# Patient Record
Sex: FEMALE | Race: WHITE | NOT HISPANIC OR LATINO | Employment: UNEMPLOYED | URBAN - NONMETROPOLITAN AREA
[De-identification: names, ages, dates, MRNs, and addresses within clinical notes are randomized per-mention and may not be internally consistent; named-entity substitution may affect disease eponyms.]

---

## 2017-01-19 ENCOUNTER — TRANSCRIBE ORDERS (OUTPATIENT)
Dept: CARDIOLOGY | Facility: CLINIC | Age: 47
End: 2017-01-19

## 2017-01-19 DIAGNOSIS — R53.81 CHRONIC FATIGUE AND MALAISE: ICD-10-CM

## 2017-01-19 DIAGNOSIS — R53.82 CHRONIC FATIGUE AND MALAISE: ICD-10-CM

## 2017-01-19 DIAGNOSIS — R00.2 PALPITATIONS: Primary | ICD-10-CM

## 2017-03-14 ENCOUNTER — PROCEDURE VISIT (OUTPATIENT)
Dept: OBSTETRICS AND GYNECOLOGY | Facility: CLINIC | Age: 47
End: 2017-03-14

## 2017-03-14 VITALS
WEIGHT: 199 LBS | BODY MASS INDEX: 31.23 KG/M2 | HEIGHT: 67 IN | DIASTOLIC BLOOD PRESSURE: 72 MMHG | SYSTOLIC BLOOD PRESSURE: 110 MMHG

## 2017-03-14 DIAGNOSIS — Z01.419 WELL WOMAN EXAM WITH ROUTINE GYNECOLOGICAL EXAM: Primary | ICD-10-CM

## 2017-03-14 DIAGNOSIS — M54.5 ACUTE BILATERAL LOW BACK PAIN, WITH SCIATICA PRESENCE UNSPECIFIED: ICD-10-CM

## 2017-03-14 DIAGNOSIS — Z12.31 ENCOUNTER FOR SCREENING MAMMOGRAM FOR MALIGNANT NEOPLASM OF BREAST: Primary | ICD-10-CM

## 2017-03-14 PROCEDURE — 88141 CYTOPATH C/V INTERPRET: CPT | Performed by: PATHOLOGY

## 2017-03-14 PROCEDURE — 88142 CYTOPATH C/V THIN LAYER: CPT | Performed by: NURSE PRACTITIONER

## 2017-03-14 PROCEDURE — 87624 HPV HI-RISK TYP POOLED RSLT: CPT | Performed by: NURSE PRACTITIONER

## 2017-03-14 PROCEDURE — 99386 PREV VISIT NEW AGE 40-64: CPT | Performed by: NURSE PRACTITIONER

## 2017-03-14 NOTE — PROGRESS NOTES
"Subjective   History of Present Illness    Kylie García is a 46 y.o. female who presents for annual exam. No GYN complaints. Patient refuses breast exam. She has had bilateral lower back pains for one month, has been to chiropractor which improves symptoms. Denies dysuria, increased urinary frequency, fever.      Postmenopausal?: No  HRT?: no  Hysterectomy?: no    Date last pap:   History of abnormal Pap smear: no  Date of last mammogram:   History of abnormal mammogram: yes - - BRCA- subsequent right mastectomy    Visit Vitals   • /72   • Ht 67\" (170.2 cm)   • Wt 199 lb (90.3 kg)   • LMP 2017 (Approximate)   • Breastfeeding No   • BMI 31.17 kg/m2       Past Medical History   Diagnosis Date   • Anxiety    • Depression    • Encounter for gynecological examination    • Malignant neoplasm of female breast      hx of dcis s/p mastectomy and reconstruction and augmentation      • Primary malignant neoplasm of breast      dcis in rt breast s/p mastectomy,reconstruction      • Ventricular premature beats        Past Surgical History   Procedure Laterality Date   • Mastectomy Right    • Breast reconstruction  10/28/2008     Abscence of right breast secondary to mastectomy. Implant exchange for reconstruction of right breast with open para-prosthetic capsulotomy   • Breast surgery  10/01/2009     Left breast ptosis and breast asymmetry secondary to right breast reconstruction for breast cancer. Left breast mastopexy with augmentation.   • Cardiac catheterization  2008     Normal coronary arteriography. Normal left ventricular angiogram   • Mastectomy  2008     Multifocal right breast ductal carcinoma-in-situ. Right total mastectomy   • Mastectomy, partial  2008     Ductal carcinoma in-situ of the right breast, proven by mammotome biopsy. Right lumpectomy, medial portion of the breast, between 2 o'clock and 4 o'clock, 5 cm from the nipple, with clip placement, radiographic " inter. [facility]. specimen, & ultrasound   • Pap smear  03/03/2009     Negative   • Breast biopsy  12/07/2007     Mammotome biopsy of the right side with clip placement       The following portions of the patient's history were reviewed and updated as appropriate: allergies, current medications, past family history, past medical history, past social history, past surgical history and problem list.    Review of Systems    Constitutional:  No fatigue, no weight loss, no weight gain, no fever, no chills   Respiratory: No dyspnea, no cough, no hemoptysis, no wheezing, no pleuritic pain   Cardiovascular: No chest pain, no palpitations, no arrhythmia, no orthopnea, no nocturnal dyspnea, no edema, no claudication   Breasts: No discharge from nipple, No breast tenderness and No breast mass   Gastrointestinal: No loss of appetite, No dysphagia, No abdominal pain, No nausea, No vomiting, No change in bowel habits, No diarrhea, No constipation and No blood in stool   Genitourinary: No increased frequency of urination, No dysuria, No hematuria, No nocturia, No urinary incontinence, No vaginal discharge, No abnormal vaginal bleeding, No pelvic pain, No menstrual problem and No menopausal problem   Skin: No skin rash, No skin lesion, No dry skin, No pruritus and No nail problem   Neurologic: No headache, No dizziness, No lightheadedness, No syncope, No vertigo, No weakness, No numbness, No tremor and No paresthesia   Psychiatric: No difficulty sleeping, No mood swings, No feeling anxious, No confusion and No memory loss          Objective   Physical Exam    General:  Alert and oriented x 3, Cooperative, Well developed & well nourished and No acute distress   Abdomen: Soft, nondistended, non-tender, without masses or organomegaly   Genitourinary: Vulva normal, vaginal mucosa normal, bladder nondistended and nontender, cervix normal, uterus normal size and nontender, no adnexal/pelvic tenderness or masses, Bartholin's/Olney/Urethral  gland WNL   Skin: Skin warm and dry and Pattern of hair growth normal       Assessment/Plan   Kylie was seen today for gynecologic exam.    Diagnoses and all orders for this visit:    Well woman exam with routine gynecological exam  -     Liquid-based Pap Smear, Screening    Acute bilateral low back pain, with sciatica presence unspecified  -     Urinalysis  -     Urine Culture      All questions answered.  Recommended self breast exam  Pap smear obtained.  Will rule out UTI.  Mammogram.  Follow up in 1 year.

## 2017-03-17 LAB
LAB AP CASE REPORT: NORMAL
LAB AP GYN ADDITIONAL INFORMATION: NORMAL
LAB AP GYN OTHER FINDINGS: NORMAL
Lab: NORMAL
PATH INTERP SPEC-IMP: NORMAL
STAT OF ADQ CVX/VAG CYTO-IMP: NORMAL

## 2017-03-21 ENCOUNTER — TELEPHONE (OUTPATIENT)
Dept: OBSTETRICS AND GYNECOLOGY | Facility: CLINIC | Age: 47
End: 2017-03-21

## 2017-03-21 LAB — HPV I/H RISK 4 DNA CVX QL PROBE+SIG AMP: NEGATIVE

## 2017-03-21 NOTE — TELEPHONE ENCOUNTER
----- Message from EVELYN Almazan sent at 3/17/2017 10:10 AM CDT -----  Send normal pap card    ----- Message -----     From: Lab, Background User     Sent: 3/17/2017  10:02 AM       To: EVELYN Almazan        Card sent.

## 2017-04-04 ENCOUNTER — OFFICE VISIT (OUTPATIENT)
Dept: SURGERY | Facility: CLINIC | Age: 47
End: 2017-04-04

## 2017-04-04 VITALS
SYSTOLIC BLOOD PRESSURE: 118 MMHG | DIASTOLIC BLOOD PRESSURE: 68 MMHG | HEIGHT: 67 IN | BODY MASS INDEX: 31.08 KG/M2 | WEIGHT: 198 LBS

## 2017-04-04 DIAGNOSIS — Z86.000 HISTORY OF DUCTAL CARCINOMA IN SITU (DCIS) OF BREAST: Primary | ICD-10-CM

## 2017-04-04 PROCEDURE — 99212 OFFICE O/P EST SF 10 MIN: CPT | Performed by: SURGERY

## 2017-04-04 NOTE — PROGRESS NOTES
46-year-old female 8 years out from right mastectomy and reconstruction for DCIS.  Patient has also undergone a left breast augmentation with implants.  No complaints mammogram done of left breast and I reviewed the films as well as the report and agree with radiologist that its a BI-RADS 2.  Patient denies any pain palpable masses nipple discharge associated with her.    Right reconstructed breast is unremarkable there is no palpable masses and no adenopathy  Left augmented breast is without any masses nipple discharge skin changes or adenopathy    Assessment and plan  History of DCIS and right breast with no evidence of disease on current mammogram and exam.  Recommend left breast mammogram in 1 year and return to clinic after the study or return to clinic sooner she has any concerns or questions

## 2017-06-26 ENCOUNTER — OFFICE VISIT (OUTPATIENT)
Dept: SURGERY | Facility: CLINIC | Age: 47
End: 2017-06-26

## 2017-06-26 ENCOUNTER — LAB (OUTPATIENT)
Dept: LAB | Facility: HOSPITAL | Age: 47
End: 2017-06-26

## 2017-06-26 VITALS
WEIGHT: 194 LBS | HEIGHT: 66 IN | SYSTOLIC BLOOD PRESSURE: 134 MMHG | BODY MASS INDEX: 31.18 KG/M2 | DIASTOLIC BLOOD PRESSURE: 72 MMHG

## 2017-06-26 DIAGNOSIS — R10.9 ABDOMINAL PAIN, UNSPECIFIED LOCATION: ICD-10-CM

## 2017-06-26 DIAGNOSIS — R10.9 ABDOMINAL PAIN, UNSPECIFIED LOCATION: Primary | ICD-10-CM

## 2017-06-26 DIAGNOSIS — M54.50 BILATERAL LOW BACK PAIN WITHOUT SCIATICA, UNSPECIFIED CHRONICITY: ICD-10-CM

## 2017-06-26 DIAGNOSIS — K80.10 CALCULUS OF GALLBLADDER WITH CHRONIC CHOLECYSTITIS WITHOUT OBSTRUCTION: ICD-10-CM

## 2017-06-26 LAB
25(OH)D3 SERPL-MCNC: 31.3 NG/ML (ref 30–100)
ALBUMIN SERPL-MCNC: 4.3 G/DL (ref 3.4–4.8)
ALP SERPL-CCNC: 364 U/L (ref 38–126)
ALT SERPL W P-5'-P-CCNC: 382 U/L (ref 9–52)
AST SERPL-CCNC: 240 U/L (ref 14–36)
BILIRUB CONJ SERPL-MCNC: 0 MG/DL (ref 0–0.3)
BILIRUB INDIRECT SERPL-MCNC: 0.3 MG/DL (ref 0–1.1)
BILIRUB SERPL-MCNC: 0.6 MG/DL (ref 0.2–1.3)
BILIRUB UR QL STRIP: NEGATIVE
CLARITY UR: CLEAR
COLOR UR: YELLOW
GLUCOSE UR STRIP-MCNC: NEGATIVE MG/DL
HGB UR QL STRIP.AUTO: NEGATIVE
KETONES UR QL STRIP: NEGATIVE
LEUKOCYTE ESTERASE UR QL STRIP.AUTO: ABNORMAL
NITRITE UR QL STRIP: NEGATIVE
PH UR STRIP.AUTO: <=5 [PH] (ref 5–9)
PROT SERPL-MCNC: 7.6 G/DL (ref 6.3–8.6)
PROT UR QL STRIP: NEGATIVE
SP GR UR STRIP: 1.01 (ref 1–1.03)
TSH SERPL DL<=0.05 MIU/L-ACNC: 1.68 MIU/ML (ref 0.46–4.68)
UROBILINOGEN UR QL STRIP: ABNORMAL

## 2017-06-26 PROCEDURE — 81003 URINALYSIS AUTO W/O SCOPE: CPT | Performed by: NURSE PRACTITIONER

## 2017-06-26 PROCEDURE — 80076 HEPATIC FUNCTION PANEL: CPT

## 2017-06-26 PROCEDURE — 36415 COLL VENOUS BLD VENIPUNCTURE: CPT

## 2017-06-26 PROCEDURE — 84443 ASSAY THYROID STIM HORMONE: CPT

## 2017-06-26 PROCEDURE — 87086 URINE CULTURE/COLONY COUNT: CPT | Performed by: NURSE PRACTITIONER

## 2017-06-26 PROCEDURE — 82306 VITAMIN D 25 HYDROXY: CPT

## 2017-06-26 PROCEDURE — 99214 OFFICE O/P EST MOD 30 MIN: CPT | Performed by: SURGERY

## 2017-06-26 NOTE — PROGRESS NOTES
Orient[age]-year-old female who I have seen in the past for history of DCIS.  Patient underwent a mastectomy nearly 10 years ago and subsequent tamoxifen treatment for 5 years.  We've been following on annual basis last time was in April of this year and her mammogram at that time of the left breast was unremarkable.  Recently the patient did not feel well for a few months and intermittent epigastric pain and more significant back pain and lower back.  Patient presented to her primary care physician for an ultrasound which I have reviewed myself she does have gallstones in her common bile duct is 5-1/2 mm.  LFTs demonstrated a alkaline phosphatase of 212 and mildly elevated transaminases with a total normal bilirubin.  Patient denies being jaundiced or any history of pancreatitis.  This epigastric pain which she points to the midline can happen anytime does not directly related to meals.  She was also sent for a bone scan and it is hot at L4 and according to the report a few areas in the ribs as well as a skull to suggest possibly metastatic disease.  No other workup is been done.  Patient has been set up to see medical oncology on Thursday Dr. Meadows.    Social History     Social History   • Marital status:      Spouse name: N/A   • Number of children: N/A   • Years of education: N/A     Occupational History   • Not on file.     Social History Main Topics   • Smoking status: Never Smoker   • Smokeless tobacco: Never Used   • Alcohol use No   • Drug use: No   • Sexual activity: Defer     Other Topics Concern   • Not on file     Social History Narrative       Past Medical History:   Diagnosis Date   • Anxiety    • Depression    • Encounter for gynecological examination    • Malignant neoplasm of female breast     hx of dcis s/p mastectomy and reconstruction and augmentation      • Primary malignant neoplasm of breast     dcis in rt breast s/p mastectomy,reconstruction      • Ventricular premature beats         Family History   Problem Relation Age of Onset   • No Known Problems Mother    • No Known Problems Father    • Diabetes Other    • Multiple sclerosis Other        Past Surgical History:   Procedure Laterality Date   • AUGMENTATION MAMMAPLASTY     • BREAST BIOPSY  12/07/2007    Mammotome biopsy of the right side with clip placement   • BREAST LUMPECTOMY     • BREAST RECONSTRUCTION  10/28/2008    Abscence of right breast secondary to mastectomy. Implant exchange for reconstruction of right breast with open para-prosthetic capsulotomy   • BREAST SURGERY  10/01/2009    Left breast ptosis and breast asymmetry secondary to right breast reconstruction for breast cancer. Left breast mastopexy with augmentation.   • CARDIAC CATHETERIZATION  09/18/2008    Normal coronary arteriography. Normal left ventricular angiogram   • MASTECTOMY Right    • MASTECTOMY  02/05/2008    Multifocal right breast ductal carcinoma-in-situ. Right total mastectomy   • MASTECTOMY, PARTIAL  01/03/2008    Ductal carcinoma in-situ of the right breast, proven by mammotome biopsy. Right lumpectomy, medial portion of the breast, between 2 o'clock and 4 o'clock, 5 cm from the nipple, with clip placement, radiographic inter. of severo. specimen, & ultrasound   • PAP SMEAR  03/03/2009    Negative     Alert and appropriate nontoxic-appearing  Nonicteric  Neck soft there was masses  Lungs clear heart regular rate and rhythm  Abdomen soft  Skin warm and dry  Extremities unremarkable    Reviewed the ultrasound and bone scan        Assessment and plan    Less likely metastatic breast cancer with his history of DCIS and appropriate treatment.  Biopsy cannot be ruled out which is this amount of information.  I would recommend we repeat her liver function tests which we will do the day and she should go and follow up with Dr. Meadows as planned.  He will most likely recommend further workup to evaluate this bone scan.  At some point she may need to have her  gallbladder removed and we talked about that and I went over that with her using a pamphlet and we discussed the procedure as well as alternatives risk and benefits and she understands surgery for a laparoscopic cholecystectomy with interoperative cholangiogram.  Patient will follow with us after she sees Dr. Meadows and we ordered LFTs to be obtained today so that they will be available for Dr. Meadows on Thursday                                                      EMR Dragon/Transcription disclaimer:  Much of this encounter note is on electronic transcription/translation of spoken language to printed text.  The electronic translation of spoken language may permit erroneous or at times, nonsensical words or phrases to be inadvertently transcribed;  Although I have reviewed the note for such errors, some may still exist.

## 2017-06-27 LAB — BACTERIA SPEC AEROBE CULT: NORMAL

## 2017-06-29 ENCOUNTER — CONSULT (OUTPATIENT)
Dept: ONCOLOGY | Facility: CLINIC | Age: 47
End: 2017-06-29

## 2017-06-29 ENCOUNTER — LAB (OUTPATIENT)
Dept: ONCOLOGY | Facility: HOSPITAL | Age: 47
End: 2017-06-29

## 2017-06-29 ENCOUNTER — DOCUMENTATION (OUTPATIENT)
Dept: ONCOLOGY | Facility: CLINIC | Age: 47
End: 2017-06-29

## 2017-06-29 VITALS
SYSTOLIC BLOOD PRESSURE: 123 MMHG | TEMPERATURE: 98.1 F | BODY MASS INDEX: 31.1 KG/M2 | WEIGHT: 192.7 LBS | HEART RATE: 68 BPM | DIASTOLIC BLOOD PRESSURE: 71 MMHG | RESPIRATION RATE: 16 BRPM

## 2017-06-29 DIAGNOSIS — R79.89 ELEVATED LIVER FUNCTION TESTS: ICD-10-CM

## 2017-06-29 DIAGNOSIS — C79.51 METASTASIS TO BONE OF UNKNOWN PRIMARY (HCC): Primary | ICD-10-CM

## 2017-06-29 DIAGNOSIS — Z86.000 HISTORY OF DUCTAL CARCINOMA IN SITU (DCIS) OF BREAST: ICD-10-CM

## 2017-06-29 DIAGNOSIS — C80.1 METASTASIS TO BONE OF UNKNOWN PRIMARY (HCC): ICD-10-CM

## 2017-06-29 DIAGNOSIS — Z85.820 HISTORY OF MALIGNANT MELANOMA OF SKIN: ICD-10-CM

## 2017-06-29 DIAGNOSIS — C80.1 METASTASIS TO BONE OF UNKNOWN PRIMARY (HCC): Primary | ICD-10-CM

## 2017-06-29 DIAGNOSIS — C79.51 METASTASIS TO BONE OF UNKNOWN PRIMARY (HCC): ICD-10-CM

## 2017-06-29 LAB
ALBUMIN SERPL-MCNC: 4.3 G/DL (ref 3.4–4.8)
ALBUMIN/GLOB SERPL: 1.1 G/DL (ref 1.1–1.8)
ALP SERPL-CCNC: 415 U/L (ref 38–126)
ALT SERPL W P-5'-P-CCNC: 337 U/L (ref 9–52)
ANION GAP SERPL CALCULATED.3IONS-SCNC: 15 MMOL/L (ref 5–15)
AST SERPL-CCNC: 200 U/L (ref 14–36)
BASOPHILS # BLD AUTO: 0.05 10*3/MM3 (ref 0–0.2)
BASOPHILS NFR BLD AUTO: 0.6 % (ref 0–2)
BILIRUB SERPL-MCNC: 0.7 MG/DL (ref 0.2–1.3)
BUN BLD-MCNC: 17 MG/DL (ref 7–21)
BUN/CREAT SERPL: 19.3 (ref 7–25)
CALCIUM SPEC-SCNC: 9.6 MG/DL (ref 8.4–10.2)
CHLORIDE SERPL-SCNC: 101 MMOL/L (ref 95–110)
CO2 SERPL-SCNC: 24 MMOL/L (ref 22–31)
CREAT BLD-MCNC: 0.88 MG/DL (ref 0.5–1)
DEPRECATED RDW RBC AUTO: 44.1 FL (ref 36.4–46.3)
EOSINOPHIL # BLD AUTO: 0.14 10*3/MM3 (ref 0–0.7)
EOSINOPHIL NFR BLD AUTO: 1.8 % (ref 0–7)
ERYTHROCYTE [DISTWIDTH] IN BLOOD BY AUTOMATED COUNT: 13.4 % (ref 11.5–14.5)
GFR SERPL CREATININE-BSD FRML MDRD: 68 ML/MIN/1.73 (ref 60–135)
GLOBULIN UR ELPH-MCNC: 3.9 GM/DL (ref 2.3–3.5)
GLUCOSE BLD-MCNC: 89 MG/DL (ref 60–100)
HCT VFR BLD AUTO: 37 % (ref 35–45)
HGB BLD-MCNC: 12.9 G/DL (ref 12–15.5)
IMM GRANULOCYTES # BLD: 0.02 10*3/MM3 (ref 0–0.02)
IMM GRANULOCYTES NFR BLD: 0.3 % (ref 0–0.5)
LYMPHOCYTES # BLD AUTO: 2.68 10*3/MM3 (ref 0.6–4.2)
LYMPHOCYTES NFR BLD AUTO: 34.5 % (ref 10–50)
MCH RBC QN AUTO: 31.5 PG (ref 26.5–34)
MCHC RBC AUTO-ENTMCNC: 34.9 G/DL (ref 31.4–36)
MCV RBC AUTO: 90.2 FL (ref 80–98)
MONOCYTES # BLD AUTO: 0.76 10*3/MM3 (ref 0–0.9)
MONOCYTES NFR BLD AUTO: 9.8 % (ref 0–12)
NEUTROPHILS # BLD AUTO: 4.11 10*3/MM3 (ref 2–8.6)
NEUTROPHILS NFR BLD AUTO: 53 % (ref 37–80)
PLATELET # BLD AUTO: 369 10*3/MM3 (ref 150–450)
PMV BLD AUTO: 11.4 FL (ref 8–12)
POTASSIUM BLD-SCNC: 4.1 MMOL/L (ref 3.5–5.1)
PROT SERPL-MCNC: 8.2 G/DL (ref 6.3–8.6)
RBC # BLD AUTO: 4.1 10*6/MM3 (ref 3.77–5.16)
SODIUM BLD-SCNC: 140 MMOL/L (ref 137–145)
WBC NRBC COR # BLD: 7.76 10*3/MM3 (ref 3.2–9.8)

## 2017-06-29 PROCEDURE — 86300 IMMUNOASSAY TUMOR CA 15-3: CPT | Performed by: INTERNAL MEDICINE

## 2017-06-29 PROCEDURE — 85025 COMPLETE CBC W/AUTO DIFF WBC: CPT | Performed by: INTERNAL MEDICINE

## 2017-06-29 PROCEDURE — 36415 COLL VENOUS BLD VENIPUNCTURE: CPT | Performed by: INTERNAL MEDICINE

## 2017-06-29 PROCEDURE — 80053 COMPREHEN METABOLIC PANEL: CPT | Performed by: INTERNAL MEDICINE

## 2017-06-29 PROCEDURE — 99204 OFFICE O/P NEW MOD 45 MIN: CPT | Performed by: INTERNAL MEDICINE

## 2017-06-29 NOTE — PROGRESS NOTES
LCSW met with patient in  Exam room prior to her initial medical oncology visit.  Patient is accompanied by her , Benny.  Pt. Referred having recently been diagnosed with metastatic disease of unknown origin.  Per history, she has history of breast cancer, DCIS and melanoma, surgically removed.    SW introduced self and explained role, hours and contact information provided.  SW presented pt with distress screen and discussed stressors as pt. Completed.    Patient's distress tool was reviewed and discussed with patient.  Patient scored #7  on scale and marked indicators of  Fear and worry that she states is due to the unknown aspects of her recent diagnostics. Patient presents this day with bright affect and mood congruent to situation. Pt. evidences good eye contact and easily engages.  She describes positive family support from her  and daughter,who she notes is a . Pt. Describes adaptive coping and primary focus of this visit is seeking for answers related to her diagnosis.  Patient responses present appropriate given her current situation.   Pt. Presents as a reliable historian.  Collaborative feedback obtained from her .   Pt. Responded receptive to SW feedback. LCSW offered emotional support as pt was encouraged to share her thoughts and feelings.  Feelings were validated and education provided in regard to [facility] services and supportive care resources to include financial counselor, dietician and patient navigator.No immediate SW needs were observed or reported.  Patient states understanding and availability of SW services and willingness to contact undersigned as needs or concerns arise.  Pt was scheduled for additional diagnostics with plan to return to  in one week.

## 2017-06-29 NOTE — PROGRESS NOTES
DATE OF CONSULT: 6/29/2017    REQUESTING SOURCE:  Jason Irvin MD      REASON FOR CONSULTATION:  Suspected bone metastasis on nuclear bone scan and history of ductal carcinoma in situ of right breast      HISTORY OF PRESENT ILLNESS:    47-year-old female with a past medical history significant for history of ductal carcinoma in situ of the right breast diagnosed in December 2007 patient eventually had a mastectomy done in February 2008 and took adjuvant tamoxifen for 5 years until 2013.  Patient also had a history of melanoma of left shoulder which was surgically excised by dermatology, pathology report of which is not available to for review at this point.  Patient started not feeling well around November of last year with intermittent abdominal pain especially in the right upper quadrant and epigastric region.  I don't February 2017 she started having severe and worsening back pain.  Initially patient was seen by chiropractor and had x-rays done which showed scoliosis and arthritis.  Subsequently in view of worsening pain patient had a bone scan done by primary medical doctor on June 20, 2017 which showed increased activity in the region of L4 vertebrae, ribs and calvarium consistent with metastatic disease.  [facility] regarding newly diagnosed suspected bone metastasis.  Patient denies any new skin lesions.  Denies any abnormal swollen and anywhere in the body.  Denies any pain in the body except for pain in lower back.  Denies any vision changes, denies any nausea or vomiting or headaches.  Admits to 4 pound weight loss recently and poor appetite.  Denies any blood in the stool or urine.        PAST MEDICAL HISTORY:    Past Medical History:   Diagnosis Date   • Anxiety    • Depression    • Encounter for gynecological examination    • Malignant neoplasm of female breast     hx of dcis s/p mastectomy and reconstruction and augmentation      •  Primary malignant neoplasm of breast     dcis in rt breast s/p mastectomy,reconstruction      • Ventricular premature beats        PAST SURGICAL HISTORY:  Past Surgical History:   Procedure Laterality Date   • AUGMENTATION MAMMAPLASTY     • BREAST BIOPSY  12/07/2007    Mammotome biopsy of the right side with clip placement   • BREAST LUMPECTOMY     • BREAST RECONSTRUCTION  10/28/2008    Abscence of right breast secondary to mastectomy. Implant exchange for reconstruction of right breast with open para-prosthetic capsulotomy   • BREAST SURGERY  10/01/2009    Left breast ptosis and breast asymmetry secondary to right breast reconstruction for breast cancer. Left breast mastopexy with augmentation.   • CARDIAC CATHETERIZATION  09/18/2008    Normal coronary arteriography. Normal left ventricular angiogram   • MASTECTOMY Right    • MASTECTOMY  02/05/2008    Multifocal right breast ductal carcinoma-in-situ. Right total mastectomy   • MASTECTOMY, PARTIAL  01/03/2008    Ductal carcinoma in-situ of the right breast, proven by mammotome biopsy. Right lumpectomy, medial portion of the breast, between 2 o'clock and 4 o'clock, 5 cm from the nipple, with clip placement, radiographic inter. of severo. specimen, & ultrasound   • PAP SMEAR  03/03/2009    Negative       ALLERGIES:    Allergies   Allergen Reactions   • Percocet [Oxycodone-Acetaminophen] Nausea And Vomiting       SOCIAL HISTORY:   Social History   Substance Use Topics   • Smoking status: Never Smoker   • Smokeless tobacco: Never Used   • Alcohol use No       CURRENT MEDICATIONS:    Current Outpatient Prescriptions   Medication Sig Dispense Refill   • escitalopram (LEXAPRO) 10 MG tablet 15 mg daily.       No current facility-administered medications for this visit.         HOME MEDICATIONS:   Current Outpatient Prescriptions on File Prior to Visit   Medication Sig Dispense Refill   • escitalopram (LEXAPRO) 10 MG tablet 15 mg daily.       No current facility-administered  medications on file prior to visit.        FAMILY HISTORY:    Family History   Problem Relation Age of Onset   • Anemia Mother    • Multiple sclerosis Mother    • No Known Problems Father    • Diabetes Other    • Multiple sclerosis Other        REVIEW OF SYSTEMS:      CONSTITUTIONAL:  Complains of fatigue.Positive for decreased appetite.  Positive for fear pound weight loss recently.  Denies any fever, chills .     HEENT:  No epistaxis, mouth sores or difficulty swallowing.    RESPIRATORY: Complains of shortness of breath with exertion that started a few months back.  No new cough or hemoptysis.    CARDIOVASCULAR:  No chest pain or palpitations.    GASTROINTESTINAL: Positive for abdominal pain in epigastric region.  No  nausea, vomiting or blood in the stool.    GENITOURINARY: No Dysuria or Hematuria.    MUSCULOSKELETAL: Complains of pain in lower back radiating down to left side wall and flank region.    LYMPHATICS:  Denies any abnormal swollen glands anywhere in the body.    NEUROLOGICAL : No tingling or numbness. No headache or dizziness. No seizures or balance problems.    SKIN: Positive for history of melanoma status post surgical removal.  Denies any new skin lesion worrisome for melanoma.        PHYSICAL EXAMINATION:      VITAL SIGNS:  Temp:  [98.1 °F (36.7 °C)] 98.1 °F (36.7 °C)  Heart Rate:  [68] 68  Resp:  [16] 16  BP: (123)/(71) 123/71    GENERAL:  Not in any distress.    HEENT:  Normocephalic, Atraumatic.Eyes  Shows mild pallor. No icterus. Extraocular Movements Intact. No Facial Asymmetry noted.    NECK:  No adenopathy. NO JVD.    RESPIRATORY:  Fair air entry bilateral. No rhonchi or wheezing.    CARDIOVASCULAR:  S1, S2. Regular rate and rhythm. No murmur or gallop appreciated.    ABDOMEN:  Soft, obese, nontender. Bowel sounds present in all four quadrants.  No organomegaly appreciated.    EXTREMITIES:  No edema.No Calf Tenderness.    NEUROLOGIC:  Alert, awake and oriented ×3.  No  Motor or sensory  deficit appreciated. Cranial Nerves 2-12 grossly intact.    SKIN : Evidence of a surgical scar present in the left shoulder.    DIAGNOSTIC DATA:    No results found for: WBC, RBC, HGB, HCT, MCV, MCH, MCHC, RDW, RDWSD, MPV, PLT, NEUTRORELPCT, LYMPHORELPCT, MONORELPCT, EOSRELPCT, BASORELPCT, AUTOIGPER, NEUTROABS, LYMPHSABS, MONOSABS, EOSABS, BASOSABS, AUTOIGNUM, NRBC  Alkaline Phosphatase   Date Value Ref Range Status   06/26/2017 364 (H) 38 - 126 U/L Final     Total Protein   Date Value Ref Range Status   06/26/2017 7.6 6.3 - 8.6 g/dL Final     ALT (SGPT)   Date Value Ref Range Status   06/26/2017 382 (H) 9 - 52 U/L Final     AST (SGOT)   Date Value Ref Range Status   06/26/2017 240 (H) 14 - 36 U/L Final     Total Bilirubin   Date Value Ref Range Status   06/26/2017 0.6 0.2 - 1.3 mg/dL Final     Albumin   Date Value Ref Range Status   06/26/2017 4.30 3.40 - 4.80 g/dL Final     No results found for: IRON, TIBC, LABIRON, FERRITIN, GQXVRCGO70, FOLATE  No results found for: , LABCA2, AFPTM, HCGQUANT, , CHROMGRNA, 2VUAT80RCJ, CEA, REFLABREPO]    Radiology Data :  Nuclear bone scan done on June 20, 2017 showed:  1.  Increased uptake at L4 suggestive of metastatic process.  2.  3 or 4 areas of increased activity in bony calvarium  3.  Some increased activity in tips posteriorly and anteriorly suggestive of metastatic carcinoma to the skeletal system.        Pathology :  Pathology report from December 7, 2007 showed:  FINAL DIAGNOSIS:   RIGHT BREAST MAMMOTOME BIOPSY:        DUCTAL CARCINOMA IN SITU, INTERMEDIATE NUCLEAR GRADE              WITH MICROCALCIFICATION.     ADDENDUM:   Estrogen receptors are positive (90-95% stainability).   Progesterone receptors are positive (90-95% stainability).         ASSESSMENT AND PLAN:      1.  Suspected bone metastasis: Most likely secondary to metastatic focus in L4 vertebral body.  Bone scan done on June 20, 2017 shows abnormal uptake in as well as bilateral hips and calvarium  worrisome force bone metastasis.  Patient does have a history of ductal carcinoma in situ of right breast as well as melanoma of skin.  Both of them can metastasize to bone.  At this point we will schedule patient for PET CT to see if there is involvement of any other organ or any other area in the bone.  The result of nuclear bone scan were discussed with patient and her .  It was discussed with them most likely it's related to malignancy but depending on PET CT will need to get some kind of tissue diagnosis with biopsy.  Further recommendation based on biopsy result.  We will see her back in about one week to go over the result of biopsy.  We'll also do some blood work today with CBC, CMP, CA-15-3 and CA-27-29.    2.  History of ductal carcinoma in situ of right breast: Patient was initially diagnosed in December 2007.  It was positive for estrogen 90-95%, positive for progesterone 90-95%.  Patient had a mastectomy in February 2008 after that she took tamoxifen for 5 years until 2013.    3.  History of melanoma of skin: We do not have pathology report available to review how deep was melanoma at the time of excision.  We will try to obtain the result of pathology.    4.  History of irregular heart rate: Status post ablation    5.  Elevated liver function test: Most recently done liver function test is extremely elevated on patient with AST 40, , alkaline phosphatase 384.  It may be related to metastatic focus in the liver.  PET/CT showed better delineated there is any involvement of liver with cancer.  Patient does have a history of gallstones which can also elevat liver function test.    6.  Health maintenance: She does not smoke.  She is 47 years of age and not due for colonoscopy at this point.  Remains full code.            Thank you for this consultation.    Ovidio Meadows MD  6/29/2017  4:42 PM          EMR Dragon/Transcription disclaimer:   Much of this encounter note is an electronic  transcription/translation of spoken language to printed text. The electronic translation of spoken language may permit erroneous, or at times, nonsensical words or phrases to be inadvertently transcribed; Although I have reviewed the note for such errors, some may still exist.

## 2017-06-29 NOTE — PATIENT INSTRUCTIONS
PET Scan  A PET scan, also called positron emission tomography, is a test that creates pictures of the inside of the body. A PET scan requires a small dose of a harmless radioactive material to be injected into a vein. When this material combines with certain substances in the body, it produces tiny particles that can be detected by a scanner and converted into pictures.   The pictures created during a PET scan can be used to study a disease. They are often used to study cancer and cancer therapy. The colors and brightness on the pictures show different levels of organ and tissue function. For example, cancer tissue appears brighter than normal tissue on a PET scan picture.  LET YOUR HEALTH CARE PROVIDER KNOW ABOUT:   · Any allergies you have.  · All medicines you are taking, including vitamins, herbs, eye drops, creams, and over-the-counter medicines.  · Previous problems you or members of your family have had with the use of anesthetics.  · Any blood disorders you have.  · Previous surgeries you have had.  · Medical conditions you have.  · If you are afraid of cramped spaces (claustrophobic). If claustrophobia is a problem, it usually can be relieved with mild sedatives or antianxiety medicines.  · If you have trouble staying still for long periods of time.  BEFORE THE PROCEDURE   · Do not eat or drink anything after midnight on the night before the procedure or as directed by your health care provider.  · Take medicines only as directed by your health care provider.  · If you have diabetes, ask your health care provider for diet guidelines to control sugar (glucose) levels on the day of the test.  PROCEDURE   · A small amount of radioactive material will be injected into a vein. The test will begin 30-60 minutes after the injection, when the material has traveled around your body.  · You will lie on a cushioned table, and the table will be moved through the center of a machine that looks like a large donut. It  will take about 30-60 minutes for the machine to produce pictures of your body. You will need to stay still during this time.  AFTER THE PROCEDURE  · You may resume your normal diet and activities.  · Drink several 8 oz glasses of water following the test to flush the radioactive material out of your body.     This information is not intended to replace advice given to you by your health care provider. Make sure you discuss any questions you have with your health care provider.     Document Released: 06/23/2004 Document Revised: 01/08/2016 Document Reviewed: 04/01/2015  [employer] Patient Education ©2017 [employer]

## 2017-07-02 LAB
CANCER AG15-3 SERPL-ACNC: 2107 U/ML (ref 0–25)
CANCER AG27-29 SERPL-ACNC: 1817.1 U/ML (ref 0–38.6)

## 2017-07-03 ENCOUNTER — HOSPITAL ENCOUNTER (OUTPATIENT)
Dept: PET IMAGING | Facility: HOSPITAL | Age: 47
Discharge: HOME | End: 2017-07-03
Admitting: INTERNAL MEDICINE

## 2017-07-03 DIAGNOSIS — C79.51 METASTASIS TO BONE OF UNKNOWN PRIMARY (HCC): ICD-10-CM

## 2017-07-03 DIAGNOSIS — C80.1 METASTASIS TO BONE OF UNKNOWN PRIMARY (HCC): ICD-10-CM

## 2017-07-03 PROCEDURE — A9552 F18 FDG: HCPCS | Performed by: INTERNAL MEDICINE

## 2017-07-03 PROCEDURE — 0 FLUDEOXYGLUCOSE F18 SOLUTION: Performed by: INTERNAL MEDICINE

## 2017-07-03 PROCEDURE — 78815 PET IMAGE W/CT SKULL-THIGH: CPT

## 2017-07-03 RX ADMIN — FLUDEOXYGLUCOSE F18 1 DOSE: 300 INJECTION INTRAVENOUS at 09:06

## 2017-07-05 ENCOUNTER — PREP FOR SURGERY (OUTPATIENT)
Dept: OTHER | Facility: HOSPITAL | Age: 47
End: 2017-07-05

## 2017-07-07 ENCOUNTER — ANESTHESIA EVENT (OUTPATIENT)
Dept: PERIOP | Facility: HOSPITAL | Age: 47
End: 2017-07-07

## 2017-07-07 ENCOUNTER — OFFICE VISIT (OUTPATIENT)
Dept: SURGERY | Facility: CLINIC | Age: 47
End: 2017-07-07

## 2017-07-07 ENCOUNTER — OFFICE VISIT (OUTPATIENT)
Dept: ONCOLOGY | Facility: CLINIC | Age: 47
End: 2017-07-07

## 2017-07-07 ENCOUNTER — APPOINTMENT (OUTPATIENT)
Dept: PREADMISSION TESTING | Facility: HOSPITAL | Age: 47
End: 2017-07-07

## 2017-07-07 VITALS
HEIGHT: 67 IN | BODY MASS INDEX: 30.13 KG/M2 | SYSTOLIC BLOOD PRESSURE: 104 MMHG | WEIGHT: 192 LBS | DIASTOLIC BLOOD PRESSURE: 66 MMHG

## 2017-07-07 VITALS
DIASTOLIC BLOOD PRESSURE: 67 MMHG | HEIGHT: 67 IN | TEMPERATURE: 97 F | HEART RATE: 68 BPM | BODY MASS INDEX: 30.04 KG/M2 | WEIGHT: 191.4 LBS | RESPIRATION RATE: 18 BRPM | SYSTOLIC BLOOD PRESSURE: 122 MMHG

## 2017-07-07 VITALS — WEIGHT: 191 LBS | BODY MASS INDEX: 29.98 KG/M2 | HEIGHT: 67 IN

## 2017-07-07 DIAGNOSIS — C80.1 METASTASIS TO BONE OF UNKNOWN PRIMARY (HCC): Primary | ICD-10-CM

## 2017-07-07 DIAGNOSIS — C79.9 METASTATIC DISEASE (HCC): Primary | ICD-10-CM

## 2017-07-07 DIAGNOSIS — C79.51 METASTASIS TO BONE OF UNKNOWN PRIMARY (HCC): Primary | ICD-10-CM

## 2017-07-07 PROCEDURE — 99214 OFFICE O/P EST MOD 30 MIN: CPT | Performed by: INTERNAL MEDICINE

## 2017-07-07 PROCEDURE — 99214 OFFICE O/P EST MOD 30 MIN: CPT | Performed by: SURGERY

## 2017-07-07 PROCEDURE — 93010 ELECTROCARDIOGRAM REPORT: CPT | Performed by: INTERNAL MEDICINE

## 2017-07-07 PROCEDURE — 93005 ELECTROCARDIOGRAM TRACING: CPT

## 2017-07-07 PROCEDURE — G0463 HOSPITAL OUTPT CLINIC VISIT: HCPCS | Performed by: INTERNAL MEDICINE

## 2017-07-07 PROCEDURE — 93005 ELECTROCARDIOGRAM TRACING: CPT | Performed by: ANESTHESIOLOGY

## 2017-07-07 RX ORDER — SODIUM CHLORIDE, SODIUM GLUCONATE, SODIUM ACETATE, POTASSIUM CHLORIDE, AND MAGNESIUM CHLORIDE 526; 502; 368; 37; 30 MG/100ML; MG/100ML; MG/100ML; MG/100ML; MG/100ML
1000 INJECTION, SOLUTION INTRAVENOUS CONTINUOUS PRN
Status: CANCELLED | OUTPATIENT
Start: 2017-07-10

## 2017-07-07 RX ORDER — NAPROXEN SODIUM 220 MG
220 TABLET ORAL AS NEEDED
COMMUNITY
End: 2019-04-19

## 2017-07-07 NOTE — DISCHARGE INSTRUCTIONS
[facility]  Pre-op Information and Guidelines    You will be called after 2 p.m. the day before your surgery (Friday for Monday surgery) and notified of your time for arrival and approximate surgery time.  If you have not received a call by 4P.M., please contact Same Day Surgery at [phone] of if outside [facility] call [phone].    Please Follow these Important Safety Guidelines:    • The morning of your procedure, take only the medications listed below with   A sip of water:_____________________________________________       ______________________________________________    • DO NOT eat or drink anything after 12:00 midnight the night before surgery  Specific instructions concerning drinking clear liquids will be discussed during  the pre-surgery instruction call the day before your surgery.    • If you take a blood thinner (ex. Plavix, Coumadin, aspirin), ask your doctor when to stop it before surgery  STOP DATE: _________________    • Only 2 visitors are allowed in patient rooms at a time  Your visitors will be asked to wait in the lobby until the admission process is complete with the exception of a parent with a child and patients in need of special assistance.    • YOU CANNOT DRIVE YOURSELF HOME  You must be accompanied by someone who will be responsible for driving you home after surgery and for your care at home.    • DO NOT chew gum, use breath mints, hard candy, or smoke the day of surgery  • DO NOT drink alcohol for at least 24 hours before your surgery  • DO NOT wear any jewelry and remove all body piercing before coming to the hospital  • DO NOT wear make-up to the hospital  • If you are having surgery on an extremity (arm/leg/foot) remove nail polish/artificial nails on the surgical side  • Clothing, glasses, contacts, dentures, and hairpieces must be removed before surgery  • Bathe the night before or the morning of your surgery and do not use powders/lotions on  skin.

## 2017-07-07 NOTE — PROGRESS NOTES
DATE OF VISIT: 7/7/2017    REASON FOR VISIT:  Metastatic cancer most likely of breast origin    HISTORY OF PRESENT ILLNESS:    47-year-old female with a past medical history significant for history of ductal carcinoma in situ of the right breast diagnosed in December 2007 patient eventually had a mastectomy done in February 2008 and took adjuvant tamoxifen for 5 years until 2013. Patient also had a history of melanoma in situ of left shoulder diagnosed in September 2016 status postsurgical excision by Dr. Linn.  Patient was initially seen in consultation on June 29, 2017 for evaluation of abnormal bone scan which was done for back pain.  Patient had a PET/CT done earlier this week.  She is here to discuss the result of PET/CT and further recommendation.  Still complains of pain in her back and hip radiating down to leg.  Denies any blood in the stool or urine.    PAST MEDICAL HISTORY:    Past Medical History:   Diagnosis Date   • Anxiety    • Depression    • Encounter for gynecological examination    • Malignant neoplasm of female breast     hx of dcis s/p mastectomy and reconstruction and augmentation      • Primary malignant neoplasm of breast     dcis in rt breast s/p mastectomy,reconstruction      • Ventricular premature beats        SOCIAL HISTORY:    Social History   Substance Use Topics   • Smoking status: Never Smoker   • Smokeless tobacco: Never Used   • Alcohol use No       Surgical History :  Past Surgical History:   Procedure Laterality Date   • AUGMENTATION MAMMAPLASTY     • BREAST BIOPSY  12/07/2007    Mammotome biopsy of the right side with clip placement   • BREAST LUMPECTOMY     • BREAST RECONSTRUCTION  10/28/2008    Abscence of right breast secondary to mastectomy. Implant exchange for reconstruction of right breast with open para-prosthetic capsulotomy   • BREAST SURGERY  10/01/2009    Left breast ptosis and breast asymmetry secondary to right breast reconstruction for breast cancer. Left breast  "mastopexy with augmentation.   • CARDIAC CATHETERIZATION  09/18/2008    Normal coronary arteriography. Normal left ventricular angiogram   • MASTECTOMY Right    • MASTECTOMY  02/05/2008    Multifocal right breast ductal carcinoma-in-situ. Right total mastectomy   • MASTECTOMY, PARTIAL  01/03/2008    Ductal carcinoma in-situ of the right breast, proven by mammotome biopsy. Right lumpectomy, medial portion of the breast, between 2 o'clock and 4 o'clock, 5 cm from the nipple, with clip placement, radiographic inter. of severo. specimen, & ultrasound   • PAP SMEAR  03/03/2009    Negative       ALLERGIES:    Allergies   Allergen Reactions   • Percocet [Oxycodone-Acetaminophen] Nausea And Vomiting       REVIEW OF SYSTEMS:      CONSTITUTIONAL: Complains of fatigue.Positive for decreased appetite. Positive for four pound weight loss recently.  Denies any fever, chills .      HEENT: No epistaxis, mouth sores or difficulty swallowing.     RESPIRATORY: Complains of shortness of breath with exertion that started a few months back. No new cough or hemoptysis.     CARDIOVASCULAR: No chest pain or palpitations.     GASTROINTESTINAL: Positive for abdominal pain in epigastric region. No nausea, vomiting or blood in the stool.     GENITOURINARY: No Dysuria or Hematuria.     MUSCULOSKELETAL: Complains of pain in lower back radiating down to left side wall and flank region.     LYMPHATICS: Denies any abnormal swollen glands anywhere in the body.     NEUROLOGICAL : No tingling or numbness. No headache or dizziness. No seizures or balance problems.     SKIN: Positive for history of melanoma status post surgical removal. Denies any new skin lesion worrisome for melanoma.  Complains of pruritus follower body.        PHYSICAL EXAMINATION:      VITAL SIGNS:  /67  Pulse 68  Temp 97 °F (36.1 °C)  Resp 18  Ht 67\" (170.2 cm)  Wt 191 lb 6.4 oz (86.8 kg)  LMP 06/29/2017  BMI 29.98 kg/m2    GENERAL:  Not in any distress.    HEENT:  " Normocephalic, Atraumatic.Mild Conjunctival pallor. No icterus. Extraocular Movements Intact. No Facial Asymmetry noted.    NECK:  No adenopathy. No JVD.    RESPIRATORY:  Fair air entry bilateral. No rhonchi or wheezing.    CARDIOVASCULAR:  S1, S2. Regular rate and rhythm. No murmur or gallop appreciated.    ABDOMEN:  Soft, obese, nontender. Bowel sounds present in all four quadrants.  No organomegaly appreciated.    EXTREMITIES:  No edema.No Calf Tenderness.    NEUROLOGIC:  Alert, awake and oriented ×3.  No  Motor or sensory deficit appreciated. Cranial Nerves 2-12 grossly intact.      DIAGNOSTIC DATA:    Glucose   Date Value Ref Range Status   06/29/2017 89 60 - 100 mg/dL Final     Sodium   Date Value Ref Range Status   06/29/2017 140 137 - 145 mmol/L Final     Potassium   Date Value Ref Range Status   06/29/2017 4.1 3.5 - 5.1 mmol/L Final     CO2   Date Value Ref Range Status   06/29/2017 24.0 22.0 - 31.0 mmol/L Final     Chloride   Date Value Ref Range Status   06/29/2017 101 95 - 110 mmol/L Final     Anion Gap   Date Value Ref Range Status   06/29/2017 15.0 5.0 - 15.0 mmol/L Final     Creatinine   Date Value Ref Range Status   06/29/2017 0.88 0.50 - 1.00 mg/dL Final     BUN   Date Value Ref Range Status   06/29/2017 17 7 - 21 mg/dL Final     BUN/Creatinine Ratio   Date Value Ref Range Status   06/29/2017 19.3 7.0 - 25.0 Final     Calcium   Date Value Ref Range Status   06/29/2017 9.6 8.4 - 10.2 mg/dL Final     eGFR Non  Amer   Date Value Ref Range Status   06/29/2017 68 >60 mL/min/1.73 Final     Alkaline Phosphatase   Date Value Ref Range Status   06/29/2017 415 (H) 38 - 126 U/L Final     Total Protein   Date Value Ref Range Status   06/29/2017 8.2 6.3 - 8.6 g/dL Final     ALT (SGPT)   Date Value Ref Range Status   06/29/2017 337 (H) 9 - 52 U/L Final     AST (SGOT)   Date Value Ref Range Status   06/29/2017 200 (H) 14 - 36 U/L Final     Total Bilirubin   Date Value Ref Range Status   06/29/2017 0.7 0.2 -  1.3 mg/dL Final     Albumin   Date Value Ref Range Status   06/29/2017 4.30 3.40 - 4.80 g/dL Final     Globulin   Date Value Ref Range Status   06/29/2017 3.9 (H) 2.3 - 3.5 gm/dL Final     A/G Ratio   Date Value Ref Range Status   06/29/2017 1.1 1.1 - 1.8 g/dL Final     Lab Results   Component Value Date    WBC 7.76 06/29/2017    HGB 12.9 06/29/2017    HCT 37.0 06/29/2017    MCV 90.2 06/29/2017     06/29/2017     Lab Results   Component Value Date    NEUTROABS 4.11 06/29/2017     Lab Results   Component Value Date     2107.0 (H) 06/29/2017    LABCA2 1817.1 (H) 06/29/2017       Radiology Data :  PET/CT done on July 3, 2017 showed:  IMPRESSION:  CONCLUSION:  1. Extensive metastatic disease. Pathologic uptake within  enlarged right-sided axillary lymph nodes. Metastatic adenopathy  in both laurie and mediastinum. Tumor replacing most of the left  lobe of liver. Intra-abdominal retroperitoneal metastases,  adenopathy.     Extensive skeletal metastases including a pathologic fracture at  L4.        Nuclear bone scan done on June 20, 2017 showed:  1. Increased uptake at L4 suggestive of metastatic process.  2. 3 or 4 areas of increased activity in bony calvarium  3. Some increased activity in tips posteriorly and anteriorly suggestive of metastatic carcinoma to the skeletal system.           Pathology :  Pathology report from December 7, 2007 showed:  FINAL DIAGNOSIS:   RIGHT BREAST MAMMOTOME BIOPSY:        DUCTAL CARCINOMA IN SITU, INTERMEDIATE NUCLEAR GRADE              WITH MICROCALCIFICATION.      ADDENDUM:   Estrogen receptors are positive (90-95% stainability).   Progesterone receptors are positive (90-95% stainability).         ASSESSMENT AND PLAN:      1.  Metastatic cancer with extensive adenopathy in right axilla, mediastinum, hilar, abdominal, retroperitoneal with extensive liver and bone metastasis on PET/CT.  Most likely relates a breast primary since patient has a history of DCIS in past.  Patient  also had a history of melanoma in situ which was excised in September 2016.  At this point there is a palpable axillary lymphadenopathy on physical exam and referred her to Dr. Ernst to see if that lymph nodes can be removed surgically we can obtain tissue diagnosis.  She will also need port placement for chemotherapy.  If axillary lymph node removal is not possible then we will refer her to liver biopsy under CT guidance.  Depending on results of pathology plan is to start her on chemotherapy on July 17, 2017.  Result of PET/CT and extent of disease were discussed with patient and her .  It was also discussed with patient since she has extensive bone metastasis we will start her on treatment with Zometa or Xgeva with chemotherapy.  We'll talk about chemotherapy Zometa side effect once we see her with the result of pathology.    2.  History of melanoma in situ of left shoulder    3.  History of ductal carcinoma in situ of the right breast diagnosed in 2017.  Status post mastectomy followed by adjuvant tamoxifen for 5 years which was finished in 2013.    4.  Elevated  liver function tests secondary to liver metastases   5.  Health maintenance: Patient does not smoke.  Not a candidate for screening colonoscopy at this point.  Remains full code.        Ovidio Meadows MD  7/7/2017  9:21 AM        EMR Dragon/Transcription disclaimer:   Much of this encounter note is an electronic transcription/translation of spoken language to printed text. The electronic translation of spoken language may permit erroneous, or at times, nonsensical words or phrases to be inadvertently transcribed; Although I have reviewed the note for such errors, some may still exist.

## 2017-07-07 NOTE — PROGRESS NOTES
See prior notes.  Patient has seen Dr. Meadows in and underwent a PET scan which demonstrates metastatic disease involving pelvic bones lumbar and thoracic vertebrae most of left lobe of liver multiple small areas in the right lobe of liver and prominent areas in right axilla.  Patient also has history of a melanoma in situ removed by dermatology from her left shoulder a few years ago.  Went over this patient's studies and discussed this with Dr. Meadows and the rest the tumor board.  Patient obviously needs to have tissue for diagnostic purposes.  Dr. Meadows would also appreciate MediPort placement.  I went over all this with the patient and her  and specifically went over her PET scan with her and answered all the questions.    Social History     Social History   • Marital status:      Spouse name: N/A   • Number of children: N/A   • Years of education: N/A     Occupational History   • Not on file.     Social History Main Topics   • Smoking status: Never Smoker   • Smokeless tobacco: Never Used   • Alcohol use No   • Drug use: No   • Sexual activity: Defer     Other Topics Concern   • Not on file     Social History Narrative       Past Medical History:   Diagnosis Date   • Anxiety    • Depression    • Encounter for gynecological examination    • Malignant neoplasm of female breast     hx of dcis s/p mastectomy and reconstruction and augmentation      • Primary malignant neoplasm of breast     dcis in rt breast s/p mastectomy,reconstruction      • Ventricular premature beats        Family History   Problem Relation Age of Onset   • Anemia Mother    • Multiple sclerosis Mother    • No Known Problems Father    • Diabetes Other    • Multiple sclerosis Other        Past Surgical History:   Procedure Laterality Date   • AUGMENTATION MAMMAPLASTY     • BREAST BIOPSY  12/07/2007    Mammotome biopsy of the right side with clip placement   • BREAST LUMPECTOMY     • BREAST RECONSTRUCTION  10/28/2008    Abscence of right  breast secondary to mastectomy. Implant exchange for reconstruction of right breast with open para-prosthetic capsulotomy   • BREAST SURGERY  10/01/2009    Left breast ptosis and breast asymmetry secondary to right breast reconstruction for breast cancer. Left breast mastopexy with augmentation.   • CARDIAC CATHETERIZATION  09/18/2008    Normal coronary arteriography. Normal left ventricular angiogram   • MASTECTOMY Right    • MASTECTOMY  02/05/2008    Multifocal right breast ductal carcinoma-in-situ. Right total mastectomy   • MASTECTOMY, PARTIAL  01/03/2008    Ductal carcinoma in-situ of the right breast, proven by mammotome biopsy. Right lumpectomy, medial portion of the breast, between 2 o'clock and 4 o'clock, 5 cm from the nipple, with clip placement, radiographic inter. of severo. specimen, & ultrasound   • PAP SMEAR  03/03/2009    Negative     Alert and appropriate nontoxic-appearing  Lungs clear heart regular rate and rhythm  Right axilla has an approximately 2 cm firm mass most likely consistent with the findings on the PET scan  Abdomen soft  Extremities unremarkable    Assessment and plan  Metastatic disease likely breast as a source from her prior history of DCIS.  I agree that tissue is needed for further treatment with chemotherapy.  I would recommend biopsy of right axillary mass.  Same time we can put a MediPort in place.  Discussed both procedures with the patient she clearly understands and wishes to proceed.  She understands alternatives risk and benefits of both right axillary biopsy and MediPort placement                                                      EMR Dragon/Transcription disclaimer:  Much of this encounter note is on electronic transcription/translation of spoken language to printed text.  The electronic translation of spoken language may permit erroneous or at times, nonsensical words or phrases to be inadvertently transcribed;  Although I have reviewed the note for such errors, some  may still exist.

## 2017-07-10 ENCOUNTER — HOSPITAL ENCOUNTER (OUTPATIENT)
Facility: HOSPITAL | Age: 47
Setting detail: HOSPITAL OUTPATIENT SURGERY
Discharge: HOME | End: 2017-07-10
Attending: SURGERY | Admitting: SURGERY

## 2017-07-10 ENCOUNTER — HOSPITAL ENCOUNTER (OUTPATIENT)
Dept: GENERAL RADIOLOGY | Facility: HOSPITAL | Age: 47
Setting detail: HOSPITAL OUTPATIENT SURGERY
Discharge: HOME | End: 2017-07-10

## 2017-07-10 ENCOUNTER — ANESTHESIA (OUTPATIENT)
Dept: PERIOP | Facility: HOSPITAL | Age: 47
End: 2017-07-10

## 2017-07-10 ENCOUNTER — APPOINTMENT (OUTPATIENT)
Dept: GENERAL RADIOLOGY | Facility: HOSPITAL | Age: 47
End: 2017-07-10

## 2017-07-10 VITALS
BODY MASS INDEX: 29.97 KG/M2 | OXYGEN SATURATION: 96 % | WEIGHT: 190.92 LBS | HEIGHT: 67 IN | TEMPERATURE: 98.8 F | DIASTOLIC BLOOD PRESSURE: 56 MMHG | RESPIRATION RATE: 18 BRPM | SYSTOLIC BLOOD PRESSURE: 110 MMHG | HEART RATE: 66 BPM

## 2017-07-10 DIAGNOSIS — C79.9 METASTATIC DISEASE (HCC): ICD-10-CM

## 2017-07-10 LAB — HCG SERPL QL: NEGATIVE

## 2017-07-10 PROCEDURE — 88274 CYTOGENETICS 25-99: CPT

## 2017-07-10 PROCEDURE — 3394F QUANT HER2 IHC EVAL BRST CX: CPT | Performed by: PATHOLOGY

## 2017-07-10 PROCEDURE — 25010000002 FENTANYL CITRATE (PF) 100 MCG/2ML SOLUTION: Performed by: NURSE ANESTHETIST, CERTIFIED REGISTERED

## 2017-07-10 PROCEDURE — 25010000002 MIDAZOLAM PER 1 MG: Performed by: NURSE ANESTHETIST, CERTIFIED REGISTERED

## 2017-07-10 PROCEDURE — 84703 CHORIONIC GONADOTROPIN ASSAY: CPT | Performed by: ANESTHESIOLOGY

## 2017-07-10 PROCEDURE — 25010000002 PROPOFOL 10 MG/ML EMULSION: Performed by: NURSE ANESTHETIST, CERTIFIED REGISTERED

## 2017-07-10 PROCEDURE — 25010000002 ONDANSETRON PER 1 MG: Performed by: NURSE ANESTHETIST, CERTIFIED REGISTERED

## 2017-07-10 PROCEDURE — 76937 US GUIDE VASCULAR ACCESS: CPT | Performed by: SURGERY

## 2017-07-10 PROCEDURE — 88360 TUMOR IMMUNOHISTOCHEM/MANUAL: CPT | Performed by: SURGERY

## 2017-07-10 PROCEDURE — 38525 BIOPSY/REMOVAL LYMPH NODES: CPT | Performed by: SURGERY

## 2017-07-10 PROCEDURE — 88331 PATH CONSLTJ SURG 1 BLK 1SPC: CPT | Performed by: SURGERY

## 2017-07-10 PROCEDURE — 25010000002 DEXAMETHASONE PER 1 MG: Performed by: NURSE ANESTHETIST, CERTIFIED REGISTERED

## 2017-07-10 PROCEDURE — 88307 TISSUE EXAM BY PATHOLOGIST: CPT | Performed by: SURGERY

## 2017-07-10 PROCEDURE — 88307 TISSUE EXAM BY PATHOLOGIST: CPT | Performed by: PATHOLOGY

## 2017-07-10 PROCEDURE — 76000 FLUOROSCOPY <1 HR PHYS/QHP: CPT

## 2017-07-10 PROCEDURE — C1788 PORT, INDWELLING, IMP: HCPCS | Performed by: SURGERY

## 2017-07-10 PROCEDURE — 25010000002 HEPARIN FLUSH (PORCINE) 100 UNIT/ML SOLUTION: Performed by: SURGERY

## 2017-07-10 PROCEDURE — 77001 FLUOROGUIDE FOR VEIN DEVICE: CPT | Performed by: SURGERY

## 2017-07-10 PROCEDURE — 88377 M/PHMTRC ALYS ISHQUANT/SEMIQ: CPT | Performed by: SURGERY

## 2017-07-10 PROCEDURE — 36590 REMOVAL TUNNELED CV CATH: CPT | Performed by: SURGERY

## 2017-07-10 PROCEDURE — 25010000003 CEFAZOLIN PER 500 MG: Performed by: SURGERY

## 2017-07-10 PROCEDURE — 88342 IMHCHEM/IMCYTCHM 1ST ANTB: CPT | Performed by: PATHOLOGY

## 2017-07-10 PROCEDURE — 88360 TUMOR IMMUNOHISTOCHEM/MANUAL: CPT | Performed by: PATHOLOGY

## 2017-07-10 PROCEDURE — 88341 IMHCHEM/IMCYTCHM EA ADD ANTB: CPT | Performed by: PATHOLOGY

## 2017-07-10 PROCEDURE — 88341 IMHCHEM/IMCYTCHM EA ADD ANTB: CPT | Performed by: SURGERY

## 2017-07-10 PROCEDURE — 88331 PATH CONSLTJ SURG 1 BLK 1SPC: CPT | Performed by: PATHOLOGY

## 2017-07-10 PROCEDURE — 88342 IMHCHEM/IMCYTCHM 1ST ANTB: CPT | Performed by: SURGERY

## 2017-07-10 DEVICE — POWERPORT M.R.I. IMPLANTABLE PORT WITH PRE-ATTACHED 9.6F  OPEN-ENDED SINGLE-LUMEN VENOUS CATHETER. INTERMEDIATE KIT (WITH SUTURE PLUGS)
Type: IMPLANTABLE DEVICE | Site: CHEST | Status: FUNCTIONAL
Brand: POWERPORT M.R.I.

## 2017-07-10 RX ORDER — EPHEDRINE SULFATE 50 MG/ML
INJECTION, SOLUTION INTRAVENOUS AS NEEDED
Status: DISCONTINUED | OUTPATIENT
Start: 2017-07-10 | End: 2017-07-10 | Stop reason: SURG

## 2017-07-10 RX ORDER — MIDAZOLAM HYDROCHLORIDE 1 MG/ML
INJECTION INTRAMUSCULAR; INTRAVENOUS AS NEEDED
Status: DISCONTINUED | OUTPATIENT
Start: 2017-07-10 | End: 2017-07-10 | Stop reason: SURG

## 2017-07-10 RX ORDER — ACETAMINOPHEN 325 MG/1
650 TABLET ORAL ONCE AS NEEDED
Status: DISCONTINUED | OUTPATIENT
Start: 2017-07-10 | End: 2017-07-10 | Stop reason: HOSPADM

## 2017-07-10 RX ORDER — LIDOCAINE HYDROCHLORIDE 20 MG/ML
INJECTION, SOLUTION INFILTRATION; PERINEURAL AS NEEDED
Status: DISCONTINUED | OUTPATIENT
Start: 2017-07-10 | End: 2017-07-10 | Stop reason: SURG

## 2017-07-10 RX ORDER — EPHEDRINE SULFATE 50 MG/ML
5 INJECTION, SOLUTION INTRAVENOUS ONCE AS NEEDED
Status: DISCONTINUED | OUTPATIENT
Start: 2017-07-10 | End: 2017-07-10 | Stop reason: HOSPADM

## 2017-07-10 RX ORDER — NALOXONE HCL 0.4 MG/ML
0.2 VIAL (ML) INJECTION AS NEEDED
Status: DISCONTINUED | OUTPATIENT
Start: 2017-07-10 | End: 2017-07-10 | Stop reason: HOSPADM

## 2017-07-10 RX ORDER — PROPOFOL 10 MG/ML
VIAL (ML) INTRAVENOUS AS NEEDED
Status: DISCONTINUED | OUTPATIENT
Start: 2017-07-10 | End: 2017-07-10 | Stop reason: SURG

## 2017-07-10 RX ORDER — DIPHENHYDRAMINE HYDROCHLORIDE 50 MG/ML
12.5 INJECTION INTRAMUSCULAR; INTRAVENOUS
Status: DISCONTINUED | OUTPATIENT
Start: 2017-07-10 | End: 2017-07-10 | Stop reason: HOSPADM

## 2017-07-10 RX ORDER — FLUMAZENIL 0.1 MG/ML
0.2 INJECTION INTRAVENOUS AS NEEDED
Status: DISCONTINUED | OUTPATIENT
Start: 2017-07-10 | End: 2017-07-10 | Stop reason: HOSPADM

## 2017-07-10 RX ORDER — ONDANSETRON 2 MG/ML
INJECTION INTRAMUSCULAR; INTRAVENOUS AS NEEDED
Status: DISCONTINUED | OUTPATIENT
Start: 2017-07-10 | End: 2017-07-10 | Stop reason: SURG

## 2017-07-10 RX ORDER — ACETAMINOPHEN 650 MG/1
650 SUPPOSITORY RECTAL ONCE AS NEEDED
Status: DISCONTINUED | OUTPATIENT
Start: 2017-07-10 | End: 2017-07-10 | Stop reason: HOSPADM

## 2017-07-10 RX ORDER — FENTANYL CITRATE 50 UG/ML
INJECTION, SOLUTION INTRAMUSCULAR; INTRAVENOUS AS NEEDED
Status: DISCONTINUED | OUTPATIENT
Start: 2017-07-10 | End: 2017-07-10 | Stop reason: SURG

## 2017-07-10 RX ORDER — SCOLOPAMINE TRANSDERMAL SYSTEM 1 MG/1
1 PATCH, EXTENDED RELEASE TRANSDERMAL ONCE
Status: DISCONTINUED | OUTPATIENT
Start: 2017-07-10 | End: 2017-07-10 | Stop reason: HOSPADM

## 2017-07-10 RX ORDER — ONDANSETRON 2 MG/ML
4 INJECTION INTRAMUSCULAR; INTRAVENOUS ONCE AS NEEDED
Status: DISCONTINUED | OUTPATIENT
Start: 2017-07-10 | End: 2017-07-10 | Stop reason: HOSPADM

## 2017-07-10 RX ORDER — SODIUM CHLORIDE, SODIUM GLUCONATE, SODIUM ACETATE, POTASSIUM CHLORIDE, AND MAGNESIUM CHLORIDE 526; 502; 368; 37; 30 MG/100ML; MG/100ML; MG/100ML; MG/100ML; MG/100ML
1000 INJECTION, SOLUTION INTRAVENOUS CONTINUOUS PRN
Status: DISCONTINUED | OUTPATIENT
Start: 2017-07-10 | End: 2017-07-10 | Stop reason: HOSPADM

## 2017-07-10 RX ORDER — HYDROCODONE BITARTRATE AND ACETAMINOPHEN 7.5; 325 MG/1; MG/1
1 TABLET ORAL EVERY 6 HOURS PRN
Qty: 20 TABLET | Refills: 0 | Status: SHIPPED | OUTPATIENT
Start: 2017-07-10 | End: 2017-07-21

## 2017-07-10 RX ORDER — DEXAMETHASONE SODIUM PHOSPHATE 4 MG/ML
INJECTION, SOLUTION INTRA-ARTICULAR; INTRALESIONAL; INTRAMUSCULAR; INTRAVENOUS; SOFT TISSUE AS NEEDED
Status: DISCONTINUED | OUTPATIENT
Start: 2017-07-10 | End: 2017-07-10 | Stop reason: SURG

## 2017-07-10 RX ORDER — LABETALOL HYDROCHLORIDE 5 MG/ML
5 INJECTION, SOLUTION INTRAVENOUS
Status: DISCONTINUED | OUTPATIENT
Start: 2017-07-10 | End: 2017-07-10 | Stop reason: HOSPADM

## 2017-07-10 RX ADMIN — FENTANYL CITRATE 50 MCG: 50 INJECTION, SOLUTION INTRAMUSCULAR; INTRAVENOUS at 10:50

## 2017-07-10 RX ADMIN — SODIUM CHLORIDE, SODIUM GLUCONATE, SODIUM ACETATE, POTASSIUM CHLORIDE, AND MAGNESIUM CHLORIDE 1000 ML: 526; 502; 368; 37; 30 INJECTION, SOLUTION INTRAVENOUS at 09:26

## 2017-07-10 RX ADMIN — PROPOFOL 200 MG: 10 INJECTION, EMULSION INTRAVENOUS at 10:44

## 2017-07-10 RX ADMIN — CEFAZOLIN SODIUM 2 G: 1 INJECTION, POWDER, FOR SOLUTION INTRAMUSCULAR; INTRAVENOUS at 10:47

## 2017-07-10 RX ADMIN — PROPOFOL 30 MG: 10 INJECTION, EMULSION INTRAVENOUS at 11:37

## 2017-07-10 RX ADMIN — MIDAZOLAM 2 MG: 1 INJECTION INTRAMUSCULAR; INTRAVENOUS at 10:33

## 2017-07-10 RX ADMIN — FENTANYL CITRATE 50 MCG: 50 INJECTION, SOLUTION INTRAMUSCULAR; INTRAVENOUS at 11:10

## 2017-07-10 RX ADMIN — ONDANSETRON 4 MG: 2 INJECTION INTRAMUSCULAR; INTRAVENOUS at 11:08

## 2017-07-10 RX ADMIN — LIDOCAINE HYDROCHLORIDE 50 MG: 20 INJECTION, SOLUTION INFILTRATION; PERINEURAL at 10:44

## 2017-07-10 RX ADMIN — EPHEDRINE SULFATE 10 MG: 50 INJECTION INTRAMUSCULAR; INTRAVENOUS; SUBCUTANEOUS at 11:25

## 2017-07-10 RX ADMIN — SCOPALAMINE 1 PATCH: 1 PATCH, EXTENDED RELEASE TRANSDERMAL at 09:26

## 2017-07-10 RX ADMIN — PROPOFOL 20 MG: 10 INJECTION, EMULSION INTRAVENOUS at 11:55

## 2017-07-10 RX ADMIN — DEXAMETHASONE SODIUM PHOSPHATE 4 MG: 4 INJECTION, SOLUTION INTRAMUSCULAR; INTRAVENOUS at 11:08

## 2017-07-10 NOTE — ANESTHESIA PREPROCEDURE EVALUATION
Anesthesia Evaluation     Patient summary reviewed and Nursing notes reviewed   NPO Solid Status: > 8 hours  NPO Liquid Status: > 8 hours     Airway   Mallampati: II  TM distance: >3 FB  Neck ROM: full  possible difficult intubation  Dental - normal exam     Pulmonary - negative pulmonary ROS and normal exam   Cardiovascular - normal exam      ROS comment: She had a full cardiac w/u a few years ago for palpitations.  The results of this were negative and she has done well since.    Neuro/Psych  (+) psychiatric history Depression,    GI/Hepatic/Renal/Endo    (+)  liver disease ( has liver metastases),     Musculoskeletal (-) negative ROS    Abdominal    Substance History - negative use     OB/GYN negative ob/gyn ROS         Other      history of cancer active                                  Anesthesia Plan    ASA 3   (Has a h/o N + V and I will order scopalamine)  intravenous induction   Anesthetic plan and risks discussed with patient and spouse/significant other.

## 2017-07-10 NOTE — ANESTHESIA POSTPROCEDURE EVALUATION
Patient: Kylie García    Procedure Summary     Date Anesthesia Start Anesthesia Stop Room / Location    07/10/17 1038 [id number] [facility] OR 09 / [facility] OR       Procedure Diagnosis Surgeon Provider    BIOSPY RIGHT AXILLARY MASS AND OR LYMPH (Right ); MEDIPORT     (c-arm#2) (Right Chest) Metastatic disease  (Metastatic disease [C80.1]) MD Cely Vickers CRNA          Anesthesia Type: No value filed.  Last vitals  BP      Temp      Pulse     Resp      SpO2        Post Anesthesia Care and Evaluation    Patient location during evaluation: PACU  Patient participation: complete - patient participated  Level of consciousness: awake and alert  Pain score: 0  Pain management: adequate  Airway patency: patent  Anesthetic complications: No anesthetic complications  PONV Status: none  Cardiovascular status: acceptable  Respiratory status: acceptable  Hydration status: acceptable

## 2017-07-10 NOTE — OP NOTE
AXILLARY LYMPH NODE BIOPSY/EXCISION, INSERTION VENOUS ACCESS DEVICE  Procedure Note    Kylie García  7/10/2017    Pre-op Diagnosis:   Metastatic disease [C80.1]    Post-op Diagnosis:     Post-Op Diagnosis Codes:     * Metastatic disease [C80.1]    Procedure/CPT® Codes:      Procedure(s):  BIOSPY RIGHT AXILLARY MASS AND OR LYMPH  MEDIPORT     (c-arm#2)    Surgeon(s):  Sourav Ernst MD    Anesthesia: General    Staff:   Circulator: Saskia Charles RN  Scrub Person: Alice Gagnon  Assistant: Keke Hooker CSA    Estimated Blood Loss: *No blood loss documented*    Specimens:                  ID Type Source Tests Collected by Time Destination   A : right axillary mass  Tissue Axilla, Right TISSUE EXAM Sourav Ernst MD 7/10/2017 1139          Drains: None          Indications: Patient recently underwent workup for back and epigastric abdominal pain and workup suggest metastatic disease to multiple bones liver and likely right axilla.  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    Findings:  Matted nodes and right axilla.  Adequate biopsy obtained of these nodes    Complications: None    Procedure: She was brought to the operating room where she was placed under general anesthesia without any difficulty.  Her right neck and right axilla arm and upper chest were prepped and draped in normal sterile fashion.  She received Ancef of antibiotics preoperatively.  She had SCDs in place.  Appropriate timeout was taken.  We addressed the MediPort initially.  With the patient in Trendelenburg position evaluated the right IJ with ultrasound and right IJ appeared to be patent by ultrasound criteria.  We accessed the right IJ first attempt using ultrasound for guidance and placed guidewire into position.  We checked the guidewire marked on the scanner was tipped catheter and up.  The mapped out subcutaneous tunnel and  pocket and infiltrated that with local.  Made a skin incision sharply for the pocket and then developed that the subcutaneous tissue staying well above the muscle has patient has a prosthesis breast prosthesis under the muscle.  We placed a pre-connected port into the pocket and then placed 2-0 Prolene suture on each side and did not tie the suture this point.  Used a tunneler to bring the catheter from the pocket to the exit site and then cut the catheter the appropriate length.  We then make sure patient was in Trendelenburg position and placed a dilator over the guidewire and then removed the dilator and placed the dilator.  The sheath over the guidewire this time removing the dilator and the guidewire leaving the sheath in position.  Then fed the catheter through the peel-away sheath and removed the peel-away sheath leaving the catheter in good position.  It aspirates and flushes easily with heparinized saline we checked under fluoroscopy and the catheter laid nicely and appeared to be in good position.  We tied our 2 Prolene sutures down and then closed the pocket with a 3-0 Vicryl in a running 4-0 Monocryl subcuticular stitch.  Close the access site with 4-0 Monocryl subcuticular stitch      Attention was then directed to the right axilla we could palpate what appeared to be a 2-3 cm mass.  Made a skin incision in the lower mid axilla transversely and then followed that down to subcutaneous tissue with electrocautery a Vicryl suture was used to grasp the mass and then we dissected the mass out partially and then divided a portion of this mass sharply with scalpel and the mass appeared to be 2-3 matted lymph nodes.  It grossly appeared that we had adequate tissue at that point and I sent this to pathology and confirmed that with Dr. knapp.  He did a frozen section on a portion of the mass and said that he thought that this was not melanoma but further studies would need to be done to actually rule that out and  more likely this was another process.  It is breast cancer it appears to be poorly differentiated.  We irrigated the wound.  Hemostasis was noted.  Piece of Surgicel was placed at the site of the lymph node biopsy and then the wound was closed with 3-0 Vicryl and a running 4-0 Monocryl subject her stitch glue was used to final skin closure at all sites and patient was awakened and extubated and transferred to the recovery room in awake stable condition          Disposition: Patient will be discharged percent day surgery protocol.  She was given twenty 7.5 Norco tablets these on a when necessary basis for pain.  She will follow up with the [facility] as planned hole chest x-ray will be obtained in the recovery room prior to discharge    EMR Dragon/Transcription disclaimer:  Much of this encounter note is on electronic transcription/translation of spoken language to printed text.  The electronic translation of spoken language may permit erroneous or at times, nonsensical words or phrases to be inadvertently transcribed;  Although I have reviewed the note for such errors, some may still exist.            Sourav Ernst MD     Date: 7/10/2017  Time: 12:11 PM

## 2017-07-10 NOTE — ANESTHESIA PROCEDURE NOTES
Airway  Airway not difficult    General Information and Staff    Patient location during procedure: OR  CRNA: JACQUI MARI    Indications and Patient Condition  Indications for airway management: airway protection    Preoxygenated: yes  MILS maintained throughout  Mask difficulty assessment: 0 - not attempted    Final Airway Details  Final airway type: supraglottic airway      Successful airway: classic  Size 4    Number of attempts at approach: 1

## 2017-07-10 NOTE — PLAN OF CARE
Problem: Patient Care Overview (Adult)  Goal: Plan of Care Review  Outcome: Ongoing (interventions implemented as appropriate)    07/10/17 1237   Coping/Psychosocial Response Interventions   Plan Of Care Reviewed With patient   Patient Care Overview   Progress improving   Outcome Evaluation   Outcome Summary/Follow up Plan vss, dc per anesthesia protocol, transfer to [facility].       Goal: Discharge Needs Assessment  Outcome: Ongoing (interventions implemented as appropriate)

## 2017-07-13 DIAGNOSIS — Z51.11 ENCOUNTER FOR ANTINEOPLASTIC CHEMOTHERAPY: Primary | ICD-10-CM

## 2017-07-17 ENCOUNTER — APPOINTMENT (OUTPATIENT)
Dept: ONCOLOGY | Facility: HOSPITAL | Age: 47
End: 2017-07-17

## 2017-07-17 ENCOUNTER — OFFICE VISIT (OUTPATIENT)
Dept: SURGERY | Facility: CLINIC | Age: 47
End: 2017-07-17

## 2017-07-17 VITALS
SYSTOLIC BLOOD PRESSURE: 120 MMHG | BODY MASS INDEX: 29.82 KG/M2 | HEIGHT: 67 IN | WEIGHT: 190 LBS | DIASTOLIC BLOOD PRESSURE: 60 MMHG

## 2017-07-17 DIAGNOSIS — C79.9 METASTATIC DISEASE (HCC): Primary | ICD-10-CM

## 2017-07-17 PROCEDURE — 99024 POSTOP FOLLOW-UP VISIT: CPT | Performed by: SURGERY

## 2017-07-17 NOTE — PROGRESS NOTES
47-year-old female returns after MediPort placement and biopsy of right axillary lymph node 1 week ago.  See prior note.  Patient is doing well from her surgery.  Overnight she noticed some drainage from her lymph node biopsy site but otherwise no complaints.  Pathology is consistent with metastatic breast cancer and Herceptin is pending from [facility].  Patient will see medical oncology tomorrow.  She is ready to be treated once the get the final pathology back.  Patient has expressed an interest in going to [facility].  I have spoken to medical oncology as well as the nursing staff from the [facility] and we will assist her in any way with being evaluated at [facility] as well.    Since wounds are clean and healing.  There is no redness and only slight amount of drainage from the axillary incision.  I explained to her that that more likely will resolve but if it becomes more significant or she has a mass develop narrative and she needs to see us for further evaluation treatment.

## 2017-07-19 DIAGNOSIS — C50.911 MALIGNANT NEOPLASM OF RIGHT FEMALE BREAST, UNSPECIFIED SITE OF BREAST: ICD-10-CM

## 2017-07-19 DIAGNOSIS — C80.1 METASTASIS TO BONE OF UNKNOWN PRIMARY (HCC): ICD-10-CM

## 2017-07-19 DIAGNOSIS — C79.51 METASTASIS TO BONE OF UNKNOWN PRIMARY (HCC): ICD-10-CM

## 2017-07-20 LAB
LAB AP CASE REPORT: NORMAL
LAB AP SPECIAL STAINS: NORMAL
Lab: NORMAL
Lab: NORMAL
PATH REPORT.ADDENDUM SPEC: NORMAL
PATH REPORT.FINAL DX SPEC: NORMAL
PATH REPORT.GROSS SPEC: NORMAL

## 2017-07-21 ENCOUNTER — INFUSION (OUTPATIENT)
Dept: ONCOLOGY | Facility: HOSPITAL | Age: 47
End: 2017-07-21

## 2017-07-21 ENCOUNTER — OFFICE VISIT (OUTPATIENT)
Dept: SURGERY | Facility: CLINIC | Age: 47
End: 2017-07-21

## 2017-07-21 ENCOUNTER — OFFICE VISIT (OUTPATIENT)
Dept: ONCOLOGY | Facility: CLINIC | Age: 47
End: 2017-07-21

## 2017-07-21 VITALS
SYSTOLIC BLOOD PRESSURE: 114 MMHG | WEIGHT: 189.5 LBS | BODY MASS INDEX: 29.74 KG/M2 | HEIGHT: 67 IN | TEMPERATURE: 98.4 F | RESPIRATION RATE: 14 BRPM | DIASTOLIC BLOOD PRESSURE: 69 MMHG | HEART RATE: 70 BPM

## 2017-07-21 VITALS
HEIGHT: 67 IN | WEIGHT: 189 LBS | SYSTOLIC BLOOD PRESSURE: 116 MMHG | DIASTOLIC BLOOD PRESSURE: 70 MMHG | BODY MASS INDEX: 29.66 KG/M2

## 2017-07-21 DIAGNOSIS — C79.51 METASTASIS TO BONE OF UNKNOWN PRIMARY (HCC): Primary | ICD-10-CM

## 2017-07-21 DIAGNOSIS — C50.911 MALIGNANT NEOPLASM OF RIGHT FEMALE BREAST, UNSPECIFIED SITE OF BREAST: ICD-10-CM

## 2017-07-21 DIAGNOSIS — Z45.2 ENCOUNTER FOR ADJUSTMENT OR MANAGEMENT OF VASCULAR ACCESS DEVICE: ICD-10-CM

## 2017-07-21 DIAGNOSIS — C80.1 METASTASIS TO BONE OF UNKNOWN PRIMARY (HCC): ICD-10-CM

## 2017-07-21 DIAGNOSIS — C79.9 METASTATIC DISEASE (HCC): Primary | ICD-10-CM

## 2017-07-21 DIAGNOSIS — C80.1 METASTASIS TO BONE OF UNKNOWN PRIMARY (HCC): Primary | ICD-10-CM

## 2017-07-21 DIAGNOSIS — C79.51 METASTASIS TO BONE OF UNKNOWN PRIMARY (HCC): ICD-10-CM

## 2017-07-21 DIAGNOSIS — C50.911 MALIGNANT NEOPLASM OF RIGHT FEMALE BREAST, UNSPECIFIED SITE OF BREAST: Primary | ICD-10-CM

## 2017-07-21 LAB
ALBUMIN SERPL-MCNC: 3.8 G/DL (ref 3.4–4.8)
ALBUMIN/GLOB SERPL: 1.2 G/DL (ref 1.1–1.8)
ALP SERPL-CCNC: 349 U/L (ref 38–126)
ALT SERPL W P-5'-P-CCNC: 170 U/L (ref 9–52)
ANION GAP SERPL CALCULATED.3IONS-SCNC: 10 MMOL/L (ref 5–15)
AST SERPL-CCNC: 116 U/L (ref 14–36)
BASOPHILS # BLD AUTO: 0.05 10*3/MM3 (ref 0–0.2)
BASOPHILS NFR BLD AUTO: 0.7 % (ref 0–2)
BILIRUB SERPL-MCNC: 1 MG/DL (ref 0.2–1.3)
BUN BLD-MCNC: 18 MG/DL (ref 7–21)
BUN/CREAT SERPL: 23.1 (ref 7–25)
CALCIUM SPEC-SCNC: 9.3 MG/DL (ref 8.4–10.2)
CHLORIDE SERPL-SCNC: 106 MMOL/L (ref 95–110)
CO2 SERPL-SCNC: 22 MMOL/L (ref 22–31)
CREAT BLD-MCNC: 0.78 MG/DL (ref 0.5–1)
DEPRECATED RDW RBC AUTO: 44.1 FL (ref 36.4–46.3)
EOSINOPHIL # BLD AUTO: 0.19 10*3/MM3 (ref 0–0.7)
EOSINOPHIL NFR BLD AUTO: 2.7 % (ref 0–7)
ERYTHROCYTE [DISTWIDTH] IN BLOOD BY AUTOMATED COUNT: 13.3 % (ref 11.5–14.5)
GFR SERPL CREATININE-BSD FRML MDRD: 78 ML/MIN/1.73 (ref 58–135)
GLOBULIN UR ELPH-MCNC: 3.3 GM/DL (ref 2.3–3.5)
GLUCOSE BLD-MCNC: 86 MG/DL (ref 60–100)
HCT VFR BLD AUTO: 32.6 % (ref 35–45)
HGB BLD-MCNC: 11.2 G/DL (ref 12–15.5)
IMM GRANULOCYTES # BLD: 0.01 10*3/MM3 (ref 0–0.02)
IMM GRANULOCYTES NFR BLD: 0.1 % (ref 0–0.5)
LYMPHOCYTES # BLD AUTO: 1.78 10*3/MM3 (ref 0.6–4.2)
LYMPHOCYTES NFR BLD AUTO: 25.8 % (ref 10–50)
MCH RBC QN AUTO: 31.5 PG (ref 26.5–34)
MCHC RBC AUTO-ENTMCNC: 34.4 G/DL (ref 31.4–36)
MCV RBC AUTO: 91.6 FL (ref 80–98)
MONOCYTES # BLD AUTO: 0.86 10*3/MM3 (ref 0–0.9)
MONOCYTES NFR BLD AUTO: 12.4 % (ref 0–12)
NEUTROPHILS # BLD AUTO: 4.02 10*3/MM3 (ref 2–8.6)
NEUTROPHILS NFR BLD AUTO: 58.3 % (ref 37–80)
PLATELET # BLD AUTO: 303 10*3/MM3 (ref 150–450)
PMV BLD AUTO: 10.4 FL (ref 8–12)
POTASSIUM BLD-SCNC: 3.9 MMOL/L (ref 3.5–5.1)
PROT SERPL-MCNC: 7.1 G/DL (ref 6.3–8.6)
RBC # BLD AUTO: 3.56 10*6/MM3 (ref 3.77–5.16)
SODIUM BLD-SCNC: 138 MMOL/L (ref 137–145)
WBC NRBC COR # BLD: 6.91 10*3/MM3 (ref 3.2–9.8)

## 2017-07-21 PROCEDURE — 99214 OFFICE O/P EST MOD 30 MIN: CPT | Performed by: INTERNAL MEDICINE

## 2017-07-21 PROCEDURE — 25010000002 HEPARIN FLUSH (PORCINE) 100 UNIT/ML SOLUTION: Performed by: INTERNAL MEDICINE

## 2017-07-21 PROCEDURE — 36591 DRAW BLOOD OFF VENOUS DEVICE: CPT | Performed by: INTERNAL MEDICINE

## 2017-07-21 PROCEDURE — G0463 HOSPITAL OUTPT CLINIC VISIT: HCPCS | Performed by: INTERNAL MEDICINE

## 2017-07-21 PROCEDURE — 80053 COMPREHEN METABOLIC PANEL: CPT

## 2017-07-21 PROCEDURE — 99024 POSTOP FOLLOW-UP VISIT: CPT | Performed by: SURGERY

## 2017-07-21 PROCEDURE — 85025 COMPLETE CBC W/AUTO DIFF WBC: CPT

## 2017-07-21 RX ORDER — ONDANSETRON 4 MG/1
8 TABLET, FILM COATED ORAL 4 TIMES DAILY PRN
Qty: 40 TABLET | Refills: 3 | Status: SHIPPED | OUTPATIENT
Start: 2017-07-21 | End: 2018-01-31

## 2017-07-21 RX ORDER — DEXAMETHASONE 4 MG/1
TABLET ORAL
Qty: 12 TABLET | Refills: 3 | Status: SHIPPED | OUTPATIENT
Start: 2017-07-21 | End: 2017-10-25 | Stop reason: SDUPTHER

## 2017-07-21 RX ORDER — SODIUM CHLORIDE 0.9 % (FLUSH) 0.9 %
10 SYRINGE (ML) INJECTION AS NEEDED
Status: DISCONTINUED | OUTPATIENT
Start: 2017-07-21 | End: 2017-07-21 | Stop reason: HOSPADM

## 2017-07-21 RX ORDER — SODIUM CHLORIDE 0.9 % (FLUSH) 0.9 %
10 SYRINGE (ML) INJECTION AS NEEDED
Status: CANCELLED | OUTPATIENT
Start: 2017-07-21

## 2017-07-21 RX ORDER — SODIUM CHLORIDE 9 MG/ML
250 INJECTION, SOLUTION INTRAVENOUS ONCE
Status: CANCELLED | OUTPATIENT
Start: 2017-08-23

## 2017-07-21 RX ADMIN — Medication 10 ML: at 08:18

## 2017-07-21 RX ADMIN — SODIUM CHLORIDE, PRESERVATIVE FREE 500 UNITS: 5 INJECTION INTRAVENOUS at 09:49

## 2017-07-21 NOTE — PROGRESS NOTES
47-year-old female now 11 days out from right axillary lymph node biopsy and MediPort placement.  This turns out to be metastatic Breast cancer and patient has seen Dr. Meadows from medical oncology who wishes to start her on chemotherapy.  She had some drainage from her axillary incision he wanted me to see her today.  I suggested to him earlier they should wait another week at least before starting her chemotherapy.  Patient is a small amount of blood-tinged drainage no pain no mass feeling and no fever.  Incision appears to be clean there is no mass or significant fluid collection deep to the incision or in the axilla there is no redness or acute infection appreciated.  Recommend patient continue with local wound care at this point as we discussed and she'll follow up with us early next week.  Agree with waiting at least a week for her chemotherapy and then we'll reassess this next week.

## 2017-07-21 NOTE — PATIENT INSTRUCTIONS
Docetaxel injection  What is this medicine?  DOCETAXEL (chiu se TAX el) is a chemotherapy drug. It targets fast dividing cells, like cancer cells, and causes these cells to die. This medicine is used to treat many types of cancers like breast cancer, certain stomach cancers, head and neck cancer, lung cancer, and prostate cancer.  This medicine may be used for other purposes; ask your health care provider or pharmacist if you have questions.  COMMON BRAND NAME(S): Jayjuanlong Taxoterjose  What should I tell my health care provider before I take this medicine?  They need to know if you have any of these conditions:  -infection (especially a virus infection such as chickenpox, cold sores, or herpes)  -liver disease  -low blood counts, like low white cell, platelet, or red cell counts  -an unusual or allergic reaction to docetaxel, polysorbate 80, other chemotherapy agents, other medicines, foods, dyes, or preservatives  -pregnant or trying to get pregnant  -breast-feeding  How should I use this medicine?  This drug is given as an infusion into a vein. It is administered in a hospital or clinic by a specially trained health care professional.  Talk to your pediatrician regarding the use of this medicine in children. Special care may be needed.  Overdosage: If you think you have taken too much of this medicine contact a poison control center or emergency room at once.  NOTE: This medicine is only for you. Do not share this medicine with others.  What if I miss a dose?  It is important not to miss your dose. Call your doctor or health care professional if you are unable to keep an appointment.  What may interact with this medicine?  -cyclosporine  -erythromycin  -ketoconazole  -medicines to increase blood counts like filgrastim, pegfilgrastim, sargramostim  -vaccines  Talk to your doctor or health care professional before taking any of these medicines:  -acetaminophen  -aspirin  -ibuprofen  -ketoprofen  -naproxen  This list  may not describe all possible interactions. Give your health care provider a list of all the medicines, herbs, non-prescription drugs, or dietary supplements you use. Also tell them if you smoke, drink alcohol, or use illegal drugs. Some items may interact with your medicine.  What should I watch for while using this medicine?  Your condition will be monitored carefully while you are receiving this medicine. You will need important blood work done while you are taking this medicine.  This drug may make you feel generally unwell. This is not uncommon, as chemotherapy can affect healthy cells as well as cancer cells. Report any side effects. Continue your course of treatment even though you feel ill unless your doctor tells you to stop.  In some cases, you may be given additional medicines to help with side effects. Follow all directions for their use.  Call your doctor or health care professional for advice if you get a fever, chills or sore throat, or other symptoms of a cold or flu. Do not treat yourself. This drug decreases your body's ability to fight infections. Try to avoid being around people who are sick.  This medicine may increase your risk to bruise or bleed. Call your doctor or health care professional if you notice any unusual bleeding.  This medicine may contain alcohol in the product. You may get drowsy or dizzy. Do not drive, use machinery, or do anything that needs mental alertness until you know how this medicine affects you. Do not stand or sit up quickly, especially if you are an older patient. This reduces the risk of dizzy or fainting spells. Avoid alcoholic drinks.  Do not become pregnant while taking this medicine. Women should inform their doctor if they wish to become pregnant or think they might be pregnant. There is a potential for serious side effects to an unborn child. Talk to your health care professional or pharmacist for more information. Do not breast-feed an infant while taking  this medicine.  What side effects may I notice from receiving this medicine?  Side effects that you should report to your doctor or health care professional as soon as possible:  -allergic reactions like skin rash, itching or hives, swelling of the face, lips, or tongue  -low blood counts - This drug may decrease the number of white blood cells, red blood cells and platelets. You may be at increased risk for infections and bleeding.  -signs of infection - fever or chills, cough, sore throat, pain or difficulty passing urine  -signs of decreased platelets or bleeding - bruising, pinpoint red spots on the skin, black, tarry stools, nosebleeds  -signs of decreased red blood cells - unusually weak or tired, fainting spells, lightheadedness  -breathing problems  -fast or irregular heartbeat  -low blood pressure  -mouth sores  -nausea and vomiting  -pain, swelling, redness or irritation at the injection site  -pain, tingling, numbness in the hands or feet  -swelling of the ankle, feet, hands  -weight gain  Side effects that usually do not require medical attention (report to your doctor or health care professional if they continue or are bothersome):  -bone pain  -complete hair loss including hair on your head, underarms, pubic hair, eyebrows, and eyelashes  -diarrhea  -excessive tearing  -changes in the color of fingernails  -loosening of the fingernails  -nausea  -muscle pain  -red flush to skin  -sweating  -weak or tired  This list may not describe all possible side effects. Call your doctor for medical advice about side effects. You may report side effects to FDA at 5-721-FDA-9471.  Where should I keep my medicine?  This drug is given in a hospital or clinic and will not be stored at home.  NOTE: This sheet is a summary. It may not cover all possible information. If you have questions about this medicine, talk to your doctor, pharmacist, or health care provider.     © 2017, [employer]. (2017-01-19  12:32:56)  Cyclophosphamide injection  What is this medicine?  CYCLOPHOSPHAMIDE (sye klgrnat KENNY fa mide) is a chemotherapy drug. It slows the growth of cancer cells. This medicine is used to treat many types of cancer like lymphoma, myeloma, leukemia, breast cancer, and ovarian cancer, to name a few.  This medicine may be used for other purposes; ask your health care provider or pharmacist if you have questions.  COMMON BRAND NAME(S): Cytoxan, Neosar  What should I tell my health care provider before I take this medicine?  They need to know if you have any of these conditions:  -blood disorders  -history of other chemotherapy  -infection  -kidney disease  -liver disease  -recent or ongoing radiation therapy  -tumors in the bone marrow  -an unusual or allergic reaction to cyclophosphamide, other chemotherapy, other medicines, foods, dyes, or preservatives  -pregnant or trying to get pregnant  -breast-feeding  How should I use this medicine?  This drug is usually given as an injection into a vein or muscle or by infusion into a vein. It is administered in a hospital or clinic by a specially trained health care professional.  Talk to your pediatrician regarding the use of this medicine in children. Special care may be needed.  Overdosage: If you think you have taken too much of this medicine contact a poison control center or emergency room at once.  NOTE: This medicine is only for you. Do not share this medicine with others.  What if I miss a dose?  It is important not to miss your dose. Call your doctor or health care professional if you are unable to keep an appointment.  What may interact with this medicine?  This medicine may interact with the following medications:  -amiodarone  -amphotericin B  -azathioprine  -certain antiviral medicines for HIV or AIDS such as protease inhibitors (e.g., indinavir, ritonavir) and zidovudine  -certain blood pressure medications such as benazepril, captopril, enalapril, fosinopril,  lisinopril, moexipril, monopril, perindopril, quinapril, ramipril, trandolapril  -certain cancer medications such as anthracyclines (e.g., daunorubicin, doxorubicin), busulfan, cytarabine, paclitaxel, pentostatin, tamoxifen, trastuzumab  -certain diuretics such as chlorothiazide, chlorthalidone, hydrochlorothiazide, indapamide, metolazone  -certain medicines that treat or prevent blood clots like warfarin  -certain muscle relaxants such as succinylcholine  -cyclosporine  -etanercept  -indomethacin  -medicines to increase blood counts like filgrastim, pegfilgrastim, sargramostim  -medicines used as general anesthesia  -metronidazole  -natalizumab  This list may not describe all possible interactions. Give your health care provider a list of all the medicines, herbs, non-prescription drugs, or dietary supplements you use. Also tell them if you smoke, drink alcohol, or use illegal drugs. Some items may interact with your medicine.  What should I watch for while using this medicine?  Visit your doctor for checks on your progress. This drug may make you feel generally unwell. This is not uncommon, as chemotherapy can affect healthy cells as well as cancer cells. Report any side effects. Continue your course of treatment even though you feel ill unless your doctor tells you to stop.  Drink water or other fluids as directed. Urinate often, even at night.  In some cases, you may be given additional medicines to help with side effects. Follow all directions for their use.  Call your doctor or health care professional for advice if you get a fever, chills or sore throat, or other symptoms of a cold or flu. Do not treat yourself. This drug decreases your body's ability to fight infections. Try to avoid being around people who are sick.  This medicine may increase your risk to bruise or bleed. Call your doctor or health care professional if you notice any unusual bleeding.  Be careful brushing and flossing your teeth or using a  toothpick because you may get an infection or bleed more easily. If you have any dental work done, tell your dentist you are receiving this medicine.  You may get drowsy or dizzy. Do not drive, use machinery, or do anything that needs mental alertness until you know how this medicine affects you.  Do not become pregnant while taking this medicine or for 1 year after stopping it. Women should inform their doctor if they wish to become pregnant or think they might be pregnant. Men should not father a child while taking this medicine and for 4 months after stopping it. There is a potential for serious side effects to an unborn child. Talk to your health care professional or pharmacist for more information. Do not breast-feed an infant while taking this medicine.  This medicine may interfere with the ability to have a child. This medicine has caused ovarian failure in some women. This medicine has caused reduced sperm counts in some men. You should talk with your doctor or health care professional if you are concerned about your fertility.  If you are going to have surgery, tell your doctor or health care professional that you have taken this medicine.  What side effects may I notice from receiving this medicine?  Side effects that you should report to your doctor or health care professional as soon as possible:  -allergic reactions like skin rash, itching or hives, swelling of the face, lips, or tongue  -low blood counts - this medicine may decrease the number of white blood cells, red blood cells and platelets. You may be at increased risk for infections and bleeding.  -signs of infection - fever or chills, cough, sore throat, pain or difficulty passing urine  -signs of decreased platelets or bleeding - bruising, pinpoint red spots on the skin, black, tarry stools, blood in the urine  -signs of decreased red blood cells - unusually weak or tired, fainting spells, lightheadedness  -breathing problems  -dark  urine  -dizziness  -palpitations  -swelling of the ankles, feet, hands  -trouble passing urine or change in the amount of urine  -weight gain  -yellowing of the eyes or skin  Side effects that usually do not require medical attention (report to your doctor or health care professional if they continue or are bothersome):  -changes in nail or skin color  -hair loss  -missed menstrual periods  -mouth sores  -nausea, vomiting  This list may not describe all possible side effects. Call your doctor for medical advice about side effects. You may report side effects to FDA at 0-648-FDA-5882.  Where should I keep my medicine?  This drug is given in a hospital or clinic and will not be stored at home.  NOTE: This sheet is a summary. It may not cover all possible information. If you have questions about this medicine, talk to your doctor, pharmacist, or health care provider.     © 2017, [employer]. (2013-11-01 16:22:58)  Zoledronic Acid injection (Hypercalcemia, Oncology)  What is this medicine?  ZOLEDRONIC ACID (WHITEILEEN fan ik AS id) lowers the amount of calcium loss from bone. It is used to treat too much calcium in your blood from cancer. It is also used to prevent complications of cancer that has spread to the bone.  This medicine may be used for other purposes; ask your health care provider or pharmacist if you have questions.  COMMON BRAND NAME(S): Zometa  What should I tell my health care provider before I take this medicine?  They need to know if you have any of these conditions:  -aspirin-sensitive asthma  -cancer, especially if you are receiving medicines used to treat cancer  -dental disease or wear dentures  -infection  -kidney disease  -receiving corticosteroids like dexamethasone or prednisone  -an unusual or allergic reaction to zoledronic acid, other medicines, foods, dyes, or preservatives  -pregnant or trying to get pregnant  -breast-feeding  How should I use this medicine?  This medicine is for  infusion into a vein. It is given by a health care professional in a hospital or clinic setting.  Talk to your pediatrician regarding the use of this medicine in children. Special care may be needed.  Overdosage: If you think you have taken too much of this medicine contact a poison control center or emergency room at once.  NOTE: This medicine is only for you. Do not share this medicine with others.  What if I miss a dose?  It is important not to miss your dose. Call your doctor or health care professional if you are unable to keep an appointment.  What may interact with this medicine?  -certain antibiotics given by injection  -NSAIDs, medicines for pain and inflammation, like ibuprofen or naproxen  -some diuretics like bumetanide, furosemide  -teriparatide  -thalidomide  This list may not describe all possible interactions. Give your health care provider a list of all the medicines, herbs, non-prescription drugs, or dietary supplements you use. Also tell them if you smoke, drink alcohol, or use illegal drugs. Some items may interact with your medicine.  What should I watch for while using this medicine?  Visit your doctor or health care professional for regular checkups. It may be some time before you see the benefit from this medicine. Do not stop taking your medicine unless your doctor tells you to. Your doctor may order blood tests or other tests to see how you are doing.  Women should inform their doctor if they wish to become pregnant or think they might be pregnant. There is a potential for serious side effects to an unborn child. Talk to your health care professional or pharmacist for more information.  You should make sure that you get enough calcium and vitamin D while you are taking this medicine. Discuss the foods you eat and the vitamins you take with your health care professional.  Some people who take this medicine have severe bone, joint, and/or muscle pain. This medicine may also increase your risk  for jaw problems or a broken thigh bone. Tell your doctor right away if you have severe pain in your jaw, bones, joints, or muscles. Tell your doctor if you have any pain that does not go away or that gets worse.  Tell your dentist and dental surgeon that you are taking this medicine. You should not have major dental surgery while on this medicine. See your dentist to have a dental exam and fix any dental problems before starting this medicine. Take good care of your teeth while on this medicine. Make sure you see your dentist for regular follow-up appointments.  What side effects may I notice from receiving this medicine?  Side effects that you should report to your doctor or health care professional as soon as possible:  -allergic reactions like skin rash, itching or hives, swelling of the face, lips, or tongue  -anxiety, confusion, or depression  -breathing problems  -changes in vision  -eye pain  -feeling faint or lightheaded, falls  -jaw pain, especially after dental work  -mouth sores  -muscle cramps, stiffness, or weakness  -redness, blistering, peeling or loosening of the skin, including inside the mouth  -trouble passing urine or change in the amount of urine  Side effects that usually do not require medical attention (report to your doctor or health care professional if they continue or are bothersome):  -bone, joint, or muscle pain  -constipation  -diarrhea  -fever  -hair loss  -irritation at site where injected  -loss of appetite  -nausea, vomiting  -stomach upset  -trouble sleeping  -trouble swallowing  -weak or tired  This list may not describe all possible side effects. Call your doctor for medical advice about side effects. You may report side effects to FDA at 9-699-FDA-5406.  Where should I keep my medicine?  This drug is given in a hospital or clinic and will not be stored at home.  NOTE: This sheet is a summary. It may not cover all possible information. If you have questions about this medicine,  talk to your doctor, pharmacist, or health care provider.     © 2017, [employer]. (2015-05-16 14:19:39)

## 2017-07-21 NOTE — PROGRESS NOTES
DATE OF VISIT: 7/21/2017    REASON FOR VISIT:  Metastatic cancer most likely of breast origin    HISTORY OF PRESENT ILLNESS:    47-year-old female with a past medical history significant for history of ductal carcinoma in situ of the right breast diagnosed in December 2007 patient eventually had a mastectomy done in February 2008 and took adjuvant tamoxifen for 5 years until 2013. Patient also had a history of melanoma in situ of left shoulder diagnosed in September 2016 status postsurgical excision by Dr. Linn.  She was last seen here in clinic on July 7, 2017 and at that point in view of PET scan showing metastatic disease she was referred to Dr. Ernst for axillary lymph node biopsy which was done on July 10, 2017.  She is here to discuss the result of biopsy as well as further treatment recommendation. Complains of drainage coming from axillary lymph node biopsy site.  Denies any fever or chills  Complains of back pain.  Complains of constipation.    PAST MEDICAL HISTORY:    Past Medical History:   Diagnosis Date   • Anxiety    • Depression    • Encounter for gynecological examination    • Malignant neoplasm of female breast     hx of dcis s/p mastectomy and reconstruction and augmentation      • Primary malignant neoplasm of breast     dcis in rt breast s/p mastectomy,reconstruction      • Ventricular premature beats        SOCIAL HISTORY:    Social History   Substance Use Topics   • Smoking status: Never Smoker   • Smokeless tobacco: Never Used   • Alcohol use No       Surgical History :  Past Surgical History:   Procedure Laterality Date   • AUGMENTATION MAMMAPLASTY     • AXILLARY LYMPH NODE BIOPSY/EXCISION Right 7/10/2017    Procedure: BIOSPY RIGHT AXILLARY MASS AND OR LYMPH;  Surgeon: Sourav Ernst MD;  Location: [facility];  Service:    • BREAST BIOPSY  12/07/2007    Mammotome biopsy of the right side with clip placement   • BREAST LUMPECTOMY     • BREAST RECONSTRUCTION  10/28/2008    Abscence of  right breast secondary to mastectomy. Implant exchange for reconstruction of right breast with open para-prosthetic capsulotomy   • BREAST SURGERY  10/01/2009    Left breast ptosis and breast asymmetry secondary to right breast reconstruction for breast cancer. Left breast mastopexy with augmentation.   • CARDIAC CATHETERIZATION  09/18/2008    Normal coronary arteriography. Normal left ventricular angiogram   • EP STUDY     • MASTECTOMY Right    • MASTECTOMY  02/05/2008    Multifocal right breast ductal carcinoma-in-situ. Right total mastectomy   • MASTECTOMY, PARTIAL  01/03/2008    Ductal carcinoma in-situ of the right breast, proven by mammotome biopsy. Right lumpectomy, medial portion of the breast, between 2 o'clock and 4 o'clock, 5 cm from the nipple, with clip placement, radiographic inter. of severo. specimen, & ultrasound   • PAP SMEAR  03/03/2009    Negative   • LA INSJ TUNNELED CVC W/O SUBQ PORT/ AGE 3 YR/> Right 7/10/2017    Procedure: MEDIPORT     (c-arm#2);  Surgeon: Sourav Ernst MD;  Location: [facility];  Service: General       ALLERGIES:    Allergies   Allergen Reactions   • Percocet [Oxycodone-Acetaminophen] Nausea And Vomiting       REVIEW OF SYSTEMS:      CONSTITUTIONAL: Complains of fatigue.Positive for decreased appetite.   Denies any fever, chills .      HEENT: No epistaxis, mouth sores or difficulty swallowing.     RESPIRATORY: Complains of shortness of breath with exertion that started a few months back. No new cough or hemoptysis.     CARDIOVASCULAR: No chest pain or palpitations.     GASTROINTESTINAL: Positive for abdominal pain in epigastric region. No nausea, vomiting or blood in the stool.     GENITOURINARY: No Dysuria or Hematuria.     MUSCULOSKELETAL: Complains of pain in lower back radiating down to left side wall and flank region.     LYMPHATICS: Denies any abnormal swollen glands anywhere in the body.     NEUROLOGICAL : No tingling or numbness. No headache or dizziness. No  "seizures or balance problems.     SKIN: Positive for history of melanoma status post surgical removal. Denies any new skin lesion worrisome for melanoma.  Complains of pruritus all over body.            PHYSICAL EXAMINATION:      VITAL SIGNS:  /69  Pulse 70  Temp 98.4 °F (36.9 °C)  Resp 14  Ht 67\" (170.2 cm)  Wt 189 lb 8 oz (86 kg)  LMP 06/28/2017  BMI 29.68 kg/m2    GENERAL:  Not in any distress.    HEENT:  Normocephalic, Atraumatic.Mild Conjunctival pallor. No icterus. Extraocular Movements Intact. No Facial Asymmetry noted.    NECK:  No adenopathy. No JVD.    RESPIRATORY:  Fair air entry bilateral. No rhonchi or wheezing.    CARDIOVASCULAR:  S1, S2. Regular rate and rhythm. No murmur or gallop appreciated.    BREAST:Right axillary area was examined in the presence of registered nurse Vijaya, there appears well-healed surgical scar.  However on pressing on area of scarthere is dark brown fluid coming out.    ABDOMEN:  Soft, obese, nontender. Bowel sounds present in all four quadrants.  No organomegaly appreciated.    EXTREMITIES:  No edema.No Calf Tenderness.    NEUROLOGIC:  Alert, awake and oriented ×3.  No  Motor or sensory deficit appreciated. Cranial Nerves 2-12 grossly intact.      DIAGNOSTIC DATA:    Glucose   Date Value Ref Range Status   07/21/2017 86 60 - 100 mg/dL Final     Sodium   Date Value Ref Range Status   07/21/2017 138 137 - 145 mmol/L Final     Potassium   Date Value Ref Range Status   07/21/2017 3.9 3.5 - 5.1 mmol/L Final     CO2   Date Value Ref Range Status   07/21/2017 22.0 22.0 - 31.0 mmol/L Final     Chloride   Date Value Ref Range Status   07/21/2017 106 95 - 110 mmol/L Final     Anion Gap   Date Value Ref Range Status   07/21/2017 10.0 5.0 - 15.0 mmol/L Final     Creatinine   Date Value Ref Range Status   07/21/2017 0.78 0.50 - 1.00 mg/dL Final     BUN   Date Value Ref Range Status   07/21/2017 18 7 - 21 mg/dL Final     BUN/Creatinine Ratio   Date Value Ref Range Status "   07/21/2017 23.1 7.0 - 25.0 Final     Calcium   Date Value Ref Range Status   07/21/2017 9.3 8.4 - 10.2 mg/dL Final     eGFR Non  Amer   Date Value Ref Range Status   07/21/2017 78 58 - 135 mL/min/1.73 Final     Alkaline Phosphatase   Date Value Ref Range Status   07/21/2017 349 (H) 38 - 126 U/L Final     Total Protein   Date Value Ref Range Status   07/21/2017 7.1 6.3 - 8.6 g/dL Final     ALT (SGPT)   Date Value Ref Range Status   07/21/2017 170 (H) 9 - 52 U/L Final     AST (SGOT)   Date Value Ref Range Status   07/21/2017 116 (H) 14 - 36 U/L Final     Total Bilirubin   Date Value Ref Range Status   07/21/2017 1.0 0.2 - 1.3 mg/dL Final     Albumin   Date Value Ref Range Status   07/21/2017 3.80 3.40 - 4.80 g/dL Final     Globulin   Date Value Ref Range Status   07/21/2017 3.3 2.3 - 3.5 gm/dL Final     A/G Ratio   Date Value Ref Range Status   07/21/2017 1.2 1.1 - 1.8 g/dL Final     Lab Results   Component Value Date    WBC 6.91 07/21/2017    HGB 11.2 (L) 07/21/2017    HCT 32.6 (L) 07/21/2017    MCV 91.6 07/21/2017     07/21/2017     Lab Results   Component Value Date    NEUTROABS 4.02 07/21/2017     Lab Results   Component Value Date     2107.0 (H) 06/29/2017    LABCA2 1817.1 (H) 06/29/2017     2 D Echo Done on July 19, 2017 showed:  Interpretation Summary   · The left ventricular cavity is borderline dilated.  · Left ventricular systolic function is normal. Estimated EF = 53%.  · Left ventricular diastolic dysfunction (grade I) consistent with impaired relaxation.  · IAS aneurysm noted. No PFO or ASD           Radiology Data :  PET/CT done on July 3, 2017 showed:  IMPRESSION:  CONCLUSION:  1. Extensive metastatic disease. Pathologic uptake within  enlarged right-sided axillary lymph nodes. Metastatic adenopathy  in both laurie and mediastinum. Tumor replacing most of the left  lobe of liver. Intra-abdominal retroperitoneal metastases,  adenopathy.     Extensive skeletal metastases including a  pathologic fracture at  L4.        Nuclear bone scan done on June 20, 2017 showed:  1. Increased uptake at L4 suggestive of metastatic process.  2. 3 or 4 areas of increased activity in bony calvarium  3. Some increased activity in tips posteriorly and anteriorly suggestive of metastatic carcinoma to the skeletal system.           Pathology :    Pathology data from July 10, 2017 showed:  Final Diagnosis   Mass right axilla, excisional biopsy:      Metastatic poorly differentiated ductal carcinoma, breast primary      Estrogen receptor positive, 95% stainability, strong intensity      Progesterone receptor positive, 95% stainability, strong intensity      HER-2 2+(equivocal), sent to [facility] for FISH     Addendum   Her 2 FISH result from [facility] is NEGATIVE         Pathology report from December 7, 2007 showed:  FINAL DIAGNOSIS:   RIGHT BREAST MAMMOTOME BIOPSY:        DUCTAL CARCINOMA IN SITU, INTERMEDIATE NUCLEAR GRADE              WITH MICROCALCIFICATION.      ADDENDUM:   Estrogen receptors are positive (90-95% stainability).   Progesterone receptors are positive (90-95% stainability).         ASSESSMENT AND PLAN:      1.  Metastatic cancer with extensive adenopathy in right axilla, mediastinum, hilar, abdominal, retroperitoneal with extensive liver and bone metastasis on PET/CT.  Patient underwent right apatient underwent right axillary lymph node excision by Dr. Ernst on July 10, 2017.[facility] report confirmed ductal carcinoma of breast with estrogen and progesterone positivity.  Result of pathology report were discussed with patient and her family member.  At this point plan is to start her on chemotherapy with Taxotere and Cytoxan in view of extensive liver involvement and elevated liver function test.  Side effect of Taxotere and Cytoxan including nausea, vomiting, diarrhea, lowering of blood counts, risk of infection, need for blood transfusion, neuropathy, risk of kidney failure, and possibility  ofallergic reaction which could be severe and fatal in rare case were discussed with patient and her family member.  We will provide them information about Taxotere and Cytoxan today.  Since patient is having some active discharge from the site of surgery case was discussed with Dr. Ernst over the phone.  He recommended waiting for one more week before starting chemotherapy to prevent any infectious complication.  This recommendation were again discussed with patient and her family member.  Patient was referred back to Dr. Ernst to get evaluated today.    2.  History of melanoma in situ status post excision by Dr. Linn.    3.  Bone metastasis: Planning to start patient on Zometa next week.  Side effect of Zometa including hypocalcemia, mouth sores, osteonecrosis of jaw were discussed with patient.  We will also provide her information to read about Zometa.  She was instructed to get seen by dentist prior to starting Zometa in case any dental work needs to be done.    4.  History of ductal carcinoma in situ of the right breast diagnosed in 2017.  Status post mastectomy followed by adjuvant tamoxifen for 5 years which was finished in 2013.    5.  Elevated  liver function tests secondary to liver metastases   6.  Health maintenance: Patient does not smoke.  Not a candidate for screening colonoscopy at this point.  Remains full code.    7.  Prescriptions: Prescription for Decadron and Zofran has been sent to her pharmacy today on July 21, 2017.        Ovidio Meadows MD  7/21/2017  4:36 PM        EMR Dragon/Transcription disclaimer:   Much of this encounter note is an electronic transcription/translation of spoken language to printed text. The electronic translation of spoken language may permit erroneous, or at times, nonsensical words or phrases to be inadvertently transcribed; Although I have reviewed the note for such errors, some may still exist.

## 2017-07-25 ENCOUNTER — OFFICE VISIT (OUTPATIENT)
Dept: SURGERY | Facility: CLINIC | Age: 47
End: 2017-07-25

## 2017-07-25 VITALS
BODY MASS INDEX: 29.98 KG/M2 | DIASTOLIC BLOOD PRESSURE: 76 MMHG | WEIGHT: 191 LBS | SYSTOLIC BLOOD PRESSURE: 138 MMHG | HEIGHT: 67 IN

## 2017-07-25 DIAGNOSIS — C50.911 MALIGNANT NEOPLASM OF RIGHT FEMALE BREAST, UNSPECIFIED SITE OF BREAST: Primary | ICD-10-CM

## 2017-07-25 PROCEDURE — 99024 POSTOP FOLLOW-UP VISIT: CPT | Performed by: SURGERY

## 2017-07-25 NOTE — PROGRESS NOTES
47-year-old female recently diagnosed with metastatic breast cancer presents 2 weeks after biopsy of right axillary lymph node.  Patient continues to have old blood-tinged drainage from the lateral part of her incision.  There is no redness no tenderness no fluctuance and no evidence of any type of infection.  Discussed this with Dr. Meadows and we both feel that support she go ahead and start her chemotherapy realizing that there may be some delay in her wound healing but I think is critical that she get her chemotherapy started sooner rather than later.  We will continue with local wound care as far as his wound is concerned and the patient will follow up with me in 2 weeks or sooner she has a further concerns or questions.  Fully discussed this with the patient she clearly understands

## 2017-07-26 ENCOUNTER — INFUSION (OUTPATIENT)
Dept: ONCOLOGY | Facility: HOSPITAL | Age: 47
End: 2017-07-26

## 2017-07-26 VITALS
TEMPERATURE: 98.2 F | SYSTOLIC BLOOD PRESSURE: 129 MMHG | DIASTOLIC BLOOD PRESSURE: 80 MMHG | HEART RATE: 68 BPM | RESPIRATION RATE: 16 BRPM

## 2017-07-26 DIAGNOSIS — C79.51 METASTASIS TO BONE OF UNKNOWN PRIMARY (HCC): ICD-10-CM

## 2017-07-26 DIAGNOSIS — C80.1 METASTASIS TO BONE OF UNKNOWN PRIMARY (HCC): ICD-10-CM

## 2017-07-26 DIAGNOSIS — C50.911 MALIGNANT NEOPLASM OF RIGHT FEMALE BREAST, UNSPECIFIED SITE OF BREAST: ICD-10-CM

## 2017-07-26 DIAGNOSIS — C50.911 MALIGNANT NEOPLASM OF RIGHT FEMALE BREAST, UNSPECIFIED SITE OF BREAST: Primary | ICD-10-CM

## 2017-07-26 DIAGNOSIS — Z45.2 ENCOUNTER FOR ADJUSTMENT OR MANAGEMENT OF VASCULAR ACCESS DEVICE: ICD-10-CM

## 2017-07-26 LAB
ALBUMIN SERPL-MCNC: 4.2 G/DL (ref 3.4–4.8)
ALBUMIN/GLOB SERPL: 1.4 G/DL (ref 1.1–1.8)
ALP SERPL-CCNC: 399 U/L (ref 38–126)
ALT SERPL W P-5'-P-CCNC: 182 U/L (ref 9–52)
ANION GAP SERPL CALCULATED.3IONS-SCNC: 11 MMOL/L (ref 5–15)
AST SERPL-CCNC: 140 U/L (ref 14–36)
BASOPHILS # BLD AUTO: 0.05 10*3/MM3 (ref 0–0.2)
BASOPHILS NFR BLD AUTO: 0.8 % (ref 0–2)
BILIRUB SERPL-MCNC: 1.1 MG/DL (ref 0.2–1.3)
BUN BLD-MCNC: 14 MG/DL (ref 7–21)
BUN/CREAT SERPL: 18.9 (ref 7–25)
CALCIUM SPEC-SCNC: 8.9 MG/DL (ref 8.4–10.2)
CHLORIDE SERPL-SCNC: 103 MMOL/L (ref 95–110)
CO2 SERPL-SCNC: 23 MMOL/L (ref 22–31)
CREAT BLD-MCNC: 0.74 MG/DL (ref 0.5–1)
DEPRECATED RDW RBC AUTO: 44.5 FL (ref 36.4–46.3)
EOSINOPHIL # BLD AUTO: 0.14 10*3/MM3 (ref 0–0.7)
EOSINOPHIL NFR BLD AUTO: 2.2 % (ref 0–7)
ERYTHROCYTE [DISTWIDTH] IN BLOOD BY AUTOMATED COUNT: 13.3 % (ref 11.5–14.5)
GFR SERPL CREATININE-BSD FRML MDRD: 83 ML/MIN/1.73 (ref 58–135)
GLOBULIN UR ELPH-MCNC: 3.1 GM/DL (ref 2.3–3.5)
GLUCOSE BLD-MCNC: 99 MG/DL (ref 60–100)
HCT VFR BLD AUTO: 34 % (ref 35–45)
HGB BLD-MCNC: 11.6 G/DL (ref 12–15.5)
IMM GRANULOCYTES # BLD: 0.02 10*3/MM3 (ref 0–0.02)
IMM GRANULOCYTES NFR BLD: 0.3 % (ref 0–0.5)
LYMPHOCYTES # BLD AUTO: 1.15 10*3/MM3 (ref 0.6–4.2)
LYMPHOCYTES NFR BLD AUTO: 18.4 % (ref 10–50)
MAGNESIUM SERPL-MCNC: 1.9 MG/DL (ref 1.6–2.3)
MCH RBC QN AUTO: 31.4 PG (ref 26.5–34)
MCHC RBC AUTO-ENTMCNC: 34.1 G/DL (ref 31.4–36)
MCV RBC AUTO: 91.9 FL (ref 80–98)
MONOCYTES # BLD AUTO: 0.36 10*3/MM3 (ref 0–0.9)
MONOCYTES NFR BLD AUTO: 5.8 % (ref 0–12)
NEUTROPHILS # BLD AUTO: 4.52 10*3/MM3 (ref 2–8.6)
NEUTROPHILS NFR BLD AUTO: 72.5 % (ref 37–80)
PHOSPHATE SERPL-MCNC: 4.2 MG/DL (ref 2.4–4.4)
PLATELET # BLD AUTO: 306 10*3/MM3 (ref 150–450)
PMV BLD AUTO: 10.4 FL (ref 8–12)
POTASSIUM BLD-SCNC: 4.3 MMOL/L (ref 3.5–5.1)
PROT SERPL-MCNC: 7.3 G/DL (ref 6.3–8.6)
RBC # BLD AUTO: 3.7 10*6/MM3 (ref 3.77–5.16)
SODIUM BLD-SCNC: 137 MMOL/L (ref 137–145)
WBC NRBC COR # BLD: 6.24 10*3/MM3 (ref 3.2–9.8)

## 2017-07-26 PROCEDURE — 25010000002 CYCLOPHOSPHAMIDE PER 100 MG: Performed by: INTERNAL MEDICINE

## 2017-07-26 PROCEDURE — 85025 COMPLETE CBC W/AUTO DIFF WBC: CPT

## 2017-07-26 PROCEDURE — 25010000002 DOCETAXEL 80 MG/4ML CONCENTRATION 4 ML VIAL: Performed by: INTERNAL MEDICINE

## 2017-07-26 PROCEDURE — 96417 CHEMO IV INFUS EACH ADDL SEQ: CPT | Performed by: INTERNAL MEDICINE

## 2017-07-26 PROCEDURE — 96413 CHEMO IV INFUSION 1 HR: CPT | Performed by: INTERNAL MEDICINE

## 2017-07-26 PROCEDURE — 96377 APPLICATON ON-BODY INJECTOR: CPT | Performed by: INTERNAL MEDICINE

## 2017-07-26 PROCEDURE — 83735 ASSAY OF MAGNESIUM: CPT

## 2017-07-26 PROCEDURE — 25010000003 DEXAMETHASONE SODIUM PHOSPHATE 100 MG/10ML SOLUTION 10 ML VIAL: Performed by: INTERNAL MEDICINE

## 2017-07-26 PROCEDURE — 80053 COMPREHEN METABOLIC PANEL: CPT

## 2017-07-26 PROCEDURE — 96375 TX/PRO/DX INJ NEW DRUG ADDON: CPT | Performed by: INTERNAL MEDICINE

## 2017-07-26 PROCEDURE — 25010000002 PEGFILGRASTIM 6 MG/0.6ML PREFILLED SYRINGE KIT: Performed by: INTERNAL MEDICINE

## 2017-07-26 PROCEDURE — 25010000002 PALONOSETRON PER 25 MCG: Performed by: INTERNAL MEDICINE

## 2017-07-26 PROCEDURE — 25010000002 DIPHENHYDRAMINE PER 50 MG: Performed by: INTERNAL MEDICINE

## 2017-07-26 PROCEDURE — 25010000002 HEPARIN FLUSH (PORCINE) 100 UNIT/ML SOLUTION: Performed by: INTERNAL MEDICINE

## 2017-07-26 PROCEDURE — 84100 ASSAY OF PHOSPHORUS: CPT

## 2017-07-26 RX ORDER — SODIUM CHLORIDE 9 MG/ML
250 INJECTION, SOLUTION INTRAVENOUS ONCE
Status: CANCELLED | OUTPATIENT
Start: 2017-07-26

## 2017-07-26 RX ORDER — TRAMADOL HYDROCHLORIDE 50 MG/1
50 TABLET ORAL EVERY 8 HOURS PRN
Qty: 60 TABLET | Refills: 0 | Status: SHIPPED | OUTPATIENT
Start: 2017-07-26 | End: 2017-08-23 | Stop reason: SDUPTHER

## 2017-07-26 RX ORDER — FAMOTIDINE 10 MG/ML
20 INJECTION, SOLUTION INTRAVENOUS ONCE
Status: CANCELLED | OUTPATIENT
Start: 2017-07-26

## 2017-07-26 RX ORDER — SODIUM CHLORIDE 0.9 % (FLUSH) 0.9 %
10 SYRINGE (ML) INJECTION AS NEEDED
Status: CANCELLED | OUTPATIENT
Start: 2017-07-26

## 2017-07-26 RX ORDER — SODIUM CHLORIDE 0.9 % (FLUSH) 0.9 %
10 SYRINGE (ML) INJECTION AS NEEDED
Status: DISCONTINUED | OUTPATIENT
Start: 2017-07-26 | End: 2017-07-26 | Stop reason: HOSPADM

## 2017-07-26 RX ORDER — PALONOSETRON 0.05 MG/ML
0.25 INJECTION, SOLUTION INTRAVENOUS ONCE
Status: COMPLETED | OUTPATIENT
Start: 2017-07-26 | End: 2017-07-26

## 2017-07-26 RX ORDER — FAMOTIDINE 10 MG/ML
20 INJECTION, SOLUTION INTRAVENOUS ONCE
Status: COMPLETED | OUTPATIENT
Start: 2017-07-26 | End: 2017-07-26

## 2017-07-26 RX ORDER — SODIUM CHLORIDE 9 MG/ML
250 INJECTION, SOLUTION INTRAVENOUS ONCE
Status: COMPLETED | OUTPATIENT
Start: 2017-07-26 | End: 2017-07-26

## 2017-07-26 RX ORDER — PALONOSETRON 0.05 MG/ML
0.25 INJECTION, SOLUTION INTRAVENOUS ONCE
Status: CANCELLED | OUTPATIENT
Start: 2017-07-26

## 2017-07-26 RX ADMIN — DEXAMETHASONE SODIUM PHOSPHATE 10 MG: 10 INJECTION, SOLUTION INTRAMUSCULAR; INTRAVENOUS at 11:05

## 2017-07-26 RX ADMIN — SODIUM CHLORIDE, PRESERVATIVE FREE 500 UNITS: 5 INJECTION INTRAVENOUS at 14:19

## 2017-07-26 RX ADMIN — CYCLOPHOSPHAMIDE 1190 MG: 1 INJECTION, POWDER, FOR SOLUTION INTRAVENOUS; ORAL at 13:30

## 2017-07-26 RX ADMIN — Medication 10 ML: at 09:19

## 2017-07-26 RX ADMIN — DIPHENHYDRAMINE HYDROCHLORIDE 25 MG: 50 INJECTION INTRAMUSCULAR; INTRAVENOUS at 11:41

## 2017-07-26 RX ADMIN — SODIUM CHLORIDE 250 ML: 9 INJECTION, SOLUTION INTRAVENOUS at 11:03

## 2017-07-26 RX ADMIN — Medication 10 ML: at 14:19

## 2017-07-26 RX ADMIN — PEGFILGRASTIM 6 MG: KIT SUBCUTANEOUS at 14:20

## 2017-07-26 RX ADMIN — DOCETAXEL 150 MG: 80 INJECTION, SOLUTION, CONCENTRATE INTRAVENOUS at 12:15

## 2017-07-26 RX ADMIN — FAMOTIDINE 20 MG: 10 INJECTION INTRAVENOUS at 11:28

## 2017-07-26 RX ADMIN — PALONOSETRON HYDROCHLORIDE 0.25 MG: 0.25 INJECTION INTRAVENOUS at 11:00

## 2017-07-26 NOTE — PROGRESS NOTES
Patient report surgical site still draining---seen Sujata yesterday, says he said proceed with chemo.  Kyleigh English RN  July 26, 2017  9:28 AM

## 2017-07-26 NOTE — PROGRESS NOTES
Patient did not take her at home day before steroid.  Kyleigh English RN  July 26, 2017  9:24 AM

## 2017-07-28 ENCOUNTER — INFUSION (OUTPATIENT)
Dept: ONCOLOGY | Facility: HOSPITAL | Age: 47
End: 2017-07-28

## 2017-07-28 VITALS — DIASTOLIC BLOOD PRESSURE: 67 MMHG | SYSTOLIC BLOOD PRESSURE: 101 MMHG | TEMPERATURE: 98 F | HEART RATE: 74 BPM

## 2017-07-28 DIAGNOSIS — R11.2 NAUSEA AND VOMITING, INTRACTABILITY OF VOMITING NOT SPECIFIED, UNSPECIFIED VOMITING TYPE: ICD-10-CM

## 2017-07-28 DIAGNOSIS — Z45.2 ENCOUNTER FOR ADJUSTMENT OR MANAGEMENT OF VASCULAR ACCESS DEVICE: Primary | ICD-10-CM

## 2017-07-28 PROCEDURE — 96374 THER/PROPH/DIAG INJ IV PUSH: CPT | Performed by: INTERNAL MEDICINE

## 2017-07-28 PROCEDURE — 25010000002 HEPARIN FLUSH (PORCINE) 100 UNIT/ML SOLUTION: Performed by: INTERNAL MEDICINE

## 2017-07-28 PROCEDURE — 25010000003 DEXAMETHASONE SODIUM PHOSPHATE 100 MG/10ML SOLUTION 10 ML VIAL: Performed by: INTERNAL MEDICINE

## 2017-07-28 PROCEDURE — 25010000002 ONDANSETRON PER 1 MG: Performed by: INTERNAL MEDICINE

## 2017-07-28 PROCEDURE — 96361 HYDRATE IV INFUSION ADD-ON: CPT | Performed by: INTERNAL MEDICINE

## 2017-07-28 RX ORDER — SODIUM CHLORIDE 0.9 % (FLUSH) 0.9 %
10 SYRINGE (ML) INJECTION AS NEEDED
Status: DISCONTINUED | OUTPATIENT
Start: 2017-07-28 | End: 2017-07-28 | Stop reason: HOSPADM

## 2017-07-28 RX ORDER — SODIUM CHLORIDE 0.9 % (FLUSH) 0.9 %
10 SYRINGE (ML) INJECTION AS NEEDED
Status: CANCELLED | OUTPATIENT
Start: 2017-07-28

## 2017-07-28 RX ADMIN — Medication 10 ML: at 13:00

## 2017-07-28 RX ADMIN — DEXAMETHASONE SODIUM PHOSPHATE: 10 INJECTION, SOLUTION INTRAMUSCULAR; INTRAVENOUS at 10:53

## 2017-07-28 RX ADMIN — SODIUM CHLORIDE: 900 INJECTION, SOLUTION INTRAVENOUS at 10:42

## 2017-07-28 RX ADMIN — SODIUM CHLORIDE, PRESERVATIVE FREE 500 UNITS: 5 INJECTION INTRAVENOUS at 13:00

## 2017-07-28 NOTE — PROGRESS NOTES
Adult Outpatient Nutrition  Assessment    Patient Name:  Kylie García  YOB: 1970  MRN: [id number]        Assessment Date:  7/28/2017              CHART REVIEW  Kylie García   1970  47 y.o.  Wt: 191 lb  Ht: 67 in  Dx: metastatic cancer to liver & bones (most likely breast origin)  Tx: chemo    PATIENT/FAMILY COMMENTS  Usual Body Weight: 196 lb  Weight Change: lost 5 lb  Diet: regular    Appetite/Intake: poor recently (N/V)  Supplements: poorly tolerated recently  Nutrition Concerns:  -N/V (IV fluid and zofran today)  -more frequent thin stools     No problems with chew/swal/taste blindness/constipation.          GOAL(s)  -to optimize nutrition in attempt to prevent loss of LBM          EDUCATION  Discussed  -high calorie high protein diet with homemade supplements  -importance of staying hydrated  -food safety  -MD discussed antemetic use/dose    To reinforce education, written information with RD's name & number provided.  Patient and son receptive to suggestions--agreed to call on dietitian as needed.              Sue Devine RD                Electronically signed by:  Sue Devine RD  07/28/17 2:43 PM

## 2017-08-02 ENCOUNTER — TELEPHONE (OUTPATIENT)
Dept: ONCOLOGY | Facility: CLINIC | Age: 47
End: 2017-08-02

## 2017-08-02 RX ORDER — SCOLOPAMINE TRANSDERMAL SYSTEM 1 MG/1
1 PATCH, EXTENDED RELEASE TRANSDERMAL
Qty: 10 PATCH | Refills: 0 | Status: SHIPPED | OUTPATIENT
Start: 2017-08-02 | End: 2017-11-15 | Stop reason: SDUPTHER

## 2017-08-02 NOTE — TELEPHONE ENCOUNTER
Pt states she has always had to have a scopolamine patch for nausea any time she has had to have surgery and is requesting to try that. Also asking if there is anything that can be done for sensitive gag reflex.

## 2017-08-02 NOTE — TELEPHONE ENCOUNTER
I have sent prescription for scopolamine patch.  Nothing we can do about sensitive gag reflex.  Scopolamine can try up everything so make sure she does not get bad constipation or have urinary retention.

## 2017-08-02 NOTE — TELEPHONE ENCOUNTER
Pt picked up script from pharmacy. Called back asking if she could continue her other nausea meds as needed along with patch and instructed to continue other meds prn. Verbalized understanding.

## 2017-08-08 ENCOUNTER — OFFICE VISIT (OUTPATIENT)
Dept: SURGERY | Facility: CLINIC | Age: 47
End: 2017-08-08

## 2017-08-08 VITALS
HEIGHT: 67 IN | DIASTOLIC BLOOD PRESSURE: 68 MMHG | SYSTOLIC BLOOD PRESSURE: 118 MMHG | BODY MASS INDEX: 28.56 KG/M2 | WEIGHT: 182 LBS

## 2017-08-08 DIAGNOSIS — C79.9 METASTATIC DISEASE (HCC): Primary | ICD-10-CM

## 2017-08-08 PROCEDURE — 99024 POSTOP FOLLOW-UP VISIT: CPT | Performed by: SURGERY

## 2017-08-08 NOTE — PROGRESS NOTES
47-year-old female with known metastatic breast disease presents for follow-up regarding her lymph node biopsy right axilla.  Patient is restarted on chemotherapy.  Lateral portion of the wound proximally as centimeter area that still open and draining a clear material.  There is no redness noted no fluctuance appreciated.  Of note patient has decreased abduction of right shoulder that she's noticed over the past 24 hours and this is an acute change.  Shoulder appears to be normal with good pulse and no paresthesias.  No neck or back pain.    Spoke with Dr. Meadows for medical oncology and we will obtain a MRI of the shoulder see if there is any acute changes noted.  Metastatic disease involving consider referral for radiation therapy.  Otherwise will consist continue with chemotherapy at this point.

## 2017-08-09 ENCOUNTER — TELEPHONE (OUTPATIENT)
Dept: ONCOLOGY | Facility: CLINIC | Age: 47
End: 2017-08-09

## 2017-08-11 ENCOUNTER — TELEPHONE (OUTPATIENT)
Dept: SURGERY | Facility: CLINIC | Age: 47
End: 2017-08-11

## 2017-08-11 NOTE — TELEPHONE ENCOUNTER
PT IS AT [facility].. HAS A LOT OF DRAINAGE AND NEEDS TO BE SEE ASAP..       WANTS TO TALK TO  YOU RE: THIS    CALL PT  [phone]    Spoke with the patient about the drainage from her wound.  She will see me in the clinic sooner she gets back from [place] early next week

## 2017-08-14 ENCOUNTER — OFFICE VISIT (OUTPATIENT)
Dept: SURGERY | Facility: CLINIC | Age: 47
End: 2017-08-14

## 2017-08-14 VITALS
HEIGHT: 67 IN | BODY MASS INDEX: 28.72 KG/M2 | SYSTOLIC BLOOD PRESSURE: 116 MMHG | WEIGHT: 183 LBS | DIASTOLIC BLOOD PRESSURE: 70 MMHG

## 2017-08-14 DIAGNOSIS — C79.9 METASTATIC DISEASE (HCC): Primary | ICD-10-CM

## 2017-08-14 PROCEDURE — 99024 POSTOP FOLLOW-UP VISIT: CPT | Performed by: SURGERY

## 2017-08-14 RX ORDER — PROCHLORPERAZINE MALEATE 10 MG
1 TABLET ORAL AS NEEDED
COMMUNITY
Start: 2017-07-28 | End: 2018-02-23

## 2017-08-14 NOTE — PROGRESS NOTES
47-year-old female here for follow-up of her right axillary wound.  She's now 1 month out from a right axillary lymph node biopsy demonstrated metastatic breast cancer.  She continues to have drainage from her incision and it seems to changed in character.  Patient has received one round of chemotherapy for breast cancer and she'll be due to get her second round later this week.  Patient did go to [facility] last week and saw medical oncologist.  No fever no chills.    suspect patient is just coming out of her ruthie issues due to get chemotherapy in 2 days.    X-ray wound is intact slight redness at the edges but clearly no cellulitis or obvious infection.  His drainage on the dressing and there is a pinhole size opening in the incision.  I prepped the area with Betadine and explored this with a Q-tip and appears to just be the cavity from the surgery.    Discussed options with the patient of continuing with dressing changes and she's doing versus open up the wound to allow for packing to be placed.  I spoke with Dr. Barahona and he is going to delay her chemotherapy for a week.  We decided to go ahead and open the incision under local.  We prepped the area with Betadine and infiltrated local and a good block was obtained.  I open the incision approximately 2 cm in length are about a third of the incision this allowed me to get a gauze packing and place easily.  Patient has a friend with her is also a nurse and feels comfortable with doing the packing.  Advised him to the packing at least twice a day if not 3 times a day and they seem to be comfortable with doing that.  Patient will follow up with me on Friday or sooner she has any other concerns or questions.

## 2017-08-14 NOTE — TELEPHONE ENCOUNTER
FREDDIE- Pt called and states she is having yellow drainage from incision to right axilla.  She has appt with dr. Ernst today and is to let us know what his thoughts are.  Pt also went to md richard for second opinion, we are requesting records now.  Pt has appt. Here on Wednesday.  thanks

## 2017-08-16 ENCOUNTER — APPOINTMENT (OUTPATIENT)
Dept: ONCOLOGY | Facility: HOSPITAL | Age: 47
End: 2017-08-16

## 2017-08-22 ENCOUNTER — OFFICE VISIT (OUTPATIENT)
Dept: SURGERY | Facility: CLINIC | Age: 47
End: 2017-08-22

## 2017-08-22 VITALS
HEIGHT: 67 IN | SYSTOLIC BLOOD PRESSURE: 102 MMHG | BODY MASS INDEX: 28.88 KG/M2 | WEIGHT: 184 LBS | DIASTOLIC BLOOD PRESSURE: 66 MMHG

## 2017-08-22 DIAGNOSIS — C50.911 MALIGNANT NEOPLASM OF RIGHT FEMALE BREAST, UNSPECIFIED SITE OF BREAST: Primary | ICD-10-CM

## 2017-08-22 PROCEDURE — 99024 POSTOP FOLLOW-UP VISIT: CPT | Performed by: SURGERY

## 2017-08-22 NOTE — PROGRESS NOTES
See prior notes.  Patient here for right axillary wound check.  Overall she is doing much better.  Patient has friends who are nurses are helping with her dressing change in her doing an excellent job.  Wound is clean with good granulation tissue and the drainage output has dropped significantly.  Wound is being packed well.  There is no tenderness or fluctuance area patient will continue with local wound care.  She is due to get another dose of chemotherapy this week.  She'll follow up with me in 2 weeks or sooner she has a further concerns or questions

## 2017-08-23 ENCOUNTER — INFUSION (OUTPATIENT)
Dept: ONCOLOGY | Facility: HOSPITAL | Age: 47
End: 2017-08-23

## 2017-08-23 ENCOUNTER — OFFICE VISIT (OUTPATIENT)
Dept: ONCOLOGY | Facility: CLINIC | Age: 47
End: 2017-08-23

## 2017-08-23 VITALS
SYSTOLIC BLOOD PRESSURE: 105 MMHG | HEIGHT: 67 IN | RESPIRATION RATE: 16 BRPM | WEIGHT: 184 LBS | BODY MASS INDEX: 28.88 KG/M2 | TEMPERATURE: 96.8 F | DIASTOLIC BLOOD PRESSURE: 67 MMHG | HEART RATE: 84 BPM

## 2017-08-23 DIAGNOSIS — Z45.2 ENCOUNTER FOR VENOUS ACCESS DEVICE CARE: ICD-10-CM

## 2017-08-23 DIAGNOSIS — C80.1 METASTASIS TO BONE OF UNKNOWN PRIMARY (HCC): ICD-10-CM

## 2017-08-23 DIAGNOSIS — C50.911 MALIGNANT NEOPLASM OF RIGHT FEMALE BREAST, UNSPECIFIED SITE OF BREAST: ICD-10-CM

## 2017-08-23 DIAGNOSIS — C79.51 METASTASIS TO BONE OF UNKNOWN PRIMARY (HCC): Primary | ICD-10-CM

## 2017-08-23 DIAGNOSIS — R79.89 ELEVATED LIVER FUNCTION TESTS: Primary | ICD-10-CM

## 2017-08-23 DIAGNOSIS — IMO0001 OTHER COMPLICATION DUE TO VENOUS ACCESS DEVICE, SUBSEQUENT ENCOUNTER: ICD-10-CM

## 2017-08-23 DIAGNOSIS — Z45.2 ENCOUNTER FOR ADJUSTMENT OR MANAGEMENT OF VASCULAR ACCESS DEVICE: ICD-10-CM

## 2017-08-23 DIAGNOSIS — R11.2 NAUSEA AND VOMITING, INTRACTABILITY OF VOMITING NOT SPECIFIED, UNSPECIFIED VOMITING TYPE: ICD-10-CM

## 2017-08-23 DIAGNOSIS — C79.51 METASTASIS TO BONE OF UNKNOWN PRIMARY (HCC): ICD-10-CM

## 2017-08-23 DIAGNOSIS — C80.1 METASTASIS TO BONE OF UNKNOWN PRIMARY (HCC): Primary | ICD-10-CM

## 2017-08-23 LAB
ALBUMIN SERPL-MCNC: 4.1 G/DL (ref 3.4–4.8)
ALBUMIN/GLOB SERPL: 1.3 G/DL (ref 1.1–1.8)
ALP SERPL-CCNC: 320 U/L (ref 38–126)
ALT SERPL W P-5'-P-CCNC: 109 U/L (ref 9–52)
ANION GAP SERPL CALCULATED.3IONS-SCNC: 11 MMOL/L (ref 5–15)
AST SERPL-CCNC: 57 U/L (ref 14–36)
BASOPHILS # BLD AUTO: 0 10*3/MM3 (ref 0–0.2)
BASOPHILS NFR BLD AUTO: 0 % (ref 0–2)
BILIRUB SERPL-MCNC: 0.5 MG/DL (ref 0.2–1.3)
BUN BLD-MCNC: 14 MG/DL (ref 7–21)
BUN/CREAT SERPL: 21.5 (ref 7–25)
CALCIUM SPEC-SCNC: 9.3 MG/DL (ref 8.4–10.2)
CHLORIDE SERPL-SCNC: 104 MMOL/L (ref 95–110)
CO2 SERPL-SCNC: 24 MMOL/L (ref 22–31)
CREAT BLD-MCNC: 0.65 MG/DL (ref 0.5–1)
DEPRECATED RDW RBC AUTO: 46.8 FL (ref 36.4–46.3)
EOSINOPHIL # BLD AUTO: 0 10*3/MM3 (ref 0–0.7)
EOSINOPHIL NFR BLD AUTO: 0 % (ref 0–7)
ERYTHROCYTE [DISTWIDTH] IN BLOOD BY AUTOMATED COUNT: 14.2 % (ref 11.5–14.5)
GFR SERPL CREATININE-BSD FRML MDRD: 97 ML/MIN/1.73 (ref 58–135)
GLOBULIN UR ELPH-MCNC: 3.1 GM/DL (ref 2.3–3.5)
GLUCOSE BLD-MCNC: 106 MG/DL (ref 60–100)
HCT VFR BLD AUTO: 33.7 % (ref 35–45)
HGB BLD-MCNC: 11.5 G/DL (ref 12–15.5)
IMM GRANULOCYTES # BLD: 0.02 10*3/MM3 (ref 0–0.02)
IMM GRANULOCYTES NFR BLD: 0.2 % (ref 0–0.5)
LYMPHOCYTES # BLD AUTO: 0.65 10*3/MM3 (ref 0.6–4.2)
LYMPHOCYTES NFR BLD AUTO: 7.7 % (ref 10–50)
MCH RBC QN AUTO: 31 PG (ref 26.5–34)
MCHC RBC AUTO-ENTMCNC: 34.1 G/DL (ref 31.4–36)
MCV RBC AUTO: 90.8 FL (ref 80–98)
MONOCYTES # BLD AUTO: 0.5 10*3/MM3 (ref 0–0.9)
MONOCYTES NFR BLD AUTO: 5.9 % (ref 0–12)
NEUTROPHILS # BLD AUTO: 7.25 10*3/MM3 (ref 2–8.6)
NEUTROPHILS NFR BLD AUTO: 86.2 % (ref 37–80)
PLATELET # BLD AUTO: 266 10*3/MM3 (ref 150–450)
PMV BLD AUTO: 9.5 FL (ref 8–12)
POTASSIUM BLD-SCNC: 4 MMOL/L (ref 3.5–5.1)
PROT SERPL-MCNC: 7.2 G/DL (ref 6.3–8.6)
RBC # BLD AUTO: 3.71 10*6/MM3 (ref 3.77–5.16)
SODIUM BLD-SCNC: 139 MMOL/L (ref 137–145)
WBC NRBC COR # BLD: 8.42 10*3/MM3 (ref 3.2–9.8)

## 2017-08-23 PROCEDURE — 86300 IMMUNOASSAY TUMOR CA 15-3: CPT

## 2017-08-23 PROCEDURE — 36415 COLL VENOUS BLD VENIPUNCTURE: CPT | Performed by: INTERNAL MEDICINE

## 2017-08-23 PROCEDURE — 96375 TX/PRO/DX INJ NEW DRUG ADDON: CPT | Performed by: INTERNAL MEDICINE

## 2017-08-23 PROCEDURE — 96417 CHEMO IV INFUS EACH ADDL SEQ: CPT | Performed by: INTERNAL MEDICINE

## 2017-08-23 PROCEDURE — 25010000003 DEXAMETHASONE SODIUM PHOSPHATE 100 MG/10ML SOLUTION 10 ML VIAL: Performed by: INTERNAL MEDICINE

## 2017-08-23 PROCEDURE — 25010000002 HEPARIN FLUSH (PORCINE) 100 UNIT/ML SOLUTION: Performed by: INTERNAL MEDICINE

## 2017-08-23 PROCEDURE — 96413 CHEMO IV INFUSION 1 HR: CPT | Performed by: INTERNAL MEDICINE

## 2017-08-23 PROCEDURE — 99214 OFFICE O/P EST MOD 30 MIN: CPT | Performed by: INTERNAL MEDICINE

## 2017-08-23 PROCEDURE — 25010000002 ALTEPLASE 2 MG RECONSTITUTED SOLUTION: Performed by: INTERNAL MEDICINE

## 2017-08-23 PROCEDURE — 85025 COMPLETE CBC W/AUTO DIFF WBC: CPT

## 2017-08-23 PROCEDURE — 96367 TX/PROPH/DG ADDL SEQ IV INF: CPT | Performed by: INTERNAL MEDICINE

## 2017-08-23 PROCEDURE — 36593 DECLOT VASCULAR DEVICE: CPT | Performed by: INTERNAL MEDICINE

## 2017-08-23 PROCEDURE — 25010000002 CYCLOPHOSPHAMIDE PER 100 MG: Performed by: INTERNAL MEDICINE

## 2017-08-23 PROCEDURE — 25010000002 PALONOSETRON PER 25 MCG: Performed by: INTERNAL MEDICINE

## 2017-08-23 PROCEDURE — 25010000002 PEGFILGRASTIM 6 MG/0.6ML PREFILLED SYRINGE KIT: Performed by: INTERNAL MEDICINE

## 2017-08-23 PROCEDURE — 25010000002 DOCETAXEL 80 MG/4ML CONCENTRATION 4 ML VIAL: Performed by: INTERNAL MEDICINE

## 2017-08-23 PROCEDURE — 25010000002 ZOLEDRONIC ACID PER 1 MG: Performed by: INTERNAL MEDICINE

## 2017-08-23 PROCEDURE — 25010000002 DIPHENHYDRAMINE PER 50 MG: Performed by: INTERNAL MEDICINE

## 2017-08-23 PROCEDURE — 96377 APPLICATON ON-BODY INJECTOR: CPT | Performed by: INTERNAL MEDICINE

## 2017-08-23 PROCEDURE — 80053 COMPREHEN METABOLIC PANEL: CPT

## 2017-08-23 RX ORDER — TRAMADOL HYDROCHLORIDE 50 MG/1
50 TABLET ORAL EVERY 8 HOURS PRN
Qty: 90 TABLET | Refills: 0 | Status: SHIPPED | OUTPATIENT
Start: 2017-08-23 | End: 2017-09-13 | Stop reason: SDUPTHER

## 2017-08-23 RX ORDER — SODIUM CHLORIDE 0.9 % (FLUSH) 0.9 %
10 SYRINGE (ML) INJECTION AS NEEDED
Status: DISCONTINUED | OUTPATIENT
Start: 2017-08-23 | End: 2017-08-23 | Stop reason: HOSPADM

## 2017-08-23 RX ORDER — SODIUM CHLORIDE 9 MG/ML
250 INJECTION, SOLUTION INTRAVENOUS ONCE
Status: DISCONTINUED | OUTPATIENT
Start: 2017-08-23 | End: 2017-08-23 | Stop reason: HOSPADM

## 2017-08-23 RX ORDER — PALONOSETRON 0.05 MG/ML
0.25 INJECTION, SOLUTION INTRAVENOUS ONCE
Status: COMPLETED | OUTPATIENT
Start: 2017-08-23 | End: 2017-08-23

## 2017-08-23 RX ORDER — SODIUM CHLORIDE 0.9 % (FLUSH) 0.9 %
10 SYRINGE (ML) INJECTION AS NEEDED
Status: CANCELLED | OUTPATIENT
Start: 2017-08-23

## 2017-08-23 RX ORDER — FAMOTIDINE 10 MG/ML
20 INJECTION, SOLUTION INTRAVENOUS ONCE
Status: COMPLETED | OUTPATIENT
Start: 2017-08-23 | End: 2017-08-23

## 2017-08-23 RX ORDER — PALONOSETRON 0.05 MG/ML
0.25 INJECTION, SOLUTION INTRAVENOUS ONCE
Status: CANCELLED | OUTPATIENT
Start: 2017-08-23

## 2017-08-23 RX ORDER — SODIUM CHLORIDE 9 MG/ML
250 INJECTION, SOLUTION INTRAVENOUS ONCE
Status: CANCELLED | OUTPATIENT
Start: 2017-08-23

## 2017-08-23 RX ORDER — SODIUM CHLORIDE 9 MG/ML
250 INJECTION, SOLUTION INTRAVENOUS ONCE
Status: COMPLETED | OUTPATIENT
Start: 2017-08-23 | End: 2017-08-23

## 2017-08-23 RX ORDER — FAMOTIDINE 10 MG/ML
20 INJECTION, SOLUTION INTRAVENOUS ONCE
Status: CANCELLED | OUTPATIENT
Start: 2017-08-23

## 2017-08-23 RX ORDER — DEXAMETHASONE 4 MG/1
4 TABLET ORAL
Qty: 15 TABLET | Refills: 2 | Status: SHIPPED | OUTPATIENT
Start: 2017-08-23 | End: 2017-10-25 | Stop reason: SDUPTHER

## 2017-08-23 RX ADMIN — ALTEPLASE 2 MG: 2.2 INJECTION, POWDER, LYOPHILIZED, FOR SOLUTION INTRAVENOUS at 08:58

## 2017-08-23 RX ADMIN — PALONOSETRON HYDROCHLORIDE 0.25 MG: 0.25 INJECTION INTRAVENOUS at 10:02

## 2017-08-23 RX ADMIN — Medication 10 ML: at 08:44

## 2017-08-23 RX ADMIN — ZOLEDRONIC ACID 4 MG: 4 INJECTION, SOLUTION, CONCENTRATE INTRAVENOUS at 13:20

## 2017-08-23 RX ADMIN — Medication 10 ML: at 13:52

## 2017-08-23 RX ADMIN — FAMOTIDINE 20 MG: 10 INJECTION INTRAVENOUS at 09:56

## 2017-08-23 RX ADMIN — DOCETAXEL 150 MG: 80 INJECTION, SOLUTION, CONCENTRATE INTRAVENOUS at 10:59

## 2017-08-23 RX ADMIN — DIPHENHYDRAMINE HYDROCHLORIDE 25 MG: 50 INJECTION INTRAMUSCULAR; INTRAVENOUS at 10:10

## 2017-08-23 RX ADMIN — PEGFILGRASTIM 6 MG: KIT SUBCUTANEOUS at 12:56

## 2017-08-23 RX ADMIN — SODIUM CHLORIDE 250 ML: 9 INJECTION, SOLUTION INTRAVENOUS at 09:43

## 2017-08-23 RX ADMIN — DEXAMETHASONE SODIUM PHOSPHATE 10 MG: 10 INJECTION, SOLUTION INTRAMUSCULAR; INTRAVENOUS at 10:34

## 2017-08-23 RX ADMIN — SODIUM CHLORIDE, PRESERVATIVE FREE 500 UNITS: 5 INJECTION INTRAVENOUS at 13:53

## 2017-08-23 RX ADMIN — CYCLOPHOSPHAMIDE 1190 MG: 1 INJECTION, POWDER, FOR SOLUTION INTRAVENOUS; ORAL at 12:09

## 2017-08-23 NOTE — PROGRESS NOTES
DATE OF VISIT: 8/23/2017    REASON FOR VISIT:  Metastatic breast cancer    HISTORY OF PRESENT ILLNESS:    47-year-old female with a past medical history significant for history of ductal carcinoma in situ of the right breast diagnosed in December 2007 patient eventually had a mastectomy done in February 2008 and took adjuvant tamoxifen for 5 years until 2013.  Started on palliative chemotherapy with Taxotere and Cytoxan on July 26, 2017.  She is here to get cycle 2 of chemotherapy today.  [facility] her metastatic breast cancer.  States her discharge from right axilla is much improved after packing.  Still complains of back pain and hip pain.  Complains of excessive nausea after last chemotherapy which is being helped with scopolamine patch.      PAST MEDICAL HISTORY:    Past Medical History:   Diagnosis Date   • Anxiety    • Depression    • Encounter for gynecological examination    • Malignant neoplasm of female breast     hx of dcis s/p mastectomy and reconstruction and augmentation      • Primary malignant neoplasm of breast     dcis in rt breast s/p mastectomy,reconstruction      • Ventricular premature beats        SOCIAL HISTORY:    Social History   Substance Use Topics   • Smoking status: Never Smoker   • Smokeless tobacco: Never Used   • Alcohol use No       Surgical History :  Past Surgical History:   Procedure Laterality Date   • AUGMENTATION MAMMAPLASTY     • AXILLARY LYMPH NODE BIOPSY/EXCISION Right 7/10/2017    Procedure: BIOSPY RIGHT AXILLARY MASS AND OR LYMPH;  Surgeon: Sourav Ernst MD;  Location: [facility];  Service:    • BREAST BIOPSY  12/07/2007    Mammotome biopsy of the right side with clip placement   • BREAST LUMPECTOMY     • BREAST RECONSTRUCTION  10/28/2008    Abscence of right breast secondary to mastectomy. Implant exchange for reconstruction of right breast with open para-prosthetic capsulotomy   • BREAST SURGERY   10/01/2009    Left breast ptosis and breast asymmetry secondary to right breast reconstruction for breast cancer. Left breast mastopexy with augmentation.   • CARDIAC CATHETERIZATION  09/18/2008    Normal coronary arteriography. Normal left ventricular angiogram   • EP STUDY     • MASTECTOMY Right    • MASTECTOMY  02/05/2008    Multifocal right breast ductal carcinoma-in-situ. Right total mastectomy   • MASTECTOMY, PARTIAL  01/03/2008    Ductal carcinoma in-situ of the right breast, proven by mammotome biopsy. Right lumpectomy, medial portion of the breast, between 2 o'clock and 4 o'clock, 5 cm from the nipple, with clip placement, radiographic inter. of severo. specimen, & ultrasound   • PAP SMEAR  03/03/2009    Negative   • [place] INSJ TUNNELED CVC W/O SUBQ PORT/ AGE 3 YR/> Right 7/10/2017    Procedure: MEDIPORT     (c-arm#2);  Surgeon: Sourav Ernst MD;  Location: [facility];  Service: General       ALLERGIES:    Allergies   Allergen Reactions   • Percocet [Oxycodone-Acetaminophen] Nausea And Vomiting       REVIEW OF SYSTEMS:      CONSTITUTIONAL: Complains of fatigue.  Denies any fever, chills .      HEENT: No epistaxis, mouth sores or difficulty swallowing.     RESPIRATORY: Complains of shortness of breath with exertion that started a few months back. No new cough or hemoptysis.     CARDIOVASCULAR: No chest pain or palpitations.     GASTROINTESTINAL: Complains of excessive nausea with last round of chemotherapy, states scopolamine patch is helping her with nausea.  Positive for abdominal pain in epigastric region. No blood in the stool.     GENITOURINARY: No Dysuria or Hematuria.     MUSCULOSKELETAL: Complains of pain in lower back radiating down to left side wall and flank region.     LYMPHATICS: States drainage from right axilla is improved.     NEUROLOGICAL : No tingling or numbness. No headache or dizziness. No seizures or balance problems.     SKIN: Positive for history of melanoma status post surgical  "removal. Denies any new skin lesion worrisome for melanoma.  Complains of pruritus all over body.            PHYSICAL EXAMINATION:      VITAL SIGNS:  /67  Pulse 84  Temp 96.8 °F (36 °C) (Temporal Artery )   Resp 16  Ht 67\" (170.2 cm)  Wt 184 lb (83.5 kg)  BMI 28.82 kg/m2    GENERAL:  Not in any distress.    HEENT:  Normocephalic, Atraumatic.Mild Conjunctival pallor. No icterus. Extraocular Movements Intact. No Facial Asymmetry noted.    NECK:  No adenopathy. No JVD.    RESPIRATORY:  Fair air entry bilateral. No rhonchi or wheezing.    CARDIOVASCULAR:  S1, S2. Regular rate and rhythm. No murmur or gallop appreciated.    ABDOMEN:  Soft, obese, nontender. Bowel sounds present in all four quadrants.  No organomegaly appreciated.    EXTREMITIES:  No edema.No Calf Tenderness.    NEUROLOGIC:  Alert, awake and oriented ×3.  No  Motor or sensory deficit appreciated. Cranial Nerves 2-12 grossly intact.      DIAGNOSTIC DATA:    Glucose   Date Value Ref Range Status   08/23/2017 106 (H) 60 - 100 mg/dL Final     Sodium   Date Value Ref Range Status   08/23/2017 139 137 - 145 mmol/L Final     Potassium   Date Value Ref Range Status   08/23/2017 4.0 3.5 - 5.1 mmol/L Final     CO2   Date Value Ref Range Status   08/23/2017 24.0 22.0 - 31.0 mmol/L Final     Chloride   Date Value Ref Range Status   08/23/2017 104 95 - 110 mmol/L Final     Anion Gap   Date Value Ref Range Status   08/23/2017 11.0 5.0 - 15.0 mmol/L Final     Creatinine   Date Value Ref Range Status   08/23/2017 0.65 0.50 - 1.00 mg/dL Final     BUN   Date Value Ref Range Status   08/23/2017 14 7 - 21 mg/dL Final     BUN/Creatinine Ratio   Date Value Ref Range Status   08/23/2017 21.5 7.0 - 25.0 Final     Calcium   Date Value Ref Range Status   08/23/2017 9.3 8.4 - 10.2 mg/dL Final     eGFR Non  Amer   Date Value Ref Range Status   08/23/2017 97 58 - 135 mL/min/1.73 Final     Alkaline Phosphatase   Date Value Ref Range Status   08/23/2017 320 (H) 38 - " 126 U/L Final     Total Protein   Date Value Ref Range Status   08/23/2017 7.2 6.3 - 8.6 g/dL Final     ALT (SGPT)   Date Value Ref Range Status   08/23/2017 109 (H) 9 - 52 U/L Final     AST (SGOT)   Date Value Ref Range Status   08/23/2017 57 (H) 14 - 36 U/L Final     Total Bilirubin   Date Value Ref Range Status   08/23/2017 0.5 0.2 - 1.3 mg/dL Final     Albumin   Date Value Ref Range Status   08/23/2017 4.10 3.40 - 4.80 g/dL Final     Globulin   Date Value Ref Range Status   08/23/2017 3.1 2.3 - 3.5 gm/dL Final     A/G Ratio   Date Value Ref Range Status   08/23/2017 1.3 1.1 - 1.8 g/dL Final     Lab Results   Component Value Date    WBC 8.42 08/23/2017    HGB 11.5 (L) 08/23/2017    HCT 33.7 (L) 08/23/2017    MCV 90.8 08/23/2017     08/23/2017     Lab Results   Component Value Date    NEUTROABS 7.25 08/23/2017     Lab Results   Component Value Date     2107.0 (H) 06/29/2017    LABCA2 1817.1 (H) 06/29/2017     2 D Echo Done on July 19, 2017 showed:  Interpretation Summary   · The left ventricular cavity is borderline dilated.  · Left ventricular systolic function is normal. Estimated EF = 53%.  · Left ventricular diastolic dysfunction (grade I) consistent with impaired relaxation.  · IAS aneurysm noted. No PFO or ASD           Radiology Data :  PET/CT done on July 3, 2017 showed:  IMPRESSION:  CONCLUSION:  1. Extensive metastatic disease. Pathologic uptake within  enlarged right-sided axillary lymph nodes. Metastatic adenopathy  in both laurie and mediastinum. Tumor replacing most of the left  lobe of liver. Intra-abdominal retroperitoneal metastases,  adenopathy.     Extensive skeletal metastases including a pathologic fracture at  L4.        Nuclear bone scan done on June 20, 2017 showed:  1. Increased uptake at L4 suggestive of metastatic process.  2. 3 or 4 areas of increased activity in bony calvarium  3. Some increased activity in tips posteriorly and anteriorly suggestive of metastatic carcinoma to  the skeletal system.           Pathology :    Pathology data from July 10, 2017 showed:  Final Diagnosis   Mass right axilla, excisional biopsy:      Metastatic poorly differentiated ductal carcinoma, breast primary      Estrogen receptor positive, 95% stainability, strong intensity      Progesterone receptor positive, 95% stainability, strong intensity      HER-2 2+(equivocal), sent to [facility] for FISH     Addendum   Her 2 FISH result from [facility] is NEGATIVE         Pathology report from December 7, 2007 showed:  FINAL DIAGNOSIS:   RIGHT BREAST MAMMOTOME BIOPSY:        DUCTAL CARCINOMA IN SITU, INTERMEDIATE NUCLEAR GRADE              WITH MICROCALCIFICATION.      ADDENDUM:   Estrogen receptors are positive (90-95% stainability).   Progesterone receptors are positive (90-95% stainability).         ASSESSMENT AND PLAN:      1.  Metastatic cancer with extensive adenopathy in right axilla, mediastinum, hilar, abdominal, retroperitoneal with extensive liver and bone metastasis on PET/CT.  Patient underwent right apatient underwent right axillary lymph node excision by Dr. Ernst on July 10, 2017.Pathology report confirmed ductal carcinoma of breast with estrogen and progesterone positivity.  At her on palliative chemotherapy with Taxotere and cytoxan on July 26, 2017.  Patient tolerated first round of chemotherapy poorly with excessive nausea which is finally controlled with scopolamine patch.  Patient was recently seen at [facility] for second opinion regarding her breast cancer.  Case was discussed with Dr vazquez oncologist that has seen patient at [facility].  She agreed with her chemotherapy at this point.  In future if he get her disease under control will change her to hormonal therapy with Palbociclib.  This recommendation were discussed with patient again today.  Plan is to give her 3 cycles of Taxotere and Cytoxan and after that we will do restaging CT scan.  Tumor marker has been drawn today  we will follow-up on the result of tumor marker.    2.  History of melanoma in situ status post excision by Dr. Linn.    3.  Bone metastasis: She was recently seen by dentist.  She does not need any intervention done at this point.  We'll start her on Zometa today.  Patient was instructed to start taking calcium and vitamin D every day.    4.  History of ductal carcinoma in situ of the right breast diagnosed in 2007.  Status post mastectomy followed by adjuvant tamoxifen for 5 years which was finished in 2013.    5.  Elevated  liver function tests secondary to liver metastases   6.  Health maintenance: Patient does not smoke.  Not a candidate for screening colonoscopy at this point.  Remains full code.    7.  Prescriptions: Prescription for Decadron and Zofran has been sent to her pharmacy today on July 21, 2017.        Ovidio Meadows MD  8/23/2017  6:14 PM        EMR Dragon/Transcription disclaimer:   Much of this encounter note is an electronic transcription/translation of spoken language to printed text. The electronic translation of spoken language may permit erroneous, or at times, nonsensical words or phrases to be inadvertently transcribed; Although I have reviewed the note for such errors, some may still exist.

## 2017-08-24 PROBLEM — T82.898A OTHER COMPLICATION DUE TO VENOUS ACCESS DEVICE: Status: ACTIVE | Noted: 2017-08-24

## 2017-08-24 LAB
CANCER AG15-3 SERPL-ACNC: 943 U/ML (ref 0–25)
CANCER AG27-29 SERPL-ACNC: 1075.9 U/ML (ref 0–38.6)

## 2017-09-05 ENCOUNTER — OFFICE VISIT (OUTPATIENT)
Dept: SURGERY | Facility: CLINIC | Age: 47
End: 2017-09-05

## 2017-09-05 VITALS
DIASTOLIC BLOOD PRESSURE: 72 MMHG | HEIGHT: 67 IN | WEIGHT: 187 LBS | SYSTOLIC BLOOD PRESSURE: 120 MMHG | BODY MASS INDEX: 29.35 KG/M2

## 2017-09-05 DIAGNOSIS — C50.911 MALIGNANT NEOPLASM OF RIGHT FEMALE BREAST, UNSPECIFIED SITE OF BREAST: Primary | ICD-10-CM

## 2017-09-05 PROCEDURE — 99024 POSTOP FOLLOW-UP VISIT: CPT | Performed by: SURGERY

## 2017-09-05 RX ORDER — MULTIPLE VITAMINS W/ MINERALS TAB 9MG-400MCG
1 TAB ORAL DAILY
COMMUNITY
End: 2019-10-11 | Stop reason: ALTCHOICE

## 2017-09-05 NOTE — PROGRESS NOTES
See prior notes.  Patient had a follow-up right axillary wound.  Patient undergoing chemotherapy for metastatic breast cancer currently.  Second treatment was last week.  She's undergone local wound care of her axilla where the biopsy site was done make a diagnosis.  Cavity is getting smaller with packing.  Patient is doing well no complaints in wound appears to be getting better as well.  We'll continue with local wound care will follow up with us in 2 weeks or sooner should any further concerns or questions.

## 2017-09-06 ENCOUNTER — TELEPHONE (OUTPATIENT)
Dept: ONCOLOGY | Facility: CLINIC | Age: 47
End: 2017-09-06

## 2017-09-06 DIAGNOSIS — C80.1 METASTASIS TO BONE OF UNKNOWN PRIMARY (HCC): Primary | ICD-10-CM

## 2017-09-06 DIAGNOSIS — C79.51 METASTASIS TO BONE OF UNKNOWN PRIMARY (HCC): Primary | ICD-10-CM

## 2017-09-06 NOTE — TELEPHONE ENCOUNTER
Called and informed pt that Dr Meadows ordered a xray of left hip and thigh. Pt request to have it done at [facility]. Called hospital and they stated to just fax order over to [phone] and she can walk in any time and get xray done. Called and informed pt about arrangements.

## 2017-09-06 NOTE — TELEPHONE ENCOUNTER
Pt states that joint pain started on day 9 after chemo treatment last week. Pt states that when she is walking pain is a 10 out of 10 but is relieved when she is sitting or laying. Pt states that she is taking tramadol. Please advise

## 2017-09-06 NOTE — TELEPHONE ENCOUNTER
Pt states that it is her left groin area which is new and left hip down her leg. Pt states that she doesn't want anything stronger for pain right now but she just wanted to let you know since it was a new pain.

## 2017-09-13 ENCOUNTER — INFUSION (OUTPATIENT)
Dept: ONCOLOGY | Facility: HOSPITAL | Age: 47
End: 2017-09-13

## 2017-09-13 ENCOUNTER — OFFICE VISIT (OUTPATIENT)
Dept: ONCOLOGY | Facility: CLINIC | Age: 47
End: 2017-09-13

## 2017-09-13 VITALS
SYSTOLIC BLOOD PRESSURE: 113 MMHG | HEIGHT: 67 IN | BODY MASS INDEX: 29.84 KG/M2 | RESPIRATION RATE: 18 BRPM | WEIGHT: 190.1 LBS | TEMPERATURE: 98 F | HEART RATE: 75 BPM | DIASTOLIC BLOOD PRESSURE: 69 MMHG

## 2017-09-13 DIAGNOSIS — C79.51 METASTASIS TO BONE OF UNKNOWN PRIMARY (HCC): ICD-10-CM

## 2017-09-13 DIAGNOSIS — C50.911 MALIGNANT NEOPLASM OF RIGHT FEMALE BREAST, UNSPECIFIED SITE OF BREAST: ICD-10-CM

## 2017-09-13 DIAGNOSIS — Z45.2 ENCOUNTER FOR ADJUSTMENT OR MANAGEMENT OF VASCULAR ACCESS DEVICE: ICD-10-CM

## 2017-09-13 DIAGNOSIS — Z45.2 ENCOUNTER FOR VENOUS ACCESS DEVICE CARE: Primary | ICD-10-CM

## 2017-09-13 DIAGNOSIS — C80.1 METASTASIS TO BONE OF UNKNOWN PRIMARY (HCC): ICD-10-CM

## 2017-09-13 DIAGNOSIS — IMO0001 OTHER COMPLICATION DUE TO VENOUS ACCESS DEVICE, SUBSEQUENT ENCOUNTER: ICD-10-CM

## 2017-09-13 LAB
ALBUMIN SERPL-MCNC: 4 G/DL (ref 3.4–4.8)
ALBUMIN/GLOB SERPL: 1.3 G/DL (ref 1.1–1.8)
ALP SERPL-CCNC: 204 U/L (ref 38–126)
ALT SERPL W P-5'-P-CCNC: 60 U/L (ref 9–52)
ANION GAP SERPL CALCULATED.3IONS-SCNC: 13 MMOL/L (ref 5–15)
AST SERPL-CCNC: 30 U/L (ref 14–36)
BASOPHILS # BLD AUTO: 0 10*3/MM3 (ref 0–0.2)
BASOPHILS NFR BLD AUTO: 0 % (ref 0–2)
BILIRUB SERPL-MCNC: 0.5 MG/DL (ref 0.2–1.3)
BUN BLD-MCNC: 13 MG/DL (ref 7–21)
BUN/CREAT SERPL: 21.7 (ref 7–25)
CALCIUM SPEC-SCNC: 9.3 MG/DL (ref 8.4–10.2)
CHLORIDE SERPL-SCNC: 106 MMOL/L (ref 95–110)
CO2 SERPL-SCNC: 21 MMOL/L (ref 22–31)
CREAT BLD-MCNC: 0.6 MG/DL (ref 0.5–1)
DEPRECATED RDW RBC AUTO: 48.2 FL (ref 36.4–46.3)
EOSINOPHIL # BLD AUTO: 0 10*3/MM3 (ref 0–0.7)
EOSINOPHIL NFR BLD AUTO: 0 % (ref 0–7)
ERYTHROCYTE [DISTWIDTH] IN BLOOD BY AUTOMATED COUNT: 14.7 % (ref 11.5–14.5)
GFR SERPL CREATININE-BSD FRML MDRD: 106 ML/MIN/1.73 (ref 60–135)
GLOBULIN UR ELPH-MCNC: 3 GM/DL (ref 2.3–3.5)
GLUCOSE BLD-MCNC: 131 MG/DL (ref 60–100)
HCT VFR BLD AUTO: 33.2 % (ref 35–45)
HGB BLD-MCNC: 11.2 G/DL (ref 12–15.5)
IMM GRANULOCYTES # BLD: 0.03 10*3/MM3 (ref 0–0.02)
IMM GRANULOCYTES NFR BLD: 0.3 % (ref 0–0.5)
LYMPHOCYTES # BLD AUTO: 0.44 10*3/MM3 (ref 0.6–4.2)
LYMPHOCYTES NFR BLD AUTO: 4.7 % (ref 10–50)
MCH RBC QN AUTO: 31 PG (ref 26.5–34)
MCHC RBC AUTO-ENTMCNC: 33.7 G/DL (ref 31.4–36)
MCV RBC AUTO: 92 FL (ref 80–98)
MONOCYTES # BLD AUTO: 0.38 10*3/MM3 (ref 0–0.9)
MONOCYTES NFR BLD AUTO: 4.1 % (ref 0–12)
NEUTROPHILS # BLD AUTO: 8.45 10*3/MM3 (ref 2–8.6)
NEUTROPHILS NFR BLD AUTO: 90.9 % (ref 37–80)
NRBC BLD MANUAL-RTO: 0 /100 WBC (ref 0–0)
PLATELET # BLD AUTO: 272 10*3/MM3 (ref 150–450)
PMV BLD AUTO: 9.6 FL (ref 8–12)
POTASSIUM BLD-SCNC: 4.2 MMOL/L (ref 3.5–5.1)
PROT SERPL-MCNC: 7 G/DL (ref 6.3–8.6)
RBC # BLD AUTO: 3.61 10*6/MM3 (ref 3.77–5.16)
SODIUM BLD-SCNC: 140 MMOL/L (ref 137–145)
WBC NRBC COR # BLD: 9.3 10*3/MM3 (ref 3.2–9.8)

## 2017-09-13 PROCEDURE — 96413 CHEMO IV INFUSION 1 HR: CPT | Performed by: INTERNAL MEDICINE

## 2017-09-13 PROCEDURE — 80053 COMPREHEN METABOLIC PANEL: CPT

## 2017-09-13 PROCEDURE — 25010000002 HEPARIN FLUSH (PORCINE) 100 UNIT/ML SOLUTION: Performed by: INTERNAL MEDICINE

## 2017-09-13 PROCEDURE — 25010000002 CYCLOPHOSPHAMIDE PER 100 MG: Performed by: INTERNAL MEDICINE

## 2017-09-13 PROCEDURE — 25010000002 DOCETAXEL 80 MG/4ML CONCENTRATION 4 ML VIAL: Performed by: INTERNAL MEDICINE

## 2017-09-13 PROCEDURE — 25010000002 PEGFILGRASTIM 6 MG/0.6ML PREFILLED SYRINGE KIT: Performed by: INTERNAL MEDICINE

## 2017-09-13 PROCEDURE — 25010000003 DEXAMETHASONE SODIUM PHOSPHATE 100 MG/10ML SOLUTION 10 ML VIAL: Performed by: INTERNAL MEDICINE

## 2017-09-13 PROCEDURE — 25010000002 DIPHENHYDRAMINE PER 50 MG: Performed by: INTERNAL MEDICINE

## 2017-09-13 PROCEDURE — 85025 COMPLETE CBC W/AUTO DIFF WBC: CPT | Performed by: INTERNAL MEDICINE

## 2017-09-13 PROCEDURE — 36415 COLL VENOUS BLD VENIPUNCTURE: CPT | Performed by: INTERNAL MEDICINE

## 2017-09-13 PROCEDURE — 96375 TX/PRO/DX INJ NEW DRUG ADDON: CPT | Performed by: INTERNAL MEDICINE

## 2017-09-13 PROCEDURE — 96377 APPLICATON ON-BODY INJECTOR: CPT | Performed by: INTERNAL MEDICINE

## 2017-09-13 PROCEDURE — 25010000002 PALONOSETRON PER 25 MCG: Performed by: INTERNAL MEDICINE

## 2017-09-13 PROCEDURE — 96417 CHEMO IV INFUS EACH ADDL SEQ: CPT | Performed by: INTERNAL MEDICINE

## 2017-09-13 PROCEDURE — 99214 OFFICE O/P EST MOD 30 MIN: CPT | Performed by: INTERNAL MEDICINE

## 2017-09-13 RX ORDER — FAMOTIDINE 10 MG/ML
20 INJECTION, SOLUTION INTRAVENOUS ONCE
Status: COMPLETED | OUTPATIENT
Start: 2017-09-13 | End: 2017-09-13

## 2017-09-13 RX ORDER — TRAMADOL HYDROCHLORIDE 50 MG/1
50 TABLET ORAL EVERY 8 HOURS PRN
Qty: 90 TABLET | Refills: 0 | Status: SHIPPED | OUTPATIENT
Start: 2017-09-13 | End: 2017-12-27 | Stop reason: SDUPTHER

## 2017-09-13 RX ORDER — PALONOSETRON 0.05 MG/ML
0.25 INJECTION, SOLUTION INTRAVENOUS ONCE
Status: COMPLETED | OUTPATIENT
Start: 2017-09-13 | End: 2017-09-13

## 2017-09-13 RX ORDER — SODIUM CHLORIDE 9 MG/ML
250 INJECTION, SOLUTION INTRAVENOUS ONCE
Status: CANCELLED | OUTPATIENT
Start: 2017-09-13

## 2017-09-13 RX ORDER — FAMOTIDINE 10 MG/ML
20 INJECTION, SOLUTION INTRAVENOUS ONCE
Status: CANCELLED | OUTPATIENT
Start: 2017-09-13

## 2017-09-13 RX ORDER — SODIUM CHLORIDE 0.9 % (FLUSH) 0.9 %
10 SYRINGE (ML) INJECTION AS NEEDED
Status: DISCONTINUED | OUTPATIENT
Start: 2017-09-13 | End: 2017-09-13 | Stop reason: HOSPADM

## 2017-09-13 RX ORDER — PALONOSETRON 0.05 MG/ML
0.25 INJECTION, SOLUTION INTRAVENOUS ONCE
Status: CANCELLED | OUTPATIENT
Start: 2017-09-13

## 2017-09-13 RX ORDER — SODIUM CHLORIDE 0.9 % (FLUSH) 0.9 %
10 SYRINGE (ML) INJECTION AS NEEDED
Status: CANCELLED | OUTPATIENT
Start: 2017-09-13

## 2017-09-13 RX ORDER — SODIUM CHLORIDE 9 MG/ML
250 INJECTION, SOLUTION INTRAVENOUS ONCE
Status: COMPLETED | OUTPATIENT
Start: 2017-09-13 | End: 2017-09-13

## 2017-09-13 RX ADMIN — FAMOTIDINE 20 MG: 10 INJECTION, SOLUTION INTRAVENOUS at 12:23

## 2017-09-13 RX ADMIN — DIPHENHYDRAMINE HYDROCHLORIDE 25 MG: 50 INJECTION INTRAMUSCULAR; INTRAVENOUS at 11:15

## 2017-09-13 RX ADMIN — SODIUM CHLORIDE 250 ML: 9 INJECTION, SOLUTION INTRAVENOUS at 11:12

## 2017-09-13 RX ADMIN — CYCLOPHOSPHAMIDE 1190 MG: 1 INJECTION, POWDER, FOR SOLUTION INTRAVENOUS; ORAL at 14:03

## 2017-09-13 RX ADMIN — SODIUM CHLORIDE, PRESERVATIVE FREE 500 UNITS: 5 INJECTION INTRAVENOUS at 14:54

## 2017-09-13 RX ADMIN — DOCETAXEL 150 MG: 80 INJECTION, SOLUTION, CONCENTRATE INTRAVENOUS at 12:45

## 2017-09-13 RX ADMIN — DEXAMETHASONE SODIUM PHOSPHATE 10 MG: 10 INJECTION, SOLUTION INTRAMUSCULAR; INTRAVENOUS at 11:53

## 2017-09-13 RX ADMIN — Medication 10 ML: at 09:56

## 2017-09-13 RX ADMIN — Medication 10 ML: at 14:54

## 2017-09-13 RX ADMIN — Medication 10 ML: at 09:29

## 2017-09-13 RX ADMIN — PEGFILGRASTIM 6 MG: KIT SUBCUTANEOUS at 14:57

## 2017-09-13 RX ADMIN — PALONOSETRON HYDROCHLORIDE 0.25 MG: 0.25 INJECTION INTRAVENOUS at 11:13

## 2017-09-13 NOTE — PROGRESS NOTES
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  Started on palliative chemotherapy with Taxotere and Cytoxan on July 26, 2017.  She is here to get cycle 3 of chemotherapy today.  Patient started having worsening pain in the left hip and groin region last week for which she underwent an x-ray of hip.  States her hip pain is improved.  States her nausea is better controlled with scopolamine patch.  Denies any blood in the stool or urine.  Denies any abnormal swollen and anywhere in the body.      PAST MEDICAL HISTORY:    Past Medical History:   Diagnosis Date   • Anxiety    • Depression    • Encounter for gynecological examination    • Malignant neoplasm of female breast     hx of dcis s/p mastectomy and reconstruction and augmentation      • Primary malignant neoplasm of breast     dcis in rt breast s/p mastectomy,reconstruction      • Ventricular premature beats        SOCIAL HISTORY:    Social History   Substance Use Topics   • Smoking status: Never Smoker   • Smokeless tobacco: Never Used   • Alcohol use No       Surgical History :  Past Surgical History:   Procedure Laterality Date   • AUGMENTATION MAMMAPLASTY     • AXILLARY LYMPH NODE BIOPSY/EXCISION Right 7/10/2017    Procedure: BIOSPY RIGHT AXILLARY MASS AND OR LYMPH;  Surgeon: Sourav Ernst MD;  Location: [facility];  Service:    • BREAST BIOPSY  12/07/2007    Mammotome biopsy of the right side with clip placement   • BREAST LUMPECTOMY     • BREAST RECONSTRUCTION  10/28/2008    Abscence of right breast secondary to mastectomy. Implant exchange for reconstruction of right breast with open para-prosthetic capsulotomy   • BREAST SURGERY  10/01/2009     Left breast ptosis and breast asymmetry secondary to right breast reconstruction for breast cancer. Left breast mastopexy with augmentation.   • CARDIAC CATHETERIZATION  09/18/2008    Normal coronary arteriography. Normal left ventricular angiogram   • EP STUDY     • MASTECTOMY Right    • MASTECTOMY  02/05/2008    Multifocal right breast ductal carcinoma-in-situ. Right total mastectomy   • MASTECTOMY, PARTIAL  01/03/2008    Ductal carcinoma in-situ of the right breast, proven by mammotome biopsy. Right lumpectomy, medial portion of the breast, between 2 o'clock and 4 o'clock, 5 cm from the nipple, with clip placement, radiographic inter. of severo. specimen, & ultrasound   • PAP SMEAR  03/03/2009    Negative   • [place] INSJ TUNNELED CVC W/O SUBQ PORT/ AGE 3 YR/> Right 7/10/2017    Procedure: MEDIPORT     (c-arm#2);  Surgeon: Sourav Ernst MD;  Location: [facility];  Service: General       ALLERGIES:    Allergies   Allergen Reactions   • Percocet [Oxycodone-Acetaminophen] Nausea And Vomiting       REVIEW OF SYSTEMS:      CONSTITUTIONAL: Complains of fatigue.  Denies any fever, chills .      HEENT: No epistaxis, mouth sores or difficulty swallowing.     RESPIRATORY: Complains of shortness of breath with exertion that started a few months back. No new cough or hemoptysis.     CARDIOVASCULAR: No chest pain or palpitations.     GASTROINTESTINAL:  states scopolamine patch is helping her with nausea.  Positive for abdominal pain in epigastric region. No blood in the stool.     GENITOURINARY: No Dysuria or Hematuria.     MUSCULOSKELETAL: Complains of pain in bilateral hip.  Complains of pain in lower back radiating down to left side wall and flank region.     LYMPHATICS: States drainage from right axilla is improved.     NEUROLOGICAL : No tingling or numbness. No headache or dizziness. No seizures or balance problems.     SKIN: Positive for history of melanoma status post surgical removal. Denies any new skin lesion  "worrisome for melanoma.  Complains of pruritus all over body.            PHYSICAL EXAMINATION:      VITAL SIGNS:  /69  Pulse 75  Temp 98 °F (36.7 °C) (Temporal Artery )   Resp 18  Ht 67\" (170.2 cm)  Wt 190 lb 1.6 oz (86.2 kg)  BMI 29.77 kg/m2    GENERAL:  Not in any distress.    HEENT:  Normocephalic, Atraumatic.Mild Conjunctival pallor. No icterus. Extraocular Movements Intact. No Facial Asymmetry noted.    NECK:  No adenopathy. No JVD.    RESPIRATORY:  Fair air entry bilateral. No rhonchi or wheezing.    CARDIOVASCULAR:  S1, S2. Regular rate and rhythm. No murmur or gallop appreciated.    ABDOMEN:  Soft, obese, nontender. Bowel sounds present in all four quadrants.  No organomegaly appreciated.    EXTREMITIES:  No edema.No Calf Tenderness.    NEUROLOGIC:  Alert, awake and oriented ×3.  No  Motor or sensory deficit appreciated. Cranial Nerves 2-12 grossly intact.      DIAGNOSTIC DATA:    Glucose   Date Value Ref Range Status   09/13/2017 131 (H) 60 - 100 mg/dL Final     Sodium   Date Value Ref Range Status   09/13/2017 140 137 - 145 mmol/L Final     Potassium   Date Value Ref Range Status   09/13/2017 4.2 3.5 - 5.1 mmol/L Final     CO2   Date Value Ref Range Status   09/13/2017 21.0 (L) 22.0 - 31.0 mmol/L Final     Chloride   Date Value Ref Range Status   09/13/2017 106 95 - 110 mmol/L Final     Anion Gap   Date Value Ref Range Status   09/13/2017 13.0 5.0 - 15.0 mmol/L Final     Creatinine   Date Value Ref Range Status   09/13/2017 0.60 0.50 - 1.00 mg/dL Final     BUN   Date Value Ref Range Status   09/13/2017 13 7 - 21 mg/dL Final     BUN/Creatinine Ratio   Date Value Ref Range Status   09/13/2017 21.7 7.0 - 25.0 Final     Calcium   Date Value Ref Range Status   09/13/2017 9.3 8.4 - 10.2 mg/dL Final     eGFR Non  Amer   Date Value Ref Range Status   09/13/2017 106 >60 mL/min/1.73 Final     Alkaline Phosphatase   Date Value Ref Range Status   09/13/2017 204 (H) 38 - 126 U/L Final     Total " Protein   Date Value Ref Range Status   09/13/2017 7.0 6.3 - 8.6 g/dL Final     ALT (SGPT)   Date Value Ref Range Status   09/13/2017 60 (H) 9 - 52 U/L Final     AST (SGOT)   Date Value Ref Range Status   09/13/2017 30 14 - 36 U/L Final     Total Bilirubin   Date Value Ref Range Status   09/13/2017 0.5 0.2 - 1.3 mg/dL Final     Albumin   Date Value Ref Range Status   09/13/2017 4.00 3.40 - 4.80 g/dL Final     Globulin   Date Value Ref Range Status   09/13/2017 3.0 2.3 - 3.5 gm/dL Final     A/G Ratio   Date Value Ref Range Status   09/13/2017 1.3 1.1 - 1.8 g/dL Final     Lab Results   Component Value Date    WBC 9.30 09/13/2017    HGB 11.2 (L) 09/13/2017    HCT 33.2 (L) 09/13/2017    MCV 92.0 09/13/2017     09/13/2017     Lab Results   Component Value Date    NEUTROABS 8.45 09/13/2017     Lab Results   Component Value Date     943.0 (H) 08/23/2017    LABCA2 1075.9 (H) 08/23/2017     2 D Echo Done on July 19, 2017 showed:  Interpretation Summary   · The left ventricular cavity is borderline dilated.  · Left ventricular systolic function is normal. Estimated EF = 53%.  · Left ventricular diastolic dysfunction (grade I) consistent with impaired relaxation.  · IAS aneurysm noted. No PFO or ASD           Radiology Data :  X-ray of left hip done on September 7, 2017 showed:  Hyperlucency of the inferior pubic rami and the inferior aspect of obturator foramen consistent with osteolytic metastasis.    PET/CT done on July 3, 2017 showed:  IMPRESSION:  CONCLUSION:  1. Extensive metastatic disease. Pathologic uptake within  enlarged right-sided axillary lymph nodes. Metastatic adenopathy  in both laurie and mediastinum. Tumor replacing most of the left  lobe of liver. Intra-abdominal retroperitoneal metastases,  adenopathy.     Extensive skeletal metastases including a pathologic fracture at  L4.        Nuclear bone scan done on June 20, 2017 showed:  1. Increased uptake at L4 suggestive of metastatic process.  2. 3  or 4 areas of increased activity in bony calvarium  3. Some increased activity in tips posteriorly and anteriorly suggestive of metastatic carcinoma to the skeletal system.           Pathology :    Pathology data from July 10, 2017 showed:  Final Diagnosis   Mass right axilla, excisional biopsy:      Metastatic poorly differentiated ductal carcinoma, breast primary      Estrogen receptor positive, 95% stainability, strong intensity      Progesterone receptor positive, 95% stainability, strong intensity      HER-2 2+(equivocal), sent to [facility] for FISH     Addendum   Her 2 FISH result from [facility] is NEGATIVE         Pathology report from December 7, 2007 showed:  FINAL DIAGNOSIS:   RIGHT BREAST MAMMOTOME BIOPSY:        DUCTAL CARCINOMA IN SITU, INTERMEDIATE NUCLEAR GRADE              WITH MICROCALCIFICATION.      ADDENDUM:   Estrogen receptors are positive (90-95% stainability).   Progesterone receptors are positive (90-95% stainability).         ASSESSMENT AND PLAN:      1.  Metastatic cancer with extensive adenopathy in right axilla, mediastinum, hilar, abdominal, retroperitoneal with extensive liver and bone metastasis on PET/CT.  Patient underwent right apatient underwent right axillary lymph node excision by Dr. Ernst on July 10, 2017.Pathology report confirmed ductal carcinoma of breast with estrogen and progesterone positivity.  At her on palliative chemotherapy with Taxotere and cytoxan on July 26, 2017.   Patient was  seen at [facility] for second opinion regarding her breast cancer.  Case was discussed with Dr vazquez oncologist that has seen patient at [facility].  She agreed with her chemotherapy at this point.  In future if he get her disease under control will change her to hormonal therapy with Palbociclib.  This recommendation were discussed with patient again today.  We will go head with cycle 3 of Taxotere and Cytoxan today.  Plan is to repeat CT of chest, abdomen and pelvis with  contrast prior to next clinic visit in 3 weeks.  Further treatment recommendation will depend on the result of CT scan.    2.  History of melanoma in situ status post excision by Dr. Linn.    3.  Bone metastasis: Patient was started on Zometa on August 23, 2017.  In view of patient complaining of metastatic disease in the hip.  For pain gets out of control will have to do palliative radiation to pelvic region.     4.  History of ductal carcinoma in situ of the right breast diagnosed in 2007.  Status post mastectomy followed by adjuvant tamoxifen for 5 years which was finished in 2013.    5.  Elevated  liver function tests secondary to liver metastases   6.  Health maintenance: Patient does not smoke.  Not a candidate for screening colonoscopy at this point.  Remains full code.    7.  Prescriptions: Is enough prescription of Decadron, Zofran, scopolamine and Ultram.        Ovidio Meadows MD  9/13/2017  5:23 PM        EMR Dragon/Transcription disclaimer:   Much of this encounter note is an electronic transcription/translation of spoken language to printed text. The electronic translation of spoken language may permit erroneous, or at times, nonsensical words or phrases to be inadvertently transcribed; Although I have reviewed the note for such errors, some may still exist.

## 2017-09-20 ENCOUNTER — HOSPITAL ENCOUNTER (OUTPATIENT)
Dept: CT IMAGING | Facility: HOSPITAL | Age: 47
Discharge: HOME | End: 2017-09-20
Attending: INTERNAL MEDICINE | Admitting: INTERNAL MEDICINE

## 2017-09-20 ENCOUNTER — APPOINTMENT (OUTPATIENT)
Dept: ONCOLOGY | Facility: HOSPITAL | Age: 47
End: 2017-09-20

## 2017-09-20 ENCOUNTER — APPOINTMENT (OUTPATIENT)
Dept: CT IMAGING | Facility: HOSPITAL | Age: 47
End: 2017-09-20
Attending: INTERNAL MEDICINE

## 2017-09-20 DIAGNOSIS — C79.51 METASTASIS TO BONE OF UNKNOWN PRIMARY (HCC): ICD-10-CM

## 2017-09-20 DIAGNOSIS — C80.1 METASTASIS TO BONE OF UNKNOWN PRIMARY (HCC): ICD-10-CM

## 2017-09-20 PROCEDURE — 74177 CT ABD & PELVIS W/CONTRAST: CPT

## 2017-09-20 PROCEDURE — 25010000002 HEPARIN FLUSH (PORCINE) 100 UNIT/ML SOLUTION: Performed by: INTERNAL MEDICINE

## 2017-09-20 PROCEDURE — 0 IOPAMIDOL 61 % SOLUTION: Performed by: INTERNAL MEDICINE

## 2017-09-20 PROCEDURE — 71260 CT THORAX DX C+: CPT

## 2017-09-20 RX ORDER — 0.9 % SODIUM CHLORIDE 0.9 %
10 VIAL (ML) INJECTION ONCE
Status: COMPLETED | OUTPATIENT
Start: 2017-09-20 | End: 2017-09-20

## 2017-09-20 RX ADMIN — SODIUM CHLORIDE, PRESERVATIVE FREE 500 UNITS: 5 INJECTION INTRAVENOUS at 15:29

## 2017-09-20 RX ADMIN — SODIUM CHLORIDE 10 ML: 9 INJECTION, SOLUTION INTRAMUSCULAR; INTRAVENOUS; SUBCUTANEOUS at 15:28

## 2017-09-20 RX ADMIN — IOPAMIDOL 95 ML: 612 INJECTION, SOLUTION INTRAVENOUS at 16:00

## 2017-09-22 ENCOUNTER — OFFICE VISIT (OUTPATIENT)
Dept: SURGERY | Facility: CLINIC | Age: 47
End: 2017-09-22

## 2017-09-22 ENCOUNTER — INFUSION (OUTPATIENT)
Dept: ONCOLOGY | Facility: HOSPITAL | Age: 47
End: 2017-09-22

## 2017-09-22 VITALS
DIASTOLIC BLOOD PRESSURE: 72 MMHG | WEIGHT: 188 LBS | BODY MASS INDEX: 29.51 KG/M2 | SYSTOLIC BLOOD PRESSURE: 114 MMHG | HEIGHT: 67 IN

## 2017-09-22 VITALS
SYSTOLIC BLOOD PRESSURE: 120 MMHG | DIASTOLIC BLOOD PRESSURE: 76 MMHG | TEMPERATURE: 97.8 F | RESPIRATION RATE: 16 BRPM | HEART RATE: 105 BPM

## 2017-09-22 DIAGNOSIS — Z45.2 ENCOUNTER FOR VENOUS ACCESS DEVICE CARE: Primary | ICD-10-CM

## 2017-09-22 DIAGNOSIS — Z45.2 ENCOUNTER FOR ADJUSTMENT OR MANAGEMENT OF VASCULAR ACCESS DEVICE: ICD-10-CM

## 2017-09-22 DIAGNOSIS — C50.911 MALIGNANT NEOPLASM OF RIGHT FEMALE BREAST, UNSPECIFIED SITE OF BREAST: ICD-10-CM

## 2017-09-22 DIAGNOSIS — C79.9 METASTATIC DISEASE (HCC): Primary | ICD-10-CM

## 2017-09-22 PROCEDURE — 96365 THER/PROPH/DIAG IV INF INIT: CPT | Performed by: INTERNAL MEDICINE

## 2017-09-22 PROCEDURE — 99024 POSTOP FOLLOW-UP VISIT: CPT | Performed by: SURGERY

## 2017-09-22 PROCEDURE — 25010000002 HEPARIN FLUSH (PORCINE) 100 UNIT/ML SOLUTION: Performed by: INTERNAL MEDICINE

## 2017-09-22 PROCEDURE — 25010000002 ZOLEDRONIC ACID PER 1 MG: Performed by: INTERNAL MEDICINE

## 2017-09-22 RX ORDER — SODIUM CHLORIDE 0.9 % (FLUSH) 0.9 %
10 SYRINGE (ML) INJECTION AS NEEDED
Status: DISCONTINUED | OUTPATIENT
Start: 2017-09-22 | End: 2017-09-22 | Stop reason: HOSPADM

## 2017-09-22 RX ORDER — SODIUM CHLORIDE 0.9 % (FLUSH) 0.9 %
10 SYRINGE (ML) INJECTION AS NEEDED
Status: CANCELLED | OUTPATIENT
Start: 2017-09-22

## 2017-09-22 RX ORDER — SODIUM CHLORIDE 9 MG/ML
250 INJECTION, SOLUTION INTRAVENOUS ONCE
Status: COMPLETED | OUTPATIENT
Start: 2017-09-22 | End: 2017-09-22

## 2017-09-22 RX ADMIN — SODIUM CHLORIDE, PRESERVATIVE FREE 500 UNITS: 5 INJECTION INTRAVENOUS at 13:53

## 2017-09-22 RX ADMIN — SODIUM CHLORIDE 250 ML: 9 INJECTION, SOLUTION INTRAVENOUS at 13:19

## 2017-09-22 RX ADMIN — Medication 10 ML: at 13:53

## 2017-09-22 RX ADMIN — ZOLEDRONIC ACID 4 MG: 4 INJECTION, SOLUTION, CONCENTRATE INTRAVENOUS at 13:19

## 2017-09-22 NOTE — PROGRESS NOTES
See prior notes.  This is a 47-year-old female who is here for right axillary lymph node biopsy site check.  Patient has metastatic breast cancer and has just completed her third round of chemotherapy with Dr. Meadows at the [facility].  Patient is actually doing very well with the chemotherapy and seems to be tolerating better and better.   tells me that the wound appears to be getting smaller.  She is due to get her next round of chemotherapy next week.    Incision is open takes one gauze as packing there is no redness no significant drainage no tenderness      We'll continue with local wound care she is doing as well as with her chemotherapy.  Patient will follow up with me in 3-4 weeks or sooner she has a further concerns or questions

## 2017-09-25 ENCOUNTER — TELEPHONE (OUTPATIENT)
Dept: ONCOLOGY | Facility: HOSPITAL | Age: 47
End: 2017-09-25

## 2017-09-25 NOTE — TELEPHONE ENCOUNTER
----- Message from Janine Greenwood sent at 9/25/2017 10:08 AM CDT -----  Pt called stating she got zometa on Friday and now she is broke out on her face and its itchy wants to know if there is something she can take for this

## 2017-09-28 ENCOUNTER — TELEPHONE (OUTPATIENT)
Dept: ONCOLOGY | Facility: HOSPITAL | Age: 47
End: 2017-09-28

## 2017-09-28 NOTE — TELEPHONE ENCOUNTER
Patient called, reported that her eye has been dilated since 10 am this morning.  She was concerned that it was related to the rash she experienced after the Zometa--she had called previously, reported rash on back of head and around her eye---this has improved with OTC Benadryl.  She went to her eye doctor-Nakia today, he looked at her eye, states there is some inflammation, but not sure from what--he was going to consult another eye doctor to consider prednisone eye drops.  At this time, patient is just self-reporting this incident to see if Ammon has any other recommendations.  Kyleigh English RN  September 28, 2017  1:37 PM

## 2017-10-04 ENCOUNTER — HOSPITAL ENCOUNTER (OUTPATIENT)
Dept: RADIATION ONCOLOGY | Facility: HOSPITAL | Age: 47
Setting detail: RADIATION/ONCOLOGY SERIES
End: 2017-10-04

## 2017-10-04 ENCOUNTER — INFUSION (OUTPATIENT)
Dept: ONCOLOGY | Facility: HOSPITAL | Age: 47
End: 2017-10-04

## 2017-10-04 ENCOUNTER — CONSULT (OUTPATIENT)
Dept: RADIATION ONCOLOGY | Facility: HOSPITAL | Age: 47
End: 2017-10-04

## 2017-10-04 ENCOUNTER — OFFICE VISIT (OUTPATIENT)
Dept: ONCOLOGY | Facility: CLINIC | Age: 47
End: 2017-10-04

## 2017-10-04 VITALS
TEMPERATURE: 97.6 F | BODY MASS INDEX: 30.04 KG/M2 | RESPIRATION RATE: 16 BRPM | WEIGHT: 191.8 LBS | DIASTOLIC BLOOD PRESSURE: 76 MMHG | HEART RATE: 83 BPM | SYSTOLIC BLOOD PRESSURE: 112 MMHG

## 2017-10-04 VITALS
HEART RATE: 83 BPM | WEIGHT: 191.9 LBS | DIASTOLIC BLOOD PRESSURE: 76 MMHG | BODY MASS INDEX: 30.12 KG/M2 | SYSTOLIC BLOOD PRESSURE: 112 MMHG | RESPIRATION RATE: 16 BRPM | TEMPERATURE: 97.6 F | HEIGHT: 67 IN

## 2017-10-04 DIAGNOSIS — C79.31 METASTASIS TO BRAIN (HCC): Primary | ICD-10-CM

## 2017-10-04 DIAGNOSIS — Z17.0 MALIGNANT NEOPLASM OF RIGHT BREAST IN FEMALE, ESTROGEN RECEPTOR POSITIVE, UNSPECIFIED SITE OF BREAST (HCC): Primary | ICD-10-CM

## 2017-10-04 DIAGNOSIS — C80.1 METASTASIS TO BONE OF UNKNOWN PRIMARY (HCC): Primary | ICD-10-CM

## 2017-10-04 DIAGNOSIS — Z17.0 MALIGNANT NEOPLASM OF RIGHT BREAST IN FEMALE, ESTROGEN RECEPTOR POSITIVE, UNSPECIFIED SITE OF BREAST (HCC): ICD-10-CM

## 2017-10-04 DIAGNOSIS — Z45.2 ENCOUNTER FOR ADJUSTMENT OR MANAGEMENT OF VASCULAR ACCESS DEVICE: ICD-10-CM

## 2017-10-04 DIAGNOSIS — C80.1 METASTASIS TO BONE OF UNKNOWN PRIMARY (HCC): ICD-10-CM

## 2017-10-04 DIAGNOSIS — C50.911 MALIGNANT NEOPLASM OF RIGHT FEMALE BREAST (HCC): ICD-10-CM

## 2017-10-04 DIAGNOSIS — Z45.2 ENCOUNTER FOR VENOUS ACCESS DEVICE CARE: ICD-10-CM

## 2017-10-04 DIAGNOSIS — C79.51 METASTASIS TO BONE OF UNKNOWN PRIMARY (HCC): ICD-10-CM

## 2017-10-04 DIAGNOSIS — IMO0001 OTHER COMPLICATION DUE TO VENOUS ACCESS DEVICE, SUBSEQUENT ENCOUNTER: ICD-10-CM

## 2017-10-04 DIAGNOSIS — C50.911 MALIGNANT NEOPLASM OF RIGHT BREAST IN FEMALE, ESTROGEN RECEPTOR POSITIVE, UNSPECIFIED SITE OF BREAST (HCC): ICD-10-CM

## 2017-10-04 DIAGNOSIS — C50.911 MALIGNANT NEOPLASM OF RIGHT BREAST IN FEMALE, ESTROGEN RECEPTOR POSITIVE, UNSPECIFIED SITE OF BREAST (HCC): Primary | ICD-10-CM

## 2017-10-04 DIAGNOSIS — C79.51 METASTASIS TO BONE OF UNKNOWN PRIMARY (HCC): Primary | ICD-10-CM

## 2017-10-04 LAB
ALBUMIN SERPL-MCNC: 3.7 G/DL (ref 3.4–4.8)
ALBUMIN/GLOB SERPL: 1.3 G/DL (ref 1.1–1.8)
ALP SERPL-CCNC: 160 U/L (ref 38–126)
ALT SERPL W P-5'-P-CCNC: 47 U/L (ref 9–52)
ANION GAP SERPL CALCULATED.3IONS-SCNC: 14 MMOL/L (ref 5–15)
AST SERPL-CCNC: 31 U/L (ref 14–36)
BASOPHILS # BLD AUTO: 0.01 10*3/MM3 (ref 0–0.2)
BASOPHILS NFR BLD AUTO: 0.1 % (ref 0–2)
BILIRUB SERPL-MCNC: 0.4 MG/DL (ref 0.2–1.3)
BUN BLD-MCNC: 11 MG/DL (ref 7–21)
BUN/CREAT SERPL: 14.7 (ref 7–25)
CALCIUM SPEC-SCNC: 9.4 MG/DL (ref 8.4–10.2)
CHLORIDE SERPL-SCNC: 106 MMOL/L (ref 95–110)
CO2 SERPL-SCNC: 23 MMOL/L (ref 22–31)
CREAT BLD-MCNC: 0.75 MG/DL (ref 0.5–1)
DEPRECATED RDW RBC AUTO: 49.5 FL (ref 36.4–46.3)
EOSINOPHIL # BLD AUTO: 0 10*3/MM3 (ref 0–0.7)
EOSINOPHIL NFR BLD AUTO: 0 % (ref 0–7)
ERYTHROCYTE [DISTWIDTH] IN BLOOD BY AUTOMATED COUNT: 14.8 % (ref 11.5–14.5)
GFR SERPL CREATININE-BSD FRML MDRD: 82 ML/MIN/1.73 (ref 60–135)
GLOBULIN UR ELPH-MCNC: 2.9 GM/DL (ref 2.3–3.5)
GLUCOSE BLD-MCNC: 128 MG/DL (ref 60–100)
HCT VFR BLD AUTO: 31.5 % (ref 35–45)
HGB BLD-MCNC: 10.6 G/DL (ref 12–15.5)
IMM GRANULOCYTES # BLD: 0.04 10*3/MM3 (ref 0–0.02)
IMM GRANULOCYTES NFR BLD: 0.5 % (ref 0–0.5)
LYMPHOCYTES # BLD AUTO: 0.35 10*3/MM3 (ref 0.6–4.2)
LYMPHOCYTES NFR BLD AUTO: 4.4 % (ref 10–50)
MCH RBC QN AUTO: 31.4 PG (ref 26.5–34)
MCHC RBC AUTO-ENTMCNC: 33.7 G/DL (ref 31.4–36)
MCV RBC AUTO: 93.2 FL (ref 80–98)
MONOCYTES # BLD AUTO: 0.25 10*3/MM3 (ref 0–0.9)
MONOCYTES NFR BLD AUTO: 3.1 % (ref 0–12)
NEUTROPHILS # BLD AUTO: 7.31 10*3/MM3 (ref 2–8.6)
NEUTROPHILS NFR BLD AUTO: 91.9 % (ref 37–80)
PLATELET # BLD AUTO: 255 10*3/MM3 (ref 150–450)
PMV BLD AUTO: 9.4 FL (ref 8–12)
POTASSIUM BLD-SCNC: 4.1 MMOL/L (ref 3.5–5.1)
PROT SERPL-MCNC: 6.6 G/DL (ref 6.3–8.6)
RBC # BLD AUTO: 3.38 10*6/MM3 (ref 3.77–5.16)
SODIUM BLD-SCNC: 143 MMOL/L (ref 137–145)
WBC NRBC COR # BLD: 7.96 10*3/MM3 (ref 3.2–9.8)

## 2017-10-04 PROCEDURE — 96377 APPLICATON ON-BODY INJECTOR: CPT | Performed by: INTERNAL MEDICINE

## 2017-10-04 PROCEDURE — 86300 IMMUNOASSAY TUMOR CA 15-3: CPT | Performed by: INTERNAL MEDICINE

## 2017-10-04 PROCEDURE — 25010000002 DIPHENHYDRAMINE PER 50 MG: Performed by: INTERNAL MEDICINE

## 2017-10-04 PROCEDURE — 85025 COMPLETE CBC W/AUTO DIFF WBC: CPT | Performed by: INTERNAL MEDICINE

## 2017-10-04 PROCEDURE — 36415 COLL VENOUS BLD VENIPUNCTURE: CPT | Performed by: INTERNAL MEDICINE

## 2017-10-04 PROCEDURE — 99215 OFFICE O/P EST HI 40 MIN: CPT | Performed by: INTERNAL MEDICINE

## 2017-10-04 PROCEDURE — 25010000003 DEXAMETHASONE SODIUM PHOSPHATE 100 MG/10ML SOLUTION 10 ML VIAL: Performed by: INTERNAL MEDICINE

## 2017-10-04 PROCEDURE — 96375 TX/PRO/DX INJ NEW DRUG ADDON: CPT | Performed by: INTERNAL MEDICINE

## 2017-10-04 PROCEDURE — 25010000002 CYCLOPHOSPHAMIDE PER 100 MG: Performed by: INTERNAL MEDICINE

## 2017-10-04 PROCEDURE — 80053 COMPREHEN METABOLIC PANEL: CPT | Performed by: INTERNAL MEDICINE

## 2017-10-04 PROCEDURE — 25010000002 PALONOSETRON PER 25 MCG: Performed by: INTERNAL MEDICINE

## 2017-10-04 PROCEDURE — 25010000002 PEGFILGRASTIM 6 MG/0.6ML PREFILLED SYRINGE KIT: Performed by: INTERNAL MEDICINE

## 2017-10-04 PROCEDURE — 25010000002 DOCETAXEL 80 MG/4ML CONCENTRATION 4 ML VIAL: Performed by: INTERNAL MEDICINE

## 2017-10-04 PROCEDURE — 96417 CHEMO IV INFUS EACH ADDL SEQ: CPT | Performed by: INTERNAL MEDICINE

## 2017-10-04 PROCEDURE — 36591 DRAW BLOOD OFF VENOUS DEVICE: CPT | Performed by: INTERNAL MEDICINE

## 2017-10-04 PROCEDURE — 96413 CHEMO IV INFUSION 1 HR: CPT | Performed by: INTERNAL MEDICINE

## 2017-10-04 PROCEDURE — 99204 OFFICE O/P NEW MOD 45 MIN: CPT | Performed by: RADIOLOGY

## 2017-10-04 PROCEDURE — 25010000002 HEPARIN FLUSH (PORCINE) 100 UNIT/ML SOLUTION: Performed by: INTERNAL MEDICINE

## 2017-10-04 RX ORDER — SODIUM CHLORIDE 9 MG/ML
250 INJECTION, SOLUTION INTRAVENOUS ONCE
Status: CANCELLED | OUTPATIENT
Start: 2017-10-04

## 2017-10-04 RX ORDER — SODIUM CHLORIDE 0.9 % (FLUSH) 0.9 %
10 SYRINGE (ML) INJECTION AS NEEDED
Status: DISCONTINUED | OUTPATIENT
Start: 2017-10-04 | End: 2017-10-04 | Stop reason: HOSPADM

## 2017-10-04 RX ORDER — SODIUM CHLORIDE 0.9 % (FLUSH) 0.9 %
10 SYRINGE (ML) INJECTION AS NEEDED
Status: DISCONTINUED | OUTPATIENT
Start: 2017-10-04 | End: 2018-07-23

## 2017-10-04 RX ORDER — FAMOTIDINE 10 MG/ML
20 INJECTION, SOLUTION INTRAVENOUS ONCE
Status: CANCELLED | OUTPATIENT
Start: 2017-10-04

## 2017-10-04 RX ORDER — PALONOSETRON 0.05 MG/ML
0.25 INJECTION, SOLUTION INTRAVENOUS ONCE
Status: CANCELLED | OUTPATIENT
Start: 2017-10-04

## 2017-10-04 RX ORDER — FAMOTIDINE 10 MG/ML
20 INJECTION, SOLUTION INTRAVENOUS ONCE
Status: DISCONTINUED | OUTPATIENT
Start: 2017-10-04 | End: 2017-10-04 | Stop reason: HOSPADM

## 2017-10-04 RX ORDER — SODIUM CHLORIDE 0.9 % (FLUSH) 0.9 %
10 SYRINGE (ML) INJECTION AS NEEDED
Status: CANCELLED | OUTPATIENT
Start: 2017-10-04

## 2017-10-04 RX ORDER — PALONOSETRON 0.05 MG/ML
0.25 INJECTION, SOLUTION INTRAVENOUS ONCE
Status: DISCONTINUED | OUTPATIENT
Start: 2017-10-04 | End: 2017-10-04 | Stop reason: HOSPADM

## 2017-10-04 RX ORDER — SODIUM CHLORIDE 9 MG/ML
250 INJECTION, SOLUTION INTRAVENOUS ONCE
Status: DISCONTINUED | OUTPATIENT
Start: 2017-10-04 | End: 2017-10-04 | Stop reason: HOSPADM

## 2017-10-04 RX ADMIN — SODIUM CHLORIDE 250 ML: 9 INJECTION, SOLUTION INTRAVENOUS at 12:29

## 2017-10-04 RX ADMIN — PALONOSETRON HYDROCHLORIDE 0.25 MG: 0.25 INJECTION INTRAVENOUS at 14:08

## 2017-10-04 RX ADMIN — DOCETAXEL 150 MG: 80 INJECTION, SOLUTION, CONCENTRATE INTRAVENOUS at 15:30

## 2017-10-04 RX ADMIN — DIPHENHYDRAMINE HYDROCHLORIDE 25 MG: 50 INJECTION INTRAMUSCULAR; INTRAVENOUS at 13:13

## 2017-10-04 RX ADMIN — SODIUM CHLORIDE, PRESERVATIVE FREE 500 UNITS: 5 INJECTION INTRAVENOUS at 09:35

## 2017-10-04 RX ADMIN — CYCLOPHOSPHAMIDE 1190 MG: 1 INJECTION, POWDER, FOR SOLUTION INTRAVENOUS; ORAL at 14:34

## 2017-10-04 RX ADMIN — SODIUM CHLORIDE, PRESERVATIVE FREE 500 UNITS: 5 INJECTION INTRAVENOUS at 16:46

## 2017-10-04 RX ADMIN — Medication 10 ML: at 16:46

## 2017-10-04 RX ADMIN — Medication 10 ML: at 08:46

## 2017-10-04 RX ADMIN — PEGFILGRASTIM 6 MG: KIT SUBCUTANEOUS at 16:33

## 2017-10-04 RX ADMIN — DEXAMETHASONE SODIUM PHOSPHATE 10 MG: 10 INJECTION, SOLUTION INTRAMUSCULAR; INTRAVENOUS at 12:30

## 2017-10-04 RX ADMIN — FAMOTIDINE 20 MG: 10 INJECTION, SOLUTION INTRAVENOUS at 14:12

## 2017-10-04 NOTE — PROGRESS NOTES
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  ICD 10 Code: C50.911                                               History of Present Illness: Ms. Kylie García is a 47-year-old white female with a remote history of DCIS of the right breast, with recently diagnosed metastatic breast cancer, now with meningeal involvement. Ms. García was initially diagnosed with DCIS in the right breast on mammotome biopsy on 12/7/07. She underwent a lumpectomy on 1/3/08, which confirmed DCIS (ER/NC positive) with extensive necrosis and positive surgical margins. On 2/5/08 she underwent a right total mastectomy without lymphadenectomy with right breast reconstruction and left breast augmentation. EIC and extensive necrosis was noted. She then received 5 years of tamoxifen therapy, until 2013. In September 2016, she had a melanoma in situ removed from the left shoulder.     The patient did well, and had no disease or treatment-related complaints, until she developed abdominal pain in November 2016. In February, 2017, she began developing back pain, and received treatment by a chiropractor (with some relief), and her PCP. A mammogram on 3/14/17 was benign (BI-RADS 2). When her abdominal and back pain persisted, however, her PCP obtained an abdominal ultrasound and a bone scan at [facility] on 6/20/17. The U/S was relatively unremarkable, however, the bone scan revealed multiple areas of increased uptake, especially in the L4 vertebral body, bony calvarium, and in the ribs, highly suggestive for metastatic disease. She was seen in consultation by Dr. Meadows on 6/29/17. A PET/CT on 7/3/17 revealed hypermetabolic activity in numerous enlarged mediastinal and bilateral hilar lymph nodes, the  entire left lobe of the liver, several smaller foci in the right lobe of the liver, numerous enlarged intra-abdominal and retroperitoneal lymph nodes, numerous right sided axillary lymph nodes, and extensive osseous metastases involving the iliac bones, pelvis, sacrum, multiple T/L vertebral bodies, left scapula, and right humeral head. A pathologic fracture was noted at L4. On 7/10/17 Dr. Ernst biopsied a right axillary lymph node, with pathology revealing metastatic poorly differentiated ductal carcinoma from a breast primary (ER 95%, RI 95%, HER-2/john negative by FISH). Dr. Meadows separately started the patient on Taxotere/Cytoxan chemotherapy, of which she has now had 3 cycles (7/26/17, 8/23/17, and 9/13/17). The patient received a second opinion from Dr. Cespedes at [facility], who agreed with chemotherapy at this point. She has tolerated chemotherapy fairly well, except for excessive nausea, which has responded to a scopolamine patch.    On 9/28/17, the patient noted that she had developed a dilated right pupil. She had no other complaints, such as headache, blurred vision, or increased N/V. She went to her optometrist, who saw no abnormalities on exam. She was evaluated by Dr. Irvin, who obtained an MRI of the brain at [facility] on 9/29/17 which revealed approximately 7 metastatic lesions in the meninges of the brain, the largest measuring 2 cm. No parenchymal lesions or edema were identified. The patient was seen in follow-up by Dr. Meadows earlier today, and has been referred to our clinic for radiation therapy recommendations. The patient states that she received a call earlier this week from her optometrist, who feels that the right pupil dilation was possibly secondary to her scopolamine patch.    Today on exam, her pupillary dilation has resolved. She has no headaches or blurred/double vision. She has no unilateral or bilateral extremity weakness, and no difficulty with gait. She continues  to wear a scopolamine patch secondary to chemotherapy induced nausea.    This is a new problem to me.  (Old medical records were requested, reviewed, and summarized in the HPI)    Review of Systems   Constitutional: Positive for fatigue.   Eyes: Positive for visual disturbance.   Gastrointestinal: Positive for nausea and vomiting.   Musculoskeletal: Positive for back pain.        Hip pain          The remainder of her review of systems is otherwise negative.    Past Medical History:   Diagnosis Date   • Anxiety    • Depression    • Encounter for gynecological examination    • Malignant neoplasm of female breast     hx of dcis s/p mastectomy and reconstruction and augmentation      • Primary malignant neoplasm of breast     dcis in rt breast s/p mastectomy,reconstruction      • Ventricular premature beats         Past Surgical History:   Procedure Laterality Date   • AUGMENTATION MAMMAPLASTY     • AXILLARY LYMPH NODE BIOPSY/EXCISION Right 7/10/2017    Procedure: BIOSPY RIGHT AXILLARY MASS AND OR LYMPH;  Surgeon: Sourav Ernst MD;  Location: [facility];  Service:    • BREAST BIOPSY  12/07/2007    Mammotome biopsy of the right side with clip placement   • BREAST LUMPECTOMY     • BREAST RECONSTRUCTION  10/28/2008    Abscence of right breast secondary to mastectomy. Implant exchange for reconstruction of right breast with open para-prosthetic capsulotomy   • BREAST SURGERY  10/01/2009    Left breast ptosis and breast asymmetry secondary to right breast reconstruction for breast cancer. Left breast mastopexy with augmentation.   • CARDIAC CATHETERIZATION  09/18/2008    Normal coronary arteriography. Normal left ventricular angiogram   • EP STUDY     • MASTECTOMY Right    • MASTECTOMY  02/05/2008    Multifocal right breast ductal carcinoma-in-situ. Right total mastectomy   • MASTECTOMY, PARTIAL  01/03/2008    Ductal carcinoma in-situ of the right breast, proven by mammotome biopsy. Right lumpectomy, medial portion of  the breast, between 2 o'clock and 4 o'clock, 5 cm from the nipple, with clip placement, radiographic inter. of severo. specimen, & ultrasound   • PAP SMEAR  03/03/2009    Negative   • IN INSJ TUNNELED CVC W/O SUBQ PORT/ AGE 3 YR/> Right 7/10/2017    Procedure: MEDIPORT     (c-arm#2);  Surgeon: Sourav Ernst MD;  Location: [facility];  Service: General      Family History   Problem Relation Age of Onset   • Anemia Mother    • Multiple sclerosis Mother    • No Known Problems Father    • Diabetes Other    • Multiple sclerosis Other         Social History     Social History   • Marital status:      Spouse name: N/A   • Number of children: N/A   • Years of education: N/A     Occupational History   • Not on file.     Social History Main Topics   • Smoking status: Never Smoker   • Smokeless tobacco: Never Used   • Alcohol use No   • Drug use: No   • Sexual activity: Defer     Other Topics Concern   • Not on file     Social History Narrative     Allergies: Percocet [oxycodone-acetaminophen]     Prior to Admission medications    Medication Sig Start Date End Date Taking? Authorizing Provider   Calcium Carbonate-Vitamin D (CALTRATE 600+D PO) Take  by mouth Daily.   Yes Historical Provider, MD   dexamethasone (DECADRON) 4 MG tablet Take 2 tablets oral twice a day for 3 consecutive days beginning the day before chemotherapy and continue for 6 doses. 7/21/17  Yes Ovidio Meadows MD   dexamethasone (DECADRON) 4 MG tablet Take 1 tablet by mouth Daily With Breakfast. 8/23/17  Yes Ovidio Meadows MD   Docusate Sodium (COLACE PO) Take  by mouth Daily.   Yes Historical Provider, MD   escitalopram (LEXAPRO) 10 MG tablet Take 15 mg by mouth Daily. 8/9/16  Yes Historical Provider, MD   Multiple Vitamins-Minerals (MULTIVITAMIN WITH MINERALS) tablet tablet Take 1 tablet by mouth Daily.   Yes Historical Provider, MD   naproxen sodium (ALEVE) 220 MG tablet Take 220 mg by mouth As Needed for Mild Pain  (2 tabs in the morning one pm).    Yes Historical Provider, MD   ondansetron (ZOFRAN) 4 MG tablet Take 2 tablets by mouth 4 (Four) Times a Day As Needed for Nausea or Vomiting.  Patient taking differently: Take 8 mg by mouth As Needed for Nausea or Vomiting. 7/21/17  Yes Ovidio Meadows MD   Polyethylene Glycol 3350 (MIRALAX PO) Take 17 g by mouth As Needed.   Yes Historical Provider, MD   prochlorperazine (COMPAZINE) 10 MG tablet Take 1 tablet by mouth As Needed. 7/28/17  Yes Historical Provider, MD   Scopolamine (TRANSDERM-SCOP, 1.5 MG,) 1.5 MG/3DAYS patch Place 1 patch on the skin Every 72 (Seventy-Two) Hours. 8/2/17  Yes Ovidio Meadows MD   traMADol (ULTRAM) 50 MG tablet Take 1 tablet by mouth Every 8 (Eight) Hours As Needed for Moderate Pain . 9/13/17  Yes Ovidio Meadows MD      Pain:(on a scale of 0-10)  Pain Score    10/04/17 1005   PainSc: 4  Comment: hip        Quality of Life: 90 - Limited Activity  Advanced Care Plan: N    Physical Examination:  Vitals:     Vitals:    10/04/17 1005   BP: 112/76   Pulse: 83   Resp: 16   Temp: 97.6 °F (36.4 °C)       Height:    Weight: Weight: 191 lb 12.8 oz (87 kg) Body mass index is 30.04 kg/(m^2).      Constitutional: The patient is a well-developed, well-nourished white female in no acute distress.  Alert and oriented ×3.  Eyes: PERRLA.  EOMI.  ENMT:  Ears and nose WNL.  No lesions noted in the oral cavity or oropharynx.  Lymphatics: The patient has an open wound in the right axilla, secondary to her recent biopsy, which is currently packed with gauze. There is otherwise no cervical, supraclavicular, axillary, or inguinal lymphadenopathy palpated.  CV: Regular rate and rhythm.  No murmurs, rubs, or gallops are appreciated.  Respiratory: Lungs clear to auscultation.  Breath sounds equal bilaterally.  Breasts: Bilateral surgical implants are noted. Surgical scars in both the left and right breast are well healed, and there are no suspicious lesions or nodules palpated.   GI: Abdomen soft, nontender,  nondistended, with no hepatosplenomegaly or masses palpated.  Extremities: No clubbing, cyanosis, or edema.  Neurologic: Cranial nerves II through XII are grossly intact, with no focal neurological deficits noted on exam.  Psychiatric: Alert and oriented x3. Normal affect, with no anxiety or depression noted.    Radiographs :as discussed in the HPI.  Pathology: as discussed in the HPI.  Labs:   WBC   Date Value Ref Range Status   10/04/2017 7.96 3.20 - 9.80 10*3/mm3 Final     Hemoglobin   Date Value Ref Range Status   10/04/2017 10.6 (L) 12.0 - 15.5 g/dL Final     Hematocrit   Date Value Ref Range Status   10/04/2017 31.5 (L) 35.0 - 45.0 % Final     Platelets   Date Value Ref Range Status   10/04/2017 255 150 - 450 10*3/mm3 Final        ASSESSMENT/PLAN: Ms. Kylie García is a 47-year-old white female with a remote history of DCIS of the right breast, with recently diagnosed widespread metastatic breast cancer, now with meningeal involvement. I agree with her optometrist that the pupillary dilation could be secondary to her scopolamine patch, and not related to the  meningeal lesions, which would not tend to cause unilateral pupil dilation (which has now resolved). She is otherwise without neurologic complaints or signs on exam. She states that her low back/sacral pain has improved while on chemotherapy. I had a long discussion with the patient and her  regarding the meningeal metastases, and whether palliative radiation therapy at this point would be more beneficial than continuing her chemotherapy, to which she is responding favorably. I also discussed this with Dr. Meadows. At this point, the patient voiced her desire to continue with chemotherapy and reevaluate the brain/meningeal lesions in 6 weeks, if she remains asymptomatic. If she becomes symptomatic, we will proceed with palliative whole brain XRT immediately. We also discussed the possibility of palliative radiation therapy to her pelvic metastases,  should they continue to be painful.    Sincerely,    Pablito Pulido MD  Radiation Oncology    Electronically signed by Pablito Pulido MD  10/4/2017  10:10 AM    Cc: Dr. Ovidio Irvin

## 2017-10-04 NOTE — PROGRESS NOTES
DATE OF VISIT: 10/4/2017    REASON FOR VISIT:  Metastatic breast cancer    HISTORY OF PRESENT ILLNESS:    47-year-old female with a past medical history significant for history of ductal carcinoma in situ of the right breast diagnosed in December 2007 patient eventually had a mastectomy done in February 2008 and took adjuvant tamoxifen for 5 years until 2013.  Started on palliative chemotherapy with Taxotere and Cytoxan on July 26, 2017.  She is here to get cycle 4 of chemotherapy today.  Last week patient's primary medical doctor called me regarding the later dilated pupil on right eye for which patient underwent an MRI of brain.  Patient also had a staging CT of chest, abdomen and pelvis with contrast done 2 weeks ago.  She is here to discuss the result of MRI and CT scan and further recommendation.  Denies any headache or dizziness.  States her hip pain is improved.  States her nausea is better controlled with scopolamine patch.  Denies any blood in the stool or urine.  Denies any abnormal swollen and anywhere in the body.      PAST MEDICAL HISTORY:    Past Medical History:   Diagnosis Date   • Anxiety    • Depression    • Encounter for gynecological examination    • Malignant neoplasm of female breast     hx of dcis s/p mastectomy and reconstruction and augmentation      • Primary malignant neoplasm of breast     dcis in rt breast s/p mastectomy,reconstruction      • Ventricular premature beats        SOCIAL HISTORY:    Social History   Substance Use Topics   • Smoking status: Never Smoker   • Smokeless tobacco: Never Used   • Alcohol use No       Surgical History :  Past Surgical History:   Procedure Laterality Date   • AUGMENTATION MAMMAPLASTY     • AXILLARY LYMPH NODE BIOPSY/EXCISION Right 7/10/2017    Procedure: BIOSPY RIGHT AXILLARY MASS AND OR LYMPH;  Surgeon: Sourav Ernst MD;  Location: [facility];  Service:    • BREAST BIOPSY  12/07/2007    Mammotome biopsy of the right side with clip placement   •  BREAST LUMPECTOMY     • BREAST RECONSTRUCTION  10/28/2008    Abscence of right breast secondary to mastectomy. Implant exchange for reconstruction of right breast with open para-prosthetic capsulotomy   • BREAST SURGERY  10/01/2009    Left breast ptosis and breast asymmetry secondary to right breast reconstruction for breast cancer. Left breast mastopexy with augmentation.   • CARDIAC CATHETERIZATION  09/18/2008    Normal coronary arteriography. Normal left ventricular angiogram   • EP STUDY     • MASTECTOMY Right    • MASTECTOMY  02/05/2008    Multifocal right breast ductal carcinoma-in-situ. Right total mastectomy   • MASTECTOMY, PARTIAL  01/03/2008    Ductal carcinoma in-situ of the right breast, proven by mammotome biopsy. Right lumpectomy, medial portion of the breast, between 2 o'clock and 4 o'clock, 5 cm from the nipple, with clip placement, radiographic inter. of severo. specimen, & ultrasound   • PAP SMEAR  03/03/2009    Negative   • ID INSJ TUNNELED CVC W/O SUBQ PORT/ AGE 5 YR/> Right 7/10/2017    Procedure: MEDIPORT     (c-arm#2);  Surgeon: Sourav Ernst MD;  Location: [facility];  Service: General       ALLERGIES:    Allergies   Allergen Reactions   • Percocet [Oxycodone-Acetaminophen] Nausea And Vomiting       FAMILY HISTORY:  Family History   Problem Relation Age of Onset   • Anemia Mother    • Multiple sclerosis Mother    • No Known Problems Father    • Diabetes Other    • Multiple sclerosis Other        REVIEW OF SYSTEMS:      CONSTITUTIONAL: Complains of fatigue.  Denies any fever, chills .      HEENT: No epistaxis, mouth sores or difficulty swallowing.     RESPIRATORY: Complains of shortness of breath with exertion that started a few months back. No new cough or hemoptysis.     CARDIOVASCULAR: No chest pain or palpitations.     GASTROINTESTINAL:  states scopolamine patch is helping her with nausea.  Positive for abdominal pain in epigastric region. No blood in the stool.     GENITOURINARY: No  "Dysuria or Hematuria.     MUSCULOSKELETAL: Complains of pain in bilateral hip.  Complains of pain in lower back radiating down to left side wall and flank region.     LYMPHATICS: States drainage from right axilla is improved.     NEUROLOGICAL : No tingling or numbness. No headache or dizziness. No seizures or balance problems.     SKIN: Positive for history of melanoma status post surgical removal. Denies any new skin lesion worrisome for melanoma.  Complains of pruritus all over body.            PHYSICAL EXAMINATION:      VITAL SIGNS:  /76  Pulse 83  Temp 97.6 °F (36.4 °C) (Temporal Artery )   Resp 16  Ht 67\" (170.2 cm)  Wt 191 lb 14.4 oz (87 kg)  BMI 30.06 kg/m2    GENERAL:  Not in any distress.    HEENT:  Normocephalic, Atraumatic.Mild Conjunctival pallor. No icterus. Extraocular Movements Intact. No Facial Asymmetry noted.    NECK:  No adenopathy. No JVD.    RESPIRATORY:  Fair air entry bilateral. No rhonchi or wheezing.    CARDIOVASCULAR:  S1, S2. Regular rate and rhythm. No murmur or gallop appreciated.    ABDOMEN:  Soft, obese, nontender. Bowel sounds present in all four quadrants.  No organomegaly appreciated.    EXTREMITIES:  No edema.No Calf Tenderness.    NEUROLOGIC:  Alert, awake and oriented ×3.  No  Motor or sensory deficit appreciated. Cranial Nerves 2-12 grossly intact.      DIAGNOSTIC DATA:    Glucose   Date Value Ref Range Status   10/04/2017 128 (H) 60 - 100 mg/dL Final     Sodium   Date Value Ref Range Status   10/04/2017 143 137 - 145 mmol/L Final     Potassium   Date Value Ref Range Status   10/04/2017 4.1 3.5 - 5.1 mmol/L Final     CO2   Date Value Ref Range Status   10/04/2017 23.0 22.0 - 31.0 mmol/L Final     Chloride   Date Value Ref Range Status   10/04/2017 106 95 - 110 mmol/L Final     Anion Gap   Date Value Ref Range Status   10/04/2017 14.0 5.0 - 15.0 mmol/L Final     Creatinine   Date Value Ref Range Status   10/04/2017 0.75 0.50 - 1.00 mg/dL Final     BUN   Date Value Ref " Range Status   10/04/2017 11 7 - 21 mg/dL Final     BUN/Creatinine Ratio   Date Value Ref Range Status   10/04/2017 14.7 7.0 - 25.0 Final     Calcium   Date Value Ref Range Status   10/04/2017 9.4 8.4 - 10.2 mg/dL Final     eGFR Non  Amer   Date Value Ref Range Status   10/04/2017 82 >60 mL/min/1.73 Final     Alkaline Phosphatase   Date Value Ref Range Status   10/04/2017 160 (H) 38 - 126 U/L Final     Total Protein   Date Value Ref Range Status   10/04/2017 6.6 6.3 - 8.6 g/dL Final     ALT (SGPT)   Date Value Ref Range Status   10/04/2017 47 9 - 52 U/L Final     AST (SGOT)   Date Value Ref Range Status   10/04/2017 31 14 - 36 U/L Final     Total Bilirubin   Date Value Ref Range Status   10/04/2017 0.4 0.2 - 1.3 mg/dL Final     Albumin   Date Value Ref Range Status   10/04/2017 3.70 3.40 - 4.80 g/dL Final     Globulin   Date Value Ref Range Status   10/04/2017 2.9 2.3 - 3.5 gm/dL Final     A/G Ratio   Date Value Ref Range Status   10/04/2017 1.3 1.1 - 1.8 g/dL Final     Lab Results   Component Value Date    WBC 7.96 10/04/2017    HGB 10.6 (L) 10/04/2017    HCT 31.5 (L) 10/04/2017    MCV 93.2 10/04/2017     10/04/2017     Lab Results   Component Value Date    NEUTROABS 7.31 10/04/2017     Lab Results   Component Value Date     943.0 (H) 08/23/2017    LABCA2 1075.9 (H) 08/23/2017     2 D Echo Done on July 19, 2017 showed:  Interpretation Summary   · The left ventricular cavity is borderline dilated.  · Left ventricular systolic function is normal. Estimated EF = 53%.  · Left ventricular diastolic dysfunction (grade I) consistent with impaired relaxation.  · IAS aneurysm noted. No PFO or ASD           Radiology Data :  MRI of brain with contrast done on September 29, 2017 at [facility] was reviewed and it showed:  1.  T1 study done with pre-and post contrast scan demonstrates approximately 7 metastatic lesions in the meninges of brain, largest of which 2 cm in size.  There are some minor  metastasis present seen based on coronal study.      CT of Chest, abdomen and pelvis with contrast done on September 20, 2017 showed:  IMPRESSION:  CONCLUSION:   1.  Large hypodense lesion occupying much of the left lobe of the  liver with additional smaller lesions is described above,  consistent with metastatic disease. The larger lesions are  smaller than the areas of hypermetabolic activity seen on PET,  however this comparison may not be accurate due to differences in  modality.  2.  Slightly enlarged lymph nodes an postoperative changes noted  in the right axilla.  3.  Numerous lytic lesions throughout the osseous structures  appears similar, consistent with metastatic disease.  4.  Pathologic L4 compression fracture has progressed compared  with that seen on the PET/CT. There is mild bony retropulsion.          X-ray of left hip done on September 7, 2017 showed:  Hyperlucency of the inferior pubic rami and the inferior aspect of obturator foramen consistent with osteolytic metastasis.    PET/CT done on July 3, 2017 showed:  IMPRESSION:  CONCLUSION:  1. Extensive metastatic disease. Pathologic uptake within  enlarged right-sided axillary lymph nodes. Metastatic adenopathy  in both laurie and mediastinum. Tumor replacing most of the left  lobe of liver. Intra-abdominal retroperitoneal metastases,  adenopathy.     Extensive skeletal metastases including a pathologic fracture at  L4.        Nuclear bone scan done on June 20, 2017 showed:  1. Increased uptake at L4 suggestive of metastatic process.  2. 3 or 4 areas of increased activity in bony calvarium  3. Some increased activity in tips posteriorly and anteriorly suggestive of metastatic carcinoma to the skeletal system.           Pathology :    Pathology data from July 10, 2017 showed:  Final Diagnosis   Mass right axilla, excisional biopsy:      Metastatic poorly differentiated ductal carcinoma, breast primary      Estrogen receptor positive, 95% stainability,  strong intensity      Progesterone receptor positive, 95% stainability, strong intensity      HER-2 2+(equivocal), sent to [facility] for FISH     Addendum   Her 2 FISH result from [facility] is NEGATIVE         Pathology report from December 7, 2007 showed:  FINAL DIAGNOSIS:   RIGHT BREAST MAMMOTOME BIOPSY:        DUCTAL CARCINOMA IN SITU, INTERMEDIATE NUCLEAR GRADE              WITH MICROCALCIFICATION.      ADDENDUM:   Estrogen receptors are positive (90-95% stainability).   Progesterone receptors are positive (90-95% stainability).         ASSESSMENT AND PLAN:      1.  Metastatic cancer with extensive adenopathy in right axilla, mediastinum, hilar, abdominal, retroperitoneal with extensive liver and bone metastasis on PET/CT.  Patient underwent right apatient underwent right axillary lymph node excision by Dr. Ernst on July 10, 2017.Pathology report confirmed ductal carcinoma of breast with estrogen and progesterone positivity.  At her on palliative chemotherapy with Taxotere and cytoxan on July 26, 2017.   Patient was  seen at [facility] for second opinion regarding her breast cancer.  Case was discussed with Dr vazquez oncologist that has seen patient at [facility].  She agreed with her chemotherapy at this point.  In future if he get her disease under control will change her to hormonal therapy with Palbociclib.  This recommendation were discussed with patient again today. The scan of chest, abdomen and pelvis with contrast was Cytoxan showed good response with lesion in the liver as well as adenopathy getting smaller.  In view of MRI of brain showing possible metastatic lesion in the brain patient was referred to Dr. Pulido was seen patient today and did not recommend any radiation at this point since patient is asymptomatic.  We will go ahead with cycle 4 of Taxotere and Cytoxan today.  We will repeat CT of chest, abdomen and pelvis with contrast after 5 cycles of Taxotere and Cytoxan.  We will  also repeat MRI of brain with contrast after 5 cycles of Taxotere and Cytoxan to follow-up on the brain lesions.    2.  Brain metastasis: Patient had a unilateral dilated patient of pupil last week for which she underwent MRI of brain on September 29, 2017.  MRI of brain showed metastatic lesion in the meninges.  MRI from outside were reviewed with Dr. Pulido earlier today.  It patient was evaluated by Dr. Pulido for radiation treatment.  As patient is asymptomatic Dr. Pulido recommended continuing with systemic therapy at this point.  Plan is to repeat MRI of brain after 2 more cycles of taxotere and Cytoxan.  If she develops any symptoms will do radiation therapy prior to that which was discussed with patient.    3.  History of melanoma in situ status post excision by Dr. Linn.    4.  Bone metastasis: Patient was started on Zometa on August 23, 2017.  In view of patient complaining of metastatic disease in the hip.  For pain gets out of control will have to do palliative radiation to pelvic region.     5.  History of ductal carcinoma in situ of the right breast diagnosed in 2007.  Status post mastectomy followed by adjuvant tamoxifen for 5 years which was finished in 2013.    6.  Elevated  liver function tests secondary to liver metastases   7.  Health maintenance: Patient does not smoke.  Not a candidate for screening colonoscopy at this point.  Remains full code.    8.  Prescriptions: Is enough prescription of Decadron, Zofran, scopolamine and Ultram.        Ovidio Meadows MD  10/4/2017  4:14 PM        EMR Dragon/Transcription disclaimer:   Much of this encounter note is an electronic transcription/translation of spoken language to printed text. The electronic translation of spoken language may permit erroneous, or at times, nonsensical words or phrases to be inadvertently transcribed; Although I have reviewed the note for such errors, some may still exist.

## 2017-10-05 LAB
CANCER AG15-3 SERPL-ACNC: 282.2 U/ML (ref 0–25)
CANCER AG27-29 SERPL-ACNC: 288.9 U/ML (ref 0–38.6)

## 2017-10-13 ENCOUNTER — TELEPHONE (OUTPATIENT)
Dept: ONCOLOGY | Facility: CLINIC | Age: 47
End: 2017-10-13

## 2017-10-13 ENCOUNTER — HOSPITAL ENCOUNTER (EMERGENCY)
Facility: HOSPITAL | Age: 47
Discharge: HOME | End: 2017-10-13
Attending: FAMILY MEDICINE | Admitting: FAMILY MEDICINE

## 2017-10-13 VITALS
HEART RATE: 106 BPM | OXYGEN SATURATION: 98 % | BODY MASS INDEX: 29.82 KG/M2 | DIASTOLIC BLOOD PRESSURE: 75 MMHG | WEIGHT: 190 LBS | RESPIRATION RATE: 20 BRPM | TEMPERATURE: 98.8 F | SYSTOLIC BLOOD PRESSURE: 156 MMHG | HEIGHT: 67 IN

## 2017-10-13 DIAGNOSIS — L08.9 WOUND INFECTION: Primary | ICD-10-CM

## 2017-10-13 DIAGNOSIS — T14.8XXA WOUND INFECTION: Primary | ICD-10-CM

## 2017-10-13 LAB
ALBUMIN SERPL-MCNC: 3.3 G/DL (ref 3.4–4.8)
ALBUMIN/GLOB SERPL: 1.4 G/DL (ref 1.1–1.8)
ALP SERPL-CCNC: 120 U/L (ref 38–126)
ALT SERPL W P-5'-P-CCNC: 105 U/L (ref 9–52)
ANION GAP SERPL CALCULATED.3IONS-SCNC: 8 MMOL/L (ref 5–15)
ANISOCYTOSIS BLD QL: ABNORMAL
AST SERPL-CCNC: 52 U/L (ref 14–36)
BASOPHILS # BLD MANUAL: 0.03 10*3/MM3 (ref 0–0.2)
BASOPHILS NFR BLD AUTO: 2 % (ref 0–2)
BILIRUB SERPL-MCNC: 0.8 MG/DL (ref 0.2–1.3)
BUN BLD-MCNC: 11 MG/DL (ref 7–21)
BUN/CREAT SERPL: 17.5 (ref 7–25)
CALCIUM SPEC-SCNC: 7.4 MG/DL (ref 8.4–10.2)
CHLORIDE SERPL-SCNC: 100 MMOL/L (ref 95–110)
CO2 SERPL-SCNC: 25 MMOL/L (ref 22–31)
CREAT BLD-MCNC: 0.63 MG/DL (ref 0.5–1)
D-LACTATE SERPL-SCNC: 1.1 MMOL/L (ref 0.5–2)
DEPRECATED RDW RBC AUTO: 48.3 FL (ref 36.4–46.3)
ERYTHROCYTE [DISTWIDTH] IN BLOOD BY AUTOMATED COUNT: 14.4 % (ref 11.5–14.5)
GFR SERPL CREATININE-BSD FRML MDRD: 100 ML/MIN/1.73 (ref 58–135)
GLOBULIN UR ELPH-MCNC: 2.3 GM/DL (ref 2.3–3.5)
GLUCOSE BLD-MCNC: 92 MG/DL (ref 60–100)
HCT VFR BLD AUTO: 31.4 % (ref 35–45)
HGB BLD-MCNC: 10.7 G/DL (ref 12–15.5)
HYPOCHROMIA BLD QL: ABNORMAL
LARGE PLATELETS: ABNORMAL
LYMPHOCYTES # BLD MANUAL: 0.49 10*3/MM3 (ref 0.6–4.2)
LYMPHOCYTES NFR BLD MANUAL: 36.4 % (ref 10–50)
LYMPHOCYTES NFR BLD MANUAL: 48.5 % (ref 0–12)
MCH RBC QN AUTO: 31.4 PG (ref 26.5–34)
MCHC RBC AUTO-ENTMCNC: 34.1 G/DL (ref 31.4–36)
MCV RBC AUTO: 92.1 FL (ref 80–98)
MONOCYTES # BLD AUTO: 0.65 10*3/MM3 (ref 0–0.9)
NEUTROPHILS # BLD AUTO: 0.18 10*3/MM3 (ref 2–8.6)
NEUTROPHILS NFR BLD MANUAL: 8.1 % (ref 37–80)
NEUTS BAND NFR BLD MANUAL: 5.1 % (ref 0–5)
PLATELET # BLD AUTO: 246 10*3/MM3 (ref 150–450)
PMV BLD AUTO: 9.1 FL (ref 8–12)
POIKILOCYTOSIS BLD QL SMEAR: ABNORMAL
POTASSIUM BLD-SCNC: 3.8 MMOL/L (ref 3.5–5.1)
PROT SERPL-MCNC: 5.6 G/DL (ref 6.3–8.6)
RBC # BLD AUTO: 3.41 10*6/MM3 (ref 3.77–5.16)
SMALL PLATELETS BLD QL SMEAR: ADEQUATE
SODIUM BLD-SCNC: 133 MMOL/L (ref 137–145)
WBC MORPH BLD: NORMAL
WBC NRBC COR # BLD: 1.34 10*3/MM3 (ref 3.2–9.8)

## 2017-10-13 PROCEDURE — 99283 EMERGENCY DEPT VISIT LOW MDM: CPT

## 2017-10-13 PROCEDURE — 96360 HYDRATION IV INFUSION INIT: CPT

## 2017-10-13 PROCEDURE — 80053 COMPREHEN METABOLIC PANEL: CPT | Performed by: FAMILY MEDICINE

## 2017-10-13 PROCEDURE — 85025 COMPLETE CBC W/AUTO DIFF WBC: CPT | Performed by: FAMILY MEDICINE

## 2017-10-13 PROCEDURE — 85007 BL SMEAR W/DIFF WBC COUNT: CPT | Performed by: FAMILY MEDICINE

## 2017-10-13 PROCEDURE — 87040 BLOOD CULTURE FOR BACTERIA: CPT | Performed by: FAMILY MEDICINE

## 2017-10-13 PROCEDURE — 83605 ASSAY OF LACTIC ACID: CPT | Performed by: FAMILY MEDICINE

## 2017-10-13 RX ORDER — CEPHALEXIN 500 MG/1
500 CAPSULE ORAL 4 TIMES DAILY
Qty: 28 CAPSULE | Refills: 0 | Status: SHIPPED | OUTPATIENT
Start: 2017-10-13 | End: 2017-10-20

## 2017-10-13 RX ADMIN — SODIUM CHLORIDE 1000 ML: 9 INJECTION, SOLUTION INTRAVENOUS at 15:55

## 2017-10-13 NOTE — ED PROVIDER NOTES
Subjective     History provided by:  Patient   used: No      Patient complain about infection on the wound site on the right breast for 2 days now. Patient does wound change 2 times a day. Said that she had fever of 100.6 at home. Patient is a breast cancer patient with mets.    Review of Systems   Genitourinary: Negative for dysuria.   Skin: Positive for wound.        On the right axilla.   All other systems reviewed and are negative.      Past Medical History:   Diagnosis Date   • Anxiety    • Depression    • Encounter for gynecological examination    • Malignant neoplasm of female breast     hx of dcis s/p mastectomy and reconstruction and augmentation      • Primary malignant neoplasm of breast     dcis in rt breast s/p mastectomy,reconstruction      • Ventricular premature beats        Allergies   Allergen Reactions   • Percocet [Oxycodone-Acetaminophen] Nausea And Vomiting       Past Surgical History:   Procedure Laterality Date   • AUGMENTATION MAMMAPLASTY     • AXILLARY LYMPH NODE BIOPSY/EXCISION Right 7/10/2017    Procedure: BIOSPY RIGHT AXILLARY MASS AND OR LYMPH;  Surgeon: Sourav Ernst MD;  Location: [facility];  Service:    • BREAST BIOPSY  12/07/2007    Mammotome biopsy of the right side with clip placement   • BREAST LUMPECTOMY     • BREAST RECONSTRUCTION  10/28/2008    Abscence of right breast secondary to mastectomy. Implant exchange for reconstruction of right breast with open para-prosthetic capsulotomy   • BREAST SURGERY  10/01/2009    Left breast ptosis and breast asymmetry secondary to right breast reconstruction for breast cancer. Left breast mastopexy with augmentation.   • CARDIAC CATHETERIZATION  09/18/2008    Normal coronary arteriography. Normal left ventricular angiogram   • EP STUDY     • MASTECTOMY Right    • MASTECTOMY  02/05/2008    Multifocal right breast ductal carcinoma-in-situ. Right total mastectomy   • MASTECTOMY, PARTIAL  01/03/2008    Ductal carcinoma  in-situ of the right breast, proven by mammotome biopsy. Right lumpectomy, medial portion of the breast, between 2 o'clock and 4 o'clock, 5 cm from the nipple, with clip placement, radiographic inter. of severo. specimen, & ultrasound   • PAP SMEAR  03/03/2009    Negative   • KS INSJ TUNNELED CVC W/O SUBQ PORT/ AGE 3 YR/> Right 7/10/2017    Procedure: MEDIPORT     (c-arm#2);  Surgeon: Sourav Ernst MD;  Location: [facility];  Service: General       Family History   Problem Relation Age of Onset   • Anemia Mother    • Multiple sclerosis Mother    • No Known Problems Father    • Diabetes Other    • Multiple sclerosis Other        Social History     Social History   • Marital status:      Spouse name: N/A   • Number of children: N/A   • Years of education: N/A     Social History Main Topics   • Smoking status: Never Smoker   • Smokeless tobacco: Never Used   • Alcohol use No   • Drug use: No   • Sexual activity: Defer     Other Topics Concern   • None     Social History Narrative           Objective   Physical Exam   Constitutional: She appears well-nourished.   Neck: Normal range of motion. Neck supple.   Cardiovascular: Normal rate, regular rhythm, normal heart sounds and intact distal pulses.    Pulmonary/Chest: Effort normal and breath sounds normal.   Abdominal: Soft. Bowel sounds are normal.   Musculoskeletal: Normal range of motion.   Neurological: She is alert.   Skin: Skin is warm. No erythema.   There is no redness, wound look great, mild drainage clear.       Procedures         ED Course  ED Course      Labs Reviewed   COMPREHENSIVE METABOLIC PANEL - Abnormal; Notable for the following:        Result Value    Sodium 133 (*)     Calcium 7.4 (*)     Total Protein 5.6 (*)     Albumin 3.30 (*)     ALT (SGPT) 105 (*)     AST (SGOT) 52 (*)     All other components within normal limits   CBC WITH AUTO DIFFERENTIAL - Abnormal; Notable for the following:     WBC 1.34 (*)     RBC 3.41 (*)     Hemoglobin 10.7  (*)     Hematocrit 31.4 (*)     RDW-SD 48.3 (*)     All other components within normal limits   MANUAL DIFFERENTIAL - Abnormal; Notable for the following:     Neutrophil % 8.1 (*)     Monocyte % 48.5 (*)     Bands %  5.1 (*)     Neutrophils Absolute 0.18 (*)     Lymphocytes Absolute 0.49 (*)     All other components within normal limits   LACTIC ACID, PLASMA - Normal   BLOOD CULTURE   BLOOD CULTURE   CBC AND DIFFERENTIAL    Narrative:     The following orders were created for panel order CBC & Differential.  Procedure                               Abnormality         Status                     ---------                               -----------         ------                     Manual Differential[[id number]]          Abnormal            Final result               CBC Auto Differential[[id number]]        Abnormal            Final result                 Please view results for these tests on the individual orders.       No orders to display               MDM    Final diagnoses:   Wound infection            Edin Chatman MD  10/13/17 1650       Edin Chatman MD  10/13/17 1656

## 2017-10-13 NOTE — ED NOTES
Patient presents to the ED with complaints of fever and redness/warmth around an incision from this past July. Patient does receive chemo every 3 weeks with her last treatment being on October 4th. Patient does have packing in her right breast that is changed twice daily at home.      Justine Gagnon RN  10/13/17 1479

## 2017-10-13 NOTE — TELEPHONE ENCOUNTER
Called patient back. She denies any cold like symptoms but says her incision is more tender than normal. She says it is not reddened or draining at all. Informed patient that Dr. Meadows suggests patient go to the ER. Patient states understanding and says she will go.

## 2017-10-16 ENCOUNTER — TELEPHONE (OUTPATIENT)
Dept: ONCOLOGY | Facility: CLINIC | Age: 47
End: 2017-10-16

## 2017-10-16 ENCOUNTER — OFFICE VISIT (OUTPATIENT)
Dept: SURGERY | Facility: CLINIC | Age: 47
End: 2017-10-16

## 2017-10-16 VITALS
HEIGHT: 67 IN | BODY MASS INDEX: 30.61 KG/M2 | WEIGHT: 195 LBS | SYSTOLIC BLOOD PRESSURE: 110 MMHG | DIASTOLIC BLOOD PRESSURE: 72 MMHG

## 2017-10-16 DIAGNOSIS — C79.9 METASTATIC DISEASE (HCC): Primary | ICD-10-CM

## 2017-10-16 PROCEDURE — 99213 OFFICE O/P EST LOW 20 MIN: CPT | Performed by: SURGERY

## 2017-10-16 NOTE — TELEPHONE ENCOUNTER
Called patient back. She said that she was sent home from ER on Friday with an antibiotic and then on Saturday her temperature got up to 102 and she was vomiting. She called then to see if it was okay to take Tylenol with her liver mets. She says today that her temp is normal at 97.2 and she feels better. She is going to call Dr. Ernst's office since she missed her wound check appointment on Friday.

## 2017-10-16 NOTE — PROGRESS NOTES
47-year-old female currently receiving chemotherapy for metastatic breast cancer has an open wound from lymph node biopsy and right axilla which she is changing dressings and packing the wound daily.  Last week she had some pain associated with her shoulder and thought to be related to the wound and noticed some increased drainage from the wound.  She had a fever over 100° on Friday and her medical oncologist told her to go to the emergency room for evaluation.  Blood cultures and labs were checked her white count was 1.3 with 80% neutrophils.  Blood cultures are negative at 48 hours.  Patient was started on Keflex.  She says the pain in her right shoulder has improved and the drainage is back to what was before.    Wound itself skin is clean without any redness or fluctuance appreciated in the wound is open and will have appears to have granulation tissue present and is easily packed.    Likely do not appear any obvious issues with wound.  Would be concerned that she has other reasons for an infection at the fact that she is neutropenic.  Fully discussed that with her and she will follow up with her medical oncologist next week or sooner if she has any further questions.  She'll follow up with us in 3-4 weeks or sooner she has a further concerns or questions.

## 2017-10-18 LAB
BACTERIA SPEC AEROBE CULT: NORMAL
BACTERIA SPEC AEROBE CULT: NORMAL

## 2017-10-25 ENCOUNTER — INFUSION (OUTPATIENT)
Dept: ONCOLOGY | Facility: HOSPITAL | Age: 47
End: 2017-10-25

## 2017-10-25 ENCOUNTER — OFFICE VISIT (OUTPATIENT)
Dept: ONCOLOGY | Facility: CLINIC | Age: 47
End: 2017-10-25

## 2017-10-25 VITALS
BODY MASS INDEX: 31.09 KG/M2 | TEMPERATURE: 98.6 F | RESPIRATION RATE: 18 BRPM | SYSTOLIC BLOOD PRESSURE: 110 MMHG | WEIGHT: 198.5 LBS | DIASTOLIC BLOOD PRESSURE: 65 MMHG | HEART RATE: 79 BPM

## 2017-10-25 DIAGNOSIS — C79.51 METASTASIS TO BONE OF UNKNOWN PRIMARY (HCC): ICD-10-CM

## 2017-10-25 DIAGNOSIS — C80.1 METASTASIS TO BONE OF UNKNOWN PRIMARY (HCC): ICD-10-CM

## 2017-10-25 DIAGNOSIS — Z86.000 HISTORY OF DUCTAL CARCINOMA IN SITU (DCIS) OF BREAST: ICD-10-CM

## 2017-10-25 DIAGNOSIS — C50.911 MALIGNANT NEOPLASM OF RIGHT BREAST IN FEMALE, ESTROGEN RECEPTOR POSITIVE, UNSPECIFIED SITE OF BREAST (HCC): ICD-10-CM

## 2017-10-25 DIAGNOSIS — IMO0001 OTHER COMPLICATION DUE TO VENOUS ACCESS DEVICE, SUBSEQUENT ENCOUNTER: ICD-10-CM

## 2017-10-25 DIAGNOSIS — Z45.2 ENCOUNTER FOR ADJUSTMENT OR MANAGEMENT OF VASCULAR ACCESS DEVICE: ICD-10-CM

## 2017-10-25 DIAGNOSIS — Z17.0 MALIGNANT NEOPLASM OF RIGHT BREAST IN FEMALE, ESTROGEN RECEPTOR POSITIVE, UNSPECIFIED SITE OF BREAST (HCC): ICD-10-CM

## 2017-10-25 DIAGNOSIS — Z45.2 ENCOUNTER FOR VENOUS ACCESS DEVICE CARE: Primary | ICD-10-CM

## 2017-10-25 DIAGNOSIS — C79.31 METASTASIS TO BRAIN (HCC): Primary | ICD-10-CM

## 2017-10-25 LAB
ALBUMIN SERPL-MCNC: 3.8 G/DL (ref 3.4–4.8)
ALBUMIN/GLOB SERPL: 1.3 G/DL (ref 1.1–1.8)
ALP SERPL-CCNC: 100 U/L (ref 38–126)
ALT SERPL W P-5'-P-CCNC: 42 U/L (ref 9–52)
ANION GAP SERPL CALCULATED.3IONS-SCNC: 14 MMOL/L (ref 5–15)
AST SERPL-CCNC: 29 U/L (ref 14–36)
BASOPHILS # BLD AUTO: 0 10*3/MM3 (ref 0–0.2)
BASOPHILS NFR BLD AUTO: 0 % (ref 0–2)
BILIRUB SERPL-MCNC: 0.3 MG/DL (ref 0.2–1.3)
BUN BLD-MCNC: 14 MG/DL (ref 7–21)
BUN/CREAT SERPL: 21.9 (ref 7–25)
CALCIUM SPEC-SCNC: 9.7 MG/DL (ref 8.4–10.2)
CHLORIDE SERPL-SCNC: 107 MMOL/L (ref 95–110)
CO2 SERPL-SCNC: 21 MMOL/L (ref 22–31)
CREAT BLD-MCNC: 0.64 MG/DL (ref 0.5–1)
DEPRECATED RDW RBC AUTO: 51.1 FL (ref 36.4–46.3)
EOSINOPHIL # BLD AUTO: 0 10*3/MM3 (ref 0–0.7)
EOSINOPHIL NFR BLD AUTO: 0 % (ref 0–7)
ERYTHROCYTE [DISTWIDTH] IN BLOOD BY AUTOMATED COUNT: 15.8 % (ref 11.5–14.5)
GFR SERPL CREATININE-BSD FRML MDRD: 99 ML/MIN/1.73 (ref 58–135)
GLOBULIN UR ELPH-MCNC: 3 GM/DL (ref 2.3–3.5)
GLUCOSE BLD-MCNC: 133 MG/DL (ref 60–100)
HCT VFR BLD AUTO: 32.2 % (ref 35–45)
HGB BLD-MCNC: 11.1 G/DL (ref 12–15.5)
IMM GRANULOCYTES # BLD: 0.03 10*3/MM3 (ref 0–0.02)
IMM GRANULOCYTES NFR BLD: 0.3 % (ref 0–0.5)
LYMPHOCYTES # BLD AUTO: 0.36 10*3/MM3 (ref 0.6–4.2)
LYMPHOCYTES NFR BLD AUTO: 4 % (ref 10–50)
MCH RBC QN AUTO: 30.7 PG (ref 26.5–34)
MCHC RBC AUTO-ENTMCNC: 34.5 G/DL (ref 31.4–36)
MCV RBC AUTO: 89 FL (ref 80–98)
MONOCYTES # BLD AUTO: 0.16 10*3/MM3 (ref 0–0.9)
MONOCYTES NFR BLD AUTO: 1.8 % (ref 0–12)
NEUTROPHILS # BLD AUTO: 8.38 10*3/MM3 (ref 2–8.6)
NEUTROPHILS NFR BLD AUTO: 93.9 % (ref 37–80)
PLATELET # BLD AUTO: 279 10*3/MM3 (ref 150–450)
PMV BLD AUTO: 9 FL (ref 8–12)
POTASSIUM BLD-SCNC: 4 MMOL/L (ref 3.5–5.1)
PROT SERPL-MCNC: 6.8 G/DL (ref 6.3–8.6)
RBC # BLD AUTO: 3.62 10*6/MM3 (ref 3.77–5.16)
SODIUM BLD-SCNC: 142 MMOL/L (ref 137–145)
WBC NRBC COR # BLD: 8.93 10*3/MM3 (ref 3.2–9.8)

## 2017-10-25 PROCEDURE — 86300 IMMUNOASSAY TUMOR CA 15-3: CPT

## 2017-10-25 PROCEDURE — 25010000002 DIPHENHYDRAMINE PER 50 MG: Performed by: INTERNAL MEDICINE

## 2017-10-25 PROCEDURE — 25010000002 CYCLOPHOSPHAMIDE PER 100 MG: Performed by: INTERNAL MEDICINE

## 2017-10-25 PROCEDURE — 96417 CHEMO IV INFUS EACH ADDL SEQ: CPT | Performed by: INTERNAL MEDICINE

## 2017-10-25 PROCEDURE — 25010000002 ALTEPLASE 2 MG RECONSTITUTED SOLUTION: Performed by: INTERNAL MEDICINE

## 2017-10-25 PROCEDURE — 25010000002 PALONOSETRON PER 25 MCG: Performed by: INTERNAL MEDICINE

## 2017-10-25 PROCEDURE — 99214 OFFICE O/P EST MOD 30 MIN: CPT | Performed by: INTERNAL MEDICINE

## 2017-10-25 PROCEDURE — 96367 TX/PROPH/DG ADDL SEQ IV INF: CPT | Performed by: INTERNAL MEDICINE

## 2017-10-25 PROCEDURE — 25010000002 HEPARIN FLUSH (PORCINE) 100 UNIT/ML SOLUTION: Performed by: INTERNAL MEDICINE

## 2017-10-25 PROCEDURE — 25010000002 ZOLEDRONIC ACID PER 1 MG: Performed by: INTERNAL MEDICINE

## 2017-10-25 PROCEDURE — 25010000002 DOCETAXEL 80 MG/4ML CONCENTRATION 4 ML VIAL: Performed by: INTERNAL MEDICINE

## 2017-10-25 PROCEDURE — 25010000002 PEGFILGRASTIM 6 MG/0.6ML PREFILLED SYRINGE KIT: Performed by: INTERNAL MEDICINE

## 2017-10-25 PROCEDURE — 96413 CHEMO IV INFUSION 1 HR: CPT | Performed by: INTERNAL MEDICINE

## 2017-10-25 PROCEDURE — 96375 TX/PRO/DX INJ NEW DRUG ADDON: CPT | Performed by: INTERNAL MEDICINE

## 2017-10-25 PROCEDURE — 80053 COMPREHEN METABOLIC PANEL: CPT

## 2017-10-25 PROCEDURE — 96377 APPLICATON ON-BODY INJECTOR: CPT | Performed by: INTERNAL MEDICINE

## 2017-10-25 PROCEDURE — 36415 COLL VENOUS BLD VENIPUNCTURE: CPT | Performed by: INTERNAL MEDICINE

## 2017-10-25 PROCEDURE — 25010000003 DEXAMETHASONE SODIUM PHOSPHATE 100 MG/10ML SOLUTION 10 ML VIAL: Performed by: INTERNAL MEDICINE

## 2017-10-25 PROCEDURE — 36593 DECLOT VASCULAR DEVICE: CPT | Performed by: INTERNAL MEDICINE

## 2017-10-25 PROCEDURE — 85025 COMPLETE CBC W/AUTO DIFF WBC: CPT

## 2017-10-25 RX ORDER — SODIUM CHLORIDE 0.9 % (FLUSH) 0.9 %
10 SYRINGE (ML) INJECTION AS NEEDED
Status: CANCELLED | OUTPATIENT
Start: 2017-10-25

## 2017-10-25 RX ORDER — PALONOSETRON 0.05 MG/ML
0.25 INJECTION, SOLUTION INTRAVENOUS ONCE
Status: COMPLETED | OUTPATIENT
Start: 2017-10-25 | End: 2017-10-25

## 2017-10-25 RX ORDER — DEXAMETHASONE 4 MG/1
4 TABLET ORAL
Qty: 15 TABLET | Refills: 2 | Status: SHIPPED | OUTPATIENT
Start: 2017-10-25 | End: 2018-02-23 | Stop reason: SDUPTHER

## 2017-10-25 RX ORDER — SODIUM CHLORIDE 9 MG/ML
250 INJECTION, SOLUTION INTRAVENOUS ONCE
Status: COMPLETED | OUTPATIENT
Start: 2017-10-25 | End: 2017-10-25

## 2017-10-25 RX ORDER — FAMOTIDINE 10 MG/ML
20 INJECTION, SOLUTION INTRAVENOUS ONCE
Status: COMPLETED | OUTPATIENT
Start: 2017-10-25 | End: 2017-10-25

## 2017-10-25 RX ORDER — FAMOTIDINE 10 MG/ML
20 INJECTION, SOLUTION INTRAVENOUS ONCE
Status: CANCELLED | OUTPATIENT
Start: 2017-10-25

## 2017-10-25 RX ORDER — DEXAMETHASONE 4 MG/1
TABLET ORAL
Qty: 12 TABLET | Refills: 3 | Status: SHIPPED | OUTPATIENT
Start: 2017-10-25 | End: 2019-10-11 | Stop reason: ALTCHOICE

## 2017-10-25 RX ORDER — SODIUM CHLORIDE 9 MG/ML
250 INJECTION, SOLUTION INTRAVENOUS ONCE
Status: DISCONTINUED | OUTPATIENT
Start: 2017-10-25 | End: 2017-10-25 | Stop reason: HOSPADM

## 2017-10-25 RX ORDER — SODIUM CHLORIDE 9 MG/ML
250 INJECTION, SOLUTION INTRAVENOUS ONCE
Status: CANCELLED | OUTPATIENT
Start: 2017-10-25

## 2017-10-25 RX ORDER — PALONOSETRON 0.05 MG/ML
0.25 INJECTION, SOLUTION INTRAVENOUS ONCE
Status: CANCELLED | OUTPATIENT
Start: 2017-10-25

## 2017-10-25 RX ORDER — SODIUM CHLORIDE 0.9 % (FLUSH) 0.9 %
10 SYRINGE (ML) INJECTION AS NEEDED
Status: DISCONTINUED | OUTPATIENT
Start: 2017-10-25 | End: 2017-10-25 | Stop reason: HOSPADM

## 2017-10-25 RX ADMIN — SODIUM CHLORIDE, PRESERVATIVE FREE 500 UNITS: 5 INJECTION INTRAVENOUS at 15:21

## 2017-10-25 RX ADMIN — CYCLOPHOSPHAMIDE 1190 MG: 1 INJECTION, POWDER, FOR SOLUTION INTRAVENOUS; ORAL at 13:34

## 2017-10-25 RX ADMIN — Medication 10 ML: at 08:48

## 2017-10-25 RX ADMIN — PALONOSETRON HYDROCHLORIDE 0.25 MG: 0.25 INJECTION INTRAVENOUS at 11:49

## 2017-10-25 RX ADMIN — DEXAMETHASONE SODIUM PHOSPHATE 10 MG: 10 INJECTION, SOLUTION INTRAMUSCULAR; INTRAVENOUS at 11:10

## 2017-10-25 RX ADMIN — DOCETAXEL 150 MG: 80 INJECTION, SOLUTION, CONCENTRATE INTRAVENOUS at 12:07

## 2017-10-25 RX ADMIN — SODIUM CHLORIDE 250 ML: 9 INJECTION, SOLUTION INTRAVENOUS at 10:16

## 2017-10-25 RX ADMIN — ALTEPLASE 2 MG: 2.2 INJECTION, POWDER, LYOPHILIZED, FOR SOLUTION INTRAVENOUS at 09:11

## 2017-10-25 RX ADMIN — Medication 10 ML: at 15:20

## 2017-10-25 RX ADMIN — DIPHENHYDRAMINE HYDROCHLORIDE 25 MG: 50 INJECTION INTRAMUSCULAR; INTRAVENOUS at 10:43

## 2017-10-25 RX ADMIN — PEGFILGRASTIM 6 MG: KIT SUBCUTANEOUS at 15:20

## 2017-10-25 RX ADMIN — FAMOTIDINE 20 MG: 10 INJECTION, SOLUTION INTRAVENOUS at 10:33

## 2017-10-25 RX ADMIN — ZOLEDRONIC ACID 4 MG: 4 INJECTION, SOLUTION, CONCENTRATE INTRAVENOUS at 14:38

## 2017-10-25 NOTE — PROGRESS NOTES
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  Started on palliative chemotherapy with Taxotere and Cytoxan on July 26, 2017.  She is here to get cycle 5 of chemotherapy today.  Last clinic visit in view of MRI of brain showing meningeal lesion consistent with a metastasis patient was referred to Dr. Pulido for a radiation therapy evaluation.  Since patient was asymptomatic Dr. Pulido recommended continue with the chemotherapy subsequently patient received cycle 4 of chemotherapy on October 4, 2017.  Patient presented to the emergency room on October 13, 2017 with complaint of fever and increasing discharge from the right axilla.  Her septic workup was negative and patient was evaluated by Dr. Ernst on October 16.  Denies any worsening headache or visual disturbances.    PAST MEDICAL HISTORY:    Past Medical History:   Diagnosis Date   • Anxiety    • Depression    • Encounter for gynecological examination    • Malignant neoplasm of female breast     hx of dcis s/p mastectomy and reconstruction and augmentation      • Primary malignant neoplasm of breast     dcis in rt breast s/p mastectomy,reconstruction      • Ventricular premature beats        SOCIAL HISTORY:    Social History   Substance Use Topics   • Smoking status: Never Smoker   • Smokeless tobacco: Never Used   • Alcohol use No       Surgical History :  Past Surgical History:   Procedure Laterality Date   • AUGMENTATION MAMMAPLASTY     • AXILLARY LYMPH NODE BIOPSY/EXCISION Right 7/10/2017    Procedure: BIOSPY RIGHT AXILLARY MASS AND OR LYMPH;  Surgeon: Sourav Ernst MD;  Location: [facility];  Service:    • BREAST BIOPSY  12/07/2007     Mammotome biopsy of the right side with clip placement   • BREAST LUMPECTOMY     • BREAST RECONSTRUCTION  10/28/2008    Abscence of right breast secondary to mastectomy. Implant exchange for reconstruction of right breast with open para-prosthetic capsulotomy   • BREAST SURGERY  10/01/2009    Left breast ptosis and breast asymmetry secondary to right breast reconstruction for breast cancer. Left breast mastopexy with augmentation.   • CARDIAC CATHETERIZATION  09/18/2008    Normal coronary arteriography. Normal left ventricular angiogram   • EP STUDY     • MASTECTOMY Right    • MASTECTOMY  02/05/2008    Multifocal right breast ductal carcinoma-in-situ. Right total mastectomy   • MASTECTOMY, PARTIAL  01/03/2008    Ductal carcinoma in-situ of the right breast, proven by mammotome biopsy. Right lumpectomy, medial portion of the breast, between 2 o'clock and 4 o'clock, 5 cm from the nipple, with clip placement, radiographic inter. of severo. specimen, & ultrasound   • PAP SMEAR  03/03/2009    Negative   • NC INSJ TUNNELED CVC W/O SUBQ PORT/ AGE 5 YR/> Right 7/10/2017    Procedure: MEDIPORT     (c-arm#2);  Surgeon: Sourav Ernst MD;  Location: [facility];  Service: General       ALLERGIES:    Allergies   Allergen Reactions   • Percocet [Oxycodone-Acetaminophen] Nausea And Vomiting       FAMILY HISTORY:  Family History   Problem Relation Age of Onset   • Anemia Mother    • Multiple sclerosis Mother    • No Known Problems Father    • Diabetes Other    • Multiple sclerosis Other        REVIEW OF SYSTEMS:      CONSTITUTIONAL: Complains of fatigue.  Denies any fever, chills .      HEENT: No epistaxis, mouth sores or difficulty swallowing.     RESPIRATORY: No new shortness of breath. No new cough or hemoptysis.     CARDIOVASCULAR: No chest pain or palpitations.     GASTROINTESTINAL:  states scopolamine patch is helping her with nausea.  Positive for abdominal pain in epigastric region. No blood in the  stool.     GENITOURINARY: No Dysuria or Hematuria.     MUSCULOSKELETAL: Complains of pain in bilateral hip.  Denies any recent worsening of hip pain.     LYMPHATICS: States drainage from right axilla is improved after recent use of Keflex.     NEUROLOGICAL : No tingling or numbness. No headache or dizziness. No seizures or balance problems.     SKIN: Positive for history of melanoma status post surgical removal. Denies any new skin lesion worrisome for melanoma.  Complains of pruritus all over body.            PHYSICAL EXAMINATION:      VITAL SIGNS:  /65  Pulse 79  Temp 98.6 °F (37 °C)  Resp 18  Wt 198 lb 8 oz (90 kg)  BMI 31.09 kg/m2    GENERAL:  Not in any distress.    HEENT:  Normocephalic, Atraumatic.Mild Conjunctival pallor. No icterus. Extraocular Movements Intact. No Facial Asymmetry noted.    NECK:  No adenopathy. No JVD.    RESPIRATORY:  Fair air entry bilateral. No rhonchi or wheezing.    CARDIOVASCULAR:  S1, S2. Regular rate and rhythm. No murmur or gallop appreciated.    ABDOMEN:  Soft, obese, nontender. Bowel sounds present in all four quadrants.  No organomegaly appreciated.    EXTREMITIES:  No edema.No Calf Tenderness.    NEUROLOGIC:  Alert, awake and oriented ×3.  No  Motor or sensory deficit appreciated. Cranial Nerves 2-12 grossly intact.      DIAGNOSTIC DATA:    Glucose   Date Value Ref Range Status   10/25/2017 133 (H) 60 - 100 mg/dL Final     Sodium   Date Value Ref Range Status   10/25/2017 142 137 - 145 mmol/L Final     Potassium   Date Value Ref Range Status   10/25/2017 4.0 3.5 - 5.1 mmol/L Final     CO2   Date Value Ref Range Status   10/25/2017 21.0 (L) 22.0 - 31.0 mmol/L Final     Chloride   Date Value Ref Range Status   10/25/2017 107 95 - 110 mmol/L Final     Anion Gap   Date Value Ref Range Status   10/25/2017 14.0 5.0 - 15.0 mmol/L Final     Creatinine   Date Value Ref Range Status   10/25/2017 0.64 0.50 - 1.00 mg/dL Final     BUN   Date Value Ref Range Status   10/25/2017  14 7 - 21 mg/dL Final     BUN/Creatinine Ratio   Date Value Ref Range Status   10/25/2017 21.9 7.0 - 25.0 Final     Calcium   Date Value Ref Range Status   10/25/2017 9.7 8.4 - 10.2 mg/dL Final     eGFR Non  Amer   Date Value Ref Range Status   10/25/2017 99 58 - 135 mL/min/1.73 Final     Alkaline Phosphatase   Date Value Ref Range Status   10/25/2017 100 38 - 126 U/L Final     Total Protein   Date Value Ref Range Status   10/25/2017 6.8 6.3 - 8.6 g/dL Final     ALT (SGPT)   Date Value Ref Range Status   10/25/2017 42 9 - 52 U/L Final     AST (SGOT)   Date Value Ref Range Status   10/25/2017 29 14 - 36 U/L Final     Total Bilirubin   Date Value Ref Range Status   10/25/2017 0.3 0.2 - 1.3 mg/dL Final     Albumin   Date Value Ref Range Status   10/25/2017 3.80 3.40 - 4.80 g/dL Final     Globulin   Date Value Ref Range Status   10/25/2017 3.0 2.3 - 3.5 gm/dL Final     A/G Ratio   Date Value Ref Range Status   10/25/2017 1.3 1.1 - 1.8 g/dL Final     Lab Results   Component Value Date    WBC 8.93 10/25/2017    HGB 11.1 (L) 10/25/2017    HCT 32.2 (L) 10/25/2017    MCV 89.0 10/25/2017     10/25/2017     Lab Results   Component Value Date    NEUTROABS 8.38 10/25/2017     Lab Results   Component Value Date     282.2 (H) 10/04/2017    LABCA2 288.9 (H) 10/04/2017     2 D Echo Done on July 19, 2017 showed:  Interpretation Summary   · The left ventricular cavity is borderline dilated.  · Left ventricular systolic function is normal. Estimated EF = 53%.  · Left ventricular diastolic dysfunction (grade I) consistent with impaired relaxation.  · IAS aneurysm noted. No PFO or ASD           Radiology Data :  MRI of brain with contrast done on September 29, 2017 at [facility] was reviewed and it showed:  1.  T1 study done with pre-and post contrast scan demonstrates approximately 7 metastatic lesions in the meninges of brain, largest of which 2 cm in size.  There are some minor metastasis present seen  based on coronal study.      CT of Chest, abdomen and pelvis with contrast done on September 20, 2017 showed:  IMPRESSION:  CONCLUSION:   1.  Large hypodense lesion occupying much of the left lobe of the  liver with additional smaller lesions is described above,  consistent with metastatic disease. The larger lesions are  smaller than the areas of hypermetabolic activity seen on PET,  however this comparison may not be accurate due to differences in  modality.  2.  Slightly enlarged lymph nodes an postoperative changes noted  in the right axilla.  3.  Numerous lytic lesions throughout the osseous structures  appears similar, consistent with metastatic disease.  4.  Pathologic L4 compression fracture has progressed compared  with that seen on the PET/CT. There is mild bony retropulsion.          X-ray of left hip done on September 7, 2017 showed:  Hyperlucency of the inferior pubic rami and the inferior aspect of obturator foramen consistent with osteolytic metastasis.    PET/CT done on July 3, 2017 showed:  IMPRESSION:  CONCLUSION:  1. Extensive metastatic disease. Pathologic uptake within  enlarged right-sided axillary lymph nodes. Metastatic adenopathy  in both laurie and mediastinum. Tumor replacing most of the left  lobe of liver. Intra-abdominal retroperitoneal metastases,  adenopathy.     Extensive skeletal metastases including a pathologic fracture at  L4.        Nuclear bone scan done on June 20, 2017 showed:  1. Increased uptake at L4 suggestive of metastatic process.  2. 3 or 4 areas of increased activity in bony calvarium  3. Some increased activity in tips posteriorly and anteriorly suggestive of metastatic carcinoma to the skeletal system.           Pathology :    Pathology data from July 10, 2017 showed:  Final Diagnosis   Mass right axilla, excisional biopsy:      Metastatic poorly differentiated ductal carcinoma, breast primary      Estrogen receptor positive, 95% stainability, strong intensity       Progesterone receptor positive, 95% stainability, strong intensity      HER-2 2+(equivocal), sent to [facility] for FISH     Addendum   Her 2 FISH result from [facility] is NEGATIVE         Pathology report from December 7, 2007 showed:  FINAL DIAGNOSIS:   RIGHT BREAST MAMMOTOME BIOPSY:        DUCTAL CARCINOMA IN SITU, INTERMEDIATE NUCLEAR GRADE              WITH MICROCALCIFICATION.      ADDENDUM:   Estrogen receptors are positive (90-95% stainability).   Progesterone receptors are positive (90-95% stainability).         ASSESSMENT AND PLAN:      1.  Metastatic cancer with extensive adenopathy in right axilla, mediastinum, hilar, abdominal, retroperitoneal with extensive liver and bone metastasis on PET/CT.  Patient underwent right apatient underwent right axillary lymph node excision by Dr. Ernst on July 10, 2017.Pathology report confirmed ductal carcinoma of breast with estrogen and progesterone positivity.  At her on palliative chemotherapy with Taxotere and cytoxan on July 26, 2017.   Patient was  seen at [facility] for second opinion regarding her breast cancer.  Case was discussed with Dr vazquez oncologist that has seen patient at [facility].  She agreed with her chemotherapy at this point.  In future if he get her disease under control will change her to hormonal therapy with Palbociclib.  This recommendation were discussed with patient again today. The scan of chest, abdomen and pelvis with contrast was Cytoxan showed good response with lesion in the liver as well as adenopathy getting smaller.  In view of MRI of brain showing possible metastatic lesion in the brain patient was referred to Dr. Pulido was seen patient today and did not recommend any radiation at this point since patient is asymptomatic.  We will go ahead with cycle 5 of Taxotere and Cytoxan today.  We will see her back in about 3 weeks with a repeat CT of chest, abdomen and pelvis with contrast as well as MRI of brain with and  without contrast prior to next clinic visit for restaging purposes.  This recommendation were discussed with patient    2.  Brain metastasis: Patient had a unilateral dilated patient of pupil last week for which she underwent MRI of brain on September 29, 2017.  MRI of brain showed metastatic lesion in the meninges.  MRI from outside were reviewed with Dr. Pulido earlier today.  It patient was evaluated by Dr. Pulido for radiation treatment.  As patient is asymptomatic Dr. Pulido recommended continuing with systemic therapy at this point.  We will get MRI of brain with and without contrast prior to next clinic visit in 3 weeks.    3.  History of melanoma in situ status post excision by Dr. Linn.    4.  Bone metastasis: Patient was started on Zometa on August 23, 2017.  Continue with Zometa as scheduled for now.  Patient denies any mouth sores or jaw pain.     5.  History of ductal carcinoma in situ of the right breast diagnosed in 2007.  Status post mastectomy followed by adjuvant tamoxifen for 5 years which was finished in 2013.    6.  Elevated  liver function tests secondary to liver metastases: Liver enzymes have completely normalized today.   7.  Health maintenance: Patient does not smoke.  Not a candidate for screening colonoscopy at this point.  Remains full code.    8.  Prescriptions: Patient has enough prescription of Decadron, Zofran, scopolamine and Ultram.        Ovidio Meadows MD  10/25/2017  5:57 PM        EMR Dragon/Transcription disclaimer:   Much of this encounter note is an electronic transcription/translation of spoken language to printed text. The electronic translation of spoken language may permit erroneous, or at times, nonsensical words or phrases to be inadvertently transcribed; Although I have reviewed the note for such errors, some may still exist.

## 2017-10-26 LAB
CANCER AG15-3 SERPL-ACNC: 162.2 U/ML (ref 0–25)
CANCER AG27-29 SERPL-ACNC: 181.2 U/ML (ref 0–38.6)

## 2017-11-09 ENCOUNTER — HOSPITAL ENCOUNTER (OUTPATIENT)
Dept: CT IMAGING | Facility: HOSPITAL | Age: 47
Discharge: HOME | End: 2017-11-09
Attending: INTERNAL MEDICINE

## 2017-11-09 ENCOUNTER — APPOINTMENT (OUTPATIENT)
Dept: CT IMAGING | Facility: HOSPITAL | Age: 47
End: 2017-11-09
Attending: INTERNAL MEDICINE

## 2017-11-09 ENCOUNTER — HOSPITAL ENCOUNTER (OUTPATIENT)
Dept: MRI IMAGING | Facility: HOSPITAL | Age: 47
Discharge: HOME | End: 2017-11-09
Attending: INTERNAL MEDICINE | Admitting: INTERNAL MEDICINE

## 2017-11-09 DIAGNOSIS — C79.31 METASTASIS TO BRAIN (HCC): ICD-10-CM

## 2017-11-09 DIAGNOSIS — C50.911 MALIGNANT NEOPLASM OF RIGHT BREAST IN FEMALE, ESTROGEN RECEPTOR POSITIVE, UNSPECIFIED SITE OF BREAST (HCC): ICD-10-CM

## 2017-11-09 DIAGNOSIS — C80.1 METASTASIS TO BONE OF UNKNOWN PRIMARY (HCC): ICD-10-CM

## 2017-11-09 DIAGNOSIS — C79.51 METASTASIS TO BONE OF UNKNOWN PRIMARY (HCC): ICD-10-CM

## 2017-11-09 DIAGNOSIS — Z17.0 MALIGNANT NEOPLASM OF RIGHT BREAST IN FEMALE, ESTROGEN RECEPTOR POSITIVE, UNSPECIFIED SITE OF BREAST (HCC): ICD-10-CM

## 2017-11-09 PROCEDURE — 70553 MRI BRAIN STEM W/O & W/DYE: CPT

## 2017-11-09 PROCEDURE — 0 IOPAMIDOL 61 % SOLUTION: Performed by: INTERNAL MEDICINE

## 2017-11-09 PROCEDURE — 71260 CT THORAX DX C+: CPT

## 2017-11-09 PROCEDURE — 74177 CT ABD & PELVIS W/CONTRAST: CPT

## 2017-11-09 PROCEDURE — 25010000002 GADOTERIDOL PER 1 ML: Performed by: INTERNAL MEDICINE

## 2017-11-09 PROCEDURE — A9576 INJ PROHANCE MULTIPACK: HCPCS | Performed by: INTERNAL MEDICINE

## 2017-11-09 RX ADMIN — IOPAMIDOL 94 ML: 612 INJECTION, SOLUTION INTRAVENOUS at 11:15

## 2017-11-09 RX ADMIN — GADOTERIDOL 20 ML: 279.3 INJECTION, SOLUTION INTRAVENOUS at 09:25

## 2017-11-10 ENCOUNTER — TELEPHONE (OUTPATIENT)
Dept: ONCOLOGY | Facility: HOSPITAL | Age: 47
End: 2017-11-10

## 2017-11-10 ENCOUNTER — HOSPITAL ENCOUNTER (OUTPATIENT)
Dept: ULTRASOUND IMAGING | Facility: HOSPITAL | Age: 47
Discharge: HOME | End: 2017-11-10
Admitting: INTERNAL MEDICINE

## 2017-11-10 DIAGNOSIS — I82.290 BRACHIOCEPHALIC VEIN THROMBOSIS (HCC): Primary | ICD-10-CM

## 2017-11-10 DIAGNOSIS — I82.290 BRACHIOCEPHALIC VEIN THROMBOSIS (HCC): ICD-10-CM

## 2017-11-10 PROCEDURE — 93971 EXTREMITY STUDY: CPT

## 2017-11-10 NOTE — PROGRESS NOTES
I was called by the radiologist Dr. Nevarez, patient had Doppler ultrasound of right upper extremity done earlier today for filling defect in brachiocephalic vein on CT scan yesterday.  Patient was found to have thrombosis in the internal jugular vein.  Patient was called with the result of ultrasound.  Prescription for Lovenox 60 mg twice daily along with Coumadin 5 mg daily to be started tomorrow has been called to her pharmacy.  Patient was instructed to stop taking blood thinner if she starts having any bleeding issues since she has open wound on the right axilla.

## 2017-11-10 NOTE — TELEPHONE ENCOUNTER
Called patient and informed that Dr. Meadows has ordered an ultrasound of SUJATHA for today to rule out DVT. Gave patient that appointment time for today at 3:45. She states understanding.

## 2017-11-14 DIAGNOSIS — I82.290 BRACHIOCEPHALIC VEIN THROMBOSIS (HCC): Primary | ICD-10-CM

## 2017-11-15 ENCOUNTER — INFUSION (OUTPATIENT)
Dept: ONCOLOGY | Facility: HOSPITAL | Age: 47
End: 2017-11-15

## 2017-11-15 ENCOUNTER — OFFICE VISIT (OUTPATIENT)
Dept: ONCOLOGY | Facility: CLINIC | Age: 47
End: 2017-11-15

## 2017-11-15 ENCOUNTER — ANTICOAGULATION VISIT (OUTPATIENT)
Dept: CARDIAC SURGERY | Facility: CLINIC | Age: 47
End: 2017-11-15

## 2017-11-15 VITALS
HEIGHT: 67 IN | BODY MASS INDEX: 32.04 KG/M2 | DIASTOLIC BLOOD PRESSURE: 84 MMHG | HEART RATE: 106 BPM | RESPIRATION RATE: 18 BRPM | WEIGHT: 204.15 LBS | TEMPERATURE: 98.8 F | SYSTOLIC BLOOD PRESSURE: 130 MMHG

## 2017-11-15 DIAGNOSIS — C80.1 METASTASIS TO BONE OF UNKNOWN PRIMARY (HCC): ICD-10-CM

## 2017-11-15 DIAGNOSIS — C50.911 MALIGNANT NEOPLASM OF RIGHT BREAST IN FEMALE, ESTROGEN RECEPTOR POSITIVE, UNSPECIFIED SITE OF BREAST (HCC): ICD-10-CM

## 2017-11-15 DIAGNOSIS — Z17.0 MALIGNANT NEOPLASM OF RIGHT BREAST IN FEMALE, ESTROGEN RECEPTOR POSITIVE, UNSPECIFIED SITE OF BREAST (HCC): ICD-10-CM

## 2017-11-15 DIAGNOSIS — C79.31 METASTASIS TO BRAIN (HCC): Primary | ICD-10-CM

## 2017-11-15 DIAGNOSIS — C79.9 METASTATIC DISEASE (HCC): ICD-10-CM

## 2017-11-15 DIAGNOSIS — IMO0001 OTHER COMPLICATION DUE TO VENOUS ACCESS DEVICE, SUBSEQUENT ENCOUNTER: ICD-10-CM

## 2017-11-15 DIAGNOSIS — I82.290 BRACHIOCEPHALIC VEIN THROMBOSIS (HCC): ICD-10-CM

## 2017-11-15 DIAGNOSIS — C79.51 METASTASIS TO BONE OF UNKNOWN PRIMARY (HCC): Primary | ICD-10-CM

## 2017-11-15 DIAGNOSIS — C79.51 METASTASIS TO BONE OF UNKNOWN PRIMARY (HCC): ICD-10-CM

## 2017-11-15 DIAGNOSIS — I82.C11 ACUTE THROMBOSIS OF RIGHT INTERNAL JUGULAR VEIN (HCC): ICD-10-CM

## 2017-11-15 DIAGNOSIS — Z79.01 LONG-TERM (CURRENT) USE OF ANTICOAGULANTS: ICD-10-CM

## 2017-11-15 DIAGNOSIS — Z45.2 ENCOUNTER FOR ADJUSTMENT OR MANAGEMENT OF VASCULAR ACCESS DEVICE: ICD-10-CM

## 2017-11-15 DIAGNOSIS — C80.1 METASTASIS TO BONE OF UNKNOWN PRIMARY (HCC): Primary | ICD-10-CM

## 2017-11-15 DIAGNOSIS — Z45.2 ENCOUNTER FOR VENOUS ACCESS DEVICE CARE: ICD-10-CM

## 2017-11-15 DIAGNOSIS — R79.89 ELEVATED LIVER FUNCTION TESTS: ICD-10-CM

## 2017-11-15 DIAGNOSIS — M54.50 BILATERAL LOW BACK PAIN WITHOUT SCIATICA, UNSPECIFIED CHRONICITY: ICD-10-CM

## 2017-11-15 LAB
ALBUMIN SERPL-MCNC: 3.7 G/DL (ref 3.4–4.8)
ALBUMIN/GLOB SERPL: 1.3 G/DL (ref 1.1–1.8)
ALP SERPL-CCNC: 109 U/L (ref 38–126)
ALT SERPL W P-5'-P-CCNC: 56 U/L (ref 9–52)
ANION GAP SERPL CALCULATED.3IONS-SCNC: 14 MMOL/L (ref 5–15)
AST SERPL-CCNC: 33 U/L (ref 14–36)
BASOPHILS # BLD AUTO: 0 10*3/MM3 (ref 0–0.2)
BASOPHILS NFR BLD AUTO: 0 % (ref 0–2)
BILIRUB SERPL-MCNC: 0.2 MG/DL (ref 0.2–1.3)
BUN BLD-MCNC: 11 MG/DL (ref 7–21)
BUN/CREAT SERPL: 17.2 (ref 7–25)
CALCIUM SPEC-SCNC: 9.3 MG/DL (ref 8.4–10.2)
CHLORIDE SERPL-SCNC: 108 MMOL/L (ref 95–110)
CO2 SERPL-SCNC: 20 MMOL/L (ref 22–31)
CREAT BLD-MCNC: 0.64 MG/DL (ref 0.5–1)
DEPRECATED RDW RBC AUTO: 55.2 FL (ref 36.4–46.3)
EOSINOPHIL # BLD AUTO: 0 10*3/MM3 (ref 0–0.7)
EOSINOPHIL NFR BLD AUTO: 0 % (ref 0–7)
ERYTHROCYTE [DISTWIDTH] IN BLOOD BY AUTOMATED COUNT: 16.6 % (ref 11.5–14.5)
GFR SERPL CREATININE-BSD FRML MDRD: 99 ML/MIN/1.73 (ref 58–135)
GLOBULIN UR ELPH-MCNC: 2.9 GM/DL (ref 2.3–3.5)
GLUCOSE BLD-MCNC: 159 MG/DL (ref 60–100)
HCT VFR BLD AUTO: 31.9 % (ref 35–45)
HGB BLD-MCNC: 10.5 G/DL (ref 12–15.5)
IMM GRANULOCYTES # BLD: 0.04 10*3/MM3 (ref 0–0.02)
IMM GRANULOCYTES NFR BLD: 0.6 % (ref 0–0.5)
INR PPP: 1.52
INR PPP: 1.52 (ref 0.8–1.2)
LYMPHOCYTES # BLD AUTO: 0.28 10*3/MM3 (ref 0.6–4.2)
LYMPHOCYTES NFR BLD AUTO: 4.1 % (ref 10–50)
MCH RBC QN AUTO: 29.8 PG (ref 26.5–34)
MCHC RBC AUTO-ENTMCNC: 32.9 G/DL (ref 31.4–36)
MCV RBC AUTO: 90.6 FL (ref 80–98)
MONOCYTES # BLD AUTO: 0.49 10*3/MM3 (ref 0–0.9)
MONOCYTES NFR BLD AUTO: 7.2 % (ref 0–12)
NEUTROPHILS # BLD AUTO: 5.96 10*3/MM3 (ref 2–8.6)
NEUTROPHILS NFR BLD AUTO: 88.1 % (ref 37–80)
PLATELET # BLD AUTO: 276 10*3/MM3 (ref 150–450)
PMV BLD AUTO: 9.8 FL (ref 8–12)
POTASSIUM BLD-SCNC: 3.6 MMOL/L (ref 3.5–5.1)
PROT SERPL-MCNC: 6.6 G/DL (ref 6.3–8.6)
PROTHROMBIN TIME: 18.3 SECONDS (ref 11.1–15.3)
RBC # BLD AUTO: 3.52 10*6/MM3 (ref 3.77–5.16)
SODIUM BLD-SCNC: 142 MMOL/L (ref 137–145)
WBC NRBC COR # BLD: 6.77 10*3/MM3 (ref 3.2–9.8)

## 2017-11-15 PROCEDURE — 85025 COMPLETE CBC W/AUTO DIFF WBC: CPT

## 2017-11-15 PROCEDURE — 86300 IMMUNOASSAY TUMOR CA 15-3: CPT

## 2017-11-15 PROCEDURE — 25010000002 CYCLOPHOSPHAMIDE PER 100 MG: Performed by: INTERNAL MEDICINE

## 2017-11-15 PROCEDURE — 25010000002 ALTEPLASE 2 MG RECONSTITUTED SOLUTION: Performed by: INTERNAL MEDICINE

## 2017-11-15 PROCEDURE — 25010000002 DIPHENHYDRAMINE PER 50 MG: Performed by: INTERNAL MEDICINE

## 2017-11-15 PROCEDURE — 25010000002 PEGFILGRASTIM 6 MG/0.6ML PREFILLED SYRINGE KIT: Performed by: INTERNAL MEDICINE

## 2017-11-15 PROCEDURE — 36593 DECLOT VASCULAR DEVICE: CPT | Performed by: INTERNAL MEDICINE

## 2017-11-15 PROCEDURE — 96377 APPLICATON ON-BODY INJECTOR: CPT | Performed by: INTERNAL MEDICINE

## 2017-11-15 PROCEDURE — 80053 COMPREHEN METABOLIC PANEL: CPT

## 2017-11-15 PROCEDURE — 36415 COLL VENOUS BLD VENIPUNCTURE: CPT | Performed by: INTERNAL MEDICINE

## 2017-11-15 PROCEDURE — 96413 CHEMO IV INFUSION 1 HR: CPT | Performed by: INTERNAL MEDICINE

## 2017-11-15 PROCEDURE — 25010000002 PALONOSETRON PER 25 MCG: Performed by: INTERNAL MEDICINE

## 2017-11-15 PROCEDURE — 96417 CHEMO IV INFUS EACH ADDL SEQ: CPT | Performed by: INTERNAL MEDICINE

## 2017-11-15 PROCEDURE — 99215 OFFICE O/P EST HI 40 MIN: CPT | Performed by: INTERNAL MEDICINE

## 2017-11-15 PROCEDURE — 85610 PROTHROMBIN TIME: CPT

## 2017-11-15 PROCEDURE — 36415 COLL VENOUS BLD VENIPUNCTURE: CPT

## 2017-11-15 PROCEDURE — 25010000002 DOCETAXEL 80 MG/4ML CONCENTRATION 4 ML VIAL: Performed by: INTERNAL MEDICINE

## 2017-11-15 PROCEDURE — 25010000002 HEPARIN FLUSH (PORCINE) 100 UNIT/ML SOLUTION: Performed by: INTERNAL MEDICINE

## 2017-11-15 PROCEDURE — 25010000003 DEXAMETHASONE SODIUM PHOSPHATE 100 MG/10ML SOLUTION 10 ML VIAL: Performed by: INTERNAL MEDICINE

## 2017-11-15 PROCEDURE — 96375 TX/PRO/DX INJ NEW DRUG ADDON: CPT | Performed by: INTERNAL MEDICINE

## 2017-11-15 RX ORDER — SODIUM CHLORIDE 0.9 % (FLUSH) 0.9 %
10 SYRINGE (ML) INJECTION AS NEEDED
Status: CANCELLED | OUTPATIENT
Start: 2017-11-15

## 2017-11-15 RX ORDER — FAMOTIDINE 10 MG/ML
20 INJECTION, SOLUTION INTRAVENOUS ONCE
Status: COMPLETED | OUTPATIENT
Start: 2017-11-15 | End: 2017-11-15

## 2017-11-15 RX ORDER — SODIUM CHLORIDE 0.9 % (FLUSH) 0.9 %
10 SYRINGE (ML) INJECTION AS NEEDED
Status: DISCONTINUED | OUTPATIENT
Start: 2017-11-15 | End: 2017-11-15 | Stop reason: HOSPADM

## 2017-11-15 RX ORDER — SODIUM CHLORIDE 9 MG/ML
250 INJECTION, SOLUTION INTRAVENOUS ONCE
Status: CANCELLED | OUTPATIENT
Start: 2017-11-15

## 2017-11-15 RX ORDER — SCOLOPAMINE TRANSDERMAL SYSTEM 1 MG/1
1 PATCH, EXTENDED RELEASE TRANSDERMAL
Qty: 10 PATCH | Refills: 2 | Status: SHIPPED | OUTPATIENT
Start: 2017-11-15 | End: 2018-03-30 | Stop reason: SDUPTHER

## 2017-11-15 RX ORDER — FAMOTIDINE 10 MG/ML
20 INJECTION, SOLUTION INTRAVENOUS ONCE
Status: CANCELLED | OUTPATIENT
Start: 2017-11-15

## 2017-11-15 RX ORDER — SODIUM CHLORIDE 9 MG/ML
250 INJECTION, SOLUTION INTRAVENOUS ONCE
Status: COMPLETED | OUTPATIENT
Start: 2017-11-15 | End: 2017-11-15

## 2017-11-15 RX ORDER — WARFARIN SODIUM 5 MG/1
TABLET ORAL
Refills: 0 | COMMUNITY
Start: 2017-11-10 | End: 2017-11-17 | Stop reason: SDUPTHER

## 2017-11-15 RX ORDER — PALONOSETRON 0.05 MG/ML
0.25 INJECTION, SOLUTION INTRAVENOUS ONCE
Status: CANCELLED | OUTPATIENT
Start: 2017-11-15

## 2017-11-15 RX ORDER — PALONOSETRON 0.05 MG/ML
0.25 INJECTION, SOLUTION INTRAVENOUS ONCE
Status: COMPLETED | OUTPATIENT
Start: 2017-11-15 | End: 2017-11-15

## 2017-11-15 RX ADMIN — PEGFILGRASTIM 6 MG: KIT SUBCUTANEOUS at 13:45

## 2017-11-15 RX ADMIN — ALTEPLASE 2 MG: 2.2 INJECTION, POWDER, LYOPHILIZED, FOR SOLUTION INTRAVENOUS at 09:10

## 2017-11-15 RX ADMIN — FAMOTIDINE 20 MG: 10 INJECTION, SOLUTION INTRAVENOUS at 10:21

## 2017-11-15 RX ADMIN — SODIUM CHLORIDE, PRESERVATIVE FREE 500 UNITS: 5 INJECTION INTRAVENOUS at 13:44

## 2017-11-15 RX ADMIN — CYCLOPHOSPHAMIDE 1190 MG: 1 INJECTION, POWDER, FOR SOLUTION INTRAVENOUS; ORAL at 13:01

## 2017-11-15 RX ADMIN — DIPHENHYDRAMINE HYDROCHLORIDE 25 MG: 50 INJECTION INTRAMUSCULAR; INTRAVENOUS at 10:34

## 2017-11-15 RX ADMIN — DOCETAXEL 150 MG: 80 INJECTION, SOLUTION, CONCENTRATE INTRAVENOUS at 11:32

## 2017-11-15 RX ADMIN — Medication 10 ML: at 13:44

## 2017-11-15 RX ADMIN — SODIUM CHLORIDE 250 ML: 9 INJECTION, SOLUTION INTRAVENOUS at 10:17

## 2017-11-15 RX ADMIN — Medication 10 ML: at 08:47

## 2017-11-15 RX ADMIN — PALONOSETRON HYDROCHLORIDE 0.25 MG: 0.25 INJECTION INTRAVENOUS at 10:18

## 2017-11-15 RX ADMIN — DEXAMETHASONE SODIUM PHOSPHATE 10 MG: 10 INJECTION, SOLUTION INTRAMUSCULAR; INTRAVENOUS at 11:05

## 2017-11-15 NOTE — PROGRESS NOTES
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  Started on palliative chemotherapy with Taxotere and Cytoxan on July 26, 2017.  She is here to get cycle 6 of chemotherapy today.  She had a restaging CT of chest, abdomen and pelvis done last week on which there was a filling defect on brachiocephalic vein.  Subsequently patient had ultrasound of right upper extremity which showed internal jugular vein thrombosis for which she is currently taking Lovenox along with Coumadin.  Denies any active bleeding.  Complains of tingling and numbness affecting bilateral hand and feet.  Denies any new headache or dizziness.      PAST MEDICAL HISTORY:    Past Medical History:   Diagnosis Date   • Anxiety    • Depression    • Encounter for gynecological examination    • Malignant neoplasm of female breast     hx of dcis s/p mastectomy and reconstruction and augmentation      • Primary malignant neoplasm of breast     dcis in rt breast s/p mastectomy,reconstruction      • Ventricular premature beats        SOCIAL HISTORY:    Social History   Substance Use Topics   • Smoking status: Never Smoker   • Smokeless tobacco: Never Used   • Alcohol use No       Surgical History :  Past Surgical History:   Procedure Laterality Date   • AUGMENTATION MAMMAPLASTY     • AXILLARY LYMPH NODE BIOPSY/EXCISION Right 7/10/2017    Procedure: BIOSPY RIGHT AXILLARY MASS AND OR LYMPH;  Surgeon: Sourav Ernst MD;  Location: [facility];  Service:    • BREAST BIOPSY  12/07/2007    Mammotome biopsy of the right side with clip placement   • BREAST LUMPECTOMY     • BREAST RECONSTRUCTION  10/28/2008    Abscence of right breast secondary to mastectomy.  Implant exchange for reconstruction of right breast with open para-prosthetic capsulotomy   • BREAST SURGERY  10/01/2009    Left breast ptosis and breast asymmetry secondary to right breast reconstruction for breast cancer. Left breast mastopexy with augmentation.   • CARDIAC CATHETERIZATION  09/18/2008    Normal coronary arteriography. Normal left ventricular angiogram   • EP STUDY     • MASTECTOMY Right    • MASTECTOMY  02/05/2008    Multifocal right breast ductal carcinoma-in-situ. Right total mastectomy   • MASTECTOMY, PARTIAL  01/03/2008    Ductal carcinoma in-situ of the right breast, proven by mammotome biopsy. Right lumpectomy, medial portion of the breast, between 2 o'clock and 4 o'clock, 5 cm from the nipple, with clip placement, radiographic inter. of severo. specimen, & ultrasound   • PAP SMEAR  03/03/2009    Negative   • [place] INSJ TUNNELED CVC W/O SUBQ PORT/ AGE 5 YR/> Right 7/10/2017    Procedure: MEDIPORT     (c-arm#2);  Surgeon: Sourav Ernst MD;  Location: [facility];  Service: General       ALLERGIES:    Allergies   Allergen Reactions   • Percocet [Oxycodone-Acetaminophen] Nausea And Vomiting       FAMILY HISTORY:  Family History   Problem Relation Age of Onset   • Anemia Mother    • Multiple sclerosis Mother    • No Known Problems Father    • Diabetes Other    • Multiple sclerosis Other        REVIEW OF SYSTEMS:      CONSTITUTIONAL: Complains of fatigue.  Denies any fever, chills .      HEENT: No epistaxis, mouth sores or difficulty swallowing.     RESPIRATORY: No new shortness of breath. No new cough or hemoptysis.     CARDIOVASCULAR: No chest pain or palpitations.     GASTROINTESTINAL:  states scopolamine patch is helping her with nausea.  Positive for abdominal pain in epigastric region. No blood in the stool.     GENITOURINARY: No Dysuria or Hematuria.     MUSCULOSKELETAL: Complains of pain in bilateral hip.  Complains of back pain.     LYMPHATICS: States drainage from right axilla is  "improved .     NEUROLOGICAL : No tingling or numbness. No headache or dizziness. No seizures or balance problems.     SKIN: Positive for history of melanoma status post surgical removal. Denies any new skin lesion worrisome for melanoma.              PHYSICAL EXAMINATION:      VITAL SIGNS:  /84  Pulse 106  Temp 98.8 °F (37.1 °C) (Temporal Artery )   Resp 18  Ht 67.01\" (170.2 cm)  Wt 204 lb 2.3 oz (92.6 kg)  BMI 31.97 kg/m2    GENERAL:  Not in any distress.    HEENT:  Normocephalic, Atraumatic.Mild Conjunctival pallor. No icterus. Extraocular Movements Intact. No Facial Asymmetry noted.    NECK:  No adenopathy. No JVD.    RESPIRATORY:  Fair air entry bilateral. No rhonchi or wheezing.    CARDIOVASCULAR:  S1, S2. Regular rate and rhythm. No murmur or gallop appreciated.    ABDOMEN:  Soft, obese, nontender. Bowel sounds present in all four quadrants.  No organomegaly appreciated.    EXTREMITIES:  No edema.No Calf Tenderness.    NEUROLOGIC:  Alert, awake and oriented ×3.  No  Motor or sensory deficit appreciated. Cranial Nerves 2-12 grossly intact.      DIAGNOSTIC DATA:    Glucose   Date Value Ref Range Status   11/15/2017 159 (H) 60 - 100 mg/dL Final     Sodium   Date Value Ref Range Status   11/15/2017 142 137 - 145 mmol/L Final     Potassium   Date Value Ref Range Status   11/15/2017 3.6 3.5 - 5.1 mmol/L Final     CO2   Date Value Ref Range Status   11/15/2017 20.0 (L) 22.0 - 31.0 mmol/L Final     Chloride   Date Value Ref Range Status   11/15/2017 108 95 - 110 mmol/L Final     Anion Gap   Date Value Ref Range Status   11/15/2017 14.0 5.0 - 15.0 mmol/L Final     Creatinine   Date Value Ref Range Status   11/15/2017 0.64 0.50 - 1.00 mg/dL Final     BUN   Date Value Ref Range Status   11/15/2017 11 7 - 21 mg/dL Final     BUN/Creatinine Ratio   Date Value Ref Range Status   11/15/2017 17.2 7.0 - 25.0 Final     Calcium   Date Value Ref Range Status   11/15/2017 9.3 8.4 - 10.2 mg/dL Final     eGFR Non African " Amer   Date Value Ref Range Status   11/15/2017 99 58 - 135 mL/min/1.73 Final     Alkaline Phosphatase   Date Value Ref Range Status   11/15/2017 109 38 - 126 U/L Final     Total Protein   Date Value Ref Range Status   11/15/2017 6.6 6.3 - 8.6 g/dL Final     ALT (SGPT)   Date Value Ref Range Status   11/15/2017 56 (H) 9 - 52 U/L Final     AST (SGOT)   Date Value Ref Range Status   11/15/2017 33 14 - 36 U/L Final     Total Bilirubin   Date Value Ref Range Status   11/15/2017 0.2 0.2 - 1.3 mg/dL Final     Albumin   Date Value Ref Range Status   11/15/2017 3.70 3.40 - 4.80 g/dL Final     Globulin   Date Value Ref Range Status   11/15/2017 2.9 2.3 - 3.5 gm/dL Final     A/G Ratio   Date Value Ref Range Status   11/15/2017 1.3 1.1 - 1.8 g/dL Final     Lab Results   Component Value Date    WBC 6.77 11/15/2017    HGB 10.5 (L) 11/15/2017    HCT 31.9 (L) 11/15/2017    MCV 90.6 11/15/2017     11/15/2017     Lab Results   Component Value Date    NEUTROABS 5.96 11/15/2017     Lab Results   Component Value Date     162.2 (H) 10/25/2017    LABCA2 181.2 (H) 10/25/2017     2 D Echo Done on July 19, 2017 showed:  Interpretation Summary   · The left ventricular cavity is borderline dilated.  · Left ventricular systolic function is normal. Estimated EF = 53%.  · Left ventricular diastolic dysfunction (grade I) consistent with impaired relaxation.  · IAS aneurysm noted. No PFO or ASD           Radiology Data :  Doppler ultrasound of right upper extremity done on November 10, 2017 showed:  IMPRESSION:  CONCLUSION:   Abnormal exam with thrombus visualized in the right internal  jugular vein, consistent with DVT.    MRI of brain with and without contrast done on November 9, 2017 was reviewed with the radiologist, it showed:  IMPRESSION:  CONCLUSION:  At least six calvarial metastases, largest 1.6 cm residing in the  left parietal bone.  No brain metastases demonstrated.    CT of chest, abdomen and pelvis with contrast done on  November 9, 2017 showed:  IMPRESSION:  1. Partial filling defect involving the right brachiocephalic  vein suspicious for thrombus in this region.  2. Stable left lobe of liver metastatic disease.  3. Stable L4 vertebral body pathologic compression fracture.  There is loss of diffuse L4 vertebral body height by 70%. Diffuse  thoracic and lumbar spine stable osteoblastic and osteolytic  lesions consistent with metastatic disease.          X-ray of left hip done on September 7, 2017 showed:  Hyperlucency of the inferior pubic rami and the inferior aspect of obturator foramen consistent with osteolytic metastasis.    PET/CT done on July 3, 2017 showed:  IMPRESSION:  CONCLUSION:  1. Extensive metastatic disease. Pathologic uptake within  enlarged right-sided axillary lymph nodes. Metastatic adenopathy  in both laurie and mediastinum. Tumor replacing most of the left  lobe of liver. Intra-abdominal retroperitoneal metastases,  adenopathy.     Extensive skeletal metastases including a pathologic fracture at  L4.        Nuclear bone scan done on June 20, 2017 showed:  1. Increased uptake at L4 suggestive of metastatic process.  2. 3 or 4 areas of increased activity in bony calvarium  3. Some increased activity in tips posteriorly and anteriorly suggestive of metastatic carcinoma to the skeletal system.           Pathology :    Pathology data from July 10, 2017 showed:  Final Diagnosis   Mass right axilla, excisional biopsy:      Metastatic poorly differentiated ductal carcinoma, breast primary      Estrogen receptor positive, 95% stainability, strong intensity      Progesterone receptor positive, 95% stainability, strong intensity      HER-2 2+(equivocal), sent to [facility] for FISH     Addendum   Her 2 FISH result from [facility] is NEGATIVE         Pathology report from December 7, 2007 showed:  FINAL DIAGNOSIS:   RIGHT BREAST MAMMOTOME BIOPSY:        DUCTAL CARCINOMA IN SITU, INTERMEDIATE NUCLEAR GRADE               WITH MICROCALCIFICATION.      ADDENDUM:   Estrogen receptors are positive (90-95% stainability).   Progesterone receptors are positive (90-95% stainability).         ASSESSMENT AND PLAN:      1.  Metastatic cancer with extensive adenopathy in right axilla, mediastinum, hilar, abdominal, retroperitoneal with extensive liver and bone metastasis on PET/CT.  Patient underwent right apatient underwent right axillary lymph node excision by Dr. Ernst on July 10, 2017.Pathology report confirmed ductal carcinoma of breast with estrogen and progesterone positivity.  At her on palliative chemotherapy with Taxotere and cytoxan on July 26, 2017.   Patient was  seen at [facility] for second opinion regarding her breast cancer.  Case was discussed with Dr vazquez oncologist that has seen patient at [facility].  She agreed with her chemotherapy at this point.  In future if he get her disease under control will change her to hormonal therapy with Palbociclib.  CT of chest, abdomen and pelvis with contrast done on November 9, 2017 shows stable disease involving liver.  Plan is to give HER-2 more grams of chemotherapy including one today.  After 7 cycles of chemotherapy we will do restaging CT of chest, abdomen and pelvis with contrast, after that we will change her therapy to letrozole with Palbociclib which was discussed with patient and her family today.    2.  Brain metastasis: Iopidine with and without done on November 9, 2017 was reviewed and compared with a weight MRI done 6 weeks ago.  Brain calvarial metastasis are stable or somewhat improved.  No need to do radiation at this point which was discussed with patient.    3.  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  Patient has been started on Lovenox 60 mg twice daily along with Coumadin 5 mg on November 11, 2017.  Her INR is 1.54 today.  She has an appointment with Coumadin clinic upcoming Friday.  She was encouraged  to keep that appointment.  She was instructed to come to the emergency room in case she starts having any bleeding.    4.  History of melanoma in situ status post excision by Dr. Linn.    5.  Bone metastasis: Patient was started on Zometa on August 23, 2017.  Continue with Zometa as scheduled for now.  Patient denies any mouth sores or jaw pain.  CT scan shows about loss of 70% of height of L4 vertebral body, patient has been referred to Dr. Pugh to get evaluated for kyphoplasty.     6.  History of ductal carcinoma in situ of the right breast diagnosed in 2007.  Status post mastectomy followed by adjuvant tamoxifen for 5 years which was finished in 2013.    7.  Elevated  liver function tests secondary to liver metastases:   8.  Health maintenance: Patient does not smoke.  Not a candidate for screening colonoscopy at this point.  Remains full code.    9.  Prescriptions: Patient has enough prescription of Decadron, Zofran, scopolamine and Ultram.        Ovidio Meadows MD  11/15/2017  5:30 PM        EMR Dragon/Transcription disclaimer:   Much of this encounter note is an electronic transcription/translation of spoken language to printed text. The electronic translation of spoken language may permit erroneous, or at times, nonsensical words or phrases to be inadvertently transcribed; Although I have reviewed the note for such errors, some may still exist.

## 2017-11-15 NOTE — PROGRESS NOTES
Pt's INR drawn today in [facility] while receiving chemo.  Pt started coumadin 5mg daily last Saturday along with lovenox 60mg twice daily.  I spoke to MALGORZATA Hilario from [facility] over the phone.  Gave Vijaya verbal coumadin dosing instructions and for pt to continue lovenox injections twice daily.  I also instructed for pt to limit green intake for now until INR rechecked this Friday the 17th in the coumadin clinic.  Pt will be admitted for CC services and receive teaching on Friday as well.  Instructions verbalized.  I did fax a copy of pt's coumadin dosing schedule,  Vijaya received this and gave to pt.          This document has been electronically signed by EVELYN Brewster on November 15, 2017 1:11 PM

## 2017-11-16 LAB
CANCER AG15-3 SERPL-ACNC: 118 U/ML (ref 0–25)
CANCER AG27-29 SERPL-ACNC: 112.2 U/ML (ref 0–38.6)

## 2017-11-17 ENCOUNTER — ANTICOAGULATION VISIT (OUTPATIENT)
Dept: CARDIAC SURGERY | Facility: CLINIC | Age: 47
End: 2017-11-17

## 2017-11-17 VITALS — DIASTOLIC BLOOD PRESSURE: 71 MMHG | HEART RATE: 101 BPM | SYSTOLIC BLOOD PRESSURE: 114 MMHG

## 2017-11-17 DIAGNOSIS — Z79.01 LONG-TERM (CURRENT) USE OF ANTICOAGULANTS: ICD-10-CM

## 2017-11-17 DIAGNOSIS — I82.C11 ACUTE THROMBOSIS OF RIGHT INTERNAL JUGULAR VEIN (HCC): ICD-10-CM

## 2017-11-17 DIAGNOSIS — M54.5 LOW BACK PAIN, UNSPECIFIED BACK PAIN LATERALITY, UNSPECIFIED CHRONICITY, WITH SCIATICA PRESENCE UNSPECIFIED: Primary | ICD-10-CM

## 2017-11-17 LAB — INR PPP: 3 (ref 0.9–1.1)

## 2017-11-17 PROCEDURE — 85610 PROTHROMBIN TIME: CPT | Performed by: NURSE PRACTITIONER

## 2017-11-17 PROCEDURE — 99211 OFF/OP EST MAY X REQ PHY/QHP: CPT | Performed by: NURSE PRACTITIONER

## 2017-11-17 RX ORDER — WARFARIN SODIUM 5 MG/1
5 TABLET ORAL
Qty: 30 TABLET | Refills: 1 | Status: SHIPPED | OUTPATIENT
Start: 2017-11-17 | End: 2017-12-27 | Stop reason: SDUPTHER

## 2017-11-17 NOTE — PROGRESS NOTES
Pt here today for enrollment in the Coumadin Clinic. Due to rapid rise in INR pt was instructed to stop Lovenox, dose was decreased, and pt instructed to have a large salad or serving of broccoli or turnip greens today; pt verbalized. Pt is on a decreasing steroid schedule she follows after chemo for nausea. Pt is currently taking 1 pill a day and will take for 5 days then stop. Pt states she has chemo every 3 weeks and her last treatment was 11/15. Pt is given IV steroids with chemo and one of the drugs Cytoxan shows a moderate interaction with Coumadin which would explain rapid rise in INR. Pt was educated regarding rationale for Coumadin therapy, mechanism of action, side effects, importance of compliance, to take coumadin in the evening or bedtime, food and drug interactions, to notify CC with any medication changes, to notify CC if she were instructed to hold coumadin for any reason, visit frequency for monitoring of INR, and importance of having only one provider manage Coumadin. Pt was encouraged to purchase a medic alert bracelet. Pt was given Coumadin Clinic enrollment booklet and above highlights were discussed; pt verbalized an understanding. Pt was encouraged to call CC with any questions and pt provided with office phone number and business hours.        This document has been electronically signed by EVELYN Brewster on November 17, 2017 1:05 PM

## 2017-11-20 ENCOUNTER — OFFICE VISIT (OUTPATIENT)
Dept: ORTHOPEDIC SURGERY | Facility: CLINIC | Age: 47
End: 2017-11-20

## 2017-11-20 ENCOUNTER — ANTICOAGULATION VISIT (OUTPATIENT)
Dept: CARDIAC SURGERY | Facility: CLINIC | Age: 47
End: 2017-11-20

## 2017-11-20 VITALS — HEIGHT: 67 IN | WEIGHT: 204 LBS | BODY MASS INDEX: 32.02 KG/M2

## 2017-11-20 DIAGNOSIS — I82.890 SPLENIC VEIN THROMBOSIS: Primary | ICD-10-CM

## 2017-11-20 DIAGNOSIS — IMO0001 VERTEBRAL COMPRESSION FRACTURE, INITIAL ENCOUNTER: ICD-10-CM

## 2017-11-20 DIAGNOSIS — C79.9 METASTATIC DISEASE (HCC): ICD-10-CM

## 2017-11-20 DIAGNOSIS — M54.50 ACUTE BILATERAL LOW BACK PAIN WITHOUT SCIATICA: ICD-10-CM

## 2017-11-20 DIAGNOSIS — I82.C11 ACUTE THROMBOSIS OF RIGHT INTERNAL JUGULAR VEIN (HCC): ICD-10-CM

## 2017-11-20 DIAGNOSIS — Z79.01 LONG-TERM (CURRENT) USE OF ANTICOAGULANTS: ICD-10-CM

## 2017-11-20 DIAGNOSIS — C79.31 METASTASIS TO BRAIN (HCC): ICD-10-CM

## 2017-11-20 DIAGNOSIS — C80.1 METASTASIS TO BONE OF UNKNOWN PRIMARY (HCC): Primary | ICD-10-CM

## 2017-11-20 DIAGNOSIS — C79.51 METASTASIS TO BONE OF UNKNOWN PRIMARY (HCC): Primary | ICD-10-CM

## 2017-11-20 LAB — INR PPP: 1.6 (ref 0.9–1.1)

## 2017-11-20 PROCEDURE — 99211 OFF/OP EST MAY X REQ PHY/QHP: CPT | Performed by: NURSE PRACTITIONER

## 2017-11-20 PROCEDURE — 85610 PROTHROMBIN TIME: CPT | Performed by: NURSE PRACTITIONER

## 2017-11-20 PROCEDURE — 99204 OFFICE O/P NEW MOD 45 MIN: CPT | Performed by: ORTHOPAEDIC SURGERY

## 2017-11-20 NOTE — PROGRESS NOTES
PT is only taking steroids daily and will finish on Wednesday. PT denies any new medications or bleeding issues. PT denies any further s/s of blood clot. PT denies any missed doses or excessive k. PT states she does not have Lovenox at home and now has to pay out of pocket for it. Adjusted pt's dose and instructed to hold green vegs for 2 days. PT will be seen in CC on Wednesday when she returns for Zometa. Patient instructed regarding medication; results given and questions answered. Nutritional counseling given.  Dietary factors affecting therapy addressed.  Patient instructed to monitor for excessive bruising or bleeding. PT verbalizes understanding.   Electronically signed by EVELYN Matute

## 2017-11-20 NOTE — PROGRESS NOTES
Kylie García is a 47 y.o. female   Primary provider:  Tobi Pugh MD       Chief Complaint   Patient presents with   • Lumbar Spine - Pain   x-rays done formerly [facility]    HISTORY OF PRESENT ILLNESS: low back pain pain scale today 0/10     Pain   This is a new problem. The current episode started more than 1 month ago. The problem occurs constantly. The problem has been unchanged. Associated symptoms include fatigue, nausea and vomiting. Pertinent negatives include no abdominal pain, chest pain, chills, congestion, coughing, diaphoresis, fever, headaches, joint swelling, neck pain, numbness, rash or weakness. Associated symptoms comments: Lumbar pain  .     She has some back pain, was diagnosed with recurrence of breast cancer,.. States she is presently getting chemotherapy.  no radiation to date.       CONCURRENT MEDICAL HISTORY:    Past Medical History:   Diagnosis Date   • Anxiety    • Depression    • Encounter for gynecological examination    • Malignant neoplasm of female breast     hx of dcis s/p mastectomy and reconstruction and augmentation      • Primary malignant neoplasm of breast     dcis in rt breast s/p mastectomy,reconstruction      • Ventricular premature beats        Allergies   Allergen Reactions   • Percocet [Oxycodone-Acetaminophen] Nausea And Vomiting         Current Outpatient Prescriptions:   •  Calcium Carbonate-Vitamin D (CALTRATE 600+D PO), Take  by mouth Daily., Disp: , Rfl:   •  dexamethasone (DECADRON) 4 MG tablet, Take 1 tablet by mouth Daily With Breakfast., Disp: 15 tablet, Rfl: 2  •  dexamethasone (DECADRON) 4 MG tablet, Take 2 tablets oral twice a day for 3 consecutive days beginning the day before chemotherapy and continue for 6 doses., Disp: 12 tablet, Rfl: 3  •  Docusate Sodium (COLACE PO), Take  by mouth Daily., Disp: , Rfl:   •  enoxaparin (LOVENOX) 60 MG/0.6ML solution syringe, INJECT CONTENTS OF 1 SYRINGE TWICE DAILY FOR 7 DAYS, Disp: , Rfl: 0  •  escitalopram  (LEXAPRO) 10 MG tablet, Take 15 mg by mouth Daily., Disp: , Rfl:   •  Multiple Vitamins-Minerals (MULTIVITAMIN WITH MINERALS) tablet tablet, Take 1 tablet by mouth Daily., Disp: , Rfl:   •  naproxen sodium (ALEVE) 220 MG tablet, Take 220 mg by mouth As Needed for Mild Pain  (2 tabs in the morning one pm)., Disp: , Rfl:   •  ondansetron (ZOFRAN) 4 MG tablet, Take 2 tablets by mouth 4 (Four) Times a Day As Needed for Nausea or Vomiting. (Patient taking differently: Take 8 mg by mouth As Needed for Nausea or Vomiting.), Disp: 40 tablet, Rfl: 3  •  Polyethylene Glycol 3350 (MIRALAX PO), Take 17 g by mouth As Needed., Disp: , Rfl:   •  prochlorperazine (COMPAZINE) 10 MG tablet, Take 1 tablet by mouth As Needed., Disp: , Rfl:   •  Scopolamine (TRANSDERM-SCOP, 1.5 MG,) 1.5 MG/3DAYS patch, Place 1 patch on the skin Every 72 (Seventy-Two) Hours., Disp: 10 patch, Rfl: 2  •  traMADol (ULTRAM) 50 MG tablet, Take 1 tablet by mouth Every 8 (Eight) Hours As Needed for Moderate Pain ., Disp: 90 tablet, Rfl: 0  •  warfarin (COUMADIN) 5 MG tablet, Take 1 tablet by mouth Daily. Take 1 tablet nightly or as directed, Disp: 30 tablet, Rfl: 1  No current facility-administered medications for this visit.     Facility-Administered Medications Ordered in Other Visits:   •  alteplase ((CATHFLO/ACTIVASE)) injection 2 mg, 2 mg, Intravenous, Once, Ovidio Meadows MD  •  heparin flush (porcine) 100 UNIT/ML injection 500 Units, 500 Units, Intravenous, PRN, Ovidio Meadows MD, 500 Units at 10/04/17 1646  •  sodium chloride 0.9 % flush 10 mL, 10 mL, Intravenous, PRN, Ovidio Meadows MD, 10 mL at 10/04/17 1646    Past Surgical History:   Procedure Laterality Date   • AUGMENTATION MAMMAPLASTY     • AXILLARY LYMPH NODE BIOPSY/EXCISION Right 7/10/2017    Procedure: BIOSPY RIGHT AXILLARY MASS AND OR LYMPH;  Surgeon: Sourav Ernst MD;  Location: [facility];  Service:    • BREAST BIOPSY  12/07/2007    Mammotome biopsy of the right side with clip placement   •  BREAST LUMPECTOMY     • BREAST RECONSTRUCTION  10/28/2008    Abscence of right breast secondary to mastectomy. Implant exchange for reconstruction of right breast with open para-prosthetic capsulotomy   • BREAST SURGERY  10/01/2009    Left breast ptosis and breast asymmetry secondary to right breast reconstruction for breast cancer. Left breast mastopexy with augmentation.   • CARDIAC CATHETERIZATION  09/18/2008    Normal coronary arteriography. Normal left ventricular angiogram   • EP STUDY     • MASTECTOMY Right    • MASTECTOMY  02/05/2008    Multifocal right breast ductal carcinoma-in-situ. Right total mastectomy   • MASTECTOMY, PARTIAL  01/03/2008    Ductal carcinoma in-situ of the right breast, proven by mammotome biopsy. Right lumpectomy, medial portion of the breast, between 2 o'clock and 4 o'clock, 5 cm from the nipple, with clip placement, radiographic inter. of severo. specimen, & ultrasound   • PAP SMEAR  03/03/2009    Negative   • MA INSJ TUNNELED CVC W/O SUBQ PORT/ AGE 3 YR/> Right 7/10/2017    Procedure: MEDIPORT     (c-arm#2);  Surgeon: Sourav Ernst MD;  Location: [facility];  Service: General       Family History   Problem Relation Age of Onset   • Anemia Mother    • Multiple sclerosis Mother    • No Known Problems Father    • Diabetes Other    • Multiple sclerosis Other         Social History     Social History   • Marital status:      Spouse name: N/A   • Number of children: N/A   • Years of education: N/A     Occupational History   • Not on file.     Social History Main Topics   • Smoking status: Never Smoker   • Smokeless tobacco: Never Used   • Alcohol use No   • Drug use: No   • Sexual activity: Defer     Other Topics Concern   • Not on file     Social History Narrative        Review of Systems   Constitutional: Positive for fatigue. Negative for appetite change, chills, diaphoresis, fever and unexpected weight change.   HENT: Negative.  Negative for congestion, dental problem,  "facial swelling, hearing loss, nosebleeds, postnasal drip, trouble swallowing and voice change.    Eyes: Positive for visual disturbance. Negative for pain, discharge, redness and itching.   Respiratory: Negative.  Negative for cough, choking, chest tightness, shortness of breath, wheezing and stridor.    Cardiovascular: Negative.  Negative for chest pain, palpitations and leg swelling.   Gastrointestinal: Positive for nausea and vomiting. Negative for abdominal pain, blood in stool, constipation and diarrhea.   Endocrine: Negative.  Negative for cold intolerance and heat intolerance.   Genitourinary: Negative.  Negative for dysuria, frequency, hematuria and urgency.   Musculoskeletal: Positive for back pain. Negative for gait problem, joint swelling, neck pain and neck stiffness.        Hip pain    Skin: Negative.  Negative for pallor and rash.   Allergic/Immunologic: Negative.    Neurological: Negative for syncope, facial asymmetry, speech difficulty, weakness, numbness and headaches.   Hematological: Negative.    Psychiatric/Behavioral: Negative.  Negative for agitation, behavioral problems, confusion, decreased concentration, dysphoric mood and hallucinations. The patient is not nervous/anxious and is not hyperactive.        PHYSICAL EXAMINATION:       Ht 67.01\" (170.2 cm)  Wt 204 lb (92.5 kg)  BMI 31.94 kg/m2    Physical Exam   Constitutional: She is oriented to person, place, and time. She appears well-developed and well-nourished.   HENT:   Head: Normocephalic.   Mouth/Throat: No oropharyngeal exudate.   Eyes: No scleral icterus.   Neck: No JVD present. No tracheal deviation present.   Cardiovascular: Normal rate and intact distal pulses.    Pulmonary/Chest: Effort normal. She has no wheezes. She has no rales.   Abdominal: Soft. She exhibits no mass. There is no tenderness.   Lymphadenopathy:     She has no cervical adenopathy.   Neurological: She is alert and oriented to person, place, and time. She " displays normal reflexes. Coordination normal.   Skin: Skin is warm and dry. No rash noted. No erythema.   Psychiatric: She has a normal mood and affect. Her behavior is normal. Thought content normal.       GAIT:     [x]  Normal  []  Antalgic    Assistive device: [x]  None  []  Walker     []  Crutches  []  Cane     []  Wheelchair  []  Stretcher    Back Exam     Tenderness   The patient is experiencing tenderness in the lumbar.    Range of Motion   Extension: 10   Flexion: 40   Lateral Bend Right: 10   Lateral Bend Left: 10   Rotation Right: 10   Rotation Left: 10     Tests   Straight leg raise right: negative  Straight leg raise left: negative    Other   Erythema: no back redness              Xr Spine Lumbar 2 Or 3 View    Result Date: 11/20/2017  Narrative: Xray lumbar spine with standard AP and lateral view bone quality is poor, lumbar lordosis is maintained, L4 vertebral compression fracture, 50% loss of vertebral body height and endplate collapse.      Ct Chest W Contrast, Ct Abdomen Pelvis W Contrast    Result Date: 11/9/2017  Narrative: PROCEDURE: CT chest abdomen and pelvis with contrast REASON FOR EXAM: Restaging, C79.51 Secondary malignant neoplasm of bone C80.1 Malignant (primary) neoplasm, unspecified C50.911 Malignant neoplasm of unspecified site of right female breast Z17.0 Estrogen receptor positive status (ER+) This exam was performed according to our departmental dose-optimization program, which includes automated exposure control, adjustment of the mA and/or kV according to patient size and/or use of iterative reconstruction technique. FINDINGS: Comparison study dated September 20, 2017. Axial computer tomography sequential imaging was performed from the thoracic inlet through the symphysis pubis after administration of IV contrast. .Sagittal and coronal reformation was performed . Right jugular Port-A-Cath with tip within the right brachiocephalic vein. There is a partial filling defect within the  right brachiocephalic vein which would be suspicious for thrombus in this region. Otherwise mediastinal structures of the chest are unremarkable. Stable right breast tissue expander device in place. Smaller left breast tissue expander device in place. No mediastinal lymphadenopathy or pericardial effusion. Lungs are clear. Pleural spaces are normal. Stable large left lobe of liver hypodense poorly enhancing mass lesion which measures 4.7 x 5.8 x 5.7 cm. Adjacent small satellite some centimeter hypodense metastatic nodule involving the medial segment left lobe of liver. Otherwise the liver is unremarkable. The gallbladder is normal. The biliary system is normal. The pancreas is normal. The spleen is normal. Bilateral adrenal glands are normal. Right kidney and ureter are normal. Left kidney and ureter are normal. The bladder is normal. The uterus and ovaries appear within normal limits. The hollow viscera is normal. The appendix is identified appears normal. No lymphadenopathy in the abdomen or pelvis. No acute osseous abnormalities. Stable L4 vertebral body pathologic compression fracture. There is loss of diffuse L4 vertebral body height by 70%. Diffuse thoracic and lumbar spine stable osteoblastic and osteolytic lesions.     Impression: 1. Partial filling defect involving the right brachiocephalic vein suspicious for thrombus in this region. 2. Stable left lobe of liver metastatic disease. 3. Stable L4 vertebral body pathologic compression fracture. There is loss of diffuse L4 vertebral body height by 70%. Diffuse thoracic and lumbar spine stable osteoblastic and osteolytic lesions consistent with metastatic disease. 4. Findings discussed with referring clinician by phone at 5:28 PM on 11/9/2017. Electronically signed by:  Oscar Valentien MD  11/9/2017 5:29 PM [facility] Workstation: [id number]    Mri Brain With & Without Contrast    Result Date: 11/9/2017  Narrative: MRI of the brain without and with contrast HISTORY: Carcinoma  the breast with liver and bone metastases. Evaluate for brain metastases. Multisequence multiplanar images of the brain were obtained without and with contrast. COMPARISON: None. The paranasal sinuses are unremarkable. At least six calvarial metastases, largest 1.6 cm residing in the left parietal bone. Diffusion images do not demonstrate an acute infarct. No hemorrhage. No mass. No abnormal areas of increased or decreased signal. No abnormal enhancement. No midline shift and no abnormal extra-axial fluid collections. Normal signal flow voids are present in the major vessels and venous sinuses.     Impression: CONCLUSION: At least six calvarial metastases, largest 1.6 cm residing in the left parietal bone. No brain metastases demonstrated. [id number] Electronically signed by:  Darell Estes MD  11/9/2017 3:06 PM CST Workstation: [id number] Venous Doppler Upper Extremity Right (duplex)    Result Date: 11/10/2017  Narrative: PROCEDURE: Right upper extremity venous duplex COMPARISON: None HISTORY: Filling defects seen in the right internal jugular vein on recent chest CT. FINDINGS: Realtime grayscale and color-flow imaging was performed of the right upper extremity(s). Thrombus is visualized in the right internal jugular vein consistent with DVT. The remaining deep veins of the right upper extremity demonstrate normal compressibility, respiratory variation and augmentation with distal compression.     Impression: CONCLUSION: Abnormal exam with thrombus visualized in the right internal jugular vein, consistent with DVT. Critical results discussed with SEVERO GRAJEDA on 11/10/2017 4:38 PM CST Electronically signed by:  Enmanuel Nevarez MD  11/10/2017 4:40 PM CST Workstation: [id number]      CT scan lumbar spine L4 vertebral compression fracture with 50% collapse of endplate.    ASSESSMENT:    Diagnoses and all orders for this visit:    Metastasis to bone of unknown primary  -     MRI Lumbar Spine With & Without Contrast;  Future  -     MRI Thoracic Spine With & Without Contrast; Future    Acute bilateral low back pain without sciatica    Metastatic disease    Metastasis to brain    Vertebral compression fracture, initial encounter          PLAN      Recommend MRI of thoracic and lumbar spines to assess.   Surgical treatment may be necessary depending on tumor burden, pain and/or neurologic complaints.          Tobi Pugh MD

## 2017-11-22 ENCOUNTER — INFUSION (OUTPATIENT)
Dept: ONCOLOGY | Facility: HOSPITAL | Age: 47
End: 2017-11-22

## 2017-11-22 ENCOUNTER — ANTICOAGULATION VISIT (OUTPATIENT)
Dept: CARDIAC SURGERY | Facility: CLINIC | Age: 47
End: 2017-11-22

## 2017-11-22 VITALS
TEMPERATURE: 97.2 F | HEART RATE: 109 BPM | DIASTOLIC BLOOD PRESSURE: 74 MMHG | SYSTOLIC BLOOD PRESSURE: 120 MMHG | RESPIRATION RATE: 18 BRPM

## 2017-11-22 DIAGNOSIS — Z17.0 MALIGNANT NEOPLASM OF RIGHT BREAST IN FEMALE, ESTROGEN RECEPTOR POSITIVE, UNSPECIFIED SITE OF BREAST (HCC): ICD-10-CM

## 2017-11-22 DIAGNOSIS — IMO0001 OTHER COMPLICATION DUE TO VENOUS ACCESS DEVICE, SUBSEQUENT ENCOUNTER: ICD-10-CM

## 2017-11-22 DIAGNOSIS — Z45.2 ENCOUNTER FOR VENOUS ACCESS DEVICE CARE: ICD-10-CM

## 2017-11-22 DIAGNOSIS — I82.C11 ACUTE THROMBOSIS OF RIGHT INTERNAL JUGULAR VEIN (HCC): ICD-10-CM

## 2017-11-22 DIAGNOSIS — C50.911 MALIGNANT NEOPLASM OF RIGHT BREAST IN FEMALE, ESTROGEN RECEPTOR POSITIVE, UNSPECIFIED SITE OF BREAST (HCC): Primary | ICD-10-CM

## 2017-11-22 DIAGNOSIS — Z79.01 LONG-TERM (CURRENT) USE OF ANTICOAGULANTS: ICD-10-CM

## 2017-11-22 DIAGNOSIS — C50.911 MALIGNANT NEOPLASM OF RIGHT BREAST IN FEMALE, ESTROGEN RECEPTOR POSITIVE, UNSPECIFIED SITE OF BREAST (HCC): ICD-10-CM

## 2017-11-22 DIAGNOSIS — Z45.2 ENCOUNTER FOR ADJUSTMENT OR MANAGEMENT OF VASCULAR ACCESS DEVICE: ICD-10-CM

## 2017-11-22 DIAGNOSIS — Z17.0 MALIGNANT NEOPLASM OF RIGHT BREAST IN FEMALE, ESTROGEN RECEPTOR POSITIVE, UNSPECIFIED SITE OF BREAST (HCC): Primary | ICD-10-CM

## 2017-11-22 LAB
ALBUMIN SERPL-MCNC: 3.9 G/DL (ref 3.4–4.8)
ALBUMIN/GLOB SERPL: 1.4 G/DL (ref 1.1–1.8)
ALP SERPL-CCNC: 125 U/L (ref 38–126)
ALT SERPL W P-5'-P-CCNC: 50 U/L (ref 9–52)
ANION GAP SERPL CALCULATED.3IONS-SCNC: 10 MMOL/L (ref 5–15)
ANISOCYTOSIS BLD QL: ABNORMAL
AST SERPL-CCNC: 43 U/L (ref 14–36)
BASOPHILS # BLD AUTO: 0.8 10*3/MM3 (ref 0–0.2)
BASOPHILS NFR BLD AUTO: 3.7 % (ref 0–2)
BILIRUB SERPL-MCNC: 0.3 MG/DL (ref 0.2–1.3)
BUN BLD-MCNC: 16 MG/DL (ref 7–21)
BUN/CREAT SERPL: 21.6 (ref 7–25)
CALCIUM SPEC-SCNC: 9.8 MG/DL (ref 8.4–10.2)
CHLORIDE SERPL-SCNC: 102 MMOL/L (ref 95–110)
CO2 SERPL-SCNC: 27 MMOL/L (ref 22–31)
CREAT BLD-MCNC: 0.74 MG/DL (ref 0.5–1)
DEPRECATED RDW RBC AUTO: 57.2 FL (ref 36.4–46.3)
EOSINOPHIL # BLD AUTO: 0.04 10*3/MM3 (ref 0–0.7)
EOSINOPHIL NFR BLD AUTO: 0.2 % (ref 0–7)
ERYTHROCYTE [DISTWIDTH] IN BLOOD BY AUTOMATED COUNT: 16.9 % (ref 11.5–14.5)
GFR SERPL CREATININE-BSD FRML MDRD: 83 ML/MIN/1.73 (ref 58–135)
GLOBULIN UR ELPH-MCNC: 2.8 GM/DL (ref 2.3–3.5)
GLUCOSE BLD-MCNC: 102 MG/DL (ref 60–100)
HCT VFR BLD AUTO: 36.6 % (ref 35–45)
HGB BLD-MCNC: 12.2 G/DL (ref 12–15.5)
IMM GRANULOCYTES # BLD: ABNORMAL 10*3/MM3 (ref 0–0.02)
IMM GRANULOCYTES NFR BLD: ABNORMAL % (ref 0–0.5)
INR PPP: 2.5 (ref 0.9–1.1)
LYMPHOCYTES # BLD AUTO: ABNORMAL 10*3/MM3 (ref 0.6–4.2)
LYMPHOCYTES # BLD MANUAL: 2.38 10*3/MM3 (ref 0.6–4.2)
LYMPHOCYTES NFR BLD AUTO: ABNORMAL % (ref 10–50)
LYMPHOCYTES NFR BLD MANUAL: 11 % (ref 10–50)
LYMPHOCYTES NFR BLD MANUAL: 9 % (ref 0–12)
MAGNESIUM SERPL-MCNC: 1.8 MG/DL (ref 1.6–2.3)
MCH RBC QN AUTO: 30.7 PG (ref 26.5–34)
MCHC RBC AUTO-ENTMCNC: 33.3 G/DL (ref 31.4–36)
MCV RBC AUTO: 92 FL (ref 80–98)
METAMYELOCYTES NFR BLD MANUAL: 4 % (ref 0–0)
MONOCYTES # BLD AUTO: 1.94 10*3/MM3 (ref 0–0.9)
MONOCYTES # BLD AUTO: 4.3 10*3/MM3 (ref 0–0.9)
MONOCYTES NFR BLD AUTO: 19.9 % (ref 0–12)
MYELOCYTES NFR BLD MANUAL: 15 % (ref 0–0)
NEUTROPHILS # BLD AUTO: 12.75 10*3/MM3 (ref 2–8.6)
NEUTROPHILS # BLD AUTO: ABNORMAL 10*3/MM3 (ref 2–8.6)
NEUTROPHILS NFR BLD AUTO: ABNORMAL % (ref 37–80)
NEUTROPHILS NFR BLD MANUAL: 43 % (ref 37–80)
NEUTS BAND NFR BLD MANUAL: 16 % (ref 0–5)
NRBC BLD MANUAL-RTO: 1.9 /100 WBC (ref 0–0)
PHOSPHATE SERPL-MCNC: 3.6 MG/DL (ref 2.4–4.4)
PLATELET # BLD AUTO: 392 10*3/MM3 (ref 150–450)
PMV BLD AUTO: 9.2 FL (ref 8–12)
POLYCHROMASIA BLD QL SMEAR: ABNORMAL
POTASSIUM BLD-SCNC: 3.8 MMOL/L (ref 3.5–5.1)
PROT SERPL-MCNC: 6.7 G/DL (ref 6.3–8.6)
RBC # BLD AUTO: 3.98 10*6/MM3 (ref 3.77–5.16)
SMALL PLATELETS BLD QL SMEAR: ADEQUATE
SODIUM BLD-SCNC: 139 MMOL/L (ref 137–145)
TOXIC GRANULATION: ABNORMAL
VARIANT LYMPHS NFR BLD MANUAL: 2 % (ref 0–5)
WBC NRBC COR # BLD: 21.61 10*3/MM3 (ref 3.2–9.8)

## 2017-11-22 PROCEDURE — 85025 COMPLETE CBC W/AUTO DIFF WBC: CPT

## 2017-11-22 PROCEDURE — 83735 ASSAY OF MAGNESIUM: CPT

## 2017-11-22 PROCEDURE — 96374 THER/PROPH/DIAG INJ IV PUSH: CPT | Performed by: INTERNAL MEDICINE

## 2017-11-22 PROCEDURE — 85610 PROTHROMBIN TIME: CPT | Performed by: NURSE PRACTITIONER

## 2017-11-22 PROCEDURE — 84100 ASSAY OF PHOSPHORUS: CPT

## 2017-11-22 PROCEDURE — 80053 COMPREHEN METABOLIC PANEL: CPT

## 2017-11-22 PROCEDURE — 25010000002 HEPARIN FLUSH (PORCINE) 100 UNIT/ML SOLUTION: Performed by: INTERNAL MEDICINE

## 2017-11-22 PROCEDURE — 25010000002 ZOLEDRONIC ACID PER 1 MG: Performed by: INTERNAL MEDICINE

## 2017-11-22 PROCEDURE — 85007 BL SMEAR W/DIFF WBC COUNT: CPT

## 2017-11-22 PROCEDURE — 25010000002 ALTEPLASE 2 MG RECONSTITUTED SOLUTION: Performed by: INTERNAL MEDICINE

## 2017-11-22 PROCEDURE — 36415 COLL VENOUS BLD VENIPUNCTURE: CPT | Performed by: INTERNAL MEDICINE

## 2017-11-22 RX ORDER — SODIUM CHLORIDE 0.9 % (FLUSH) 0.9 %
10 SYRINGE (ML) INJECTION AS NEEDED
Status: CANCELLED | OUTPATIENT
Start: 2017-11-22

## 2017-11-22 RX ORDER — SODIUM CHLORIDE 9 MG/ML
250 INJECTION, SOLUTION INTRAVENOUS ONCE
Status: CANCELLED | OUTPATIENT
Start: 2017-11-22

## 2017-11-22 RX ORDER — SODIUM CHLORIDE 9 MG/ML
250 INJECTION, SOLUTION INTRAVENOUS ONCE
Status: COMPLETED | OUTPATIENT
Start: 2017-11-22 | End: 2017-11-22

## 2017-11-22 RX ORDER — SODIUM CHLORIDE 0.9 % (FLUSH) 0.9 %
10 SYRINGE (ML) INJECTION AS NEEDED
Status: DISCONTINUED | OUTPATIENT
Start: 2017-11-22 | End: 2017-11-22 | Stop reason: HOSPADM

## 2017-11-22 RX ADMIN — ZOLEDRONIC ACID 4 MG: 4 INJECTION, SOLUTION, CONCENTRATE INTRAVENOUS at 12:01

## 2017-11-22 RX ADMIN — SODIUM CHLORIDE, PRESERVATIVE FREE 500 UNITS: 5 INJECTION INTRAVENOUS at 12:24

## 2017-11-22 RX ADMIN — ALTEPLASE 2 MG: 2.2 INJECTION, POWDER, LYOPHILIZED, FOR SOLUTION INTRAVENOUS at 10:33

## 2017-11-22 RX ADMIN — SODIUM CHLORIDE 250 ML: 9 INJECTION, SOLUTION INTRAVENOUS at 12:00

## 2017-11-22 RX ADMIN — Medication 10 ML: at 12:24

## 2017-11-22 NOTE — PROGRESS NOTES
Pt was checked in [facility] today. Pt denies bleeding problems. Pt is no longer taking steroids. Pt will begin steroids again on Dec 5 with next chemo on Dec 6. Due to rapid rise in INR dose was slightly decreased. Patient instructed regarding medication; results given and questions answered. Nutritional counseling given.  Dietary factors affecting therapy addressed.  Patient instructed to monitor for excessive bruising or bleeding. Will recheck in 5 days.

## 2017-11-27 ENCOUNTER — OFFICE VISIT (OUTPATIENT)
Dept: SURGERY | Facility: CLINIC | Age: 47
End: 2017-11-27

## 2017-11-27 ENCOUNTER — ANTICOAGULATION VISIT (OUTPATIENT)
Dept: CARDIAC SURGERY | Facility: CLINIC | Age: 47
End: 2017-11-27

## 2017-11-27 VITALS — DIASTOLIC BLOOD PRESSURE: 83 MMHG | SYSTOLIC BLOOD PRESSURE: 121 MMHG | HEART RATE: 99 BPM

## 2017-11-27 VITALS
BODY MASS INDEX: 31.89 KG/M2 | WEIGHT: 203.2 LBS | SYSTOLIC BLOOD PRESSURE: 122 MMHG | DIASTOLIC BLOOD PRESSURE: 80 MMHG | HEIGHT: 67 IN

## 2017-11-27 DIAGNOSIS — Z17.0 MALIGNANT NEOPLASM OF RIGHT BREAST IN FEMALE, ESTROGEN RECEPTOR POSITIVE, UNSPECIFIED SITE OF BREAST (HCC): Primary | ICD-10-CM

## 2017-11-27 DIAGNOSIS — Z79.01 LONG-TERM (CURRENT) USE OF ANTICOAGULANTS: ICD-10-CM

## 2017-11-27 DIAGNOSIS — C50.911 MALIGNANT NEOPLASM OF RIGHT BREAST IN FEMALE, ESTROGEN RECEPTOR POSITIVE, UNSPECIFIED SITE OF BREAST (HCC): Primary | ICD-10-CM

## 2017-11-27 DIAGNOSIS — I82.C11 ACUTE THROMBOSIS OF RIGHT INTERNAL JUGULAR VEIN (HCC): ICD-10-CM

## 2017-11-27 LAB — INR PPP: 3.4 (ref 0.9–1.1)

## 2017-11-27 PROCEDURE — 99024 POSTOP FOLLOW-UP VISIT: CPT | Performed by: SURGERY

## 2017-11-27 PROCEDURE — 99211 OFF/OP EST MAY X REQ PHY/QHP: CPT | Performed by: NURSE PRACTITIONER

## 2017-11-27 PROCEDURE — 85610 PROTHROMBIN TIME: CPT | Performed by: NURSE PRACTITIONER

## 2017-11-27 NOTE — PROGRESS NOTES
PT had Zometa on Wednesday but denies any other med changes or bleeding issues. Pt denies any s/s of blood clot. Adjusted pt's dose and instructed to increase vit k today. PT will be at Dr in [place] on Thursday and INR will be checked there. If appt is changed, pt will come to [facility]. Patient instructed regarding medication; results given and questions answered. Nutritional counseling given.  Dietary factors affecting therapy addressed.  Patient instructed to monitor for excessive bruising or bleeding. PT was given appt in [facility] for 12/6 before chemo.           This document has been electronically signed by EVELYN Brewster on November 27, 2017 4:09 PM

## 2017-11-27 NOTE — PROGRESS NOTES
This document has been electronically signed by EVELYN Brewster on November 26, 2017 7:01 PM

## 2017-11-27 NOTE — PROGRESS NOTES
47-year-old female metastatic breast cancer with a chronic wound after lymph node biopsy right axilla.  Patient is on chemotherapy so this wound is very slow to heal.  No evidence of any infection currently.  Patient's been doing local wound care in this area is been stable and seems to be getting slowly smaller.  Exam is no redness tenderness or induration.  We'll still takes about a third of a gauze for packing.  We'll continue with local wound care.  She will follow up with us in 6 weeks or sooner she has a further concerns or questions.  She understands she has any problems with her wound or any other issues she'll follow up with us sooner.  She will continue with her chemotherapy every 3 weeks if she is doing currently

## 2017-11-28 DIAGNOSIS — C79.51 METASTASIS TO BONE OF UNKNOWN PRIMARY (HCC): ICD-10-CM

## 2017-11-28 DIAGNOSIS — C80.1 METASTASIS TO BONE OF UNKNOWN PRIMARY (HCC): ICD-10-CM

## 2017-12-01 ENCOUNTER — DOCUMENTATION (OUTPATIENT)
Dept: CARDIAC SURGERY | Facility: CLINIC | Age: 47
End: 2017-12-01

## 2017-12-01 NOTE — PROGRESS NOTES
Pt called and stated her INR was 2.7 yesterday when she visited PCP. Pt was instructed to follow dosing schedule on her calendar and keep appt for Dec 6 ; she verbalized an understanding.

## 2017-12-06 ENCOUNTER — ANTICOAGULATION VISIT (OUTPATIENT)
Dept: CARDIAC SURGERY | Facility: CLINIC | Age: 47
End: 2017-12-06

## 2017-12-06 ENCOUNTER — INFUSION (OUTPATIENT)
Dept: ONCOLOGY | Facility: HOSPITAL | Age: 47
End: 2017-12-06

## 2017-12-06 ENCOUNTER — OFFICE VISIT (OUTPATIENT)
Dept: ONCOLOGY | Facility: CLINIC | Age: 47
End: 2017-12-06

## 2017-12-06 VITALS
HEART RATE: 87 BPM | HEIGHT: 67 IN | RESPIRATION RATE: 18 BRPM | BODY MASS INDEX: 32.6 KG/M2 | WEIGHT: 207.67 LBS | TEMPERATURE: 98.9 F | DIASTOLIC BLOOD PRESSURE: 87 MMHG | SYSTOLIC BLOOD PRESSURE: 126 MMHG

## 2017-12-06 VITALS — HEART RATE: 108 BPM | DIASTOLIC BLOOD PRESSURE: 81 MMHG | SYSTOLIC BLOOD PRESSURE: 127 MMHG

## 2017-12-06 DIAGNOSIS — Z45.2 ENCOUNTER FOR VENOUS ACCESS DEVICE CARE: ICD-10-CM

## 2017-12-06 DIAGNOSIS — C80.1 METASTASIS TO BONE OF UNKNOWN PRIMARY (HCC): Primary | ICD-10-CM

## 2017-12-06 DIAGNOSIS — Z79.01 LONG-TERM (CURRENT) USE OF ANTICOAGULANTS: ICD-10-CM

## 2017-12-06 DIAGNOSIS — C80.1 METASTASIS TO BONE OF UNKNOWN PRIMARY (HCC): ICD-10-CM

## 2017-12-06 DIAGNOSIS — C79.51 METASTASIS TO BONE OF UNKNOWN PRIMARY (HCC): Primary | ICD-10-CM

## 2017-12-06 DIAGNOSIS — I82.C11 ACUTE THROMBOSIS OF RIGHT INTERNAL JUGULAR VEIN (HCC): ICD-10-CM

## 2017-12-06 DIAGNOSIS — C79.31 METASTASIS TO BRAIN (HCC): Primary | ICD-10-CM

## 2017-12-06 DIAGNOSIS — Z17.0 MALIGNANT NEOPLASM OF RIGHT BREAST IN FEMALE, ESTROGEN RECEPTOR POSITIVE, UNSPECIFIED SITE OF BREAST (HCC): ICD-10-CM

## 2017-12-06 DIAGNOSIS — C50.911 MALIGNANT NEOPLASM OF RIGHT BREAST IN FEMALE, ESTROGEN RECEPTOR POSITIVE, UNSPECIFIED SITE OF BREAST (HCC): ICD-10-CM

## 2017-12-06 DIAGNOSIS — Z45.2 ENCOUNTER FOR ADJUSTMENT OR MANAGEMENT OF VASCULAR ACCESS DEVICE: ICD-10-CM

## 2017-12-06 DIAGNOSIS — C79.51 METASTASIS TO BONE OF UNKNOWN PRIMARY (HCC): ICD-10-CM

## 2017-12-06 LAB
ALBUMIN SERPL-MCNC: 4 G/DL (ref 3.4–4.8)
ALBUMIN/GLOB SERPL: 1.3 G/DL (ref 1.1–1.8)
ALP SERPL-CCNC: 96 U/L (ref 38–126)
ALT SERPL W P-5'-P-CCNC: 63 U/L (ref 9–52)
ANION GAP SERPL CALCULATED.3IONS-SCNC: 13 MMOL/L (ref 5–15)
AST SERPL-CCNC: 32 U/L (ref 14–36)
BASOPHILS # BLD AUTO: 0 10*3/MM3 (ref 0–0.2)
BASOPHILS NFR BLD AUTO: 0 % (ref 0–2)
BILIRUB SERPL-MCNC: 0.3 MG/DL (ref 0.2–1.3)
BUN BLD-MCNC: 13 MG/DL (ref 7–21)
BUN/CREAT SERPL: 20.6 (ref 7–25)
CALCIUM SPEC-SCNC: 9.8 MG/DL (ref 8.4–10.2)
CHLORIDE SERPL-SCNC: 105 MMOL/L (ref 95–110)
CO2 SERPL-SCNC: 24 MMOL/L (ref 22–31)
CREAT BLD-MCNC: 0.63 MG/DL (ref 0.5–1)
DEPRECATED RDW RBC AUTO: 55.6 FL (ref 36.4–46.3)
EOSINOPHIL # BLD AUTO: 0 10*3/MM3 (ref 0–0.7)
EOSINOPHIL NFR BLD AUTO: 0 % (ref 0–7)
ERYTHROCYTE [DISTWIDTH] IN BLOOD BY AUTOMATED COUNT: 17 % (ref 11.5–14.5)
GFR SERPL CREATININE-BSD FRML MDRD: 100 ML/MIN/1.73 (ref 58–135)
GLOBULIN UR ELPH-MCNC: 3.1 GM/DL (ref 2.3–3.5)
GLUCOSE BLD-MCNC: 97 MG/DL (ref 60–100)
HCT VFR BLD AUTO: 33.5 % (ref 35–45)
HGB BLD-MCNC: 11.1 G/DL (ref 12–15.5)
IMM GRANULOCYTES # BLD: 0.03 10*3/MM3 (ref 0–0.02)
IMM GRANULOCYTES NFR BLD: 0.4 % (ref 0–0.5)
INR PPP: 2 (ref 0.9–1.1)
LYMPHOCYTES # BLD AUTO: 0.28 10*3/MM3 (ref 0.6–4.2)
LYMPHOCYTES NFR BLD AUTO: 3.9 % (ref 10–50)
MCH RBC QN AUTO: 29.9 PG (ref 26.5–34)
MCHC RBC AUTO-ENTMCNC: 33.1 G/DL (ref 31.4–36)
MCV RBC AUTO: 90.3 FL (ref 80–98)
MONOCYTES # BLD AUTO: 0.2 10*3/MM3 (ref 0–0.9)
MONOCYTES NFR BLD AUTO: 2.8 % (ref 0–12)
NEUTROPHILS # BLD AUTO: 6.67 10*3/MM3 (ref 2–8.6)
NEUTROPHILS NFR BLD AUTO: 92.9 % (ref 37–80)
NRBC BLD MANUAL-RTO: 0 /100 WBC (ref 0–0)
PLATELET # BLD AUTO: 217 10*3/MM3 (ref 150–450)
PMV BLD AUTO: 8.9 FL (ref 8–12)
POTASSIUM BLD-SCNC: 3.9 MMOL/L (ref 3.5–5.1)
PROT SERPL-MCNC: 7.1 G/DL (ref 6.3–8.6)
RBC # BLD AUTO: 3.71 10*6/MM3 (ref 3.77–5.16)
SODIUM BLD-SCNC: 142 MMOL/L (ref 137–145)
WBC NRBC COR # BLD: 7.18 10*3/MM3 (ref 3.2–9.8)

## 2017-12-06 PROCEDURE — 96413 CHEMO IV INFUSION 1 HR: CPT | Performed by: INTERNAL MEDICINE

## 2017-12-06 PROCEDURE — 36415 COLL VENOUS BLD VENIPUNCTURE: CPT

## 2017-12-06 PROCEDURE — 25010000003 DEXAMETHASONE SODIUM PHOSPHATE 100 MG/10ML SOLUTION 10 ML VIAL: Performed by: INTERNAL MEDICINE

## 2017-12-06 PROCEDURE — 85025 COMPLETE CBC W/AUTO DIFF WBC: CPT

## 2017-12-06 PROCEDURE — 25010000002 CYCLOPHOSPHAMIDE PER 100 MG: Performed by: INTERNAL MEDICINE

## 2017-12-06 PROCEDURE — 86300 IMMUNOASSAY TUMOR CA 15-3: CPT

## 2017-12-06 PROCEDURE — 25010000002 DOCETAXEL 80 MG/4ML CONCENTRATION 4 ML VIAL: Performed by: INTERNAL MEDICINE

## 2017-12-06 PROCEDURE — 96375 TX/PRO/DX INJ NEW DRUG ADDON: CPT | Performed by: INTERNAL MEDICINE

## 2017-12-06 PROCEDURE — 25010000002 PEGFILGRASTIM 6 MG/0.6ML PREFILLED SYRINGE KIT: Performed by: INTERNAL MEDICINE

## 2017-12-06 PROCEDURE — 25010000002 DIPHENHYDRAMINE PER 50 MG: Performed by: INTERNAL MEDICINE

## 2017-12-06 PROCEDURE — 80053 COMPREHEN METABOLIC PANEL: CPT

## 2017-12-06 PROCEDURE — 25010000002 PALONOSETRON PER 25 MCG: Performed by: INTERNAL MEDICINE

## 2017-12-06 PROCEDURE — 96417 CHEMO IV INFUS EACH ADDL SEQ: CPT | Performed by: INTERNAL MEDICINE

## 2017-12-06 PROCEDURE — 25010000002 HEPARIN FLUSH (PORCINE) 100 UNIT/ML SOLUTION: Performed by: INTERNAL MEDICINE

## 2017-12-06 PROCEDURE — 96377 APPLICATON ON-BODY INJECTOR: CPT | Performed by: INTERNAL MEDICINE

## 2017-12-06 PROCEDURE — 85610 PROTHROMBIN TIME: CPT | Performed by: NURSE PRACTITIONER

## 2017-12-06 PROCEDURE — 99214 OFFICE O/P EST MOD 30 MIN: CPT | Performed by: INTERNAL MEDICINE

## 2017-12-06 RX ORDER — FAMOTIDINE 10 MG/ML
20 INJECTION, SOLUTION INTRAVENOUS ONCE
Status: COMPLETED | OUTPATIENT
Start: 2017-12-06 | End: 2017-12-06

## 2017-12-06 RX ORDER — SODIUM CHLORIDE 9 MG/ML
250 INJECTION, SOLUTION INTRAVENOUS ONCE
Status: CANCELLED | OUTPATIENT
Start: 2017-12-06

## 2017-12-06 RX ORDER — FAMOTIDINE 10 MG/ML
20 INJECTION, SOLUTION INTRAVENOUS ONCE
Status: CANCELLED | OUTPATIENT
Start: 2017-12-06

## 2017-12-06 RX ORDER — SODIUM CHLORIDE 0.9 % (FLUSH) 0.9 %
10 SYRINGE (ML) INJECTION AS NEEDED
Status: DISCONTINUED | OUTPATIENT
Start: 2017-12-06 | End: 2017-12-06 | Stop reason: HOSPADM

## 2017-12-06 RX ORDER — SODIUM CHLORIDE 9 MG/ML
250 INJECTION, SOLUTION INTRAVENOUS ONCE
Status: COMPLETED | OUTPATIENT
Start: 2017-12-06 | End: 2017-12-06

## 2017-12-06 RX ORDER — SODIUM CHLORIDE 0.9 % (FLUSH) 0.9 %
10 SYRINGE (ML) INJECTION AS NEEDED
Status: CANCELLED | OUTPATIENT
Start: 2017-12-06

## 2017-12-06 RX ORDER — PALONOSETRON 0.05 MG/ML
0.25 INJECTION, SOLUTION INTRAVENOUS ONCE
Status: CANCELLED | OUTPATIENT
Start: 2017-12-06

## 2017-12-06 RX ORDER — PALONOSETRON 0.05 MG/ML
0.25 INJECTION, SOLUTION INTRAVENOUS ONCE
Status: COMPLETED | OUTPATIENT
Start: 2017-12-06 | End: 2017-12-06

## 2017-12-06 RX ADMIN — FAMOTIDINE 20 MG: 10 INJECTION, SOLUTION INTRAVENOUS at 11:16

## 2017-12-06 RX ADMIN — PALONOSETRON HYDROCHLORIDE 0.25 MG: 0.25 INJECTION INTRAVENOUS at 11:33

## 2017-12-06 RX ADMIN — DOCETAXEL 150 MG: 20 INJECTION, SOLUTION, CONCENTRATE INTRAVENOUS at 11:50

## 2017-12-06 RX ADMIN — CYCLOPHOSPHAMIDE 1190 MG: 1 INJECTION, POWDER, FOR SOLUTION INTRAVENOUS; ORAL at 13:06

## 2017-12-06 RX ADMIN — SODIUM CHLORIDE, PRESERVATIVE FREE 500 UNITS: 5 INJECTION INTRAVENOUS at 13:52

## 2017-12-06 RX ADMIN — SODIUM CHLORIDE 250 ML: 9 INJECTION, SOLUTION INTRAVENOUS at 10:00

## 2017-12-06 RX ADMIN — Medication 10 ML: at 09:10

## 2017-12-06 RX ADMIN — Medication 10 ML: at 13:52

## 2017-12-06 RX ADMIN — DEXAMETHASONE SODIUM PHOSPHATE 10 MG: 10 INJECTION, SOLUTION INTRAMUSCULAR; INTRAVENOUS at 10:46

## 2017-12-06 RX ADMIN — DIPHENHYDRAMINE HYDROCHLORIDE 25 MG: 50 INJECTION INTRAMUSCULAR; INTRAVENOUS at 10:22

## 2017-12-06 RX ADMIN — PEGFILGRASTIM 6 MG: KIT SUBCUTANEOUS at 13:52

## 2017-12-06 NOTE — PROGRESS NOTES
Pt denies med changes or bleeding problems. Pt is receiving chemo today is taking steroids as she always does with chemo. Due to pt being at the lower end of range dose was not adjusted today but tomorrow instead. Will recheck INR in 2 days. Patient instructed regarding medication; results given and questions answered. Nutritional counseling given.  Dietary factors affecting therapy addressed.  Patient instructed to monitor for excessive bruising or bleeding.   Electronically signed by EVELYN Matute

## 2017-12-06 NOTE — PATIENT INSTRUCTIONS
Patient Instructions for CT Scan    · Your CT scan is being done without any oral contrast.  Your CT scan may be done with IV contrast, if you have an allergy to iodine--please tell your nurse.    · Do not eat or drink 4 hours prior to scan.     · You may take your medications with sips of water, except DO NOT take your diabetic pill the morning of the test.    Arrive at the Outpatient Surgery entrance at [facility] 20 minutes prior to appointment time.    You will receive a phone call with an appointment for your CT scan.  Please call our office, if someone does not contact you with 3 days.    Vijaya Campos RN  December 6, 2017  9:56 AM              CT Scan  A computed tomography (CT) scan is a specialized X-ray scan. It uses X-rays and a computer to make pictures of different areas of your body. A CT scan can offer more detailed information than a regular X-ray exam. The CT scan provides data about internal organs, soft tissue structures, blood vessels, and bones.   The CT scanner is a large machine that takes pictures of your body as you move through the opening.   LET YOUR HEALTH CARE PROVIDER KNOW ABOUT:  · Any allergies you have.    · All medicines you are taking, including vitamins, herbs, eye drops, creams, and over-the-counter medicines.    · Previous problems you or members of your family have had with the use of anesthetics.    · Any blood disorders you have.    · Previous surgeries you have had.    · Medical conditions you have.  RISKS AND COMPLICATIONS   Generally, this is a safe procedure. However, as with any procedure, problems can occur. Possible problems include:   · An allergic reaction to the contrast material.    · Development of cancer from excessive exposure to radiation. The risk of this is small.    BEFORE THE PROCEDURE   · The day before the test, stop drinking caffeinated beverages. These include energy drinks, tea, soda, coffee, and hot chocolate.    · On the day of  the test:  ¨ About 4 hours before the test, stop eating and drinking anything but water as advised by your health care provider.    ¨ Avoid wearing jewelry. You will have to partly or fully undress and wear a hospital gown.  PROCEDURE   · You will be asked to lie on a table with your arms above your head.    · If contrast dye is to be used for the test, an IV tube will be inserted in your arm. The contrast dye will be injected into the IV tube. You might feel warm, or you may get a metallic taste in your mouth.    · The table you will be lying on will move into a large machine that will do the scanning.    · You will be able to see, hear, and talk to the person running the machine while you are in it. Follow that person's directions.    · The CT machine will move around you to take pictures. Do not move while it is scanning. This helps to get a good image.    · When the best possible pictures have been taken, the machine will be turned off. The table will be moved out of the machine. The IV tube will then be removed.  AFTER THE PROCEDURE   Ask your health care provider when to follow up for your test results.     This information is not intended to replace advice given to you by your health care provider. Make sure you discuss any questions you have with your health care provider.     Document Released: 01/25/2006 Document Revised: 12/23/2014 Document Reviewed: 08/25/2014  Elsevier Interactive Patient Education ©2017 [employer]

## 2017-12-06 NOTE — PROGRESS NOTES
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  Started on palliative chemotherapy with Taxotere and Cytoxan on July 26, 2017.  She is here to get cycle 7 of chemotherapy today.    Denies any active bleeding.  Complains of tingling and numbness affecting bilateral hand and feet.  Denies any new headache or dizziness.  Complains of chronic pain in bilateral hip and back, denies any recent worsening.  Was recently evaluated by Dr. Pugh for back pain secondary to metastatic fracture involving lumbar vertebral body.      PAST MEDICAL HISTORY:    Past Medical History:   Diagnosis Date   • Anxiety    • Depression    • Encounter for gynecological examination    • Malignant neoplasm of female breast     hx of dcis s/p mastectomy and reconstruction and augmentation      • Primary malignant neoplasm of breast     dcis in rt breast s/p mastectomy,reconstruction      • Ventricular premature beats        SOCIAL HISTORY:    Social History   Substance Use Topics   • Smoking status: Never Smoker   • Smokeless tobacco: Never Used   • Alcohol use No       Surgical History :  Past Surgical History:   Procedure Laterality Date   • AUGMENTATION MAMMAPLASTY     • AXILLARY LYMPH NODE BIOPSY/EXCISION Right 7/10/2017    Procedure: BIOSPY RIGHT AXILLARY MASS AND OR LYMPH;  Surgeon: Sourav Ernst MD;  Location: [facility];  Service:    • BREAST BIOPSY  12/07/2007    Mammotome biopsy of the right side with clip placement   • BREAST LUMPECTOMY     • BREAST RECONSTRUCTION  10/28/2008    Abscence of right breast secondary to mastectomy. Implant exchange for reconstruction of right breast with open para-prosthetic capsulotomy   •  BREAST SURGERY  10/01/2009    Left breast ptosis and breast asymmetry secondary to right breast reconstruction for breast cancer. Left breast mastopexy with augmentation.   • CARDIAC CATHETERIZATION  09/18/2008    Normal coronary arteriography. Normal left ventricular angiogram   • EP STUDY     • MASTECTOMY Right    • MASTECTOMY  02/05/2008    Multifocal right breast ductal carcinoma-in-situ. Right total mastectomy   • MASTECTOMY, PARTIAL  01/03/2008    Ductal carcinoma in-situ of the right breast, proven by mammotome biopsy. Right lumpectomy, medial portion of the breast, between 2 o'clock and 4 o'clock, 5 cm from the nipple, with clip placement, radiographic inter. of severo. specimen, & ultrasound   • PAP SMEAR  03/03/2009    Negative   • NH INSJ TUNNELED CVC W/O SUBQ PORT/ AGE 5 YR/> Right 7/10/2017    Procedure: MEDIPORT     (c-arm#2);  Surgeon: Sourav Ernst MD;  Location: [facility];  Service: General       ALLERGIES:    Allergies   Allergen Reactions   • Percocet [Oxycodone-Acetaminophen] Nausea And Vomiting       FAMILY HISTORY:  Family History   Problem Relation Age of Onset   • Anemia Mother    • Multiple sclerosis Mother    • No Known Problems Father    • Diabetes Other    • Multiple sclerosis Other        REVIEW OF SYSTEMS:      CONSTITUTIONAL: Complains of fatigue.  Denies any fever, chills .      HEENT: No epistaxis, mouth sores or difficulty swallowing.     RESPIRATORY: No new shortness of breath. No new cough or hemoptysis.     CARDIOVASCULAR: No chest pain or palpitations.     GASTROINTESTINAL:  states scopolamine patch is helping her with nausea.  No new abdominal pain. No blood in the stool.     GENITOURINARY: No Dysuria or Hematuria.     MUSCULOSKELETAL: Complains of pain in bilateral hip.  Complains of back pain.     LYMPHATICS: States drainage from right axilla is improved .     NEUROLOGICAL : Complains of intermittent tingling and numbness affecting bilateral hand, denies any recent  "worsening. No headache or dizziness. No seizures or balance problems.     SKIN: Positive for history of melanoma status post surgical removal. Denies any new skin lesion worrisome for melanoma.              PHYSICAL EXAMINATION:      VITAL SIGNS:  /87  Pulse 87  Temp 98.9 °F (37.2 °C) (Temporal Artery )   Resp 18  Ht 170.2 cm (67.01\")  Wt 94.2 kg (207 lb 10.8 oz)  BMI 32.52 kg/m2    GENERAL:  Not in any distress.    HEENT:  Normocephalic, Atraumatic.Mild Conjunctival pallor. No icterus. Extraocular Movements Intact. No Facial Asymmetry noted.    NECK:  No adenopathy. No JVD.    RESPIRATORY:  Fair air entry bilateral. No rhonchi or wheezing.    CARDIOVASCULAR:  S1, S2. Regular rate and rhythm. No murmur or gallop appreciated.    ABDOMEN:  Soft, obese, nontender. Bowel sounds present in all four quadrants.  No organomegaly appreciated.    EXTREMITIES:  No edema.No Calf Tenderness.    NEUROLOGIC:  Alert, awake and oriented ×3.  No  Motor or sensory deficit appreciated. Cranial Nerves 2-12 grossly intact.      DIAGNOSTIC DATA:    Glucose   Date Value Ref Range Status   12/06/2017 97 60 - 100 mg/dL Final     Sodium   Date Value Ref Range Status   12/06/2017 142 137 - 145 mmol/L Final     Potassium   Date Value Ref Range Status   12/06/2017 3.9 3.5 - 5.1 mmol/L Final     CO2   Date Value Ref Range Status   12/06/2017 24.0 22.0 - 31.0 mmol/L Final     Chloride   Date Value Ref Range Status   12/06/2017 105 95 - 110 mmol/L Final     Anion Gap   Date Value Ref Range Status   12/06/2017 13.0 5.0 - 15.0 mmol/L Final     Creatinine   Date Value Ref Range Status   12/06/2017 0.63 0.50 - 1.00 mg/dL Final     BUN   Date Value Ref Range Status   12/06/2017 13 7 - 21 mg/dL Final     BUN/Creatinine Ratio   Date Value Ref Range Status   12/06/2017 20.6 7.0 - 25.0 Final     Calcium   Date Value Ref Range Status   12/06/2017 9.8 8.4 - 10.2 mg/dL Final     eGFR Non  Amer   Date Value Ref Range Status   12/06/2017 100 " 58 - 135 mL/min/1.73 Final     Alkaline Phosphatase   Date Value Ref Range Status   12/06/2017 96 38 - 126 U/L Final     Total Protein   Date Value Ref Range Status   12/06/2017 7.1 6.3 - 8.6 g/dL Final     ALT (SGPT)   Date Value Ref Range Status   12/06/2017 63 (H) 9 - 52 U/L Final     AST (SGOT)   Date Value Ref Range Status   12/06/2017 32 14 - 36 U/L Final     Total Bilirubin   Date Value Ref Range Status   12/06/2017 0.3 0.2 - 1.3 mg/dL Final     Albumin   Date Value Ref Range Status   12/06/2017 4.00 3.40 - 4.80 g/dL Final     Globulin   Date Value Ref Range Status   12/06/2017 3.1 2.3 - 3.5 gm/dL Final     A/G Ratio   Date Value Ref Range Status   12/06/2017 1.3 1.1 - 1.8 g/dL Final     Lab Results   Component Value Date    WBC 7.18 12/06/2017    HGB 11.1 (L) 12/06/2017    HCT 33.5 (L) 12/06/2017    MCV 90.3 12/06/2017     12/06/2017     Lab Results   Component Value Date    NEUTROABS 6.67 12/06/2017     Lab Results   Component Value Date     118.0 (H) 11/15/2017    LABCA2 112.2 (H) 11/15/2017     2 D Echo Done on July 19, 2017 showed:  Interpretation Summary   · The left ventricular cavity is borderline dilated.  · Left ventricular systolic function is normal. Estimated EF = 53%.  · Left ventricular diastolic dysfunction (grade I) consistent with impaired relaxation.  · IAS aneurysm noted. No PFO or ASD           Radiology Data :  Doppler ultrasound of right upper extremity done on November 10, 2017 showed:  IMPRESSION:  CONCLUSION:   Abnormal exam with thrombus visualized in the right internal  jugular vein, consistent with DVT.    MRI of brain with and without contrast done on November 9, 2017 was reviewed with the radiologist, it showed:  IMPRESSION:  CONCLUSION:  At least six calvarial metastases, largest 1.6 cm residing in the  left parietal bone.  No brain metastases demonstrated.    CT of chest, abdomen and pelvis with contrast done on November 9, 2017 showed:  IMPRESSION:  1. Partial  filling defect involving the right brachiocephalic  vein suspicious for thrombus in this region.  2. Stable left lobe of liver metastatic disease.  3. Stable L4 vertebral body pathologic compression fracture.  There is loss of diffuse L4 vertebral body height by 70%. Diffuse  thoracic and lumbar spine stable osteoblastic and osteolytic  lesions consistent with metastatic disease.          X-ray of left hip done on September 7, 2017 showed:  Hyperlucency of the inferior pubic rami and the inferior aspect of obturator foramen consistent with osteolytic metastasis.    PET/CT done on July 3, 2017 showed:  IMPRESSION:  CONCLUSION:  1. Extensive metastatic disease. Pathologic uptake within  enlarged right-sided axillary lymph nodes. Metastatic adenopathy  in both laurie and mediastinum. Tumor replacing most of the left  lobe of liver. Intra-abdominal retroperitoneal metastases,  adenopathy.     Extensive skeletal metastases including a pathologic fracture at  L4.        Nuclear bone scan done on June 20, 2017 showed:  1. Increased uptake at L4 suggestive of metastatic process.  2. 3 or 4 areas of increased activity in bony calvarium  3. Some increased activity in tips posteriorly and anteriorly suggestive of metastatic carcinoma to the skeletal system.           Pathology :    Pathology data from July 10, 2017 showed:  Final Diagnosis   Mass right axilla, excisional biopsy:      Metastatic poorly differentiated ductal carcinoma, breast primary      Estrogen receptor positive, 95% stainability, strong intensity      Progesterone receptor positive, 95% stainability, strong intensity      HER-2 2+(equivocal), sent to [facility] for FISH     Addendum   Her 2 FISH result from [facility] is NEGATIVE         Pathology report from December 7, 2007 showed:  FINAL DIAGNOSIS:   RIGHT BREAST MAMMOTOME BIOPSY:        DUCTAL CARCINOMA IN SITU, INTERMEDIATE NUCLEAR GRADE              WITH MICROCALCIFICATION.      ADDENDUM:   Estrogen  receptors are positive (90-95% stainability).   Progesterone receptors are positive (90-95% stainability).         ASSESSMENT AND PLAN:      1.  Metastatic cancer with extensive adenopathy in right axilla, mediastinum, hilar, abdominal, retroperitoneal with extensive liver and bone metastasis on PET/CT.  Patient underwent right apatient underwent right axillary lymph node excision by Dr. Ernst on July 10, 2017.Pathology report confirmed ductal carcinoma of breast with estrogen and progesterone positivity.  At her on palliative chemotherapy with Taxotere and cytoxan on July 26, 2017.   Patient was  seen at [facility] for second opinion regarding her breast cancer.  Case was discussed with Dr vazquez oncologist that has seen patient at [facility].  She agreed with her chemotherapy at this point.  In future if he get her disease under control will change her to hormonal therapy with Palbociclib.  CT of chest, abdomen and pelvis with contrast done after 5 cycles on November 9, 2017 shows stable disease involving liver.  We will go ahead with cycle 7 of Taxotere and Cytoxan today on December 6, 2017.  We will do restaging CT of chest, abdomen and pelvis with contrast prior to next clinic visit in 3 weeks.  If her disease is stable on next CT scan plan is to change her on Palbociclib with letrozole which was again discussed with patient.     2.  Brain metastasis:MRi Brain with and without done on November 9, 2017 was reviewed and compared with t MRI done 6 weeks ago.  Brain calvarial metastasis are stable or somewhat improved.  No need to do radiation at this point which was discussed with patient.    3.  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  She is currently on Coumadin.  INR is 2 today.  She is being followed by Coumadin clinic She was instructed to come to the emergency room in case she starts having any bleeding.    4.  History of melanoma in situ status  post excision by Dr. Linn.    5.  Bone metastasis: Patient was started on Zometa on August 23, 2017.  Continue with Zometa as scheduled for now.  Patient denies any mouth sores or jaw pain.  CT scan shows about loss of 70% of height of L4 vertebral body, patient has been referred to Dr. Pugh to get evaluated for kyphoplasty.     6.  History of ductal carcinoma in situ of the right breast diagnosed in 2007.  Status post mastectomy followed by adjuvant tamoxifen for 5 years which was finished in 2013.    7.  Elevated  liver function tests secondary to liver metastases:   8.  Health maintenance: Patient does not smoke.  Not a candidate for screening colonoscopy at this point.  Remains full code.    9.  Prescriptions: Patient has enough prescription of Decadron, Zofran, scopolamine and Ultram.        Ovidio Meadows MD  12/6/2017  1:13 PM        EMR Dragon/Transcription disclaimer:   Much of this encounter note is an electronic transcription/translation of spoken language to printed text. The electronic translation of spoken language may permit erroneous, or at times, nonsensical words or phrases to be inadvertently transcribed; Although I have reviewed the note for such errors, some may still exist.

## 2017-12-07 LAB
CANCER AG15-3 SERPL-ACNC: 99.6 U/ML (ref 0–25)
CANCER AG27-29 SERPL-ACNC: 101.6 U/ML (ref 0–38.6)

## 2017-12-08 ENCOUNTER — ANTICOAGULATION VISIT (OUTPATIENT)
Dept: CARDIAC SURGERY | Facility: CLINIC | Age: 47
End: 2017-12-08

## 2017-12-08 VITALS — HEART RATE: 88 BPM | SYSTOLIC BLOOD PRESSURE: 108 MMHG | OXYGEN SATURATION: 97 % | DIASTOLIC BLOOD PRESSURE: 72 MMHG

## 2017-12-08 DIAGNOSIS — Z79.01 LONG-TERM (CURRENT) USE OF ANTICOAGULANTS: ICD-10-CM

## 2017-12-08 DIAGNOSIS — I82.C11 ACUTE THROMBOSIS OF RIGHT INTERNAL JUGULAR VEIN (HCC): ICD-10-CM

## 2017-12-08 LAB — INR PPP: 2.7 (ref 0.9–1.1)

## 2017-12-08 PROCEDURE — 85610 PROTHROMBIN TIME: CPT | Performed by: NURSE PRACTITIONER

## 2017-12-08 PROCEDURE — 99211 OFF/OP EST MAY X REQ PHY/QHP: CPT | Performed by: NURSE PRACTITIONER

## 2017-12-08 NOTE — PROGRESS NOTES
PT had chemo on Wednesday. PT 8mg of steroids on Tuesday, 16mg of steroids on Wednesday and Thursday, and will begin 4mg steroids daily x4 days today. PT states she does not believe she will be able to eat green. Denies any bleeding issues or further s/s of blood clot. Due to chemo and inability to eat green, pt's dose was decreased slightly and pt will be seen on Tuesday when she can return. Patient instructed regarding medication; results given and questions answered. Nutritional counseling given.  Dietary factors affecting therapy addressed.  Patient instructed to monitor for excessive bruising or bleeding. PT verbalizes understanding.   Electronically signed by EVELYN Matute

## 2017-12-12 ENCOUNTER — ANTICOAGULATION VISIT (OUTPATIENT)
Dept: CARDIAC SURGERY | Facility: CLINIC | Age: 47
End: 2017-12-12

## 2017-12-12 VITALS — SYSTOLIC BLOOD PRESSURE: 116 MMHG | HEART RATE: 108 BPM | DIASTOLIC BLOOD PRESSURE: 76 MMHG

## 2017-12-12 DIAGNOSIS — I82.C11 ACUTE THROMBOSIS OF RIGHT INTERNAL JUGULAR VEIN (HCC): ICD-10-CM

## 2017-12-12 DIAGNOSIS — Z79.01 LONG-TERM (CURRENT) USE OF ANTICOAGULANTS: ICD-10-CM

## 2017-12-12 LAB — INR PPP: 1.8 (ref 0.9–1.1)

## 2017-12-12 PROCEDURE — 99211 OFF/OP EST MAY X REQ PHY/QHP: CPT | Performed by: NURSE PRACTITIONER

## 2017-12-12 PROCEDURE — 85610 PROTHROMBIN TIME: CPT | Performed by: NURSE PRACTITIONER

## 2017-12-12 NOTE — PROGRESS NOTES
Pt will finish post chemo steroids tomorrow. Pt denies other med changes or bleeding problems. Pt is not scheduled for chemo again until Dec 27 but she is having a CT scan next week and may be switched to oral chemo depending on scan results. Pt was instructed to contact [facility] if she is placed on oral chemo; pt verbalized. Dose adjusted and pt instructed to hold green veggies for 2 days; pt verbalized. Patient instructed regarding medication; results given and questions answered. Nutritional counseling given.  Dietary factors affecting therapy addressed.  Patient instructed to monitor for excessive bruising or bleeding. Will recheck next week when pt returns for CT scan.           This document has been electronically signed by EVELYN Brewster on December 13, 2017 11:00 AM

## 2017-12-20 ENCOUNTER — ANTICOAGULATION VISIT (OUTPATIENT)
Dept: CARDIAC SURGERY | Facility: CLINIC | Age: 47
End: 2017-12-20

## 2017-12-20 ENCOUNTER — INFUSION (OUTPATIENT)
Dept: ONCOLOGY | Facility: HOSPITAL | Age: 47
End: 2017-12-20

## 2017-12-20 ENCOUNTER — APPOINTMENT (OUTPATIENT)
Dept: CT IMAGING | Facility: HOSPITAL | Age: 47
End: 2017-12-20
Attending: INTERNAL MEDICINE

## 2017-12-20 ENCOUNTER — HOSPITAL ENCOUNTER (OUTPATIENT)
Dept: CT IMAGING | Facility: HOSPITAL | Age: 47
Discharge: HOME | End: 2017-12-20
Attending: INTERNAL MEDICINE | Admitting: INTERNAL MEDICINE

## 2017-12-20 VITALS — HEART RATE: 92 BPM | DIASTOLIC BLOOD PRESSURE: 70 MMHG | OXYGEN SATURATION: 97 % | SYSTOLIC BLOOD PRESSURE: 98 MMHG

## 2017-12-20 DIAGNOSIS — Z17.0 MALIGNANT NEOPLASM OF RIGHT BREAST IN FEMALE, ESTROGEN RECEPTOR POSITIVE, UNSPECIFIED SITE OF BREAST (HCC): ICD-10-CM

## 2017-12-20 DIAGNOSIS — Z45.2 ENCOUNTER FOR VENOUS ACCESS DEVICE CARE: ICD-10-CM

## 2017-12-20 DIAGNOSIS — C50.911 MALIGNANT NEOPLASM OF RIGHT BREAST IN FEMALE, ESTROGEN RECEPTOR POSITIVE, UNSPECIFIED SITE OF BREAST (HCC): Primary | ICD-10-CM

## 2017-12-20 DIAGNOSIS — I82.C11 ACUTE THROMBOSIS OF RIGHT INTERNAL JUGULAR VEIN (HCC): ICD-10-CM

## 2017-12-20 DIAGNOSIS — Z45.2 ENCOUNTER FOR ADJUSTMENT OR MANAGEMENT OF VASCULAR ACCESS DEVICE: ICD-10-CM

## 2017-12-20 DIAGNOSIS — C80.1 METASTASIS TO BONE OF UNKNOWN PRIMARY (HCC): ICD-10-CM

## 2017-12-20 DIAGNOSIS — Z17.0 MALIGNANT NEOPLASM OF RIGHT BREAST IN FEMALE, ESTROGEN RECEPTOR POSITIVE, UNSPECIFIED SITE OF BREAST (HCC): Primary | ICD-10-CM

## 2017-12-20 DIAGNOSIS — C50.911 MALIGNANT NEOPLASM OF RIGHT BREAST IN FEMALE, ESTROGEN RECEPTOR POSITIVE, UNSPECIFIED SITE OF BREAST (HCC): ICD-10-CM

## 2017-12-20 DIAGNOSIS — Z79.01 LONG-TERM (CURRENT) USE OF ANTICOAGULANTS: ICD-10-CM

## 2017-12-20 DIAGNOSIS — C79.51 METASTASIS TO BONE OF UNKNOWN PRIMARY (HCC): ICD-10-CM

## 2017-12-20 LAB
ALBUMIN SERPL-MCNC: 3.6 G/DL (ref 3.4–4.8)
ALBUMIN/GLOB SERPL: 1.2 G/DL (ref 1.1–1.8)
ALP SERPL-CCNC: 142 U/L (ref 38–126)
ALT SERPL W P-5'-P-CCNC: 110 U/L (ref 9–52)
ANION GAP SERPL CALCULATED.3IONS-SCNC: 8 MMOL/L (ref 5–15)
AST SERPL-CCNC: 48 U/L (ref 14–36)
BILIRUB SERPL-MCNC: 0.3 MG/DL (ref 0.2–1.3)
BUN BLD-MCNC: 13 MG/DL (ref 7–21)
BUN/CREAT SERPL: 17.6 (ref 7–25)
CALCIUM SPEC-SCNC: 8.6 MG/DL (ref 8.4–10.2)
CHLORIDE SERPL-SCNC: 105 MMOL/L (ref 95–110)
CO2 SERPL-SCNC: 26 MMOL/L (ref 22–31)
CREAT BLD-MCNC: 0.74 MG/DL (ref 0.5–1)
GFR SERPL CREATININE-BSD FRML MDRD: 83 ML/MIN/1.73 (ref 58–135)
GLOBULIN UR ELPH-MCNC: 2.9 GM/DL (ref 2.3–3.5)
GLUCOSE BLD-MCNC: 92 MG/DL (ref 60–100)
INR PPP: 3.3 (ref 0.9–1.1)
MAGNESIUM SERPL-MCNC: 1.7 MG/DL (ref 1.6–2.3)
PHOSPHATE SERPL-MCNC: 4.1 MG/DL (ref 2.4–4.4)
POTASSIUM BLD-SCNC: 3.8 MMOL/L (ref 3.5–5.1)
PROT SERPL-MCNC: 6.5 G/DL (ref 6.3–8.6)
SODIUM BLD-SCNC: 139 MMOL/L (ref 137–145)

## 2017-12-20 PROCEDURE — 74177 CT ABD & PELVIS W/CONTRAST: CPT

## 2017-12-20 PROCEDURE — 0 IOPAMIDOL 61 % SOLUTION: Performed by: INTERNAL MEDICINE

## 2017-12-20 PROCEDURE — 83735 ASSAY OF MAGNESIUM: CPT

## 2017-12-20 PROCEDURE — 25010000002 HEPARIN FLUSH (PORCINE) 100 UNIT/ML SOLUTION: Performed by: INTERNAL MEDICINE

## 2017-12-20 PROCEDURE — 25010000002 ZOLEDRONIC ACID PER 1 MG: Performed by: INTERNAL MEDICINE

## 2017-12-20 PROCEDURE — 80053 COMPREHEN METABOLIC PANEL: CPT

## 2017-12-20 PROCEDURE — 85610 PROTHROMBIN TIME: CPT | Performed by: NURSE PRACTITIONER

## 2017-12-20 PROCEDURE — 71260 CT THORAX DX C+: CPT

## 2017-12-20 PROCEDURE — 36415 COLL VENOUS BLD VENIPUNCTURE: CPT | Performed by: INTERNAL MEDICINE

## 2017-12-20 PROCEDURE — 96374 THER/PROPH/DIAG INJ IV PUSH: CPT | Performed by: INTERNAL MEDICINE

## 2017-12-20 PROCEDURE — 84100 ASSAY OF PHOSPHORUS: CPT

## 2017-12-20 RX ORDER — SODIUM CHLORIDE 9 MG/ML
250 INJECTION, SOLUTION INTRAVENOUS ONCE
Status: CANCELLED | OUTPATIENT
Start: 2017-12-20

## 2017-12-20 RX ORDER — SODIUM CHLORIDE 0.9 % (FLUSH) 0.9 %
10 SYRINGE (ML) INJECTION AS NEEDED
Status: DISCONTINUED | OUTPATIENT
Start: 2017-12-20 | End: 2017-12-20 | Stop reason: HOSPADM

## 2017-12-20 RX ORDER — SODIUM CHLORIDE 9 MG/ML
250 INJECTION, SOLUTION INTRAVENOUS ONCE
Status: COMPLETED | OUTPATIENT
Start: 2017-12-20 | End: 2017-12-20

## 2017-12-20 RX ORDER — 0.9 % SODIUM CHLORIDE 0.9 %
10 VIAL (ML) INJECTION ONCE
Status: COMPLETED | OUTPATIENT
Start: 2017-12-20 | End: 2017-12-20

## 2017-12-20 RX ORDER — SODIUM CHLORIDE 0.9 % (FLUSH) 0.9 %
10 SYRINGE (ML) INJECTION AS NEEDED
Status: CANCELLED | OUTPATIENT
Start: 2017-12-20

## 2017-12-20 RX ADMIN — SODIUM CHLORIDE 250 ML: 9 INJECTION, SOLUTION INTRAVENOUS at 10:52

## 2017-12-20 RX ADMIN — SODIUM CHLORIDE, PRESERVATIVE FREE 500 UNITS: 5 INJECTION INTRAVENOUS at 11:30

## 2017-12-20 RX ADMIN — Medication 10 ML: at 11:30

## 2017-12-20 RX ADMIN — ZOLEDRONIC ACID 4 MG: 4 INJECTION, SOLUTION, CONCENTRATE INTRAVENOUS at 10:52

## 2017-12-20 RX ADMIN — Medication 10 ML: at 09:44

## 2017-12-20 RX ADMIN — IOPAMIDOL 93 ML: 612 INJECTION, SOLUTION INTRAVENOUS at 09:15

## 2017-12-20 RX ADMIN — SODIUM CHLORIDE, PRESERVATIVE FREE 500 UNITS: 5 INJECTION INTRAVENOUS at 08:34

## 2017-12-20 RX ADMIN — SODIUM CHLORIDE 10 ML: 9 INJECTION, SOLUTION INTRAMUSCULAR; INTRAVENOUS; SUBCUTANEOUS at 08:34

## 2017-12-20 NOTE — PROGRESS NOTES
PT started Doxycyline yesterday x7 days for stye. PT denies any bleeding issues or s/s of blood clot. Adjusted pt's dose today and Sunday. PT was instructed to increase vit k every 2 days as she can due to nausea. PT will be seen in [facility] on Wednesday when she returns to [facility]. Patient instructed regarding medication; results given and questions answered. Nutritional counseling given.  Dietary factors affecting therapy addressed.  Patient instructed to monitor for excessive bruising or bleeding. PT verbalizes understanding.   Electronically signed by EVELYN Matute

## 2017-12-27 ENCOUNTER — INFUSION (OUTPATIENT)
Dept: ONCOLOGY | Facility: HOSPITAL | Age: 47
End: 2017-12-27

## 2017-12-27 ENCOUNTER — ANTICOAGULATION VISIT (OUTPATIENT)
Dept: CARDIAC SURGERY | Facility: CLINIC | Age: 47
End: 2017-12-27

## 2017-12-27 ENCOUNTER — OFFICE VISIT (OUTPATIENT)
Dept: ONCOLOGY | Facility: CLINIC | Age: 47
End: 2017-12-27

## 2017-12-27 VITALS
BODY MASS INDEX: 33.16 KG/M2 | SYSTOLIC BLOOD PRESSURE: 112 MMHG | HEART RATE: 103 BPM | DIASTOLIC BLOOD PRESSURE: 78 MMHG | TEMPERATURE: 98.7 F | HEIGHT: 67 IN | WEIGHT: 211.3 LBS | RESPIRATION RATE: 18 BRPM

## 2017-12-27 VITALS — HEART RATE: 100 BPM

## 2017-12-27 DIAGNOSIS — Z17.0 MALIGNANT NEOPLASM OF RIGHT BREAST IN FEMALE, ESTROGEN RECEPTOR POSITIVE, UNSPECIFIED SITE OF BREAST (HCC): ICD-10-CM

## 2017-12-27 DIAGNOSIS — Z45.2 ENCOUNTER FOR ADJUSTMENT OR MANAGEMENT OF VASCULAR ACCESS DEVICE: ICD-10-CM

## 2017-12-27 DIAGNOSIS — C80.1 METASTASIS TO BONE OF UNKNOWN PRIMARY (HCC): Primary | ICD-10-CM

## 2017-12-27 DIAGNOSIS — Z79.01 LONG-TERM (CURRENT) USE OF ANTICOAGULANTS: ICD-10-CM

## 2017-12-27 DIAGNOSIS — I82.C11 ACUTE THROMBOSIS OF RIGHT INTERNAL JUGULAR VEIN (HCC): ICD-10-CM

## 2017-12-27 DIAGNOSIS — C50.911 MALIGNANT NEOPLASM OF RIGHT BREAST IN FEMALE, ESTROGEN RECEPTOR POSITIVE, UNSPECIFIED SITE OF BREAST (HCC): ICD-10-CM

## 2017-12-27 DIAGNOSIS — Z45.2 ENCOUNTER FOR VENOUS ACCESS DEVICE CARE: ICD-10-CM

## 2017-12-27 DIAGNOSIS — C79.51 METASTASIS TO BONE OF UNKNOWN PRIMARY (HCC): Primary | ICD-10-CM

## 2017-12-27 DIAGNOSIS — IMO0001 OTHER COMPLICATION DUE TO VENOUS ACCESS DEVICE, SUBSEQUENT ENCOUNTER: ICD-10-CM

## 2017-12-27 LAB
ALBUMIN SERPL-MCNC: 3.9 G/DL (ref 3.4–4.8)
ALBUMIN/GLOB SERPL: 1.3 G/DL (ref 1.1–1.8)
ALP SERPL-CCNC: 91 U/L (ref 38–126)
ALT SERPL W P-5'-P-CCNC: 51 U/L (ref 9–52)
ANION GAP SERPL CALCULATED.3IONS-SCNC: 11 MMOL/L (ref 5–15)
AST SERPL-CCNC: 32 U/L (ref 14–36)
BASOPHILS # BLD AUTO: 0 10*3/MM3 (ref 0–0.2)
BASOPHILS NFR BLD AUTO: 0 % (ref 0–2)
BILIRUB SERPL-MCNC: 0.2 MG/DL (ref 0.2–1.3)
BUN BLD-MCNC: 16 MG/DL (ref 7–21)
BUN/CREAT SERPL: 24.6 (ref 7–25)
CALCIUM SPEC-SCNC: 9.7 MG/DL (ref 8.4–10.2)
CHLORIDE SERPL-SCNC: 106 MMOL/L (ref 95–110)
CO2 SERPL-SCNC: 22 MMOL/L (ref 22–31)
CREAT BLD-MCNC: 0.65 MG/DL (ref 0.5–1)
DEPRECATED RDW RBC AUTO: 57.6 FL (ref 36.4–46.3)
EOSINOPHIL # BLD AUTO: 0 10*3/MM3 (ref 0–0.7)
EOSINOPHIL NFR BLD AUTO: 0 % (ref 0–7)
ERYTHROCYTE [DISTWIDTH] IN BLOOD BY AUTOMATED COUNT: 17.3 % (ref 11.5–14.5)
GFR SERPL CREATININE-BSD FRML MDRD: 97 ML/MIN/1.73 (ref 58–135)
GLOBULIN UR ELPH-MCNC: 3 GM/DL (ref 2.3–3.5)
GLUCOSE BLD-MCNC: 122 MG/DL (ref 60–100)
HCT VFR BLD AUTO: 35.1 % (ref 35–45)
HGB BLD-MCNC: 11.8 G/DL (ref 12–15.5)
IMM GRANULOCYTES # BLD: 0.02 10*3/MM3 (ref 0–0.02)
IMM GRANULOCYTES NFR BLD: 0.3 % (ref 0–0.5)
INR PPP: 1.9 (ref 0.9–1.1)
LYMPHOCYTES # BLD AUTO: 0.28 10*3/MM3 (ref 0.6–4.2)
LYMPHOCYTES NFR BLD AUTO: 4.1 % (ref 10–50)
MCH RBC QN AUTO: 31 PG (ref 26.5–34)
MCHC RBC AUTO-ENTMCNC: 33.6 G/DL (ref 31.4–36)
MCV RBC AUTO: 92.1 FL (ref 80–98)
MONOCYTES # BLD AUTO: 0.18 10*3/MM3 (ref 0–0.9)
MONOCYTES NFR BLD AUTO: 2.6 % (ref 0–12)
NEUTROPHILS # BLD AUTO: 6.41 10*3/MM3 (ref 2–8.6)
NEUTROPHILS NFR BLD AUTO: 93 % (ref 37–80)
PLATELET # BLD AUTO: 237 10*3/MM3 (ref 150–450)
PMV BLD AUTO: 8.8 FL (ref 8–12)
POTASSIUM BLD-SCNC: 4.3 MMOL/L (ref 3.5–5.1)
PROT SERPL-MCNC: 6.9 G/DL (ref 6.3–8.6)
RBC # BLD AUTO: 3.81 10*6/MM3 (ref 3.77–5.16)
SODIUM BLD-SCNC: 139 MMOL/L (ref 137–145)
WBC NRBC COR # BLD: 6.89 10*3/MM3 (ref 3.2–9.8)

## 2017-12-27 PROCEDURE — 86300 IMMUNOASSAY TUMOR CA 15-3: CPT

## 2017-12-27 PROCEDURE — 85025 COMPLETE CBC W/AUTO DIFF WBC: CPT | Performed by: INTERNAL MEDICINE

## 2017-12-27 PROCEDURE — G0463 HOSPITAL OUTPT CLINIC VISIT: HCPCS | Performed by: INTERNAL MEDICINE

## 2017-12-27 PROCEDURE — 36415 COLL VENOUS BLD VENIPUNCTURE: CPT | Performed by: INTERNAL MEDICINE

## 2017-12-27 PROCEDURE — 99211 OFF/OP EST MAY X REQ PHY/QHP: CPT | Performed by: NURSE PRACTITIONER

## 2017-12-27 PROCEDURE — 85610 PROTHROMBIN TIME: CPT | Performed by: NURSE PRACTITIONER

## 2017-12-27 PROCEDURE — 80053 COMPREHEN METABOLIC PANEL: CPT | Performed by: INTERNAL MEDICINE

## 2017-12-27 PROCEDURE — 99215 OFFICE O/P EST HI 40 MIN: CPT | Performed by: INTERNAL MEDICINE

## 2017-12-27 PROCEDURE — 25010000002 ALTEPLASE 2 MG RECONSTITUTED SOLUTION: Performed by: INTERNAL MEDICINE

## 2017-12-27 PROCEDURE — 36593 DECLOT VASCULAR DEVICE: CPT | Performed by: INTERNAL MEDICINE

## 2017-12-27 PROCEDURE — 25010000002 HEPARIN FLUSH (PORCINE) 100 UNIT/ML SOLUTION: Performed by: INTERNAL MEDICINE

## 2017-12-27 RX ORDER — LACTULOSE 20 G/30ML
20 SOLUTION ORAL DAILY
Qty: 500 ML | Refills: 1 | Status: SHIPPED | OUTPATIENT
Start: 2017-12-27 | End: 2019-04-19

## 2017-12-27 RX ORDER — TRAMADOL HYDROCHLORIDE 50 MG/1
50 TABLET ORAL EVERY 8 HOURS PRN
Qty: 90 TABLET | Refills: 0 | Status: SHIPPED | OUTPATIENT
Start: 2017-12-27 | End: 2018-11-05 | Stop reason: SDUPTHER

## 2017-12-27 RX ORDER — SODIUM CHLORIDE 0.9 % (FLUSH) 0.9 %
10 SYRINGE (ML) INJECTION AS NEEDED
Status: DISCONTINUED | OUTPATIENT
Start: 2017-12-27 | End: 2017-12-27 | Stop reason: HOSPADM

## 2017-12-27 RX ORDER — WARFARIN SODIUM 5 MG/1
TABLET ORAL
Qty: 30 TABLET | Refills: 5 | Status: SHIPPED | OUTPATIENT
Start: 2017-12-27 | End: 2018-06-30 | Stop reason: SDUPTHER

## 2017-12-27 RX ORDER — SODIUM CHLORIDE 0.9 % (FLUSH) 0.9 %
10 SYRINGE (ML) INJECTION AS NEEDED
Status: CANCELLED | OUTPATIENT
Start: 2018-01-31

## 2017-12-27 RX ORDER — LETROZOLE 2.5 MG/1
2.5 TABLET, FILM COATED ORAL DAILY
Qty: 30 TABLET | Refills: 3 | Status: SHIPPED | OUTPATIENT
Start: 2017-12-27 | End: 2019-09-30 | Stop reason: SDUPTHER

## 2017-12-27 RX ADMIN — Medication 10 ML: at 10:10

## 2017-12-27 RX ADMIN — Medication 10 ML: at 08:33

## 2017-12-27 RX ADMIN — SODIUM CHLORIDE, PRESERVATIVE FREE 500 UNITS: 5 INJECTION INTRAVENOUS at 10:10

## 2017-12-27 RX ADMIN — ALTEPLASE 2 MG: 2.2 INJECTION, POWDER, LYOPHILIZED, FOR SOLUTION INTRAVENOUS at 09:12

## 2017-12-27 NOTE — PROGRESS NOTES
Pt finished AB two days ago.  Pt states she may switch to oral chemo today.  Pt denies any bleeding issues.  Adjusted coumadin dose and instructed pt to limit green intake today.  Recheck INR this Friday.  Pt verbalizes.  Patient instructed regarding medication; results given and questions answered. Nutritional counseling given.  Dietary factors affecting therapy addressed.  Patient instructed to monitor for excessive bruising or bleeding.  Electronically signed by EVELYN Matute

## 2017-12-27 NOTE — PATIENT INSTRUCTIONS
Palbociclib capsules  What is this medicine?  PALBOCICLIB (pal johanny SYEILEEN klib) is a medicine that targets proteins in cancer cells and stops the cancer cells from growing. It is used to treat breast cancer.  This medicine may be used for other purposes; ask your health care provider or pharmacist if you have questions.  COMMON BRAND NAME(S): Stefano  What should I tell my health care provider before I take this medicine?  They need to know if you have any of these conditions:  -infection (especially a virus infection such as chickenpox, cold sores, or herpes)  -low blood counts, like low white cell, platelet, or red cell counts  -an unusual or allergic reaction to palbociclib, other medicines, foods, dyes, or preservatives  -pregnant or trying to get pregnant  -breast-feeding  How should I use this medicine?  Take this medicine by mouth with a glass of water. Follow the directions on the prescription label. Take this medicine with food. Avoid grapefruit and grapefruit juice while you are taking this medicine. Swallow the capsule whole. Do not cut, crush or chew this medicine. Take your medicine at regular intervals. Do not take it more often than directed. Do not stop taking except on your doctor's advice.  Talk to your pediatrician regarding the use of this medicine in children. Special care may be needed.  Overdosage: If you think you have taken too much of this medicine contact a poison control center or emergency room at once.  NOTE: This medicine is only for you. Do not share this medicine with others.  What if I miss a dose?  If you miss a dose or vomit after taking a dose, do not take another dose on that day. Take your next dose at your regular time.  What may interact with this medicine?  This medicine may interact with the following medications:  -alfentanil  -antiviral medicines for HIV or AIDS  -boceprevir  -bosentan  -carbamazepine  -certain medicines for fungal infections like ketoconazole, itraconazole,  posaconazole, voriconazole  -clarithromycin  -cyclosporine  -ergot alkaloids like dihydroergotamine, ergotamine  -everolimus  -fentanyl  -grapefruit juice  -midazolam  -modafinil  -nafcillin  -nefazodone  -phenobarbital  -phenytoin  -pimozide  -quinidine  -rifampin  -sirolimus  -Brittney's Wort  -tacrolimus  -telaprevir  -telithromycin  -verapamil  This list may not describe all possible interactions. Give your health care provider a list of all the medicines, herbs, non-prescription drugs, or dietary supplements you use. Also tell them if you smoke, drink alcohol, or use illegal drugs. Some items may interact with your medicine.  What should I watch for while using this medicine?  Visit your doctor for regular check ups. Report any side effects. Continue your course of treatment unless your doctor tells you to stop. You will need blood work done while you are taking this medicine.  Do not become pregnant while taking this medicine or for at least 3 weeks after stopping it. Women should inform their doctor if they wish to become pregnant or think they might be pregnant. Men should not father a child while taking this medicine and for 3 months after stopping it. There is a potential for serious side effects to an unborn child. Men should inform their doctors if they wish to father a child later. This medicine may lower sperm counts. Talk to your health care professional or pharmacist for more information. Do not breast-feed an infant while taking this medicine or for 3 weeks after the last dose.  Avoid taking products that contain aspirin, acetaminophen, ibuprofen, naproxen, or ketoprofen unless instructed by your doctor. These medicines may hide a fever.  Be careful brushing and flossing your teeth or using a toothpick because you may get an infection or bleed more easily. If you have any dental work done, tell your dentist you are receiving this medicine.  Call your doctor or health care professional for advice if  you get a fever, chills or sore throat, or other symptoms of a cold or flu. Do not treat yourself. This drug decreases your body's ability to fight infections. Try to avoid being around people who are sick.  This medicine may increase your risk to bruise or bleed. Call your doctor or health care professional if you notice any unusual bleeding.  This drug may make you feel generally unwell. This is not uncommon, as chemotherapy can affect healthy cells as well as cancer cells. Report any side effects. Continue your course of treatment even though you feel ill unless your doctor tells you to stop.  What side effects may I notice from receiving this medicine?  Side effects that you should report to your doctor or health care professional as soon as possible:  -allergic reactions like skin rash, itching or hives, swelling of the face, lips, or tongue  -dizziness  -mouth sores  -low blood counts - this medicine may decrease the number of white blood cells, red blood cells and platelets. You may be at increased risk for infections and bleeding.  -pain, tingling, numbness in the hands or feet  -severe or persistent diarrhea, nausea, vomiting  -signs and symptoms of infection like fever or chills; cough; sore throat; pain or trouble passing urine  -signs of decreased platelets or bleeding - nosebleed, bruising, pinpoint red spots on the skin, black, tarry stools, blood in the urine  -signs of decreased red blood cells - unusually weak or tired, feeling faint or lightheaded, falls  Side effects that usually do not require medical attention (report to your doctor or health care professional if they continue or are bothersome):  -decreased appetite  -hair thinning or hair loss  -mild diarrhea  -nausea  -weak or tired  This list may not describe all possible side effects. Call your doctor for medical advice about side effects. You may report side effects to FDA at 3-903-FDA-6629.  Where should I keep my medicine?  Keep out of  the reach of children.  Store between 20 and 25 degrees C (68 and 77 degrees F). Throw away any unused medicine after the expiration date.  NOTE: This sheet is a summary. It may not cover all possible information. If you have questions about this medicine, talk to your doctor, pharmacist, or health care provider.     © 2017, [employer]. (2017-04-06 16:30:07)  Letrozole tablets  What is this medicine?  LETROZOLE (LET erik zole) blocks the production of estrogen. Certain types of breast cancer grow under the influence of estrogen. Letrozole helps block tumor growth. This medicine is used to treat advanced breast cancer in postmenopausal women.  This medicine may be used for other purposes; ask your health care provider or pharmacist if you have questions.  COMMON BRAND NAME(S): Tenisha  What should I tell my health care provider before I take this medicine?  They need to know if you have any of these conditions:  -liver disease  -osteoporosis (weak bones)  -an unusual or allergic reaction to letrozole, other medicines, foods, dyes, or preservatives  -pregnant or trying to get pregnant  -breast-feeding  How should I use this medicine?  Take this medicine by mouth with a glass of water. You may take it with or without food. Follow the directions on the prescription label. Take your medicine at regular intervals. Do not take your medicine more often than directed. Do not stop taking except on your doctor's advice.  Talk to your pediatrician regarding the use of this medicine in children. Special care may be needed.  Overdosage: If you think you have taken too much of this medicine contact a poison control center or emergency room at once.  NOTE: This medicine is only for you. Do not share this medicine with others.  What if I miss a dose?  If you miss a dose, take it as soon as you can. If it is almost time for your next dose, take only that dose. Do not take double or extra doses.  What may interact with this  medicine?  Do not take this medicine with any of the following medications:  -estrogens, like hormone replacement therapy or birth control pills  This medicine may also interact with the following medications:  -dietary supplements such as androstenedione or DHEA  -prasterone  -tamoxifen  This list may not describe all possible interactions. Give your health care provider a list of all the medicines, herbs, non-prescription drugs, or dietary supplements you use. Also tell them if you smoke, drink alcohol, or use illegal drugs. Some items may interact with your medicine.  What should I watch for while using this medicine?  Visit your doctor or health care professional for regular check-ups to monitor your condition.  Do not use this drug if you are pregnant. Serious side effects to an unborn child are possible. Talk to your doctor or pharmacist for more information.  You may get drowsy or dizzy. Do not drive, use machinery, or do anything that needs mental alertness until you know how this medicine affects you. Do not stand or sit up quickly, especially if you are an older patient. This reduces the risk of dizzy or fainting spells.  What side effects may I notice from receiving this medicine?  Side effects that you should report to your doctor or health care professional as soon as possible:  -allergic reactions like skin rash, itching, or hives  -bone fracture  -chest pain  -difficulty breathing or shortness of breath  -severe pain, swelling, warmth in the leg  -unusually weak or tired  -vaginal bleeding  Side effects that usually do not require medical attention (report to your doctor or health care professional if they continue or are bothersome):  -bone, back, joint, or muscle pain  -dizziness  -fatigue  -fluid retention  -headache  -hot flashes, night sweats  -nausea  -weight gain  This list may not describe all possible side effects. Call your doctor for medical advice about side effects. You may report side  effects to FDA at 2-870-FDA-4462.  Where should I keep my medicine?  Keep out of the reach of children.  Store between 15 and 30 degrees C (59 and 86 degrees F). Throw away any unused medicine after the expiration date.  NOTE: This sheet is a summary. It may not cover all possible information. If you have questions about this medicine, talk to your doctor, pharmacist, or health care provider.     © 2017, [employer]. (2009-02-27 16:43:44)

## 2017-12-28 LAB
CANCER AG15-3 SERPL-ACNC: 88.3 U/ML (ref 0–25)
CANCER AG27-29 SERPL-ACNC: 90.3 U/ML (ref 0–38.6)

## 2017-12-28 NOTE — PROGRESS NOTES
DATE OF VISIT: 12/27/17    REASON FOR VISIT:  Metastatic breast cancer    HISTORY OF PRESENT ILLNESS:    47-year-old female with a past medical history significant for history of ductal carcinoma in situ of the right breast diagnosed in December 2007 patient eventually had a mastectomy done in February 2008 and took adjuvant tamoxifen for 5 years until 2013.  Started on palliative chemotherapy with Taxotere and Cytoxan on July 26, 2017.  She is here to get cycle 8 of chemotherapy today.   Had a restaging CT of chest, abdomen and pelvis with contrast done last week.  Denies any active bleeding.  Complains of tingling and numbness affecting bilateral hand and feet.  Denies any new headache or dizziness.  Complains of chronic pain in bilateral hip and back, denies any recent worsening.      PAST MEDICAL HISTORY:    Past Medical History:   Diagnosis Date   • Anxiety    • Depression    • Encounter for gynecological examination    • Malignant neoplasm of female breast     hx of dcis s/p mastectomy and reconstruction and augmentation      • Primary malignant neoplasm of breast     dcis in rt breast s/p mastectomy,reconstruction      • Ventricular premature beats        SOCIAL HISTORY:    Social History   Substance Use Topics   • Smoking status: Never Smoker   • Smokeless tobacco: Never Used   • Alcohol use No       Surgical History :  Past Surgical History:   Procedure Laterality Date   • AUGMENTATION MAMMAPLASTY     • AXILLARY LYMPH NODE BIOPSY/EXCISION Right 7/10/2017    Procedure: BIOSPY RIGHT AXILLARY MASS AND OR LYMPH;  Surgeon: Sourav Ernst MD;  Location: [facility];  Service:    • BREAST BIOPSY  12/07/2007    Mammotome biopsy of the right side with clip placement   • BREAST LUMPECTOMY     • BREAST RECONSTRUCTION  10/28/2008    Abscence of right breast secondary to mastectomy. Implant exchange for reconstruction of right breast with open para-prosthetic capsulotomy   • BREAST SURGERY  10/01/2009    Left breast  ptosis and breast asymmetry secondary to right breast reconstruction for breast cancer. Left breast mastopexy with augmentation.   • CARDIAC CATHETERIZATION  09/18/2008    Normal coronary arteriography. Normal left ventricular angiogram   • EP STUDY     • MASTECTOMY Right    • MASTECTOMY  02/05/2008    Multifocal right breast ductal carcinoma-in-situ. Right total mastectomy   • MASTECTOMY, PARTIAL  01/03/2008    Ductal carcinoma in-situ of the right breast, proven by mammotome biopsy. Right lumpectomy, medial portion of the breast, between 2 o'clock and 4 o'clock, 5 cm from the nipple, with clip placement, radiographic inter. of severo. specimen, & ultrasound   • PAP SMEAR  03/03/2009    Negative   • [place] INSJ TUNNELED CVC W/O SUBQ PORT/ AGE 5 YR/> Right 7/10/2017    Procedure: MEDIPORT     (c-arm#2);  Surgeon: Sourav Ernst MD;  Location: [facility];  Service: General       ALLERGIES:    Allergies   Allergen Reactions   • Percocet [Oxycodone-Acetaminophen] Nausea And Vomiting       FAMILY HISTORY:  Family History   Problem Relation Age of Onset   • Anemia Mother    • Multiple sclerosis Mother    • No Known Problems Father    • Diabetes Other    • Multiple sclerosis Other        REVIEW OF SYSTEMS:      CONSTITUTIONAL: Complains of fatigue.  Denies any fever, chills .      HEENT: No epistaxis, mouth sores or difficulty swallowing.     RESPIRATORY: No new shortness of breath. No new cough or hemoptysis.     CARDIOVASCULAR: No chest pain or palpitations.     GASTROINTESTINAL:  states scopolamine patch is helping her with nausea.  No new abdominal pain. No blood in the stool.     GENITOURINARY: No Dysuria or Hematuria.     MUSCULOSKELETAL: Complains of pain in bilateral hip.  Complains of back pain.     LYMPHATICS: States drainage from right axilla is improved .     NEUROLOGICAL : Complains of intermittent tingling and numbness affecting bilateral hand, denies any recent worsening. No headache or dizziness. No seizures  "or balance problems.     SKIN: Positive for history of melanoma status post surgical removal. Denies any new skin lesion worrisome for melanoma.              PHYSICAL EXAMINATION:      VITAL SIGNS:  /78  Pulse 103  Temp 98.7 °F (37.1 °C)  Resp 18  Ht 170.2 cm (67\")  Wt 95.8 kg (211 lb 4.8 oz)  BMI 33.09 kg/m2    GENERAL:  Not in any distress.    HEENT:  Normocephalic, Atraumatic.Mild Conjunctival pallor. No icterus. Extraocular Movements Intact. No Facial Asymmetry noted.    NECK:  No adenopathy. No JVD.    RESPIRATORY:  Fair air entry bilateral. No rhonchi or wheezing.    CARDIOVASCULAR:  S1, S2. Regular rate and rhythm. No murmur or gallop appreciated.    ABDOMEN:  Soft, obese, nontender. Bowel sounds present in all four quadrants.  No organomegaly appreciated.    EXTREMITIES:  No edema.No Calf Tenderness.    NEUROLOGIC:  Alert, awake and oriented ×3.  No  Motor or sensory deficit appreciated. Cranial Nerves 2-12 grossly intact.      DIAGNOSTIC DATA:    Glucose   Date Value Ref Range Status   12/27/2017 122 (H) 60 - 100 mg/dL Final     Sodium   Date Value Ref Range Status   12/27/2017 139 137 - 145 mmol/L Final     Potassium   Date Value Ref Range Status   12/27/2017 4.3 3.5 - 5.1 mmol/L Final     CO2   Date Value Ref Range Status   12/27/2017 22.0 22.0 - 31.0 mmol/L Final     Chloride   Date Value Ref Range Status   12/27/2017 106 95 - 110 mmol/L Final     Anion Gap   Date Value Ref Range Status   12/27/2017 11.0 5.0 - 15.0 mmol/L Final     Creatinine   Date Value Ref Range Status   12/27/2017 0.65 0.50 - 1.00 mg/dL Final     BUN   Date Value Ref Range Status   12/27/2017 16 7 - 21 mg/dL Final     BUN/Creatinine Ratio   Date Value Ref Range Status   12/27/2017 24.6 7.0 - 25.0 Final     Calcium   Date Value Ref Range Status   12/27/2017 9.7 8.4 - 10.2 mg/dL Final     eGFR Non  Amer   Date Value Ref Range Status   12/27/2017 97 58 - 135 mL/min/1.73 Final     Alkaline Phosphatase   Date Value Ref " Range Status   12/27/2017 91 38 - 126 U/L Final     Total Protein   Date Value Ref Range Status   12/27/2017 6.9 6.3 - 8.6 g/dL Final     ALT (SGPT)   Date Value Ref Range Status   12/27/2017 51 9 - 52 U/L Final     AST (SGOT)   Date Value Ref Range Status   12/27/2017 32 14 - 36 U/L Final     Total Bilirubin   Date Value Ref Range Status   12/27/2017 0.2 0.2 - 1.3 mg/dL Final     Albumin   Date Value Ref Range Status   12/27/2017 3.90 3.40 - 4.80 g/dL Final     Globulin   Date Value Ref Range Status   12/27/2017 3.0 2.3 - 3.5 gm/dL Final     A/G Ratio   Date Value Ref Range Status   12/27/2017 1.3 1.1 - 1.8 g/dL Final     Lab Results   Component Value Date    WBC 6.89 12/27/2017    HGB 11.8 (L) 12/27/2017    HCT 35.1 12/27/2017    MCV 92.1 12/27/2017     12/27/2017     Lab Results   Component Value Date    NEUTROABS 6.41 12/27/2017     Lab Results   Component Value Date     88.3 (H) 12/27/2017    LABCA2 90.3 (H) 12/27/2017     2 D Echo Done on July 19, 2017 showed:  Interpretation Summary   · The left ventricular cavity is borderline dilated.  · Left ventricular systolic function is normal. Estimated EF = 53%.  · Left ventricular diastolic dysfunction (grade I) consistent with impaired relaxation.  · IAS aneurysm noted. No PFO or ASD           Radiology Data :  CT of Chest, abdomen and pelvis with contrast done on December 20, 2017 was reviewed and discussed with patient, it showed:  CHEST FINDINGS:     LUNGS/PLEURA: The lungs are normal.  TRACHEA AND BRONCHI:  The trachea and bronchi are patent.  MEDIASTINUM, LATANYA AND LYMPH NODES: The mediastinum, latanya and  lymph nodes are normal.  HEART AND PERICARDIUM: The heart and pericardium are normal.  VASCULAR: Unremarkable  OSSEOUS STRUCTURES: Scattered diffuse lytic and sclerotic  metastases show no significant change.        ABDOMINAL/PELVIC FINDINGS:     HEPATOBILIARY: There is a hypoechoic dense lesion in the left  lobe of the liver measuring 6.3 x 5.5 cm,  without significant  change in size and appearance.  SPLEEN: Unremarkable.  PANCREAS: Unremarkable  ADRENAL GLANDS: Unremarkable.  KIDNEYS/URETERS: No evidence of hydronephrosis or suspicious  mass.     GASTROINTESTINAL: Unremarkable  REPRODUCTIVE ORGANS: Unremarkable.  URINARY BLADDER: Unremarkable     VASCULAR: Unremarkable  LYMPH NODES: No pathologically enlarged nodes by size criteria.  PERITONEUM/RETROPERITONEUM: Unremarkable.      OSSEOUS STRUCTURES: Scattered diffuse lytic and sclerotic  metastases show no significant change. There is a fracture of the  L4 vertebral body which appears unchanged compared to the prior  exam.        IMPRESSION:  CONCLUSION:   Stable exam with diffuse scattered lytic and sclerotic metastases  and a hypodense lesion in the left lobe of the liver, all without  significant change.  Fracture of the L4 vertebral body shows no significant change.      MRI of lumbar spine done at [facility] on November 24, 2017 showed:   Left  1. Extensive bone metastasis to all of the lumbar vertebrae and the sacrum .  2. There is a pathologic burst fracture of the L4 vertebral body with associated moderate spinal stenosis and possible early ventral epidural tumor invasion .  3. Left foraminal narrowing at the L4-5 level with uncertain but doubtful impact on the left L4 nerve      MRI of thoracic spine done at [facility] on November 24, 2017 showed:  1. Metastatic involvement of all of the thoracic vertebra, there is slight compression of T12  2. No spinal stenosis or epidural tumor       Doppler ultrasound of right upper extremity done on November 10, 2017 showed:  IMPRESSION:  CONCLUSION:   Abnormal exam with thrombus visualized in the right internal  jugular vein, consistent with DVT.    MRI of brain with and without contrast done on November 9, 2017 was reviewed with the radiologist, it showed:  IMPRESSION:  CONCLUSION:  At least six calvarial metastases, largest 1.6 cm residing in the  left  parietal bone.  No brain metastases demonstrated.          X-ray of left hip done on September 7, 2017 showed:  Hyperlucency of the inferior pubic rami and the inferior aspect of obturator foramen consistent with osteolytic metastasis.    PET/CT done on July 3, 2017 showed:  IMPRESSION:  CONCLUSION:  1. Extensive metastatic disease. Pathologic uptake within  enlarged right-sided axillary lymph nodes. Metastatic adenopathy  in both laurie and mediastinum. Tumor replacing most of the left  lobe of liver. Intra-abdominal retroperitoneal metastases,  adenopathy.     Extensive skeletal metastases including a pathologic fracture at  L4.        Nuclear bone scan done on June 20, 2017 showed:  1. Increased uptake at L4 suggestive of metastatic process.  2. 3 or 4 areas of increased activity in bony calvarium  3. Some increased activity in tips posteriorly and anteriorly suggestive of metastatic carcinoma to the skeletal system.           Pathology :    Pathology data from July 10, 2017 showed:  Final Diagnosis   Mass right axilla, excisional biopsy:      Metastatic poorly differentiated ductal carcinoma, breast primary      Estrogen receptor positive, 95% stainability, strong intensity      Progesterone receptor positive, 95% stainability, strong intensity      HER-2 2+(equivocal), sent to [facility] for FISH     Addendum   Her 2 FISH result from [facility] is NEGATIVE         Pathology report from December 7, 2007 showed:  FINAL DIAGNOSIS:   RIGHT BREAST MAMMOTOME BIOPSY:        DUCTAL CARCINOMA IN SITU, INTERMEDIATE NUCLEAR GRADE              WITH MICROCALCIFICATION.      ADDENDUM:   Estrogen receptors are positive (90-95% stainability).   Progesterone receptors are positive (90-95% stainability).         ASSESSMENT AND PLAN:      1.  Metastatic cancer with extensive adenopathy in right axilla, mediastinum, hilar, abdominal, retroperitoneal with extensive liver and bone metastasis on PET/CT.  Patient underwent right  apatient underwent right axillary lymph node excision by Dr. Ernst on July 10, 2017.Pathology report confirmed ductal carcinoma of breast with estrogen and progesterone positivity.  At her on palliative chemotherapy with Taxotere and cytoxan on July 26, 2017.   Patient was  seen at [facility] for second opinion regarding her breast cancer.  Case was discussed with Dr vazquez oncologist that has seen patient at [facility].  She agreed with her chemotherapy at this point.  The scan on December 20, 2017 shows relative stability of disease, lesion in the liver as well as bone lesion at about same.  Her tumor marker CA 27-29 as well as CA 15-3 are less than 100 at this point.  Since patient is developing some neuropathy in bilateral upper and lower extremity recommend changing her therapy to Palbociclib with letrozole.  Side effect of Palbociclib with letrozole including nausea, vomiting, diarrhea, lowering of blood count, risk of infection, need for blood transfusion, risk of kidney failure, neuropathy, possibility of allergic reaction were discussed.  Since patient may need some intervention on spine by Dr. Pugh recommend following up with Dr. Pugh this Friday and after that start taking Palbociclib.  Prescription for letrozole has been sent to her pharmacy today and and instructed to take letrozole every day.  Patient will also be provided information about Palbociclib and letrozole today.  We will see her back in about 5 weeks with a repeat CBC and CMP on that day.    2.  Brain metastasis:MRi Brain with and without done on November 9, 2017 was reviewed and compared with t MRI done 6 weeks ago.  Brain calvarial metastasis are stable or somewhat improved.  No need to do radiation at this point which was discussed with patient.    3.  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  She is currently on Coumadin.    She is being followed by Coumadin clinic She  was instructed to come to the emergency room in case she starts having any bleeding.    4.  History of melanoma in situ status post excision by Dr. Linn.    5.  Bone metastasis: Patient was started on Zometa on August 23, 2017.  Continue with Zometa as scheduled for now.  Patient denies any mouth sores or jaw pain.  CT scan shows about loss of 70% of height of L4 vertebral body, patient has been referred to Dr. Pugh to get evaluated for kyphoplasty.     6.  History of ductal carcinoma in situ of the right breast diagnosed in 2007.  Status post mastectomy followed by adjuvant tamoxifen for 5 years which was finished in 2013.    7.  Elevated  liver function tests secondary to liver metastases:   8.  Health maintenance: Patient does not smoke.  Not a candidate for screening colonoscopy at this point.  Remains full code.    9.  Prescriptions: Patient has enough prescription of Decadron, Zofran, scopolamine and Ultram.        Ovidio Meadows MD  12/28/2017  2:09 PM        EMR Dragon/Transcription disclaimer:   Much of this encounter note is an electronic transcription/translation of spoken language to printed text. The electronic translation of spoken language may permit erroneous, or at times, nonsensical words or phrases to be inadvertently transcribed; Although I have reviewed the note for such errors, some may still exist.

## 2017-12-29 ENCOUNTER — OFFICE VISIT (OUTPATIENT)
Dept: ORTHOPEDIC SURGERY | Facility: CLINIC | Age: 47
End: 2017-12-29

## 2017-12-29 ENCOUNTER — ANTICOAGULATION VISIT (OUTPATIENT)
Dept: CARDIAC SURGERY | Facility: CLINIC | Age: 47
End: 2017-12-29

## 2017-12-29 VITALS — SYSTOLIC BLOOD PRESSURE: 117 MMHG | DIASTOLIC BLOOD PRESSURE: 62 MMHG | HEART RATE: 81 BPM

## 2017-12-29 VITALS — HEIGHT: 67 IN | WEIGHT: 211 LBS | BODY MASS INDEX: 33.12 KG/M2

## 2017-12-29 DIAGNOSIS — Z79.01 LONG-TERM (CURRENT) USE OF ANTICOAGULANTS: ICD-10-CM

## 2017-12-29 DIAGNOSIS — C79.9 METASTATIC DISEASE (HCC): ICD-10-CM

## 2017-12-29 DIAGNOSIS — I82.C11 ACUTE THROMBOSIS OF RIGHT INTERNAL JUGULAR VEIN (HCC): ICD-10-CM

## 2017-12-29 DIAGNOSIS — M54.50 ACUTE BILATERAL LOW BACK PAIN WITHOUT SCIATICA: ICD-10-CM

## 2017-12-29 DIAGNOSIS — C79.51 METASTASIS TO BONE OF UNKNOWN PRIMARY (HCC): Primary | ICD-10-CM

## 2017-12-29 DIAGNOSIS — C80.1 METASTASIS TO BONE OF UNKNOWN PRIMARY (HCC): Primary | ICD-10-CM

## 2017-12-29 DIAGNOSIS — IMO0001 VERTEBRAL COMPRESSION FRACTURE, INITIAL ENCOUNTER: ICD-10-CM

## 2017-12-29 LAB — INR PPP: 1.9 (ref 0.9–1.1)

## 2017-12-29 PROCEDURE — 85610 PROTHROMBIN TIME: CPT | Performed by: NURSE PRACTITIONER

## 2017-12-29 PROCEDURE — 99211 OFF/OP EST MAY X REQ PHY/QHP: CPT | Performed by: NURSE PRACTITIONER

## 2017-12-29 PROCEDURE — 99214 OFFICE O/P EST MOD 30 MIN: CPT | Performed by: ORTHOPAEDIC SURGERY

## 2017-12-29 NOTE — PROGRESS NOTES
Kylie García is a 47 y.o. female returns for     Chief Complaint   Patient presents with   • Lumbar Spine - Follow-up   • Thoracic Spine - Follow-up   • Results     MRIs done @  11/29/17       HISTORY OF PRESENT ILLNESS: Patient here for mri results.    She continues to have pain, this is in the hips and low back, the pain is not incapacitating, but she has difficulty sitting, rising from a seated position, standing for any length of time.          CONCURRENT MEDICAL HISTORY:    Past Medical History:   Diagnosis Date   • Anxiety    • Depression    • Encounter for gynecological examination    • Malignant neoplasm of female breast     hx of dcis s/p mastectomy and reconstruction and augmentation      • Primary malignant neoplasm of breast     dcis in rt breast s/p mastectomy,reconstruction      • Ventricular premature beats        Allergies   Allergen Reactions   • Percocet [Oxycodone-Acetaminophen] Nausea And Vomiting         Current Outpatient Prescriptions:   •  Calcium Carbonate-Vitamin D (CALTRATE 600+D PO), Take  by mouth Daily., Disp: , Rfl:   •  dexamethasone (DECADRON) 4 MG tablet, Take 1 tablet by mouth Daily With Breakfast., Disp: 15 tablet, Rfl: 2  •  dexamethasone (DECADRON) 4 MG tablet, Take 2 tablets oral twice a day for 3 consecutive days beginning the day before chemotherapy and continue for 6 doses., Disp: 12 tablet, Rfl: 3  •  Docusate Sodium (COLACE PO), Take  by mouth Daily., Disp: , Rfl:   •  enoxaparin (LOVENOX) 60 MG/0.6ML solution syringe, INJECT CONTENTS OF 1 SYRINGE TWICE DAILY FOR 7 DAYS, Disp: , Rfl: 0  •  escitalopram (LEXAPRO) 10 MG tablet, Take 15 mg by mouth Daily., Disp: , Rfl:   •  lactulose 20 GM/30ML solution solution, Take 30 mL by mouth Daily. Take every night PRN for constipation., Disp: 500 mL, Rfl: 1  •  letrozole (FEMARA) 2.5 MG tablet, Take 1 tablet by mouth Daily., Disp: 30 tablet, Rfl: 3  •  Multiple Vitamins-Minerals (MULTIVITAMIN WITH MINERALS) tablet tablet, Take  1 tablet by mouth Daily., Disp: , Rfl:   •  naproxen sodium (ALEVE) 220 MG tablet, Take 220 mg by mouth As Needed for Mild Pain  (2 tabs in the morning one pm)., Disp: , Rfl:   •  ondansetron (ZOFRAN) 4 MG tablet, Take 2 tablets by mouth 4 (Four) Times a Day As Needed for Nausea or Vomiting. (Patient taking differently: Take 8 mg by mouth As Needed for Nausea or Vomiting.), Disp: 40 tablet, Rfl: 3  •  Palbociclib 125 MG capsule capsule, Take 1 capsule by mouth Daily. Take 1 tablet by mouth daily for 21 days out of 28 day cycle, Disp: 21 capsule, Rfl: 1  •  Polyethylene Glycol 3350 (MIRALAX PO), Take 17 g by mouth As Needed., Disp: , Rfl:   •  prochlorperazine (COMPAZINE) 10 MG tablet, Take 1 tablet by mouth As Needed., Disp: , Rfl:   •  Scopolamine (TRANSDERM-SCOP, 1.5 MG,) 1.5 MG/3DAYS patch, Place 1 patch on the skin Every 72 (Seventy-Two) Hours., Disp: 10 patch, Rfl: 2  •  traMADol (ULTRAM) 50 MG tablet, Take 1 tablet by mouth Every 8 (Eight) Hours As Needed for Moderate Pain ., Disp: 90 tablet, Rfl: 0  •  warfarin (COUMADIN) 5 MG tablet, Take one tablet daily or as Directed by [facility], Disp: 30 tablet, Rfl: 5  No current facility-administered medications for this visit.     Facility-Administered Medications Ordered in Other Visits:   •  alteplase ((CATHFLO/ACTIVASE)) injection 2 mg, 2 mg, Intravenous, Once, Ovidio Meadows MD  •  heparin flush (porcine) 100 UNIT/ML injection 500 Units, 500 Units, Intravenous, PRN, Ovidio Meadows MD, 500 Units at 10/04/17 1646  •  sodium chloride 0.9 % flush 10 mL, 10 mL, Intravenous, PRN, Ovidio Meadwos MD, 10 mL at 10/04/17 1646    Past Surgical History:   Procedure Laterality Date   • AUGMENTATION MAMMAPLASTY     • AXILLARY LYMPH NODE BIOPSY/EXCISION Right 7/10/2017    Procedure: BIOSPY RIGHT AXILLARY MASS AND OR LYMPH;  Surgeon: Sourav Ernst MD;  Location: [facility];  Service:    • BREAST BIOPSY  12/07/2007    Mammotome biopsy of the right side with clip placement  "  • BREAST LUMPECTOMY     • BREAST RECONSTRUCTION  10/28/2008    Abscence of right breast secondary to mastectomy. Implant exchange for reconstruction of right breast with open para-prosthetic capsulotomy   • BREAST SURGERY  10/01/2009    Left breast ptosis and breast asymmetry secondary to right breast reconstruction for breast cancer. Left breast mastopexy with augmentation.   • CARDIAC CATHETERIZATION  09/18/2008    Normal coronary arteriography. Normal left ventricular angiogram   • EP STUDY     • MASTECTOMY Right    • MASTECTOMY  02/05/2008    Multifocal right breast ductal carcinoma-in-situ. Right total mastectomy   • MASTECTOMY, PARTIAL  01/03/2008    Ductal carcinoma in-situ of the right breast, proven by mammotome biopsy. Right lumpectomy, medial portion of the breast, between 2 o'clock and 4 o'clock, 5 cm from the nipple, with clip placement, radiographic inter. of severo. specimen, & ultrasound   • PAP SMEAR  03/03/2009    Negative   • MN INSJ TUNNELED CVC W/O SUBQ PORT/ AGE 3 YR/> Right 7/10/2017    Procedure: MEDIPORT     (c-arm#2);  Surgeon: Sourav Ernst MD;  Location: [facility];  Service: General       ROS  No fevers or chills.  No chest pain or shortness of air.  No GI or  disturbances.    PHYSICAL EXAMINATION:       Ht 170.2 cm (67\")  Wt 95.7 kg (211 lb)  BMI 33.05 kg/m2    Physical Exam   Constitutional: She is oriented to person, place, and time. She appears well-developed and well-nourished.   HENT:   Head: Normocephalic.   Mouth/Throat: No oropharyngeal exudate.   Eyes: No scleral icterus.   Neck: No JVD present. No tracheal deviation present.   Cardiovascular: Normal rate and intact distal pulses.    Pulmonary/Chest: Effort normal. She has no wheezes. She has no rales.   Abdominal: Soft. She exhibits no mass. There is no tenderness.   Lymphadenopathy:     She has no cervical adenopathy.   Neurological: She is alert and oriented to person, place, and time. She displays normal reflexes. " Coordination normal.   Skin: Skin is warm and dry. No rash noted. No erythema.   Psychiatric: She has a normal mood and affect. Her behavior is normal. Thought content normal.       GAIT:     []  Normal  []  Antalgic    Assistive device: []  None  []  Walker     []  Crutches  []  Cane     []  Wheelchair  []  Stretcher    Back Exam     Tenderness   The patient is experiencing tenderness in the lumbar.    Range of Motion   Extension: 10   Flexion: 40   Lateral Bend Right: 10   Lateral Bend Left: 10   Rotation Right: 10   Rotation Left: 10     Tests   Straight leg raise right: negative  Straight leg raise left: negative    Other   Erythema: no back redness              Ct Chest W Contrast, Ct Abdomen Pelvis W Contrast    Result Date: 12/20/2017  Narrative: PROCEDURE: CT scan of the chest, abdomen and pelvis with intravenous contrast CLINICAL INFORMATION:  Restaging, C79.51 Secondary malignant neoplasm of bone C80.1 Malignant (primary) neoplasm, unspecified C50.911 Malignant neoplasm of unspecified site of right female breast Z17.0 Estrogen receptor positive status (ER+) TECHNIQUE: Axial images were obtained and multiplanar reconstructions were made.  COMPARISON: None available. Dose length product 822.6 This exam was performed using radiation doses that are as low as reasonably achievable (ALARA). This exam was performed according to our departmental dose optimization program, which includes automated exposure control, adjustment of the mA and/or KV according to patient size and/or use of iterative reconstruction technique. CONTRAST: 93 cc Intravenous Omnipaque 300 CHEST FINDINGS: LUNGS/PLEURA: The lungs are normal. TRACHEA AND BRONCHI:  The trachea and bronchi are patent. MEDIASTINUM, LATANYA AND LYMPH NODES: The mediastinum, latanya and lymph nodes are normal. HEART AND PERICARDIUM: The heart and pericardium are normal. VASCULAR: Unremarkable OSSEOUS STRUCTURES: Scattered diffuse lytic and sclerotic metastases show no  significant change. ABDOMINAL/PELVIC FINDINGS: HEPATOBILIARY: There is a hypoechoic dense lesion in the left lobe of the liver measuring 6.3 x 5.5 cm, without significant change in size and appearance. SPLEEN: Unremarkable. PANCREAS: Unremarkable ADRENAL GLANDS: Unremarkable. KIDNEYS/URETERS: No evidence of hydronephrosis or suspicious mass. GASTROINTESTINAL: Unremarkable REPRODUCTIVE ORGANS: Unremarkable. URINARY BLADDER: Unremarkable VASCULAR: Unremarkable LYMPH NODES: No pathologically enlarged nodes by size criteria. PERITONEUM/RETROPERITONEUM: Unremarkable. OSSEOUS STRUCTURES: Scattered diffuse lytic and sclerotic metastases show no significant change. There is a fracture of the L4 vertebral body which appears unchanged compared to the prior exam.     Impression: CONCLUSION: Stable exam with diffuse scattered lytic and sclerotic metastases and a hypodense lesion in the left lobe of the liver, all without significant change. Fracture of the L4 vertebral body shows no significant change. Electronically signed by:  Enmanuel Nevarez MD  12/20/2017 12:50 PM CST Workstation: [id number]    MRI lumbar spine with and without contrast per contrast [id number]/24/2017  [email or web address]  Result Impression     Left  1. Extensive bone metastasis to all of the lumbar vertebrae and the sacrum .  2. There is a pathologic burst fracture of the L4 vertebral body with associated moderate spinal stenosis and possible early ventral epidural tumor invasion .  3. Left foraminal narrowing at the L4-5 level with uncertain but doubtful impact on the left L4 nerve         MRI thoracic spine with and without contrast per contrast [id number]/24/2017  [email or web address]  Result Impression     1. Metastatic involvement of all of the thoracic vertebra, there is slight compression of T12  2. No spinal stenosis or epidural tumor       I reviewed the MRI of thoracic spine 11/29/17 extensive metastatic lesion of the thoracic spine, T12 minimal fracture,  likely acute  I reviewed the MRI of the lumbar spine 11/29/17 extensive metastatic lesion of the lumbar spine, L4 vertebral compression fracture, pathologic, 80% collapse.    I reviewed Ct of the abdomen 12/20/17, significant vertebral compression fracture of L4.        ASSESSMENT:    Diagnoses and all orders for this visit:    Metastasis to bone of unknown primary  -     Case Request; Standing  -     ceFAZolin (ANCEF) 2 g in sodium chloride 0.9 % 100 mL IVPB; Infuse 2 g into a venous catheter 1 (One) Time.  -     Case Request    Metastatic disease  -     Case Request; Standing  -     ceFAZolin (ANCEF) 2 g in sodium chloride 0.9 % 100 mL IVPB; Infuse 2 g into a venous catheter 1 (One) Time.  -     Case Request    Acute bilateral low back pain without sciatica    Vertebral compression fracture, initial encounter  -     Case Request; Standing  -     ceFAZolin (ANCEF) 2 g in sodium chloride 0.9 % 100 mL IVPB; Infuse 2 g into a venous catheter 1 (One) Time.  -     Case Request    Other orders  -     Obtain informed consent  -     Obtain Informed Consent; Standing  -     aPTT; Standing  -     CBC (No Diff); Standing  -     ECG 12 Lead; Standing  -     MRSA Screen Culture - Swab, Nares; Standing  -     XR Chest PA & Lateral; Standing  -     Protime-INR; Standing          PLAN      Recommend kyphoplasty L4, discussed options, indications, surgical technique.  I reviewed with her risks of surgery including the risk of infection, hematoma, spinal fluid leak, cement dissemination possible neurologic injury including numbness and weakness and possible worsening of pain.  All of her questions have been answered and she wishes to proceed.  No guarantees have been given.    She must stop warfarin before proceeding for surgery.        Tobi Pugh MD

## 2017-12-29 NOTE — PROGRESS NOTES
Pt states she is now taking Letrozole which does not interfere with Coumadin. Pt denies bleeding problems. Dose slightly increased. Pt states she has to see Dr Pugh today and will eventually start oral chemo but when she starts will depend on what Dr Pugh says today. Patient instructed regarding medication; results given and questions answered. Nutritional counseling given.  Dietary factors affecting therapy addressed.  Patient instructed to monitor for excessive bruising or bleeding. Will recheck next week.  Electronically signed by EVELYN Matute

## 2018-01-04 ENCOUNTER — DOCUMENTATION (OUTPATIENT)
Dept: CARDIAC SURGERY | Facility: CLINIC | Age: 48
End: 2018-01-04

## 2018-01-05 ENCOUNTER — ANTICOAGULATION VISIT (OUTPATIENT)
Dept: CARDIAC SURGERY | Facility: CLINIC | Age: 48
End: 2018-01-05

## 2018-01-05 DIAGNOSIS — Z79.01 LONG-TERM (CURRENT) USE OF ANTICOAGULANTS: ICD-10-CM

## 2018-01-05 DIAGNOSIS — I82.C11 ACUTE THROMBOSIS OF RIGHT INTERNAL JUGULAR VEIN (HCC): ICD-10-CM

## 2018-01-05 LAB — INR PPP: 1.2

## 2018-01-05 NOTE — PROGRESS NOTES
I received INR from pt via phone. 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 Per pt INR 1.2.  Pt was advised that we don't manage pt long distance or through other labs but we would make an exception once; pt verbalized an understanding. Pt states she was on Keflex but was switched to Doxycycline yesterday bid for 14 days. Pt states she only got 1 dose in yesterday. Pt denied bleeding problems and stated she did miss Tuesday nights dose. Pt was instructed to take Coumadin 7.5 mg tonight, 5mg tomorrow and 5mg Sunday; pt verbalized an understanding. Appt made for Monday due to antibiotic.           This document has been electronically signed by EVELYN Brewster on January 5, 2018 3:02 PM

## 2018-01-08 ENCOUNTER — ANTICOAGULATION VISIT (OUTPATIENT)
Dept: CARDIAC SURGERY | Facility: CLINIC | Age: 48
End: 2018-01-08

## 2018-01-08 VITALS — OXYGEN SATURATION: 98 % | HEART RATE: 98 BPM

## 2018-01-08 DIAGNOSIS — I82.C11 ACUTE THROMBOSIS OF RIGHT INTERNAL JUGULAR VEIN (HCC): ICD-10-CM

## 2018-01-08 DIAGNOSIS — Z79.01 LONG-TERM (CURRENT) USE OF ANTICOAGULANTS: ICD-10-CM

## 2018-01-08 LAB — INR PPP: 1.8 (ref 0.9–1.1)

## 2018-01-08 PROCEDURE — 85610 PROTHROMBIN TIME: CPT | Performed by: NURSE PRACTITIONER

## 2018-01-08 PROCEDURE — 99211 OFF/OP EST MAY X REQ PHY/QHP: CPT | Performed by: NURSE PRACTITIONER

## 2018-01-08 NOTE — PROGRESS NOTES
PT will be on doxycyline for several more days. PT denies any bleeding issues or s/s of blood clot. PT states she will start oral chemo after procedure. PT sees surgeon in [place] on 1/16. PT instructed on dosing and to increase vit k on Wednesday and Saturday. PT will be seen on Monday when she can return. Patient instructed regarding medication; results given and questions answered. Nutritional counseling given.  Dietary factors affecting therapy addressed.  Patient instructed to monitor for excessive bruising or bleeding. PT verbalizes understanding.           This document has been electronically signed by EVELYN Brewster on January 9, 2018 3:23 PM

## 2018-01-11 RX ORDER — FAMOTIDINE 10 MG/ML
20 INJECTION, SOLUTION INTRAVENOUS ONCE
Status: CANCELLED | OUTPATIENT
Start: 2018-01-11

## 2018-01-11 RX ORDER — SODIUM CHLORIDE 9 MG/ML
250 INJECTION, SOLUTION INTRAVENOUS ONCE
Status: CANCELLED | OUTPATIENT
Start: 2018-01-11

## 2018-01-11 RX ORDER — PALONOSETRON 0.05 MG/ML
0.25 INJECTION, SOLUTION INTRAVENOUS ONCE
Status: CANCELLED | OUTPATIENT
Start: 2018-01-11

## 2018-01-15 ENCOUNTER — ANTICOAGULATION VISIT (OUTPATIENT)
Dept: CARDIAC SURGERY | Facility: CLINIC | Age: 48
End: 2018-01-15

## 2018-01-15 VITALS — SYSTOLIC BLOOD PRESSURE: 118 MMHG | HEART RATE: 76 BPM | DIASTOLIC BLOOD PRESSURE: 64 MMHG

## 2018-01-15 DIAGNOSIS — I82.C11 ACUTE THROMBOSIS OF RIGHT INTERNAL JUGULAR VEIN (HCC): ICD-10-CM

## 2018-01-15 DIAGNOSIS — Z79.01 LONG-TERM (CURRENT) USE OF ANTICOAGULANTS: ICD-10-CM

## 2018-01-15 LAB — INR PPP: 2.2 (ref 0.9–1.1)

## 2018-01-15 PROCEDURE — 85610 PROTHROMBIN TIME: CPT | Performed by: NURSE PRACTITIONER

## 2018-01-15 PROCEDURE — 99211 OFF/OP EST MAY X REQ PHY/QHP: CPT | Performed by: NURSE PRACTITIONER

## 2018-01-15 NOTE — PROGRESS NOTES
Pt states she will continue the doxycycline through this week.  Pt denies maintenance med changes or bleeding issues.  Instructed pt to take coumadin 5mg daily and will recheck INR next Monday.  Instructed pt to have green intake on Wed and Sunday.  Pt verbalizes.  Patient instructed regarding medication; results given and questions answered. Nutritional counseling given.  Dietary factors affecting therapy addressed.  Patient instructed to monitor for excessive bruising or bleeding.          This document has been electronically signed by EVELYN Brewster on January 15, 2018 11:21 AM

## 2018-01-22 ENCOUNTER — ANTICOAGULATION VISIT (OUTPATIENT)
Dept: CARDIAC SURGERY | Facility: CLINIC | Age: 48
End: 2018-01-22

## 2018-01-22 VITALS — DIASTOLIC BLOOD PRESSURE: 82 MMHG | HEART RATE: 84 BPM | SYSTOLIC BLOOD PRESSURE: 118 MMHG

## 2018-01-22 DIAGNOSIS — Z79.01 LONG-TERM (CURRENT) USE OF ANTICOAGULANTS: ICD-10-CM

## 2018-01-22 DIAGNOSIS — I82.C11 ACUTE THROMBOSIS OF RIGHT INTERNAL JUGULAR VEIN (HCC): ICD-10-CM

## 2018-01-22 LAB — INR PPP: 2.5 (ref 0.9–1.1)

## 2018-01-22 PROCEDURE — 85610 PROTHROMBIN TIME: CPT | Performed by: NURSE PRACTITIONER

## 2018-01-22 NOTE — PROGRESS NOTES
Pt states no changes to meds or diet.  No bleeding issues.  Recheck INR next wk before placing out further appts.  Pt verbalizes.  Patient instructed regarding medication; results given and questions answered. Nutritional counseling given.  Dietary factors affecting therapy addressed.  Patient instructed to monitor for excessive bruising or bleeding. Pt has decided not to have back surgery and will begin oral chemo when her pills arrive through mail order.        This document has been electronically signed by EVELYN Brewster on January 23, 2018 12:51 PM

## 2018-01-24 ENCOUNTER — TELEPHONE (OUTPATIENT)
Dept: ONCOLOGY | Facility: HOSPITAL | Age: 48
End: 2018-01-24

## 2018-01-24 NOTE — TELEPHONE ENCOUNTER
Phoned the patient regarding her Ibrance prescription. She has not been able to receive it at this time. Her medical insurance changed on 1/1/2018. She has been unable to get the correct member ID number. She stated that she had spoken to her HR department to get this information but the information that she has received was the wrong information. She is trying to get the correct information or she has to wait for her new insurance card in the mail. I have instructed her to call if I could help and to keep me informed of the situation. She verbalized understanding.

## 2018-01-29 ENCOUNTER — ANTICOAGULATION VISIT (OUTPATIENT)
Dept: CARDIAC SURGERY | Facility: CLINIC | Age: 48
End: 2018-01-29

## 2018-01-29 ENCOUNTER — TELEPHONE (OUTPATIENT)
Dept: ONCOLOGY | Facility: HOSPITAL | Age: 48
End: 2018-01-29

## 2018-01-29 VITALS — SYSTOLIC BLOOD PRESSURE: 110 MMHG | OXYGEN SATURATION: 97 % | HEART RATE: 88 BPM | DIASTOLIC BLOOD PRESSURE: 70 MMHG

## 2018-01-29 DIAGNOSIS — I82.C11 ACUTE THROMBOSIS OF RIGHT INTERNAL JUGULAR VEIN (HCC): ICD-10-CM

## 2018-01-29 DIAGNOSIS — Z79.01 LONG-TERM (CURRENT) USE OF ANTICOAGULANTS: ICD-10-CM

## 2018-01-29 LAB — INR PPP: 2.5 (ref 0.9–1.1)

## 2018-01-29 PROCEDURE — 99211 OFF/OP EST MAY X REQ PHY/QHP: CPT | Performed by: NURSE PRACTITIONER

## 2018-01-29 PROCEDURE — 85610 PROTHROMBIN TIME: CPT | Performed by: NURSE PRACTITIONER

## 2018-01-29 NOTE — TELEPHONE ENCOUNTER
Spoke to the patient today regarding the Ibrance prescription. She stated that it was to be delivered tomorrow. She is going to start it when she receives it. She has an appointment on 1/31/18 and will be receiving zometa at that time. I reviewed with her to continue the letrozole daily and that the Ibrance would be 21 days on and 1 week off. She verbalized understanding.

## 2018-01-29 NOTE — PROGRESS NOTES
PT states she will most likely start Ibrance on Monday, 2/5 but will find out on Wednesday. PT accidentally missed a dose on Thursday. PT denies any bleeding issues or s/s of blood clot. Due to therapeutic INR will missed dose, dose was decreased slightly and pt will be seen next week. Patient instructed regarding medication; results given and questions answered. Nutritional counseling given.  Dietary factors affecting therapy addressed.  Patient instructed to monitor for excessive bruising or bleeding. PT verbalizes understanding.   Electronically signed by EVELYN Matute

## 2018-01-31 ENCOUNTER — OFFICE VISIT (OUTPATIENT)
Dept: ONCOLOGY | Facility: CLINIC | Age: 48
End: 2018-01-31

## 2018-01-31 ENCOUNTER — INFUSION (OUTPATIENT)
Dept: ONCOLOGY | Facility: HOSPITAL | Age: 48
End: 2018-01-31

## 2018-01-31 ENCOUNTER — APPOINTMENT (OUTPATIENT)
Dept: ONCOLOGY | Facility: HOSPITAL | Age: 48
End: 2018-01-31

## 2018-01-31 VITALS
SYSTOLIC BLOOD PRESSURE: 118 MMHG | BODY MASS INDEX: 32.87 KG/M2 | DIASTOLIC BLOOD PRESSURE: 76 MMHG | TEMPERATURE: 98.1 F | RESPIRATION RATE: 18 BRPM | HEIGHT: 67 IN | HEART RATE: 88 BPM | WEIGHT: 209.44 LBS

## 2018-01-31 DIAGNOSIS — C50.011 MALIGNANT NEOPLASM OF NIPPLE OF RIGHT BREAST IN FEMALE, UNSPECIFIED ESTROGEN RECEPTOR STATUS (HCC): Primary | ICD-10-CM

## 2018-01-31 DIAGNOSIS — C50.911 MALIGNANT NEOPLASM OF RIGHT BREAST IN FEMALE, ESTROGEN RECEPTOR POSITIVE, UNSPECIFIED SITE OF BREAST (HCC): ICD-10-CM

## 2018-01-31 DIAGNOSIS — Z45.2 ENCOUNTER FOR ADJUSTMENT OR MANAGEMENT OF VASCULAR ACCESS DEVICE: ICD-10-CM

## 2018-01-31 DIAGNOSIS — C50.011 MALIGNANT NEOPLASM OF NIPPLE OF RIGHT BREAST IN FEMALE, UNSPECIFIED ESTROGEN RECEPTOR STATUS (HCC): ICD-10-CM

## 2018-01-31 DIAGNOSIS — C79.51 METASTASIS TO BONE OF UNKNOWN PRIMARY (HCC): ICD-10-CM

## 2018-01-31 DIAGNOSIS — Z17.0 MALIGNANT NEOPLASM OF RIGHT BREAST IN FEMALE, ESTROGEN RECEPTOR POSITIVE, UNSPECIFIED SITE OF BREAST (HCC): ICD-10-CM

## 2018-01-31 DIAGNOSIS — C80.1 METASTASIS TO BONE OF UNKNOWN PRIMARY (HCC): ICD-10-CM

## 2018-01-31 DIAGNOSIS — Z45.2 ENCOUNTER FOR VENOUS ACCESS DEVICE CARE: Primary | ICD-10-CM

## 2018-01-31 LAB
ALBUMIN SERPL-MCNC: 3.9 G/DL (ref 3.4–4.8)
ALBUMIN/GLOB SERPL: 1.3 G/DL (ref 1.1–1.8)
ALP SERPL-CCNC: 102 U/L (ref 38–126)
ALT SERPL W P-5'-P-CCNC: 54 U/L (ref 9–52)
ANION GAP SERPL CALCULATED.3IONS-SCNC: 11 MMOL/L (ref 5–15)
AST SERPL-CCNC: 40 U/L (ref 14–36)
BASOPHILS # BLD AUTO: 0.04 10*3/MM3 (ref 0–0.2)
BASOPHILS NFR BLD AUTO: 0.8 % (ref 0–2)
BILIRUB SERPL-MCNC: 0.2 MG/DL (ref 0.2–1.3)
BUN BLD-MCNC: 15 MG/DL (ref 7–21)
BUN/CREAT SERPL: 16.5 (ref 7–25)
CALCIUM SPEC-SCNC: 9.1 MG/DL (ref 8.4–10.2)
CHLORIDE SERPL-SCNC: 104 MMOL/L (ref 95–110)
CO2 SERPL-SCNC: 23 MMOL/L (ref 22–31)
CREAT BLD-MCNC: 0.91 MG/DL (ref 0.5–1)
DEPRECATED RDW RBC AUTO: 43.1 FL (ref 36.4–46.3)
EOSINOPHIL # BLD AUTO: 1.05 10*3/MM3 (ref 0–0.7)
EOSINOPHIL NFR BLD AUTO: 20.3 % (ref 0–7)
ERYTHROCYTE [DISTWIDTH] IN BLOOD BY AUTOMATED COUNT: 13.4 % (ref 11.5–14.5)
GFR SERPL CREATININE-BSD FRML MDRD: 66 ML/MIN/1.73 (ref 58–135)
GLOBULIN UR ELPH-MCNC: 2.9 GM/DL (ref 2.3–3.5)
GLUCOSE BLD-MCNC: 88 MG/DL (ref 60–100)
HCT VFR BLD AUTO: 35.8 % (ref 35–45)
HGB BLD-MCNC: 12.2 G/DL (ref 12–15.5)
IMM GRANULOCYTES # BLD: 0.01 10*3/MM3 (ref 0–0.02)
IMM GRANULOCYTES NFR BLD: 0.2 % (ref 0–0.5)
LYMPHOCYTES # BLD AUTO: 0.97 10*3/MM3 (ref 0.6–4.2)
LYMPHOCYTES NFR BLD AUTO: 18.7 % (ref 10–50)
MAGNESIUM SERPL-MCNC: 1.8 MG/DL (ref 1.6–2.3)
MCH RBC QN AUTO: 30 PG (ref 26.5–34)
MCHC RBC AUTO-ENTMCNC: 34.1 G/DL (ref 31.4–36)
MCV RBC AUTO: 88.2 FL (ref 80–98)
MONOCYTES # BLD AUTO: 0.38 10*3/MM3 (ref 0–0.9)
MONOCYTES NFR BLD AUTO: 7.3 % (ref 0–12)
NEUTROPHILS # BLD AUTO: 2.73 10*3/MM3 (ref 2–8.6)
NEUTROPHILS NFR BLD AUTO: 52.7 % (ref 37–80)
PHOSPHATE SERPL-MCNC: 5.1 MG/DL (ref 2.4–4.4)
PLATELET # BLD AUTO: 290 10*3/MM3 (ref 150–450)
PMV BLD AUTO: 9.9 FL (ref 8–12)
POTASSIUM BLD-SCNC: 3.9 MMOL/L (ref 3.5–5.1)
PROT SERPL-MCNC: 6.8 G/DL (ref 6.3–8.6)
RBC # BLD AUTO: 4.06 10*6/MM3 (ref 3.77–5.16)
SODIUM BLD-SCNC: 138 MMOL/L (ref 137–145)
WBC NRBC COR # BLD: 5.18 10*3/MM3 (ref 3.2–9.8)

## 2018-01-31 PROCEDURE — 25010000002 ZOLEDRONIC ACID PER 1 MG: Performed by: NURSE PRACTITIONER

## 2018-01-31 PROCEDURE — 25010000002 HEPARIN FLUSH (PORCINE) 100 UNIT/ML SOLUTION: Performed by: INTERNAL MEDICINE

## 2018-01-31 PROCEDURE — 85025 COMPLETE CBC W/AUTO DIFF WBC: CPT

## 2018-01-31 PROCEDURE — 83735 ASSAY OF MAGNESIUM: CPT

## 2018-01-31 PROCEDURE — 96365 THER/PROPH/DIAG IV INF INIT: CPT | Performed by: NURSE PRACTITIONER

## 2018-01-31 PROCEDURE — 99215 OFFICE O/P EST HI 40 MIN: CPT | Performed by: NURSE PRACTITIONER

## 2018-01-31 PROCEDURE — 84100 ASSAY OF PHOSPHORUS: CPT

## 2018-01-31 PROCEDURE — 80053 COMPREHEN METABOLIC PANEL: CPT

## 2018-01-31 RX ORDER — SODIUM CHLORIDE 9 MG/ML
250 INJECTION, SOLUTION INTRAVENOUS ONCE
Status: CANCELLED | OUTPATIENT
Start: 2018-01-31

## 2018-01-31 RX ORDER — SODIUM CHLORIDE 9 MG/ML
250 INJECTION, SOLUTION INTRAVENOUS ONCE
Status: COMPLETED | OUTPATIENT
Start: 2018-01-31 | End: 2018-01-31

## 2018-01-31 RX ORDER — SODIUM CHLORIDE 0.9 % (FLUSH) 0.9 %
10 SYRINGE (ML) INJECTION AS NEEDED
Status: CANCELLED | OUTPATIENT
Start: 2018-02-28

## 2018-01-31 RX ORDER — SODIUM CHLORIDE 0.9 % (FLUSH) 0.9 %
10 SYRINGE (ML) INJECTION AS NEEDED
Status: DISCONTINUED | OUTPATIENT
Start: 2018-01-31 | End: 2018-01-31 | Stop reason: HOSPADM

## 2018-01-31 RX ORDER — GABAPENTIN 300 MG/1
CAPSULE ORAL
Refills: 0 | COMMUNITY
Start: 2018-01-22 | End: 2019-01-21 | Stop reason: SDUPTHER

## 2018-01-31 RX ADMIN — Medication 10 ML: at 12:50

## 2018-01-31 RX ADMIN — SODIUM CHLORIDE, PRESERVATIVE FREE 500 UNITS: 5 INJECTION INTRAVENOUS at 12:50

## 2018-01-31 RX ADMIN — SODIUM CHLORIDE 250 ML: 9 INJECTION, SOLUTION INTRAVENOUS at 11:51

## 2018-01-31 RX ADMIN — Medication 10 ML: at 10:13

## 2018-01-31 RX ADMIN — ZOLEDRONIC ACID 4 MG: 4 INJECTION, SOLUTION, CONCENTRATE INTRAVENOUS at 12:23

## 2018-01-31 NOTE — PROGRESS NOTES
DATE OF VISIT: 1/31/2018    REASON FOR VISIT:  Metastatic breast cancer    HISTORY OF PRESENT ILLNESS:      47-year-old female with a past medical history significant for history of ductal carcinoma in situ of the right breast diagnosed in December 2007 patient eventually had a mastectomy done in February 2008 and took adjuvant tamoxifen for 5 years until 2013.  Started on palliative chemotherapy with Taxotere and Cytoxan on July 26, 2017 until December 2017; restaging CT scans showed relative stability of disease, lesion in the liver as well as bone lesion at about same.  Her tumor marker CA 27-29 as well as CA 15-3 are less than 100 at this point, however she was developing some neuropathy in bilateral upper and lower extremity recommend changing her therapy to Palbociclib with letrozole.  Side effect of Palbociclib were discussed. She elected to hold off on starting the Palbociclib until after she was seen by orthopedics for possible intervention to her spine. She sought out a second opinion and orthopedic surgeon in [place] did not recommend any intervention to her spine. She started the Palbociclib yesterday. She is also taking Letrozole. She is due for Zometa infusion today.  Denies any new headache or dizziness.  Complains of chronic pain in bilateral hip and back, denies any recent worsening.      PAST MEDICAL HISTORY:    Past Medical History:   Diagnosis Date   • Anxiety    • Depression    • Encounter for gynecological examination    • Malignant neoplasm of female breast     hx of dcis s/p mastectomy and reconstruction and augmentation      • Primary malignant neoplasm of breast     dcis in rt breast s/p mastectomy,reconstruction      • Ventricular premature beats        SOCIAL HISTORY:    Social History   Substance Use Topics   • Smoking status: Never Smoker   • Smokeless tobacco: Never Used   • Alcohol use No       Surgical History :  Past Surgical History:   Procedure Laterality Date   • AUGMENTATION  MAMMAPLASTY     • AXILLARY LYMPH NODE BIOPSY/EXCISION Right 7/10/2017    Procedure: BIOSPY RIGHT AXILLARY MASS AND OR LYMPH;  Surgeon: Sourav Ernst MD;  Location: [facility];  Service:    • BREAST BIOPSY  12/07/2007    Mammotome biopsy of the right side with clip placement   • BREAST LUMPECTOMY     • BREAST RECONSTRUCTION  10/28/2008    Abscence of right breast secondary to mastectomy. Implant exchange for reconstruction of right breast with open para-prosthetic capsulotomy   • BREAST SURGERY  10/01/2009    Left breast ptosis and breast asymmetry secondary to right breast reconstruction for breast cancer. Left breast mastopexy with augmentation.   • CARDIAC CATHETERIZATION  09/18/2008    Normal coronary arteriography. Normal left ventricular angiogram   • EP STUDY     • MASTECTOMY Right    • MASTECTOMY  02/05/2008    Multifocal right breast ductal carcinoma-in-situ. Right total mastectomy   • MASTECTOMY, PARTIAL  01/03/2008    Ductal carcinoma in-situ of the right breast, proven by mammotome biopsy. Right lumpectomy, medial portion of the breast, between 2 o'clock and 4 o'clock, 5 cm from the nipple, with clip placement, radiographic inter. of severo. specimen, & ultrasound   • PAP SMEAR  03/03/2009    Negative   • NH INSJ TUNNELED CVC W/O SUBQ PORT/ AGE 5 YR/> Right 7/10/2017    Procedure: MEDIPORT     (c-arm#2);  Surgeon: Sourav Ernst MD;  Location: [facility];  Service: General       ALLERGIES:    Allergies   Allergen Reactions   • Percocet [Oxycodone-Acetaminophen] Nausea And Vomiting       REVIEW OF SYSTEMS:      CONSTITUTIONAL:  No fever, chills, or night sweats.     HEENT:  No epistaxis, mouth sores, or difficulty swallowing.    RESPIRATORY:  No new shortness of breath or cough at present.    CARDIOVASCULAR:  No chest pain or palpitations.    GASTROINTESTINAL:  No abdominal pain, nausea, vomiting, or blood in the stool.    GENITOURINARY:  No dysuria or hematuria.    MUSCULOSKELETAL:  No any new back  "pain or arthralgias.     NEUROLOGICAL:  No tingling or numbness. No new headache or dizziness.     LYMPHATICS:  Denies any abnormal swollen and anywhere in the body.    SKIN:  Denies any new skin rash.    PHYSICAL EXAMINATION:      VITAL SIGNS:  /76  Pulse 88  Temp 98.1 °F (36.7 °C) (Temporal Artery )   Resp 18  Ht 170.2 cm (67.01\")  Wt 95 kg (209 lb 7 oz)  BMI 32.79 kg/m2    GENERAL:  Not in any distress.    HEENT:  Normocephalic, Atraumatic.Mild Conjunctival pallor. No icterus. Extraocular Movements Intact. No Facial Asymmetry noted.    NECK:  No adenopathy. No JVD.    RESPIRATORY:  Fair air entry bilateral. No rhonchi or wheezing.    CARDIOVASCULAR:  S1, S2. Regular rate and rhythm. No murmur or gallop appreciated.    ABDOMEN:  Soft, obese, nontender. Bowel sounds present in all four quadrants.  No organomegaly appreciated.    EXTREMITIES:  No edema.No Calf Tenderness.    NEUROLOGIC:  Alert, awake and oriented ×3.  No  Motor or sensory deficit appreciated. Cranial Nerves 2-12 grossly intact.    SKIN : No new skin lesion identified  DIAGNOSTIC DATA:    Glucose   Date Value Ref Range Status   01/31/2018 88 60 - 100 mg/dL Final     Sodium   Date Value Ref Range Status   01/31/2018 138 137 - 145 mmol/L Final     Potassium   Date Value Ref Range Status   01/31/2018 3.9 3.5 - 5.1 mmol/L Final     CO2   Date Value Ref Range Status   01/31/2018 23.0 22.0 - 31.0 mmol/L Final     Chloride   Date Value Ref Range Status   01/31/2018 104 95 - 110 mmol/L Final     Anion Gap   Date Value Ref Range Status   01/31/2018 11.0 5.0 - 15.0 mmol/L Final     Creatinine   Date Value Ref Range Status   01/31/2018 0.91 0.50 - 1.00 mg/dL Final     BUN   Date Value Ref Range Status   01/31/2018 15 7 - 21 mg/dL Final     BUN/Creatinine Ratio   Date Value Ref Range Status   01/31/2018 16.5 7.0 - 25.0 Final     Calcium   Date Value Ref Range Status   01/31/2018 9.1 8.4 - 10.2 mg/dL Final     eGFR Non  Amer   Date Value Ref " Range Status   01/31/2018 66 58 - 135 mL/min/1.73 Final     Alkaline Phosphatase   Date Value Ref Range Status   01/31/2018 102 38 - 126 U/L Final     Total Protein   Date Value Ref Range Status   01/31/2018 6.8 6.3 - 8.6 g/dL Final     ALT (SGPT)   Date Value Ref Range Status   01/31/2018 54 (H) 9 - 52 U/L Final     AST (SGOT)   Date Value Ref Range Status   01/31/2018 40 (H) 14 - 36 U/L Final     Total Bilirubin   Date Value Ref Range Status   01/31/2018 0.2 0.2 - 1.3 mg/dL Final     Albumin   Date Value Ref Range Status   01/31/2018 3.90 3.40 - 4.80 g/dL Final     Globulin   Date Value Ref Range Status   01/31/2018 2.9 2.3 - 3.5 gm/dL Final     A/G Ratio   Date Value Ref Range Status   01/31/2018 1.3 1.1 - 1.8 g/dL Final     Lab Results   Component Value Date    WBC 5.18 01/31/2018    HGB 12.2 01/31/2018    HCT 35.8 01/31/2018    MCV 88.2 01/31/2018     01/31/2018     Lab Results   Component Value Date    NEUTROABS 2.73 01/31/2018     Lab Results   Component Value Date     88.3 (H) 12/27/2017    LABCA2 90.3 (H) 12/27/2017   ]  D Echo Done on July 19, 2017 showed:  Interpretation Summary   · The left ventricular cavity is borderline dilated.  · Left ventricular systolic function is normal. Estimated EF = 53%.  · Left ventricular diastolic dysfunction (grade I) consistent with impaired relaxation.  · IAS aneurysm noted. No PFO or ASD               Radiology Data :  CT of Chest, abdomen and pelvis with contrast done on December 20, 2017:    CHEST FINDINGS:      LUNGS/PLEURA: The lungs are normal.  TRACHEA AND BRONCHI:  The trachea and bronchi are patent.  MEDIASTINUM, LATANYA AND LYMPH NODES: The mediastinum, latanya and  lymph nodes are normal.  HEART AND PERICARDIUM: The heart and pericardium are normal.  VASCULAR: Unremarkable  OSSEOUS STRUCTURES: Scattered diffuse lytic and sclerotic  metastases show no significant change.          ABDOMINAL/PELVIC FINDINGS:      HEPATOBILIARY: There is a hypoechoic dense  lesion in the left  lobe of the liver measuring 6.3 x 5.5 cm, without significant  change in size and appearance.  SPLEEN: Unremarkable.  PANCREAS: Unremarkable  ADRENAL GLANDS: Unremarkable.  KIDNEYS/URETERS: No evidence of hydronephrosis or suspicious  mass.      GASTROINTESTINAL: Unremarkable  REPRODUCTIVE ORGANS: Unremarkable.  URINARY BLADDER: Unremarkable      VASCULAR: Unremarkable  LYMPH NODES: No pathologically enlarged nodes by size criteria.  PERITONEUM/RETROPERITONEUM: Unremarkable.       OSSEOUS STRUCTURES: Scattered diffuse lytic and sclerotic  metastases show no significant change. There is a fracture of the  L4 vertebral body which appears unchanged compared to the prior  exam.          IMPRESSION:  CONCLUSION:   Stable exam with diffuse scattered lytic and sclerotic metastases  and a hypodense lesion in the left lobe of the liver, all without  significant change.  Fracture of the L4 vertebral body shows no significant change.        MRI of lumbar spine done at [facility] on November 24, 2017 showed:   Left  1. Extensive bone metastasis to all of the lumbar vertebrae and the sacrum .  2. There is a pathologic burst fracture of the L4 vertebral body with associated moderate spinal stenosis and possible early ventral epidural tumor invasion .  3. Left foraminal narrowing at the L4-5 level with uncertain but doubtful impact on the left L4 nerve        MRI of thoracic spine done at [facility] on November 24, 2017 showed:  1. Metastatic involvement of all of the thoracic vertebra, there is slight compression of T12  2. No spinal stenosis or epidural tumor        Doppler ultrasound of right upper extremity done on November 10, 2017 showed:  IMPRESSION:  CONCLUSION:   Abnormal exam with thrombus visualized in the right internal  jugular vein, consistent with DVT.     MRI of brain with and without contrast done on November 9, 2017 was reviewed with the radiologist, it showed:  IMPRESSION:  CONCLUSION:  At  least six calvarial metastases, largest 1.6 cm residing in the  left parietal bone.  No brain metastases demonstrated.              X-ray of left hip done on September 7, 2017 showed:  Hyperlucency of the inferior pubic rami and the inferior aspect of obturator foramen consistent with osteolytic metastasis.     PET/CT done on July 3, 2017 showed:  IMPRESSION:  CONCLUSION:  1. Extensive metastatic disease. Pathologic uptake within  enlarged right-sided axillary lymph nodes. Metastatic adenopathy  in both laurie and mediastinum. Tumor replacing most of the left  lobe of liver. Intra-abdominal retroperitoneal metastases,  adenopathy.      Extensive skeletal metastases including a pathologic fracture at  L4.           Nuclear bone scan done on June 20, 2017 showed:  1. Increased uptake at L4 suggestive of metastatic process.  2. 3 or 4 areas of increased activity in bony calvarium  3. Some increased activity in tips posteriorly and anteriorly suggestive of metastatic carcinoma to the skeletal system.              Pathology :     Pathology data from July 10, 2017 showed:  Final Diagnosis   Mass right axilla, excisional biopsy:      Metastatic poorly differentiated ductal carcinoma, breast primary      Estrogen receptor positive, 95% stainability, strong intensity      Progesterone receptor positive, 95% stainability, strong intensity      HER-2 2+(equivocal), sent to [facility] for FISH      Addendum   Her 2 FISH result from [facility] is NEGATIVE            Pathology report from December 7, 2007 showed:  FINAL DIAGNOSIS:   RIGHT BREAST MAMMOTOME BIOPSY:        DUCTAL CARCINOMA IN SITU, INTERMEDIATE NUCLEAR GRADE              WITH MICROCALCIFICATION.       ADDENDUM:   Estrogen receptors are positive (90-95% stainability).   Progesterone receptors are positive (90-95% stainability).            ASSESSMENT AND PLAN:       1.  Metastatic cancer with extensive adenopathy in right axilla, mediastinum, hilar, abdominal,  retroperitoneal with extensive liver and bone metastasis on PET/CT.  Patient underwent right apatient underwent right axillary lymph node excision by Dr. Ernst on July 10, 2017.Pathology report confirmed ductal carcinoma of breast with estrogen and progesterone positivity.  At her on palliative chemotherapy with Taxotere and cytoxan on July 26, 2017.   Patient was  seen at [facility] for second opinion regarding her breast cancer.  Case was discussed with Dr vazquez oncologist that has seen patient at [facility].  She agreed with her chemotherapy at this point.  The scan on December 20, 2017 shows relative stability of disease, lesion in the liver as well as bone lesion at about same.  Her tumor marker CA 27-29 as well as CA 15-3 are less than 100 at this point.  Since patient is developing some neuropathy in bilateral upper and lower extremity recommend changing her therapy to Palbociclib with letrozole.  She was recently evaluated by orhtopedic surgeon in [place] who did not recommend any spine intervention at this time. She started the Palbociclib yesterday, she understands that she is to take it day 1 thru 21 and off one week before restarting next cycle. She is also taking Letrozole. Will have her return to clinic to see Dr. Meadows before she starts the next cycle with repeat CBC and CMP. .     2.  Brain metastasis:MRi Brain with and without done on November 9, 2017 was reviewed and brain calvarial metastasis are stable or somewhat improved.  No need to do radiation at this point which was discussed with patient.     3.  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  She is currently on Coumadin.    She is being followed by Coumadin clinic.    4. Bone metastases, she is currently on Zometa infusion.       This document has been signed by EVELYN Xiong on January 31, 2018 2:42 PM

## 2018-02-12 ENCOUNTER — ANTICOAGULATION VISIT (OUTPATIENT)
Dept: CARDIAC SURGERY | Facility: CLINIC | Age: 48
End: 2018-02-12

## 2018-02-12 VITALS — SYSTOLIC BLOOD PRESSURE: 108 MMHG | DIASTOLIC BLOOD PRESSURE: 72 MMHG | HEART RATE: 76 BPM

## 2018-02-12 DIAGNOSIS — I82.C11 ACUTE THROMBOSIS OF RIGHT INTERNAL JUGULAR VEIN (HCC): ICD-10-CM

## 2018-02-12 DIAGNOSIS — Z79.01 LONG-TERM (CURRENT) USE OF ANTICOAGULANTS: ICD-10-CM

## 2018-02-12 LAB — INR PPP: 1.9 (ref 0.9–1.1)

## 2018-02-12 PROCEDURE — 85610 PROTHROMBIN TIME: CPT | Performed by: NURSE PRACTITIONER

## 2018-02-12 PROCEDURE — 99211 OFF/OP EST MAY X REQ PHY/QHP: CPT | Performed by: NURSE PRACTITIONER

## 2018-02-12 NOTE — PROGRESS NOTES
PT states she checked her INR at work last Friday when she was unable to make appt due to work. PT's INR at work was 2.5. PT states her diet was the same over the weekend and denies missed doses or excessive k. PT denies started on Palbociclib on 2/6. PT will only get Zometa infusions now which do not require steroids or cause interference. PT denies any med bleeding issues. Due to drop in INR from Friday, dose increased slightly and pt will be seen next week when she returns to see Dr Meadows. Patient instructed regarding medication; results given and questions answered. Nutritional counseling given.  Dietary factors affecting therapy addressed.  Patient instructed to monitor for excessive bruising or bleeding. PT verbalizes understanding.         This document has been electronically signed by EVELYN Brewster on February 13, 2018 11:53 AM

## 2018-02-23 ENCOUNTER — ANTICOAGULATION VISIT (OUTPATIENT)
Dept: CARDIAC SURGERY | Facility: CLINIC | Age: 48
End: 2018-02-23

## 2018-02-23 ENCOUNTER — OFFICE VISIT (OUTPATIENT)
Dept: ONCOLOGY | Facility: CLINIC | Age: 48
End: 2018-02-23

## 2018-02-23 VITALS
TEMPERATURE: 98 F | SYSTOLIC BLOOD PRESSURE: 115 MMHG | WEIGHT: 213.41 LBS | HEART RATE: 84 BPM | RESPIRATION RATE: 16 BRPM | DIASTOLIC BLOOD PRESSURE: 76 MMHG | BODY MASS INDEX: 33.49 KG/M2 | HEIGHT: 67 IN

## 2018-02-23 VITALS — HEART RATE: 80 BPM

## 2018-02-23 DIAGNOSIS — Z79.01 LONG-TERM (CURRENT) USE OF ANTICOAGULANTS: ICD-10-CM

## 2018-02-23 DIAGNOSIS — Z17.0 MALIGNANT NEOPLASM OF RIGHT BREAST IN FEMALE, ESTROGEN RECEPTOR POSITIVE, UNSPECIFIED SITE OF BREAST (HCC): ICD-10-CM

## 2018-02-23 DIAGNOSIS — C50.911 MALIGNANT NEOPLASM OF RIGHT BREAST IN FEMALE, ESTROGEN RECEPTOR POSITIVE, UNSPECIFIED SITE OF BREAST (HCC): ICD-10-CM

## 2018-02-23 DIAGNOSIS — C80.1 METASTASIS TO BONE OF UNKNOWN PRIMARY (HCC): ICD-10-CM

## 2018-02-23 DIAGNOSIS — C79.51 METASTASIS TO BONE OF UNKNOWN PRIMARY (HCC): ICD-10-CM

## 2018-02-23 DIAGNOSIS — I82.C11 ACUTE THROMBOSIS OF RIGHT INTERNAL JUGULAR VEIN (HCC): ICD-10-CM

## 2018-02-23 LAB — INR PPP: 2 (ref 0.9–1.1)

## 2018-02-23 PROCEDURE — G0463 HOSPITAL OUTPT CLINIC VISIT: HCPCS | Performed by: INTERNAL MEDICINE

## 2018-02-23 PROCEDURE — 99214 OFFICE O/P EST MOD 30 MIN: CPT | Performed by: INTERNAL MEDICINE

## 2018-02-23 PROCEDURE — 85610 PROTHROMBIN TIME: CPT | Performed by: NURSE PRACTITIONER

## 2018-02-23 NOTE — PROGRESS NOTES
DATE OF VISIT: 12/27/17    REASON FOR VISIT:  Metastatic breast cancer    HISTORY OF PRESENT ILLNESS:    47-year-old female with a past medical history significant for history of ductal carcinoma in situ of the right breast diagnosed in December 2007 patient eventually had a mastectomy done in February 2008 and took adjuvant tamoxifen for 5 years until 2013.  Started on palliative chemotherapy with Taxotere and Cytoxan on July 26, 2017.  Patient received 7 cycle of Taxotere and Cytoxan on December 6, 2017.  After that patient was changed over to treatment with Palbociclib and letrozole on January 30, 2018.  Patient finished her first round of chemotherapy on February 20, 2018.  She is here for follow-up visit.  Denies any nausea or vomiting or diarrhea with new treatment.  Denies any worsening tingling numbness.  Complains of arthritic pain.  Complains of back pain.  Denies any bowel or bladder incontinence.      PAST MEDICAL HISTORY:    Past Medical History:   Diagnosis Date   • Anxiety    • Depression    • Encounter for gynecological examination    • Malignant neoplasm of female breast     hx of dcis s/p mastectomy and reconstruction and augmentation      • Primary malignant neoplasm of breast     dcis in rt breast s/p mastectomy,reconstruction      • Ventricular premature beats        SOCIAL HISTORY:    Social History   Substance Use Topics   • Smoking status: Never Smoker   • Smokeless tobacco: Never Used   • Alcohol use No       Surgical History :  Past Surgical History:   Procedure Laterality Date   • AUGMENTATION MAMMAPLASTY     • AXILLARY LYMPH NODE BIOPSY/EXCISION Right 7/10/2017    Procedure: BIOSPY RIGHT AXILLARY MASS AND OR LYMPH;  Surgeon: Sourav Ernst MD;  Location: [facility];  Service:    • BREAST BIOPSY  12/07/2007    Mammotome biopsy of the right side with clip placement   • BREAST LUMPECTOMY     • BREAST RECONSTRUCTION  10/28/2008    Abscence of right breast secondary to mastectomy. Implant  exchange for reconstruction of right breast with open para-prosthetic capsulotomy   • BREAST SURGERY  10/01/2009    Left breast ptosis and breast asymmetry secondary to right breast reconstruction for breast cancer. Left breast mastopexy with augmentation.   • CARDIAC CATHETERIZATION  09/18/2008    Normal coronary arteriography. Normal left ventricular angiogram   • EP STUDY     • MASTECTOMY Right    • MASTECTOMY  02/05/2008    Multifocal right breast ductal carcinoma-in-situ. Right total mastectomy   • MASTECTOMY, PARTIAL  01/03/2008    Ductal carcinoma in-situ of the right breast, proven by mammotome biopsy. Right lumpectomy, medial portion of the breast, between 2 o'clock and 4 o'clock, 5 cm from the nipple, with clip placement, radiographic inter. of severo. specimen, & ultrasound   • PAP SMEAR  03/03/2009    Negative   • MD INSJ TUNNELED CVC W/O SUBQ PORT/ AGE 3 YR/> Right 7/10/2017    Procedure: MEDIPORT     (c-arm#2);  Surgeon: Sourav Ernst MD;  Location: [facility];  Service: General       ALLERGIES:    Allergies   Allergen Reactions   • Percocet [Oxycodone-Acetaminophen] Nausea And Vomiting       FAMILY HISTORY:  Family History   Problem Relation Age of Onset   • Anemia Mother    • Multiple sclerosis Mother    • No Known Problems Father    • Diabetes Other    • Multiple sclerosis Other        REVIEW OF SYSTEMS:      CONSTITUTIONAL: Complains of fatigue.  Denies any fever, chills .      HEENT: No epistaxis, mouth sores or difficulty swallowing.     RESPIRATORY: No new shortness of breath. No new cough or hemoptysis.     CARDIOVASCULAR: No chest pain or palpitations.     GASTROINTESTINAL:  states scopolamine patch is helping her with nausea.  No new abdominal pain. No blood in the stool.     GENITOURINARY: No Dysuria or Hematuria.     MUSCULOSKELETAL: Complains of pain in bilateral hip.  Complains of back pain.     LYMPHATICS: No new lymph node swelling.     NEUROLOGICAL : Complains of intermittent  "tingling and numbness affecting bilateral hand, denies any recent worsening. No headache or dizziness. No seizures or balance problems.     SKIN: Positive for history of melanoma status post surgical removal. Denies any new skin lesion worrisome for melanoma.              PHYSICAL EXAMINATION:      VITAL SIGNS:  /76  Pulse 84  Temp 98 °F (36.7 °C) (Temporal Artery )   Resp 16  Ht 170.2 cm (67.01\")  Wt 96.8 kg (213 lb 6.5 oz)  BMI 33.42 kg/m2    GENERAL:  Not in any distress.    HEENT:  Normocephalic, Atraumatic.Mild Conjunctival pallor. No icterus. Extraocular Movements Intact. No Facial Asymmetry noted.    NECK:  No adenopathy. No JVD.    RESPIRATORY:  Fair air entry bilateral. No rhonchi or wheezing.    CARDIOVASCULAR:  S1, S2. Regular rate and rhythm. No murmur or gallop appreciated.    ABDOMEN:  Soft, obese, nontender. Bowel sounds present in all four quadrants.  No organomegaly appreciated.    EXTREMITIES:  No edema.No Calf Tenderness.    NEUROLOGIC:  Alert, awake and oriented ×3.  No  Motor or sensory deficit appreciated. Cranial Nerves 2-12 grossly intact.      DIAGNOSTIC DATA:    Glucose   Date Value Ref Range Status   01/31/2018 88 60 - 100 mg/dL Final     Sodium   Date Value Ref Range Status   01/31/2018 138 137 - 145 mmol/L Final     Potassium   Date Value Ref Range Status   01/31/2018 3.9 3.5 - 5.1 mmol/L Final     CO2   Date Value Ref Range Status   01/31/2018 23.0 22.0 - 31.0 mmol/L Final     Chloride   Date Value Ref Range Status   01/31/2018 104 95 - 110 mmol/L Final     Anion Gap   Date Value Ref Range Status   01/31/2018 11.0 5.0 - 15.0 mmol/L Final     Creatinine   Date Value Ref Range Status   01/31/2018 0.91 0.50 - 1.00 mg/dL Final     BUN   Date Value Ref Range Status   01/31/2018 15 7 - 21 mg/dL Final     BUN/Creatinine Ratio   Date Value Ref Range Status   01/31/2018 16.5 7.0 - 25.0 Final     Calcium   Date Value Ref Range Status   01/31/2018 9.1 8.4 - 10.2 mg/dL Final     eGFR " Non  Amer   Date Value Ref Range Status   01/31/2018 66 58 - 135 mL/min/1.73 Final     Alkaline Phosphatase   Date Value Ref Range Status   01/31/2018 102 38 - 126 U/L Final     Total Protein   Date Value Ref Range Status   01/31/2018 6.8 6.3 - 8.6 g/dL Final     ALT (SGPT)   Date Value Ref Range Status   01/31/2018 54 (H) 9 - 52 U/L Final     AST (SGOT)   Date Value Ref Range Status   01/31/2018 40 (H) 14 - 36 U/L Final     Total Bilirubin   Date Value Ref Range Status   01/31/2018 0.2 0.2 - 1.3 mg/dL Final     Albumin   Date Value Ref Range Status   01/31/2018 3.90 3.40 - 4.80 g/dL Final     Globulin   Date Value Ref Range Status   01/31/2018 2.9 2.3 - 3.5 gm/dL Final     A/G Ratio   Date Value Ref Range Status   01/31/2018 1.3 1.1 - 1.8 g/dL Final     Lab Results   Component Value Date    WBC 5.18 01/31/2018    HGB 12.2 01/31/2018    HCT 35.8 01/31/2018    MCV 88.2 01/31/2018     01/31/2018     Lab Results   Component Value Date    NEUTROABS 2.73 01/31/2018     Lab Results   Component Value Date     88.3 (H) 12/27/2017    LABCA2 90.3 (H) 12/27/2017     2 D Echo Done on July 19, 2017 showed:  Interpretation Summary   · The left ventricular cavity is borderline dilated.  · Left ventricular systolic function is normal. Estimated EF = 53%.  · Left ventricular diastolic dysfunction (grade I) consistent with impaired relaxation.  · IAS aneurysm noted. No PFO or ASD           Radiology Data :  CT of Chest, abdomen and pelvis with contrast done on December 20, 2017 was reviewed and discussed with patient, it showed:  CHEST FINDINGS:     LUNGS/PLEURA: The lungs are normal.  TRACHEA AND BRONCHI:  The trachea and bronchi are patent.  MEDIASTINUM, LATANYA AND LYMPH NODES: The mediastinum, latanya and  lymph nodes are normal.  HEART AND PERICARDIUM: The heart and pericardium are normal.  VASCULAR: Unremarkable  OSSEOUS STRUCTURES: Scattered diffuse lytic and sclerotic  metastases show no significant  change.        ABDOMINAL/PELVIC FINDINGS:     HEPATOBILIARY: There is a hypoechoic dense lesion in the left  lobe of the liver measuring 6.3 x 5.5 cm, without significant  change in size and appearance.  SPLEEN: Unremarkable.  PANCREAS: Unremarkable  ADRENAL GLANDS: Unremarkable.  KIDNEYS/URETERS: No evidence of hydronephrosis or suspicious  mass.     GASTROINTESTINAL: Unremarkable  REPRODUCTIVE ORGANS: Unremarkable.  URINARY BLADDER: Unremarkable     VASCULAR: Unremarkable  LYMPH NODES: No pathologically enlarged nodes by size criteria.  PERITONEUM/RETROPERITONEUM: Unremarkable.      OSSEOUS STRUCTURES: Scattered diffuse lytic and sclerotic  metastases show no significant change. There is a fracture of the  L4 vertebral body which appears unchanged compared to the prior  exam.        IMPRESSION:  CONCLUSION:   Stable exam with diffuse scattered lytic and sclerotic metastases  and a hypodense lesion in the left lobe of the liver, all without  significant change.  Fracture of the L4 vertebral body shows no significant change.      MRI of lumbar spine done at [facility] on November 24, 2017 showed:   Left  1. Extensive bone metastasis to all of the lumbar vertebrae and the sacrum .  2. There is a pathologic burst fracture of the L4 vertebral body with associated moderate spinal stenosis and possible early ventral epidural tumor invasion .  3. Left foraminal narrowing at the L4-5 level with uncertain but doubtful impact on the left L4 nerve      MRI of thoracic spine done at [facility] on November 24, 2017 showed:  1. Metastatic involvement of all of the thoracic vertebra, there is slight compression of T12  2. No spinal stenosis or epidural tumor       Doppler ultrasound of right upper extremity done on November 10, 2017 showed:  IMPRESSION:  CONCLUSION:   Abnormal exam with thrombus visualized in the right internal  jugular vein, consistent with DVT.    MRI of brain with and without contrast done on November 9, 2017  was reviewed with the radiologist, it showed:  IMPRESSION:  CONCLUSION:  At least six calvarial metastases, largest 1.6 cm residing in the  left parietal bone.  No brain metastases demonstrated.          X-ray of left hip done on September 7, 2017 showed:  Hyperlucency of the inferior pubic rami and the inferior aspect of obturator foramen consistent with osteolytic metastasis.    PET/CT done on July 3, 2017 showed:  IMPRESSION:  CONCLUSION:  1. Extensive metastatic disease. Pathologic uptake within  enlarged right-sided axillary lymph nodes. Metastatic adenopathy  in both laurie and mediastinum. Tumor replacing most of the left  lobe of liver. Intra-abdominal retroperitoneal metastases,  adenopathy.     Extensive skeletal metastases including a pathologic fracture at  L4.        Nuclear bone scan done on June 20, 2017 showed:  1. Increased uptake at L4 suggestive of metastatic process.  2. 3 or 4 areas of increased activity in bony calvarium  3. Some increased activity in tips posteriorly and anteriorly suggestive of metastatic carcinoma to the skeletal system.           Pathology :    Pathology data from July 10, 2017 showed:  Final Diagnosis   Mass right axilla, excisional biopsy:      Metastatic poorly differentiated ductal carcinoma, breast primary      Estrogen receptor positive, 95% stainability, strong intensity      Progesterone receptor positive, 95% stainability, strong intensity      HER-2 2+(equivocal), sent to [facility] for FISH     Addendum   Her 2 FISH result from [facility] is NEGATIVE         Pathology report from December 7, 2007 showed:  FINAL DIAGNOSIS:   RIGHT BREAST MAMMOTOME BIOPSY:        DUCTAL CARCINOMA IN SITU, INTERMEDIATE NUCLEAR GRADE              WITH MICROCALCIFICATION.      ADDENDUM:   Estrogen receptors are positive (90-95% stainability).   Progesterone receptors are positive (90-95% stainability).         ASSESSMENT AND PLAN:      1.  Metastatic cancer with extensive adenopathy  in right axilla, mediastinum, hilar, abdominal, retroperitoneal with extensive liver and bone metastasis on PET/CT.  Patient underwent right apatient underwent right axillary lymph node excision by Dr. Ernst on July 10, 2017.Pathology report confirmed ductal carcinoma of breast with estrogen and progesterone positivity.  At her on palliative chemotherapy with Taxotere and cytoxan on July 26, 2017.   Patient was  seen at [facility] for second opinion regarding her breast cancer.  Case was discussed with Dr vazquez oncologist that has seen patient at [facility].  She agreed with her chemotherapy at this point.  The scan on December 20, 2017 shows relative stability of disease, lesion in the liver as well as bone lesion at about same.  Her tumor marker CA 27-29 as well as CA 15-3 are less than 100 at this point.  Since patient is developing some neuropathy in bilateral upper and lower extremity recommend changing her therapy to Palbociclib with letrozole.  Patient started taking first round of Palbociclib and letrozole on January 30, 2018.  Patient finished first 21 days of Palbociclib on February 20, 2018.  We will recheck her CBC and CMP along with tumor marker next week with Zometa.  We will see her back in about 5 weeks prior to starting cycle 3 of Palbociclib and letrozole.  Plan is to repeat restaging CT scan after 3 cycles of Palbociclib and letrozole.    2.  Brain metastasis:MRi Brain with and without done on November 9, 2017 was reviewed and compared with t MRI done 6 weeks ago.  Brain calvarial metastasis are stable or somewhat improved.  No need to do radiation at this point which was discussed with patient.    3.  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  She is currently on Coumadin.    She is being followed by Coumadin clinic She was instructed to come to the emergency room in case she starts having any bleeding.    4.  History of melanoma in  situ status post excision by Dr. Linn.    5.  Bone metastasis: Patient was started on Zometa on August 23, 2017.  Continue with Zometa as scheduled for now.  Patient denies any mouth sores or jaw pain.  CT scan shows about loss of 70% of height of L4 vertebral body, patient has been referred to Dr. Pugh to get evaluated for kyphoplasty.  Patient went for second opinion with orthopedic surgeon in [place], as per patient's second surgeon did not recommend any surgery.  We will continue with monthly Zometa for now.     6.  History of ductal carcinoma in situ of the right breast diagnosed in 2007.  Status post mastectomy followed by adjuvant tamoxifen for 5 years which was finished in 2013.    7.  Elevated  liver function tests secondary to liver metastases:   8.  Health maintenance: Patient does not smoke.  Not a candidate for screening colonoscopy at this point.  Remains full code.    9.  Prescriptions: Patient has enough prescription of Decadron, Zofran, scopolamine and Ultram.        Ovidio eMadows MD  2/23/2018  2:30 PM        EMR Dragon/Transcription disclaimer:   Much of this encounter note is an electronic transcription/translation of spoken language to printed text. The electronic translation of spoken language may permit erroneous, or at times, nonsensical words or phrases to be inadvertently transcribed; Although I have reviewed the note for such errors, some may still exist.

## 2018-02-28 ENCOUNTER — INFUSION (OUTPATIENT)
Dept: ONCOLOGY | Facility: HOSPITAL | Age: 48
End: 2018-02-28

## 2018-02-28 DIAGNOSIS — Z45.2 ENCOUNTER FOR ADJUSTMENT OR MANAGEMENT OF VASCULAR ACCESS DEVICE: ICD-10-CM

## 2018-02-28 DIAGNOSIS — IMO0001 OTHER COMPLICATION DUE TO VENOUS ACCESS DEVICE, SUBSEQUENT ENCOUNTER: ICD-10-CM

## 2018-02-28 DIAGNOSIS — Z17.0 MALIGNANT NEOPLASM OF RIGHT BREAST IN FEMALE, ESTROGEN RECEPTOR POSITIVE, UNSPECIFIED SITE OF BREAST (HCC): Primary | ICD-10-CM

## 2018-02-28 DIAGNOSIS — Z17.0 MALIGNANT NEOPLASM OF RIGHT BREAST IN FEMALE, ESTROGEN RECEPTOR POSITIVE, UNSPECIFIED SITE OF BREAST (HCC): ICD-10-CM

## 2018-02-28 DIAGNOSIS — C50.911 MALIGNANT NEOPLASM OF RIGHT BREAST IN FEMALE, ESTROGEN RECEPTOR POSITIVE, UNSPECIFIED SITE OF BREAST (HCC): Primary | ICD-10-CM

## 2018-02-28 DIAGNOSIS — Z45.2 ENCOUNTER FOR VENOUS ACCESS DEVICE CARE: ICD-10-CM

## 2018-02-28 DIAGNOSIS — C50.911 MALIGNANT NEOPLASM OF RIGHT BREAST IN FEMALE, ESTROGEN RECEPTOR POSITIVE, UNSPECIFIED SITE OF BREAST (HCC): ICD-10-CM

## 2018-02-28 LAB
ALBUMIN SERPL-MCNC: 4 G/DL (ref 3.4–4.8)
ALBUMIN/GLOB SERPL: 1.4 G/DL (ref 1.1–1.8)
ALP SERPL-CCNC: 91 U/L (ref 38–126)
ALT SERPL W P-5'-P-CCNC: 58 U/L (ref 9–52)
ANION GAP SERPL CALCULATED.3IONS-SCNC: 12 MMOL/L (ref 5–15)
AST SERPL-CCNC: 31 U/L (ref 14–36)
BASOPHILS # BLD AUTO: 0.03 10*3/MM3 (ref 0–0.2)
BASOPHILS NFR BLD AUTO: 1.4 % (ref 0–2)
BILIRUB SERPL-MCNC: 0.1 MG/DL (ref 0.2–1.3)
BUN BLD-MCNC: 14 MG/DL (ref 7–21)
BUN/CREAT SERPL: 21.2 (ref 7–25)
CALCIUM SPEC-SCNC: 9.3 MG/DL (ref 8.4–10.2)
CHLORIDE SERPL-SCNC: 103 MMOL/L (ref 95–110)
CO2 SERPL-SCNC: 24 MMOL/L (ref 22–31)
CREAT BLD-MCNC: 0.66 MG/DL (ref 0.5–1)
DEPRECATED RDW RBC AUTO: 47 FL (ref 36.4–46.3)
EOSINOPHIL # BLD AUTO: 0.02 10*3/MM3 (ref 0–0.7)
EOSINOPHIL NFR BLD AUTO: 0.9 % (ref 0–7)
ERYTHROCYTE [DISTWIDTH] IN BLOOD BY AUTOMATED COUNT: 15.4 % (ref 11.5–14.5)
GFR SERPL CREATININE-BSD FRML MDRD: 95 ML/MIN/1.73 (ref 60–135)
GLOBULIN UR ELPH-MCNC: 2.8 GM/DL (ref 2.3–3.5)
GLUCOSE BLD-MCNC: 110 MG/DL (ref 60–100)
HCT VFR BLD AUTO: 33.2 % (ref 35–45)
HGB BLD-MCNC: 11.3 G/DL (ref 12–15.5)
IMM GRANULOCYTES # BLD: 0 10*3/MM3 (ref 0–0.02)
IMM GRANULOCYTES NFR BLD: 0 % (ref 0–0.5)
LYMPHOCYTES # BLD AUTO: 1.07 10*3/MM3 (ref 0.6–4.2)
LYMPHOCYTES NFR BLD AUTO: 50.7 % (ref 10–50)
MAGNESIUM SERPL-MCNC: 1.7 MG/DL (ref 1.6–2.3)
MCH RBC QN AUTO: 29.8 PG (ref 26.5–34)
MCHC RBC AUTO-ENTMCNC: 34 G/DL (ref 31.4–36)
MCV RBC AUTO: 87.6 FL (ref 80–98)
MONOCYTES # BLD AUTO: 0.31 10*3/MM3 (ref 0–0.9)
MONOCYTES NFR BLD AUTO: 14.7 % (ref 0–12)
NEUTROPHILS # BLD AUTO: 0.68 10*3/MM3 (ref 2–8.6)
NEUTROPHILS NFR BLD AUTO: 32.3 % (ref 37–80)
PHOSPHATE SERPL-MCNC: 4.5 MG/DL (ref 2.4–4.4)
PLATELET # BLD AUTO: 173 10*3/MM3 (ref 150–450)
PMV BLD AUTO: 10 FL (ref 8–12)
POTASSIUM BLD-SCNC: 3.5 MMOL/L (ref 3.5–5.1)
PROT SERPL-MCNC: 6.8 G/DL (ref 6.3–8.6)
RBC # BLD AUTO: 3.79 10*6/MM3 (ref 3.77–5.16)
SODIUM BLD-SCNC: 139 MMOL/L (ref 137–145)
WBC NRBC COR # BLD: 2.11 10*3/MM3 (ref 3.2–9.8)

## 2018-02-28 PROCEDURE — 83735 ASSAY OF MAGNESIUM: CPT

## 2018-02-28 PROCEDURE — 86300 IMMUNOASSAY TUMOR CA 15-3: CPT

## 2018-02-28 PROCEDURE — 80053 COMPREHEN METABOLIC PANEL: CPT

## 2018-02-28 PROCEDURE — 25010000002 ZOLEDRONIC ACID PER 1 MG: Performed by: INTERNAL MEDICINE

## 2018-02-28 PROCEDURE — 25010000002 HEPARIN FLUSH (PORCINE) 100 UNIT/ML SOLUTION: Performed by: INTERNAL MEDICINE

## 2018-02-28 PROCEDURE — 84100 ASSAY OF PHOSPHORUS: CPT

## 2018-02-28 PROCEDURE — 96365 THER/PROPH/DIAG IV INF INIT: CPT | Performed by: INTERNAL MEDICINE

## 2018-02-28 PROCEDURE — 85025 COMPLETE CBC W/AUTO DIFF WBC: CPT

## 2018-02-28 RX ORDER — SODIUM CHLORIDE 0.9 % (FLUSH) 0.9 %
10 SYRINGE (ML) INJECTION AS NEEDED
Status: DISCONTINUED | OUTPATIENT
Start: 2018-02-28 | End: 2018-02-28 | Stop reason: HOSPADM

## 2018-02-28 RX ORDER — SODIUM CHLORIDE 0.9 % (FLUSH) 0.9 %
10 SYRINGE (ML) INJECTION AS NEEDED
Status: CANCELLED | OUTPATIENT
Start: 2018-03-05

## 2018-02-28 RX ORDER — SODIUM CHLORIDE 9 MG/ML
250 INJECTION, SOLUTION INTRAVENOUS ONCE
Status: CANCELLED | OUTPATIENT
Start: 2018-02-28

## 2018-02-28 RX ORDER — SODIUM CHLORIDE 9 MG/ML
250 INJECTION, SOLUTION INTRAVENOUS ONCE
Status: COMPLETED | OUTPATIENT
Start: 2018-02-28 | End: 2018-02-28

## 2018-02-28 RX ORDER — PALBOCICLIB 125 MG/1
CAPSULE ORAL
Qty: 21 CAPSULE | Refills: 1 | Status: SHIPPED | OUTPATIENT
Start: 2018-02-28 | End: 2018-05-08 | Stop reason: SDUPTHER

## 2018-02-28 RX ADMIN — SODIUM CHLORIDE, PRESERVATIVE FREE 500 UNITS: 5 INJECTION INTRAVENOUS at 11:53

## 2018-02-28 RX ADMIN — ZOLEDRONIC ACID 4 MG: 4 INJECTION, SOLUTION, CONCENTRATE INTRAVENOUS at 11:20

## 2018-02-28 RX ADMIN — SODIUM CHLORIDE 250 ML: 9 INJECTION, SOLUTION INTRAVENOUS at 11:11

## 2018-02-28 RX ADMIN — Medication 10 ML: at 11:53

## 2018-02-28 RX ADMIN — Medication 10 ML: at 10:24

## 2018-03-01 LAB
CANCER AG15-3 SERPL-ACNC: 47.2 U/ML (ref 0–25)
CANCER AG27-29 SERPL-ACNC: 52.1 U/ML (ref 0–38.6)

## 2018-03-05 ENCOUNTER — INFUSION (OUTPATIENT)
Dept: ONCOLOGY | Facility: HOSPITAL | Age: 48
End: 2018-03-05

## 2018-03-05 DIAGNOSIS — C50.911 MALIGNANT NEOPLASM OF RIGHT BREAST IN FEMALE, ESTROGEN RECEPTOR POSITIVE, UNSPECIFIED SITE OF BREAST (HCC): ICD-10-CM

## 2018-03-05 DIAGNOSIS — IMO0001 OTHER COMPLICATION DUE TO VENOUS ACCESS DEVICE, SUBSEQUENT ENCOUNTER: ICD-10-CM

## 2018-03-05 DIAGNOSIS — C79.9 METASTATIC DISEASE (HCC): ICD-10-CM

## 2018-03-05 DIAGNOSIS — Z17.0 MALIGNANT NEOPLASM OF RIGHT BREAST IN FEMALE, ESTROGEN RECEPTOR POSITIVE, UNSPECIFIED SITE OF BREAST (HCC): ICD-10-CM

## 2018-03-05 DIAGNOSIS — Z45.2 ENCOUNTER FOR ADJUSTMENT OR MANAGEMENT OF VASCULAR ACCESS DEVICE: ICD-10-CM

## 2018-03-05 DIAGNOSIS — Z45.2 ENCOUNTER FOR VENOUS ACCESS DEVICE CARE: Primary | ICD-10-CM

## 2018-03-05 LAB
BASOPHILS # BLD AUTO: 0.09 10*3/MM3 (ref 0–0.2)
BASOPHILS NFR BLD AUTO: 2.3 % (ref 0–2)
DEPRECATED RDW RBC AUTO: 51.9 FL (ref 36.4–46.3)
EOSINOPHIL # BLD AUTO: 0.04 10*3/MM3 (ref 0–0.7)
EOSINOPHIL NFR BLD AUTO: 1 % (ref 0–7)
ERYTHROCYTE [DISTWIDTH] IN BLOOD BY AUTOMATED COUNT: 15.9 % (ref 11.5–14.5)
HCT VFR BLD AUTO: 35.2 % (ref 35–45)
HGB BLD-MCNC: 12 G/DL (ref 12–15.5)
IMM GRANULOCYTES # BLD: 0 10*3/MM3 (ref 0–0.02)
IMM GRANULOCYTES NFR BLD: 0 % (ref 0–0.5)
LYMPHOCYTES # BLD AUTO: 1.35 10*3/MM3 (ref 0.6–4.2)
LYMPHOCYTES NFR BLD AUTO: 34.1 % (ref 10–50)
MCH RBC QN AUTO: 30.7 PG (ref 26.5–34)
MCHC RBC AUTO-ENTMCNC: 34.1 G/DL (ref 31.4–36)
MCV RBC AUTO: 90 FL (ref 80–98)
MONOCYTES # BLD AUTO: 0.69 10*3/MM3 (ref 0–0.9)
MONOCYTES NFR BLD AUTO: 17.4 % (ref 0–12)
NEUTROPHILS # BLD AUTO: 1.79 10*3/MM3 (ref 2–8.6)
NEUTROPHILS NFR BLD AUTO: 45.2 % (ref 37–80)
PLATELET # BLD AUTO: 272 10*3/MM3 (ref 150–450)
PMV BLD AUTO: 9.3 FL (ref 8–12)
RBC # BLD AUTO: 3.91 10*6/MM3 (ref 3.77–5.16)
WBC NRBC COR # BLD: 3.96 10*3/MM3 (ref 3.2–9.8)

## 2018-03-05 PROCEDURE — 36415 COLL VENOUS BLD VENIPUNCTURE: CPT | Performed by: INTERNAL MEDICINE

## 2018-03-05 PROCEDURE — 85025 COMPLETE CBC W/AUTO DIFF WBC: CPT | Performed by: INTERNAL MEDICINE

## 2018-03-05 RX ORDER — SODIUM CHLORIDE 0.9 % (FLUSH) 0.9 %
10 SYRINGE (ML) INJECTION AS NEEDED
Status: DISCONTINUED | OUTPATIENT
Start: 2018-03-05 | End: 2018-03-05 | Stop reason: HOSPADM

## 2018-03-05 RX ORDER — SODIUM CHLORIDE 0.9 % (FLUSH) 0.9 %
10 SYRINGE (ML) INJECTION AS NEEDED
Status: CANCELLED | OUTPATIENT
Start: 2018-04-02

## 2018-03-09 ENCOUNTER — TELEPHONE (OUTPATIENT)
Dept: RADIATION ONCOLOGY | Facility: HOSPITAL | Age: 48
End: 2018-03-09

## 2018-03-09 NOTE — TELEPHONE ENCOUNTER
Patient came by [facility] around noon on her way out of town to attempt to give stool sample. Unable to provide one. Again informed patient that if she develops any abdominal pain, fever, or worsening diarrhea to go the a local ER. Patient voices understanding. Dr. Meadows notified.

## 2018-03-09 NOTE — TELEPHONE ENCOUNTER
Patient has started taking her Ibrance 125 mg and today has started having diarrhea (so far 5 bowel movements today). She says she is experiencing no fever, nausea, or vomiting. She says she is due to go out of town and is wanting to know if Dr Meadows would send something to the pharmacy or advise if something otc. He pharmacy is [facility]

## 2018-03-09 NOTE — TELEPHONE ENCOUNTER
Patient called back. Asked her to come today and give a stool sample to check for infection. Patient states she is getting ready to go out of town and doesn't know if she will be able to come in. Informed patient that until we know she doesn't have an infection that she should not take any anti diarrhea medication. Patient says she will try to come by here today before leaving town. Instructed patient that if she develops any abdominal pain, worsening diarrhea, or fever, she should go to an ER. Patient states understanding.

## 2018-03-16 ENCOUNTER — ANTICOAGULATION VISIT (OUTPATIENT)
Dept: CARDIAC SURGERY | Facility: CLINIC | Age: 48
End: 2018-03-16

## 2018-03-16 VITALS — DIASTOLIC BLOOD PRESSURE: 70 MMHG | SYSTOLIC BLOOD PRESSURE: 111 MMHG | HEART RATE: 86 BPM

## 2018-03-16 DIAGNOSIS — Z79.01 LONG-TERM (CURRENT) USE OF ANTICOAGULANTS: ICD-10-CM

## 2018-03-16 DIAGNOSIS — I82.C11 ACUTE THROMBOSIS OF RIGHT INTERNAL JUGULAR VEIN (HCC): ICD-10-CM

## 2018-03-16 LAB — INR PPP: 3.1 (ref 0.9–1.1)

## 2018-03-16 PROCEDURE — 85610 PROTHROMBIN TIME: CPT | Performed by: NURSE PRACTITIONER

## 2018-03-16 NOTE — PROGRESS NOTES
Pt denies med changes or bleeding problems. Pt states she has not eaten green veggies in awhile. Pt instructed to add green veggies twice weekly and appt made for 2.5 weeks when pt returns for another appt; pt verbalized. Patient instructed regarding medication; results given and questions answered. Nutritional counseling given.  Dietary factors affecting therapy addressed.  Patient instructed to monitor for excessive bruising or bleeding.           This document has been electronically signed by EVELYN Brewster on March 19, 2018 11:28 AM

## 2018-03-19 ENCOUNTER — TELEPHONE (OUTPATIENT)
Dept: ONCOLOGY | Facility: CLINIC | Age: 48
End: 2018-03-19

## 2018-03-19 NOTE — TELEPHONE ENCOUNTER
Returned the patient's phone call regarding the need to move her follow up appointment to a later date. The patient needs her appointment moved to 4/2/2018 in regards to day 1 of her next cycle of chemo. Message sent to the front office to move it and notify the patient of the time.

## 2018-03-28 ENCOUNTER — APPOINTMENT (OUTPATIENT)
Dept: ONCOLOGY | Facility: HOSPITAL | Age: 48
End: 2018-03-28

## 2018-04-02 ENCOUNTER — INFUSION (OUTPATIENT)
Dept: ONCOLOGY | Facility: HOSPITAL | Age: 48
End: 2018-04-02

## 2018-04-02 ENCOUNTER — ANTICOAGULATION VISIT (OUTPATIENT)
Dept: CARDIAC SURGERY | Facility: CLINIC | Age: 48
End: 2018-04-02

## 2018-04-02 ENCOUNTER — OFFICE VISIT (OUTPATIENT)
Dept: ONCOLOGY | Facility: CLINIC | Age: 48
End: 2018-04-02

## 2018-04-02 VITALS
DIASTOLIC BLOOD PRESSURE: 81 MMHG | HEART RATE: 73 BPM | TEMPERATURE: 98 F | HEIGHT: 67 IN | RESPIRATION RATE: 18 BRPM | WEIGHT: 215.17 LBS | BODY MASS INDEX: 33.77 KG/M2 | SYSTOLIC BLOOD PRESSURE: 119 MMHG

## 2018-04-02 VITALS — SYSTOLIC BLOOD PRESSURE: 102 MMHG | HEART RATE: 74 BPM | OXYGEN SATURATION: 97 % | DIASTOLIC BLOOD PRESSURE: 72 MMHG

## 2018-04-02 DIAGNOSIS — C80.1 METASTASIS TO BONE OF UNKNOWN PRIMARY (HCC): Primary | ICD-10-CM

## 2018-04-02 DIAGNOSIS — Z79.01 LONG-TERM (CURRENT) USE OF ANTICOAGULANTS: ICD-10-CM

## 2018-04-02 DIAGNOSIS — Z17.0 MALIGNANT NEOPLASM OF RIGHT BREAST IN FEMALE, ESTROGEN RECEPTOR POSITIVE, UNSPECIFIED SITE OF BREAST (HCC): ICD-10-CM

## 2018-04-02 DIAGNOSIS — C79.31 METASTASIS TO BRAIN (HCC): ICD-10-CM

## 2018-04-02 DIAGNOSIS — D70.1 CHEMOTHERAPY-INDUCED NEUTROPENIA (HCC): ICD-10-CM

## 2018-04-02 DIAGNOSIS — Z85.820 HISTORY OF MALIGNANT MELANOMA OF SKIN: ICD-10-CM

## 2018-04-02 DIAGNOSIS — I82.C11 ACUTE THROMBOSIS OF RIGHT INTERNAL JUGULAR VEIN (HCC): ICD-10-CM

## 2018-04-02 DIAGNOSIS — C50.911 MALIGNANT NEOPLASM OF RIGHT BREAST IN FEMALE, ESTROGEN RECEPTOR POSITIVE, UNSPECIFIED SITE OF BREAST (HCC): ICD-10-CM

## 2018-04-02 DIAGNOSIS — Z45.2 ENCOUNTER FOR VENOUS ACCESS DEVICE CARE: ICD-10-CM

## 2018-04-02 DIAGNOSIS — Z17.0 MALIGNANT NEOPLASM OF RIGHT BREAST IN FEMALE, ESTROGEN RECEPTOR POSITIVE, UNSPECIFIED SITE OF BREAST (HCC): Primary | ICD-10-CM

## 2018-04-02 DIAGNOSIS — T45.1X5A CHEMOTHERAPY-INDUCED NEUTROPENIA (HCC): ICD-10-CM

## 2018-04-02 DIAGNOSIS — Z45.2 ENCOUNTER FOR ADJUSTMENT OR MANAGEMENT OF VASCULAR ACCESS DEVICE: ICD-10-CM

## 2018-04-02 DIAGNOSIS — IMO0001 OTHER COMPLICATION DUE TO VENOUS ACCESS DEVICE, SUBSEQUENT ENCOUNTER: ICD-10-CM

## 2018-04-02 DIAGNOSIS — C50.911 MALIGNANT NEOPLASM OF RIGHT BREAST IN FEMALE, ESTROGEN RECEPTOR POSITIVE, UNSPECIFIED SITE OF BREAST (HCC): Primary | ICD-10-CM

## 2018-04-02 DIAGNOSIS — C79.51 METASTASIS TO BONE OF UNKNOWN PRIMARY (HCC): Primary | ICD-10-CM

## 2018-04-02 LAB
ALBUMIN SERPL-MCNC: 4 G/DL (ref 3.4–4.8)
ALBUMIN/GLOB SERPL: 1.3 G/DL (ref 1.1–1.8)
ALP SERPL-CCNC: 90 U/L (ref 38–126)
ALT SERPL W P-5'-P-CCNC: 47 U/L (ref 9–52)
ANION GAP SERPL CALCULATED.3IONS-SCNC: 14 MMOL/L (ref 5–15)
AST SERPL-CCNC: 32 U/L (ref 14–36)
BASOPHILS # BLD AUTO: 0.02 10*3/MM3 (ref 0–0.2)
BASOPHILS NFR BLD AUTO: 0.7 % (ref 0–2)
BILIRUB SERPL-MCNC: 0.1 MG/DL (ref 0.2–1.3)
BUN BLD-MCNC: 12 MG/DL (ref 7–21)
BUN/CREAT SERPL: 15.2 (ref 7–25)
CALCIUM SPEC-SCNC: 8.9 MG/DL (ref 8.4–10.2)
CHLORIDE SERPL-SCNC: 104 MMOL/L (ref 95–110)
CO2 SERPL-SCNC: 25 MMOL/L (ref 22–31)
CREAT BLD-MCNC: 0.79 MG/DL (ref 0.5–1)
DEPRECATED RDW RBC AUTO: 52 FL (ref 36.4–46.3)
EOSINOPHIL # BLD AUTO: 0.05 10*3/MM3 (ref 0–0.7)
EOSINOPHIL NFR BLD AUTO: 1.9 % (ref 0–7)
ERYTHROCYTE [DISTWIDTH] IN BLOOD BY AUTOMATED COUNT: 16.6 % (ref 11.5–14.5)
GFR SERPL CREATININE-BSD FRML MDRD: 77 ML/MIN/1.73 (ref 58–135)
GLOBULIN UR ELPH-MCNC: 3 GM/DL (ref 2.3–3.5)
GLUCOSE BLD-MCNC: 68 MG/DL (ref 60–100)
HCT VFR BLD AUTO: 32.8 % (ref 35–45)
HGB BLD-MCNC: 11.6 G/DL (ref 12–15.5)
IMM GRANULOCYTES # BLD: 0 10*3/MM3 (ref 0–0.02)
IMM GRANULOCYTES NFR BLD: 0 % (ref 0–0.5)
INR PPP: 2.6 (ref 0.9–1.1)
LYMPHOCYTES # BLD AUTO: 1.08 10*3/MM3 (ref 0.6–4.2)
LYMPHOCYTES NFR BLD AUTO: 40 % (ref 10–50)
MAGNESIUM SERPL-MCNC: 1.8 MG/DL (ref 1.6–2.3)
MCH RBC QN AUTO: 31.4 PG (ref 26.5–34)
MCHC RBC AUTO-ENTMCNC: 35.4 G/DL (ref 31.4–36)
MCV RBC AUTO: 88.9 FL (ref 80–98)
MONOCYTES # BLD AUTO: 0.36 10*3/MM3 (ref 0–0.9)
MONOCYTES NFR BLD AUTO: 13.3 % (ref 0–12)
NEUTROPHILS # BLD AUTO: 1.19 10*3/MM3 (ref 2–8.6)
NEUTROPHILS NFR BLD AUTO: 44.1 % (ref 37–80)
PHOSPHATE SERPL-MCNC: 4.3 MG/DL (ref 2.4–4.4)
PLATELET # BLD AUTO: 174 10*3/MM3 (ref 150–450)
PMV BLD AUTO: 9.3 FL (ref 8–12)
POTASSIUM BLD-SCNC: 3.7 MMOL/L (ref 3.5–5.1)
PROT SERPL-MCNC: 7 G/DL (ref 6.3–8.6)
RBC # BLD AUTO: 3.69 10*6/MM3 (ref 3.77–5.16)
SODIUM BLD-SCNC: 143 MMOL/L (ref 137–145)
WBC NRBC COR # BLD: 2.7 10*3/MM3 (ref 3.2–9.8)

## 2018-04-02 PROCEDURE — 25010000002 ZOLEDRONIC ACID PER 1 MG: Performed by: INTERNAL MEDICINE

## 2018-04-02 PROCEDURE — 84100 ASSAY OF PHOSPHORUS: CPT

## 2018-04-02 PROCEDURE — 25010000002 HEPARIN FLUSH (PORCINE) 100 UNIT/ML SOLUTION: Performed by: INTERNAL MEDICINE

## 2018-04-02 PROCEDURE — 99215 OFFICE O/P EST HI 40 MIN: CPT | Performed by: INTERNAL MEDICINE

## 2018-04-02 PROCEDURE — 96365 THER/PROPH/DIAG IV INF INIT: CPT | Performed by: INTERNAL MEDICINE

## 2018-04-02 PROCEDURE — 85610 PROTHROMBIN TIME: CPT | Performed by: NURSE PRACTITIONER

## 2018-04-02 PROCEDURE — 86300 IMMUNOASSAY TUMOR CA 15-3: CPT

## 2018-04-02 PROCEDURE — 83735 ASSAY OF MAGNESIUM: CPT

## 2018-04-02 PROCEDURE — 80053 COMPREHEN METABOLIC PANEL: CPT

## 2018-04-02 PROCEDURE — 85025 COMPLETE CBC W/AUTO DIFF WBC: CPT

## 2018-04-02 RX ORDER — SODIUM CHLORIDE 0.9 % (FLUSH) 0.9 %
10 SYRINGE (ML) INJECTION AS NEEDED
Status: DISCONTINUED | OUTPATIENT
Start: 2018-04-02 | End: 2018-04-02 | Stop reason: HOSPADM

## 2018-04-02 RX ORDER — SODIUM CHLORIDE 0.9 % (FLUSH) 0.9 %
10 SYRINGE (ML) INJECTION AS NEEDED
Status: CANCELLED | OUTPATIENT
Start: 2018-04-09

## 2018-04-02 RX ORDER — NEOMYCIN SULFATE, POLYMYXIN B SULFATE AND DEXAMETHASONE 3.5; 10000; 1 MG/ML; [USP'U]/ML; MG/ML
SUSPENSION/ DROPS OPHTHALMIC
Refills: 0 | COMMUNITY
Start: 2018-03-01 | End: 2019-04-19

## 2018-04-02 RX ORDER — SODIUM CHLORIDE 9 MG/ML
250 INJECTION, SOLUTION INTRAVENOUS ONCE
Status: COMPLETED | OUTPATIENT
Start: 2018-04-02 | End: 2018-04-02

## 2018-04-02 RX ORDER — SCOLOPAMINE TRANSDERMAL SYSTEM 1 MG/1
PATCH, EXTENDED RELEASE TRANSDERMAL
Qty: 4 PATCH | Refills: 3 | Status: SHIPPED | OUTPATIENT
Start: 2018-04-02 | End: 2019-04-19

## 2018-04-02 RX ORDER — SODIUM CHLORIDE 9 MG/ML
250 INJECTION, SOLUTION INTRAVENOUS ONCE
Status: CANCELLED | OUTPATIENT
Start: 2018-04-02

## 2018-04-02 RX ADMIN — Medication 10 ML: at 08:38

## 2018-04-02 RX ADMIN — SODIUM CHLORIDE 250 ML: 9 INJECTION, SOLUTION INTRAVENOUS at 09:36

## 2018-04-02 RX ADMIN — SODIUM CHLORIDE, PRESERVATIVE FREE 500 UNITS: 5 INJECTION INTRAVENOUS at 10:32

## 2018-04-02 RX ADMIN — Medication 10 ML: at 10:32

## 2018-04-02 RX ADMIN — ZOLEDRONIC ACID 4 MG: 4 INJECTION, SOLUTION, CONCENTRATE INTRAVENOUS at 09:56

## 2018-04-02 NOTE — PROGRESS NOTES
DATE OF VISIT: 04/02/2018    REASON FOR VISIT:  Metastatic breast cancer    HISTORY OF PRESENT ILLNESS:    47-year-old female with a past medical history significant for history of ductal carcinoma in situ of the right breast diagnosed in December 2007 patient eventually had a mastectomy done in February 2008 and took adjuvant tamoxifen for 5 years until 2013.  Started on palliative chemotherapy with Taxotere and Cytoxan on July 26, 2017.  Patient received 7 cycle of Taxotere and Cytoxan on December 6, 2017.  After that patient was changed over to treatment with Palbociclib and letrozole on January 30, 2018.  She is scheduled to start cycle 3 of Palbociclib with letrozole today on April 2, 2018.  She is here for follow-up visit.  Denies any nausea or vomiting or diarrhea with new treatment.  Denies any worsening tingling numbness.  Complains of arthritic pain.  Complains of back pain.  Denies any bowel or bladder incontinence.      PAST MEDICAL HISTORY:    Past Medical History:   Diagnosis Date   • Anxiety    • Depression    • Encounter for gynecological examination    • Malignant neoplasm of female breast     hx of dcis s/p mastectomy and reconstruction and augmentation      • Primary malignant neoplasm of breast     dcis in rt breast s/p mastectomy,reconstruction      • Ventricular premature beats        SOCIAL HISTORY:    Social History   Substance Use Topics   • Smoking status: Never Smoker   • Smokeless tobacco: Never Used   • Alcohol use No       Surgical History :  Past Surgical History:   Procedure Laterality Date   • AUGMENTATION MAMMAPLASTY     • AXILLARY LYMPH NODE BIOPSY/EXCISION Right 7/10/2017    Procedure: BIOSPY RIGHT AXILLARY MASS AND OR LYMPH;  Surgeon: Sourav Ernst MD;  Location: [facility];  Service:    • BREAST BIOPSY  12/07/2007    Mammotome biopsy of the right side with clip placement   • BREAST LUMPECTOMY     • BREAST RECONSTRUCTION  10/28/2008    Abscence of right breast secondary to  mastectomy. Implant exchange for reconstruction of right breast with open para-prosthetic capsulotomy   • BREAST SURGERY  10/01/2009    Left breast ptosis and breast asymmetry secondary to right breast reconstruction for breast cancer. Left breast mastopexy with augmentation.   • CARDIAC CATHETERIZATION  09/18/2008    Normal coronary arteriography. Normal left ventricular angiogram   • EP STUDY     • MASTECTOMY Right    • MASTECTOMY  02/05/2008    Multifocal right breast ductal carcinoma-in-situ. Right total mastectomy   • MASTECTOMY, PARTIAL  01/03/2008    Ductal carcinoma in-situ of the right breast, proven by mammotome biopsy. Right lumpectomy, medial portion of the breast, between 2 o'clock and 4 o'clock, 5 cm from the nipple, with clip placement, radiographic inter. of severo. specimen, & ultrasound   • PAP SMEAR  03/03/2009    Negative   • MI INSJ TUNNELED CVC W/O SUBQ PORT/ AGE 5 YR/> Right 7/10/2017    Procedure: MEDIPORT     (c-arm#2);  Surgeon: Sourav Ernst MD;  Location: [facility];  Service: General       ALLERGIES:    Allergies   Allergen Reactions   • Percocet [Oxycodone-Acetaminophen] Nausea And Vomiting       FAMILY HISTORY:  Family History   Problem Relation Age of Onset   • Anemia Mother    • Multiple sclerosis Mother    • No Known Problems Father    • Diabetes Other    • Multiple sclerosis Other        REVIEW OF SYSTEMS:      CONSTITUTIONAL: Complains of fatigue.  Denies any fever, chills .      HEENT: No epistaxis, mouth sores or difficulty swallowing.     RESPIRATORY: No new shortness of breath. No new cough or hemoptysis.     CARDIOVASCULAR: No chest pain or palpitations.     GASTROINTESTINAL:  states scopolamine patch is helping her with nausea.  No new abdominal pain. No blood in the stool.     GENITOURINARY: No Dysuria or Hematuria.     MUSCULOSKELETAL: Complains of pain in bilateral hip.  Complains of back pain.     LYMPHATICS: No new lymph node swelling.     NEUROLOGICAL : Complains of  "intermittent tingling and numbness affecting bilateral hand, denies any recent worsening. No headache or dizziness. No seizures or balance problems.     SKIN: Positive for history of melanoma status post surgical removal. Denies any new skin lesion worrisome for melanoma.              PHYSICAL EXAMINATION:      VITAL SIGNS:  /81   Pulse 73   Temp 98 °F (36.7 °C) (Temporal Artery )   Resp 18   Ht 170.2 cm (67.01\")   Wt 97.6 kg (215 lb 2.7 oz)   BMI 33.69 kg/m²      ECOG performance status:1    GENERAL:  Not in any distress.    HEENT:  Normocephalic, Atraumatic.Mild Conjunctival pallor. No icterus. Extraocular Movements Intact. No Facial Asymmetry noted.    NECK:  No adenopathy. No JVD.    RESPIRATORY:  Fair air entry bilateral. No rhonchi or wheezing.    CARDIOVASCULAR:  S1, S2. Regular rate and rhythm. No murmur or gallop appreciated.    ABDOMEN:  Soft, obese, nontender. Bowel sounds present in all four quadrants.  No organomegaly appreciated.    EXTREMITIES:  No edema.No Calf Tenderness.    NEUROLOGIC:  Alert, awake and oriented ×3.  No  Motor or sensory deficit appreciated. Cranial Nerves 2-12 grossly intact.    LYMPHATICS: No new lymph node enlargement in neck.    PSYCHIATRY: Normal affect. Normal Judgement.      DIAGNOSTIC DATA:    Glucose   Date Value Ref Range Status   04/02/2018 68 60 - 100 mg/dL Final     Sodium   Date Value Ref Range Status   04/02/2018 143 137 - 145 mmol/L Final     Potassium   Date Value Ref Range Status   04/02/2018 3.7 3.5 - 5.1 mmol/L Final     CO2   Date Value Ref Range Status   04/02/2018 25.0 22.0 - 31.0 mmol/L Final     Chloride   Date Value Ref Range Status   04/02/2018 104 95 - 110 mmol/L Final     Anion Gap   Date Value Ref Range Status   04/02/2018 14.0 5.0 - 15.0 mmol/L Final     Creatinine   Date Value Ref Range Status   04/02/2018 0.79 0.50 - 1.00 mg/dL Final     BUN   Date Value Ref Range Status   04/02/2018 12 7 - 21 mg/dL Final     BUN/Creatinine Ratio   Date " Value Ref Range Status   04/02/2018 15.2 7.0 - 25.0 Final     Calcium   Date Value Ref Range Status   04/02/2018 8.9 8.4 - 10.2 mg/dL Final     eGFR Non  Amer   Date Value Ref Range Status   04/02/2018 77 58 - 135 mL/min/1.73 Final     Alkaline Phosphatase   Date Value Ref Range Status   04/02/2018 90 38 - 126 U/L Final     Total Protein   Date Value Ref Range Status   04/02/2018 7.0 6.3 - 8.6 g/dL Final     ALT (SGPT)   Date Value Ref Range Status   04/02/2018 47 9 - 52 U/L Final     AST (SGOT)   Date Value Ref Range Status   04/02/2018 32 14 - 36 U/L Final     Total Bilirubin   Date Value Ref Range Status   04/02/2018 0.1 (L) 0.2 - 1.3 mg/dL Final     Albumin   Date Value Ref Range Status   04/02/2018 4.00 3.40 - 4.80 g/dL Final     Globulin   Date Value Ref Range Status   04/02/2018 3.0 2.3 - 3.5 gm/dL Final     A/G Ratio   Date Value Ref Range Status   04/02/2018 1.3 1.1 - 1.8 g/dL Final     Lab Results   Component Value Date    WBC 2.70 (L) 04/02/2018    HGB 11.6 (L) 04/02/2018    HCT 32.8 (L) 04/02/2018    MCV 88.9 04/02/2018     04/02/2018     Lab Results   Component Value Date    NEUTROABS 1.19 (L) 04/02/2018     Lab Results   Component Value Date     47.2 (H) 02/28/2018    LABCA2 52.1 (H) 02/28/2018     Component CA 27.29   Latest Ref Rng & Units 0.0 - 38.6 U/mL   6/29/2017 1817.1 (H)   8/23/2017 1075.9 (H)   10/4/2017 288.9 (H)   10/25/2017 181.2 (H)   11/15/2017 112.2 (H)   12/6/2017 101.6 (H)   12/27/2017 90.3 (H)   2/28/2018 52.1 (H)       Component CA 15-3   Latest Ref Rng & Units 0.0 - 25.0 U/mL   6/29/2017 2107.0 (H)   8/23/2017 943.0 (H)   10/4/2017 282.2 (H)   10/25/2017 162.2 (H)   11/15/2017 118.0 (H)   12/6/2017 99.6 (H)   12/27/2017 88.3 (H)   2/28/2018 47.2 (H)           2 D Echo Done on July 19, 2017 showed:  Interpretation Summary   · The left ventricular cavity is borderline dilated.  · Left ventricular systolic function is normal. Estimated EF = 53%.  · Left ventricular  diastolic dysfunction (grade I) consistent with impaired relaxation.  · IAS aneurysm noted. No PFO or ASD           Radiology Data :  CT of Chest, abdomen and pelvis with contrast done on December 20, 2017 was reviewed and discussed with patient, it showed:  CHEST FINDINGS:     LUNGS/PLEURA: The lungs are normal.  TRACHEA AND BRONCHI:  The trachea and bronchi are patent.  MEDIASTINUM, LATANYA AND LYMPH NODES: The mediastinum, latanya and  lymph nodes are normal.  HEART AND PERICARDIUM: The heart and pericardium are normal.  VASCULAR: Unremarkable  OSSEOUS STRUCTURES: Scattered diffuse lytic and sclerotic  metastases show no significant change.        ABDOMINAL/PELVIC FINDINGS:     HEPATOBILIARY: There is a hypoechoic dense lesion in the left  lobe of the liver measuring 6.3 x 5.5 cm, without significant  change in size and appearance.  SPLEEN: Unremarkable.  PANCREAS: Unremarkable  ADRENAL GLANDS: Unremarkable.  KIDNEYS/URETERS: No evidence of hydronephrosis or suspicious  mass.     GASTROINTESTINAL: Unremarkable  REPRODUCTIVE ORGANS: Unremarkable.  URINARY BLADDER: Unremarkable     VASCULAR: Unremarkable  LYMPH NODES: No pathologically enlarged nodes by size criteria.  PERITONEUM/RETROPERITONEUM: Unremarkable.      OSSEOUS STRUCTURES: Scattered diffuse lytic and sclerotic  metastases show no significant change. There is a fracture of the  L4 vertebral body which appears unchanged compared to the prior  exam.        IMPRESSION:  CONCLUSION:   Stable exam with diffuse scattered lytic and sclerotic metastases  and a hypodense lesion in the left lobe of the liver, all without  significant change.  Fracture of the L4 vertebral body shows no significant change.      MRI of lumbar spine done at [facility] on November 24, 2017 showed:   Left  1. Extensive bone metastasis to all of the lumbar vertebrae and the sacrum .  2. There is a pathologic burst fracture of the L4 vertebral body with associated moderate spinal stenosis and  possible early ventral epidural tumor invasion .  3. Left foraminal narrowing at the L4-5 level with uncertain but doubtful impact on the left L4 nerve      MRI of thoracic spine done at [facility] on November 24, 2017 showed:  1. Metastatic involvement of all of the thoracic vertebra, there is slight compression of T12  2. No spinal stenosis or epidural tumor       Doppler ultrasound of right upper extremity done on November 10, 2017 showed:  IMPRESSION:  CONCLUSION:   Abnormal exam with thrombus visualized in the right internal  jugular vein, consistent with DVT.    MRI of brain with and without contrast done on November 9, 2017 was reviewed with the radiologist, it showed:  IMPRESSION:  CONCLUSION:  At least six calvarial metastases, largest 1.6 cm residing in the  left parietal bone.  No brain metastases demonstrated.          X-ray of left hip done on September 7, 2017 showed:  Hyperlucency of the inferior pubic rami and the inferior aspect of obturator foramen consistent with osteolytic metastasis.    PET/CT done on July 3, 2017 showed:  IMPRESSION:  CONCLUSION:  1. Extensive metastatic disease. Pathologic uptake within  enlarged right-sided axillary lymph nodes. Metastatic adenopathy  in both laurie and mediastinum. Tumor replacing most of the left  lobe of liver. Intra-abdominal retroperitoneal metastases,  adenopathy.     Extensive skeletal metastases including a pathologic fracture at  L4.        Nuclear bone scan done on June 20, 2017 showed:  1. Increased uptake at L4 suggestive of metastatic process.  2. 3 or 4 areas of increased activity in bony calvarium  3. Some increased activity in tips posteriorly and anteriorly suggestive of metastatic carcinoma to the skeletal system.           Pathology :    Pathology data from July 10, 2017 showed:  Final Diagnosis   Mass right axilla, excisional biopsy:      Metastatic poorly differentiated ductal carcinoma, breast primary      Estrogen receptor positive, 95%  stainability, strong intensity      Progesterone receptor positive, 95% stainability, strong intensity      HER-2 2+(equivocal), sent to [facility] for FISH     Addendum   Her 2 FISH result from [facility] is NEGATIVE         Pathology report from December 7, 2007 showed:  FINAL DIAGNOSIS:   RIGHT BREAST MAMMOTOME BIOPSY:        DUCTAL CARCINOMA IN SITU, INTERMEDIATE NUCLEAR GRADE              WITH MICROCALCIFICATION.      ADDENDUM:   Estrogen receptors are positive (90-95% stainability).   Progesterone receptors are positive (90-95% stainability).         ASSESSMENT AND PLAN:      1.  Metastatic cancer with extensive adenopathy in right axilla, mediastinum, hilar, abdominal, retroperitoneal with extensive liver and bone metastasis on PET/CT.  Patient underwent right apatient underwent right axillary lymph node excision by Dr. Ernst on July 10, 2017.Pathology report confirmed ductal carcinoma of breast with estrogen and progesterone positivity.  At her on palliative chemotherapy with Taxotere and cytoxan on July 26, 2017.   Patient was  seen at [facility] for second opinion regarding her breast cancer.  Case was discussed with Dr vazquez oncologist that has seen patient at [facility].  She agreed with her chemotherapy at this point.  The scan on December 20, 2017 shows relative stability of disease, lesion in the liver as well as bone lesion at about same.  Her tumor marker CA 27-29 as well as CA 15-3 are less than 100 at this point.  Since patient is developing some neuropathy in bilateral upper and lower extremity recommend changing her therapy to Palbociclib with letrozole.  Patient started taking first round of Palbociclib and letrozole on January 30, 2018.  Was scheduled to third second cycle of chemotherapy today with Palbociclib on April 2, 2018.  In view of neutropenia with absolute neutrophil count of 1170.  Recommend rechecking CBC next week and starting Palbociclib after that.  We will ask  patient to return to clinic in 5 weeks with a repeat CBC, CMP, CT of chest abdomen and pelvis with contrast to be done prior to that.    2.  Brain metastasis:MRi Brain with and without done on November 9, 2017 was reviewed and compared with t MRI done 6 weeks ago.  Brain calvarial metastasis are stable or somewhat improved.  No need to do radiation at this point which was discussed with patient.    3.  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  She is currently on Coumadin.    She is being followed by Coumadin clinic She was instructed to come to the emergency room in case she starts having any bleeding.    4.  History of melanoma in situ status post excision by Dr. Linn.    5.  Bone metastasis: Patient was started on Zometa on August 23, 2017.  Continue with Zometa as scheduled for now.  Patient denies any mouth sores or jaw pain.  CT scan shows about loss of 70% of height of L4 vertebral body, patient has been referred to Dr. Pugh to get evaluated for kyphoplasty.  Patient went for second opinion with orthopedic surgeon in [place], as per patient's second surgeon did not recommend any surgery.  We will continue with monthly Zometa for now.     6.  History of ductal carcinoma in situ of the right breast diagnosed in 2007.  Status post mastectomy followed by adjuvant tamoxifen for 5 years which was finished in 2013.    7.  Elevated  liver function tests secondary to liver metastases:   8.  Neutropenia: Secondary to Palbociclib. Absolute neutrophil count is 1190.  Patient denies any fevers.  Will recheck CBC next week.  Neutrophil is greater than 1500 we will restart Palbociclib was discussed with patient.    9.  Health maintenance: Patient does not smoke.  Not a candidate for screening colonoscopy at this point.  Remains full code.    10.  Prescriptions: Patient has enough prescription of Decadron, Zofran, scopolamine and Ultram.        Ovidio Meadows,  MD  4/2/2018  4:51 PM        EMR Dragon/Transcription disclaimer:   Much of this encounter note is an electronic transcription/translation of spoken language to printed text. The electronic translation of spoken language may permit erroneous, or at times, nonsensical words or phrases to be inadvertently transcribed; Although I have reviewed the note for such errors, some may still exist.

## 2018-04-02 NOTE — PROGRESS NOTES
PT here today after Zometia infusion. PT denies any new medications or bleeding issues. PT denies any s/s of blood clot. PT instructed to continue same dose and Coumadin friendly diet. PT will be seen in 1 month. Patient instructed regarding medication; results given and questions answered. Nutritional counseling given.  Dietary factors affecting therapy addressed.  Patient instructed to monitor for excessive bruising or bleeding. Pt verbalizes understanding.   Electronically signed by EVELYN Matute

## 2018-04-02 NOTE — PATIENT INSTRUCTIONS
Patient Instructions for CT Scan    · Your CT scan is being done without any oral contrast.  Your CT scan may be done with IV contrast, if you have an allergy to iodine--please tell your nurse.    · Do not eat or drink 4 hours prior to scan.     · You may take your medications with sips of water, except DO NOT take your diabetic pill the morning of the test.    Arrive at the Outpatient Surgery entrance at [facility] 20 minutes prior to appointment time.    You will receive a phone call with an appointment for your CT scan.  Please call our office, if someone does not contact you with 3 days.    Vijaya Campos RN  April 2, 2018  9:29 AM              CT Scan  A computed tomography (CT) scan is a specialized X-ray scan. It uses X-rays and a computer to make pictures of different areas of your body. A CT scan can offer more detailed information than a regular X-ray exam. The CT scan provides data about internal organs, soft tissue structures, blood vessels, and bones.  The CT scanner is a large machine that takes pictures of your body as you move through the opening.  Tell a health care provider about:  · Any allergies you have.  · All medicines you are taking, including vitamins, herbs, eye drops, creams, and over-the-counter medicines.  · Any problems you or family members have had with anesthetic medicines.  · Any blood disorders you have.  · Any surgeries you have had.  · Any medical conditions you have.  What are the risks?  Generally, this is a safe procedure. However, as with any procedure, problems can occur. Possible problems include:  · An allergic reaction to the contrast material.  · Development of cancer from excessive exposure to radiation. The risk of this is small.  What happens before the procedure?  · The day before the test, stop drinking caffeinated beverages. These include energy drinks, tea, soda, coffee, and hot chocolate.  · On the day of the test:  ¨ About 4 hours before the  test, stop eating and drinking anything but water as advised by your health care provider.  ¨ Avoid wearing jewelry. You will have to partly or fully undress and wear a hospital gown.  What happens during the procedure?  · You will be asked to lie on a table with your arms above your head.  · If contrast dye is to be used for the test, an IV tube will be inserted in your arm. The contrast dye will be injected into the IV tube. You might feel warm, or you may get a metallic taste in your mouth.  · The table you will be lying on will move into a large machine that will do the scanning.  · You will be able to see, hear, and talk to the person running the machine while you are in it. Follow that person's directions.  · The CT machine will move around you to take pictures. Do not move while it is scanning. This helps to get a good image.  · When the best possible pictures have been taken, the machine will be turned off. The table will be moved out of the machine. The IV tube will then be removed.  What happens after the procedure?  Ask your health care provider when to follow up for your test results.  This information is not intended to replace advice given to you by your health care provider. Make sure you discuss any questions you have with your health care provider.  Document Released: 01/25/2006 Document Revised: 05/25/2017 Document Reviewed: 08/25/2014  Elsevier Interactive Patient Education © 2017 [employer]

## 2018-04-03 LAB
CANCER AG15-3 SERPL-ACNC: 11.9 U/ML (ref 0–25)
CANCER AG27-29 SERPL-ACNC: 12.4 U/ML (ref 0–38.6)

## 2018-04-09 ENCOUNTER — TELEPHONE (OUTPATIENT)
Dept: ONCOLOGY | Facility: HOSPITAL | Age: 48
End: 2018-04-09

## 2018-04-09 ENCOUNTER — LAB (OUTPATIENT)
Dept: ONCOLOGY | Facility: HOSPITAL | Age: 48
End: 2018-04-09

## 2018-04-09 DIAGNOSIS — C50.911 MALIGNANT NEOPLASM OF RIGHT BREAST IN FEMALE, ESTROGEN RECEPTOR POSITIVE, UNSPECIFIED SITE OF BREAST (HCC): ICD-10-CM

## 2018-04-09 DIAGNOSIS — C79.51 METASTASIS TO BONE OF UNKNOWN PRIMARY (HCC): Primary | ICD-10-CM

## 2018-04-09 DIAGNOSIS — Z45.2 ENCOUNTER FOR ADJUSTMENT OR MANAGEMENT OF VASCULAR ACCESS DEVICE: ICD-10-CM

## 2018-04-09 DIAGNOSIS — Z17.0 MALIGNANT NEOPLASM OF RIGHT BREAST IN FEMALE, ESTROGEN RECEPTOR POSITIVE, UNSPECIFIED SITE OF BREAST (HCC): ICD-10-CM

## 2018-04-09 DIAGNOSIS — C80.1 METASTASIS TO BONE OF UNKNOWN PRIMARY (HCC): Primary | ICD-10-CM

## 2018-04-09 DIAGNOSIS — Z45.2 ENCOUNTER FOR VENOUS ACCESS DEVICE CARE: ICD-10-CM

## 2018-04-09 LAB
BASOPHILS # BLD AUTO: 0.06 10*3/MM3 (ref 0–0.2)
BASOPHILS NFR BLD AUTO: 1.5 % (ref 0–2)
DEPRECATED RDW RBC AUTO: 56.7 FL (ref 36.4–46.3)
EOSINOPHIL # BLD AUTO: 0.06 10*3/MM3 (ref 0–0.7)
EOSINOPHIL NFR BLD AUTO: 1.5 % (ref 0–7)
ERYTHROCYTE [DISTWIDTH] IN BLOOD BY AUTOMATED COUNT: 17.2 % (ref 11.5–14.5)
HCT VFR BLD AUTO: 35.5 % (ref 35–45)
HGB BLD-MCNC: 12.3 G/DL (ref 12–15.5)
IMM GRANULOCYTES # BLD: 0.01 10*3/MM3 (ref 0–0.02)
IMM GRANULOCYTES NFR BLD: 0.2 % (ref 0–0.5)
LYMPHOCYTES # BLD AUTO: 1.35 10*3/MM3 (ref 0.6–4.2)
LYMPHOCYTES NFR BLD AUTO: 33 % (ref 10–50)
MCH RBC QN AUTO: 31.2 PG (ref 26.5–34)
MCHC RBC AUTO-ENTMCNC: 34.6 G/DL (ref 31.4–36)
MCV RBC AUTO: 90.1 FL (ref 80–98)
MONOCYTES # BLD AUTO: 0.55 10*3/MM3 (ref 0–0.9)
MONOCYTES NFR BLD AUTO: 13.4 % (ref 0–12)
NEUTROPHILS # BLD AUTO: 2.06 10*3/MM3 (ref 2–8.6)
NEUTROPHILS NFR BLD AUTO: 50.4 % (ref 37–80)
PLATELET # BLD AUTO: 213 10*3/MM3 (ref 150–450)
PMV BLD AUTO: 9.2 FL (ref 8–12)
RBC # BLD AUTO: 3.94 10*6/MM3 (ref 3.77–5.16)
WBC NRBC COR # BLD: 4.09 10*3/MM3 (ref 3.2–9.8)

## 2018-04-09 PROCEDURE — 85025 COMPLETE CBC W/AUTO DIFF WBC: CPT | Performed by: INTERNAL MEDICINE

## 2018-04-09 PROCEDURE — 36415 COLL VENOUS BLD VENIPUNCTURE: CPT | Performed by: INTERNAL MEDICINE

## 2018-04-09 RX ORDER — SODIUM CHLORIDE 0.9 % (FLUSH) 0.9 %
10 SYRINGE (ML) INJECTION AS NEEDED
Status: CANCELLED | OUTPATIENT
Start: 2018-05-07

## 2018-04-09 RX ORDER — SODIUM CHLORIDE 0.9 % (FLUSH) 0.9 %
10 SYRINGE (ML) INJECTION AS NEEDED
Status: DISCONTINUED | OUTPATIENT
Start: 2018-04-09 | End: 2018-04-09 | Stop reason: HOSPADM

## 2018-04-09 NOTE — TELEPHONE ENCOUNTER
Called patient--ANC improved, okay to proceed with oral chemo per MD-Dori.  Verbal understanding by patient.  Kyleigh English RN  April 9, 2018   12:36 PM

## 2018-05-03 ENCOUNTER — APPOINTMENT (OUTPATIENT)
Dept: CT IMAGING | Facility: HOSPITAL | Age: 48
End: 2018-05-03
Attending: INTERNAL MEDICINE

## 2018-05-03 ENCOUNTER — HOSPITAL ENCOUNTER (OUTPATIENT)
Dept: CT IMAGING | Facility: HOSPITAL | Age: 48
Discharge: HOME | End: 2018-05-03
Attending: INTERNAL MEDICINE | Admitting: INTERNAL MEDICINE

## 2018-05-03 DIAGNOSIS — C50.911 MALIGNANT NEOPLASM OF RIGHT BREAST IN FEMALE, ESTROGEN RECEPTOR POSITIVE, UNSPECIFIED SITE OF BREAST (HCC): ICD-10-CM

## 2018-05-03 DIAGNOSIS — C80.1 METASTASIS TO BONE OF UNKNOWN PRIMARY (HCC): ICD-10-CM

## 2018-05-03 DIAGNOSIS — Z17.0 MALIGNANT NEOPLASM OF RIGHT BREAST IN FEMALE, ESTROGEN RECEPTOR POSITIVE, UNSPECIFIED SITE OF BREAST (HCC): ICD-10-CM

## 2018-05-03 DIAGNOSIS — C79.51 METASTASIS TO BONE OF UNKNOWN PRIMARY (HCC): ICD-10-CM

## 2018-05-03 PROCEDURE — 74177 CT ABD & PELVIS W/CONTRAST: CPT

## 2018-05-03 PROCEDURE — 71260 CT THORAX DX C+: CPT

## 2018-05-03 PROCEDURE — 25010000002 HEPARIN FLUSH (PORCINE) 100 UNIT/ML SOLUTION: Performed by: INTERNAL MEDICINE

## 2018-05-03 PROCEDURE — 25010000002 IOPAMIDOL 61 % SOLUTION: Performed by: INTERNAL MEDICINE

## 2018-05-03 RX ORDER — 0.9 % SODIUM CHLORIDE 0.9 %
10 VIAL (ML) INJECTION ONCE
Status: COMPLETED | OUTPATIENT
Start: 2018-05-03 | End: 2018-05-03

## 2018-05-03 RX ADMIN — SODIUM CHLORIDE 10 ML: 9 INJECTION, SOLUTION INTRAMUSCULAR; INTRAVENOUS; SUBCUTANEOUS at 12:02

## 2018-05-03 RX ADMIN — IOPAMIDOL 90 ML: 612 INJECTION, SOLUTION INTRAVENOUS at 12:01

## 2018-05-03 RX ADMIN — SODIUM CHLORIDE, PRESERVATIVE FREE 500 UNITS: 5 INJECTION INTRAVENOUS at 12:03

## 2018-05-06 NOTE — PROGRESS NOTES
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  Started on palliative chemotherapy with Taxotere and Cytoxan on July 26, 2017.  Patient received 7 cycle of Taxotere and Cytoxan on December 6, 2017.  After that patient was changed over to treatment with Palbociclib and letrozole on January 30, 2018.  She is scheduled to start cycle 4 of Palbociclib with letrozole today on May 7, 2018.  She is here for follow-up visit.  Denies any nausea or vomiting or diarrhea with new treatment.  Denies any worsening tingling numbness.  Complains of arthritic pain.  Complains of back pain.  Denies any bowel or bladder incontinence.      PAST MEDICAL HISTORY:    Past Medical History:   Diagnosis Date   • Anxiety    • Depression    • Encounter for gynecological examination    • Malignant neoplasm of female breast     hx of dcis s/p mastectomy and reconstruction and augmentation      • Primary malignant neoplasm of breast     dcis in rt breast s/p mastectomy,reconstruction      • Ventricular premature beats        SOCIAL HISTORY:    Social History   Substance Use Topics   • Smoking status: Never Smoker   • Smokeless tobacco: Never Used   • Alcohol use No       Surgical History :  Past Surgical History:   Procedure Laterality Date   • AUGMENTATION MAMMAPLASTY     • AXILLARY LYMPH NODE BIOPSY/EXCISION Right 7/10/2017    Procedure: BIOSPY RIGHT AXILLARY MASS AND OR LYMPH;  Surgeon: Sourav Ernst MD;  Location: [facility];  Service:    • BREAST BIOPSY  12/07/2007    Mammotome biopsy of the right side with clip placement   • BREAST LUMPECTOMY     • BREAST RECONSTRUCTION  10/28/2008    Abscence of right breast secondary to  mastectomy. Implant exchange for reconstruction of right breast with open para-prosthetic capsulotomy   • BREAST SURGERY  10/01/2009    Left breast ptosis and breast asymmetry secondary to right breast reconstruction for breast cancer. Left breast mastopexy with augmentation.   • CARDIAC CATHETERIZATION  09/18/2008    Normal coronary arteriography. Normal left ventricular angiogram   • EP STUDY     • MASTECTOMY Right    • MASTECTOMY  02/05/2008    Multifocal right breast ductal carcinoma-in-situ. Right total mastectomy   • MASTECTOMY, PARTIAL  01/03/2008    Ductal carcinoma in-situ of the right breast, proven by mammotome biopsy. Right lumpectomy, medial portion of the breast, between 2 o'clock and 4 o'clock, 5 cm from the nipple, with clip placement, radiographic inter. of severo. specimen, & ultrasound   • PAP SMEAR  03/03/2009    Negative   • [place] INSJ TUNNELED CVC W/O SUBQ PORT/ AGE 5 YR/> Right 7/10/2017    Procedure: MEDIPORT     (c-arm#2);  Surgeon: Sourav Ernst MD;  Location: [facility];  Service: General       ALLERGIES:    Allergies   Allergen Reactions   • Percocet [Oxycodone-Acetaminophen] Nausea And Vomiting       FAMILY HISTORY:  Family History   Problem Relation Age of Onset   • Anemia Mother    • Multiple sclerosis Mother    • No Known Problems Father    • Diabetes Other    • Multiple sclerosis Other        REVIEW OF SYSTEMS:      CONSTITUTIONAL: Complains of fatigue.  Denies any fever, chills .      HEENT:States she had subconjunctival hemorrhage in right eye. No epistaxis, mouth sores or difficulty swallowing.     RESPIRATORY: No new shortness of breath. No new cough or hemoptysis.     CARDIOVASCULAR: No chest pain or palpitations.     GASTROINTESTINAL: No nausea or vomiting. Not requiring scopolamine patch.  No new abdominal pain. No blood in the stool.     GENITOURINARY: No Dysuria or Hematuria.     MUSCULOSKELETAL:Complains of arthritic pain affecting bilateral hands. Complains of pain in  "bilateral hip.  Complains of back pain.     LYMPHATICS: No new lymph node swelling.     NEUROLOGICAL : Complains of intermittent tingling and numbness affecting bilateral hand, denies any recent worsening. No headache or dizziness. No seizures or balance problems.     SKIN: Positive for history of melanoma status post surgical removal. Denies any new skin lesion worrisome for melanoma.              PHYSICAL EXAMINATION:      VITAL SIGNS:  /87   Pulse 63   Temp 98.2 °F (36.8 °C) (Temporal Artery )   Resp 18   Ht 170.2 cm (67.01\")   Wt 97.9 kg (215 lb 12.8 oz)   BMI 33.79 kg/m²      ECOG performance status:1    GENERAL:  Not in any distress.    HEENT:  Normocephalic, Atraumatic.Mild Conjunctival pallor. No icterus. Extraocular Movements Intact. No Facial Asymmetry noted.    NECK:  No adenopathy. No JVD.    RESPIRATORY:  Fair air entry bilateral. No rhonchi or wheezing.    CARDIOVASCULAR:  S1, S2. Regular rate and rhythm. No murmur or gallop appreciated.    ABDOMEN:  Soft, obese, nontender. Bowel sounds present in all four quadrants.  No organomegaly appreciated.    EXTREMITIES:  No edema.No Calf Tenderness.    NEUROLOGIC:  Alert, awake and oriented ×3.  No  Motor or sensory deficit appreciated. Cranial Nerves 2-12 grossly intact.    LYMPHATICS: No new lymph node enlargement in neck.    PSYCHIATRY: Normal affect. Normal Judgement.      DIAGNOSTIC DATA:    Glucose   Date Value Ref Range Status   05/07/2018 79 60 - 100 mg/dL Final     Sodium   Date Value Ref Range Status   05/07/2018 141 137 - 145 mmol/L Final     Potassium   Date Value Ref Range Status   05/07/2018 4.0 3.5 - 5.1 mmol/L Final     CO2   Date Value Ref Range Status   05/07/2018 27.0 22.0 - 31.0 mmol/L Final     Chloride   Date Value Ref Range Status   05/07/2018 104 95 - 110 mmol/L Final     Anion Gap   Date Value Ref Range Status   05/07/2018 10.0 5.0 - 15.0 mmol/L Final     Creatinine   Date Value Ref Range Status   05/07/2018 0.66 0.50 - " 1.00 mg/dL Final     BUN   Date Value Ref Range Status   05/07/2018 10 7 - 21 mg/dL Final     BUN/Creatinine Ratio   Date Value Ref Range Status   05/07/2018 15.2 7.0 - 25.0 Final     Calcium   Date Value Ref Range Status   05/07/2018 9.8 8.4 - 10.2 mg/dL Final     eGFR Non  Amer   Date Value Ref Range Status   05/07/2018 95 >60 mL/min/1.73 Final     Alkaline Phosphatase   Date Value Ref Range Status   05/07/2018 90 38 - 126 U/L Final     Total Protein   Date Value Ref Range Status   05/07/2018 7.1 6.3 - 8.6 g/dL Final     ALT (SGPT)   Date Value Ref Range Status   05/07/2018 47 9 - 52 U/L Final     AST (SGOT)   Date Value Ref Range Status   05/07/2018 33 14 - 36 U/L Final     Total Bilirubin   Date Value Ref Range Status   05/07/2018 0.1 (L) 0.2 - 1.3 mg/dL Final     Albumin   Date Value Ref Range Status   05/07/2018 4.00 3.40 - 4.80 g/dL Final     Globulin   Date Value Ref Range Status   05/07/2018 3.1 2.3 - 3.5 gm/dL Final     A/G Ratio   Date Value Ref Range Status   05/07/2018 1.3 1.1 - 1.8 g/dL Final     Lab Results   Component Value Date    WBC 2.84 (L) 05/07/2018    HGB 11.4 (L) 05/07/2018    HCT 32.7 (L) 05/07/2018    MCV 94.2 05/07/2018     05/07/2018     Lab Results   Component Value Date    NEUTROABS 0.93 (L) 05/07/2018     Lab Results   Component Value Date     11.9 04/02/2018    LABCA2 12.4 04/02/2018     Component CA 15-3   Latest Ref Rng & Units 0.0 - 25.0 U/mL   6/29/2017 2107.0 (H)   8/23/2017 943.0 (H)   10/4/2017 282.2 (H)   10/25/2017 162.2 (H)   11/15/2017 118.0 (H)   12/6/2017 99.6 (H)   12/27/2017 88.3 (H)   2/28/2018 47.2 (H)   4/2/2018 11.9       Component CA 27.29   Latest Ref Rng & Units 0.0 - 38.6 U/mL   6/29/2017 1817.1 (H)   8/23/2017 1075.9 (H)   10/4/2017 288.9 (H)   10/25/2017 181.2 (H)   11/15/2017 112.2 (H)   12/6/2017 101.6 (H)   12/27/2017 90.3 (H)   2/28/2018 52.1 (H)   4/2/2018 12.4         2 D Echo Done on July 19, 2017 showed:  Interpretation Summary    · The left ventricular cavity is borderline dilated.  · Left ventricular systolic function is normal. Estimated EF = 53%.  · Left ventricular diastolic dysfunction (grade I) consistent with impaired relaxation.  · IAS aneurysm noted. No PFO or ASD           Radiology Data :  CT of Chest, abdomen and pelvis with contrast done on May 3,2018 was reviewed and discussed with patient, it showed:  IMPRESSION:  CONCLUSION:      1. Stable examination.  2. No change, liver lesion in the left lobe.  3. No gross changes associated with the multiple osteosclerotic  lesions. Suggest correlation with a nuclear medicine bone scan.            MRI of lumbar spine done at [facility] on November 24, 2017 showed:   Left  1. Extensive bone metastasis to all of the lumbar vertebrae and the sacrum .  2. There is a pathologic burst fracture of the L4 vertebral body with associated moderate spinal stenosis and possible early ventral epidural tumor invasion .  3. Left foraminal narrowing at the L4-5 level with uncertain but doubtful impact on the left L4 nerve      MRI of thoracic spine done at [facility] on November 24, 2017 showed:  1. Metastatic involvement of all of the thoracic vertebra, there is slight compression of T12  2. No spinal stenosis or epidural tumor       Doppler ultrasound of right upper extremity done on November 10, 2017 showed:  IMPRESSION:  CONCLUSION:   Abnormal exam with thrombus visualized in the right internal  jugular vein, consistent with DVT.    MRI of brain with and without contrast done on November 9, 2017 was reviewed with the radiologist, it showed:  IMPRESSION:  CONCLUSION:  At least six calvarial metastases, largest 1.6 cm residing in the  left parietal bone.  No brain metastases demonstrated.          X-ray of left hip done on September 7, 2017 showed:  Hyperlucency of the inferior pubic rami and the inferior aspect of obturator foramen consistent with osteolytic metastasis.    PET/CT done on July 3,  2017 showed:  IMPRESSION:  CONCLUSION:  1. Extensive metastatic disease. Pathologic uptake within  enlarged right-sided axillary lymph nodes. Metastatic adenopathy  in both laurie and mediastinum. Tumor replacing most of the left  lobe of liver. Intra-abdominal retroperitoneal metastases,  adenopathy.     Extensive skeletal metastases including a pathologic fracture at  L4.        Nuclear bone scan done on June 20, 2017 showed:  1. Increased uptake at L4 suggestive of metastatic process.  2. 3 or 4 areas of increased activity in bony calvarium  3. Some increased activity in tips posteriorly and anteriorly suggestive of metastatic carcinoma to the skeletal system.           Pathology :    Pathology data from July 10, 2017 showed:  Final Diagnosis   Mass right axilla, excisional biopsy:      Metastatic poorly differentiated ductal carcinoma, breast primary      Estrogen receptor positive, 95% stainability, strong intensity      Progesterone receptor positive, 95% stainability, strong intensity      HER-2 2+(equivocal), sent to [facility] for FISH     Addendum   Her 2 FISH result from [facility] is NEGATIVE         Pathology report from December 7, 2007 showed:  FINAL DIAGNOSIS:   RIGHT BREAST MAMMOTOME BIOPSY:        DUCTAL CARCINOMA IN SITU, INTERMEDIATE NUCLEAR GRADE              WITH MICROCALCIFICATION.      ADDENDUM:   Estrogen receptors are positive (90-95% stainability).   Progesterone receptors are positive (90-95% stainability).         ASSESSMENT AND PLAN:      1.  Metastatic cancer with extensive adenopathy in right axilla, mediastinum, hilar, abdominal, retroperitoneal with extensive liver and bone metastasis on PET/CT.  Patient underwent right apatient underwent right axillary lymph node excision by Dr. Ernst on July 10, 2017.Pathology report confirmed ductal carcinoma of breast with estrogen and progesterone positivity.  At her on palliative chemotherapy with Taxotere and cytoxan on July 26, 2017.    Patient was  seen at [facility] for second opinion regarding her breast cancer.  Case was discussed with Dr vazquez oncologist that has seen patient at [facility].  She agreed with her chemotherapy at this point.  The scan on December 20, 2017 shows relative stability of disease, lesion in the liver as well as bone lesion at about same.  Her tumor marker CA 27-29 as well as CA 15-3 are less than 100 at this point.  Since patient is developing some neuropathy in bilateral upper and lower extremity recommend changing her therapy to Palbociclib with letrozole.  Patient started taking first round of Palbociclib and letrozole on January 30, 2018.  Was scheduled to fourth  cycle of chemotherapy today with Palbociclib today on May 7, 2018.  In view of neutropenia with absolute neutrophil count of 930.  Recommend rechecking CBC next week and starting Palbociclib after that.  CT of Chest, abdomen and pelvis with contrast done on May 3, 2018 showed stable disease which was discussed with patient and her family.  We will ask patient to return to clinic in 5 weeks with a repeat CBC, CMP, .    2.  Brain metastasis:MRi Brain with and without done on November 9, 2017 was reviewed and compared with t MRI done 6 weeks ago.  Brain calvarial metastasis are stable or somewhat improved.  No need to do radiation at this point which was discussed with patient.    3.  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  She is currently on Coumadin.    She is being followed by Coumadin clinic She was instructed to come to the emergency room in case she starts having any bleeding.    4.  History of melanoma in situ status post excision by Dr. Linn.    5.  Bone metastasis: Patient was started on Zometa on August 23, 2017.  Continue with Zometa as scheduled for now.  Patient denies any mouth sores or jaw pain.  CT scan shows about loss of 70% of height of L4 vertebral body, patient has been  referred to Dr. Pugh to get evaluated for kyphoplasty.  Patient went for second opinion with orthopedic surgeon in [place], as per patient's second surgeon did not recommend any surgery.  We will continue with monthly Zometa for now.     6.  History of ductal carcinoma in situ of the right breast diagnosed in 2007.  Status post mastectomy followed by adjuvant tamoxifen for 5 years which was finished in 2013.    7.  Elevated  liver function tests secondary to liver metastases.resolved   8.  Neutropenia: Secondary to Palbociclib. Absolute neutrophil count is 970.  Patient denies any fevers.  Will recheck CBC next week.  Neutrophil is greater than 1500 we will restart Palbociclib was discussed with patient.    9.  Health maintenance: Patient does not smoke.  Not a candidate for screening colonoscopy at this point.      10.  Prescriptions: Patient has enough prescription of Decadron, Zofran,  and Ultram.    11.  Advance care planning: Patient remains full code for now and is able to make on her decisions.  Patient has healthcare surrogate Mentioned on Chart.        Ovidio Meadows MD  5/7/2018  10:41 AM        EMR Dragon/Transcription disclaimer:   Much of this encounter note is an electronic transcription/translation of spoken language to printed text. The electronic translation of spoken language may permit erroneous, or at times, nonsensical words or phrases to be inadvertently transcribed; Although I have reviewed the note for such errors, some may still exist.

## 2018-05-07 ENCOUNTER — INFUSION (OUTPATIENT)
Dept: ONCOLOGY | Facility: HOSPITAL | Age: 48
End: 2018-05-07

## 2018-05-07 ENCOUNTER — ANTICOAGULATION VISIT (OUTPATIENT)
Dept: CARDIAC SURGERY | Facility: CLINIC | Age: 48
End: 2018-05-07

## 2018-05-07 ENCOUNTER — OFFICE VISIT (OUTPATIENT)
Dept: ONCOLOGY | Facility: CLINIC | Age: 48
End: 2018-05-07

## 2018-05-07 VITALS
BODY MASS INDEX: 33.87 KG/M2 | HEIGHT: 67 IN | DIASTOLIC BLOOD PRESSURE: 87 MMHG | RESPIRATION RATE: 18 BRPM | SYSTOLIC BLOOD PRESSURE: 122 MMHG | TEMPERATURE: 98.2 F | WEIGHT: 215.8 LBS | HEART RATE: 63 BPM

## 2018-05-07 VITALS — DIASTOLIC BLOOD PRESSURE: 78 MMHG | HEART RATE: 72 BPM | SYSTOLIC BLOOD PRESSURE: 118 MMHG

## 2018-05-07 DIAGNOSIS — C79.51 METASTASIS TO BONE OF UNKNOWN PRIMARY (HCC): ICD-10-CM

## 2018-05-07 DIAGNOSIS — C79.51 METASTASIS TO BONE OF UNKNOWN PRIMARY (HCC): Primary | ICD-10-CM

## 2018-05-07 DIAGNOSIS — R79.89 ELEVATED LIVER FUNCTION TESTS: ICD-10-CM

## 2018-05-07 DIAGNOSIS — Z45.2 ENCOUNTER FOR ADJUSTMENT OR MANAGEMENT OF VASCULAR ACCESS DEVICE: ICD-10-CM

## 2018-05-07 DIAGNOSIS — Z85.820 HISTORY OF MALIGNANT MELANOMA OF SKIN: ICD-10-CM

## 2018-05-07 DIAGNOSIS — Z79.01 LONG-TERM (CURRENT) USE OF ANTICOAGULANTS: ICD-10-CM

## 2018-05-07 DIAGNOSIS — C80.1 METASTASIS TO BONE OF UNKNOWN PRIMARY (HCC): ICD-10-CM

## 2018-05-07 DIAGNOSIS — Z17.0 MALIGNANT NEOPLASM OF RIGHT BREAST IN FEMALE, ESTROGEN RECEPTOR POSITIVE, UNSPECIFIED SITE OF BREAST (HCC): ICD-10-CM

## 2018-05-07 DIAGNOSIS — I82.C11 ACUTE THROMBOSIS OF RIGHT INTERNAL JUGULAR VEIN (HCC): ICD-10-CM

## 2018-05-07 DIAGNOSIS — D70.1 CHEMOTHERAPY-INDUCED NEUTROPENIA (HCC): ICD-10-CM

## 2018-05-07 DIAGNOSIS — C50.911 MALIGNANT NEOPLASM OF RIGHT BREAST IN FEMALE, ESTROGEN RECEPTOR POSITIVE, UNSPECIFIED SITE OF BREAST (HCC): Primary | ICD-10-CM

## 2018-05-07 DIAGNOSIS — C80.1 METASTASIS TO BONE OF UNKNOWN PRIMARY (HCC): Primary | ICD-10-CM

## 2018-05-07 DIAGNOSIS — C79.9 METASTATIC DISEASE (HCC): ICD-10-CM

## 2018-05-07 DIAGNOSIS — Z17.0 MALIGNANT NEOPLASM OF RIGHT BREAST IN FEMALE, ESTROGEN RECEPTOR POSITIVE, UNSPECIFIED SITE OF BREAST (HCC): Primary | ICD-10-CM

## 2018-05-07 DIAGNOSIS — C50.911 MALIGNANT NEOPLASM OF RIGHT BREAST IN FEMALE, ESTROGEN RECEPTOR POSITIVE, UNSPECIFIED SITE OF BREAST (HCC): ICD-10-CM

## 2018-05-07 DIAGNOSIS — Z45.2 ENCOUNTER FOR VENOUS ACCESS DEVICE CARE: ICD-10-CM

## 2018-05-07 DIAGNOSIS — T45.1X5A CHEMOTHERAPY-INDUCED NEUTROPENIA (HCC): ICD-10-CM

## 2018-05-07 LAB
ALBUMIN SERPL-MCNC: 4 G/DL (ref 3.4–4.8)
ALBUMIN/GLOB SERPL: 1.3 G/DL (ref 1.1–1.8)
ALP SERPL-CCNC: 90 U/L (ref 38–126)
ALT SERPL W P-5'-P-CCNC: 47 U/L (ref 9–52)
ANION GAP SERPL CALCULATED.3IONS-SCNC: 10 MMOL/L (ref 5–15)
AST SERPL-CCNC: 33 U/L (ref 14–36)
BASOPHILS # BLD AUTO: 0.05 10*3/MM3 (ref 0–0.2)
BASOPHILS NFR BLD AUTO: 1.8 % (ref 0–2)
BILIRUB SERPL-MCNC: 0.1 MG/DL (ref 0.2–1.3)
BUN BLD-MCNC: 10 MG/DL (ref 7–21)
BUN/CREAT SERPL: 15.2 (ref 7–25)
CALCIUM SPEC-SCNC: 9.8 MG/DL (ref 8.4–10.2)
CHLORIDE SERPL-SCNC: 104 MMOL/L (ref 95–110)
CO2 SERPL-SCNC: 27 MMOL/L (ref 22–31)
CREAT BLD-MCNC: 0.66 MG/DL (ref 0.5–1)
DEPRECATED RDW RBC AUTO: 65 FL (ref 36.4–46.3)
EOSINOPHIL # BLD AUTO: 0.05 10*3/MM3 (ref 0–0.7)
EOSINOPHIL NFR BLD AUTO: 1.8 % (ref 0–7)
ERYTHROCYTE [DISTWIDTH] IN BLOOD BY AUTOMATED COUNT: 18.9 % (ref 11.5–14.5)
GFR SERPL CREATININE-BSD FRML MDRD: 95 ML/MIN/1.73 (ref 60–135)
GLOBULIN UR ELPH-MCNC: 3.1 GM/DL (ref 2.3–3.5)
GLUCOSE BLD-MCNC: 79 MG/DL (ref 60–100)
HCT VFR BLD AUTO: 32.7 % (ref 35–45)
HGB BLD-MCNC: 11.4 G/DL (ref 12–15.5)
IMM GRANULOCYTES # BLD: 0.02 10*3/MM3 (ref 0–0.02)
IMM GRANULOCYTES NFR BLD: 0.7 % (ref 0–0.5)
INR PPP: 3 (ref 0.9–1.1)
LYMPHOCYTES # BLD AUTO: 1.39 10*3/MM3 (ref 0.6–4.2)
LYMPHOCYTES NFR BLD AUTO: 48.9 % (ref 10–50)
MAGNESIUM SERPL-MCNC: 1.8 MG/DL (ref 1.6–2.3)
MCH RBC QN AUTO: 32.9 PG (ref 26.5–34)
MCHC RBC AUTO-ENTMCNC: 34.9 G/DL (ref 31.4–36)
MCV RBC AUTO: 94.2 FL (ref 80–98)
MONOCYTES # BLD AUTO: 0.4 10*3/MM3 (ref 0–0.9)
MONOCYTES NFR BLD AUTO: 14.1 % (ref 0–12)
NEUTROPHILS # BLD AUTO: 0.93 10*3/MM3 (ref 2–8.6)
NEUTROPHILS NFR BLD AUTO: 32.7 % (ref 37–80)
NRBC BLD MANUAL-RTO: 0 /100 WBC (ref 0–0)
PHOSPHATE SERPL-MCNC: 4.5 MG/DL (ref 2.4–4.4)
PLATELET # BLD AUTO: 179 10*3/MM3 (ref 150–450)
PMV BLD AUTO: 9.1 FL (ref 8–12)
POTASSIUM BLD-SCNC: 4 MMOL/L (ref 3.5–5.1)
PROT SERPL-MCNC: 7.1 G/DL (ref 6.3–8.6)
RBC # BLD AUTO: 3.47 10*6/MM3 (ref 3.77–5.16)
SODIUM BLD-SCNC: 141 MMOL/L (ref 137–145)
WBC NRBC COR # BLD: 2.84 10*3/MM3 (ref 3.2–9.8)

## 2018-05-07 PROCEDURE — 83735 ASSAY OF MAGNESIUM: CPT

## 2018-05-07 PROCEDURE — 99214 OFFICE O/P EST MOD 30 MIN: CPT | Performed by: INTERNAL MEDICINE

## 2018-05-07 PROCEDURE — 86300 IMMUNOASSAY TUMOR CA 15-3: CPT

## 2018-05-07 PROCEDURE — 1123F ACP DISCUSS/DSCN MKR DOCD: CPT | Performed by: INTERNAL MEDICINE

## 2018-05-07 PROCEDURE — 25010000002 HEPARIN FLUSH (PORCINE) 100 UNIT/ML SOLUTION: Performed by: INTERNAL MEDICINE

## 2018-05-07 PROCEDURE — 96374 THER/PROPH/DIAG INJ IV PUSH: CPT | Performed by: INTERNAL MEDICINE

## 2018-05-07 PROCEDURE — 85610 PROTHROMBIN TIME: CPT | Performed by: NURSE PRACTITIONER

## 2018-05-07 PROCEDURE — 25010000002 ZOLEDRONIC ACID PER 1 MG: Performed by: INTERNAL MEDICINE

## 2018-05-07 PROCEDURE — 84100 ASSAY OF PHOSPHORUS: CPT

## 2018-05-07 PROCEDURE — 80053 COMPREHEN METABOLIC PANEL: CPT

## 2018-05-07 PROCEDURE — 85025 COMPLETE CBC W/AUTO DIFF WBC: CPT

## 2018-05-07 PROCEDURE — 36591 DRAW BLOOD OFF VENOUS DEVICE: CPT | Performed by: INTERNAL MEDICINE

## 2018-05-07 RX ORDER — SODIUM CHLORIDE 9 MG/ML
250 INJECTION, SOLUTION INTRAVENOUS ONCE
Status: COMPLETED | OUTPATIENT
Start: 2018-05-07 | End: 2018-05-07

## 2018-05-07 RX ORDER — SODIUM CHLORIDE 0.9 % (FLUSH) 0.9 %
10 SYRINGE (ML) INJECTION AS NEEDED
Status: DISCONTINUED | OUTPATIENT
Start: 2018-05-07 | End: 2018-05-07 | Stop reason: HOSPADM

## 2018-05-07 RX ORDER — SODIUM CHLORIDE 9 MG/ML
250 INJECTION, SOLUTION INTRAVENOUS ONCE
Status: CANCELLED | OUTPATIENT
Start: 2018-05-07

## 2018-05-07 RX ORDER — SODIUM CHLORIDE 0.9 % (FLUSH) 0.9 %
10 SYRINGE (ML) INJECTION AS NEEDED
Status: CANCELLED | OUTPATIENT
Start: 2018-05-14

## 2018-05-07 RX ADMIN — SODIUM CHLORIDE 250 ML: 9 INJECTION, SOLUTION INTRAVENOUS at 11:22

## 2018-05-07 RX ADMIN — SODIUM CHLORIDE, PRESERVATIVE FREE 500 UNITS: 5 INJECTION INTRAVENOUS at 12:02

## 2018-05-07 RX ADMIN — ZOLEDRONIC ACID 4 MG: 4 INJECTION, SOLUTION, CONCENTRATE INTRAVENOUS at 11:37

## 2018-05-07 RX ADMIN — Medication 10 ML: at 12:02

## 2018-05-07 RX ADMIN — Medication 10 ML: at 10:01

## 2018-05-07 NOTE — PROGRESS NOTES
Patient states no med changes or bleeding problems or unexplained bruising. Patient instructed to continue current dosing schedule. Verbalizes understanding. Will recheck 1 month.   Patient instructed regarding medication; results given and questions answered. Nutritional counseling given.  Dietary factors affecting therapy addressed.  Patient instructed to monitor for excessive bruising or bleeding.          This document has been electronically signed by EVELYN Brewster on May 7, 2018 2:19 PM

## 2018-05-08 LAB
CANCER AG15-3 SERPL-ACNC: NORMAL U/ML
CANCER AG27-29 SERPL-ACNC: NORMAL U/ML

## 2018-05-08 RX ORDER — PALBOCICLIB 125 MG/1
CAPSULE ORAL
Qty: 21 CAPSULE | Refills: 1 | Status: SHIPPED | OUTPATIENT
Start: 2018-05-08 | End: 2018-07-23 | Stop reason: SDUPTHER

## 2018-05-14 ENCOUNTER — LAB (OUTPATIENT)
Dept: ONCOLOGY | Facility: HOSPITAL | Age: 48
End: 2018-05-14

## 2018-05-14 ENCOUNTER — TELEPHONE (OUTPATIENT)
Dept: ONCOLOGY | Facility: HOSPITAL | Age: 48
End: 2018-05-14

## 2018-05-14 DIAGNOSIS — Z45.2 ENCOUNTER FOR ADJUSTMENT OR MANAGEMENT OF VASCULAR ACCESS DEVICE: ICD-10-CM

## 2018-05-14 DIAGNOSIS — Z17.0 MALIGNANT NEOPLASM OF RIGHT BREAST IN FEMALE, ESTROGEN RECEPTOR POSITIVE, UNSPECIFIED SITE OF BREAST (HCC): ICD-10-CM

## 2018-05-14 DIAGNOSIS — C79.51 METASTASIS TO BONE OF UNKNOWN PRIMARY (HCC): Primary | ICD-10-CM

## 2018-05-14 DIAGNOSIS — IMO0001 OTHER COMPLICATION DUE TO VENOUS ACCESS DEVICE, SUBSEQUENT ENCOUNTER: ICD-10-CM

## 2018-05-14 DIAGNOSIS — Z45.2 ENCOUNTER FOR VENOUS ACCESS DEVICE CARE: ICD-10-CM

## 2018-05-14 DIAGNOSIS — C80.1 METASTASIS TO BONE OF UNKNOWN PRIMARY (HCC): Primary | ICD-10-CM

## 2018-05-14 DIAGNOSIS — C50.911 MALIGNANT NEOPLASM OF RIGHT BREAST IN FEMALE, ESTROGEN RECEPTOR POSITIVE, UNSPECIFIED SITE OF BREAST (HCC): ICD-10-CM

## 2018-05-14 LAB
BASOPHILS # BLD AUTO: 0.12 10*3/MM3 (ref 0–0.2)
BASOPHILS NFR BLD AUTO: 2.9 % (ref 0–2)
DEPRECATED RDW RBC AUTO: 62.8 FL (ref 36.4–46.3)
EOSINOPHIL # BLD AUTO: 0.06 10*3/MM3 (ref 0–0.7)
EOSINOPHIL NFR BLD AUTO: 1.4 % (ref 0–7)
ERYTHROCYTE [DISTWIDTH] IN BLOOD BY AUTOMATED COUNT: 18.3 % (ref 11.5–14.5)
HCT VFR BLD AUTO: 32.6 % (ref 35–45)
HGB BLD-MCNC: 11.4 G/DL (ref 12–15.5)
IMM GRANULOCYTES # BLD: 0.01 10*3/MM3 (ref 0–0.02)
IMM GRANULOCYTES NFR BLD: 0.2 % (ref 0–0.5)
LYMPHOCYTES # BLD AUTO: 1.36 10*3/MM3 (ref 0.6–4.2)
LYMPHOCYTES NFR BLD AUTO: 32.5 % (ref 10–50)
MCH RBC QN AUTO: 32.9 PG (ref 26.5–34)
MCHC RBC AUTO-ENTMCNC: 35 G/DL (ref 31.4–36)
MCV RBC AUTO: 93.9 FL (ref 80–98)
MONOCYTES # BLD AUTO: 0.56 10*3/MM3 (ref 0–0.9)
MONOCYTES NFR BLD AUTO: 13.4 % (ref 0–12)
NEUTROPHILS # BLD AUTO: 2.07 10*3/MM3 (ref 2–8.6)
NEUTROPHILS NFR BLD AUTO: 49.6 % (ref 37–80)
PLATELET # BLD AUTO: 221 10*3/MM3 (ref 150–450)
PMV BLD AUTO: 9.5 FL (ref 8–12)
RBC # BLD AUTO: 3.47 10*6/MM3 (ref 3.77–5.16)
WBC NRBC COR # BLD: 4.18 10*3/MM3 (ref 3.2–9.8)

## 2018-05-14 PROCEDURE — 85025 COMPLETE CBC W/AUTO DIFF WBC: CPT

## 2018-05-14 PROCEDURE — 25010000002 HEPARIN FLUSH (PORCINE) 100 UNIT/ML SOLUTION: Performed by: INTERNAL MEDICINE

## 2018-05-14 PROCEDURE — 86300 IMMUNOASSAY TUMOR CA 15-3: CPT

## 2018-05-14 PROCEDURE — 36591 DRAW BLOOD OFF VENOUS DEVICE: CPT | Performed by: INTERNAL MEDICINE

## 2018-05-14 RX ORDER — SODIUM CHLORIDE 0.9 % (FLUSH) 0.9 %
10 SYRINGE (ML) INJECTION AS NEEDED
Status: CANCELLED | OUTPATIENT
Start: 2018-06-11

## 2018-05-14 RX ORDER — SODIUM CHLORIDE 0.9 % (FLUSH) 0.9 %
10 SYRINGE (ML) INJECTION AS NEEDED
Status: DISCONTINUED | OUTPATIENT
Start: 2018-05-14 | End: 2018-05-14 | Stop reason: HOSPADM

## 2018-05-14 RX ADMIN — SODIUM CHLORIDE, PRESERVATIVE FREE 500 UNITS: 5 INJECTION INTRAVENOUS at 09:38

## 2018-05-14 RX ADMIN — Medication 10 ML: at 09:38

## 2018-05-14 NOTE — TELEPHONE ENCOUNTER
Pt informed that dr gray stated she can go ahead and start her chemo pills. Pt states understanding.

## 2018-05-15 LAB
CANCER AG15-3 SERPL-ACNC: 41.1 U/ML (ref 0–25)
CANCER AG27-29 SERPL-ACNC: 38.1 U/ML (ref 0–38.6)

## 2018-06-04 ENCOUNTER — DOCUMENTATION (OUTPATIENT)
Dept: CARDIAC SURGERY | Facility: CLINIC | Age: 48
End: 2018-06-04

## 2018-06-11 ENCOUNTER — OFFICE VISIT (OUTPATIENT)
Dept: ONCOLOGY | Facility: CLINIC | Age: 48
End: 2018-06-11

## 2018-06-11 ENCOUNTER — INFUSION (OUTPATIENT)
Dept: ONCOLOGY | Facility: HOSPITAL | Age: 48
End: 2018-06-11

## 2018-06-11 VITALS
HEART RATE: 62 BPM | BODY MASS INDEX: 34.56 KG/M2 | HEIGHT: 67 IN | TEMPERATURE: 98 F | WEIGHT: 220.2 LBS | RESPIRATION RATE: 18 BRPM | SYSTOLIC BLOOD PRESSURE: 123 MMHG | DIASTOLIC BLOOD PRESSURE: 70 MMHG

## 2018-06-11 DIAGNOSIS — D70.1 CHEMOTHERAPY-INDUCED NEUTROPENIA (HCC): ICD-10-CM

## 2018-06-11 DIAGNOSIS — T45.1X5A ANEMIA DUE TO ANTINEOPLASTIC CHEMOTHERAPY: ICD-10-CM

## 2018-06-11 DIAGNOSIS — I82.C11 ACUTE THROMBOSIS OF RIGHT INTERNAL JUGULAR VEIN (HCC): ICD-10-CM

## 2018-06-11 DIAGNOSIS — R79.89 ELEVATED LIVER FUNCTION TESTS: ICD-10-CM

## 2018-06-11 DIAGNOSIS — D64.81 ANEMIA DUE TO ANTINEOPLASTIC CHEMOTHERAPY: ICD-10-CM

## 2018-06-11 DIAGNOSIS — T45.1X5A CHEMOTHERAPY-INDUCED NEUTROPENIA (HCC): ICD-10-CM

## 2018-06-11 DIAGNOSIS — C50.911 MALIGNANT NEOPLASM OF RIGHT BREAST IN FEMALE, ESTROGEN RECEPTOR POSITIVE, UNSPECIFIED SITE OF BREAST (HCC): ICD-10-CM

## 2018-06-11 DIAGNOSIS — Z45.2 ENCOUNTER FOR ADJUSTMENT OR MANAGEMENT OF VASCULAR ACCESS DEVICE: ICD-10-CM

## 2018-06-11 DIAGNOSIS — Z17.0 MALIGNANT NEOPLASM OF RIGHT BREAST IN FEMALE, ESTROGEN RECEPTOR POSITIVE, UNSPECIFIED SITE OF BREAST (HCC): ICD-10-CM

## 2018-06-11 DIAGNOSIS — C79.31 METASTASIS TO BRAIN (HCC): ICD-10-CM

## 2018-06-11 DIAGNOSIS — C79.51 METASTASIS TO BONE OF UNKNOWN PRIMARY (HCC): ICD-10-CM

## 2018-06-11 DIAGNOSIS — C80.1 METASTASIS TO BONE OF UNKNOWN PRIMARY (HCC): Primary | ICD-10-CM

## 2018-06-11 DIAGNOSIS — Z79.01 LONG-TERM (CURRENT) USE OF ANTICOAGULANTS: ICD-10-CM

## 2018-06-11 DIAGNOSIS — C80.1 METASTASIS TO BONE OF UNKNOWN PRIMARY (HCC): ICD-10-CM

## 2018-06-11 DIAGNOSIS — Z45.2 ENCOUNTER FOR VENOUS ACCESS DEVICE CARE: Primary | ICD-10-CM

## 2018-06-11 DIAGNOSIS — C79.51 METASTASIS TO BONE OF UNKNOWN PRIMARY (HCC): Primary | ICD-10-CM

## 2018-06-11 PROBLEM — D64.9 ANEMIA: Status: ACTIVE | Noted: 2018-06-11

## 2018-06-11 LAB
ALBUMIN SERPL-MCNC: 4.2 G/DL (ref 3.4–4.8)
ALBUMIN/GLOB SERPL: 1.4 G/DL (ref 1.1–1.8)
ALP SERPL-CCNC: 81 U/L (ref 38–126)
ALT SERPL W P-5'-P-CCNC: 41 U/L (ref 9–52)
ANION GAP SERPL CALCULATED.3IONS-SCNC: 11 MMOL/L (ref 5–15)
AST SERPL-CCNC: 34 U/L (ref 14–36)
BASOPHILS # BLD AUTO: 0.04 10*3/MM3 (ref 0–0.2)
BASOPHILS NFR BLD AUTO: 1.4 % (ref 0–2)
BILIRUB SERPL-MCNC: 0.2 MG/DL (ref 0.2–1.3)
BUN BLD-MCNC: 10 MG/DL (ref 7–21)
BUN/CREAT SERPL: 15.4 (ref 7–25)
CALCIUM SPEC-SCNC: 8.9 MG/DL (ref 8.4–10.2)
CHLORIDE SERPL-SCNC: 106 MMOL/L (ref 95–110)
CO2 SERPL-SCNC: 24 MMOL/L (ref 22–31)
CREAT BLD-MCNC: 0.65 MG/DL (ref 0.5–1)
DEPRECATED RDW RBC AUTO: 60.7 FL (ref 36.4–46.3)
EOSINOPHIL # BLD AUTO: 0.07 10*3/MM3 (ref 0–0.7)
EOSINOPHIL NFR BLD AUTO: 2.4 % (ref 0–7)
ERYTHROCYTE [DISTWIDTH] IN BLOOD BY AUTOMATED COUNT: 17 % (ref 11.5–14.5)
GFR SERPL CREATININE-BSD FRML MDRD: 97 ML/MIN/1.73 (ref 58–135)
GLOBULIN UR ELPH-MCNC: 2.9 GM/DL (ref 2.3–3.5)
GLUCOSE BLD-MCNC: 83 MG/DL (ref 60–100)
HCT VFR BLD AUTO: 32.3 % (ref 35–45)
HGB BLD-MCNC: 11.2 G/DL (ref 12–15.5)
IMM GRANULOCYTES # BLD: 0 10*3/MM3 (ref 0–0.02)
IMM GRANULOCYTES NFR BLD: 0 % (ref 0–0.5)
LYMPHOCYTES # BLD AUTO: 1.18 10*3/MM3 (ref 0.6–4.2)
LYMPHOCYTES NFR BLD AUTO: 40.4 % (ref 10–50)
MAGNESIUM SERPL-MCNC: 2 MG/DL (ref 1.6–2.3)
MCH RBC QN AUTO: 33.9 PG (ref 26.5–34)
MCHC RBC AUTO-ENTMCNC: 34.7 G/DL (ref 31.4–36)
MCV RBC AUTO: 97.9 FL (ref 80–98)
MONOCYTES # BLD AUTO: 0.52 10*3/MM3 (ref 0–0.9)
MONOCYTES NFR BLD AUTO: 17.8 % (ref 0–12)
NEUTROPHILS # BLD AUTO: 1.11 10*3/MM3 (ref 2–8.6)
NEUTROPHILS NFR BLD AUTO: 38 % (ref 37–80)
PHOSPHATE SERPL-MCNC: 3.7 MG/DL (ref 2.4–4.4)
PLATELET # BLD AUTO: 172 10*3/MM3 (ref 150–450)
PMV BLD AUTO: 9.4 FL (ref 8–12)
POTASSIUM BLD-SCNC: 4 MMOL/L (ref 3.5–5.1)
PROT SERPL-MCNC: 7.1 G/DL (ref 6.3–8.6)
RBC # BLD AUTO: 3.3 10*6/MM3 (ref 3.77–5.16)
SODIUM BLD-SCNC: 141 MMOL/L (ref 137–145)
WBC NRBC COR # BLD: 2.92 10*3/MM3 (ref 3.2–9.8)

## 2018-06-11 PROCEDURE — 96365 THER/PROPH/DIAG IV INF INIT: CPT | Performed by: INTERNAL MEDICINE

## 2018-06-11 PROCEDURE — 86300 IMMUNOASSAY TUMOR CA 15-3: CPT

## 2018-06-11 PROCEDURE — 85025 COMPLETE CBC W/AUTO DIFF WBC: CPT

## 2018-06-11 PROCEDURE — 83735 ASSAY OF MAGNESIUM: CPT

## 2018-06-11 PROCEDURE — 25010000002 ZOLEDRONIC ACID PER 1 MG: Performed by: INTERNAL MEDICINE

## 2018-06-11 PROCEDURE — 84100 ASSAY OF PHOSPHORUS: CPT

## 2018-06-11 PROCEDURE — G8417 CALC BMI ABV UP PARAM F/U: HCPCS | Performed by: INTERNAL MEDICINE

## 2018-06-11 PROCEDURE — 80053 COMPREHEN METABOLIC PANEL: CPT

## 2018-06-11 PROCEDURE — 1123F ACP DISCUSS/DSCN MKR DOCD: CPT | Performed by: INTERNAL MEDICINE

## 2018-06-11 PROCEDURE — 25010000002 HEPARIN FLUSH (PORCINE) 100 UNIT/ML SOLUTION: Performed by: INTERNAL MEDICINE

## 2018-06-11 PROCEDURE — 99214 OFFICE O/P EST MOD 30 MIN: CPT | Performed by: INTERNAL MEDICINE

## 2018-06-11 RX ORDER — SODIUM CHLORIDE 9 MG/ML
250 INJECTION, SOLUTION INTRAVENOUS ONCE
Status: COMPLETED | OUTPATIENT
Start: 2018-06-11 | End: 2018-06-11

## 2018-06-11 RX ORDER — SODIUM CHLORIDE 0.9 % (FLUSH) 0.9 %
10 SYRINGE (ML) INJECTION AS NEEDED
Status: CANCELLED | OUTPATIENT
Start: 2018-06-18

## 2018-06-11 RX ORDER — SODIUM CHLORIDE 9 MG/ML
250 INJECTION, SOLUTION INTRAVENOUS ONCE
Status: CANCELLED | OUTPATIENT
Start: 2018-06-11

## 2018-06-11 RX ORDER — SODIUM CHLORIDE 0.9 % (FLUSH) 0.9 %
10 SYRINGE (ML) INJECTION AS NEEDED
Status: DISCONTINUED | OUTPATIENT
Start: 2018-06-11 | End: 2018-06-11 | Stop reason: HOSPADM

## 2018-06-11 RX ADMIN — Medication 10 ML: at 12:24

## 2018-06-11 RX ADMIN — SODIUM CHLORIDE, PRESERVATIVE FREE 500 UNITS: 5 INJECTION INTRAVENOUS at 12:24

## 2018-06-11 RX ADMIN — Medication 10 ML: at 10:15

## 2018-06-11 RX ADMIN — SODIUM CHLORIDE 500 ML: 9 INJECTION, SOLUTION INTRAVENOUS at 11:10

## 2018-06-11 RX ADMIN — ZOLEDRONIC ACID 4 MG: 0.8 INJECTION, SOLUTION, CONCENTRATE INTRAVENOUS at 11:37

## 2018-06-11 NOTE — PROGRESS NOTES
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  Started on palliative chemotherapy with Taxotere and Cytoxan on July 26, 2017.  Patient received 7 cycle of Taxotere and Cytoxan on December 6, 2017.  After that patient was changed over to treatment with Palbociclib and letrozole on January 30, 2018.  She is scheduled to start cycle 5 of Palbociclib with letrozole today on June 11, 2018.  She is here for follow-up visit.  Denies any nausea or vomiting or diarrhea with current treatment.  Complains of tingling and numbness affecting bilateral hand.    Complains of back pain.  Denies any bowel or bladder incontinence.      PAST MEDICAL HISTORY:    Past Medical History:   Diagnosis Date   • Anxiety    • Depression    • Encounter for gynecological examination    • Malignant neoplasm of female breast     hx of dcis s/p mastectomy and reconstruction and augmentation      • Primary malignant neoplasm of breast     dcis in rt breast s/p mastectomy,reconstruction      • Ventricular premature beats        SOCIAL HISTORY:    Social History   Substance Use Topics   • Smoking status: Never Smoker   • Smokeless tobacco: Never Used   • Alcohol use No       Surgical History :  Past Surgical History:   Procedure Laterality Date   • AUGMENTATION MAMMAPLASTY     • AXILLARY LYMPH NODE BIOPSY/EXCISION Right 7/10/2017    Procedure: BIOSPY RIGHT AXILLARY MASS AND OR LYMPH;  Surgeon: Sourav Ernst MD;  Location: [facility];  Service:    • BREAST BIOPSY  12/07/2007    Mammotome biopsy of the right side with clip placement   • BREAST LUMPECTOMY     • BREAST RECONSTRUCTION  10/28/2008    Abscence of right breast secondary to  mastectomy. Implant exchange for reconstruction of right breast with open para-prosthetic capsulotomy   • BREAST SURGERY  10/01/2009    Left breast ptosis and breast asymmetry secondary to right breast reconstruction for breast cancer. Left breast mastopexy with augmentation.   • CARDIAC CATHETERIZATION  09/18/2008    Normal coronary arteriography. Normal left ventricular angiogram   • EP STUDY     • MASTECTOMY Right    • MASTECTOMY  02/05/2008    Multifocal right breast ductal carcinoma-in-situ. Right total mastectomy   • MASTECTOMY, PARTIAL  01/03/2008    Ductal carcinoma in-situ of the right breast, proven by mammotome biopsy. Right lumpectomy, medial portion of the breast, between 2 o'clock and 4 o'clock, 5 cm from the nipple, with clip placement, radiographic inter. of severo. specimen, & ultrasound   • PAP SMEAR  03/03/2009    Negative   • OR INSJ TUNNELED CVC W/O SUBQ PORT/ AGE 5 YR/> Right 7/10/2017    Procedure: MEDIPORT     (c-arm#2);  Surgeon: Sourav Ernst MD;  Location: [facility];  Service: General       ALLERGIES:    Allergies   Allergen Reactions   • Percocet [Oxycodone-Acetaminophen] Nausea And Vomiting       FAMILY HISTORY:  Family History   Problem Relation Age of Onset   • Anemia Mother    • Multiple sclerosis Mother    • No Known Problems Father    • Diabetes Other    • Multiple sclerosis Other        REVIEW OF SYSTEMS:      CONSTITUTIONAL: Complains of fatigue.  Denies any fever, chills .      HEENT:States she had subconjunctival hemorrhage in right eye. No epistaxis, mouth sores or difficulty swallowing.     RESPIRATORY: No new shortness of breath. No new cough or hemoptysis.     CARDIOVASCULAR: No chest pain or palpitations.     GASTROINTESTINAL: No nausea or vomiting. Not requiring scopolamine patch.  No new abdominal pain. No blood in the stool.     GENITOURINARY: No Dysuria or Hematuria.     MUSCULOSKELETAL:Complains of arthritic pain affecting bilateral hands. Complains of pain in  "bilateral hip.  Complains of back pain.     LYMPHATICS: No new lymph node swelling.     NEUROLOGICAL : Complains of intermittent tingling and numbness affecting bilateral hand. Complains of headaches.  No dizziness. No seizures or balance problems.     SKIN: Positive for history of melanoma status post surgical removal. Denies any new skin lesion worrisome for melanoma.              PHYSICAL EXAMINATION:      VITAL SIGNS:  /70   Pulse 62   Temp 98 °F (36.7 °C) (Temporal Artery )   Resp 18   Ht 170.2 cm (67.01\")   Wt 99.9 kg (220 lb 3.2 oz)   BMI 34.48 kg/m²      ECOG performance status:1    GENERAL:  Not in any distress.    HEENT:  Normocephalic, Atraumatic.Mild Conjunctival pallor. No icterus. Extraocular Movements Intact. No Facial Asymmetry noted.    NECK:  No adenopathy. No JVD.    RESPIRATORY:  Fair air entry bilateral. No rhonchi or wheezing.    CARDIOVASCULAR:  S1, S2. Regular rate and rhythm. No murmur or gallop appreciated.    ABDOMEN:  Soft, obese, nontender. Bowel sounds present in all four quadrants.  No organomegaly appreciated.    MUSCULOSKELETAL:  No edema.No Calf Tenderness.  Decreased range of motion.    NEUROLOGIC:  Alert, awake and oriented ×3.  No  Motor  deficit appreciated. Cranial Nerves 2-12 grossly intact.    LYMPHATICS: No new lymph node enlargement in neck.    PSYCHIATRY: Normal affect. Normal Judgement.      DIAGNOSTIC DATA:    Glucose   Date Value Ref Range Status   06/11/2018 83 60 - 100 mg/dL Final     Sodium   Date Value Ref Range Status   06/11/2018 141 137 - 145 mmol/L Final     Potassium   Date Value Ref Range Status   06/11/2018 4.0 3.5 - 5.1 mmol/L Final     CO2   Date Value Ref Range Status   06/11/2018 24.0 22.0 - 31.0 mmol/L Final     Chloride   Date Value Ref Range Status   06/11/2018 106 95 - 110 mmol/L Final     Anion Gap   Date Value Ref Range Status   06/11/2018 11.0 5.0 - 15.0 mmol/L Final     Creatinine   Date Value Ref Range Status   06/11/2018 0.65 0.50 - " 1.00 mg/dL Final     BUN   Date Value Ref Range Status   06/11/2018 10 7 - 21 mg/dL Final     BUN/Creatinine Ratio   Date Value Ref Range Status   06/11/2018 15.4 7.0 - 25.0 Final     Calcium   Date Value Ref Range Status   06/11/2018 8.9 8.4 - 10.2 mg/dL Final     eGFR Non  Amer   Date Value Ref Range Status   06/11/2018 97 58 - 135 mL/min/1.73 Final     Alkaline Phosphatase   Date Value Ref Range Status   06/11/2018 81 38 - 126 U/L Final     Total Protein   Date Value Ref Range Status   06/11/2018 7.1 6.3 - 8.6 g/dL Final     ALT (SGPT)   Date Value Ref Range Status   06/11/2018 41 9 - 52 U/L Final     AST (SGOT)   Date Value Ref Range Status   06/11/2018 34 14 - 36 U/L Final     Total Bilirubin   Date Value Ref Range Status   06/11/2018 0.2 0.2 - 1.3 mg/dL Final     Albumin   Date Value Ref Range Status   06/11/2018 4.20 3.40 - 4.80 g/dL Final     Globulin   Date Value Ref Range Status   06/11/2018 2.9 2.3 - 3.5 gm/dL Final     A/G Ratio   Date Value Ref Range Status   06/11/2018 1.4 1.1 - 1.8 g/dL Final     Lab Results   Component Value Date    WBC 2.92 (L) 06/11/2018    HGB 11.2 (L) 06/11/2018    HCT 32.3 (L) 06/11/2018    MCV 97.9 06/11/2018     06/11/2018     Lab Results   Component Value Date    NEUTROABS 1.11 (L) 06/11/2018     Lab Results   Component Value Date     41.1 (H) 05/14/2018    LABCA2 38.1 05/14/2018     Component CA 15-3   Latest Ref Rng & Units 0.0 - 25.0 U/mL   6/29/2017 2107.0 (H)   8/23/2017 943.0 (H)   10/4/2017 282.2 (H)   10/25/2017 162.2 (H)   11/15/2017 118.0 (H)   12/6/2017 99.6 (H)   12/27/2017 88.3 (H)   2/28/2018 47.2 (H)   4/2/2018 11.9   5/7/2018 5/14/2018 41.1 (H)       Component CA 27.29   Latest Ref Rng & Units 0.0 - 38.6 U/mL   6/29/2017 1817.1 (H)   8/23/2017 1075.9 (H)   10/4/2017 288.9 (H)   10/25/2017 181.2 (H)   11/15/2017 112.2 (H)   12/6/2017 101.6 (H)   12/27/2017 90.3 (H)   2/28/2018 52.1 (H)   4/2/2018 12.4   5/7/2018 5/14/2018 38.1         2  D Echo Done on July 19, 2017 showed:  Interpretation Summary   · The left ventricular cavity is borderline dilated.  · Left ventricular systolic function is normal. Estimated EF = 53%.  · Left ventricular diastolic dysfunction (grade I) consistent with impaired relaxation.  · IAS aneurysm noted. No PFO or ASD           Radiology Data :  CT of Chest, abdomen and pelvis with contrast done on May 3,2018 was reviewed and discussed with patient, it showed:  IMPRESSION:  CONCLUSION:      1. Stable examination.  2. No change, liver lesion in the left lobe.  3. No gross changes associated with the multiple osteosclerotic  lesions. Suggest correlation with a nuclear medicine bone scan.            MRI of lumbar spine done at [facility] on November 24, 2017 showed:   Left  1. Extensive bone metastasis to all of the lumbar vertebrae and the sacrum .  2. There is a pathologic burst fracture of the L4 vertebral body with associated moderate spinal stenosis and possible early ventral epidural tumor invasion .  3. Left foraminal narrowing at the L4-5 level with uncertain but doubtful impact on the left L4 nerve      MRI of thoracic spine done at [facility] on November 24, 2017 showed:  1. Metastatic involvement of all of the thoracic vertebra, there is slight compression of T12  2. No spinal stenosis or epidural tumor       Doppler ultrasound of right upper extremity done on November 10, 2017 showed:  IMPRESSION:  CONCLUSION:   Abnormal exam with thrombus visualized in the right internal  jugular vein, consistent with DVT.    MRI of brain with and without contrast done on November 9, 2017 was reviewed with the radiologist, it showed:  IMPRESSION:  CONCLUSION:  At least six calvarial metastases, largest 1.6 cm residing in the  left parietal bone.  No brain metastases demonstrated.          X-ray of left hip done on September 7, 2017 showed:  Hyperlucency of the inferior pubic rami and the inferior aspect of obturator foramen  consistent with osteolytic metastasis.    PET/CT done on July 3, 2017 showed:  IMPRESSION:  CONCLUSION:  1. Extensive metastatic disease. Pathologic uptake within  enlarged right-sided axillary lymph nodes. Metastatic adenopathy  in both laurie and mediastinum. Tumor replacing most of the left  lobe of liver. Intra-abdominal retroperitoneal metastases,  adenopathy.     Extensive skeletal metastases including a pathologic fracture at  L4.        Nuclear bone scan done on June 20, 2017 showed:  1. Increased uptake at L4 suggestive of metastatic process.  2. 3 or 4 areas of increased activity in bony calvarium  3. Some increased activity in tips posteriorly and anteriorly suggestive of metastatic carcinoma to the skeletal system.           Pathology :    Pathology data from July 10, 2017 showed:  Final Diagnosis   Mass right axilla, excisional biopsy:      Metastatic poorly differentiated ductal carcinoma, breast primary      Estrogen receptor positive, 95% stainability, strong intensity      Progesterone receptor positive, 95% stainability, strong intensity      HER-2 2+(equivocal), sent to [facility] for FISH     Addendum   Her 2 FISH result from [facility] is NEGATIVE         Pathology report from December 7, 2007 showed:  FINAL DIAGNOSIS:   RIGHT BREAST MAMMOTOME BIOPSY:        DUCTAL CARCINOMA IN SITU, INTERMEDIATE NUCLEAR GRADE              WITH MICROCALCIFICATION.      ADDENDUM:   Estrogen receptors are positive (90-95% stainability).   Progesterone receptors are positive (90-95% stainability).         ASSESSMENT AND PLAN:      1.  Metastatic cancer with extensive adenopathy in right axilla, mediastinum, hilar, abdominal, retroperitoneal with extensive liver and bone metastasis on PET/CT.  Patient underwent right apatient underwent right axillary lymph node excision by Dr. Ernst on July 10, 2017.Pathology report confirmed ductal carcinoma of breast with estrogen and progesterone positivity.  At her on  palliative chemotherapy with Taxotere and cytoxan on July 26, 2017.   Patient was  seen at [facility] for second opinion regarding her breast cancer.  Case was discussed with Dr vazquez oncologist that has seen patient at [facility].  She agreed with her chemotherapy at this point.  The scan on December 20, 2017 shows relative stability of disease, lesion in the liver as well as bone lesion at about same.  Her tumor marker CA 27-29 as well as CA 15-3 are less than 100 at this point.  Since patient is developing some neuropathy in bilateral upper and lower extremity recommend changing her therapy to Palbociclib with letrozole.  Patient started taking first round of Palbociclib and letrozole on January 30, 2018.  Patient was scheduled to start cycle  5 of chemotherapy today with Palbociclib today on June 11, 2018.  In view of neutropenia with absolute neutrophil count of 1110.  Recommend rechecking CBC next week and starting Palbociclib after that.  CT of Chest, abdomen and pelvis with contrast done on May 3, 2018 showed stable disease which was discussed with patient and her family.  We will ask patient to return to clinic in 5 weeks with a repeat CBC, CMP ,CA 15-3 and CA 27-29 to be done on that day.  Since last clinic visit patient's tumor marker CA 15-9 as well as CA 27-29 has increased compared to month prior to that.  We get restaging CT of chest, abdomen and pelvis with contrast as well as MRI of brain with and without contrast prior to next clinic visit which was discussed with patient today.    2.  Brain metastasis:MRi Brain with and without done on November 9, 2017 was reviewed and compared with t MRI done 6 weeks prior to that.  Brain calvarial metastasis are stable or somewhat improved.  No need to do radiation at this point which was discussed with patient.  Review of worsening tumor marker and intermittent headaches, we will get an MRI of brain with and without contrast to rule out any worsening  metastasis involving calvarium.    3.  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  She is currently on Coumadin.    She is being followed by Coumadin clinic She was instructed to come to the emergency room in case she starts having any bleeding.    4.  History of melanoma in situ status post excision by Dr. Linn.    5.  Bone metastasis: Patient was started on Zometa on August 23, 2017.  Continue with Zometa as scheduled for now.  Patient denies any mouth sores or jaw pain.  CT scan shows about loss of 70% of height of L4 vertebral body, patient has been referred to Dr. Pugh to get evaluated for kyphoplasty.  Patient went for second opinion with orthopedic surgeon in [place], as per patient's second surgeon did not recommend any surgery.  We will continue with monthly Zometa for now.     6.  History of ductal carcinoma in situ of the right breast diagnosed in 2007.  Status post mastectomy followed by adjuvant tamoxifen for 5 years which was finished in 2013.    7.  Elevated liver enzymes: Resolved.    8.  Neutropenia: Secondary to Palbociclib. Absolute neutrophil count is 1110.  Patient denies any fevers.  Will recheck CBC next week.  Neutrophil is greater than 1500 we will restart Palbociclib was discussed with patient.    9.  Anemia: Secondary to chemotherapy.  Hemoglobin is 11.1 today.  We'll monitored with CBC.    10.  Health maintenance: Patient does not smoke.  Not a candidate for screening colonoscopy at this point.      11.  Prescriptions: Patient has enough prescription of Decadron, Zofran,  and Ultram.    12.  Advance care planning: Patient remains full code for now and is able to make on her decisions.  Patient has healthcare surrogate Mentioned on Chart.    13 Obesity: Patient's Body mass index is 34.48 kg/m². BMI is higher than reference range.  Patient was notified about elevated BMI.  Patient was counseled about diet and exercise for weight  loss.        Ovidio Meadows MD  6/11/2018  5:34 PM        EMR Dragon/Transcription disclaimer:   Much of this encounter note is an electronic transcription/translation of spoken language to printed text. The electronic translation of spoken language may permit erroneous, or at times, nonsensical words or phrases to be inadvertently transcribed; Although I have reviewed the note for such errors, some may still exist.

## 2018-06-11 NOTE — PATIENT INSTRUCTIONS
Patient Instructions for CT Scan    · Your CT scan is being done without any oral contrast.  Your CT scan may be done with IV contrast, if you have an allergy to iodine--please tell your nurse.    · Do not eat or drink 4 hours prior to scan.     · You may take your medications with sips of water, except DO NOT take your diabetic pill the morning of the test.    Arrive at the Outpatient Surgery entrance at [facility] 20 minutes prior to appointment time.    You will receive a phone call with an appointment for your CT scan.  Please call our office, if someone does not contact you with 3 days.    Vijaya Campos RN  June 11, 2018  10:58 AM              CT Scan  A computed tomography (CT) scan is a specialized X-ray scan. It uses X-rays and a computer to make pictures of different areas of your body. A CT scan can offer more detailed information than a regular X-ray exam. The CT scan provides data about internal organs, soft tissue structures, blood vessels, and bones.  The CT scanner is a large machine that takes pictures of your body as you move through the opening.  Tell a health care provider about:  · Any allergies you have.  · All medicines you are taking, including vitamins, herbs, eye drops, creams, and over-the-counter medicines.  · Any problems you or family members have had with anesthetic medicines.  · Any blood disorders you have.  · Any surgeries you have had.  · Any medical conditions you have.  What are the risks?  Generally, this is a safe procedure. However, as with any procedure, problems can occur. Possible problems include:  · An allergic reaction to the contrast material.  · Development of cancer from excessive exposure to radiation. The risk of this is small.  What happens before the procedure?  · The day before the test, stop drinking caffeinated beverages. These include energy drinks, tea, soda, coffee, and hot chocolate.  · On the day of the test:  ¨ About 4 hours before the  test, stop eating and drinking anything but water as advised by your health care provider.  ¨ Avoid wearing jewelry. You will have to partly or fully undress and wear a hospital gown.  What happens during the procedure?  · You will be asked to lie on a table with your arms above your head.  · If contrast dye is to be used for the test, an IV tube will be inserted in your arm. The contrast dye will be injected into the IV tube. You might feel warm, or you may get a metallic taste in your mouth.  · The table you will be lying on will move into a large machine that will do the scanning.  · You will be able to see, hear, and talk to the person running the machine while you are in it. Follow that person's directions.  · The CT machine will move around you to take pictures. Do not move while it is scanning. This helps to get a good image.  · When the best possible pictures have been taken, the machine will be turned off. The table will be moved out of the machine. The IV tube will then be removed.  What happens after the procedure?  Ask your health care provider when to follow up for your test results.  This information is not intended to replace advice given to you by your health care provider. Make sure you discuss any questions you have with your health care provider.  Document Released: 01/25/2006 Document Revised: 05/25/2017 Document Reviewed: 08/25/2014  [employer] Patient Education © 2017 [employer]    Magnetic Resonance Imaging  Magnetic resonance imaging (MRI) is an imaging test that produces clear digital pictures of the inside of your body without using X-rays. The MRI scanner uses radio waves and a magnetic field to create the images. The MRI pictures may provide different details than images obtained through X-rays, CT scans, or ultrasounds. Contrast material may be injected to make MRI images even more clear. In a standard MRI scanner, the area of your body being studied will be in the center  opening of the scanner. In open MRI scanners, the scanner does not entirely surround your body.  Tell a health care provider about:  · Any surgeries you have had.  · Any metal you may have in your body. The magnet used in MRI can cause metal objects in your body to move. This includes:  ¨ A pacemaker or any other implants, such as an implanted neurostimulator, a metallic ear implant, or a metallic object within the eye socket.  ¨ Metal splinters in your body.  ¨ Any bullet fragments.  ¨ A port for delivering insulin or chemotherapy.  · Any tattoos. Some red dyes contain iron which is sometimes a problem.  · If you are pregnant or may be pregnant.  · If you are breastfeeding.  · If you are afraid of cramped spaces (claustrophobic). If claustrophobia is a problem, it usually can be relieved with medicines or the use of the open MRI scanner.  · Any allergies you have.  · All medicines you are taking, including vitamins, herbs, eye drops, creams, and over-the-counter medicines.  What are the risks?  Generally, MRI is a safe procedure. However, problems can occur and include:  · If a metal implant is present but is undetected, it may be affected by the strong magnetic field. In addition, if the implant is close to the examination site, it may be hard to get high-quality images.  · If you are pregnant:  ¨ MRI generally should be avoided during the first three months of pregnancy. It is not known what effects the MRI may have on a fetus. Ultrasound is preferred at this time unless a serious condition is suspected that is best studied by MRI. MRI should be considered if there is a substantial risk of missing the correct diagnosis if MRI is not done.  · If you are breastfeeding:  ¨ You should inform your health care provider and ask how to proceed. You may pump breast milk before the exam for use until the contrast material, if used, has cleared from the body.  What happens before the procedure?  · You will be asked to  remove all metal, including:  ¨ Your watch, jewelry, and other metal objects.  ¨ Some makeup also contains traces of metal and may need to be removed.  ¨ Braces and fillings normally are not a problem.  What happens during the procedure?  · You may be given earplugs or headphones to listen to music. The MRI scanner can be noisy.  · You may be injected with contrast material.  · The standard MRI is done in a long, magnetic chamber. You will lie down on a platform that slides into the magnetic chamber. Once inside, you will still be able to talk to the person performing the test. The open MRI scanner is open on at least one side of the scanner.  · You will be asked to hold very still. You will be told when you can shift position. You may have to wait a few minutes to make sure the images are readable.  What happens after the procedure?  · You may resume normal activities right away.  · If you were given contrast material, it will pass naturally through your body within a day.  · A person experienced in MRI (radiologist) will analyze the results and send a report to your health care provider, along with an explanation of the results.  This information is not intended to replace advice given to you by your health care provider. Make sure you discuss any questions you have with your health care provider.  Document Released: 12/15/2001 Document Revised: 05/22/2017 Document Reviewed: 02/12/2015  [employer] Interactive Patient Education © 2017 [employer]

## 2018-06-12 LAB
CANCER AG15-3 SERPL-ACNC: 37.3 U/ML (ref 0–25)
CANCER AG27-29 SERPL-ACNC: 39.7 U/ML (ref 0–38.6)

## 2018-06-13 ENCOUNTER — OFFICE VISIT (OUTPATIENT)
Dept: CARDIAC SURGERY | Facility: CLINIC | Age: 48
End: 2018-06-13

## 2018-06-13 ENCOUNTER — ANTICOAGULATION VISIT (OUTPATIENT)
Dept: CARDIAC SURGERY | Facility: CLINIC | Age: 48
End: 2018-06-13

## 2018-06-13 VITALS
BODY MASS INDEX: 34.09 KG/M2 | SYSTOLIC BLOOD PRESSURE: 128 MMHG | WEIGHT: 217.19 LBS | DIASTOLIC BLOOD PRESSURE: 80 MMHG | HEART RATE: 66 BPM | HEIGHT: 67 IN | OXYGEN SATURATION: 98 %

## 2018-06-13 DIAGNOSIS — Z79.01 LONG-TERM (CURRENT) USE OF ANTICOAGULANTS: ICD-10-CM

## 2018-06-13 DIAGNOSIS — C50.911 MALIGNANT NEOPLASM OF RIGHT BREAST IN FEMALE, ESTROGEN RECEPTOR POSITIVE, UNSPECIFIED SITE OF BREAST (HCC): ICD-10-CM

## 2018-06-13 DIAGNOSIS — I82.C11 ACUTE THROMBOSIS OF RIGHT INTERNAL JUGULAR VEIN (HCC): Primary | ICD-10-CM

## 2018-06-13 DIAGNOSIS — Z17.0 MALIGNANT NEOPLASM OF RIGHT BREAST IN FEMALE, ESTROGEN RECEPTOR POSITIVE, UNSPECIFIED SITE OF BREAST (HCC): ICD-10-CM

## 2018-06-13 LAB — INR PPP: 2.7 (ref 0.9–1.1)

## 2018-06-13 PROCEDURE — 85610 PROTHROMBIN TIME: CPT | Performed by: NURSE PRACTITIONER

## 2018-06-13 PROCEDURE — 99213 OFFICE O/P EST LOW 20 MIN: CPT | Performed by: NURSE PRACTITIONER

## 2018-06-13 NOTE — PROGRESS NOTES
PT here today seeing Patt. PT denies any new medications or bleeding issues. PT denies any s/s of blood clot. PT had US today which shows blood clot has resolved. Patient instructed regarding medication; results given and questions answered. Nutritional counseling given.  Dietary factors affecting therapy addressed.  Patient instructed to monitor for excessive bruising or bleeding. PT instructed to continue same dose and Coumadin friendly diet. PT will be seen in 1 month.           This document has been electronically signed by EVELYN Brewster on June 14, 2018 9:14 AM

## 2018-06-13 NOTE — PATIENT INSTRUCTIONS
Lifelong Anticoagulation: Coumadin  Acute DVT RIJ: Resolved  INR 2.7 today Goal2-3  Continue dosing per [facility]

## 2018-06-18 ENCOUNTER — LAB (OUTPATIENT)
Dept: ONCOLOGY | Facility: HOSPITAL | Age: 48
End: 2018-06-18

## 2018-06-18 DIAGNOSIS — C50.911 MALIGNANT NEOPLASM OF RIGHT BREAST IN FEMALE, ESTROGEN RECEPTOR POSITIVE, UNSPECIFIED SITE OF BREAST (HCC): ICD-10-CM

## 2018-06-18 DIAGNOSIS — Z17.0 MALIGNANT NEOPLASM OF RIGHT BREAST IN FEMALE, ESTROGEN RECEPTOR POSITIVE, UNSPECIFIED SITE OF BREAST (HCC): ICD-10-CM

## 2018-06-18 LAB
ALBUMIN SERPL-MCNC: 4.3 G/DL (ref 3.4–4.8)
ALBUMIN/GLOB SERPL: 1.4 G/DL (ref 1.1–1.8)
ALP SERPL-CCNC: 67 U/L (ref 38–126)
ALT SERPL W P-5'-P-CCNC: 35 U/L (ref 9–52)
ANION GAP SERPL CALCULATED.3IONS-SCNC: 10 MMOL/L (ref 5–15)
AST SERPL-CCNC: 40 U/L (ref 14–36)
BASOPHILS # BLD AUTO: 0.06 10*3/MM3 (ref 0–0.2)
BASOPHILS NFR BLD AUTO: 1.5 % (ref 0–2)
BILIRUB SERPL-MCNC: 0.2 MG/DL (ref 0.2–1.3)
BUN BLD-MCNC: 12 MG/DL (ref 7–21)
BUN/CREAT SERPL: 16.2 (ref 7–25)
CALCIUM SPEC-SCNC: 9.2 MG/DL (ref 8.4–10.2)
CHLORIDE SERPL-SCNC: 104 MMOL/L (ref 95–110)
CO2 SERPL-SCNC: 27 MMOL/L (ref 22–31)
CREAT BLD-MCNC: 0.74 MG/DL (ref 0.5–1)
DEPRECATED RDW RBC AUTO: 55.3 FL (ref 36.4–46.3)
EOSINOPHIL # BLD AUTO: 0.07 10*3/MM3 (ref 0–0.7)
EOSINOPHIL NFR BLD AUTO: 1.7 % (ref 0–7)
ERYTHROCYTE [DISTWIDTH] IN BLOOD BY AUTOMATED COUNT: 15.7 % (ref 11.5–14.5)
GFR SERPL CREATININE-BSD FRML MDRD: 83 ML/MIN/1.73 (ref 58–135)
GLOBULIN UR ELPH-MCNC: 3 GM/DL (ref 2.3–3.5)
GLUCOSE BLD-MCNC: 84 MG/DL (ref 60–100)
HCT VFR BLD AUTO: 35.5 % (ref 35–45)
HGB BLD-MCNC: 12.2 G/DL (ref 12–15.5)
IMM GRANULOCYTES # BLD: 0.01 10*3/MM3 (ref 0–0.02)
IMM GRANULOCYTES NFR BLD: 0.2 % (ref 0–0.5)
LYMPHOCYTES # BLD AUTO: 1.4 10*3/MM3 (ref 0.6–4.2)
LYMPHOCYTES NFR BLD AUTO: 34 % (ref 10–50)
MCH RBC QN AUTO: 33.3 PG (ref 26.5–34)
MCHC RBC AUTO-ENTMCNC: 34.4 G/DL (ref 31.4–36)
MCV RBC AUTO: 97 FL (ref 80–98)
MONOCYTES # BLD AUTO: 0.61 10*3/MM3 (ref 0–0.9)
MONOCYTES NFR BLD AUTO: 14.8 % (ref 0–12)
NEUTROPHILS # BLD AUTO: 1.97 10*3/MM3 (ref 2–8.6)
NEUTROPHILS NFR BLD AUTO: 47.8 % (ref 37–80)
PLATELET # BLD AUTO: 232 10*3/MM3 (ref 150–450)
PMV BLD AUTO: 9.5 FL (ref 8–12)
POTASSIUM BLD-SCNC: 4 MMOL/L (ref 3.5–5.1)
PROT SERPL-MCNC: 7.3 G/DL (ref 6.3–8.6)
RBC # BLD AUTO: 3.66 10*6/MM3 (ref 3.77–5.16)
SODIUM BLD-SCNC: 141 MMOL/L (ref 137–145)
WBC NRBC COR # BLD: 4.12 10*3/MM3 (ref 3.2–9.8)

## 2018-06-18 PROCEDURE — 36415 COLL VENOUS BLD VENIPUNCTURE: CPT | Performed by: NURSE PRACTITIONER

## 2018-06-18 PROCEDURE — 80053 COMPREHEN METABOLIC PANEL: CPT

## 2018-06-18 PROCEDURE — 85025 COMPLETE CBC W/AUTO DIFF WBC: CPT

## 2018-06-25 NOTE — PROGRESS NOTES
Subjective   Patient ID: Kylie García is a 47 y.o. female is here today for follow-up.    Chief Complaint:    Chief Complaint   Patient presents with   • Deep Vein Thrombosis   • Follow-up       History of Present Illness  The following portions of the patient's history were reviewed and updated as appropriate: allergies, current medications, past family history, past medical history, past social history, past surgical history and problem list.  Recent images independently reviewed.  Available laboratory values reviewed.  PCP: Dr. 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  Started on palliative chemotherapy. Developed DVT RIGHT IJ 11/2017. Was started on Coumadin therapy. Has tolerated well with no adverse bleeding events. Complains of tingling and numbness affecting bilateral hand.  Returns for follow up, requests for coumadin discontinuation. INR 2.7    11/2017: Abnormal exam with thrombus visualized in the right internal jugular vein, consistent with DVT.  6/13/18: Appears negative for DVT of the RT IJV in today's exam       Past Medical History:   Diagnosis Date   • Anxiety    • Depression    • Encounter for gynecological examination    • Malignant neoplasm of female breast     hx of dcis s/p mastectomy and reconstruction and augmentation      • Primary malignant neoplasm of breast     dcis in rt breast s/p mastectomy,reconstruction      • Ventricular premature beats      Past Surgical History:   Procedure Laterality Date   • AUGMENTATION MAMMAPLASTY     • AXILLARY LYMPH NODE BIOPSY/EXCISION Right 7/10/2017    Procedure: BIOSPY RIGHT AXILLARY MASS AND OR LYMPH;  Surgeon: Sourav Ernst MD;  Location: [facility];  Service:    • BREAST BIOPSY  12/07/2007    Mammotome  biopsy of the right side with clip placement   • BREAST LUMPECTOMY     • BREAST RECONSTRUCTION  10/28/2008    Abscence of right breast secondary to mastectomy. Implant exchange for reconstruction of right breast with open para-prosthetic capsulotomy   • BREAST SURGERY  10/01/2009    Left breast ptosis and breast asymmetry secondary to right breast reconstruction for breast cancer. Left breast mastopexy with augmentation.   • CARDIAC CATHETERIZATION  09/18/2008    Normal coronary arteriography. Normal left ventricular angiogram   • EP STUDY     • MASTECTOMY Right    • MASTECTOMY  02/05/2008    Multifocal right breast ductal carcinoma-in-situ. Right total mastectomy   • MASTECTOMY, PARTIAL  01/03/2008    Ductal carcinoma in-situ of the right breast, proven by mammotome biopsy. Right lumpectomy, medial portion of the breast, between 2 o'clock and 4 o'clock, 5 cm from the nipple, with clip placement, radiographic inter. of severo. specimen, & ultrasound   • PAP SMEAR  03/03/2009    Negative   • TX INSJ TUNNELED CVC W/O SUBQ PORT/ AGE 5 YR/> Right 7/10/2017    Procedure: MEDIPORT     (c-arm#2);  Surgeon: Sourav Ernst MD;  Location: [facility];  Service: General       ALLERGIES:   Percocet [oxycodone-acetaminophen]    MEDICATIONS:      Current Outpatient Prescriptions:   •  Calcium Carbonate-Vitamin D (CALTRATE 600+D PO), Take  by mouth Daily., Disp: , Rfl:   •  dexamethasone (DECADRON) 4 MG tablet, Take 2 tablets oral twice a day for 3 consecutive days beginning the day before chemotherapy and continue for 6 doses., Disp: 12 tablet, Rfl: 3  •  Docusate Sodium (COLACE PO), Take  by mouth Daily., Disp: , Rfl:   •  escitalopram (LEXAPRO) 10 MG tablet, Take 15 mg by mouth Daily., Disp: , Rfl:   •  gabapentin (NEURONTIN) 300 MG capsule, TAKE 1 CAPSULE THREE TIMES A DAY, Disp: , Rfl: 0  •  IBRANCE 125 MG capsule capsule, TAKE 1 CAPSULE DAILY FOR 21 DAYS OUT OF 28 DAY CYCLE, Disp: 21 capsule, Rfl: 1  •  lactulose 20 GM/30ML  solution solution, Take 30 mL by mouth Daily. Take every night PRN for constipation., Disp: 500 mL, Rfl: 1  •  letrozole (FEMARA) 2.5 MG tablet, Take 1 tablet by mouth Daily., Disp: 30 tablet, Rfl: 3  •  Multiple Vitamins-Minerals (MULTIVITAMIN WITH MINERALS) tablet tablet, Take 1 tablet by mouth Daily., Disp: , Rfl:   •  naproxen sodium (ALEVE) 220 MG tablet, Take 220 mg by mouth As Needed for Mild Pain  (2 tabs in the morning one pm)., Disp: , Rfl:   •  neomycin-polymyxin-dexamethasone (MAXITROL) 3.5-08712-3.1 ophthalmic suspension, PLACE 1 DROP IN RIGHT EYE 4 TIMES A DAY, Disp: , Rfl: 0  •  Scopolamine (TRANSDERM-SCOP) 1.5 MG/3DAYS patch, APPLY 1 PATCH EVERY 72 HOURS, Disp: 4 patch, Rfl: 3  •  traMADol (ULTRAM) 50 MG tablet, Take 1 tablet by mouth Every 8 (Eight) Hours As Needed for Moderate Pain ., Disp: 90 tablet, Rfl: 0  •  warfarin (COUMADIN) 5 MG tablet, Take one tablet daily or as Directed by [facility]in Clinic, Disp: 30 tablet, Rfl: 5  No current facility-administered medications for this visit.     Facility-Administered Medications Ordered in Other Visits:   •  alteplase ((CATHFLO/ACTIVASE)) injection 2 mg, 2 mg, Intravenous, Once, Ovidio Meadows MD  •  heparin flush (porcine) 100 UNIT/ML injection 500 Units, 500 Units, Intravenous, PRN, Ovidio Meadows MD, 500 Units at 10/04/17 1646  •  sodium chloride 0.9 % flush 10 mL, 10 mL, Intravenous, PRN, Ovidio Meadows MD, 10 mL at 10/04/17 1646    Review of Systems   Constitution: Negative for weakness.   HENT: Negative for nosebleeds.    Eyes: Negative for visual disturbance.   Cardiovascular: Negative for leg swelling.   Respiratory: Negative for hemoptysis and shortness of breath.    Hematologic/Lymphatic: Negative for bleeding problem. Does not bruise/bleed easily.   Gastrointestinal: Negative for hematemesis and melena.   Genitourinary: Negative for hematuria.   Neurological: Negative for dizziness and light-headedness.   All other systems reviewed and are  negative.       Objective       Vitals:    06/13/18 0935   BP: 128/80   Pulse: 66   SpO2: 98%       Body mass index is 34.02 kg/m².  Physical Exam   Constitutional: She is oriented to person, place, and time. She appears well-developed.   HENT:   Head: Normocephalic.   Eyes: EOM are normal.   Neck: Neck supple.   Cardiovascular: Normal rate.    Pulmonary/Chest: Effort normal and breath sounds normal.   Abdominal: Soft.   Musculoskeletal: Normal range of motion.   Gait normal   Neurological: She is alert and oriented to person, place, and time.   Skin: Skin is warm and dry. Capillary refill takes less than 2 seconds.   Psychiatric: She has a normal mood and affect. Thought content normal.   Vitals reviewed.      Assessment/Plan   Independent Review of Radiographic Studies:    Detailed discussion regarding risks, benefits, and treatment plan. Images independently reviewed. Patient understands, agrees, and wishes to proceed with plan.     1. Long-term (current) use of anticoagulants  Lifelong Anticoagulation: Coumadin  Acute DVT RIJ: Resolved  INR 2.7 today Goal2-3  Continue dosing per [facility]in [facility]    2. Acute thrombosis of right internal jugular vein  RESOLVED    3. Malignant neoplasm of right breast in female, estrogen receptor positive, unspecified site of breast              This document has been electronically signed by EVELYN Brewster on June 25, 2018 4:51 PM

## 2018-07-02 RX ORDER — WARFARIN SODIUM 5 MG/1
TABLET ORAL
Qty: 30 TABLET | Refills: 5 | Status: SHIPPED | OUTPATIENT
Start: 2018-07-02 | End: 2019-01-10 | Stop reason: SDUPTHER

## 2018-07-09 ENCOUNTER — INFUSION (OUTPATIENT)
Dept: ONCOLOGY | Facility: HOSPITAL | Age: 48
End: 2018-07-09

## 2018-07-09 ENCOUNTER — APPOINTMENT (OUTPATIENT)
Dept: CT IMAGING | Facility: HOSPITAL | Age: 48
End: 2018-07-09

## 2018-07-09 ENCOUNTER — HOSPITAL ENCOUNTER (OUTPATIENT)
Dept: CT IMAGING | Facility: HOSPITAL | Age: 48
Discharge: HOME | End: 2018-07-09
Admitting: INTERNAL MEDICINE

## 2018-07-09 ENCOUNTER — HOSPITAL ENCOUNTER (OUTPATIENT)
Dept: MRI IMAGING | Facility: HOSPITAL | Age: 48
Discharge: HOME | End: 2018-07-09

## 2018-07-09 VITALS
DIASTOLIC BLOOD PRESSURE: 68 MMHG | TEMPERATURE: 97.5 F | RESPIRATION RATE: 18 BRPM | SYSTOLIC BLOOD PRESSURE: 114 MMHG | HEART RATE: 68 BPM

## 2018-07-09 DIAGNOSIS — C79.51 METASTASIS TO BONE OF UNKNOWN PRIMARY (HCC): ICD-10-CM

## 2018-07-09 DIAGNOSIS — C50.911 MALIGNANT NEOPLASM OF RIGHT BREAST IN FEMALE, ESTROGEN RECEPTOR POSITIVE, UNSPECIFIED SITE OF BREAST (HCC): Primary | ICD-10-CM

## 2018-07-09 DIAGNOSIS — Z45.2 ENCOUNTER FOR VENOUS ACCESS DEVICE CARE: ICD-10-CM

## 2018-07-09 DIAGNOSIS — C80.1 METASTASIS TO BONE OF UNKNOWN PRIMARY (HCC): ICD-10-CM

## 2018-07-09 DIAGNOSIS — C50.911 MALIGNANT NEOPLASM OF RIGHT BREAST IN FEMALE, ESTROGEN RECEPTOR POSITIVE, UNSPECIFIED SITE OF BREAST (HCC): ICD-10-CM

## 2018-07-09 DIAGNOSIS — Z17.0 MALIGNANT NEOPLASM OF RIGHT BREAST IN FEMALE, ESTROGEN RECEPTOR POSITIVE, UNSPECIFIED SITE OF BREAST (HCC): ICD-10-CM

## 2018-07-09 DIAGNOSIS — Z45.2 ENCOUNTER FOR ADJUSTMENT OR MANAGEMENT OF VASCULAR ACCESS DEVICE: ICD-10-CM

## 2018-07-09 DIAGNOSIS — Z17.0 MALIGNANT NEOPLASM OF RIGHT BREAST IN FEMALE, ESTROGEN RECEPTOR POSITIVE, UNSPECIFIED SITE OF BREAST (HCC): Primary | ICD-10-CM

## 2018-07-09 DIAGNOSIS — C79.31 METASTASIS TO BRAIN (HCC): ICD-10-CM

## 2018-07-09 LAB
ALBUMIN SERPL-MCNC: 4.2 G/DL (ref 3.4–4.8)
ALBUMIN/GLOB SERPL: 1.4 G/DL (ref 1.1–1.8)
ALP SERPL-CCNC: 74 U/L (ref 38–126)
ALT SERPL W P-5'-P-CCNC: 38 U/L (ref 9–52)
ANION GAP SERPL CALCULATED.3IONS-SCNC: 7 MMOL/L (ref 5–15)
AST SERPL-CCNC: 29 U/L (ref 14–36)
BASOPHILS # BLD AUTO: 0.03 10*3/MM3 (ref 0–0.2)
BASOPHILS NFR BLD AUTO: 1.5 % (ref 0–2)
BILIRUB SERPL-MCNC: 0.4 MG/DL (ref 0.2–1.3)
BUN BLD-MCNC: 15 MG/DL (ref 7–21)
BUN/CREAT SERPL: 17.9 (ref 7–25)
CALCIUM SPEC-SCNC: 9.3 MG/DL (ref 8.4–10.2)
CHLORIDE SERPL-SCNC: 104 MMOL/L (ref 95–110)
CO2 SERPL-SCNC: 25 MMOL/L (ref 22–31)
CREAT BLD-MCNC: 0.84 MG/DL (ref 0.5–1)
DEPRECATED RDW RBC AUTO: 55 FL (ref 36.4–46.3)
EOSINOPHIL # BLD AUTO: 0.05 10*3/MM3 (ref 0–0.7)
EOSINOPHIL NFR BLD AUTO: 2.5 % (ref 0–7)
ERYTHROCYTE [DISTWIDTH] IN BLOOD BY AUTOMATED COUNT: 15.6 % (ref 11.5–14.5)
GFR SERPL CREATININE-BSD FRML MDRD: 72 ML/MIN/1.73 (ref 51–120)
GLOBULIN UR ELPH-MCNC: 3 GM/DL (ref 2.3–3.5)
GLUCOSE BLD-MCNC: 88 MG/DL (ref 60–100)
HCT VFR BLD AUTO: 33.6 % (ref 35–45)
HGB BLD-MCNC: 11.8 G/DL (ref 12–15.5)
IMM GRANULOCYTES # BLD: 0.01 10*3/MM3 (ref 0–0.02)
IMM GRANULOCYTES NFR BLD: 0.5 % (ref 0–0.5)
LYMPHOCYTES # BLD AUTO: 0.79 10*3/MM3 (ref 0.6–4.2)
LYMPHOCYTES NFR BLD AUTO: 39.3 % (ref 10–50)
MAGNESIUM SERPL-MCNC: 1.9 MG/DL (ref 1.6–2.3)
MCH RBC QN AUTO: 34.3 PG (ref 26.5–34)
MCHC RBC AUTO-ENTMCNC: 35.1 G/DL (ref 31.4–36)
MCV RBC AUTO: 97.7 FL (ref 80–98)
MONOCYTES # BLD AUTO: 0.12 10*3/MM3 (ref 0–0.9)
MONOCYTES NFR BLD AUTO: 6 % (ref 0–12)
NEUTROPHILS # BLD AUTO: 1.01 10*3/MM3 (ref 2–8.6)
NEUTROPHILS NFR BLD AUTO: 50.2 % (ref 37–80)
NRBC BLD MANUAL-RTO: 0 /100 WBC (ref 0–0)
PHOSPHATE SERPL-MCNC: 4.7 MG/DL (ref 2.4–4.4)
PLATELET # BLD AUTO: 127 10*3/MM3 (ref 150–450)
PMV BLD AUTO: 9.8 FL (ref 8–12)
POTASSIUM BLD-SCNC: 3.9 MMOL/L (ref 3.5–5.1)
PROT SERPL-MCNC: 7.2 G/DL (ref 6.3–8.6)
RBC # BLD AUTO: 3.44 10*6/MM3 (ref 3.77–5.16)
SODIUM BLD-SCNC: 136 MMOL/L (ref 137–145)
WBC NRBC COR # BLD: 2.01 10*3/MM3 (ref 3.2–9.8)

## 2018-07-09 PROCEDURE — 25010000002 HEPARIN FLUSH (PORCINE) 100 UNIT/ML SOLUTION: Performed by: INTERNAL MEDICINE

## 2018-07-09 PROCEDURE — 86300 IMMUNOASSAY TUMOR CA 15-3: CPT

## 2018-07-09 PROCEDURE — 96365 THER/PROPH/DIAG IV INF INIT: CPT | Performed by: INTERNAL MEDICINE

## 2018-07-09 PROCEDURE — 25010000002 ZOLEDRONIC ACID PER 1 MG: Performed by: INTERNAL MEDICINE

## 2018-07-09 PROCEDURE — 25010000002 GADOTERIDOL PER 1 ML: Performed by: INTERNAL MEDICINE

## 2018-07-09 PROCEDURE — 74177 CT ABD & PELVIS W/CONTRAST: CPT

## 2018-07-09 PROCEDURE — 25010000002 IOPAMIDOL 61 % SOLUTION: Performed by: INTERNAL MEDICINE

## 2018-07-09 PROCEDURE — 71260 CT THORAX DX C+: CPT

## 2018-07-09 PROCEDURE — A9576 INJ PROHANCE MULTIPACK: HCPCS | Performed by: INTERNAL MEDICINE

## 2018-07-09 PROCEDURE — 70553 MRI BRAIN STEM W/O & W/DYE: CPT

## 2018-07-09 PROCEDURE — 85025 COMPLETE CBC W/AUTO DIFF WBC: CPT

## 2018-07-09 PROCEDURE — 84100 ASSAY OF PHOSPHORUS: CPT

## 2018-07-09 PROCEDURE — 80053 COMPREHEN METABOLIC PANEL: CPT

## 2018-07-09 PROCEDURE — 83735 ASSAY OF MAGNESIUM: CPT

## 2018-07-09 RX ORDER — SODIUM CHLORIDE 9 MG/ML
250 INJECTION, SOLUTION INTRAVENOUS ONCE
Status: CANCELLED | OUTPATIENT
Start: 2018-07-09

## 2018-07-09 RX ORDER — SODIUM CHLORIDE 0.9 % (FLUSH) 0.9 %
10 SYRINGE (ML) INJECTION AS NEEDED
Status: CANCELLED | OUTPATIENT
Start: 2018-07-23

## 2018-07-09 RX ORDER — SODIUM CHLORIDE 0.9 % (FLUSH) 0.9 %
10 SYRINGE (ML) INJECTION AS NEEDED
Status: DISCONTINUED | OUTPATIENT
Start: 2018-07-09 | End: 2018-07-09 | Stop reason: HOSPADM

## 2018-07-09 RX ORDER — SODIUM CHLORIDE 9 MG/ML
250 INJECTION, SOLUTION INTRAVENOUS ONCE
Status: COMPLETED | OUTPATIENT
Start: 2018-07-09 | End: 2018-07-09

## 2018-07-09 RX ADMIN — SODIUM CHLORIDE 250 ML: 9 INJECTION, SOLUTION INTRAVENOUS at 11:42

## 2018-07-09 RX ADMIN — IOPAMIDOL 93 ML: 612 INJECTION, SOLUTION INTRAVENOUS at 09:15

## 2018-07-09 RX ADMIN — SODIUM CHLORIDE, PRESERVATIVE FREE 500 UNITS: 5 INJECTION INTRAVENOUS at 12:37

## 2018-07-09 RX ADMIN — Medication 10 ML: at 12:37

## 2018-07-09 RX ADMIN — GADOTERIDOL 20 ML: 279.3 INJECTION, SOLUTION INTRAVENOUS at 10:16

## 2018-07-09 RX ADMIN — ZOLEDRONIC ACID 4 MG: 4 INJECTION, SOLUTION, CONCENTRATE INTRAVENOUS at 12:11

## 2018-07-10 LAB
CANCER AG15-3 SERPL-ACNC: 35.3 U/ML (ref 0–25)
CANCER AG27-29 SERPL-ACNC: 37.9 U/ML (ref 0–38.6)

## 2018-07-16 ENCOUNTER — APPOINTMENT (OUTPATIENT)
Dept: ONCOLOGY | Facility: HOSPITAL | Age: 48
End: 2018-07-16

## 2018-07-16 ENCOUNTER — ANTICOAGULATION VISIT (OUTPATIENT)
Dept: CARDIAC SURGERY | Facility: CLINIC | Age: 48
End: 2018-07-16

## 2018-07-16 ENCOUNTER — OFFICE VISIT (OUTPATIENT)
Dept: ONCOLOGY | Facility: CLINIC | Age: 48
End: 2018-07-16

## 2018-07-16 VITALS
WEIGHT: 216.8 LBS | TEMPERATURE: 98.3 F | HEIGHT: 67 IN | SYSTOLIC BLOOD PRESSURE: 123 MMHG | RESPIRATION RATE: 18 BRPM | OXYGEN SATURATION: 100 % | HEART RATE: 64 BPM | BODY MASS INDEX: 34.03 KG/M2 | DIASTOLIC BLOOD PRESSURE: 75 MMHG

## 2018-07-16 VITALS — HEART RATE: 72 BPM | SYSTOLIC BLOOD PRESSURE: 116 MMHG | DIASTOLIC BLOOD PRESSURE: 62 MMHG

## 2018-07-16 DIAGNOSIS — Z79.01 LONG-TERM (CURRENT) USE OF ANTICOAGULANTS: ICD-10-CM

## 2018-07-16 DIAGNOSIS — Z17.0 MALIGNANT NEOPLASM OF RIGHT BREAST IN FEMALE, ESTROGEN RECEPTOR POSITIVE, UNSPECIFIED SITE OF BREAST (HCC): Primary | ICD-10-CM

## 2018-07-16 DIAGNOSIS — Z17.0 MALIGNANT NEOPLASM OF RIGHT BREAST IN FEMALE, ESTROGEN RECEPTOR POSITIVE, UNSPECIFIED SITE OF BREAST (HCC): ICD-10-CM

## 2018-07-16 DIAGNOSIS — T45.1X5A PANCYTOPENIA DUE TO ANTINEOPLASTIC CHEMOTHERAPY (HCC): ICD-10-CM

## 2018-07-16 DIAGNOSIS — D61.810 PANCYTOPENIA DUE TO ANTINEOPLASTIC CHEMOTHERAPY (HCC): ICD-10-CM

## 2018-07-16 DIAGNOSIS — C79.31 METASTASIS TO BRAIN (HCC): ICD-10-CM

## 2018-07-16 DIAGNOSIS — C50.911 MALIGNANT NEOPLASM OF RIGHT BREAST IN FEMALE, ESTROGEN RECEPTOR POSITIVE, UNSPECIFIED SITE OF BREAST (HCC): Primary | ICD-10-CM

## 2018-07-16 DIAGNOSIS — C50.911 MALIGNANT NEOPLASM OF RIGHT BREAST IN FEMALE, ESTROGEN RECEPTOR POSITIVE, UNSPECIFIED SITE OF BREAST (HCC): ICD-10-CM

## 2018-07-16 DIAGNOSIS — C79.51 BONE METASTASIS: ICD-10-CM

## 2018-07-16 PROBLEM — C79.9 METASTATIC DISEASE (HCC): Status: RESOLVED | Noted: 2017-07-07 | Resolved: 2018-07-16

## 2018-07-16 LAB
BASOPHILS # BLD AUTO: 0.06 10*3/MM3 (ref 0–0.2)
BASOPHILS NFR BLD AUTO: 2 % (ref 0–2)
DEPRECATED RDW RBC AUTO: 54.9 FL (ref 36.4–46.3)
EOSINOPHIL # BLD AUTO: 0.04 10*3/MM3 (ref 0–0.7)
EOSINOPHIL NFR BLD AUTO: 1.3 % (ref 0–7)
ERYTHROCYTE [DISTWIDTH] IN BLOOD BY AUTOMATED COUNT: 15.8 % (ref 11.5–14.5)
HCT VFR BLD AUTO: 34.2 % (ref 35–45)
HGB BLD-MCNC: 12.1 G/DL (ref 12–15.5)
IMM GRANULOCYTES # BLD: 0.01 10*3/MM3 (ref 0–0.02)
IMM GRANULOCYTES NFR BLD: 0.3 % (ref 0–0.5)
INR PPP: 3.2 (ref 0.9–1.1)
LYMPHOCYTES # BLD AUTO: 1.23 10*3/MM3 (ref 0.6–4.2)
LYMPHOCYTES NFR BLD AUTO: 40.3 % (ref 10–50)
MCH RBC QN AUTO: 34.4 PG (ref 26.5–34)
MCHC RBC AUTO-ENTMCNC: 35.4 G/DL (ref 31.4–36)
MCV RBC AUTO: 97.2 FL (ref 80–98)
MONOCYTES # BLD AUTO: 0.47 10*3/MM3 (ref 0–0.9)
MONOCYTES NFR BLD AUTO: 15.4 % (ref 0–12)
NEUTROPHILS # BLD AUTO: 1.24 10*3/MM3 (ref 2–8.6)
NEUTROPHILS NFR BLD AUTO: 40.7 % (ref 37–80)
PLATELET # BLD AUTO: 167 10*3/MM3 (ref 150–450)
PMV BLD AUTO: 9.8 FL (ref 8–12)
RBC # BLD AUTO: 3.52 10*6/MM3 (ref 3.77–5.16)
WBC NRBC COR # BLD: 3.05 10*3/MM3 (ref 3.2–9.8)

## 2018-07-16 PROCEDURE — 85610 PROTHROMBIN TIME: CPT | Performed by: NURSE PRACTITIONER

## 2018-07-16 PROCEDURE — 99211 OFF/OP EST MAY X REQ PHY/QHP: CPT | Performed by: NURSE PRACTITIONER

## 2018-07-16 PROCEDURE — G0463 HOSPITAL OUTPT CLINIC VISIT: HCPCS | Performed by: INTERNAL MEDICINE

## 2018-07-16 PROCEDURE — 99214 OFFICE O/P EST MOD 30 MIN: CPT | Performed by: INTERNAL MEDICINE

## 2018-07-16 PROCEDURE — 1123F ACP DISCUSS/DSCN MKR DOCD: CPT | Performed by: INTERNAL MEDICINE

## 2018-07-16 PROCEDURE — 85025 COMPLETE CBC W/AUTO DIFF WBC: CPT | Performed by: INTERNAL MEDICINE

## 2018-07-16 PROCEDURE — G8417 CALC BMI ABV UP PARAM F/U: HCPCS | Performed by: INTERNAL MEDICINE

## 2018-07-16 NOTE — PROGRESS NOTES
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  Started on palliative chemotherapy with Taxotere and Cytoxan on July 26, 2017.  Patient received 7 cycle of Taxotere and Cytoxan on December 6, 2017.  After that patient was changed over to treatment with Palbociclib and letrozole on January 30, 2018.  She is scheduled to start cycle 6 of Palbociclib with letrozole today on July 16, 2018.  She is here for follow-up visit.  Denies any nausea or vomiting or diarrhea with current treatment.  Complains of tingling and numbness affecting bilateral hand.    Complains of back pain.  Denies any bowel or bladder incontinence.      PAST MEDICAL HISTORY:    Past Medical History:   Diagnosis Date   • Anxiety    • Depression    • Encounter for gynecological examination    • Malignant neoplasm of female breast (CMS/HCC)     hx of dcis s/p mastectomy and reconstruction and augmentation      • Primary malignant neoplasm of breast (CMS/HCC)     dcis in rt breast s/p mastectomy,reconstruction      • Ventricular premature beats        SOCIAL HISTORY:    Social History   Substance Use Topics   • Smoking status: Never Smoker   • Smokeless tobacco: Never Used   • Alcohol use No       Surgical History :  Past Surgical History:   Procedure Laterality Date   • AUGMENTATION MAMMAPLASTY     • AXILLARY LYMPH NODE BIOPSY/EXCISION Right 7/10/2017    Procedure: BIOSPY RIGHT AXILLARY MASS AND OR LYMPH;  Surgeon: Sourav Ernst MD;  Location: [facility];  Service:    • BREAST BIOPSY  12/07/2007    Mammotome biopsy of the right side with clip placement   • BREAST LUMPECTOMY     • BREAST RECONSTRUCTION  10/28/2008    Abscence of right breast  secondary to mastectomy. Implant exchange for reconstruction of right breast with open para-prosthetic capsulotomy   • BREAST SURGERY  10/01/2009    Left breast ptosis and breast asymmetry secondary to right breast reconstruction for breast cancer. Left breast mastopexy with augmentation.   • CARDIAC CATHETERIZATION  09/18/2008    Normal coronary arteriography. Normal left ventricular angiogram   • EP STUDY     • MASTECTOMY Right    • MASTECTOMY  02/05/2008    Multifocal right breast ductal carcinoma-in-situ. Right total mastectomy   • MASTECTOMY, PARTIAL  01/03/2008    Ductal carcinoma in-situ of the right breast, proven by mammotome biopsy. Right lumpectomy, medial portion of the breast, between 2 o'clock and 4 o'clock, 5 cm from the nipple, with clip placement, radiographic inter. of severo. specimen, & ultrasound   • PAP SMEAR  03/03/2009    Negative   • CA INSJ TUNNELED CVC W/O SUBQ PORT/ AGE 5 YR/> Right 7/10/2017    Procedure: MEDIPORT     (c-arm#2);  Surgeon: Sourav Ernst MD;  Location: [facility];  Service: General       ALLERGIES:    Allergies   Allergen Reactions   • Percocet [Oxycodone-Acetaminophen] Nausea And Vomiting       FAMILY HISTORY:  Family History   Problem Relation Age of Onset   • Anemia Mother    • Multiple sclerosis Mother    • No Known Problems Father    • Diabetes Other    • Multiple sclerosis Other        REVIEW OF SYSTEMS:      CONSTITUTIONAL: Complains of fatigue.  Denies any fever, chills .      HEENT: No epistaxis, mouth sores or difficulty swallowing.     RESPIRATORY: No new shortness of breath. No new cough or hemoptysis.     CARDIOVASCULAR: No chest pain or palpitations.     GASTROINTESTINAL: No nausea or vomiting. Not requiring scopolamine patch.  No new abdominal pain. No blood in the stool.     GENITOURINARY: No Dysuria or Hematuria.     MUSCULOSKELETAL:Complains of arthritic pain affecting bilateral hands. Complains of pain in bilateral hip.  Complains of back  "pain.     LYMPHATICS: No new lymph node swelling.     NEUROLOGICAL : Complains of intermittent tingling and numbness affecting bilateral hand. Complains of headaches.  No dizziness. No seizures or balance problems.     SKIN: Positive for history of melanoma status post surgical removal. Denies any new skin lesion worrisome for melanoma.              PHYSICAL EXAMINATION:      VITAL SIGNS:  /75   Pulse 64   Temp 98.3 °F (36.8 °C) (Temporal Artery )   Resp 18   Ht 170.2 cm (67.01\")   Wt 98.3 kg (216 lb 12.8 oz)   SpO2 100%   BMI 33.95 kg/m²      ECOG performance status:1    GENERAL:  Not in any distress.    HEENT:  Normocephalic, Atraumatic.Mild Conjunctival pallor. No icterus. Extraocular Movements Intact. No Facial Asymmetry noted.    NECK:  No adenopathy. No JVD.    RESPIRATORY:  Fair air entry bilateral. No rhonchi or wheezing.    CARDIOVASCULAR:  S1, S2. Regular rate and rhythm. No murmur or gallop appreciated.    ABDOMEN:  Soft, obese, nontender. Bowel sounds present in all four quadrants.  No organomegaly appreciated.    MUSCULOSKELETAL:  No edema.No Calf Tenderness.  Decreased range of motion.    NEUROLOGIC:  Alert, awake and oriented ×3.  No  Motor  deficit appreciated. Cranial Nerves 2-12 grossly intact.    LYMPHATICS: No new lymph node enlargement in neck.    PSYCHIATRY: Normal affect. Normal Judgement.      DIAGNOSTIC DATA:    Glucose   Date Value Ref Range Status   07/09/2018 88 60 - 100 mg/dL Final     Sodium   Date Value Ref Range Status   07/09/2018 136 (L) 137 - 145 mmol/L Final     Potassium   Date Value Ref Range Status   07/09/2018 3.9 3.5 - 5.1 mmol/L Final     CO2   Date Value Ref Range Status   07/09/2018 25.0 22.0 - 31.0 mmol/L Final     Chloride   Date Value Ref Range Status   07/09/2018 104 95 - 110 mmol/L Final     Anion Gap   Date Value Ref Range Status   07/09/2018 7.0 5.0 - 15.0 mmol/L Final     Creatinine   Date Value Ref Range Status   07/09/2018 0.84 0.50 - 1.00 mg/dL " Final     BUN   Date Value Ref Range Status   07/09/2018 15 7 - 21 mg/dL Final     BUN/Creatinine Ratio   Date Value Ref Range Status   07/09/2018 17.9 7.0 - 25.0 Final     Calcium   Date Value Ref Range Status   07/09/2018 9.3 8.4 - 10.2 mg/dL Final     eGFR Non  Amer   Date Value Ref Range Status   07/09/2018 72 51 - 120 mL/min/1.73 Final     Alkaline Phosphatase   Date Value Ref Range Status   07/09/2018 74 38 - 126 U/L Final     Total Protein   Date Value Ref Range Status   07/09/2018 7.2 6.3 - 8.6 g/dL Final     ALT (SGPT)   Date Value Ref Range Status   07/09/2018 38 9 - 52 U/L Final     AST (SGOT)   Date Value Ref Range Status   07/09/2018 29 14 - 36 U/L Final     Total Bilirubin   Date Value Ref Range Status   07/09/2018 0.4 0.2 - 1.3 mg/dL Final     Albumin   Date Value Ref Range Status   07/09/2018 4.20 3.40 - 4.80 g/dL Final     Globulin   Date Value Ref Range Status   07/09/2018 3.0 2.3 - 3.5 gm/dL Final     A/G Ratio   Date Value Ref Range Status   07/09/2018 1.4 1.1 - 1.8 g/dL Final     Lab Results   Component Value Date    WBC 3.05 (L) 07/16/2018    HGB 12.1 07/16/2018    HCT 34.2 (L) 07/16/2018    MCV 97.2 07/16/2018     07/16/2018     Lab Results   Component Value Date    NEUTROABS 1.24 (L) 07/16/2018     Lab Results   Component Value Date     35.3 (H) 07/09/2018    LABCA2 37.9 07/09/2018     Component CA 27.29   Latest Ref Rng & Units 0.0 - 38.6 U/mL   12/27/2017 90.3 (H)   2/28/2018 52.1 (H)   4/2/2018 12.4   5/7/2018 5/14/2018 38.1   6/11/2018 39.7 (H)   7/9/2018 37.9       Component CA 15-3   Latest Ref Rng & Units 0.0 - 25.0 U/mL   2/28/2018 47.2 (H)   4/2/2018 11.9   5/7/2018 5/14/2018 41.1 (H)   6/11/2018 37.3 (H)   7/9/2018 35.3 (H)           2 D Echo Done on July 19, 2017 showed:  Interpretation Summary   · The left ventricular cavity is borderline dilated.  · Left ventricular systolic function is normal. Estimated EF = 53%.  · Left ventricular diastolic dysfunction  (grade I) consistent with impaired relaxation.  · IAS aneurysm noted. No PFO or ASD           Radiology Data :  CT of chest, abdomen and pelvis with contrast done on July 9, 2018 was reviewed, discussed with patient, it showed:  FINDINGS:      Chest:  The lungs are clear and well-expanded bilaterally. No suspicious  pulmonary nodules or masses.     Heart size is normal. No pleural or pericardial effusion.  Esophagus is normal in course and caliber. Bilateral breast  implants with prior right mastectomy. No lymphadenopathy in the  chest by CT size criteria.     Abdomen/pelvis:  Stable low-attenuation lesion in the anterior aspect of the  liver. No new liver lesions are identified. There is atrophy of  the left lateral segment of the liver which is stable. The liver  is otherwise unremarkable. Gallbladder, spleen, adrenal glands  and kidneys are unremarkable. Focal atrophy of the pancreatic  tail which is of uncertain etiology although stable in appearance  compared to multiple prior exams. No focal pancreatic ductal  dilatation or mass identified.     No lymphadenopathy in the abdomen, retroperitoneum or pelvis by  CT size criteria.     The bladder is collapsed. Uterus and ovaries are grossly normal  in size for age. The bowel is unremarkable. No bowel obstruction.  Normal appendix. No free fluid.     Grossly stable diffuse skeletal metastatic disease with a stable  compression or burst deformity of L4.     IMPRESSION:  1. Stable exam. Diffuse skeletal metastatic disease is grossly  unchanged. No new metastases are identified in the chest, abdomen  or pelvis.      MRI of brain with and without contrast done on July 9, 2018 was reviewed, discussed with patient, it showed:  IMPRESSION:  No evidence of intracranial hemorrhage, mass/mass effect, acute  infarct, or pathologic contrast enhancement.     Previous described/known enhancing calvarial metastatic lesions  are significantly less conspicuous on today's  examination. The  finding would support favorable treatment response.     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        CT of Chest, abdomen and pelvis with contrast done on May 3,2018 was reviewed and discussed with patient, it showed:  IMPRESSION:  CONCLUSION:      1. Stable examination.  2. No change, liver lesion in the left lobe.  3. No gross changes associated with the multiple osteosclerotic  lesions. Suggest correlation with a nuclear medicine bone scan.            MRI of lumbar spine done at [facility] on November 24, 2017 showed:   Left  1. Extensive bone metastasis to all of the lumbar vertebrae and the sacrum .  2. There is a pathologic burst fracture of the L4 vertebral body with associated moderate spinal stenosis and possible early ventral epidural tumor invasion .  3. Left foraminal narrowing at the L4-5 level with uncertain but doubtful impact on the left L4 nerve      MRI of thoracic spine done at [facility] on November 24, 2017 showed:  1. Metastatic involvement of all of the thoracic vertebra, there is slight compression of T12  2. No spinal stenosis or epidural tumor       Doppler ultrasound of right upper extremity done on November 10, 2017 showed:  IMPRESSION:  CONCLUSION:   Abnormal exam with thrombus visualized in the right internal  jugular vein, consistent with DVT.      PET/CT done on July 3, 2017 showed:  IMPRESSION:  CONCLUSION:  1. Extensive metastatic disease. Pathologic uptake within  enlarged right-sided axillary lymph nodes. Metastatic adenopathy  in both laurie and mediastinum. Tumor replacing most of the left  lobe of liver. Intra-abdominal retroperitoneal metastases,  adenopathy.     Extensive skeletal metastases including a pathologic fracture at  L4.        Nuclear bone scan done on June 20, 2017 showed:  1. Increased uptake at L4 suggestive of metastatic process.  2. 3 or 4 areas of increased activity in bony calvarium  3. Some increased activity  in tips posteriorly and anteriorly suggestive of metastatic carcinoma to the skeletal system.           Pathology :    Pathology data from July 10, 2017 showed:  Final Diagnosis   Mass right axilla, excisional biopsy:      Metastatic poorly differentiated ductal carcinoma, breast primary      Estrogen receptor positive, 95% stainability, strong intensity      Progesterone receptor positive, 95% stainability, strong intensity      HER-2 2+(equivocal), sent to [facility] for FISH     Addendum   Her 2 FISH result from [facility] is NEGATIVE         Pathology report from December 7, 2007 showed:  FINAL DIAGNOSIS:   RIGHT BREAST MAMMOTOME BIOPSY:        DUCTAL CARCINOMA IN SITU, INTERMEDIATE NUCLEAR GRADE              WITH MICROCALCIFICATION.      ADDENDUM:   Estrogen receptors are positive (90-95% stainability).   Progesterone receptors are positive (90-95% stainability).         ASSESSMENT AND PLAN:      1.  Metastatic cancer with extensive adenopathy in right axilla, mediastinum, hilar, abdominal, retroperitoneal with extensive liver and bone metastasis on PET/CT.  Patient underwent right apatient underwent right axillary lymph node excision by Dr. Ernst on July 10, 2017.Pathology report confirmed ductal carcinoma of breast with estrogen and progesterone positivity.  At her on palliative chemotherapy with Taxotere and cytoxan on July 26, 2017.   Patient was  seen at [facility] for second opinion regarding her breast cancer.  Case was discussed with Dr vazquez oncologist that has seen patient at [facility].  She agreed with her chemotherapy at this point.  The scan on December 20, 2017 shows relative stability of disease, lesion in the liver as well as bone lesion at about same.  Her tumor marker CA 27-29 as well as CA 15-3 are less than 100 at this point.  Since patient is developing some neuropathy in bilateral upper and lower extremity recommend changing her therapy to Palbociclib with letrozole.  Patient  started taking first round of Palbociclib and letrozole on January 30, 2018.  Patient was scheduled to start cycle  6 of chemotherapy today with Palbociclib today on July 16, 2018.   -CT of chest, abdomen and pelvis with contrast done on July 9, 2018 show stable disease which was discussed with patient.  -MRI of brain with and without contrast done on July 9, 2018 shows significant improvement in calvarial metastasis which was discussed with patient.  -We will ask patient to return to clinic in 5 weeks with a repeat CBC and CMP to be done on that day.    2.  Brain metastasis:MRi Brain with and without done on November 9, 2017 was reviewed and compared with t MRI done 6 weeks prior to that.  Brain calvarial metastasis are stable or somewhat improved.  No need to do radiation at this point which was discussed with patient.    -MRI of brain with and without contrast done on July 9, 2018 shows improvement in calvarial metastasis.    3.  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  She is currently on Coumadin.    She is being followed by Coumadin clinic She was instructed to come to the emergency room in case she starts having any bleeding.    4.  History of melanoma in situ status post excision by Dr. Linn.    5.  Bone metastasis: Patient was started on Zometa on August 23, 2017.  Continue with Zometa as scheduled for now.  Patient denies any mouth sores or jaw pain.  CT scan shows about loss of 70% of height of L4 vertebral body, patient has been referred to Dr. Pugh to get evaluated for kyphoplasty.  Patient went for second opinion with orthopedic surgeon in [place], as per patient's second surgeon did not recommend any surgery.  We will continue with monthly Zometa for now.     6.  History of ductal carcinoma in situ of the right breast diagnosed in 2007.  Status post mastectomy followed by adjuvant tamoxifen for 5 years which was finished in 2013.    7.  Elevated  liver enzymes: Resolved.    8.  Neutropenia: Secondary to Palbociclib. Absolute neutrophil count is 1240.  Patient denies any fevers. Will resume palbociclib next week after rechecking CBC.    9.  Anemia: Secondary to chemotherapy.  Hemoglobin is 12..1 today.  We'll monitored with CBC.    10.  Health maintenance: Patient does not smoke.  Not a candidate for screening colonoscopy at this point.      11.  Prescriptions: Patient has enough prescription of Decadron, Zofran,  and Ultram.    12.  Advance care planning: Patient remains full code for now and is able to make on her decisions.  Patient has healthcare surrogate Mentioned on Chart.    13 Obesity: Patient's Body mass index is 33.95 kg/m². BMI is higher than reference range.  Patient was notified about elevated BMI.  Patient was counseled about diet and exercise for weight loss.        Ovidio Meadows MD  7/16/2018  7:00 PM        EMR Dragon/Transcription disclaimer:   Much of this encounter note is an electronic transcription/translation of spoken language to printed text. The electronic translation of spoken language may permit erroneous, or at times, nonsensical words or phrases to be inadvertently transcribed; Although I have reviewed the note for such errors, some may still exist.

## 2018-07-16 NOTE — PROGRESS NOTES
PT states she has not been eating as much green as of late. PT denies any med changes or bleeding issues. PT denies any s/s of blood clot. Adjusted pt's dose for today and instructed to increase vit k . PT will be seen in 1 month due to TTR. Patient instructed regarding medication; results given and questions answered. Nutritional counseling given.  Dietary factors affecting therapy addressed.  Patient instructed to monitor for excessive bruising or bleeding. PT verbalizes understanding.         This document has been electronically signed by EVELYN Orellana on July 16, 2018 10:40 AM

## 2018-07-17 ENCOUNTER — TELEPHONE (OUTPATIENT)
Dept: ONCOLOGY | Facility: HOSPITAL | Age: 48
End: 2018-07-17

## 2018-07-17 DIAGNOSIS — C50.911 MALIGNANT NEOPLASM OF RIGHT BREAST IN FEMALE, ESTROGEN RECEPTOR POSITIVE, UNSPECIFIED SITE OF BREAST (HCC): Primary | ICD-10-CM

## 2018-07-17 DIAGNOSIS — Z17.0 MALIGNANT NEOPLASM OF RIGHT BREAST IN FEMALE, ESTROGEN RECEPTOR POSITIVE, UNSPECIFIED SITE OF BREAST (HCC): Primary | ICD-10-CM

## 2018-07-17 NOTE — TELEPHONE ENCOUNTER
Called patient and informed. Gave appointment time to have lab drawn next week. She states understanding.

## 2018-07-17 NOTE — TELEPHONE ENCOUNTER
----- Message from Ovidio Meadows MD sent at 7/16/2018  7:04 PM CDT -----  Please let patient know, her neutrophil count is still low.  We will need to recheck CBC next week prior to resuming Palbociclib.  She can continue taking letrozole for now.Thanks

## 2018-07-23 ENCOUNTER — TELEPHONE (OUTPATIENT)
Dept: ONCOLOGY | Facility: HOSPITAL | Age: 48
End: 2018-07-23

## 2018-07-23 ENCOUNTER — LAB (OUTPATIENT)
Dept: ONCOLOGY | Facility: HOSPITAL | Age: 48
End: 2018-07-23

## 2018-07-23 DIAGNOSIS — Z45.2 ENCOUNTER FOR VENOUS ACCESS DEVICE CARE: ICD-10-CM

## 2018-07-23 DIAGNOSIS — IMO0001 OTHER COMPLICATION DUE TO VENOUS ACCESS DEVICE, SUBSEQUENT ENCOUNTER: Primary | ICD-10-CM

## 2018-07-23 DIAGNOSIS — Z17.0 MALIGNANT NEOPLASM OF RIGHT BREAST IN FEMALE, ESTROGEN RECEPTOR POSITIVE, UNSPECIFIED SITE OF BREAST (HCC): ICD-10-CM

## 2018-07-23 DIAGNOSIS — C50.911 MALIGNANT NEOPLASM OF RIGHT BREAST IN FEMALE, ESTROGEN RECEPTOR POSITIVE, UNSPECIFIED SITE OF BREAST (HCC): ICD-10-CM

## 2018-07-23 DIAGNOSIS — Z45.2 ENCOUNTER FOR ADJUSTMENT OR MANAGEMENT OF VASCULAR ACCESS DEVICE: ICD-10-CM

## 2018-07-23 LAB
BASOPHILS # BLD AUTO: 0.08 10*3/MM3 (ref 0–0.2)
BASOPHILS NFR BLD AUTO: 1.7 % (ref 0–2)
DEPRECATED RDW RBC AUTO: 55.1 FL (ref 36.4–46.3)
EOSINOPHIL # BLD AUTO: 0.08 10*3/MM3 (ref 0–0.7)
EOSINOPHIL NFR BLD AUTO: 1.7 % (ref 0–7)
ERYTHROCYTE [DISTWIDTH] IN BLOOD BY AUTOMATED COUNT: 15.4 % (ref 11.5–14.5)
HCT VFR BLD AUTO: 34.5 % (ref 35–45)
HGB BLD-MCNC: 12.2 G/DL (ref 12–15.5)
IMM GRANULOCYTES # BLD: 0.01 10*3/MM3 (ref 0–0.02)
IMM GRANULOCYTES NFR BLD: 0.2 % (ref 0–0.5)
LYMPHOCYTES # BLD AUTO: 1.29 10*3/MM3 (ref 0.6–4.2)
LYMPHOCYTES NFR BLD AUTO: 27.4 % (ref 10–50)
MCH RBC QN AUTO: 34.3 PG (ref 26.5–34)
MCHC RBC AUTO-ENTMCNC: 35.4 G/DL (ref 31.4–36)
MCV RBC AUTO: 96.9 FL (ref 80–98)
MONOCYTES # BLD AUTO: 0.79 10*3/MM3 (ref 0–0.9)
MONOCYTES NFR BLD AUTO: 16.8 % (ref 0–12)
NEUTROPHILS # BLD AUTO: 2.46 10*3/MM3 (ref 2–8.6)
NEUTROPHILS NFR BLD AUTO: 52.2 % (ref 37–80)
PLATELET # BLD AUTO: 203 10*3/MM3 (ref 150–450)
PMV BLD AUTO: 9.9 FL (ref 8–12)
RBC # BLD AUTO: 3.56 10*6/MM3 (ref 3.77–5.16)
WBC NRBC COR # BLD: 4.71 10*3/MM3 (ref 3.2–9.8)

## 2018-07-23 PROCEDURE — 85025 COMPLETE CBC W/AUTO DIFF WBC: CPT

## 2018-07-23 PROCEDURE — 36591 DRAW BLOOD OFF VENOUS DEVICE: CPT | Performed by: INTERNAL MEDICINE

## 2018-07-23 RX ORDER — SODIUM CHLORIDE 0.9 % (FLUSH) 0.9 %
10 SYRINGE (ML) INJECTION AS NEEDED
Status: CANCELLED | OUTPATIENT
Start: 2018-08-27

## 2018-07-23 RX ORDER — PALBOCICLIB 125 MG/1
CAPSULE ORAL
Qty: 21 CAPSULE | Refills: 1 | Status: SHIPPED | OUTPATIENT
Start: 2018-07-23 | End: 2018-09-26 | Stop reason: SDUPTHER

## 2018-07-23 RX ORDER — SODIUM CHLORIDE 0.9 % (FLUSH) 0.9 %
10 SYRINGE (ML) INJECTION AS NEEDED
Status: DISCONTINUED | OUTPATIENT
Start: 2018-07-23 | End: 2018-07-23 | Stop reason: HOSPADM

## 2018-07-23 NOTE — TELEPHONE ENCOUNTER
Informed patient that Dr. Meadows said it is ok for her to restart chemo pill. She states understanding.

## 2018-08-27 ENCOUNTER — INFUSION (OUTPATIENT)
Dept: ONCOLOGY | Facility: HOSPITAL | Age: 48
End: 2018-08-27

## 2018-08-27 ENCOUNTER — OFFICE VISIT (OUTPATIENT)
Dept: ONCOLOGY | Facility: CLINIC | Age: 48
End: 2018-08-27

## 2018-08-27 ENCOUNTER — ANTICOAGULATION VISIT (OUTPATIENT)
Dept: CARDIAC SURGERY | Facility: CLINIC | Age: 48
End: 2018-08-27

## 2018-08-27 VITALS
SYSTOLIC BLOOD PRESSURE: 120 MMHG | RESPIRATION RATE: 18 BRPM | TEMPERATURE: 98.5 F | DIASTOLIC BLOOD PRESSURE: 74 MMHG | WEIGHT: 219.2 LBS | OXYGEN SATURATION: 98 % | BODY MASS INDEX: 34.4 KG/M2 | HEART RATE: 72 BPM | HEIGHT: 67 IN

## 2018-08-27 VITALS — DIASTOLIC BLOOD PRESSURE: 74 MMHG | HEART RATE: 80 BPM | SYSTOLIC BLOOD PRESSURE: 120 MMHG

## 2018-08-27 DIAGNOSIS — T45.1X5A ANEMIA DUE TO ANTINEOPLASTIC CHEMOTHERAPY: ICD-10-CM

## 2018-08-27 DIAGNOSIS — C50.911 MALIGNANT NEOPLASM OF RIGHT BREAST IN FEMALE, ESTROGEN RECEPTOR POSITIVE, UNSPECIFIED SITE OF BREAST (HCC): ICD-10-CM

## 2018-08-27 DIAGNOSIS — Z79.01 LONG-TERM (CURRENT) USE OF ANTICOAGULANTS: ICD-10-CM

## 2018-08-27 DIAGNOSIS — Z86.000 HISTORY OF DUCTAL CARCINOMA IN SITU (DCIS) OF BREAST: ICD-10-CM

## 2018-08-27 DIAGNOSIS — Z45.2 ENCOUNTER FOR ADJUSTMENT OR MANAGEMENT OF VASCULAR ACCESS DEVICE: ICD-10-CM

## 2018-08-27 DIAGNOSIS — R79.89 ELEVATED LIVER FUNCTION TESTS: ICD-10-CM

## 2018-08-27 DIAGNOSIS — D64.81 ANEMIA DUE TO ANTINEOPLASTIC CHEMOTHERAPY: ICD-10-CM

## 2018-08-27 DIAGNOSIS — D70.1 CHEMOTHERAPY-INDUCED NEUTROPENIA (HCC): ICD-10-CM

## 2018-08-27 DIAGNOSIS — Z17.0 MALIGNANT NEOPLASM OF RIGHT BREAST IN FEMALE, ESTROGEN RECEPTOR POSITIVE, UNSPECIFIED SITE OF BREAST (HCC): ICD-10-CM

## 2018-08-27 DIAGNOSIS — C79.31 METASTASIS TO BRAIN (HCC): ICD-10-CM

## 2018-08-27 DIAGNOSIS — IMO0001 OTHER COMPLICATION DUE TO VENOUS ACCESS DEVICE, SUBSEQUENT ENCOUNTER: ICD-10-CM

## 2018-08-27 DIAGNOSIS — Z45.2 ENCOUNTER FOR VENOUS ACCESS DEVICE CARE: ICD-10-CM

## 2018-08-27 DIAGNOSIS — C50.911 MALIGNANT NEOPLASM OF RIGHT BREAST IN FEMALE, ESTROGEN RECEPTOR POSITIVE, UNSPECIFIED SITE OF BREAST (HCC): Primary | ICD-10-CM

## 2018-08-27 DIAGNOSIS — C79.51 BONE METASTASIS: Primary | ICD-10-CM

## 2018-08-27 DIAGNOSIS — T45.1X5A CHEMOTHERAPY-INDUCED NEUTROPENIA (HCC): ICD-10-CM

## 2018-08-27 DIAGNOSIS — Z17.0 MALIGNANT NEOPLASM OF RIGHT BREAST IN FEMALE, ESTROGEN RECEPTOR POSITIVE, UNSPECIFIED SITE OF BREAST (HCC): Primary | ICD-10-CM

## 2018-08-27 LAB
ALBUMIN SERPL-MCNC: 4.1 G/DL (ref 3.4–4.8)
ALBUMIN/GLOB SERPL: 1.3 G/DL (ref 1.1–1.8)
ALP SERPL-CCNC: 80 U/L (ref 38–126)
ALT SERPL W P-5'-P-CCNC: 78 U/L (ref 9–52)
ANION GAP SERPL CALCULATED.3IONS-SCNC: 7 MMOL/L (ref 5–15)
AST SERPL-CCNC: 55 U/L (ref 14–36)
BASOPHILS # BLD AUTO: 0.11 10*3/MM3 (ref 0–0.2)
BASOPHILS NFR BLD AUTO: 2.7 % (ref 0–2)
BILIRUB SERPL-MCNC: 0.3 MG/DL (ref 0.2–1.3)
BUN BLD-MCNC: 11 MG/DL (ref 7–21)
BUN/CREAT SERPL: 16.2 (ref 7–25)
CALCIUM SPEC-SCNC: 8.9 MG/DL (ref 8.4–10.2)
CHLORIDE SERPL-SCNC: 107 MMOL/L (ref 95–110)
CO2 SERPL-SCNC: 25 MMOL/L (ref 22–31)
CREAT BLD-MCNC: 0.68 MG/DL (ref 0.5–1)
DEPRECATED RDW RBC AUTO: 58.2 FL (ref 36.4–46.3)
EOSINOPHIL # BLD AUTO: 0.07 10*3/MM3 (ref 0–0.7)
EOSINOPHIL NFR BLD AUTO: 1.7 % (ref 0–7)
ERYTHROCYTE [DISTWIDTH] IN BLOOD BY AUTOMATED COUNT: 15.9 % (ref 11.5–14.5)
GFR SERPL CREATININE-BSD FRML MDRD: 92 ML/MIN/1.73 (ref 51–120)
GLOBULIN UR ELPH-MCNC: 3.1 GM/DL (ref 2.3–3.5)
GLUCOSE BLD-MCNC: 113 MG/DL (ref 60–100)
HCT VFR BLD AUTO: 35.5 % (ref 35–45)
HGB BLD-MCNC: 12.3 G/DL (ref 12–15.5)
IMM GRANULOCYTES # BLD: 0.01 10*3/MM3 (ref 0–0.02)
IMM GRANULOCYTES NFR BLD: 0.2 % (ref 0–0.5)
INR PPP: 3.1 (ref 0.9–1.1)
LYMPHOCYTES # BLD AUTO: 1.33 10*3/MM3 (ref 0.6–4.2)
LYMPHOCYTES NFR BLD AUTO: 32.9 % (ref 10–50)
MAGNESIUM SERPL-MCNC: 1.7 MG/DL (ref 1.6–2.3)
MCH RBC QN AUTO: 33.9 PG (ref 26.5–34)
MCHC RBC AUTO-ENTMCNC: 34.6 G/DL (ref 31.4–36)
MCV RBC AUTO: 97.8 FL (ref 80–98)
MONOCYTES # BLD AUTO: 0.48 10*3/MM3 (ref 0–0.9)
MONOCYTES NFR BLD AUTO: 11.9 % (ref 0–12)
NEUTROPHILS # BLD AUTO: 2.04 10*3/MM3 (ref 2–8.6)
NEUTROPHILS NFR BLD AUTO: 50.6 % (ref 37–80)
NRBC BLD MANUAL-RTO: 0 /100 WBC (ref 0–0)
PHOSPHATE SERPL-MCNC: 3.9 MG/DL (ref 2.4–4.4)
PLATELET # BLD AUTO: 227 10*3/MM3 (ref 150–450)
PMV BLD AUTO: 9.3 FL (ref 8–12)
POTASSIUM BLD-SCNC: 3.8 MMOL/L (ref 3.5–5.1)
PROT SERPL-MCNC: 7.2 G/DL (ref 6.3–8.6)
RBC # BLD AUTO: 3.63 10*6/MM3 (ref 3.77–5.16)
SODIUM BLD-SCNC: 139 MMOL/L (ref 137–145)
WBC NRBC COR # BLD: 4.04 10*3/MM3 (ref 3.2–9.8)

## 2018-08-27 PROCEDURE — 96365 THER/PROPH/DIAG IV INF INIT: CPT | Performed by: INTERNAL MEDICINE

## 2018-08-27 PROCEDURE — 25010000002 ZOLEDRONIC ACID PER 1 MG: Performed by: INTERNAL MEDICINE

## 2018-08-27 PROCEDURE — 83735 ASSAY OF MAGNESIUM: CPT

## 2018-08-27 PROCEDURE — 80053 COMPREHEN METABOLIC PANEL: CPT

## 2018-08-27 PROCEDURE — 84100 ASSAY OF PHOSPHORUS: CPT

## 2018-08-27 PROCEDURE — 86300 IMMUNOASSAY TUMOR CA 15-3: CPT

## 2018-08-27 PROCEDURE — 25010000002 HEPARIN FLUSH (PORCINE) 100 UNIT/ML SOLUTION: Performed by: INTERNAL MEDICINE

## 2018-08-27 PROCEDURE — 85610 PROTHROMBIN TIME: CPT | Performed by: NURSE PRACTITIONER

## 2018-08-27 PROCEDURE — 1123F ACP DISCUSS/DSCN MKR DOCD: CPT | Performed by: INTERNAL MEDICINE

## 2018-08-27 PROCEDURE — 85025 COMPLETE CBC W/AUTO DIFF WBC: CPT

## 2018-08-27 PROCEDURE — 99214 OFFICE O/P EST MOD 30 MIN: CPT | Performed by: INTERNAL MEDICINE

## 2018-08-27 PROCEDURE — G8417 CALC BMI ABV UP PARAM F/U: HCPCS | Performed by: INTERNAL MEDICINE

## 2018-08-27 RX ORDER — SODIUM CHLORIDE 9 MG/ML
250 INJECTION, SOLUTION INTRAVENOUS ONCE
Status: COMPLETED | OUTPATIENT
Start: 2018-08-27 | End: 2018-08-27

## 2018-08-27 RX ORDER — SODIUM CHLORIDE 0.9 % (FLUSH) 0.9 %
10 SYRINGE (ML) INJECTION AS NEEDED
Status: CANCELLED | OUTPATIENT
Start: 2018-08-27

## 2018-08-27 RX ORDER — SODIUM CHLORIDE 0.9 % (FLUSH) 0.9 %
10 SYRINGE (ML) INJECTION AS NEEDED
Status: DISCONTINUED | OUTPATIENT
Start: 2018-08-27 | End: 2018-08-27 | Stop reason: HOSPADM

## 2018-08-27 RX ORDER — SODIUM CHLORIDE 9 MG/ML
250 INJECTION, SOLUTION INTRAVENOUS ONCE
Status: CANCELLED | OUTPATIENT
Start: 2018-08-27

## 2018-08-27 RX ADMIN — Medication 10 ML: at 10:08

## 2018-08-27 RX ADMIN — Medication 10 ML: at 12:56

## 2018-08-27 RX ADMIN — ZOLEDRONIC ACID 4 MG: 0.8 INJECTION, SOLUTION, CONCENTRATE INTRAVENOUS at 12:18

## 2018-08-27 RX ADMIN — HEPARIN SODIUM (PORCINE) LOCK FLUSH IV SOLN 100 UNIT/ML 500 UNITS: 100 SOLUTION at 12:56

## 2018-08-27 RX ADMIN — SODIUM CHLORIDE 250 ML: 9 INJECTION, SOLUTION INTRAVENOUS at 12:17

## 2018-08-27 NOTE — PROGRESS NOTES
Pt states no changes to meds or diet.  No bleeding issues.  Instructed pt to increase Vit K intake today with a large broccoli serving.  Will recheck INR in one month.  Pt verbalizes.  Patient instructed regarding medication; results given and questions answered. Nutritional counseling given.  Dietary factors affecting therapy addressed.  Patient instructed to monitor for excessive bruising or bleeding.        This document has been electronically signed by EVELYN Orellana on August 27, 2018 9:48 AM

## 2018-08-27 NOTE — PATIENT INSTRUCTIONS
Patient Instructions for CT Scan    · Your CT scan is being done without any oral contrast.  Your CT scan may be done with IV contrast, if you have an allergy to iodine--please tell your nurse.    · Do not eat or drink 4 hours prior to scan.     · You may take your medications with sips of water, except DO NOT take your diabetic pill the morning of the test.    Arrive at the Outpatient Surgery entrance at [facility] 20 minutes prior to appointment time.    You will receive a phone call with an appointment for your CT scan.  Please call our office, if someone does not contact you with 3 days.    Dianelys Dougherty RN  August 27, 2018  11:13 AM

## 2018-08-27 NOTE — PROGRESS NOTES
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  Started on palliative chemotherapy with Taxotere and Cytoxan on July 26, 2017.  Patient received 7 cycle of Taxotere and Cytoxan on December 6, 2017.  After that patient was changed over to treatment with Palbociclib and letrozole on January 30, 2018.  She is scheduled to start cycle 7 of Palbociclib with letrozole today on August 27, 2018.  She is here for follow-up visit.  Denies any nausea or vomiting or diarrhea with current treatment.  Complains of tingling and numbness affecting bilateral hand.    Complains of back pain,denies any recent worsening.  Denies any bowel or bladder incontinence.      PAST MEDICAL HISTORY:    Past Medical History:   Diagnosis Date   • Anxiety    • Depression    • Encounter for gynecological examination    • Malignant neoplasm of female breast (CMS/HCC)     hx of dcis s/p mastectomy and reconstruction and augmentation      • Primary malignant neoplasm of breast (CMS/HCC)     dcis in rt breast s/p mastectomy,reconstruction      • Ventricular premature beats        SOCIAL HISTORY:    Social History   Substance Use Topics   • Smoking status: Never Smoker   • Smokeless tobacco: Never Used   • Alcohol use No       Surgical History :  Past Surgical History:   Procedure Laterality Date   • AUGMENTATION MAMMAPLASTY     • AXILLARY LYMPH NODE BIOPSY/EXCISION Right 7/10/2017    Procedure: BIOSPY RIGHT AXILLARY MASS AND OR LYMPH;  Surgeon: Sourav Ernst MD;  Location: [facility];  Service:    • BREAST BIOPSY  12/07/2007    Mammotome biopsy of the right side with clip placement   • BREAST LUMPECTOMY     • BREAST RECONSTRUCTION   10/28/2008    Abscence of right breast secondary to mastectomy. Implant exchange for reconstruction of right breast with open para-prosthetic capsulotomy   • BREAST SURGERY  10/01/2009    Left breast ptosis and breast asymmetry secondary to right breast reconstruction for breast cancer. Left breast mastopexy with augmentation.   • CARDIAC CATHETERIZATION  09/18/2008    Normal coronary arteriography. Normal left ventricular angiogram   • EP STUDY     • MASTECTOMY Right    • MASTECTOMY  02/05/2008    Multifocal right breast ductal carcinoma-in-situ. Right total mastectomy   • MASTECTOMY, PARTIAL  01/03/2008    Ductal carcinoma in-situ of the right breast, proven by mammotome biopsy. Right lumpectomy, medial portion of the breast, between 2 o'clock and 4 o'clock, 5 cm from the nipple, with clip placement, radiographic inter. of severo. specimen, & ultrasound   • PAP SMEAR  03/03/2009    Negative   • TX INSJ TUNNELED CVC W/O SUBQ PORT/ AGE 5 YR/> Right 7/10/2017    Procedure: MEDIPORT     (c-arm#2);  Surgeon: Sourav Ernst MD;  Location: [facility];  Service: General       ALLERGIES:    Allergies   Allergen Reactions   • Percocet [Oxycodone-Acetaminophen] Nausea And Vomiting       FAMILY HISTORY:  Family History   Problem Relation Age of Onset   • Anemia Mother    • Multiple sclerosis Mother    • No Known Problems Father    • Diabetes Other    • Multiple sclerosis Other        REVIEW OF SYSTEMS:      CONSTITUTIONAL: Complains of fatigue.  Denies any fever, chills .      HEENT: No epistaxis, mouth sores or difficulty swallowing.     RESPIRATORY: No new shortness of breath. No new cough or hemoptysis.     CARDIOVASCULAR: No chest pain or palpitations.     GASTROINTESTINAL: No nausea or vomiting. Not requiring scopolamine patch.  No new abdominal pain. No blood in the stool.     GENITOURINARY: No Dysuria or Hematuria.     MUSCULOSKELETAL:Complains of arthritic pain affecting bilateral hands. Complains of pain in  "bilateral hip.  Complains of back pain ,denies any recent worsening.     LYMPHATICS: No new lymph node swelling.     NEUROLOGICAL : Complains of intermittent tingling and numbness affecting bilateral hand. Complains of tingling and numbness from back radiating down to bilateral lower extremity.  No dizziness. No seizures or balance problems.     SKIN: Positive for history of melanoma status post surgical removal. Denies any new skin lesion worrisome for melanoma.              PHYSICAL EXAMINATION:      VITAL SIGNS:  /74   Pulse 72   Temp 98.5 °F (36.9 °C) (Temporal Artery )   Resp 18   Ht 170.2 cm (67.01\")   Wt 99.4 kg (219 lb 3.2 oz)   SpO2 98%   BMI 34.32 kg/m²      ECOG performance status:1    GENERAL:  Not in any distress.    HEENT:  Normocephalic, Atraumatic.Mild Conjunctival pallor. No icterus. Extraocular Movements Intact. No Facial Asymmetry noted.    NECK:  No adenopathy. No JVD.    RESPIRATORY:  Fair air entry bilateral. No rhonchi or wheezing.    CARDIOVASCULAR:  S1, S2. Regular rate and rhythm. No murmur or gallop appreciated.    ABDOMEN:  Soft, obese, nontender. Bowel sounds present in all four quadrants.  No hepatosplenomegaly appreciated.    MUSCULOSKELETAL:  No edema.No Calf Tenderness.  Decreased range of motion.    NEUROLOGIC:  Alert, awake and oriented ×3.  No  Motor  deficit appreciated. Cranial Nerves 2-12 grossly intact.    LYMPHATICS: No new lymph node enlargement in neck.    PSYCHIATRY: Normal affect. Normal Judgement.Makes good eye contact.      DIAGNOSTIC DATA:    Glucose   Date Value Ref Range Status   08/27/2018 113 (H) 60 - 100 mg/dL Final     Sodium   Date Value Ref Range Status   08/27/2018 139 137 - 145 mmol/L Final     Potassium   Date Value Ref Range Status   08/27/2018 3.8 3.5 - 5.1 mmol/L Final     CO2   Date Value Ref Range Status   08/27/2018 25.0 22.0 - 31.0 mmol/L Final     Chloride   Date Value Ref Range Status   08/27/2018 107 95 - 110 mmol/L Final     Anion Gap "   Date Value Ref Range Status   08/27/2018 7.0 5.0 - 15.0 mmol/L Final     Creatinine   Date Value Ref Range Status   08/27/2018 0.68 0.50 - 1.00 mg/dL Final     BUN   Date Value Ref Range Status   08/27/2018 11 7 - 21 mg/dL Final     BUN/Creatinine Ratio   Date Value Ref Range Status   08/27/2018 16.2 7.0 - 25.0 Final     Calcium   Date Value Ref Range Status   08/27/2018 8.9 8.4 - 10.2 mg/dL Final     eGFR Non  Amer   Date Value Ref Range Status   08/27/2018 92 51 - 120 mL/min/1.73 Final     Alkaline Phosphatase   Date Value Ref Range Status   08/27/2018 80 38 - 126 U/L Final     Total Protein   Date Value Ref Range Status   08/27/2018 7.2 6.3 - 8.6 g/dL Final     ALT (SGPT)   Date Value Ref Range Status   08/27/2018 78 (H) 9 - 52 U/L Final     AST (SGOT)   Date Value Ref Range Status   08/27/2018 55 (H) 14 - 36 U/L Final     Total Bilirubin   Date Value Ref Range Status   08/27/2018 0.3 0.2 - 1.3 mg/dL Final     Albumin   Date Value Ref Range Status   08/27/2018 4.10 3.40 - 4.80 g/dL Final     Globulin   Date Value Ref Range Status   08/27/2018 3.1 2.3 - 3.5 gm/dL Final     Lab Results   Component Value Date    WBC 4.04 08/27/2018    HGB 12.3 08/27/2018    HCT 35.5 08/27/2018    MCV 97.8 08/27/2018     08/27/2018     Lab Results   Component Value Date    NEUTROABS 2.04 08/27/2018     Lab Results   Component Value Date     35.3 (H) 07/09/2018    LABCA2 37.9 07/09/2018     Component CA 27.29   Latest Ref Rng & Units 0.0 - 38.6 U/mL   12/27/2017 90.3 (H)   2/28/2018 52.1 (H)   4/2/2018 12.4   5/7/2018 5/14/2018 38.1   6/11/2018 39.7 (H)   7/9/2018 37.9       Component CA 15-3   Latest Ref Rng & Units 0.0 - 25.0 U/mL   2/28/2018 47.2 (H)   4/2/2018 11.9   5/7/2018 5/14/2018 41.1 (H)   6/11/2018 37.3 (H)   7/9/2018 35.3 (H)           2 D Echo Done on July 19, 2017 showed:  Interpretation Summary   · The left ventricular cavity is borderline dilated.  · Left ventricular systolic function is  normal. Estimated EF = 53%.  · Left ventricular diastolic dysfunction (grade I) consistent with impaired relaxation.  · IAS aneurysm noted. No PFO or ASD           Radiology Data :  CT of chest, abdomen and pelvis with contrast done on July 9, 2018 was reviewed, discussed with patient, it showed:  FINDINGS:      Chest:  The lungs are clear and well-expanded bilaterally. No suspicious  pulmonary nodules or masses.     Heart size is normal. No pleural or pericardial effusion.  Esophagus is normal in course and caliber. Bilateral breast  implants with prior right mastectomy. No lymphadenopathy in the  chest by CT size criteria.     Abdomen/pelvis:  Stable low-attenuation lesion in the anterior aspect of the  liver. No new liver lesions are identified. There is atrophy of  the left lateral segment of the liver which is stable. The liver  is otherwise unremarkable. Gallbladder, spleen, adrenal glands  and kidneys are unremarkable. Focal atrophy of the pancreatic  tail which is of uncertain etiology although stable in appearance  compared to multiple prior exams. No focal pancreatic ductal  dilatation or mass identified.     No lymphadenopathy in the abdomen, retroperitoneum or pelvis by  CT size criteria.     The bladder is collapsed. Uterus and ovaries are grossly normal  in size for age. The bowel is unremarkable. No bowel obstruction.  Normal appendix. No free fluid.     Grossly stable diffuse skeletal metastatic disease with a stable  compression or burst deformity of L4.     IMPRESSION:  1. Stable exam. Diffuse skeletal metastatic disease is grossly  unchanged. No new metastases are identified in the chest, abdomen  or pelvis.      MRI of brain with and without contrast done on July 9, 2018 was reviewed, discussed with patient, it showed:  IMPRESSION:  No evidence of intracranial hemorrhage, mass/mass effect, acute  infarct, or pathologic contrast enhancement.     Previous described/known enhancing calvarial  metastatic lesions  are significantly less conspicuous on today's examination. The  finding would support favorable treatment response.     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        CT of Chest, abdomen and pelvis with contrast done on May 3,2018 was reviewed and discussed with patient, it showed:  IMPRESSION:  CONCLUSION:      1. Stable examination.  2. No change, liver lesion in the left lobe.  3. No gross changes associated with the multiple osteosclerotic  lesions. Suggest correlation with a nuclear medicine bone scan.            MRI of lumbar spine done at [facility] on November 24, 2017 showed:   Left  1. Extensive bone metastasis to all of the lumbar vertebrae and the sacrum .  2. There is a pathologic burst fracture of the L4 vertebral body with associated moderate spinal stenosis and possible early ventral epidural tumor invasion .  3. Left foraminal narrowing at the L4-5 level with uncertain but doubtful impact on the left L4 nerve      MRI of thoracic spine done at [facility] on November 24, 2017 showed:  1. Metastatic involvement of all of the thoracic vertebra, there is slight compression of T12  2. No spinal stenosis or epidural tumor       Doppler ultrasound of right upper extremity done on November 10, 2017 showed:  IMPRESSION:  CONCLUSION:   Abnormal exam with thrombus visualized in the right internal  jugular vein, consistent with DVT.      PET/CT done on July 3, 2017 showed:  IMPRESSION:  CONCLUSION:  1. Extensive metastatic disease. Pathologic uptake within  enlarged right-sided axillary lymph nodes. Metastatic adenopathy  in both laurie and mediastinum. Tumor replacing most of the left  lobe of liver. Intra-abdominal retroperitoneal metastases,  adenopathy.     Extensive skeletal metastases including a pathologic fracture at  L4.        Nuclear bone scan done on June 20, 2017 showed:  1. Increased uptake at L4 suggestive of metastatic process.  2. 3 or 4 areas  of increased activity in bony calvarium  3. Some increased activity in tips posteriorly and anteriorly suggestive of metastatic carcinoma to the skeletal system.           Pathology :    Pathology data from July 10, 2017 showed:  Final Diagnosis   Mass right axilla, excisional biopsy:      Metastatic poorly differentiated ductal carcinoma, breast primary      Estrogen receptor positive, 95% stainability, strong intensity      Progesterone receptor positive, 95% stainability, strong intensity      HER-2 2+(equivocal), sent to [facility] for FISH     Addendum   Her 2 FISH result from [facility] is NEGATIVE         Pathology report from December 7, 2007 showed:  FINAL DIAGNOSIS:   RIGHT BREAST MAMMOTOME BIOPSY:        DUCTAL CARCINOMA IN SITU, INTERMEDIATE NUCLEAR GRADE              WITH MICROCALCIFICATION.      ADDENDUM:   Estrogen receptors are positive (90-95% stainability).   Progesterone receptors are positive (90-95% stainability).         ASSESSMENT AND PLAN:      1.  Metastatic cancer with extensive adenopathy in right axilla, mediastinum, hilar, abdominal, retroperitoneal with extensive liver and bone metastasis on PET/CT.  Patient underwent right apatient underwent right axillary lymph node excision by Dr. Ernst on July 10, 2017.Pathology report confirmed ductal carcinoma of breast with estrogen and progesterone positivity.  At her on palliative chemotherapy with Taxotere and cytoxan on July 26, 2017.   Patient was  seen at [facility] for second opinion regarding her breast cancer.  Case was discussed with Dr vazquez oncologist that has seen patient at [facility].  She agreed with her chemotherapy at this point.  The scan on December 20, 2017 shows relative stability of disease, lesion in the liver as well as bone lesion at about same.  Her tumor marker CA 27-29 as well as CA 15-3 are less than 100 at this point.  Since patient is developing some neuropathy in bilateral upper and lower extremity  recommend changing her therapy to Palbociclib with letrozole.  Patient started taking first round of Palbociclib and letrozole on January 30, 2018.  Patient was scheduled to start cycle  6 of chemotherapy today with Palbociclib today on July 16, 2018.   -CT of chest, abdomen and pelvis with contrast done on July 9, 2018 show stable disease which was discussed with patient.  -MRI of brain with and without contrast done on July 9, 2018 shows significant improvement in calvarial metastasis which was discussed with patient.  -We'll ask patient to resume cycle 7 of Palbociclib project result today on August 27, 2018.  -In view of rising liver enzyme, we will get restaging CT of chest, abdomen and pelvis with contrast prior to next clinic visit in 4 weeks.This recommendations were discussed with patient.    2.  Brain metastasis:MRi Brain with and without done on November 9, 2017 was reviewed and compared with t MRI done 6 weeks prior to that.  Brain calvarial metastasis are stable or somewhat improved.  No need to do radiation at this point which was discussed with patient.    -MRI of brain with and without contrast done on July 9, 2018 shows improvement in calvarial metastasis.    3.  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  She is currently on Coumadin.    She is being followed by Coumadin clinic She was instructed to come to the emergency room in case she starts having any bleeding.    4.  History of melanoma in situ status post excision by Dr. Linn.    5.  Bone metastasis: Patient was started on Zometa on August 23, 2017.  Continue with Zometa as scheduled for now.  Patient denies any mouth sores or jaw pain.  CT scan shows about loss of 70% of height of L4 vertebral body, patient has been referred to Dr. Pugh to get evaluated for kyphoplasty.  Patient went for second opinion with orthopedic surgeon in [place], as per patient's second surgeon did not recommend  any surgery.  We will continue with monthly Zometa for now.     6.  History of ductal carcinoma in situ of the right breast diagnosed in 2007.  Status post mastectomy followed by adjuvant tamoxifen for 5 years which was finished in 2013.    7.  Elevated liver enzymes: ASt is 55, ALT is 78. Will monitor with cmp for now.  We will get restaging CT of chest, abdomen and pelvis with contrast prior to next clinic visit in 4 weeks.    8.  Neutropenia: Secondary to Palbociclib.resolved. WBC is normal at 4.04 with ANC 2.04    9.  Anemia: Secondary to chemotherapy.  Hemoglobin is 12.3 today.  We'll monitored with CBC.    10.  Health maintenance: Patient does not smoke.  Not a candidate for screening colonoscopy at this point.      11.  Prescriptions: Patient has enough prescription of Decadron, Zofran,  and Ultram.    12.  Advance care planning: Patient remains full code for now and is able to make on her decisions.  Patient has healthcare surrogate Mentioned on Chart.    13 Obesity: Patient's Body mass index is 34.32 kg/m². BMI is higher than reference range.  Patient was notified about elevated BMI.  Patient was counseled about diet and exercise for weight loss.        Ovidio Meadows MD  8/27/2018  10:46 AM        EMR Dragon/Transcription disclaimer:   Much of this encounter note is an electronic transcription/translation of spoken language to printed text. The electronic translation of spoken language may permit erroneous, or at times, nonsensical words or phrases to be inadvertently transcribed; Although I have reviewed the note for such errors, some may still exist.

## 2018-08-28 LAB
CANCER AG15-3 SERPL-ACNC: 36.7 U/ML (ref 0–25)
CANCER AG27-29 SERPL-ACNC: 34.1 U/ML (ref 0–38.6)

## 2018-09-19 ENCOUNTER — APPOINTMENT (OUTPATIENT)
Dept: CT IMAGING | Facility: HOSPITAL | Age: 48
End: 2018-09-19

## 2018-09-19 ENCOUNTER — HOSPITAL ENCOUNTER (OUTPATIENT)
Dept: CT IMAGING | Facility: HOSPITAL | Age: 48
Discharge: HOME | End: 2018-09-19
Admitting: INTERNAL MEDICINE

## 2018-09-19 DIAGNOSIS — C79.51 BONE METASTASIS: ICD-10-CM

## 2018-09-19 DIAGNOSIS — C50.911 MALIGNANT NEOPLASM OF RIGHT BREAST IN FEMALE, ESTROGEN RECEPTOR POSITIVE, UNSPECIFIED SITE OF BREAST (HCC): ICD-10-CM

## 2018-09-19 DIAGNOSIS — Z17.0 MALIGNANT NEOPLASM OF RIGHT BREAST IN FEMALE, ESTROGEN RECEPTOR POSITIVE, UNSPECIFIED SITE OF BREAST (HCC): ICD-10-CM

## 2018-09-19 PROCEDURE — 74177 CT ABD & PELVIS W/CONTRAST: CPT

## 2018-09-19 PROCEDURE — 25010000002 IOPAMIDOL 61 % SOLUTION: Performed by: INTERNAL MEDICINE

## 2018-09-19 PROCEDURE — 71260 CT THORAX DX C+: CPT

## 2018-09-19 RX ADMIN — IOPAMIDOL 95 ML: 612 INJECTION, SOLUTION INTRAVENOUS at 08:18

## 2018-09-24 ENCOUNTER — INFUSION (OUTPATIENT)
Dept: ONCOLOGY | Facility: HOSPITAL | Age: 48
End: 2018-09-24

## 2018-09-24 ENCOUNTER — OFFICE VISIT (OUTPATIENT)
Dept: ONCOLOGY | Facility: CLINIC | Age: 48
End: 2018-09-24

## 2018-09-24 ENCOUNTER — ANTICOAGULATION VISIT (OUTPATIENT)
Dept: CARDIAC SURGERY | Facility: CLINIC | Age: 48
End: 2018-09-24

## 2018-09-24 VITALS
HEIGHT: 67 IN | RESPIRATION RATE: 18 BRPM | BODY MASS INDEX: 34.88 KG/M2 | WEIGHT: 222.2 LBS | OXYGEN SATURATION: 98 % | HEART RATE: 73 BPM | TEMPERATURE: 98.4 F | DIASTOLIC BLOOD PRESSURE: 73 MMHG | SYSTOLIC BLOOD PRESSURE: 127 MMHG

## 2018-09-24 VITALS — DIASTOLIC BLOOD PRESSURE: 64 MMHG | SYSTOLIC BLOOD PRESSURE: 102 MMHG | OXYGEN SATURATION: 98 % | HEART RATE: 62 BPM

## 2018-09-24 DIAGNOSIS — C50.911 MALIGNANT NEOPLASM OF RIGHT BREAST IN FEMALE, ESTROGEN RECEPTOR POSITIVE, UNSPECIFIED SITE OF BREAST (HCC): ICD-10-CM

## 2018-09-24 DIAGNOSIS — C50.911 MALIGNANT NEOPLASM OF RIGHT BREAST IN FEMALE, ESTROGEN RECEPTOR POSITIVE, UNSPECIFIED SITE OF BREAST (HCC): Primary | ICD-10-CM

## 2018-09-24 DIAGNOSIS — Z45.2 ENCOUNTER FOR ADJUSTMENT OR MANAGEMENT OF VASCULAR ACCESS DEVICE: ICD-10-CM

## 2018-09-24 DIAGNOSIS — T45.1X5A CHEMOTHERAPY-INDUCED NEUTROPENIA (HCC): ICD-10-CM

## 2018-09-24 DIAGNOSIS — Z17.0 MALIGNANT NEOPLASM OF RIGHT BREAST IN FEMALE, ESTROGEN RECEPTOR POSITIVE, UNSPECIFIED SITE OF BREAST (HCC): ICD-10-CM

## 2018-09-24 DIAGNOSIS — T45.1X5A ANEMIA DUE TO ANTINEOPLASTIC CHEMOTHERAPY: ICD-10-CM

## 2018-09-24 DIAGNOSIS — Z79.01 LONG-TERM (CURRENT) USE OF ANTICOAGULANTS: ICD-10-CM

## 2018-09-24 DIAGNOSIS — D64.81 ANEMIA DUE TO ANTINEOPLASTIC CHEMOTHERAPY: ICD-10-CM

## 2018-09-24 DIAGNOSIS — C79.51 BONE METASTASIS: Primary | ICD-10-CM

## 2018-09-24 DIAGNOSIS — Z45.2 ENCOUNTER FOR VENOUS ACCESS DEVICE CARE: ICD-10-CM

## 2018-09-24 DIAGNOSIS — IMO0001 OTHER COMPLICATION DUE TO VENOUS ACCESS DEVICE, SUBSEQUENT ENCOUNTER: ICD-10-CM

## 2018-09-24 DIAGNOSIS — C79.31 METASTASIS TO BRAIN (HCC): ICD-10-CM

## 2018-09-24 DIAGNOSIS — Z17.0 MALIGNANT NEOPLASM OF RIGHT BREAST IN FEMALE, ESTROGEN RECEPTOR POSITIVE, UNSPECIFIED SITE OF BREAST (HCC): Primary | ICD-10-CM

## 2018-09-24 DIAGNOSIS — D70.1 CHEMOTHERAPY-INDUCED NEUTROPENIA (HCC): ICD-10-CM

## 2018-09-24 LAB
ALBUMIN SERPL-MCNC: 4 G/DL (ref 3.4–4.8)
ALBUMIN/GLOB SERPL: 1.3 G/DL (ref 1.1–1.8)
ALP SERPL-CCNC: 87 U/L (ref 38–126)
ALT SERPL W P-5'-P-CCNC: 56 U/L (ref 9–52)
ANION GAP SERPL CALCULATED.3IONS-SCNC: 13 MMOL/L (ref 5–15)
AST SERPL-CCNC: 43 U/L (ref 14–36)
BASOPHILS # BLD AUTO: 0.06 10*3/MM3 (ref 0–0.2)
BASOPHILS NFR BLD AUTO: 2 % (ref 0–2)
BILIRUB SERPL-MCNC: 0.4 MG/DL (ref 0.2–1.3)
BUN BLD-MCNC: 12 MG/DL (ref 7–21)
BUN/CREAT SERPL: 16.9 (ref 7–25)
CALCIUM SPEC-SCNC: 8.6 MG/DL (ref 8.4–10.2)
CHLORIDE SERPL-SCNC: 103 MMOL/L (ref 95–110)
CO2 SERPL-SCNC: 24 MMOL/L (ref 22–31)
CREAT BLD-MCNC: 0.71 MG/DL (ref 0.5–1)
DEPRECATED RDW RBC AUTO: 59.6 FL (ref 36.4–46.3)
EOSINOPHIL # BLD AUTO: 0.05 10*3/MM3 (ref 0–0.7)
EOSINOPHIL NFR BLD AUTO: 1.7 % (ref 0–7)
ERYTHROCYTE [DISTWIDTH] IN BLOOD BY AUTOMATED COUNT: 16.5 % (ref 11.5–14.5)
GFR SERPL CREATININE-BSD FRML MDRD: 87 ML/MIN/1.73 (ref 51–120)
GLOBULIN UR ELPH-MCNC: 3.2 GM/DL (ref 2.3–3.5)
GLUCOSE BLD-MCNC: 98 MG/DL (ref 60–100)
HCT VFR BLD AUTO: 33.9 % (ref 35–45)
HGB BLD-MCNC: 12 G/DL (ref 12–15.5)
IMM GRANULOCYTES # BLD: 0 10*3/MM3 (ref 0–0.02)
IMM GRANULOCYTES NFR BLD: 0 % (ref 0–0.5)
INR PPP: 2.3 (ref 0.9–1.1)
LYMPHOCYTES # BLD AUTO: 1.19 10*3/MM3 (ref 0.6–4.2)
LYMPHOCYTES NFR BLD AUTO: 39.5 % (ref 10–50)
MAGNESIUM SERPL-MCNC: 1.9 MG/DL (ref 1.6–2.3)
MCH RBC QN AUTO: 34.5 PG (ref 26.5–34)
MCHC RBC AUTO-ENTMCNC: 35.4 G/DL (ref 31.4–36)
MCV RBC AUTO: 97.4 FL (ref 80–98)
MONOCYTES # BLD AUTO: 0.47 10*3/MM3 (ref 0–0.9)
MONOCYTES NFR BLD AUTO: 15.6 % (ref 0–12)
NEUTROPHILS # BLD AUTO: 1.24 10*3/MM3 (ref 2–8.6)
NEUTROPHILS NFR BLD AUTO: 41.2 % (ref 37–80)
PHOSPHATE SERPL-MCNC: 3.4 MG/DL (ref 2.4–4.4)
PLATELET # BLD AUTO: 183 10*3/MM3 (ref 150–450)
PMV BLD AUTO: 9.2 FL (ref 8–12)
POTASSIUM BLD-SCNC: 3.9 MMOL/L (ref 3.5–5.1)
PROT SERPL-MCNC: 7.2 G/DL (ref 6.3–8.6)
RBC # BLD AUTO: 3.48 10*6/MM3 (ref 3.77–5.16)
SODIUM BLD-SCNC: 140 MMOL/L (ref 137–145)
WBC NRBC COR # BLD: 3.01 10*3/MM3 (ref 3.2–9.8)

## 2018-09-24 PROCEDURE — 85025 COMPLETE CBC W/AUTO DIFF WBC: CPT

## 2018-09-24 PROCEDURE — 99214 OFFICE O/P EST MOD 30 MIN: CPT | Performed by: INTERNAL MEDICINE

## 2018-09-24 PROCEDURE — 25010000002 ZOLEDRONIC ACID PER 1 MG: Performed by: INTERNAL MEDICINE

## 2018-09-24 PROCEDURE — 85610 PROTHROMBIN TIME: CPT | Performed by: NURSE PRACTITIONER

## 2018-09-24 PROCEDURE — 96374 THER/PROPH/DIAG INJ IV PUSH: CPT | Performed by: INTERNAL MEDICINE

## 2018-09-24 PROCEDURE — 1123F ACP DISCUSS/DSCN MKR DOCD: CPT | Performed by: INTERNAL MEDICINE

## 2018-09-24 PROCEDURE — 25010000002 HEPARIN FLUSH (PORCINE) 100 UNIT/ML SOLUTION: Performed by: INTERNAL MEDICINE

## 2018-09-24 PROCEDURE — 84100 ASSAY OF PHOSPHORUS: CPT

## 2018-09-24 PROCEDURE — 36591 DRAW BLOOD OFF VENOUS DEVICE: CPT | Performed by: INTERNAL MEDICINE

## 2018-09-24 PROCEDURE — G8417 CALC BMI ABV UP PARAM F/U: HCPCS | Performed by: INTERNAL MEDICINE

## 2018-09-24 PROCEDURE — 86300 IMMUNOASSAY TUMOR CA 15-3: CPT

## 2018-09-24 PROCEDURE — 80053 COMPREHEN METABOLIC PANEL: CPT

## 2018-09-24 PROCEDURE — 83735 ASSAY OF MAGNESIUM: CPT

## 2018-09-24 RX ORDER — SODIUM CHLORIDE 0.9 % (FLUSH) 0.9 %
10 SYRINGE (ML) INJECTION AS NEEDED
Status: CANCELLED | OUTPATIENT
Start: 2018-09-24

## 2018-09-24 RX ORDER — SODIUM CHLORIDE 0.9 % (FLUSH) 0.9 %
10 SYRINGE (ML) INJECTION AS NEEDED
Status: DISCONTINUED | OUTPATIENT
Start: 2018-09-24 | End: 2018-09-24 | Stop reason: HOSPADM

## 2018-09-24 RX ORDER — SODIUM CHLORIDE 9 MG/ML
250 INJECTION, SOLUTION INTRAVENOUS ONCE
Status: COMPLETED | OUTPATIENT
Start: 2018-09-24 | End: 2018-09-24

## 2018-09-24 RX ORDER — SODIUM CHLORIDE 9 MG/ML
250 INJECTION, SOLUTION INTRAVENOUS ONCE
Status: CANCELLED | OUTPATIENT
Start: 2018-09-24

## 2018-09-24 RX ADMIN — ZOLEDRONIC ACID 4 MG: 4 INJECTION, SOLUTION, CONCENTRATE INTRAVENOUS at 12:04

## 2018-09-24 RX ADMIN — SODIUM CHLORIDE 250 ML: 9 INJECTION, SOLUTION INTRAVENOUS at 11:33

## 2018-09-24 RX ADMIN — Medication 10 ML: at 12:34

## 2018-09-24 RX ADMIN — HEPARIN SODIUM (PORCINE) LOCK FLUSH IV SOLN 100 UNIT/ML 500 UNITS: 100 SOLUTION at 12:35

## 2018-09-24 RX ADMIN — Medication 10 ML: at 10:40

## 2018-09-24 NOTE — PROGRESS NOTES
PT states compliant c tx. Denies any new medications or bleeding issues. Denies any s/s of blood clot. PT instructed to continue same dose and Coumadin friendly diet. PT will be seen in 1 month. Patient instructed regarding medication; results given and questions answered. Nutritional counseling given.  Dietary factors affecting therapy addressed.  Patient instructed to monitor for excessive bruising or bleeding. PT verbalizes understanding.         This document has been electronically signed by LYLA Augustin @ on September 24, 2018 10:22 AM

## 2018-09-24 NOTE — PROGRESS NOTES
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  Started on palliative chemotherapy with Taxotere and Cytoxan on July 26, 2017.  Patient received 7 cycle of Taxotere and Cytoxan on December 6, 2017.  After that patient was changed over to treatment with Palbociclib and letrozole on January 30, 2018.  She is scheduled to start cycle 8 of Palbociclib with letrozole today on September 24, 2018.  Patient had a restaging CT of chest, abdomen and pelvis with contrast done on September 19, 2018, she is here to discuss the result of CT scan and further recommendation.    Denies any nausea or vomiting or diarrhea with current treatment.  Complains of tingling and numbness affecting bilateral hand.    Complains of back pain,denies any recent worsening.  Denies any bowel or bladder incontinence.      PAST MEDICAL HISTORY:    Past Medical History:   Diagnosis Date   • Anxiety    • Depression    • Encounter for gynecological examination    • Malignant neoplasm of female breast (CMS/HCC)     hx of dcis s/p mastectomy and reconstruction and augmentation      • Primary malignant neoplasm of breast (CMS/HCC)     dcis in rt breast s/p mastectomy,reconstruction      • Ventricular premature beats        SOCIAL HISTORY:    Social History   Substance Use Topics   • Smoking status: Never Smoker   • Smokeless tobacco: Never Used   • Alcohol use No       Surgical History :  Past Surgical History:   Procedure Laterality Date   • AUGMENTATION MAMMAPLASTY     • AXILLARY LYMPH NODE BIOPSY/EXCISION Right 7/10/2017    Procedure: BIOSPY RIGHT AXILLARY MASS AND OR LYMPH;  Surgeon: Sourav TEIXEIRA  MD Sujata;  Location: [facility];  Service:    • BREAST BIOPSY  12/07/2007    Mammotome biopsy of the right side with clip placement   • BREAST LUMPECTOMY     • BREAST RECONSTRUCTION  10/28/2008    Abscence of right breast secondary to mastectomy. Implant exchange for reconstruction of right breast with open para-prosthetic capsulotomy   • BREAST SURGERY  10/01/2009    Left breast ptosis and breast asymmetry secondary to right breast reconstruction for breast cancer. Left breast mastopexy with augmentation.   • CARDIAC CATHETERIZATION  09/18/2008    Normal coronary arteriography. Normal left ventricular angiogram   • EP STUDY     • MASTECTOMY Right    • MASTECTOMY  02/05/2008    Multifocal right breast ductal carcinoma-in-situ. Right total mastectomy   • MASTECTOMY, PARTIAL  01/03/2008    Ductal carcinoma in-situ of the right breast, proven by mammotome biopsy. Right lumpectomy, medial portion of the breast, between 2 o'clock and 4 o'clock, 5 cm from the nipple, with clip placement, radiographic inter. of severo. specimen, & ultrasound   • PAP SMEAR  03/03/2009    Negative   • CA INSJ TUNNELED CVC W/O SUBQ PORT/ AGE 5 YR/> Right 7/10/2017    Procedure: MEDIPORT     (c-arm#2);  Surgeon: Sourav Ernst MD;  Location: [facility];  Service: General       ALLERGIES:    Allergies   Allergen Reactions   • Percocet [Oxycodone-Acetaminophen] Nausea And Vomiting       FAMILY HISTORY:  Family History   Problem Relation Age of Onset   • Anemia Mother    • Multiple sclerosis Mother    • No Known Problems Father    • Diabetes Other    • Multiple sclerosis Other        REVIEW OF SYSTEMS:      CONSTITUTIONAL: Complains of fatigue.  Has gained 3 pounds since last clinic visit.  Denies any fever, chills .      HEENT: No epistaxis, mouth sores or difficulty swallowing.     RESPIRATORY: No new shortness of breath. No new cough or hemoptysis.     CARDIOVASCULAR: No chest pain or palpitations.     GASTROINTESTINAL: No nausea or  "vomiting. Not requiring scopolamine patch.  No new abdominal pain. No blood in the stool.     GENITOURINARY: No Dysuria or Hematuria.     MUSCULOSKELETAL:Complains of arthritic pain affecting bilateral hands. Complains of pain in bilateral hip.  Complains of back pain ,denies any recent worsening.     LYMPHATICS: No new lymph node swelling.     NEUROLOGICAL : Complains of intermittent tingling and numbness affecting bilateral hand. Complains of tingling and numbness from back radiating down to bilateral lower extremity.  No dizziness. No seizures or balance problems.     SKIN: Positive for history of melanoma status post surgical removal. Denies any new skin lesion worrisome for melanoma.              PHYSICAL EXAMINATION:      VITAL SIGNS:  /73   Pulse 73   Temp 98.4 °F (36.9 °C) (Temporal Artery )   Resp 18   Ht 170.2 cm (67.01\")   Wt 101 kg (222 lb 3.2 oz)   SpO2 98%   BMI 34.79 kg/m²      ECOG performance status:1    GENERAL:  Not in any distress.    HEENT:  Normocephalic, Atraumatic.Mild Conjunctival pallor. No icterus. Extraocular Movements Intact. No Facial Asymmetry noted.    NECK:  No adenopathy. No JVD.    RESPIRATORY:  Fair air entry bilateral. No rhonchi or wheezing.    CARDIOVASCULAR:  S1, S2. Regular rate and rhythm. No murmur or gallop appreciated.    ABDOMEN:  Soft, obese, nontender. Bowel sounds present in all four quadrants.  No hepatosplenomegaly appreciated.    MUSCULOSKELETAL:  No edema.No Calf Tenderness.  Decreased range of motion.    NEUROLOGIC:  Alert, awake and oriented ×3.  No  Motor  deficit appreciated. Cranial Nerves 2-12 grossly intact.    LYMPHATICS: No new lymph node enlargement in neck or supraclavicular area.    PSYCHIATRY: Alert, awake and oriented ×3. Normal affect. Normal Judgement.Makes good eye contact.          DIAGNOSTIC DATA:    Glucose   Date Value Ref Range Status   09/24/2018 98 60 - 100 mg/dL Final     Sodium   Date Value Ref Range Status   09/24/2018 140 " 137 - 145 mmol/L Final     Potassium   Date Value Ref Range Status   09/24/2018 3.9 3.5 - 5.1 mmol/L Final     CO2   Date Value Ref Range Status   09/24/2018 24.0 22.0 - 31.0 mmol/L Final     Chloride   Date Value Ref Range Status   09/24/2018 103 95 - 110 mmol/L Final     Anion Gap   Date Value Ref Range Status   09/24/2018 13.0 5.0 - 15.0 mmol/L Final     Creatinine   Date Value Ref Range Status   09/24/2018 0.71 0.50 - 1.00 mg/dL Final     BUN   Date Value Ref Range Status   09/24/2018 12 7 - 21 mg/dL Final     BUN/Creatinine Ratio   Date Value Ref Range Status   09/24/2018 16.9 7.0 - 25.0 Final     Calcium   Date Value Ref Range Status   09/24/2018 8.6 8.4 - 10.2 mg/dL Final     eGFR Non  Amer   Date Value Ref Range Status   09/24/2018 87 51 - 120 mL/min/1.73 Final     Alkaline Phosphatase   Date Value Ref Range Status   09/24/2018 87 38 - 126 U/L Final     Total Protein   Date Value Ref Range Status   09/24/2018 7.2 6.3 - 8.6 g/dL Final     ALT (SGPT)   Date Value Ref Range Status   09/24/2018 56 (H) 9 - 52 U/L Final     AST (SGOT)   Date Value Ref Range Status   09/24/2018 43 (H) 14 - 36 U/L Final     Total Bilirubin   Date Value Ref Range Status   09/24/2018 0.4 0.2 - 1.3 mg/dL Final     Albumin   Date Value Ref Range Status   09/24/2018 4.00 3.40 - 4.80 g/dL Final     Globulin   Date Value Ref Range Status   09/24/2018 3.2 2.3 - 3.5 gm/dL Final     Lab Results   Component Value Date    WBC 3.01 (L) 09/24/2018    HGB 12.0 09/24/2018    HCT 33.9 (L) 09/24/2018    MCV 97.4 09/24/2018     09/24/2018     Lab Results   Component Value Date    NEUTROABS 1.24 (L) 09/24/2018     Lab Results   Component Value Date     36.7 (H) 08/27/2018    LABCA2 34.1 08/27/2018     Component CA 27.29   Latest Ref Rng & Units 0.0 - 38.6 U/mL   12/27/2017 90.3 (H)   2/28/2018 52.1 (H)   4/2/2018 12.4   5/7/2018 5/14/2018 38.1   6/11/2018 39.7 (H)   7/9/2018 37.9       Component CA 15-3   Latest Ref Rng & Units  0.0 - 25.0 U/mL   2/28/2018 47.2 (H)   4/2/2018 11.9   5/7/2018 5/14/2018 41.1 (H)   6/11/2018 37.3 (H)   7/9/2018 35.3 (H)           2 D Echo Done on July 19, 2017 showed:  Interpretation Summary   · The left ventricular cavity is borderline dilated.  · Left ventricular systolic function is normal. Estimated EF = 53%.  · Left ventricular diastolic dysfunction (grade I) consistent with impaired relaxation.  · IAS aneurysm noted. No PFO or ASD           Radiology Data :  CT of chest, abdomen and pelvis with contrast done on September 19, 2018 was reviewed, discussed with patient, it showed:  CHEST CT FINDINGS: Bilateral breast implants. Right mastectomy.     Heart size normal. No evidence of pathologically enlarged nodes.  No dense consolidation or masses.        ABDOMEN CT FINDINGS: Stable low CT attenuation lesion anterior  aspect of the liver unchanged since multiple prior exams  corresponding to a treated, stable metastasis.     The gallbladder, spleen, pancreas, adrenal glands and kidneys are  normal in appearance. Bowel grossly unremarkable.     No evidence of pathologically enlarged nodes, free fluid or free  air. Degenerative changes of the spine. The bones are  unremarkable.     PELVIS CT FINDINGS: There are no pelvic masses.        Extensive skeletal metastases the most part osteoblastic  sclerotic. High-grade compression fracture L4 vertebral body.  These are unchanged since prior exam.           IMPRESSION:  CONCLUSION: Bilateral breast implants. Right mastectomy. CT chest  is otherwise unremarkable. Lungs are clear. No pathologic hilar  or mediastinal adenopathy.     Stable hepatic metastasis anterior aspect of liver unchanged  since prior exam.     Extensive skeletal metastases also unchanged since prior exam.             CT of chest, abdomen and pelvis with contrast done on July 9, 2018 was reviewed, discussed with patient, it showed:  FINDINGS:      Chest:  The lungs are clear and well-expanded  bilaterally. No suspicious  pulmonary nodules or masses.     Heart size is normal. No pleural or pericardial effusion.  Esophagus is normal in course and caliber. Bilateral breast  implants with prior right mastectomy. No lymphadenopathy in the  chest by CT size criteria.     Abdomen/pelvis:  Stable low-attenuation lesion in the anterior aspect of the  liver. No new liver lesions are identified. There is atrophy of  the left lateral segment of the liver which is stable. The liver  is otherwise unremarkable. Gallbladder, spleen, adrenal glands  and kidneys are unremarkable. Focal atrophy of the pancreatic  tail which is of uncertain etiology although stable in appearance  compared to multiple prior exams. No focal pancreatic ductal  dilatation or mass identified.     No lymphadenopathy in the abdomen, retroperitoneum or pelvis by  CT size criteria.     The bladder is collapsed. Uterus and ovaries are grossly normal  in size for age. The bowel is unremarkable. No bowel obstruction.  Normal appendix. No free fluid.     Grossly stable diffuse skeletal metastatic disease with a stable  compression or burst deformity of L4.     IMPRESSION:  1. Stable exam. Diffuse skeletal metastatic disease is grossly  unchanged. No new metastases are identified in the chest, abdomen  or pelvis.      MRI of brain with and without contrast done on July 9, 2018 was reviewed, discussed with patient, it showed:  IMPRESSION:  No evidence of intracranial hemorrhage, mass/mass effect, acute  infarct, or pathologic contrast enhancement.     Previous described/known enhancing calvarial metastatic lesions  are significantly less conspicuous on today's examination. The  finding would support favorable treatment response.     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          Doppler ultrasound of right upper extremity done on November 10, 2017 showed:  IMPRESSION:  CONCLUSION:   Abnormal exam with thrombus visualized in  the right internal  jugular vein, consistent with DVT.      PET/CT done on July 3, 2017 showed:  IMPRESSION:  CONCLUSION:  1. Extensive metastatic disease. Pathologic uptake within  enlarged right-sided axillary lymph nodes. Metastatic adenopathy  in both laurie and mediastinum. Tumor replacing most of the left  lobe of liver. Intra-abdominal retroperitoneal metastases,  adenopathy.     Extensive skeletal metastases including a pathologic fracture at  L4.        Nuclear bone scan done on June 20, 2017 showed:  1. Increased uptake at L4 suggestive of metastatic process.  2. 3 or 4 areas of increased activity in bony calvarium  3. Some increased activity in tips posteriorly and anteriorly suggestive of metastatic carcinoma to the skeletal system.           Pathology :    Pathology data from July 10, 2017 showed:  Final Diagnosis   Mass right axilla, excisional biopsy:      Metastatic poorly differentiated ductal carcinoma, breast primary      Estrogen receptor positive, 95% stainability, strong intensity      Progesterone receptor positive, 95% stainability, strong intensity      HER-2 2+(equivocal), sent to [facility] for FISH     Addendum   Her 2 FISH result from [facility] is NEGATIVE         Pathology report from December 7, 2007 showed:  FINAL DIAGNOSIS:   RIGHT BREAST MAMMOTOME BIOPSY:        DUCTAL CARCINOMA IN SITU, INTERMEDIATE NUCLEAR GRADE              WITH MICROCALCIFICATION.      ADDENDUM:   Estrogen receptors are positive (90-95% stainability).   Progesterone receptors are positive (90-95% stainability).         ASSESSMENT AND PLAN:      1.  Metastatic cancer with extensive adenopathy in right axilla, mediastinum, hilar, abdominal, retroperitoneal with extensive liver and bone metastasis on PET/CT.  Patient underwent right apatient underwent right axillary lymph node excision by Dr. Ernst on July 10, 2017.Pathology report confirmed ductal carcinoma of breast with estrogen and progesterone positivity.   At her on palliative chemotherapy with Taxotere and cytoxan on July 26, 2017.   Patient was  seen at [facility] for second opinion regarding her breast cancer.  Case was discussed with Dr vazquez oncologist that has seen patient at [facility].  She agreed with her chemotherapy at this point.  The scan on December 20, 2017 shows relative stability of disease, lesion in the liver as well as bone lesion at about same.  Her tumor marker CA 27-29 as well as CA 15-3 are less than 100 at this point.  Since patient is developing some neuropathy in bilateral upper and lower extremity recommend changing her therapy to Palbociclib with letrozole.  Patient started taking first round of Palbociclib and letrozole on January 30, 2018.  Patient was scheduled to start cycle  6 of chemotherapy today with Palbociclib today on July 16, 2018.   -CT of chest, abdomen and pelvis with contrast done on July 9, 2018 show stable disease which was discussed with patient.  -MRI of brain with and without contrast done on July 9, 2018 shows significant improvement in calvarial metastasis which was discussed with patient.  -CT of chest, abdomen and pelvis with contrast done on September 19, 2018 showed stable disease without any new lymphadenopathy or any new lesions.  Result of CT scan were discussed with patient and her family.  -In view of neutropenia with absolute neutrophil count of 1200.  We will ask patient to return to clinic in 1 week.  We will recheck CBC next week and if neutrophil count has recovered we will resume cycle 8 next week on October 1, 2018.  -We will ask patient to return to clinic in 5 weeks from next week with a repeat CBC and CMP to be done on that day.    2.  Brain metastasis:MRi Brain with and without done on November 9, 2017 was reviewed and compared with t MRI done 6 weeks prior to that.  Brain calvarial metastasis are stable or somewhat improved.  No need to do radiation at this point which was discussed with  patient.    -MRI of brain with and without contrast done on July 9, 2018 shows improvement in calvarial metastasis.    3.  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  She is currently on Coumadin.    She is being followed by Coumadin clinic She was instructed to come to the emergency room in case she starts having any bleeding.    4.  History of melanoma in situ status post excision by Dr. Linn.    5.  Bone metastasis: Patient was started on Zometa on August 23, 2017.  Continue with Zometa as scheduled for now.  Patient denies any mouth sores or jaw pain.  CT scan shows about loss of 70% of height of L4 vertebral body, patient has been referred to Dr. Pugh to get evaluated for kyphoplasty.  Patient went for second opinion with orthopedic surgeon in [place], as per patient's second surgeon did not recommend any surgery.  We will continue with monthly Zometa for now.     6.  History of ductal carcinoma in situ of the right breast diagnosed in 2007.  Status post mastectomy followed by adjuvant tamoxifen for 5 years which was finished in 2013.    7.  Elevated liver enzymes: ASt is 43, ALT is 56. Will monitor with cmp for now.      8.  Neutropenia: Secondary to Palbociclib. WBC is normal at 3.01 with ANC 1.24 .  We will recheck CBC next week prior to resuming Palbociclib.    9.  Anemia: Secondary to chemotherapy.  Hemoglobin is 12.0 today.  We'll monitored with CBC.    10.  Health maintenance: Patient does not smoke.  Not a candidate for screening colonoscopy at this point.      11.  Prescriptions: Patient has enough prescription of Decadron, Zofran,  and Ultram.    12.  Advance care planning: Patient remains full code for now and is able to make on her decisions.  Patient has healthcare surrogate Mentioned on Chart.    13 Obesity: Patient's Body mass index is 34.79 kg/m². BMI is higher than reference range.  Patient was notified about elevated BMI.  Patient was  counseled about diet and exercise for weight loss.        Ovidio Meadows MD  9/24/2018  7:05 PM        EMR Dragon/Transcription disclaimer:   Much of this encounter note is an electronic transcription/translation of spoken language to printed text. The electronic translation of spoken language may permit erroneous, or at times, nonsensical words or phrases to be inadvertently transcribed; Although I have reviewed the note for such errors, some may still exist.

## 2018-09-25 LAB
CANCER AG15-3 SERPL-ACNC: 31.1 U/ML (ref 0–25)
CANCER AG27-29 SERPL-ACNC: 38.1 U/ML (ref 0–38.6)

## 2018-09-26 RX ORDER — PALBOCICLIB 125 MG/1
CAPSULE ORAL
Qty: 21 CAPSULE | Refills: 1 | Status: SHIPPED | OUTPATIENT
Start: 2018-09-26 | End: 2018-12-14 | Stop reason: SDUPTHER

## 2018-10-01 ENCOUNTER — TELEPHONE (OUTPATIENT)
Dept: ONCOLOGY | Facility: HOSPITAL | Age: 48
End: 2018-10-01

## 2018-10-01 ENCOUNTER — INFUSION (OUTPATIENT)
Dept: ONCOLOGY | Facility: HOSPITAL | Age: 48
End: 2018-10-01

## 2018-10-01 DIAGNOSIS — Z45.2 ENCOUNTER FOR ADJUSTMENT OR MANAGEMENT OF VASCULAR ACCESS DEVICE: ICD-10-CM

## 2018-10-01 DIAGNOSIS — Z45.2 ENCOUNTER FOR VENOUS ACCESS DEVICE CARE: ICD-10-CM

## 2018-10-01 DIAGNOSIS — IMO0001 OTHER COMPLICATION DUE TO VENOUS ACCESS DEVICE, SUBSEQUENT ENCOUNTER: Primary | ICD-10-CM

## 2018-10-01 LAB
BASOPHILS # BLD AUTO: 0.13 10*3/MM3 (ref 0–0.2)
BASOPHILS NFR BLD AUTO: 2.9 % (ref 0–2)
DEPRECATED RDW RBC AUTO: 54.9 FL (ref 36.4–46.3)
EOSINOPHIL # BLD AUTO: 0.07 10*3/MM3 (ref 0–0.7)
EOSINOPHIL NFR BLD AUTO: 1.6 % (ref 0–7)
ERYTHROCYTE [DISTWIDTH] IN BLOOD BY AUTOMATED COUNT: 15.6 % (ref 11.5–14.5)
HCT VFR BLD AUTO: 35.7 % (ref 35–45)
HGB BLD-MCNC: 12.4 G/DL (ref 12–15.5)
IMM GRANULOCYTES # BLD: 0.03 10*3/MM3 (ref 0–0.02)
IMM GRANULOCYTES NFR BLD: 0.7 % (ref 0–0.5)
LYMPHOCYTES # BLD AUTO: 1.39 10*3/MM3 (ref 0.6–4.2)
LYMPHOCYTES NFR BLD AUTO: 31.4 % (ref 10–50)
MCH RBC QN AUTO: 33.7 PG (ref 26.5–34)
MCHC RBC AUTO-ENTMCNC: 34.7 G/DL (ref 31.4–36)
MCV RBC AUTO: 97 FL (ref 80–98)
MONOCYTES # BLD AUTO: 0.62 10*3/MM3 (ref 0–0.9)
MONOCYTES NFR BLD AUTO: 14 % (ref 0–12)
NEUTROPHILS # BLD AUTO: 2.19 10*3/MM3 (ref 2–8.6)
NEUTROPHILS NFR BLD AUTO: 49.4 % (ref 37–80)
PLATELET # BLD AUTO: 223 10*3/MM3 (ref 150–450)
PMV BLD AUTO: 9.5 FL (ref 8–12)
RBC # BLD AUTO: 3.68 10*6/MM3 (ref 3.77–5.16)
WBC NRBC COR # BLD: 4.43 10*3/MM3 (ref 3.2–9.8)

## 2018-10-01 PROCEDURE — 36415 COLL VENOUS BLD VENIPUNCTURE: CPT | Performed by: INTERNAL MEDICINE

## 2018-10-01 PROCEDURE — 85025 COMPLETE CBC W/AUTO DIFF WBC: CPT

## 2018-10-01 RX ORDER — SODIUM CHLORIDE 0.9 % (FLUSH) 0.9 %
10 SYRINGE (ML) INJECTION AS NEEDED
Status: CANCELLED | OUTPATIENT
Start: 2018-10-01

## 2018-10-01 RX ORDER — SODIUM CHLORIDE 0.9 % (FLUSH) 0.9 %
10 SYRINGE (ML) INJECTION AS NEEDED
Status: DISCONTINUED | OUTPATIENT
Start: 2018-10-01 | End: 2018-10-01 | Stop reason: HOSPADM

## 2018-10-01 NOTE — TELEPHONE ENCOUNTER
----- Message from Ovidio Meadows MD sent at 10/1/2018  9:48 AM CDT -----  Please let patient know, she can start Palbociclib from today.Thanks

## 2018-10-22 ENCOUNTER — DOCUMENTATION (OUTPATIENT)
Dept: CARDIAC SURGERY | Facility: CLINIC | Age: 48
End: 2018-10-22

## 2018-11-05 ENCOUNTER — OFFICE VISIT (OUTPATIENT)
Dept: ONCOLOGY | Facility: CLINIC | Age: 48
End: 2018-11-05

## 2018-11-05 ENCOUNTER — INFUSION (OUTPATIENT)
Dept: ONCOLOGY | Facility: HOSPITAL | Age: 48
End: 2018-11-05

## 2018-11-05 VITALS
OXYGEN SATURATION: 98 % | TEMPERATURE: 98.4 F | HEART RATE: 67 BPM | DIASTOLIC BLOOD PRESSURE: 83 MMHG | WEIGHT: 218.6 LBS | HEIGHT: 67 IN | SYSTOLIC BLOOD PRESSURE: 137 MMHG | BODY MASS INDEX: 34.31 KG/M2 | RESPIRATION RATE: 18 BRPM

## 2018-11-05 DIAGNOSIS — D70.1 CHEMOTHERAPY-INDUCED NEUTROPENIA (HCC): ICD-10-CM

## 2018-11-05 DIAGNOSIS — T45.1X5A CHEMOTHERAPY-INDUCED NEUTROPENIA (HCC): ICD-10-CM

## 2018-11-05 DIAGNOSIS — R79.89 ELEVATED LIVER FUNCTION TESTS: ICD-10-CM

## 2018-11-05 DIAGNOSIS — C50.911 MALIGNANT NEOPLASM OF RIGHT BREAST IN FEMALE, ESTROGEN RECEPTOR POSITIVE, UNSPECIFIED SITE OF BREAST (HCC): ICD-10-CM

## 2018-11-05 DIAGNOSIS — Z45.2 ENCOUNTER FOR ADJUSTMENT OR MANAGEMENT OF VASCULAR ACCESS DEVICE: ICD-10-CM

## 2018-11-05 DIAGNOSIS — IMO0001 OTHER COMPLICATION DUE TO VENOUS ACCESS DEVICE, SUBSEQUENT ENCOUNTER: ICD-10-CM

## 2018-11-05 DIAGNOSIS — D64.81 ANEMIA DUE TO ANTINEOPLASTIC CHEMOTHERAPY: ICD-10-CM

## 2018-11-05 DIAGNOSIS — C79.51 BONE METASTASIS: Primary | ICD-10-CM

## 2018-11-05 DIAGNOSIS — Z23 FLU VACCINE NEED: ICD-10-CM

## 2018-11-05 DIAGNOSIS — Z45.2 ENCOUNTER FOR VENOUS ACCESS DEVICE CARE: ICD-10-CM

## 2018-11-05 DIAGNOSIS — T45.1X5A ANEMIA DUE TO ANTINEOPLASTIC CHEMOTHERAPY: ICD-10-CM

## 2018-11-05 DIAGNOSIS — C79.51 BONE METASTASIS: ICD-10-CM

## 2018-11-05 DIAGNOSIS — Z17.0 MALIGNANT NEOPLASM OF RIGHT BREAST IN FEMALE, ESTROGEN RECEPTOR POSITIVE, UNSPECIFIED SITE OF BREAST (HCC): Primary | ICD-10-CM

## 2018-11-05 DIAGNOSIS — C50.911 MALIGNANT NEOPLASM OF RIGHT BREAST IN FEMALE, ESTROGEN RECEPTOR POSITIVE, UNSPECIFIED SITE OF BREAST (HCC): Primary | ICD-10-CM

## 2018-11-05 DIAGNOSIS — Z17.0 MALIGNANT NEOPLASM OF RIGHT BREAST IN FEMALE, ESTROGEN RECEPTOR POSITIVE, UNSPECIFIED SITE OF BREAST (HCC): ICD-10-CM

## 2018-11-05 DIAGNOSIS — Z79.01 LONG TERM CURRENT USE OF ANTICOAGULANT THERAPY: ICD-10-CM

## 2018-11-05 DIAGNOSIS — R11.2 NAUSEA AND VOMITING, INTRACTABILITY OF VOMITING NOT SPECIFIED, UNSPECIFIED VOMITING TYPE: ICD-10-CM

## 2018-11-05 LAB
ALBUMIN SERPL-MCNC: 3.9 G/DL (ref 3.4–4.8)
ALBUMIN/GLOB SERPL: 1.3 G/DL (ref 1.1–1.8)
ALP SERPL-CCNC: 85 U/L (ref 38–126)
ALT SERPL W P-5'-P-CCNC: 99 U/L (ref 9–52)
ANION GAP SERPL CALCULATED.3IONS-SCNC: 7 MMOL/L (ref 5–15)
AST SERPL-CCNC: 87 U/L (ref 14–36)
BASOPHILS # BLD AUTO: 0.07 10*3/MM3 (ref 0–0.2)
BASOPHILS NFR BLD AUTO: 2.1 % (ref 0–2)
BILIRUB SERPL-MCNC: 0.3 MG/DL (ref 0.2–1.3)
BUN BLD-MCNC: 14 MG/DL (ref 7–21)
BUN/CREAT SERPL: 15.9 (ref 7–25)
CALCIUM SPEC-SCNC: 9.1 MG/DL (ref 8.4–10.2)
CHLORIDE SERPL-SCNC: 107 MMOL/L (ref 95–110)
CO2 SERPL-SCNC: 25 MMOL/L (ref 22–31)
CREAT BLD-MCNC: 0.88 MG/DL (ref 0.5–1)
DEPRECATED RDW RBC AUTO: 54.1 FL (ref 36.4–46.3)
EOSINOPHIL # BLD AUTO: 0.03 10*3/MM3 (ref 0–0.7)
EOSINOPHIL NFR BLD AUTO: 0.9 % (ref 0–7)
ERYTHROCYTE [DISTWIDTH] IN BLOOD BY AUTOMATED COUNT: 15.3 % (ref 11.5–14.5)
GFR SERPL CREATININE-BSD FRML MDRD: 68 ML/MIN/1.73 (ref 51–120)
GLOBULIN UR ELPH-MCNC: 3.1 GM/DL (ref 2.3–3.5)
GLUCOSE BLD-MCNC: 87 MG/DL (ref 60–100)
HCT VFR BLD AUTO: 35.1 % (ref 35–45)
HGB BLD-MCNC: 12.2 G/DL (ref 12–15.5)
IMM GRANULOCYTES # BLD: 0 10*3/MM3 (ref 0–0.02)
IMM GRANULOCYTES NFR BLD: 0 % (ref 0–0.5)
LYMPHOCYTES # BLD AUTO: 1.14 10*3/MM3 (ref 0.6–4.2)
LYMPHOCYTES NFR BLD AUTO: 34 % (ref 10–50)
MCH RBC QN AUTO: 33.3 PG (ref 26.5–34)
MCHC RBC AUTO-ENTMCNC: 34.8 G/DL (ref 31.4–36)
MCV RBC AUTO: 95.9 FL (ref 80–98)
MONOCYTES # BLD AUTO: 0.55 10*3/MM3 (ref 0–0.9)
MONOCYTES NFR BLD AUTO: 16.4 % (ref 0–12)
NEUTROPHILS # BLD AUTO: 1.56 10*3/MM3 (ref 2–8.6)
NEUTROPHILS NFR BLD AUTO: 46.6 % (ref 37–80)
PLATELET # BLD AUTO: 216 10*3/MM3 (ref 150–450)
PMV BLD AUTO: 9.9 FL (ref 8–12)
POTASSIUM BLD-SCNC: 3.8 MMOL/L (ref 3.5–5.1)
PROT SERPL-MCNC: 7 G/DL (ref 6.3–8.6)
RBC # BLD AUTO: 3.66 10*6/MM3 (ref 3.77–5.16)
SODIUM BLD-SCNC: 139 MMOL/L (ref 137–145)
WBC NRBC COR # BLD: 3.35 10*3/MM3 (ref 3.2–9.8)

## 2018-11-05 PROCEDURE — G8417 CALC BMI ABV UP PARAM F/U: HCPCS | Performed by: INTERNAL MEDICINE

## 2018-11-05 PROCEDURE — 96374 THER/PROPH/DIAG INJ IV PUSH: CPT | Performed by: INTERNAL MEDICINE

## 2018-11-05 PROCEDURE — 1123F ACP DISCUSS/DSCN MKR DOCD: CPT | Performed by: INTERNAL MEDICINE

## 2018-11-05 PROCEDURE — G0008 ADMIN INFLUENZA VIRUS VAC: HCPCS | Performed by: INTERNAL MEDICINE

## 2018-11-05 PROCEDURE — 90661 CCIIV3 VAC ABX FR 0.5 ML IM: CPT | Performed by: INTERNAL MEDICINE

## 2018-11-05 PROCEDURE — 85025 COMPLETE CBC W/AUTO DIFF WBC: CPT

## 2018-11-05 PROCEDURE — 80053 COMPREHEN METABOLIC PANEL: CPT

## 2018-11-05 PROCEDURE — 25010000002 ZOLEDRONIC ACID PER 1 MG: Performed by: INTERNAL MEDICINE

## 2018-11-05 PROCEDURE — 86300 IMMUNOASSAY TUMOR CA 15-3: CPT

## 2018-11-05 PROCEDURE — 99214 OFFICE O/P EST MOD 30 MIN: CPT | Performed by: INTERNAL MEDICINE

## 2018-11-05 PROCEDURE — 25010000002 INFLUENZA VAC SUBUNIT QUAD 0.5 ML SUSPENSION PREFILLED SYRINGE: Performed by: INTERNAL MEDICINE

## 2018-11-05 PROCEDURE — 36591 DRAW BLOOD OFF VENOUS DEVICE: CPT | Performed by: INTERNAL MEDICINE

## 2018-11-05 RX ORDER — SODIUM CHLORIDE 9 MG/ML
250 INJECTION, SOLUTION INTRAVENOUS ONCE
Status: COMPLETED | OUTPATIENT
Start: 2018-11-05 | End: 2018-11-05

## 2018-11-05 RX ORDER — TRAMADOL HYDROCHLORIDE 50 MG/1
50 TABLET ORAL EVERY 8 HOURS PRN
Qty: 90 TABLET | Refills: 0 | Status: SHIPPED | OUTPATIENT
Start: 2018-11-05 | End: 2020-02-17 | Stop reason: SDUPTHER

## 2018-11-05 RX ORDER — SODIUM CHLORIDE 0.9 % (FLUSH) 0.9 %
10 SYRINGE (ML) INJECTION AS NEEDED
Status: DISCONTINUED | OUTPATIENT
Start: 2018-11-05 | End: 2018-11-05 | Stop reason: HOSPADM

## 2018-11-05 RX ORDER — SODIUM CHLORIDE 9 MG/ML
250 INJECTION, SOLUTION INTRAVENOUS ONCE
Status: CANCELLED | OUTPATIENT
Start: 2018-11-05

## 2018-11-05 RX ORDER — TRAMADOL HYDROCHLORIDE 50 MG/1
50 TABLET ORAL EVERY 8 HOURS PRN
Qty: 90 TABLET | Refills: 0 | Status: SHIPPED | OUTPATIENT
Start: 2018-11-05 | End: 2018-11-05 | Stop reason: SDUPTHER

## 2018-11-05 RX ORDER — SODIUM CHLORIDE 0.9 % (FLUSH) 0.9 %
10 SYRINGE (ML) INJECTION AS NEEDED
Status: CANCELLED | OUTPATIENT
Start: 2018-11-05

## 2018-11-05 RX ADMIN — INFLUENZA A VIRUS A/SINGAPORE/GP1908/2015 IVR-180 (H1N1) ANTIGEN (MDCK CELL DERIVED, PROPIOLACTONE INACTIVATED), INFLUENZA A VIRUS A/NORTH CAROLINA/04/2016 (H3N2) HEMAGGLUTININ ANTIGEN (MDCK CELL DERIVED, PROPIOLACTONE INACTIVATED), INFLUENZA B VIRUS B/IOWA/06/2017 HEMAGGLUTININ ANTIGEN (MDCK CELL DERIVED, PROPIOLACTONE INACTIVATED), INFLUENZA B VIRUS B/SINGAPORE/INFTT-16-0610/2016 HEMAGGLUTININ ANTIGEN (MDCK CELL DERIVED, PROPIOLACTONE INACTIVATED) 0.5 ML: 15; 15; 15; 15 INJECTION, SUSPENSION INTRAMUSCULAR at 11:02

## 2018-11-05 RX ADMIN — ZOLEDRONIC ACID 4 MG: 4 INJECTION, SOLUTION, CONCENTRATE INTRAVENOUS at 11:29

## 2018-11-05 RX ADMIN — SODIUM CHLORIDE 250 ML: 9 INJECTION, SOLUTION INTRAVENOUS at 10:56

## 2018-11-05 RX ADMIN — Medication 10 ML: at 12:06

## 2018-11-05 RX ADMIN — SODIUM CHLORIDE, PRESERVATIVE FREE 500 UNITS: 5 INJECTION INTRAVENOUS at 12:06

## 2018-11-05 RX ADMIN — Medication 10 ML: at 09:44

## 2018-11-05 NOTE — PROGRESS NOTES
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  Started on palliative chemotherapy with Taxotere and Cytoxan on July 26, 2017.  Patient received 7 cycle of Taxotere and Cytoxan on December 6, 2017.  After that patient was changed over to treatment with Palbociclib and letrozole on January 30, 2018.  She is scheduled to start cycle 9 of Palbociclib with letrozole today on November 5, 2018.      Denies any nausea or vomiting or diarrhea with current treatment.  Complains of tingling and numbness affecting bilateral hand.    Complains of back pain,denies any recent worsening.  Denies any bowel or bladder incontinence.      PAST MEDICAL HISTORY:    Past Medical History:   Diagnosis Date   • Anxiety    • Depression    • Encounter for gynecological examination    • Malignant neoplasm of female breast (CMS/HCC)     hx of dcis s/p mastectomy and reconstruction and augmentation      • Primary malignant neoplasm of breast (CMS/HCC)     dcis in rt breast s/p mastectomy,reconstruction      • Ventricular premature beats        SOCIAL HISTORY:    Social History   Substance Use Topics   • Smoking status: Never Smoker   • Smokeless tobacco: Never Used   • Alcohol use No       Surgical History :  Past Surgical History:   Procedure Laterality Date   • AUGMENTATION MAMMAPLASTY     • AXILLARY LYMPH NODE BIOPSY/EXCISION Right 7/10/2017    Procedure: BIOSPY RIGHT AXILLARY MASS AND OR LYMPH;  Surgeon: Sourav Ernst MD;  Location: [facility];  Service:    • BREAST BIOPSY  12/07/2007    Mammotome biopsy of the right side with clip placement   • BREAST LUMPECTOMY      • BREAST RECONSTRUCTION  10/28/2008    Abscence of right breast secondary to mastectomy. Implant exchange for reconstruction of right breast with open para-prosthetic capsulotomy   • BREAST SURGERY  10/01/2009    Left breast ptosis and breast asymmetry secondary to right breast reconstruction for breast cancer. Left breast mastopexy with augmentation.   • CARDIAC CATHETERIZATION  09/18/2008    Normal coronary arteriography. Normal left ventricular angiogram   • EP STUDY     • MASTECTOMY Right    • MASTECTOMY  02/05/2008    Multifocal right breast ductal carcinoma-in-situ. Right total mastectomy   • MASTECTOMY, PARTIAL  01/03/2008    Ductal carcinoma in-situ of the right breast, proven by mammotome biopsy. Right lumpectomy, medial portion of the breast, between 2 o'clock and 4 o'clock, 5 cm from the nipple, with clip placement, radiographic inter. of severo. specimen, & ultrasound   • PAP SMEAR  03/03/2009    Negative   • FL INSJ TUNNELED CVC W/O SUBQ PORT/ AGE 5 YR/> Right 7/10/2017    Procedure: MEDIPORT     (c-arm#2);  Surgeon: Sourav Ernst MD;  Location: [facility];  Service: General       ALLERGIES:    Allergies   Allergen Reactions   • Percocet [Oxycodone-Acetaminophen] Nausea And Vomiting       FAMILY HISTORY:  Family History   Problem Relation Age of Onset   • Anemia Mother    • Multiple sclerosis Mother    • No Known Problems Father    • Diabetes Other    • Multiple sclerosis Other        REVIEW OF SYSTEMS:      CONSTITUTIONAL: Complains of fatigue.  Has gained 2 pounds since last clinic visit.  Denies any fever, chills .      HEENT: No epistaxis, mouth sores or difficulty swallowing.     RESPIRATORY: No new shortness of breath. No new cough or hemoptysis.     CARDIOVASCULAR: No chest pain or palpitations.     GASTROINTESTINAL: No nausea or vomiting. Not requiring scopolamine patch.  No new abdominal pain. No blood in the stool.     GENITOURINARY: No Dysuria or Hematuria.     MUSCULOSKELETAL:Complains  "of arthritic pain affecting bilateral hands. Complains of pain in bilateral hip.  Complains of back pain ,denies any recent worsening.     LYMPHATICS: No new lymph node swelling.     NEUROLOGICAL : Complains of intermittent tingling and numbness affecting bilateral hand. Complains of tingling and numbness from back radiating down to bilateral lower extremity.  No dizziness. No seizures or balance problems.     SKIN: Positive for history of melanoma status post surgical removal. Denies any new skin lesion worrisome for melanoma.              PHYSICAL EXAMINATION:      VITAL SIGNS:  /83   Pulse 67   Temp 98.4 °F (36.9 °C) (Temporal Artery )   Resp 18   Ht 170.2 cm (67.01\")   Wt 99.2 kg (218 lb 9.6 oz)   SpO2 98%   BMI 34.23 kg/m²      ECOG performance status:1    GENERAL:  Not in any distress.    HEENT:  Normocephalic, Atraumatic.Mild Conjunctival pallor. No icterus. Extraocular Movements Intact. No Facial Asymmetry noted.    NECK:  No adenopathy. No JVD.    RESPIRATORY:  Fair air entry bilateral. No rhonchi or wheezing.    CARDIOVASCULAR:  S1, S2. Regular rate and rhythm. No murmur or gallop appreciated.    ABDOMEN:  Soft, obese, nontender. Bowel sounds present in all four quadrants.  No hepatosplenomegaly appreciated.    MUSCULOSKELETAL:  No edema.No Calf Tenderness.  Decreased range of motion.    NEUROLOGIC:  Alert, awake and oriented ×3.  No  Motor  deficit appreciated. Cranial Nerves 2-12 grossly intact.    LYMPHATICS: No new lymph node enlargement in neck or supraclavicular area.    PSYCHIATRY: Alert, awake and oriented ×3. Normal affect. Normal Judgement.Makes good eye contact.          DIAGNOSTIC DATA:    Glucose   Date Value Ref Range Status   11/05/2018 87 60 - 100 mg/dL Final     Sodium   Date Value Ref Range Status   11/05/2018 139 137 - 145 mmol/L Final     Potassium   Date Value Ref Range Status   11/05/2018 3.8 3.5 - 5.1 mmol/L Final     CO2   Date Value Ref Range Status   11/05/2018 25.0 " 22.0 - 31.0 mmol/L Final     Chloride   Date Value Ref Range Status   11/05/2018 107 95 - 110 mmol/L Final     Anion Gap   Date Value Ref Range Status   11/05/2018 7.0 5.0 - 15.0 mmol/L Final     Creatinine   Date Value Ref Range Status   11/05/2018 0.88 0.50 - 1.00 mg/dL Final     BUN   Date Value Ref Range Status   11/05/2018 14 7 - 21 mg/dL Final     BUN/Creatinine Ratio   Date Value Ref Range Status   11/05/2018 15.9 7.0 - 25.0 Final     Calcium   Date Value Ref Range Status   11/05/2018 9.1 8.4 - 10.2 mg/dL Final     eGFR Non  Amer   Date Value Ref Range Status   11/05/2018 68 51 - 120 mL/min/1.73 Final     Alkaline Phosphatase   Date Value Ref Range Status   11/05/2018 85 38 - 126 U/L Final     Total Protein   Date Value Ref Range Status   11/05/2018 7.0 6.3 - 8.6 g/dL Final     ALT (SGPT)   Date Value Ref Range Status   11/05/2018 99 (H) 9 - 52 U/L Final     AST (SGOT)   Date Value Ref Range Status   11/05/2018 87 (H) 14 - 36 U/L Final     Total Bilirubin   Date Value Ref Range Status   11/05/2018 0.3 0.2 - 1.3 mg/dL Final     Albumin   Date Value Ref Range Status   11/05/2018 3.90 3.40 - 4.80 g/dL Final     Globulin   Date Value Ref Range Status   11/05/2018 3.1 2.3 - 3.5 gm/dL Final     Lab Results   Component Value Date    WBC 3.35 11/05/2018    HGB 12.2 11/05/2018    HCT 35.1 11/05/2018    MCV 95.9 11/05/2018     11/05/2018     Lab Results   Component Value Date    NEUTROABS 1.56 (L) 11/05/2018     Lab Results   Component Value Date     31.1 (H) 09/24/2018    LABCA2 38.1 09/24/2018     Component CA 15-3   Latest Ref Rng & Units 0.0 - 25.0 U/mL   5/7/2018 5/14/2018 41.1 (H)   6/11/2018 37.3 (H)   7/9/2018 35.3 (H)   8/27/2018 36.7 (H)   9/24/2018 31.1 (H)   11/5/2018 32.9 (H)       Component CA 27.29   Latest Ref Rng & Units 0.0 - 38.6 U/mL   5/14/2018 38.1   6/11/2018 39.7 (H)   7/9/2018 37.9   8/27/2018 34.1   9/24/2018 38.1   11/5/2018 31.2         2 D Echo Done on July 19, 2017  showed:  Interpretation Summary   · The left ventricular cavity is borderline dilated.  · Left ventricular systolic function is normal. Estimated EF = 53%.  · Left ventricular diastolic dysfunction (grade I) consistent with impaired relaxation.  · IAS aneurysm noted. No PFO or ASD           Radiology Data :  CT of chest, abdomen and pelvis with contrast done on September 19, 2018 was reviewed, discussed with patient, it showed:  CHEST CT FINDINGS: Bilateral breast implants. Right mastectomy.     Heart size normal. No evidence of pathologically enlarged nodes.  No dense consolidation or masses.        ABDOMEN CT FINDINGS: Stable low CT attenuation lesion anterior  aspect of the liver unchanged since multiple prior exams  corresponding to a treated, stable metastasis.     The gallbladder, spleen, pancreas, adrenal glands and kidneys are  normal in appearance. Bowel grossly unremarkable.     No evidence of pathologically enlarged nodes, free fluid or free  air. Degenerative changes of the spine. The bones are  unremarkable.     PELVIS CT FINDINGS: There are no pelvic masses.        Extensive skeletal metastases the most part osteoblastic  sclerotic. High-grade compression fracture L4 vertebral body.  These are unchanged since prior exam.           IMPRESSION:  CONCLUSION: Bilateral breast implants. Right mastectomy. CT chest  is otherwise unremarkable. Lungs are clear. No pathologic hilar  or mediastinal adenopathy.     Stable hepatic metastasis anterior aspect of liver unchanged  since prior exam.     Extensive skeletal metastases also unchanged since prior exam.             CT of chest, abdomen and pelvis with contrast done on July 9, 2018 was reviewed, discussed with patient, it showed:  FINDINGS:      Chest:  The lungs are clear and well-expanded bilaterally. No suspicious  pulmonary nodules or masses.     Heart size is normal. No pleural or pericardial effusion.  Esophagus is normal in course and caliber.  Bilateral breast  implants with prior right mastectomy. No lymphadenopathy in the  chest by CT size criteria.     Abdomen/pelvis:  Stable low-attenuation lesion in the anterior aspect of the  liver. No new liver lesions are identified. There is atrophy of  the left lateral segment of the liver which is stable. The liver  is otherwise unremarkable. Gallbladder, spleen, adrenal glands  and kidneys are unremarkable. Focal atrophy of the pancreatic  tail which is of uncertain etiology although stable in appearance  compared to multiple prior exams. No focal pancreatic ductal  dilatation or mass identified.     No lymphadenopathy in the abdomen, retroperitoneum or pelvis by  CT size criteria.     The bladder is collapsed. Uterus and ovaries are grossly normal  in size for age. The bowel is unremarkable. No bowel obstruction.  Normal appendix. No free fluid.     Grossly stable diffuse skeletal metastatic disease with a stable  compression or burst deformity of L4.     IMPRESSION:  1. Stable exam. Diffuse skeletal metastatic disease is grossly  unchanged. No new metastases are identified in the chest, abdomen  or pelvis.      MRI of brain with and without contrast done on July 9, 2018 was reviewed, discussed with patient, it showed:  IMPRESSION:  No evidence of intracranial hemorrhage, mass/mass effect, acute  infarct, or pathologic contrast enhancement.     Previous described/known enhancing calvarial metastatic lesions  are significantly less conspicuous on today's examination. The  finding would support favorable treatment response.     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          Doppler ultrasound of right upper extremity done on November 10, 2017 showed:  IMPRESSION:  CONCLUSION:   Abnormal exam with thrombus visualized in the right internal  jugular vein, consistent with DVT.      PET/CT done on July 3, 2017 showed:  IMPRESSION:  CONCLUSION:  1. Extensive metastatic disease.  Pathologic uptake within  enlarged right-sided axillary lymph nodes. Metastatic adenopathy  in both laurie and mediastinum. Tumor replacing most of the left  lobe of liver. Intra-abdominal retroperitoneal metastases,  adenopathy.     Extensive skeletal metastases including a pathologic fracture at  L4.        Nuclear bone scan done on June 20, 2017 showed:  1. Increased uptake at L4 suggestive of metastatic process.  2. 3 or 4 areas of increased activity in bony calvarium  3. Some increased activity in tips posteriorly and anteriorly suggestive of metastatic carcinoma to the skeletal system.           Pathology :    Pathology data from July 10, 2017 showed:  Final Diagnosis   Mass right axilla, excisional biopsy:      Metastatic poorly differentiated ductal carcinoma, breast primary      Estrogen receptor positive, 95% stainability, strong intensity      Progesterone receptor positive, 95% stainability, strong intensity      HER-2 2+(equivocal), sent to [facility] for FISH     Addendum   Her 2 FISH result from [facility] is NEGATIVE         Pathology report from December 7, 2007 showed:  FINAL DIAGNOSIS:   RIGHT BREAST MAMMOTOME BIOPSY:        DUCTAL CARCINOMA IN SITU, INTERMEDIATE NUCLEAR GRADE              WITH MICROCALCIFICATION.      ADDENDUM:   Estrogen receptors are positive (90-95% stainability).   Progesterone receptors are positive (90-95% stainability).         ASSESSMENT AND PLAN:      1.  Metastatic cancer with extensive adenopathy in right axilla, mediastinum, hilar, abdominal, retroperitoneal with extensive liver and bone metastasis on PET/CT.  Patient underwent right apatient underwent right axillary lymph node excision by Dr. Ernst on July 10, 2017.Pathology report confirmed ductal carcinoma of breast with estrogen and progesterone positivity.  At her on palliative chemotherapy with Taxotere and cytoxan on July 26, 2017.   Patient was  seen at [facility] for second opinion regarding her breast  cancer.  Case was discussed with Dr vazquez oncologist that [facility]  She agreed with her chemotherapy at this point.  The scan on December 20, 2017 shows relative stability of disease, lesion in the liver as well as bone lesion at about same.  Her tumor marker CA 27-29 as well as CA 15-3 are less than 100 at this point.  Since patient is developing some neuropathy in bilateral upper and lower extremity recommend changing her therapy to Palbociclib with letrozole.  Patient started taking first round of Palbociclib and letrozole on January 30, 2018.  Patient was scheduled to start cycle  6 of chemotherapy today with Palbociclib today on July 16, 2018.   -CT of chest, abdomen and pelvis with contrast done on July 9, 2018 show stable disease which was discussed with patient.  -MRI of brain with and without contrast done on July 9, 2018 shows significant improvement in calvarial metastasis which was discussed with patient.  -CT of chest, abdomen and pelvis with contrast done on September 19, 2018 showed stable disease without any new lymphadenopathy or any new lesions.  Result of CT scan were discussed with patient and her family.  -We will ask patient to start cycle 9 of Palbociclib and letrozole on November 8, 2018.  -We will ask patient to return to clinic in 5 weeks with a restaging CT of chest, abdomen and pelvis with contrast to be done prior to that.    2.  Brain metastasis:MRi Brain with and without done on November 9, 2017 was reviewed and compared with t MRI done 6 weeks prior to that.  Brain calvarial metastasis are stable or somewhat improved.  No need to do radiation at this point which was discussed with patient.    -MRI of brain with and without contrast done on July 9, 2018 shows improvement in calvarial metastasis.    3.  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  She is currently on Coumadin.    She is being followed by  Coumadin clinic She was instructed to come to the emergency room in case she starts having any bleeding.    4.  History of melanoma in situ status post excision by Dr. Linn.    5.  Bone metastasis: Patient was started on Zometa on August 23, 2017.  Continue with Zometa as scheduled for now.  Patient denies any mouth sores or jaw pain.  CT scan shows about loss of 70% of height of L4 vertebral body, patient has been referred to Dr. Pugh to get evaluated for kyphoplasty.  Patient went for second opinion with orthopedic surgeon in [place], as per patient's second surgeon did not recommend any surgery.  We will continue with monthly Zometa for now.     6.  History of ductal carcinoma in situ of the right breast diagnosed in 2007.  Status post mastectomy followed by adjuvant tamoxifen for 5 years which was finished in 2013.    7.  Elevated liver enzymes: ASt is 87, ALT is 99. Will monitor with cmp for now.  Patient  is scheduled to get restaging CT of chest, abdomen and pelvis with contrast prior to next clinic visit in 5 weeks.    8.  Neutropenia: Secondary to Palbociclib. WBC is normal at 3.35 with ANC 1.56 .  We will monitored with CBC for now.    9.  Anemia: Secondary to chemotherapy.  Hemoglobin is 12.2 today.  We'll monitored with CBC.    10.  Health maintenance: Patient does not smoke.  Not a candidate for screening colonoscopy at this point.  Patient is requesting flu shot which will be provided today on November 5, 2018.    11.  Prescriptions: Patient has enough prescription of Decadron, Zofran,  and Ultram.    12.  Advance care planning: Patient remains full code for now and is able to make on her decisions.  Patient has healthcare surrogate Mentioned on Chart.    13 Obesity: Patient's Body mass index is 34.23 kg/m². BMI is higher than reference range.  Patient was notified about elevated BMI.  Patient was counseled about diet and exercise for weight loss.        Ovidio Medaows MD  11/5/2018  5:24  HOLLEY Briones/Transcription disclaimer:   Much of this encounter note is an electronic transcription/translation of spoken language to printed text. The electronic translation of spoken language may permit erroneous, or at times, nonsensical words or phrases to be inadvertently transcribed; Although I have reviewed the note for such errors, some may still exist.

## 2018-11-05 NOTE — PATIENT INSTRUCTIONS
Patient Instructions for CT Scan    · Your CT scan is being done without any oral contrast.  Your CT scan may be done with IV contrast, if you have an allergy to iodine--please tell your nurse.    · Do not eat or drink 4 hours prior to scan.     · You may take your medications with sips of water, except DO NOT take your diabetic pill the morning of the test.    Arrive at the Outpatient Surgery entrance at [facility] 20 minutes prior to appointment time.    You will receive a phone call with an appointment for your CT scan.  Please call our office, if someone does not contact you with 3 days.    Vijaya Campos RN  November 5, 2018  10:36 AM

## 2018-11-06 LAB
CANCER AG15-3 SERPL-ACNC: 32.9 U/ML (ref 0–25)
CANCER AG27-29 SERPL-ACNC: 31.2 U/ML (ref 0–38.6)

## 2018-12-04 ENCOUNTER — TELEPHONE (OUTPATIENT)
Dept: ONCOLOGY | Facility: CLINIC | Age: 48
End: 2018-12-04

## 2018-12-04 NOTE — TELEPHONE ENCOUNTER
The pt stated that she will just wait until her oral surgery appt to see whats going on with her gum and then she has an appt with you 12/13 so she will just wait to see you then. Thank you.

## 2018-12-04 NOTE — TELEPHONE ENCOUNTER
Let pt know that dr gray stated that the knot might be related to the gum problem but if she wants to come see dr gray then we can sit her up with an appt. She states that her oral surgery appt is this Monday and then she sees dr gray Thursday 12/13 so that she will just wait until after she sees the oral surgeon to come see dr gray.

## 2018-12-04 NOTE — TELEPHONE ENCOUNTER
Spoke with pt regarding her dental issues/appt yesterday. She states the pain started Saturday night with Sunday being the worst in pain. She states she went to the dentist yesterday and that the dentist told her it was food stuck under her gum and she is being referred to an oral surgeon next week. She also states that she has a small knot on her left jaw line. Informed her I will let dr gray know all of this and get back with her this afternoon she states understanding.

## 2018-12-07 ENCOUNTER — HOSPITAL ENCOUNTER (OUTPATIENT)
Dept: CT IMAGING | Facility: HOSPITAL | Age: 48
Discharge: HOME | End: 2018-12-07
Attending: INTERNAL MEDICINE | Admitting: INTERNAL MEDICINE

## 2018-12-07 ENCOUNTER — APPOINTMENT (OUTPATIENT)
Dept: CT IMAGING | Facility: HOSPITAL | Age: 48
End: 2018-12-07
Attending: INTERNAL MEDICINE

## 2018-12-07 DIAGNOSIS — Z17.0 MALIGNANT NEOPLASM OF RIGHT BREAST IN FEMALE, ESTROGEN RECEPTOR POSITIVE, UNSPECIFIED SITE OF BREAST (HCC): ICD-10-CM

## 2018-12-07 DIAGNOSIS — C50.911 MALIGNANT NEOPLASM OF RIGHT BREAST IN FEMALE, ESTROGEN RECEPTOR POSITIVE, UNSPECIFIED SITE OF BREAST (HCC): ICD-10-CM

## 2018-12-07 DIAGNOSIS — C79.51 BONE METASTASIS: ICD-10-CM

## 2018-12-07 PROCEDURE — 71260 CT THORAX DX C+: CPT

## 2018-12-07 PROCEDURE — 74177 CT ABD & PELVIS W/CONTRAST: CPT

## 2018-12-07 PROCEDURE — 25010000002 IOPAMIDOL 61 % SOLUTION: Performed by: INTERNAL MEDICINE

## 2018-12-07 RX ADMIN — IOPAMIDOL 86 ML: 612 INJECTION, SOLUTION INTRAVENOUS at 08:08

## 2018-12-13 ENCOUNTER — ANTICOAGULATION VISIT (OUTPATIENT)
Dept: CARDIAC SURGERY | Facility: CLINIC | Age: 48
End: 2018-12-13

## 2018-12-13 ENCOUNTER — INFUSION (OUTPATIENT)
Dept: ONCOLOGY | Facility: HOSPITAL | Age: 48
End: 2018-12-13

## 2018-12-13 ENCOUNTER — OFFICE VISIT (OUTPATIENT)
Dept: ONCOLOGY | Facility: CLINIC | Age: 48
End: 2018-12-13

## 2018-12-13 VITALS
DIASTOLIC BLOOD PRESSURE: 85 MMHG | HEART RATE: 72 BPM | HEIGHT: 67 IN | RESPIRATION RATE: 18 BRPM | OXYGEN SATURATION: 98 % | WEIGHT: 221 LBS | BODY MASS INDEX: 34.69 KG/M2 | SYSTOLIC BLOOD PRESSURE: 122 MMHG | TEMPERATURE: 98.6 F

## 2018-12-13 VITALS — HEART RATE: 80 BPM | DIASTOLIC BLOOD PRESSURE: 72 MMHG | SYSTOLIC BLOOD PRESSURE: 114 MMHG

## 2018-12-13 DIAGNOSIS — Z45.2 ENCOUNTER FOR ADJUSTMENT OR MANAGEMENT OF VASCULAR ACCESS DEVICE: ICD-10-CM

## 2018-12-13 DIAGNOSIS — C50.911 MALIGNANT NEOPLASM OF RIGHT BREAST IN FEMALE, ESTROGEN RECEPTOR POSITIVE, UNSPECIFIED SITE OF BREAST (HCC): ICD-10-CM

## 2018-12-13 DIAGNOSIS — IMO0001 OTHER COMPLICATION DUE TO VENOUS ACCESS DEVICE, SUBSEQUENT ENCOUNTER: ICD-10-CM

## 2018-12-13 DIAGNOSIS — Z17.0 MALIGNANT NEOPLASM OF RIGHT BREAST IN FEMALE, ESTROGEN RECEPTOR POSITIVE, UNSPECIFIED SITE OF BREAST (HCC): Primary | ICD-10-CM

## 2018-12-13 DIAGNOSIS — Z79.01 LONG TERM CURRENT USE OF ANTICOAGULANT THERAPY: ICD-10-CM

## 2018-12-13 DIAGNOSIS — D64.81 ANEMIA DUE TO ANTINEOPLASTIC CHEMOTHERAPY: ICD-10-CM

## 2018-12-13 DIAGNOSIS — C50.911 MALIGNANT NEOPLASM OF RIGHT BREAST IN FEMALE, ESTROGEN RECEPTOR POSITIVE, UNSPECIFIED SITE OF BREAST (HCC): Primary | ICD-10-CM

## 2018-12-13 DIAGNOSIS — T45.1X5A ANEMIA DUE TO ANTINEOPLASTIC CHEMOTHERAPY: ICD-10-CM

## 2018-12-13 DIAGNOSIS — C79.51 BONE METASTASIS: Primary | ICD-10-CM

## 2018-12-13 DIAGNOSIS — D70.1 CHEMOTHERAPY-INDUCED NEUTROPENIA (HCC): ICD-10-CM

## 2018-12-13 DIAGNOSIS — Z45.2 ENCOUNTER FOR VENOUS ACCESS DEVICE CARE: ICD-10-CM

## 2018-12-13 DIAGNOSIS — T45.1X5A CHEMOTHERAPY-INDUCED NEUTROPENIA (HCC): ICD-10-CM

## 2018-12-13 DIAGNOSIS — Z17.0 MALIGNANT NEOPLASM OF RIGHT BREAST IN FEMALE, ESTROGEN RECEPTOR POSITIVE, UNSPECIFIED SITE OF BREAST (HCC): ICD-10-CM

## 2018-12-13 DIAGNOSIS — C79.31 METASTASIS TO BRAIN (HCC): ICD-10-CM

## 2018-12-13 LAB
ALBUMIN SERPL-MCNC: 4.4 G/DL (ref 3.4–4.8)
ALBUMIN/GLOB SERPL: 1.5 G/DL (ref 1.1–1.8)
ALP SERPL-CCNC: 76 U/L (ref 38–126)
ALT SERPL W P-5'-P-CCNC: 41 U/L (ref 9–52)
ANION GAP SERPL CALCULATED.3IONS-SCNC: 11 MMOL/L (ref 5–15)
AST SERPL-CCNC: 34 U/L (ref 14–36)
BASOPHILS # BLD AUTO: 0.13 10*3/MM3 (ref 0–0.2)
BASOPHILS NFR BLD AUTO: 2.8 % (ref 0–2)
BILIRUB SERPL-MCNC: 0.3 MG/DL (ref 0.2–1.3)
BUN BLD-MCNC: 11 MG/DL (ref 7–21)
BUN/CREAT SERPL: 14.3 (ref 7–25)
CALCIUM SPEC-SCNC: 9.3 MG/DL (ref 8.4–10.2)
CHLORIDE SERPL-SCNC: 102 MMOL/L (ref 95–110)
CO2 SERPL-SCNC: 24 MMOL/L (ref 22–31)
CREAT BLD-MCNC: 0.77 MG/DL (ref 0.5–1)
DEPRECATED RDW RBC AUTO: 53.4 FL (ref 36.4–46.3)
EOSINOPHIL # BLD AUTO: 0.06 10*3/MM3 (ref 0–0.7)
EOSINOPHIL NFR BLD AUTO: 1.3 % (ref 0–7)
ERYTHROCYTE [DISTWIDTH] IN BLOOD BY AUTOMATED COUNT: 15.2 % (ref 11.5–14.5)
GFR SERPL CREATININE-BSD FRML MDRD: 79 ML/MIN/1.73 (ref 51–120)
GLOBULIN UR ELPH-MCNC: 2.9 GM/DL (ref 2.3–3.5)
GLUCOSE BLD-MCNC: 83 MG/DL (ref 60–100)
HCT VFR BLD AUTO: 35.5 % (ref 35–45)
HGB BLD-MCNC: 12.4 G/DL (ref 12–15.5)
IMM GRANULOCYTES # BLD: 0.02 10*3/MM3 (ref 0–0.02)
IMM GRANULOCYTES NFR BLD: 0.4 % (ref 0–0.5)
INR PPP: 2 (ref 0.9–1.1)
LYMPHOCYTES # BLD AUTO: 1.34 10*3/MM3 (ref 0.6–4.2)
LYMPHOCYTES NFR BLD AUTO: 29.1 % (ref 10–50)
MAGNESIUM SERPL-MCNC: 2.1 MG/DL (ref 1.6–2.3)
MCH RBC QN AUTO: 33.4 PG (ref 26.5–34)
MCHC RBC AUTO-ENTMCNC: 34.9 G/DL (ref 31.4–36)
MCV RBC AUTO: 95.7 FL (ref 80–98)
MONOCYTES # BLD AUTO: 0.65 10*3/MM3 (ref 0–0.9)
MONOCYTES NFR BLD AUTO: 14.1 % (ref 0–12)
NEUTROPHILS # BLD AUTO: 2.41 10*3/MM3 (ref 2–8.6)
NEUTROPHILS NFR BLD AUTO: 52.3 % (ref 37–80)
PHOSPHATE SERPL-MCNC: 3.9 MG/DL (ref 2.4–4.4)
PLATELET # BLD AUTO: 227 10*3/MM3 (ref 150–450)
PMV BLD AUTO: 9.9 FL (ref 8–12)
POTASSIUM BLD-SCNC: 4 MMOL/L (ref 3.5–5.1)
PROT SERPL-MCNC: 7.3 G/DL (ref 6.3–8.6)
RBC # BLD AUTO: 3.71 10*6/MM3 (ref 3.77–5.16)
SODIUM BLD-SCNC: 137 MMOL/L (ref 137–145)
WBC NRBC COR # BLD: 4.61 10*3/MM3 (ref 3.2–9.8)

## 2018-12-13 PROCEDURE — 86300 IMMUNOASSAY TUMOR CA 15-3: CPT

## 2018-12-13 PROCEDURE — 36591 DRAW BLOOD OFF VENOUS DEVICE: CPT | Performed by: INTERNAL MEDICINE

## 2018-12-13 PROCEDURE — G8417 CALC BMI ABV UP PARAM F/U: HCPCS | Performed by: INTERNAL MEDICINE

## 2018-12-13 PROCEDURE — 1123F ACP DISCUSS/DSCN MKR DOCD: CPT | Performed by: INTERNAL MEDICINE

## 2018-12-13 PROCEDURE — 83735 ASSAY OF MAGNESIUM: CPT

## 2018-12-13 PROCEDURE — 80053 COMPREHEN METABOLIC PANEL: CPT

## 2018-12-13 PROCEDURE — 85025 COMPLETE CBC W/AUTO DIFF WBC: CPT

## 2018-12-13 PROCEDURE — 85610 PROTHROMBIN TIME: CPT | Performed by: NURSE PRACTITIONER

## 2018-12-13 PROCEDURE — G0463 HOSPITAL OUTPT CLINIC VISIT: HCPCS | Performed by: INTERNAL MEDICINE

## 2018-12-13 PROCEDURE — 84100 ASSAY OF PHOSPHORUS: CPT

## 2018-12-13 PROCEDURE — 99214 OFFICE O/P EST MOD 30 MIN: CPT | Performed by: INTERNAL MEDICINE

## 2018-12-13 RX ORDER — SODIUM CHLORIDE 0.9 % (FLUSH) 0.9 %
10 SYRINGE (ML) INJECTION AS NEEDED
Status: DISCONTINUED | OUTPATIENT
Start: 2018-12-13 | End: 2018-12-13 | Stop reason: HOSPADM

## 2018-12-13 RX ORDER — SODIUM CHLORIDE 0.9 % (FLUSH) 0.9 %
10 SYRINGE (ML) INJECTION AS NEEDED
Status: CANCELLED | OUTPATIENT
Start: 2018-12-13

## 2018-12-13 RX ADMIN — SODIUM CHLORIDE, PRESERVATIVE FREE 500 UNITS: 5 INJECTION INTRAVENOUS at 10:24

## 2018-12-13 RX ADMIN — SODIUM CHLORIDE, PRESERVATIVE FREE 10 ML: 5 INJECTION INTRAVENOUS at 09:21

## 2018-12-13 NOTE — PROGRESS NOTES
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  Started on palliative chemotherapy with Taxotere and Cytoxan on July 26, 2017.  Patient received 7 cycle of Taxotere and Cytoxan on December 6, 2017.  After that patient was changed over to treatment with Palbociclib and letrozole on January 30, 2018.  She is scheduled to start cycle 10 of Palbociclib with letrozole today on December 13, 2018.  States she had pain and swelling of left jaw for which she was evaluated by dentist and found to have infection around wisdom tooth.  She is scheduled to see oral surgeon next week for treatment discussion.   Pain and swelling is resolved with antibiotic use.   Denies any nausea or vomiting or diarrhea with current treatment.  Complains of tingling and numbness affecting bilateral hand.    Complains of back pain,denies any recent worsening.  Denies any bowel or bladder incontinence.      PAST MEDICAL HISTORY:    Past Medical History:   Diagnosis Date   • Anxiety    • Depression    • Encounter for gynecological examination    • Malignant neoplasm of female breast (CMS/HCC)     hx of dcis s/p mastectomy and reconstruction and augmentation      • Primary malignant neoplasm of breast (CMS/HCC)     dcis in rt breast s/p mastectomy,reconstruction      • Ventricular premature beats        SOCIAL HISTORY:    Social History     Tobacco Use   • Smoking status: Never Smoker   • Smokeless tobacco: Never Used   Substance Use Topics   • Alcohol use: No   • Drug use: No       Surgical History :  Past Surgical History:   Procedure Laterality Date   • AUGMENTATION  MAMMAPLASTY     • BREAST BIOPSY  12/07/2007    Mammotome biopsy of the right side with clip placement   • BREAST LUMPECTOMY     • BREAST RECONSTRUCTION  10/28/2008    Abscence of right breast secondary to mastectomy. Implant exchange for reconstruction of right breast with open para-prosthetic capsulotomy   • BREAST SURGERY  10/01/2009    Left breast ptosis and breast asymmetry secondary to right breast reconstruction for breast cancer. Left breast mastopexy with augmentation.   • CARDIAC CATHETERIZATION  09/18/2008    Normal coronary arteriography. Normal left ventricular angiogram   • EP STUDY     • MASTECTOMY Right    • MASTECTOMY  02/05/2008    Multifocal right breast ductal carcinoma-in-situ. Right total mastectomy   • MASTECTOMY, PARTIAL  01/03/2008    Ductal carcinoma in-situ of the right breast, proven by mammotome biopsy. Right lumpectomy, medial portion of the breast, between 2 o'clock and 4 o'clock, 5 cm from the nipple, with clip placement, radiographic inter. of severo. specimen, & ultrasound   • PAP SMEAR  03/03/2009    Negative       ALLERGIES:    Allergies   Allergen Reactions   • Percocet [Oxycodone-Acetaminophen] Nausea And Vomiting       FAMILY HISTORY:  Family History   Problem Relation Age of Onset   • Anemia Mother    • Multiple sclerosis Mother    • No Known Problems Father    • Diabetes Other    • Multiple sclerosis Other        REVIEW OF SYSTEMS:      CONSTITUTIONAL: Complains of fatigue.  Has gained 3 pounds since last clinic visit.  Denies any fever, chills .      HEENT: Complains of pain and swelling of left jaw secondary to infection around wisdom tooth.  Her swelling does resolve after antibiotic use.  No epistaxis, mouth sores or difficulty swallowing.     RESPIRATORY: No new shortness of breath. No new cough or hemoptysis.     CARDIOVASCULAR: No chest pain or palpitations.     GASTROINTESTINAL: No nausea or vomiting. Not requiring scopolamine patch.  No new abdominal pain. No blood in  "the stool.     GENITOURINARY: No Dysuria or Hematuria.     MUSCULOSKELETAL:Complains of arthritic pain affecting bilateral hands. Complains of pain in bilateral hip.  Complains of back pain ,denies any recent worsening.     LYMPHATICS: No new lymph node swelling.     NEUROLOGICAL : Complains of intermittent tingling and numbness affecting bilateral hand. Complains of tingling and numbness from back radiating down to bilateral lower extremity.  No dizziness. No seizures or balance problems.     SKIN: Positive for history of melanoma status post surgical removal. Denies any new skin lesion worrisome for melanoma.              PHYSICAL EXAMINATION:      VITAL SIGNS:  /85   Pulse 72   Temp 98.6 °F (37 °C) (Temporal)   Resp 18   Ht 170.2 cm (67.01\")   Wt 100 kg (221 lb)   SpO2 98%   BMI 34.61 kg/m²      ECOG performance status:1    GENERAL:  Not in any distress.    HEENT:  Normocephalic, Atraumatic.Mild Conjunctival pallor. No icterus. Extraocular Movements Intact. No Facial Asymmetry noted.  No evidence of any swelling on left lower jaw.    NECK:  No adenopathy. No JVD.    RESPIRATORY:  Fair air entry bilateral. No rhonchi or wheezing.    CARDIOVASCULAR:  S1, S2. Regular rate and rhythm. No murmur or gallop appreciated.    ABDOMEN:  Soft, obese, nontender. Bowel sounds present in all four quadrants.  No hepatosplenomegaly appreciated.    MUSCULOSKELETAL:  No edema.No Calf Tenderness.  Decreased range of motion.    NEUROLOGIC:  Alert, awake and oriented ×3.  No  Motor  deficit appreciated. Cranial Nerves 2-12 grossly intact.    LYMPHATICS: No new lymph node enlargement in neck or supraclavicular area.    PSYCHIATRY: Alert, awake and oriented ×3. Normal affect. Normal Judgement.Makes good eye contact.          DIAGNOSTIC DATA:    Glucose   Date Value Ref Range Status   12/13/2018 83 60 - 100 mg/dL Final     Sodium   Date Value Ref Range Status   12/13/2018 137 137 - 145 mmol/L Final     Potassium   Date Value " Ref Range Status   12/13/2018 4.0 3.5 - 5.1 mmol/L Final     CO2   Date Value Ref Range Status   12/13/2018 24.0 22.0 - 31.0 mmol/L Final     Chloride   Date Value Ref Range Status   12/13/2018 102 95 - 110 mmol/L Final     Anion Gap   Date Value Ref Range Status   12/13/2018 11.0 5.0 - 15.0 mmol/L Final     Creatinine   Date Value Ref Range Status   12/13/2018 0.77 0.50 - 1.00 mg/dL Final     BUN   Date Value Ref Range Status   12/13/2018 11 7 - 21 mg/dL Final     BUN/Creatinine Ratio   Date Value Ref Range Status   12/13/2018 14.3 7.0 - 25.0 Final     Calcium   Date Value Ref Range Status   12/13/2018 9.3 8.4 - 10.2 mg/dL Final     eGFR Non  Amer   Date Value Ref Range Status   12/13/2018 79 51 - 120 mL/min/1.73 Final     Alkaline Phosphatase   Date Value Ref Range Status   12/13/2018 76 38 - 126 U/L Final     Total Protein   Date Value Ref Range Status   12/13/2018 7.3 6.3 - 8.6 g/dL Final     ALT (SGPT)   Date Value Ref Range Status   12/13/2018 41 9 - 52 U/L Final     AST (SGOT)   Date Value Ref Range Status   12/13/2018 34 14 - 36 U/L Final     Total Bilirubin   Date Value Ref Range Status   12/13/2018 0.3 0.2 - 1.3 mg/dL Final     Albumin   Date Value Ref Range Status   12/13/2018 4.40 3.40 - 4.80 g/dL Final     Globulin   Date Value Ref Range Status   12/13/2018 2.9 2.3 - 3.5 gm/dL Final     Lab Results   Component Value Date    WBC 4.61 12/13/2018    HGB 12.4 12/13/2018    HCT 35.5 12/13/2018    MCV 95.7 12/13/2018     12/13/2018     Lab Results   Component Value Date    NEUTROABS 2.41 12/13/2018     Lab Results   Component Value Date     32.9 (H) 11/05/2018    LABCA2 31.2 11/05/2018     Component CA 15-3   Latest Ref Rng & Units 0.0 - 25.0 U/mL   5/7/2018 5/14/2018 41.1 (H)   6/11/2018 37.3 (H)   7/9/2018 35.3 (H)   8/27/2018 36.7 (H)   9/24/2018 31.1 (H)   11/5/2018 32.9 (H)       Component CA 27.29   Latest Ref Rng & Units 0.0 - 38.6 U/mL   5/14/2018 38.1   6/11/2018 39.7 (H)    7/9/2018 37.9   8/27/2018 34.1   9/24/2018 38.1   11/5/2018 31.2         2 D Echo Done on July 19, 2017 showed:  Interpretation Summary   · The left ventricular cavity is borderline dilated.  · Left ventricular systolic function is normal. Estimated EF = 53%.  · Left ventricular diastolic dysfunction (grade I) consistent with impaired relaxation.  · IAS aneurysm noted. No PFO or ASD           Radiology Data :  CT of chest, abdomen and pelvis with contrast done on December 7, 2018 was reviewed, discussed with patient, it showed:  IMPRESSION:  CONCLUSION:     1. Stable hepatic metastases anterior aspect of the liver.  2. Stable extensive skeletal metastases with pathologic fracture  involving L4.         CT of chest, abdomen and pelvis with contrast done on September 19, 2018 was reviewed, discussed with patient, it showed:  CHEST CT FINDINGS: Bilateral breast implants. Right mastectomy.     Heart size normal. No evidence of pathologically enlarged nodes.  No dense consolidation or masses.        ABDOMEN CT FINDINGS: Stable low CT attenuation lesion anterior  aspect of the liver unchanged since multiple prior exams  corresponding to a treated, stable metastasis.     The gallbladder, spleen, pancreas, adrenal glands and kidneys are  normal in appearance. Bowel grossly unremarkable.     No evidence of pathologically enlarged nodes, free fluid or free  air. Degenerative changes of the spine. The bones are  unremarkable.     PELVIS CT FINDINGS: There are no pelvic masses.        Extensive skeletal metastases the most part osteoblastic  sclerotic. High-grade compression fracture L4 vertebral body.  These are unchanged since prior exam.           IMPRESSION:  CONCLUSION: Bilateral breast implants. Right mastectomy. CT chest  is otherwise unremarkable. Lungs are clear. No pathologic hilar  or mediastinal adenopathy.     Stable hepatic metastasis anterior aspect of liver unchanged  since prior exam.     Extensive skeletal  metastases also unchanged since prior exam.               MRI of brain with and without contrast done on July 9, 2018 was reviewed, discussed with patient, it showed:  IMPRESSION:  No evidence of intracranial hemorrhage, mass/mass effect, acute  infarct, or pathologic contrast enhancement.     Previous described/known enhancing calvarial metastatic lesions  are significantly less conspicuous on today's examination. The  finding would support favorable treatment response.     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          Doppler ultrasound of right upper extremity done on November 10, 2017 showed:  IMPRESSION:  CONCLUSION:   Abnormal exam with thrombus visualized in the right internal  jugular vein, consistent with DVT.      PET/CT done on July 3, 2017 showed:  IMPRESSION:  CONCLUSION:  1. Extensive metastatic disease. Pathologic uptake within  enlarged right-sided axillary lymph nodes. Metastatic adenopathy  in both laurie and mediastinum. Tumor replacing most of the left  lobe of liver. Intra-abdominal retroperitoneal metastases,  adenopathy.     Extensive skeletal metastases including a pathologic fracture at  L4.        Nuclear bone scan done on June 20, 2017 showed:  1. Increased uptake at L4 suggestive of metastatic process.  2. 3 or 4 areas of increased activity in bony calvarium  3. Some increased activity in tips posteriorly and anteriorly suggestive of metastatic carcinoma to the skeletal system.           Pathology :    Pathology data from July 10, 2017 showed:  Final Diagnosis   Mass right axilla, excisional biopsy:      Metastatic poorly differentiated ductal carcinoma, breast primary      Estrogen receptor positive, 95% stainability, strong intensity      Progesterone receptor positive, 95% stainability, strong intensity      HER-2 2+(equivocal), sent to [facility] for FISH     Addendum   Her 2 FISH result from [facility] is NEGATIVE         Pathology report from  December 7, 2007 showed:  FINAL DIAGNOSIS:   RIGHT BREAST MAMMOTOME BIOPSY:        DUCTAL CARCINOMA IN SITU, INTERMEDIATE NUCLEAR GRADE              WITH MICROCALCIFICATION.      ADDENDUM:   Estrogen receptors are positive (90-95% stainability).   Progesterone receptors are positive (90-95% stainability).         ASSESSMENT AND PLAN:      1.  Metastatic cancer with extensive adenopathy in right axilla, mediastinum, hilar, abdominal, retroperitoneal with extensive liver and bone metastasis on PET/CT.  Patient underwent right apatient underwent right axillary lymph node excision by Dr. Ernst on July 10, 2017.Pathology report confirmed ductal carcinoma of breast with estrogen and progesterone positivity.  At her on palliative chemotherapy with Taxotere and cytoxan on July 26, 2017.   Patient was  seen at [facility] for second opinion regarding her breast cancer.  Case was discussed with Dr vazquez oncologist that has seen patient at [facility].  She agreed with her chemotherapy at this point.  The scan on December 20, 2017 shows relative stability of disease, lesion in the liver as well as bone lesion at about same.  Her tumor marker CA 27-29 as well as CA 15-3 are less than 100 at this point.  Since patient is developing some neuropathy in bilateral upper and lower extremity recommend changing her therapy to Palbociclib with letrozole.  Patient started taking first round of Palbociclib and letrozole on January 30, 2018.  Patient was scheduled to start cycle  6 of chemotherapy today with Palbociclib today on July 16, 2018.   -CT of chest, abdomen and pelvis with contrast done on July 9, 2018 show stable disease which was discussed with patient.  -MRI of brain with and without contrast done on July 9, 2018 shows significant improvement in calvarial metastasis which was discussed with patient.  -CT of chest, abdomen and pelvis with contrast done on September 19, 2018 showed stable disease without any new  lymphadenopathy or any new lesions.  Result of CT scan were discussed with patient and her family.  -CT of chest, abdomen and pelvis with contrast done on December 7, 2018 after cycle 9 of Palbociclib and letrozole showed stable disease involving liver and bones.  Result of CT scan were discussed with patient.  -We will ask patient to start cycle 10 of Palbociclib and letrozole today on December 13, 2018.  -Since patient had recent infection and wisdom tooth on left lower choke requiring antibiotic.  Patient is scheduled to see oral surgeon next week for recommendation.  We will hold chemotherapy this week in case patient requires any tooth extraction next week.  Patient is instructed to call us after her appointment with local surgeon.  -If no intervention is required regarding her wisdom tooth, we will resume Zometa as well as chemotherapy next week.  -We will ask patient to return to clinic in 5 weeks with a repeat CBC and CMP on that day.    2.  Brain metastasis:MRi Brain with and without done on November 9, 2017 was reviewed and compared with t MRI done 6 weeks prior to that.  Brain calvarial metastasis are stable or somewhat improved.  No need to do radiation at this point which was discussed with patient.    -MRI of brain with and without contrast done on July 9, 2018 shows improvement in calvarial metastasis.    3.  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  She is currently on Coumadin.    She is being followed by Coumadin clinic She was instructed to come to the emergency room in case she starts having any bleeding.    4.  History of melanoma in situ status post excision by Dr. Linn.    5.  Bone metastasis: Patient was started on Zometa on August 23, 2017.  Continue with Zometa as scheduled for now.  Patient denies any mouth sores or jaw pain.  CT scan shows about loss of 70% of height of L4 vertebral body, patient has been referred to Dr. Pugh to get  evaluated for kyphoplasty.  Patient went for second opinion with orthopedic surgeon in [place], as per patient's second surgeon did not recommend any surgery.  We will continue with monthly Zometa for now.     6.  History of ductal carcinoma in situ of the right breast diagnosed in 2007.  Status post mastectomy followed by adjuvant tamoxifen for 5 years which was finished in 2013.    7.  Elevated liver enzymes: ASt is 87, ALT is 99. Will monitor with cmp for now.  Patient  is scheduled to get restaging CT of chest, abdomen and pelvis with contrast prior to next clinic visit in 5 weeks.    8.  Neutropenia: Secondary to Palbociclib. WBC is normal at 4.61 with ANC 2.41 .  We will monitored with CBC for now.    9.  Anemia: Secondary to chemotherapy.  Hemoglobin is 12.2 today.  We'll monitored with CBC.    10.  Health maintenance: Patient does not smoke.  Not a candidate for screening colonoscopy at this point.  Patient is requesting flu shot which will be provided today on November 5, 2018.    11.  Prescriptions: Patient has enough prescription of Decadron, Zofran,  and Ultram.    12.  Advance care planning: Patient remains full code for now and is able to make on her decisions.  Patient has healthcare surrogate Mentioned on Chart.    13 Obesity: Patient's Body mass index is 34.61 kg/m². BMI is higher than reference range.  Patient was notified about elevated BMI.  Patient was counseled about diet and exercise for weight loss.        Ovidio Meadows MD  12/13/2018  10:09 AM        EMR Dragon/Transcription disclaimer:   Much of this encounter note is an electronic transcription/translation of spoken language to printed text. The electronic translation of spoken language may permit erroneous, or at times, nonsensical words or phrases to be inadvertently transcribed; Although I have reviewed the note for such errors, some may still exist.

## 2018-12-13 NOTE — PROGRESS NOTES
PT states she continued same dose when missing appt. Denies any new medications or bleeding issues. Denies any s/s of blood clot. PT instructed to continue same dose and Coumadin friendly diet. PT will be seen in 5 weeks when she comes back for infusion. Patient instructed regarding medication; results given and questions answered. Nutritional counseling given.  Dietary factors affecting therapy addressed.  Patient instructed to monitor for excessive bruising or bleeding. PT verbalizes understanding.         This document has been electronically signed by Patt Alvarez, LYLA @ on December 13, 2018 8:58 AM

## 2018-12-14 LAB
CANCER AG15-3 SERPL-ACNC: 32.6 U/ML (ref 0–25)
CANCER AG27-29 SERPL-ACNC: 30.7 U/ML (ref 0–38.6)

## 2018-12-14 RX ORDER — PALBOCICLIB 125 MG/1
CAPSULE ORAL
Qty: 21 CAPSULE | Refills: 1 | Status: SHIPPED | OUTPATIENT
Start: 2018-12-14 | End: 2019-03-11 | Stop reason: SDUPTHER

## 2018-12-17 ENCOUNTER — TELEPHONE (OUTPATIENT)
Dept: ONCOLOGY | Facility: HOSPITAL | Age: 48
End: 2018-12-17

## 2018-12-17 NOTE — TELEPHONE ENCOUNTER
Called pt and clarified on surgery date. Pt currently does not have a surgery date. Pt states that it would be in February because the oral surgeon recommends that she be off of zometa for a total of 3 months before having surgery. Talked to Dr Meadows, he states that pt can start taking ibrance today and to keep follow up with isaac on 1/17/19. Dr Meadows also states that we will hold off on zometa for the 3 month period before pt has surgery. Informed pt with recommendations. Pt voiced understanding.

## 2018-12-17 NOTE — TELEPHONE ENCOUNTER
----- Message from Carmenza Unger sent at 12/17/2018 10:27 AM CST -----  Regarding: Update  Contact: [phone]  Pt has an update on oral surgeon wants to talk to a nurse

## 2018-12-17 NOTE — TELEPHONE ENCOUNTER
Called pt. She states that the oral surgeon recommends that she stays off of zometa for 3 months until about February for better healing. Please advise.

## 2018-12-20 ENCOUNTER — TELEPHONE (OUTPATIENT)
Dept: ONCOLOGY | Facility: HOSPITAL | Age: 48
End: 2018-12-20

## 2018-12-20 NOTE — TELEPHONE ENCOUNTER
There were no new bone mets on recent CT scan. May be not getting zometa is causing some more pain.Thanks

## 2018-12-20 NOTE — TELEPHONE ENCOUNTER
Called pt and informed. Pt states that she hasn't been having any pain and hasn't needed to take any pain meds. Pt is wondering on the bone mets are you thinking new areas of bone mets or could it be caused from not getting her zometa?

## 2018-12-20 NOTE — TELEPHONE ENCOUNTER
----- Message from Ami Mccoy sent at 12/20/2018  8:23 AM CST -----  Contact: PATIENT  Pt called saying that she is having bone pain.  It started yesterday at the sternum and is now up to her neck and arms.  Please call back.

## 2019-01-10 RX ORDER — WARFARIN SODIUM 5 MG/1
TABLET ORAL
Qty: 30 TABLET | Refills: 5 | Status: SHIPPED | OUTPATIENT
Start: 2019-01-10 | End: 2019-07-30 | Stop reason: SDUPTHER

## 2019-01-17 ENCOUNTER — OFFICE VISIT (OUTPATIENT)
Dept: ONCOLOGY | Facility: CLINIC | Age: 49
End: 2019-01-17

## 2019-01-17 ENCOUNTER — INFUSION (OUTPATIENT)
Dept: ONCOLOGY | Facility: HOSPITAL | Age: 49
End: 2019-01-17

## 2019-01-17 ENCOUNTER — ANTICOAGULATION VISIT (OUTPATIENT)
Dept: CARDIAC SURGERY | Facility: CLINIC | Age: 49
End: 2019-01-17

## 2019-01-17 ENCOUNTER — DOCUMENTATION (OUTPATIENT)
Dept: CARDIAC SURGERY | Facility: CLINIC | Age: 49
End: 2019-01-17

## 2019-01-17 VITALS
BODY MASS INDEX: 35.16 KG/M2 | OXYGEN SATURATION: 99 % | SYSTOLIC BLOOD PRESSURE: 107 MMHG | DIASTOLIC BLOOD PRESSURE: 73 MMHG | HEIGHT: 67 IN | WEIGHT: 224 LBS | RESPIRATION RATE: 18 BRPM | HEART RATE: 70 BPM | TEMPERATURE: 98.6 F

## 2019-01-17 VITALS — SYSTOLIC BLOOD PRESSURE: 114 MMHG | HEART RATE: 83 BPM | OXYGEN SATURATION: 97 % | DIASTOLIC BLOOD PRESSURE: 74 MMHG

## 2019-01-17 DIAGNOSIS — C50.911 MALIGNANT NEOPLASM OF RIGHT BREAST IN FEMALE, ESTROGEN RECEPTOR POSITIVE, UNSPECIFIED SITE OF BREAST (HCC): Primary | ICD-10-CM

## 2019-01-17 DIAGNOSIS — IMO0001 OTHER COMPLICATION DUE TO VENOUS ACCESS DEVICE, SUBSEQUENT ENCOUNTER: ICD-10-CM

## 2019-01-17 DIAGNOSIS — C79.31 METASTASIS TO BRAIN (HCC): ICD-10-CM

## 2019-01-17 DIAGNOSIS — D70.1 CHEMOTHERAPY-INDUCED NEUTROPENIA (HCC): ICD-10-CM

## 2019-01-17 DIAGNOSIS — T45.1X5A CHEMOTHERAPY-INDUCED NEUTROPENIA (HCC): ICD-10-CM

## 2019-01-17 DIAGNOSIS — Z45.2 ENCOUNTER FOR VENOUS ACCESS DEVICE CARE: ICD-10-CM

## 2019-01-17 DIAGNOSIS — Z45.2 ENCOUNTER FOR ADJUSTMENT OR MANAGEMENT OF VASCULAR ACCESS DEVICE: ICD-10-CM

## 2019-01-17 DIAGNOSIS — C50.911 MALIGNANT NEOPLASM OF RIGHT BREAST IN FEMALE, ESTROGEN RECEPTOR POSITIVE, UNSPECIFIED SITE OF BREAST (HCC): ICD-10-CM

## 2019-01-17 DIAGNOSIS — Z79.01 LONG TERM CURRENT USE OF ANTICOAGULANT THERAPY: ICD-10-CM

## 2019-01-17 DIAGNOSIS — Z17.0 MALIGNANT NEOPLASM OF RIGHT BREAST IN FEMALE, ESTROGEN RECEPTOR POSITIVE, UNSPECIFIED SITE OF BREAST (HCC): Primary | ICD-10-CM

## 2019-01-17 DIAGNOSIS — Z17.0 MALIGNANT NEOPLASM OF RIGHT BREAST IN FEMALE, ESTROGEN RECEPTOR POSITIVE, UNSPECIFIED SITE OF BREAST (HCC): ICD-10-CM

## 2019-01-17 DIAGNOSIS — C79.51 BONE METASTASIS: Primary | ICD-10-CM

## 2019-01-17 LAB
ALBUMIN SERPL-MCNC: 4.1 G/DL (ref 3.4–4.8)
ALBUMIN/GLOB SERPL: 1.4 G/DL (ref 1.1–1.8)
ALP SERPL-CCNC: 77 U/L (ref 38–126)
ALT SERPL W P-5'-P-CCNC: 39 U/L (ref 9–52)
ANION GAP SERPL CALCULATED.3IONS-SCNC: 10 MMOL/L (ref 5–15)
AST SERPL-CCNC: 34 U/L (ref 14–36)
BASOPHILS # BLD AUTO: 0.08 10*3/MM3 (ref 0–0.2)
BASOPHILS NFR BLD AUTO: 2.3 % (ref 0–2)
BILIRUB SERPL-MCNC: 0.3 MG/DL (ref 0.2–1.3)
BUN BLD-MCNC: 13 MG/DL (ref 7–21)
BUN/CREAT SERPL: 15.7 (ref 7–25)
CALCIUM SPEC-SCNC: 8.8 MG/DL (ref 8.4–10.2)
CHLORIDE SERPL-SCNC: 104 MMOL/L (ref 95–110)
CO2 SERPL-SCNC: 25 MMOL/L (ref 22–31)
CREAT BLD-MCNC: 0.83 MG/DL (ref 0.5–1)
DEPRECATED RDW RBC AUTO: 55.2 FL (ref 36.4–46.3)
EOSINOPHIL # BLD AUTO: 0.06 10*3/MM3 (ref 0–0.7)
EOSINOPHIL NFR BLD AUTO: 1.7 % (ref 0–7)
ERYTHROCYTE [DISTWIDTH] IN BLOOD BY AUTOMATED COUNT: 15.7 % (ref 11.5–14.5)
GFR SERPL CREATININE-BSD FRML MDRD: 73 ML/MIN/1.73 (ref 51–120)
GLOBULIN UR ELPH-MCNC: 3 GM/DL (ref 2.3–3.5)
GLUCOSE BLD-MCNC: 76 MG/DL (ref 60–100)
HCT VFR BLD AUTO: 35.1 % (ref 35–45)
HGB BLD-MCNC: 12.3 G/DL (ref 12–15.5)
IMM GRANULOCYTES # BLD AUTO: 0 10*3/MM3 (ref 0–0.02)
IMM GRANULOCYTES NFR BLD AUTO: 0 % (ref 0–0.5)
INR PPP: 2.1 (ref 0.9–1.1)
LYMPHOCYTES # BLD AUTO: 1.33 10*3/MM3 (ref 0.6–4.2)
LYMPHOCYTES NFR BLD AUTO: 38.6 % (ref 10–50)
MAGNESIUM SERPL-MCNC: 2.2 MG/DL (ref 1.6–2.3)
MCH RBC QN AUTO: 33.7 PG (ref 26.5–34)
MCHC RBC AUTO-ENTMCNC: 35 G/DL (ref 31.4–36)
MCV RBC AUTO: 96.2 FL (ref 80–98)
MONOCYTES # BLD AUTO: 0.61 10*3/MM3 (ref 0–0.9)
MONOCYTES NFR BLD AUTO: 17.7 % (ref 0–12)
NEUTROPHILS # BLD AUTO: 1.37 10*3/MM3 (ref 2–8.6)
NEUTROPHILS NFR BLD AUTO: 39.7 % (ref 37–80)
NRBC BLD AUTO-RTO: 0 /100 WBC (ref 0–0)
PHOSPHATE SERPL-MCNC: 3.2 MG/DL (ref 2.4–4.4)
PLATELET # BLD AUTO: 162 10*3/MM3 (ref 150–450)
PMV BLD AUTO: 9.4 FL (ref 8–12)
POTASSIUM BLD-SCNC: 4 MMOL/L (ref 3.5–5.1)
PROT SERPL-MCNC: 7.1 G/DL (ref 6.3–8.6)
RBC # BLD AUTO: 3.65 10*6/MM3 (ref 3.77–5.16)
SODIUM BLD-SCNC: 139 MMOL/L (ref 137–145)
WBC NRBC COR # BLD: 3.45 10*3/MM3 (ref 3.2–9.8)

## 2019-01-17 PROCEDURE — G8417 CALC BMI ABV UP PARAM F/U: HCPCS | Performed by: INTERNAL MEDICINE

## 2019-01-17 PROCEDURE — 99214 OFFICE O/P EST MOD 30 MIN: CPT | Performed by: INTERNAL MEDICINE

## 2019-01-17 PROCEDURE — 85610 PROTHROMBIN TIME: CPT | Performed by: NURSE PRACTITIONER

## 2019-01-17 PROCEDURE — 85025 COMPLETE CBC W/AUTO DIFF WBC: CPT

## 2019-01-17 PROCEDURE — 1123F ACP DISCUSS/DSCN MKR DOCD: CPT | Performed by: INTERNAL MEDICINE

## 2019-01-17 PROCEDURE — 83735 ASSAY OF MAGNESIUM: CPT

## 2019-01-17 PROCEDURE — 80053 COMPREHEN METABOLIC PANEL: CPT

## 2019-01-17 PROCEDURE — G0463 HOSPITAL OUTPT CLINIC VISIT: HCPCS | Performed by: INTERNAL MEDICINE

## 2019-01-17 PROCEDURE — 84100 ASSAY OF PHOSPHORUS: CPT

## 2019-01-17 PROCEDURE — 36591 DRAW BLOOD OFF VENOUS DEVICE: CPT | Performed by: INTERNAL MEDICINE

## 2019-01-17 RX ORDER — SODIUM CHLORIDE 0.9 % (FLUSH) 0.9 %
10 SYRINGE (ML) INJECTION AS NEEDED
Status: CANCELLED | OUTPATIENT
Start: 2019-01-23

## 2019-01-17 RX ORDER — SODIUM CHLORIDE 0.9 % (FLUSH) 0.9 %
10 SYRINGE (ML) INJECTION AS NEEDED
Status: DISCONTINUED | OUTPATIENT
Start: 2019-01-17 | End: 2019-01-17 | Stop reason: HOSPADM

## 2019-01-17 RX ADMIN — SODIUM CHLORIDE, PRESERVATIVE FREE 500 UNITS: 5 INJECTION INTRAVENOUS at 09:09

## 2019-01-17 RX ADMIN — SODIUM CHLORIDE, PRESERVATIVE FREE 10 ML: 5 INJECTION INTRAVENOUS at 09:09

## 2019-01-17 NOTE — PROGRESS NOTES
PT called and states Dr Meadows instructed pt to hold Coumadin x5 days and take Lovenox. PT was instructed per Dr Meadows to take Lovenox Saturday-Tuesday. PT instructed not to take Lovenox the night before the procedure. PT instructed to resume Coumadin on night of procedure and Lovenox on Friday am. Consulted Patt RIVAS and due to length of time since pt's blood clot and situation surrounding clot (active chemo tx/MediPort), Lovenox 40 mg prophylaxis dosing ordered. Lovenox 40mg sq daily #10 with 1 refill called to [facility]. Called pt with dosing and Lovenox schedule which she verbalized.

## 2019-01-17 NOTE — PROGRESS NOTES
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  Started on palliative chemotherapy with Taxotere and Cytoxan on July 26, 2017.  Patient received 7 cycle of Taxotere and Cytoxan on December 6, 2017.  After that patient was changed over to treatment with Palbociclib and letrozole on January 30, 2018.  She is scheduled to start cycle 11 of Palbociclib with letrozole today on January 17,2019.  States she is scheduled to undergo wisdom tooth extraction on January 21, 2019.   Denies any nausea or vomiting or diarrhea with current treatment.  Complains of tingling and numbness affecting bilateral hand.    Complains of back pain,denies any recent worsening.  Denies any bowel or bladder incontinence.      PAST MEDICAL HISTORY:    Past Medical History:   Diagnosis Date   • Anxiety    • Depression    • Encounter for gynecological examination    • Malignant neoplasm of female breast (CMS/HCC)     hx of dcis s/p mastectomy and reconstruction and augmentation      • Primary malignant neoplasm of breast (CMS/HCC)     dcis in rt breast s/p mastectomy,reconstruction      • Ventricular premature beats        SOCIAL HISTORY:    Social History     Tobacco Use   • Smoking status: Never Smoker   • Smokeless tobacco: Never Used   Substance Use Topics   • Alcohol use: No   • Drug use: No       Surgical History :  Past Surgical History:   Procedure Laterality Date   • AUGMENTATION MAMMAPLASTY     • AXILLARY LYMPH NODE BIOPSY/EXCISION Right 7/10/2017    Procedure: BIOSPY RIGHT AXILLARY MASS AND OR LYMPH;  Surgeon: Sourav Ernst MD;  Location: [facility];  Service:     • BREAST BIOPSY  12/07/2007    Mammotome biopsy of the right side with clip placement   • BREAST LUMPECTOMY     • BREAST RECONSTRUCTION  10/28/2008    Abscence of right breast secondary to mastectomy. Implant exchange for reconstruction of right breast with open para-prosthetic capsulotomy   • BREAST SURGERY  10/01/2009    Left breast ptosis and breast asymmetry secondary to right breast reconstruction for breast cancer. Left breast mastopexy with augmentation.   • CARDIAC CATHETERIZATION  09/18/2008    Normal coronary arteriography. Normal left ventricular angiogram   • EP STUDY     • MASTECTOMY Right    • MASTECTOMY  02/05/2008    Multifocal right breast ductal carcinoma-in-situ. Right total mastectomy   • MASTECTOMY, PARTIAL  01/03/2008    Ductal carcinoma in-situ of the right breast, proven by mammotome biopsy. Right lumpectomy, medial portion of the breast, between 2 o'clock and 4 o'clock, 5 cm from the nipple, with clip placement, radiographic inter. of severo. specimen, & ultrasound   • PAP SMEAR  03/03/2009    Negative   • SC INSJ TUNNELED CVC W/O SUBQ PORT/ AGE 5 YR/> Right 7/10/2017    Procedure: MEDIPORT     (c-arm#2);  Surgeon: Sourav Ernst MD;  Location: [facility];  Service: General       ALLERGIES:    Allergies   Allergen Reactions   • Percocet [Oxycodone-Acetaminophen] Nausea And Vomiting       FAMILY HISTORY:  Family History   Problem Relation Age of Onset   • Anemia Mother    • Multiple sclerosis Mother    • No Known Problems Father    • Diabetes Other    • Multiple sclerosis Other        REVIEW OF SYSTEMS:      CONSTITUTIONAL: Complains of fatigue.  Has gained 3 pounds since last clinic visit.  Denies any fever, chills .      HEENT: Complains of pain and swelling of left jaw secondary to infection around wisdom tooth which is resolved now.  No epistaxis, mouth sores or difficulty swallowing.     RESPIRATORY: No new shortness of breath. No new cough or hemoptysis.     CARDIOVASCULAR: No chest  "pain or palpitations.     GASTROINTESTINAL: No nausea or vomiting. Not requiring scopolamine patch.  No new abdominal pain. No blood in the stool.     GENITOURINARY: No Dysuria or Hematuria.     MUSCULOSKELETAL:Complains of arthritic pain affecting bilateral hands. Complains of pain in bilateral hip.  Complains of back pain ,denies any recent worsening.     LYMPHATICS: No new lymph node swelling.     NEUROLOGICAL : Complains of intermittent tingling and numbness affecting bilateral hand. Complains of tingling and numbness from back radiating down to bilateral lower extremity.  No dizziness. No seizures or balance problems.     SKIN: Positive for history of melanoma status post surgical removal. Denies any new skin lesion worrisome for melanoma.              PHYSICAL EXAMINATION:      VITAL SIGNS:  /73   Pulse 70   Temp 98.6 °F (37 °C) (Temporal)   Resp 18   Ht 170.2 cm (67.01\")   Wt 102 kg (224 lb)   SpO2 99%   BMI 35.07 kg/m²      ECOG performance status:1    GENERAL:  Not in any distress.    HEENT:  Normocephalic, Atraumatic.Mild Conjunctival pallor. No icterus. Extraocular Movements Intact. No Facial Asymmetry noted.  No evidence of any swelling on left lower jaw.    NECK:  No adenopathy. No JVD.    RESPIRATORY:  Fair air entry bilateral. No rhonchi or wheezing.    CARDIOVASCULAR:  S1, S2. Regular rate and rhythm. No murmur or gallop appreciated.    ABDOMEN:  Soft, obese, nontender. Bowel sounds present in all four quadrants.  No hepatosplenomegaly appreciated.    MUSCULOSKELETAL:  No edema.No Calf Tenderness.  Decreased range of motion.    NEUROLOGIC:  Alert, awake and oriented ×3.  No  Motor  deficit appreciated. Cranial Nerves 2-12 grossly intact.    LYMPHATICS: No new lymph node enlargement in neck or supraclavicular area.    PSYCHIATRY: Alert, awake and oriented ×3. Normal affect. Normal Judgement.Makes good eye contact.          DIAGNOSTIC DATA:    Glucose   Date Value Ref Range Status "   01/17/2019 76 60 - 100 mg/dL Final     Sodium   Date Value Ref Range Status   01/17/2019 139 137 - 145 mmol/L Final     Potassium   Date Value Ref Range Status   01/17/2019 4.0 3.5 - 5.1 mmol/L Final     CO2   Date Value Ref Range Status   01/17/2019 25.0 22.0 - 31.0 mmol/L Final     Chloride   Date Value Ref Range Status   01/17/2019 104 95 - 110 mmol/L Final     Anion Gap   Date Value Ref Range Status   01/17/2019 10.0 5.0 - 15.0 mmol/L Final     Creatinine   Date Value Ref Range Status   01/17/2019 0.83 0.50 - 1.00 mg/dL Final     BUN   Date Value Ref Range Status   01/17/2019 13 7 - 21 mg/dL Final     BUN/Creatinine Ratio   Date Value Ref Range Status   01/17/2019 15.7 7.0 - 25.0 Final     Calcium   Date Value Ref Range Status   01/17/2019 8.8 8.4 - 10.2 mg/dL Final     eGFR Non  Amer   Date Value Ref Range Status   01/17/2019 73 51 - 120 mL/min/1.73 Final     Alkaline Phosphatase   Date Value Ref Range Status   01/17/2019 77 38 - 126 U/L Final     Total Protein   Date Value Ref Range Status   01/17/2019 7.1 6.3 - 8.6 g/dL Final     ALT (SGPT)   Date Value Ref Range Status   01/17/2019 39 9 - 52 U/L Final     AST (SGOT)   Date Value Ref Range Status   01/17/2019 34 14 - 36 U/L Final     Total Bilirubin   Date Value Ref Range Status   01/17/2019 0.3 0.2 - 1.3 mg/dL Final     Albumin   Date Value Ref Range Status   01/17/2019 4.10 3.40 - 4.80 g/dL Final     Globulin   Date Value Ref Range Status   01/17/2019 3.0 2.3 - 3.5 gm/dL Final     Lab Results   Component Value Date    WBC 3.45 01/17/2019    HGB 12.3 01/17/2019    HCT 35.1 01/17/2019    MCV 96.2 01/17/2019     01/17/2019     Lab Results   Component Value Date    NEUTROABS 1.37 (L) 01/17/2019     Lab Results   Component Value Date     32.6 (H) 12/13/2018    LABCA2 30.7 12/13/2018         2 D Echo Done on July 19, 2017 showed:  Interpretation Summary   · The left ventricular cavity is borderline dilated.  · Left ventricular systolic  function is normal. Estimated EF = 53%.  · Left ventricular diastolic dysfunction (grade I) consistent with impaired relaxation.  · IAS aneurysm noted. No PFO or ASD           Radiology Data :  CT of chest, abdomen and pelvis with contrast done on December 7, 2018 was reviewed, discussed with patient, it showed:  IMPRESSION:  CONCLUSION:     1. Stable hepatic metastases anterior aspect of the liver.  2. Stable extensive skeletal metastases with pathologic fracture  involving L4.         CT of chest, abdomen and pelvis with contrast done on September 19, 2018 was reviewed, discussed with patient, it showed:  CHEST CT FINDINGS: Bilateral breast implants. Right mastectomy.     Heart size normal. No evidence of pathologically enlarged nodes.  No dense consolidation or masses.        ABDOMEN CT FINDINGS: Stable low CT attenuation lesion anterior  aspect of the liver unchanged since multiple prior exams  corresponding to a treated, stable metastasis.     The gallbladder, spleen, pancreas, adrenal glands and kidneys are  normal in appearance. Bowel grossly unremarkable.     No evidence of pathologically enlarged nodes, free fluid or free  air. Degenerative changes of the spine. The bones are  unremarkable.     PELVIS CT FINDINGS: There are no pelvic masses.        Extensive skeletal metastases the most part osteoblastic  sclerotic. High-grade compression fracture L4 vertebral body.  These are unchanged since prior exam.           IMPRESSION:  CONCLUSION: Bilateral breast implants. Right mastectomy. CT chest  is otherwise unremarkable. Lungs are clear. No pathologic hilar  or mediastinal adenopathy.     Stable hepatic metastasis anterior aspect of liver unchanged  since prior exam.     Extensive skeletal metastases also unchanged since prior exam.               MRI of brain with and without contrast done on July 9, 2018 was reviewed, discussed with patient, it showed:  IMPRESSION:  No evidence of intracranial hemorrhage,  mass/mass effect, acute  infarct, or pathologic contrast enhancement.     Previous described/known enhancing calvarial metastatic lesions  are significantly less conspicuous on today's examination. The  finding would support favorable treatment response.     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          Doppler ultrasound of right upper extremity done on November 10, 2017 showed:  IMPRESSION:  CONCLUSION:   Abnormal exam with thrombus visualized in the right internal  jugular vein, consistent with DVT.      PET/CT done on July 3, 2017 showed:  IMPRESSION:  CONCLUSION:  1. Extensive metastatic disease. Pathologic uptake within  enlarged right-sided axillary lymph nodes. Metastatic adenopathy  in both laurie and mediastinum. Tumor replacing most of the left  lobe of liver. Intra-abdominal retroperitoneal metastases,  adenopathy.     Extensive skeletal metastases including a pathologic fracture at  L4.        Nuclear bone scan done on June 20, 2017 showed:  1. Increased uptake at L4 suggestive of metastatic process.  2. 3 or 4 areas of increased activity in bony calvarium  3. Some increased activity in tips posteriorly and anteriorly suggestive of metastatic carcinoma to the skeletal system.           Pathology :    Pathology data from July 10, 2017 showed:  Final Diagnosis   Mass right axilla, excisional biopsy:      Metastatic poorly differentiated ductal carcinoma, breast primary      Estrogen receptor positive, 95% stainability, strong intensity      Progesterone receptor positive, 95% stainability, strong intensity      HER-2 2+(equivocal), sent to [facility] for FISH     Addendum   Her 2 FISH result from [facility] is NEGATIVE         Pathology report from December 7, 2007 showed:  FINAL DIAGNOSIS:   RIGHT BREAST MAMMOTOME BIOPSY:        DUCTAL CARCINOMA IN SITU, INTERMEDIATE NUCLEAR GRADE              WITH MICROCALCIFICATION.      ADDENDUM:   Estrogen receptors are positive  (90-95% stainability).   Progesterone receptors are positive (90-95% stainability).         ASSESSMENT AND PLAN:      1.  Metastatic cancer with extensive adenopathy in right axilla, mediastinum, hilar, abdominal, retroperitoneal with extensive liver and bone metastasis on PET/CT.  Patient underwent right apatient underwent right axillary lymph node excision by Dr. Ernst on July 10, 2017.Pathology report confirmed ductal carcinoma of breast with estrogen and progesterone positivity.  At her on palliative chemotherapy with Taxotere and cytoxan on July 26, 2017.   Patient was  seen at [facility] for second opinion regarding her breast cancer.  Case was discussed with Dr vazquez oncologist that has seen patient at [facility].  She agreed with her chemotherapy at this point.  The scan on December 20, 2017 shows relative stability of disease, lesion in the liver as well as bone lesion at about same.  Her tumor marker CA 27-29 as well as CA 15-3 are less than 100 at this point.  Since patient is developing some neuropathy in bilateral upper and lower extremity recommend changing her therapy to Palbociclib with letrozole.  Patient started taking first round of Palbociclib and letrozole on January 30, 2018.  Patient was scheduled to start cycle  6 of chemotherapy today with Palbociclib today on July 16, 2018.   -CT of chest, abdomen and pelvis with contrast done on July 9, 2018 show stable disease which was discussed with patient.  -MRI of brain with and without contrast done on July 9, 2018 shows significant improvement in calvarial metastasis which was discussed with patient.  -CT of chest, abdomen and pelvis with contrast done on September 19, 2018 showed stable disease without any new lymphadenopathy or any new lesions.  Result of CT scan were discussed with patient and her family.  -CT of chest, abdomen and pelvis with contrast done on December 7, 2018 after cycle 9 of Palbociclib and letrozole showed stable  disease involving liver and bones.  Result of CT scan were discussed with patient.  -We will ask patient to start cycle 11 of Palbociclib and letrozole today on January 17,2019.  -Patient is scheduled to have wisdom teeth extraction on January 21, 2019.  In view of neutropenia with absolute neutrophil count of 1370, recommend delaying extraction by 3-4 days until absolute neutrophil count is greater than 1500.  -We will recommend holding Palbociclib for now.  Recommend resuming Palbociclib after wisdom tooth extraction and getting clearance from dental surgeon.  -We will ask patient to return to clinic in 6 months with a repeat CBC and CMP to be done on that day.  -Plan is to repeat restaging CT of chest, abdomen and pelvis with contrast after cycle trial of Palbociclib and letrozole.  This recommendation were discussed with patient and her family.    2.  Brain metastasis:MRi Brain with and without done on November 9, 2017 was reviewed and compared with t MRI done 6 weeks prior to that.  Brain calvarial metastasis are stable or somewhat improved.  No need to do radiation at this point which was discussed with patient.    -MRI of brain with and without contrast done on July 9, 2018 shows improvement in calvarial metastasis.    3.  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  She is currently on Coumadin.    She is being followed by Coumadin clinic  -Since patient is scheduled to undergo wisdom tooth extraction, recommend holding Coumadin 5 days prior to procedure and getting Lovenox 1.5 mg/kg during that period.  We will resume Coumadin with Lovenox after wisdom tooth extraction.    4.  History of melanoma in situ status post excision by Dr. Linn.    5.  Bone metastasis: Patient was started on Zometa on August 23, 2017.  Continue with Zometa as scheduled for now.  Patient denies any mouth sores or jaw pain.  CT scan shows about loss of 70% of height of L4 vertebral  body, patient has been referred to Dr. Pugh to get evaluated for kyphoplasty.  Patient went for second opinion with orthopedic surgeon in [place], as per patient's second surgeon did not recommend any surgery.    -Zometa Has been held since November 2018 due to wisdom tooth extraction requirement.  -We will resume Zometa after dental procedure.     6.  History of ductal carcinoma in situ of the right breast diagnosed in 2007.  Status post mastectomy followed by adjuvant tamoxifen for 5 years which was finished in 2013.    7.  Neutropenia: Secondary to Palbociclib. WBC is normal at 3.45 with ANC 1.37 .  We will recheck CBC one day prior to her planned wisdom tooth extraction next week.    8.  Health maintenance: Patient does not smoke.  Not a candidate for screening colonoscopy at this point.  Patient is requesting flu shot which will be provided today on November 5, 2018.    9.  Prescriptions: Patient has enough prescription of Decadron, Zofran,  and Ultram.    10.  Advance care planning: Patient remains full code for now and is able to make on her decisions.  Patient has healthcare surrogate Mentioned on Chart.    11. Obesity: Patient's Body mass index is 35.07 kg/m². BMI is higher than reference range.  Patient was notified about elevated BMI.  Patient was counseled about diet and exercise for weight loss.        Ovidio Meadows MD  1/17/2019  9:50 AM        EMR Dragon/Transcription disclaimer:   Much of this encounter note is an electronic transcription/translation of spoken language to printed text. The electronic translation of spoken language may permit erroneous, or at times, nonsensical words or phrases to be inadvertently transcribed; Although I have reviewed the note for such errors, some may still exist.

## 2019-01-17 NOTE — PROGRESS NOTES
PT here today before [facility] appt. Denies any med changes or bleeding issues. Denies any s/s of blood clot. PT states she will be having wisdom tooth pulled on Monday and dentist did not give specifics for holding Coumadin. PT is seeing Dr Meadows when leaving  and she will talk with him about holding Coumadin. PT instructed to return to  if Dr Meadows feels she needs Lovenox. If no medications are changed, pt will be seen in 5 weeks. Patient instructed regarding medication; results given and questions answered. Nutritional counseling given.  Dietary factors affecting therapy addressed.  Patient instructed to monitor for excessive bruising or bleeding.        This document has been electronically signed by Patt Alvarez, LYLA @ on January 17, 2019 8:54 AM

## 2019-01-21 ENCOUNTER — TELEPHONE (OUTPATIENT)
Dept: RADIATION ONCOLOGY | Facility: HOSPITAL | Age: 49
End: 2019-01-21

## 2019-01-21 RX ORDER — GABAPENTIN 300 MG/1
300 CAPSULE ORAL 3 TIMES DAILY
Qty: 90 CAPSULE | Refills: 0 | Status: SHIPPED | OUTPATIENT
Start: 2019-01-21 | End: 2019-04-19 | Stop reason: SDUPTHER

## 2019-01-23 ENCOUNTER — TELEPHONE (OUTPATIENT)
Dept: ONCOLOGY | Facility: HOSPITAL | Age: 49
End: 2019-01-23

## 2019-01-23 ENCOUNTER — INFUSION (OUTPATIENT)
Dept: ONCOLOGY | Facility: HOSPITAL | Age: 49
End: 2019-01-23

## 2019-01-23 DIAGNOSIS — C79.31 METASTASIS TO BRAIN (HCC): ICD-10-CM

## 2019-01-23 DIAGNOSIS — C50.911 MALIGNANT NEOPLASM OF RIGHT BREAST IN FEMALE, ESTROGEN RECEPTOR POSITIVE, UNSPECIFIED SITE OF BREAST (HCC): ICD-10-CM

## 2019-01-23 DIAGNOSIS — Z45.2 ENCOUNTER FOR ADJUSTMENT OR MANAGEMENT OF VASCULAR ACCESS DEVICE: ICD-10-CM

## 2019-01-23 DIAGNOSIS — IMO0001 OTHER COMPLICATION DUE TO VENOUS ACCESS DEVICE, SUBSEQUENT ENCOUNTER: Primary | ICD-10-CM

## 2019-01-23 DIAGNOSIS — C79.51 BONE METASTASIS: ICD-10-CM

## 2019-01-23 DIAGNOSIS — Z45.2 ENCOUNTER FOR VENOUS ACCESS DEVICE CARE: ICD-10-CM

## 2019-01-23 DIAGNOSIS — Z17.0 MALIGNANT NEOPLASM OF RIGHT BREAST IN FEMALE, ESTROGEN RECEPTOR POSITIVE, UNSPECIFIED SITE OF BREAST (HCC): ICD-10-CM

## 2019-01-23 LAB
ALBUMIN SERPL-MCNC: 4.3 G/DL (ref 3.4–4.8)
ALBUMIN/GLOB SERPL: 1.6 G/DL (ref 1.1–1.8)
ALP SERPL-CCNC: 83 U/L (ref 38–126)
ALT SERPL W P-5'-P-CCNC: 52 U/L (ref 9–52)
ANION GAP SERPL CALCULATED.3IONS-SCNC: 4 MMOL/L (ref 5–15)
AST SERPL-CCNC: 46 U/L (ref 14–36)
BASOPHILS # BLD AUTO: 0.1 10*3/MM3 (ref 0–0.2)
BASOPHILS NFR BLD AUTO: 2.1 % (ref 0–2)
BILIRUB SERPL-MCNC: 0.3 MG/DL (ref 0.2–1.3)
BUN BLD-MCNC: 14 MG/DL (ref 7–21)
BUN/CREAT SERPL: 17.1 (ref 7–25)
CALCIUM SPEC-SCNC: 9.3 MG/DL (ref 8.4–10.2)
CHLORIDE SERPL-SCNC: 106 MMOL/L (ref 95–110)
CO2 SERPL-SCNC: 28 MMOL/L (ref 22–31)
CREAT BLD-MCNC: 0.82 MG/DL (ref 0.5–1)
DEPRECATED RDW RBC AUTO: 53.1 FL (ref 36.4–46.3)
EOSINOPHIL # BLD AUTO: 0.1 10*3/MM3 (ref 0–0.7)
EOSINOPHIL NFR BLD AUTO: 2.1 % (ref 0–7)
ERYTHROCYTE [DISTWIDTH] IN BLOOD BY AUTOMATED COUNT: 15.1 % (ref 11.5–14.5)
GFR SERPL CREATININE-BSD FRML MDRD: 74 ML/MIN/1.73 (ref 51–120)
GLOBULIN UR ELPH-MCNC: 2.7 GM/DL (ref 2.3–3.5)
GLUCOSE BLD-MCNC: 89 MG/DL (ref 60–100)
HCT VFR BLD AUTO: 36.5 % (ref 35–45)
HGB BLD-MCNC: 12.7 G/DL (ref 12–15.5)
IMM GRANULOCYTES # BLD AUTO: 0.02 10*3/MM3 (ref 0–0.02)
IMM GRANULOCYTES NFR BLD AUTO: 0.4 % (ref 0–0.5)
LYMPHOCYTES # BLD AUTO: 1.39 10*3/MM3 (ref 0.6–4.2)
LYMPHOCYTES NFR BLD AUTO: 29.7 % (ref 10–50)
MCH RBC QN AUTO: 33.5 PG (ref 26.5–34)
MCHC RBC AUTO-ENTMCNC: 34.8 G/DL (ref 31.4–36)
MCV RBC AUTO: 96.3 FL (ref 80–98)
MONOCYTES # BLD AUTO: 0.66 10*3/MM3 (ref 0–0.9)
MONOCYTES NFR BLD AUTO: 14.1 % (ref 0–12)
NEUTROPHILS # BLD AUTO: 2.41 10*3/MM3 (ref 2–8.6)
NEUTROPHILS NFR BLD AUTO: 51.6 % (ref 37–80)
PLATELET # BLD AUTO: 243 10*3/MM3 (ref 150–450)
PMV BLD AUTO: 9.6 FL (ref 8–12)
POTASSIUM BLD-SCNC: 4 MMOL/L (ref 3.5–5.1)
PROT SERPL-MCNC: 7 G/DL (ref 6.3–8.6)
RBC # BLD AUTO: 3.79 10*6/MM3 (ref 3.77–5.16)
SODIUM BLD-SCNC: 138 MMOL/L (ref 137–145)
WBC NRBC COR # BLD: 4.68 10*3/MM3 (ref 3.2–9.8)

## 2019-01-23 PROCEDURE — 85025 COMPLETE CBC W/AUTO DIFF WBC: CPT

## 2019-01-23 PROCEDURE — 36415 COLL VENOUS BLD VENIPUNCTURE: CPT | Performed by: INTERNAL MEDICINE

## 2019-01-23 PROCEDURE — 80053 COMPREHEN METABOLIC PANEL: CPT

## 2019-01-23 RX ORDER — SODIUM CHLORIDE 0.9 % (FLUSH) 0.9 %
10 SYRINGE (ML) INJECTION AS NEEDED
Status: CANCELLED | OUTPATIENT
Start: 2019-01-23

## 2019-01-23 RX ORDER — SODIUM CHLORIDE 0.9 % (FLUSH) 0.9 %
10 SYRINGE (ML) INJECTION AS NEEDED
Status: DISCONTINUED | OUTPATIENT
Start: 2019-01-23 | End: 2019-01-23 | Stop reason: HOSPADM

## 2019-01-23 NOTE — TELEPHONE ENCOUNTER
Pt informed of ok for oral surgery and dr Meadows's wishes for restarting ibrance--when oral surgeon is comfortable. Pt voices understanding.

## 2019-01-23 NOTE — TELEPHONE ENCOUNTER
----- Message from Ovidio Meadows MD sent at 1/23/2019  1:53 PM CST -----  She is okay for dental surgery tomorrow.  She can restart Ibrance once oral surgeon feels comfortable starting chemotherapy.  ----- Message -----  From: Carmita Charles RN  Sent: 1/23/2019  10:43 AM  To: Ovidio Meadows MD    Pt wants to make sure she is ok for dental surgery tomorrow. She also wants to know when to restart Ibrance.    Thanks

## 2019-01-23 NOTE — TELEPHONE ENCOUNTER
----- Message from Ovidio Meadows MD sent at 1/23/2019  9:49 AM CST -----  Please let patient know, her neutrophil count is normal.  She is okay for wisdom tooth extraction.  Thank you

## 2019-01-23 NOTE — TELEPHONE ENCOUNTER
Pt called and informed. Pt is wondering when she will need to start taking her Ibrance after surgery. Please advise. Thanks

## 2019-01-28 ENCOUNTER — TELEPHONE (OUTPATIENT)
Dept: ONCOLOGY | Facility: HOSPITAL | Age: 49
End: 2019-01-28

## 2019-01-28 NOTE — TELEPHONE ENCOUNTER
----- Message from Gloria Gandhi sent at 1/28/2019  8:14 AM CST -----  Contact: PATIENT  Patient called. Patient had oral surgery last Thursday, does not have follow up scheduled but per Dr Alcantar, patient does not need follow up with him and can start taking her Ibrance over the weekend. Patient needs to know when Dr Meadows wants her to start taking Ibrance 125 mg

## 2019-01-28 NOTE — TELEPHONE ENCOUNTER
Called pt and informed. Pt states that she is on a preventative antibiotics- Penicillin for 7 days. Pt started it on 1/25/19. Is it still ok for pt to start Ibrance on Thursday?

## 2019-01-31 ENCOUNTER — ANTICOAGULATION VISIT (OUTPATIENT)
Dept: CARDIAC SURGERY | Facility: CLINIC | Age: 49
End: 2019-01-31

## 2019-01-31 DIAGNOSIS — Z79.01 LONG TERM CURRENT USE OF ANTICOAGULANT THERAPY: ICD-10-CM

## 2019-01-31 LAB — INR PPP: 2 (ref 0.9–1.1)

## 2019-01-31 PROCEDURE — 85610 PROTHROMBIN TIME: CPT | Performed by: NURSE PRACTITIONER

## 2019-01-31 NOTE — PROGRESS NOTES
Today's INR is 2.0. PT states she restarted Coumadin and Lovenox last Thursday post procedure. PT is on PCN but will finish today. PT states she took Lovenox through yesterday due to rescheduling appt. Denies any bleeding issues. PT instructed to continue usual Coumadin dose and will recheck INR in 1 month when returning to [facility]. Patient instructed regarding medication; results given and questions answered. Nutritional counseling given.  Dietary factors affecting therapy addressed.  Patient instructed to monitor for excessive bruising or bleeding. PT verbalizes understanding.         This document has been electronically signed by Patt Alvarez, LYLA @ on January 31, 2019 9:06 AM

## 2019-02-04 ENCOUNTER — TELEPHONE (OUTPATIENT)
Dept: ONCOLOGY | Facility: CLINIC | Age: 49
End: 2019-02-04

## 2019-02-04 NOTE — TELEPHONE ENCOUNTER
Called patient. She states she doesn't need anything at this time. She states she has already spoken with Daniella this morning regarding a PA for her medication.

## 2019-02-11 ENCOUNTER — TELEPHONE (OUTPATIENT)
Dept: ONCOLOGY | Facility: HOSPITAL | Age: 49
End: 2019-02-11

## 2019-02-11 NOTE — TELEPHONE ENCOUNTER
----- Message from Ovidio Meadows MD sent at 2/8/2019  5:14 PM CST -----  Regarding: RE: Question  Contact: [phone]  She needs clinic appointment 5 weeks from today that will be March 15, 2019.  Plan is to do CT scan around April 2019.  Thank you  ----- Message -----  From: Vijaya Campos RN  Sent: 2/8/2019   3:41 PM  To: Ovidio Meadows MD  Subject: FW: Question                                     Patient just started day 1 of Ibrance today. She was having trouble with the prior auth on it. She wants to know if she needs to reschedule f/u with you which is currently scheduled for 2/28. She also wants to know when you want a CT.     ----- Message -----  From: Carmenza Unger  Sent: 2/8/2019  10:41 AM  To: [facility] [place]  Subject: Question                                         Pt thinks she needs to reschedule appointments, also thinks she should have a CT scheduled?  Please call back

## 2019-02-22 ENCOUNTER — PATIENT MESSAGE (OUTPATIENT)
Dept: ONCOLOGY | Facility: CLINIC | Age: 49
End: 2019-02-22

## 2019-02-27 ENCOUNTER — TELEPHONE (OUTPATIENT)
Dept: ONCOLOGY | Facility: HOSPITAL | Age: 49
End: 2019-02-27

## 2019-02-27 NOTE — TELEPHONE ENCOUNTER
Pt informed that per dr gray she can take the medication and use the cream she had previously called about. She states that was waiting to hear back thru email but that it wasn't anything urgent so she didn't call back. She states understanding.

## 2019-02-27 NOTE — TELEPHONE ENCOUNTER
Spoke to dr gray on the phone regarding this situation. He stated that it was fine for her to use the minocycline and cream. Will let pt know.

## 2019-02-28 ENCOUNTER — DOCUMENTATION (OUTPATIENT)
Dept: CARDIAC SURGERY | Facility: CLINIC | Age: 49
End: 2019-02-28

## 2019-03-11 RX ORDER — PALBOCICLIB 125 MG/1
CAPSULE ORAL
Qty: 21 CAPSULE | Refills: 1 | Status: SHIPPED | OUTPATIENT
Start: 2019-03-11 | End: 2019-05-07 | Stop reason: SDUPTHER

## 2019-03-15 ENCOUNTER — INFUSION (OUTPATIENT)
Dept: ONCOLOGY | Facility: HOSPITAL | Age: 49
End: 2019-03-15

## 2019-03-15 ENCOUNTER — TELEPHONE (OUTPATIENT)
Dept: ONCOLOGY | Facility: HOSPITAL | Age: 49
End: 2019-03-15

## 2019-03-15 ENCOUNTER — ANTICOAGULATION VISIT (OUTPATIENT)
Dept: CARDIAC SURGERY | Facility: CLINIC | Age: 49
End: 2019-03-15

## 2019-03-15 ENCOUNTER — OFFICE VISIT (OUTPATIENT)
Dept: ONCOLOGY | Facility: CLINIC | Age: 49
End: 2019-03-15

## 2019-03-15 VITALS
BODY MASS INDEX: 35.56 KG/M2 | OXYGEN SATURATION: 97 % | TEMPERATURE: 97.8 F | WEIGHT: 226.6 LBS | SYSTOLIC BLOOD PRESSURE: 113 MMHG | RESPIRATION RATE: 18 BRPM | HEART RATE: 77 BPM | DIASTOLIC BLOOD PRESSURE: 82 MMHG | HEIGHT: 67 IN

## 2019-03-15 VITALS — HEART RATE: 76 BPM

## 2019-03-15 DIAGNOSIS — C79.31 METASTASIS TO BRAIN (HCC): ICD-10-CM

## 2019-03-15 DIAGNOSIS — T45.1X5A CHEMOTHERAPY-INDUCED NEUTROPENIA (HCC): ICD-10-CM

## 2019-03-15 DIAGNOSIS — Z79.01 LONG TERM CURRENT USE OF ANTICOAGULANT THERAPY: ICD-10-CM

## 2019-03-15 DIAGNOSIS — C50.911 MALIGNANT NEOPLASM OF RIGHT BREAST IN FEMALE, ESTROGEN RECEPTOR POSITIVE, UNSPECIFIED SITE OF BREAST (HCC): Primary | ICD-10-CM

## 2019-03-15 DIAGNOSIS — Z45.2 ENCOUNTER FOR VENOUS ACCESS DEVICE CARE: ICD-10-CM

## 2019-03-15 DIAGNOSIS — D70.1 CHEMOTHERAPY-INDUCED NEUTROPENIA (HCC): ICD-10-CM

## 2019-03-15 DIAGNOSIS — Z45.2 ENCOUNTER FOR ADJUSTMENT OR MANAGEMENT OF VASCULAR ACCESS DEVICE: ICD-10-CM

## 2019-03-15 DIAGNOSIS — D64.81 ANEMIA DUE TO ANTINEOPLASTIC CHEMOTHERAPY: ICD-10-CM

## 2019-03-15 DIAGNOSIS — C79.51 BONE METASTASIS: ICD-10-CM

## 2019-03-15 DIAGNOSIS — R79.89 ELEVATED LIVER FUNCTION TESTS: ICD-10-CM

## 2019-03-15 DIAGNOSIS — T45.1X5A ANEMIA DUE TO ANTINEOPLASTIC CHEMOTHERAPY: ICD-10-CM

## 2019-03-15 DIAGNOSIS — Z17.0 MALIGNANT NEOPLASM OF RIGHT BREAST IN FEMALE, ESTROGEN RECEPTOR POSITIVE, UNSPECIFIED SITE OF BREAST (HCC): Primary | ICD-10-CM

## 2019-03-15 DIAGNOSIS — IMO0001 OTHER COMPLICATION DUE TO VENOUS ACCESS DEVICE, SUBSEQUENT ENCOUNTER: ICD-10-CM

## 2019-03-15 LAB
ALBUMIN SERPL-MCNC: 4.4 G/DL (ref 3.4–4.8)
ALBUMIN/GLOB SERPL: 1.4 G/DL (ref 1.1–1.8)
ALP SERPL-CCNC: 83 U/L (ref 38–126)
ALT SERPL W P-5'-P-CCNC: 40 U/L (ref 9–52)
ANION GAP SERPL CALCULATED.3IONS-SCNC: 10 MMOL/L (ref 5–15)
ANISOCYTOSIS BLD QL: ABNORMAL
AST SERPL-CCNC: 36 U/L (ref 14–36)
BASOPHILS # BLD MANUAL: 0.1 10*3/MM3 (ref 0–0.2)
BASOPHILS NFR BLD AUTO: 2 % (ref 0–1.5)
BILIRUB SERPL-MCNC: 0.3 MG/DL (ref 0.2–1.3)
BUN BLD-MCNC: 17 MG/DL (ref 7–21)
BUN/CREAT SERPL: 23.6 (ref 7–25)
CALCIUM SPEC-SCNC: 9.4 MG/DL (ref 8.4–10.2)
CHLORIDE SERPL-SCNC: 102 MMOL/L (ref 95–110)
CO2 SERPL-SCNC: 24 MMOL/L (ref 22–31)
CREAT BLD-MCNC: 0.72 MG/DL (ref 0.5–1)
DEPRECATED RDW RBC AUTO: 51.7 FL (ref 37–54)
EOSINOPHIL # BLD MANUAL: 0.05 10*3/MM3 (ref 0–0.4)
EOSINOPHIL NFR BLD MANUAL: 1 % (ref 0.3–6.2)
ERYTHROCYTE [DISTWIDTH] IN BLOOD BY AUTOMATED COUNT: 15.2 % (ref 12.3–15.4)
GFR SERPL CREATININE-BSD FRML MDRD: 86 ML/MIN/1.73 (ref 51–120)
GLOBULIN UR ELPH-MCNC: 3.1 GM/DL (ref 2.3–3.5)
GLUCOSE BLD-MCNC: 53 MG/DL (ref 60–100)
HCT VFR BLD AUTO: 35.4 % (ref 34–46.6)
HGB BLD-MCNC: 12.7 G/DL (ref 12–15.9)
INR PPP: 3 (ref 0.9–1.1)
LYMPHOCYTES # BLD MANUAL: 1.53 10*3/MM3 (ref 0.7–3.1)
LYMPHOCYTES NFR BLD MANUAL: 10 % (ref 5–12)
LYMPHOCYTES NFR BLD MANUAL: 31 % (ref 19.6–45.3)
MCH RBC QN AUTO: 33.2 PG (ref 26.6–33)
MCHC RBC AUTO-ENTMCNC: 35.9 G/DL (ref 31.5–35.7)
MCV RBC AUTO: 92.7 FL (ref 79–97)
MONOCYTES # BLD AUTO: 0.49 10*3/MM3 (ref 0.1–0.9)
NEUTROPHILS # BLD AUTO: 2.76 10*3/MM3 (ref 1.4–7)
NEUTROPHILS NFR BLD MANUAL: 56 % (ref 42.7–76)
PLATELET # BLD AUTO: 289 10*3/MM3 (ref 140–450)
PMV BLD AUTO: 9.5 FL (ref 6–12)
POTASSIUM BLD-SCNC: 4 MMOL/L (ref 3.5–5.1)
PROT SERPL-MCNC: 7.5 G/DL (ref 6.3–8.6)
RBC # BLD AUTO: 3.82 10*6/MM3 (ref 3.77–5.28)
SMALL PLATELETS BLD QL SMEAR: ADEQUATE
SODIUM BLD-SCNC: 136 MMOL/L (ref 137–145)
WBC MORPH BLD: NORMAL
WBC NRBC COR # BLD: 4.93 10*3/MM3 (ref 3.4–10.8)

## 2019-03-15 PROCEDURE — 85007 BL SMEAR W/DIFF WBC COUNT: CPT

## 2019-03-15 PROCEDURE — G8417 CALC BMI ABV UP PARAM F/U: HCPCS | Performed by: INTERNAL MEDICINE

## 2019-03-15 PROCEDURE — 99214 OFFICE O/P EST MOD 30 MIN: CPT | Performed by: INTERNAL MEDICINE

## 2019-03-15 PROCEDURE — G0463 HOSPITAL OUTPT CLINIC VISIT: HCPCS | Performed by: INTERNAL MEDICINE

## 2019-03-15 PROCEDURE — 1123F ACP DISCUSS/DSCN MKR DOCD: CPT | Performed by: INTERNAL MEDICINE

## 2019-03-15 PROCEDURE — 85025 COMPLETE CBC W/AUTO DIFF WBC: CPT

## 2019-03-15 PROCEDURE — 80053 COMPREHEN METABOLIC PANEL: CPT

## 2019-03-15 PROCEDURE — 86300 IMMUNOASSAY TUMOR CA 15-3: CPT

## 2019-03-15 PROCEDURE — 36591 DRAW BLOOD OFF VENOUS DEVICE: CPT | Performed by: INTERNAL MEDICINE

## 2019-03-15 PROCEDURE — 85610 PROTHROMBIN TIME: CPT | Performed by: NURSE PRACTITIONER

## 2019-03-15 RX ORDER — SODIUM CHLORIDE 0.9 % (FLUSH) 0.9 %
10 SYRINGE (ML) INJECTION AS NEEDED
Status: CANCELLED | OUTPATIENT
Start: 2019-03-15

## 2019-03-15 RX ORDER — SODIUM CHLORIDE 0.9 % (FLUSH) 0.9 %
10 SYRINGE (ML) INJECTION AS NEEDED
Status: DISCONTINUED | OUTPATIENT
Start: 2019-03-15 | End: 2019-03-15 | Stop reason: HOSPADM

## 2019-03-15 RX ADMIN — SODIUM CHLORIDE, PRESERVATIVE FREE 10 ML: 5 INJECTION INTRAVENOUS at 09:38

## 2019-03-15 RX ADMIN — Medication 500 UNITS: at 09:38

## 2019-03-15 RX ADMIN — SODIUM CHLORIDE, PRESERVATIVE FREE 10 ML: 5 INJECTION INTRAVENOUS at 08:46

## 2019-03-15 NOTE — TELEPHONE ENCOUNTER
Called patient and informed that her glucose is 53. Instructed patient to get something to eat and drink. Patient voiced understanding.

## 2019-03-15 NOTE — PROGRESS NOTES
Today's INR is 3.0. Denies any new medications or bleeding issues. PT states she continued same dose when missing appt. Denies any s/s of blood clot. PT instructed to continue same dose and Coumadin friendly diet. PT will be seen in 5 weeks when returning for infusion at [facility]. Patient instructed regarding medication; results given and questions answered. Nutritional counseling given.  Dietary factors affecting therapy addressed.  Patient instructed to monitor for excessive bruising or bleeding. PT verbalizes understanding.         This document has been electronically signed by LYLA Augustin @ on March 15, 2019 8:38 AM

## 2019-03-15 NOTE — PROGRESS NOTES
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  Started on palliative chemotherapy with Taxotere and Cytoxan on July 26, 2017.  Patient received 7 cycle of Taxotere and Cytoxan on December 6, 2017.  After that patient was changed over to treatment with Palbociclib and letrozole on January 30, 2018.  Palbociclib was held from January 17, 2019 until February 8, 2019 secondary to wisdom tooth extraction.  Patient started cycle 11 of palbociclib and letrozole on February 8, 2019.  She is here for follow-up appointment today.  Complains of worsening back pain and pain in right elbow.  States wound on left lower jaw after wisdom tooth extraction is healing well.   Denies any nausea or vomiting or diarrhea with current treatment.  Complains of tingling and numbness affecting bilateral hand. Denies any bowel or bladder incontinence.      PAST MEDICAL HISTORY:    Past Medical History:   Diagnosis Date   • Anxiety    • Depression    • Encounter for gynecological examination    • Malignant neoplasm of female breast (CMS/HCC)     hx of dcis s/p mastectomy and reconstruction and augmentation      • Primary malignant neoplasm of breast (CMS/HCC)     dcis in rt breast s/p mastectomy,reconstruction      • Ventricular premature beats        SOCIAL HISTORY:    Social History     Tobacco Use   • Smoking status: Never Smoker   • Smokeless tobacco: Never Used   Substance Use Topics   • Alcohol use: No   • Drug use: No       Surgical History :  Past Surgical History:   Procedure Laterality Date   • AUGMENTATION MAMMAPLASTY     • AXILLARY LYMPH NODE  BIOPSY/EXCISION Right 7/10/2017    Procedure: BIOSPY RIGHT AXILLARY MASS AND OR LYMPH;  Surgeon: Sourav Ernst MD;  Location: [facility];  Service:    • BREAST BIOPSY  12/07/2007    Mammotome biopsy of the right side with clip placement   • BREAST LUMPECTOMY     • BREAST RECONSTRUCTION  10/28/2008    Abscence of right breast secondary to mastectomy. Implant exchange for reconstruction of right breast with open para-prosthetic capsulotomy   • BREAST SURGERY  10/01/2009    Left breast ptosis and breast asymmetry secondary to right breast reconstruction for breast cancer. Left breast mastopexy with augmentation.   • CARDIAC CATHETERIZATION  09/18/2008    Normal coronary arteriography. Normal left ventricular angiogram   • EP STUDY     • MASTECTOMY Right    • MASTECTOMY  02/05/2008    Multifocal right breast ductal carcinoma-in-situ. Right total mastectomy   • MASTECTOMY, PARTIAL  01/03/2008    Ductal carcinoma in-situ of the right breast, proven by mammotome biopsy. Right lumpectomy, medial portion of the breast, between 2 o'clock and 4 o'clock, 5 cm from the nipple, with clip placement, radiographic inter. of severo. specimen, & ultrasound   • PAP SMEAR  03/03/2009    Negative   • MI INSJ TUNNELED CVC W/O SUBQ PORT/ AGE 5 YR/> Right 7/10/2017    Procedure: MEDIPORT     (c-arm#2);  Surgeon: Sourav Ernst MD;  Location: [facility];  Service: General       ALLERGIES:    Allergies   Allergen Reactions   • Percocet [Oxycodone-Acetaminophen] Nausea And Vomiting       FAMILY HISTORY:  Family History   Problem Relation Age of Onset   • Anemia Mother    • Multiple sclerosis Mother    • No Known Problems Father    • Diabetes Other    • Multiple sclerosis Other        REVIEW OF SYSTEMS:      CONSTITUTIONAL: Complains of fatigue.  Has gained 2 pounds since last clinic visit.  Denies any fever, chills .      HEENT:  No epistaxis, mouth sores or difficulty swallowing.     RESPIRATORY: No new shortness of breath. No new cough  "or hemoptysis.     CARDIOVASCULAR: No chest pain or palpitations.     GASTROINTESTINAL: No nausea or vomiting. Not requiring scopolamine patch.  No new abdominal pain. No blood in the stool.     GENITOURINARY: No Dysuria or Hematuria.     MUSCULOSKELETAL:Complains of arthritic pain affecting bilateral hands. Complains of pain in bilateral hip.  Complains of worsening of back pain .     LYMPHATICS: No new lymph node swelling.     NEUROLOGICAL : Complains of intermittent tingling and numbness affecting bilateral hand. Complains of tingling and numbness from back radiating down to bilateral lower extremity.  No dizziness. No seizures or balance problems.     SKIN: Positive for history of melanoma status post surgical removal. Denies any new skin lesion worrisome for melanoma.              PHYSICAL EXAMINATION:      VITAL SIGNS:  /82   Pulse 77   Temp 97.8 °F (36.6 °C) (Temporal)   Resp 18   Ht 170.2 cm (67.01\")   Wt 103 kg (226 lb 9.6 oz)   SpO2 97%   BMI 35.48 kg/m²      ECOG performance status:1    GENERAL:  Not in any distress.    HEENT:  Normocephalic, Atraumatic.Mild Conjunctival pallor. No icterus. Extraocular Movements Intact. No Facial Asymmetry noted.  Wound after extraction of left lower jaw wisdom tooth is healing well.    NECK:  No adenopathy. No JVD.    RESPIRATORY:  Fair air entry bilateral. No rhonchi or wheezing.    CARDIOVASCULAR:  S1, S2. Regular rate and rhythm. No murmur or gallop appreciated.    ABDOMEN:  Soft, obese, nontender. Bowel sounds present in all four quadrants.  No hepatosplenomegaly appreciated.    MUSCULOSKELETAL:  No edema.No Calf Tenderness.  Decreased range of motion.    NEUROLOGIC:  Alert, awake and oriented ×3.  No  Motor  deficit appreciated. Cranial Nerves 2-12 grossly intact.    LYMPHATICS: No new lymph node enlargement in neck or supraclavicular area.    PSYCHIATRY: Alert, awake and oriented ×3. Normal affect. Normal Judgement.Makes good eye " contact.          DIAGNOSTIC DATA:    Glucose   Date Value Ref Range Status   03/15/2019 53 (L) 60 - 100 mg/dL Final     Sodium   Date Value Ref Range Status   03/15/2019 136 (L) 137 - 145 mmol/L Final     Potassium   Date Value Ref Range Status   03/15/2019 4.0 3.5 - 5.1 mmol/L Final     CO2   Date Value Ref Range Status   03/15/2019 24.0 22.0 - 31.0 mmol/L Final     Chloride   Date Value Ref Range Status   03/15/2019 102 95 - 110 mmol/L Final     Anion Gap   Date Value Ref Range Status   03/15/2019 10.0 5.0 - 15.0 mmol/L Final     Creatinine   Date Value Ref Range Status   03/15/2019 0.72 0.50 - 1.00 mg/dL Final     BUN   Date Value Ref Range Status   03/15/2019 17 7 - 21 mg/dL Final     BUN/Creatinine Ratio   Date Value Ref Range Status   03/15/2019 23.6 7.0 - 25.0 Final     Calcium   Date Value Ref Range Status   03/15/2019 9.4 8.4 - 10.2 mg/dL Final     eGFR Non  Amer   Date Value Ref Range Status   03/15/2019 86 51 - 120 mL/min/1.73 Final     Alkaline Phosphatase   Date Value Ref Range Status   03/15/2019 83 38 - 126 U/L Final     Total Protein   Date Value Ref Range Status   03/15/2019 7.5 6.3 - 8.6 g/dL Final     ALT (SGPT)   Date Value Ref Range Status   03/15/2019 40 9 - 52 U/L Final     AST (SGOT)   Date Value Ref Range Status   03/15/2019 36 14 - 36 U/L Final     Total Bilirubin   Date Value Ref Range Status   03/15/2019 0.3 0.2 - 1.3 mg/dL Final     Albumin   Date Value Ref Range Status   03/15/2019 4.40 3.40 - 4.80 g/dL Final     Globulin   Date Value Ref Range Status   03/15/2019 3.1 2.3 - 3.5 gm/dL Final     Lab Results   Component Value Date    WBC 4.93 03/15/2019    HGB 12.7 03/15/2019    HCT 35.4 03/15/2019    MCV 92.7 03/15/2019     03/15/2019     Lab Results   Component Value Date    NEUTROABS 2.76 03/15/2019     Lab Results   Component Value Date     32.6 (H) 12/13/2018    LABCA2 30.7 12/13/2018         2 D Echo Done on July 19, 2017 showed:  Interpretation Summary   · The  left ventricular cavity is borderline dilated.  · Left ventricular systolic function is normal. Estimated EF = 53%.  · Left ventricular diastolic dysfunction (grade I) consistent with impaired relaxation.  · IAS aneurysm noted. No PFO or ASD           Radiology Data :  CT of chest, abdomen and pelvis with contrast done on December 7, 2018 was reviewed, discussed with patient, it showed:  IMPRESSION:  CONCLUSION:     1. Stable hepatic metastases anterior aspect of the liver.  2. Stable extensive skeletal metastases with pathologic fracture  involving L4.         CT of chest, abdomen and pelvis with contrast done on September 19, 2018 was reviewed, discussed with patient, it showed:  CHEST CT FINDINGS: Bilateral breast implants. Right mastectomy.     Heart size normal. No evidence of pathologically enlarged nodes.  No dense consolidation or masses.        ABDOMEN CT FINDINGS: Stable low CT attenuation lesion anterior  aspect of the liver unchanged since multiple prior exams  corresponding to a treated, stable metastasis.     The gallbladder, spleen, pancreas, adrenal glands and kidneys are  normal in appearance. Bowel grossly unremarkable.     No evidence of pathologically enlarged nodes, free fluid or free  air. Degenerative changes of the spine. The bones are  unremarkable.     PELVIS CT FINDINGS: There are no pelvic masses.        Extensive skeletal metastases the most part osteoblastic  sclerotic. High-grade compression fracture L4 vertebral body.  These are unchanged since prior exam.           IMPRESSION:  CONCLUSION: Bilateral breast implants. Right mastectomy. CT chest  is otherwise unremarkable. Lungs are clear. No pathologic hilar  or mediastinal adenopathy.     Stable hepatic metastasis anterior aspect of liver unchanged  since prior exam.     Extensive skeletal metastases also unchanged since prior exam.               MRI of brain with and without contrast done on July 9, 2018 was reviewed, discussed with  patient, it showed:  IMPRESSION:  No evidence of intracranial hemorrhage, mass/mass effect, acute  infarct, or pathologic contrast enhancement.     Previous described/known enhancing calvarial metastatic lesions  are significantly less conspicuous on today's examination. The  finding would support favorable treatment response.     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          Doppler ultrasound of right upper extremity done on November 10, 2017 showed:  IMPRESSION:  CONCLUSION:   Abnormal exam with thrombus visualized in the right internal  jugular vein, consistent with DVT.      PET/CT done on July 3, 2017 showed:  IMPRESSION:  CONCLUSION:  1. Extensive metastatic disease. Pathologic uptake within  enlarged right-sided axillary lymph nodes. Metastatic adenopathy  in both laurie and mediastinum. Tumor replacing most of the left  lobe of liver. Intra-abdominal retroperitoneal metastases,  adenopathy.     Extensive skeletal metastases including a pathologic fracture at  L4.        Nuclear bone scan done on June 20, 2017 showed:  1. Increased uptake at L4 suggestive of metastatic process.  2. 3 or 4 areas of increased activity in bony calvarium  3. Some increased activity in tips posteriorly and anteriorly suggestive of metastatic carcinoma to the skeletal system.           Pathology :    Pathology data from July 10, 2017 showed:  Final Diagnosis   Mass right axilla, excisional biopsy:      Metastatic poorly differentiated ductal carcinoma, breast primary      Estrogen receptor positive, 95% stainability, strong intensity      Progesterone receptor positive, 95% stainability, strong intensity      HER-2 2+(equivocal), sent to [facility] for FISH     Addendum   Her 2 FISH result from [facility] is NEGATIVE         Pathology report from December 7, 2007 showed:  FINAL DIAGNOSIS:   RIGHT BREAST MAMMOTOME BIOPSY:        DUCTAL CARCINOMA IN SITU, INTERMEDIATE NUCLEAR GRADE              WITH  MICROCALCIFICATION.      ADDENDUM:   Estrogen receptors are positive (90-95% stainability).   Progesterone receptors are positive (90-95% stainability).         ASSESSMENT AND PLAN:      1.  Metastatic cancer with extensive adenopathy in right axilla, mediastinum, hilar, abdominal, retroperitoneal with extensive liver and bone metastasis on PET/CT.  Patient underwent right apatient underwent right axillary lymph node excision by Dr. Ernst on July 10, 2017.Pathology report confirmed ductal carcinoma of breast with estrogen and progesterone positivity.  At her on palliative chemotherapy with Taxotere and cytoxan on July 26, 2017.   Patient was  seen at [facility] for second opinion regarding her breast cancer.  Case was discussed with Dr vazquez oncologist that has seen patient at [facility].  She agreed with her chemotherapy at this point.  The scan on December 20, 2017 shows relative stability of disease, lesion in the liver as well as bone lesion at about same.  Her tumor marker CA 27-29 as well as CA 15-3 are less than 100 at this point.  Since patient is developing some neuropathy in bilateral upper and lower extremity recommend changing her therapy to Palbociclib with letrozole.  Patient started taking first round of Palbociclib and letrozole on January 30, 2018.  Patient was scheduled to start cycle  6 of chemotherapy today with Palbociclib today on July 16, 2018.   -CT of chest, abdomen and pelvis with contrast done on July 9, 2018 show stable disease which was discussed with patient.  -MRI of brain with and without contrast done on July 9, 2018 shows significant improvement in calvarial metastasis which was discussed with patient.  -CT of chest, abdomen and pelvis with contrast done on September 19, 2018 showed stable disease without any new lymphadenopathy or any new lesions.  Result of CT scan were discussed with patient and her family.  -CT of chest, abdomen and pelvis with contrast done on  December 7, 2018 after cycle 9 of Palbociclib and letrozole showed stable disease involving liver and bones.  Result of CT scan were discussed with patient.  -Palbociclib was held from January 17, 2019 until February 8, 2019 secondary to wisdom tooth extraction.  -Patient resume cycle 11 of palbociclib and letrozole on February 8, 2019.  -CBC done earlier today on March 15, 2019 shows normal white blood cell count with normal neutrophil count.  Will go ahead with cycle 12 of palbociclib and letrozole today on March 15, 2019.  -Tumor marker consisting of CA 15-3 and CA 27-29 has been drawn earlier today.  We will follow-up on the result of tumor marker.  -We will get restaging CT of chest, abdomen and pelvis with contrast prior to next clinic visit in 5 weeks.      2.  Brain metastasis:MRi Brain with and without done on November 9, 2017 was reviewed and compared with t MRI done 6 weeks prior to that.  Brain calvarial metastasis are stable or somewhat improved.  No need to do radiation at this point which was discussed with patient.    -MRI of brain with and without contrast done on July 9, 2018 shows improvement in calvarial metastasis.    3.  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  She is currently on Coumadin.    She is being followed by Coumadin clinic  -Since patient is scheduled to undergo wisdom tooth extraction, recommend holding Coumadin 5 days prior to procedure and getting Lovenox 1.5 mg/kg during that period.  We will resume Coumadin with Lovenox after wisdom tooth extraction.    4.  History of melanoma in situ status post excision by Dr. Linn.    5.  Bone metastasis: Patient was started on Zometa on August 23, 2017.  Continue with Zometa as scheduled for now.  Patient denies any mouth sores or jaw pain.  CT scan shows about loss of 70% of height of L4 vertebral body, patient has been referred to Dr. Pugh to get evaluated for kyphoplasty.  Patient  [facility], as per patient's second surgeon did not recommend any surgery.    -Zometa Has been held since November 2018 due to wisdom tooth extraction requirement.  -Wound after wisdom tooth extraction is not completely healed.  Will resume Zometa upon next clinic visit if wound is completely healed.       6.  History of ductal carcinoma in situ of the right breast diagnosed in 2007.  Status post mastectomy followed by adjuvant tamoxifen for 5 years which was finished in 2013.    7.  Neutropenia: Secondary to Palbociclib.resolved. WBC is normal at 4.93 with ANC 2.76 .      8.  Health maintenance: Patient does not smoke.  Not a candidate for screening colonoscopy at this point.      9.  Prescriptions: Patient has enough prescription of Decadron, Zofran,  and Ultram.    10.  Advance care planning: Patient remains full code for now and is able to make on her decisions.  Patient has healthcare surrogate Mentioned on Chart.    11. Obesity: Patient's Body mass index is 35.48 kg/m². BMI is higher than reference range.  Patient was notified about elevated BMI.  Patient was counseled about diet and exercise for weight loss.        Ovidio Meadows MD  3/15/2019  3:45 PM        EMR Dragon/Transcription disclaimer:   Much of this encounter note is an electronic transcription/translation of spoken language to printed text. The electronic translation of spoken language may permit erroneous, or at times, nonsensical words or phrases to be inadvertently transcribed; Although I have reviewed the note for such errors, some may still exist.

## 2019-03-15 NOTE — PATIENT INSTRUCTIONS
Patient Instructions for CT Scan    · Your CT scan is being done without any oral contrast.  Your CT scan may be done with IV contrast, if you have an allergy to iodine--please tell your nurse.    · Do not eat or drink 4 hours prior to scan.     · You may take your medications with sips of water, except DO NOT take your diabetic pill the morning of the test.    Arrive at the Outpatient Surgery entrance at [facility] 20 minutes prior to appointment time.    You will receive a phone call with an appointment for your CT scan.  Please call our office, if someone does not contact you with 3 days.    Sabra Montemayor RN  March 15, 2019  9:39 AM

## 2019-03-16 LAB
CANCER AG15-3 SERPL-ACNC: 32.6 U/ML (ref 0–25)
CANCER AG27-29 SERPL-ACNC: 31.2 U/ML (ref 0–38.6)

## 2019-04-17 ENCOUNTER — HOSPITAL ENCOUNTER (OUTPATIENT)
Dept: CT IMAGING | Facility: HOSPITAL | Age: 49
Discharge: HOME | End: 2019-04-17
Admitting: INTERNAL MEDICINE

## 2019-04-17 DIAGNOSIS — C79.51 BONE METASTASIS: ICD-10-CM

## 2019-04-17 DIAGNOSIS — C50.911 MALIGNANT NEOPLASM OF RIGHT BREAST IN FEMALE, ESTROGEN RECEPTOR POSITIVE, UNSPECIFIED SITE OF BREAST (HCC): ICD-10-CM

## 2019-04-17 DIAGNOSIS — Z17.0 MALIGNANT NEOPLASM OF RIGHT BREAST IN FEMALE, ESTROGEN RECEPTOR POSITIVE, UNSPECIFIED SITE OF BREAST (HCC): ICD-10-CM

## 2019-04-17 PROCEDURE — 71260 CT THORAX DX C+: CPT

## 2019-04-17 PROCEDURE — 25010000002 IOPAMIDOL 61 % SOLUTION: Performed by: INTERNAL MEDICINE

## 2019-04-17 PROCEDURE — 74177 CT ABD & PELVIS W/CONTRAST: CPT

## 2019-04-17 RX ADMIN — IOPAMIDOL 90 ML: 612 INJECTION, SOLUTION INTRAVENOUS at 08:37

## 2019-04-19 ENCOUNTER — OFFICE VISIT (OUTPATIENT)
Dept: ONCOLOGY | Facility: CLINIC | Age: 49
End: 2019-04-19

## 2019-04-19 ENCOUNTER — ANTICOAGULATION VISIT (OUTPATIENT)
Dept: CARDIAC SURGERY | Facility: CLINIC | Age: 49
End: 2019-04-19

## 2019-04-19 ENCOUNTER — LAB (OUTPATIENT)
Dept: ONCOLOGY | Facility: HOSPITAL | Age: 49
End: 2019-04-19

## 2019-04-19 ENCOUNTER — INFUSION (OUTPATIENT)
Dept: ONCOLOGY | Facility: HOSPITAL | Age: 49
End: 2019-04-19

## 2019-04-19 VITALS
TEMPERATURE: 98.2 F | WEIGHT: 230.2 LBS | DIASTOLIC BLOOD PRESSURE: 75 MMHG | OXYGEN SATURATION: 96 % | HEART RATE: 74 BPM | RESPIRATION RATE: 18 BRPM | HEIGHT: 67 IN | BODY MASS INDEX: 36.13 KG/M2 | SYSTOLIC BLOOD PRESSURE: 137 MMHG

## 2019-04-19 VITALS — SYSTOLIC BLOOD PRESSURE: 118 MMHG | DIASTOLIC BLOOD PRESSURE: 72 MMHG

## 2019-04-19 DIAGNOSIS — IMO0001 OTHER COMPLICATION DUE TO VENOUS ACCESS DEVICE, SUBSEQUENT ENCOUNTER: ICD-10-CM

## 2019-04-19 DIAGNOSIS — T45.1X5A ANEMIA DUE TO ANTINEOPLASTIC CHEMOTHERAPY: ICD-10-CM

## 2019-04-19 DIAGNOSIS — Z79.01 LONG TERM CURRENT USE OF ANTICOAGULANT THERAPY: ICD-10-CM

## 2019-04-19 DIAGNOSIS — C50.911 MALIGNANT NEOPLASM OF RIGHT BREAST IN FEMALE, ESTROGEN RECEPTOR POSITIVE, UNSPECIFIED SITE OF BREAST (HCC): Primary | ICD-10-CM

## 2019-04-19 DIAGNOSIS — D64.81 ANEMIA DUE TO ANTINEOPLASTIC CHEMOTHERAPY: ICD-10-CM

## 2019-04-19 DIAGNOSIS — Z85.820 HISTORY OF MALIGNANT MELANOMA OF SKIN: ICD-10-CM

## 2019-04-19 DIAGNOSIS — T45.1X5A CHEMOTHERAPY-INDUCED NEUTROPENIA (HCC): ICD-10-CM

## 2019-04-19 DIAGNOSIS — R79.89 ELEVATED LIVER FUNCTION TESTS: ICD-10-CM

## 2019-04-19 DIAGNOSIS — Z17.0 MALIGNANT NEOPLASM OF RIGHT BREAST IN FEMALE, ESTROGEN RECEPTOR POSITIVE, UNSPECIFIED SITE OF BREAST (HCC): Primary | ICD-10-CM

## 2019-04-19 DIAGNOSIS — Z45.2 ENCOUNTER FOR VENOUS ACCESS DEVICE CARE: ICD-10-CM

## 2019-04-19 DIAGNOSIS — Z45.2 ENCOUNTER FOR ADJUSTMENT OR MANAGEMENT OF VASCULAR ACCESS DEVICE: ICD-10-CM

## 2019-04-19 DIAGNOSIS — C79.51 BONE METASTASIS: ICD-10-CM

## 2019-04-19 DIAGNOSIS — D70.1 CHEMOTHERAPY-INDUCED NEUTROPENIA (HCC): ICD-10-CM

## 2019-04-19 DIAGNOSIS — C50.911 MALIGNANT NEOPLASM OF RIGHT BREAST IN FEMALE, ESTROGEN RECEPTOR POSITIVE, UNSPECIFIED SITE OF BREAST (HCC): ICD-10-CM

## 2019-04-19 DIAGNOSIS — C79.51 BONE METASTASIS: Primary | ICD-10-CM

## 2019-04-19 DIAGNOSIS — C79.31 METASTASIS TO BRAIN (HCC): ICD-10-CM

## 2019-04-19 DIAGNOSIS — Z17.0 MALIGNANT NEOPLASM OF RIGHT BREAST IN FEMALE, ESTROGEN RECEPTOR POSITIVE, UNSPECIFIED SITE OF BREAST (HCC): ICD-10-CM

## 2019-04-19 LAB
ALBUMIN SERPL-MCNC: 4 G/DL (ref 3.5–5.2)
ALBUMIN/GLOB SERPL: 1.5 G/DL
ALP SERPL-CCNC: 99 U/L (ref 39–117)
ALT SERPL W P-5'-P-CCNC: 50 U/L (ref 1–33)
ANION GAP SERPL CALCULATED.3IONS-SCNC: 9 MMOL/L
AST SERPL-CCNC: 34 U/L (ref 1–32)
BASOPHILS # BLD AUTO: 0.13 10*3/MM3 (ref 0–0.2)
BASOPHILS NFR BLD AUTO: 2.6 % (ref 0–1.5)
BILIRUB SERPL-MCNC: 0.2 MG/DL (ref 0.2–1.2)
BUN BLD-MCNC: 17 MG/DL (ref 6–20)
BUN/CREAT SERPL: 23.3 (ref 7–25)
CALCIUM SPEC-SCNC: 9.2 MG/DL (ref 8.6–10.5)
CANCER AG15-3 SERPL-ACNC: 29.2 U/ML
CHLORIDE SERPL-SCNC: 106 MMOL/L (ref 98–107)
CO2 SERPL-SCNC: 27 MMOL/L (ref 22–29)
CREAT BLD-MCNC: 0.73 MG/DL (ref 0.57–1)
DEPRECATED RDW RBC AUTO: 58.7 FL (ref 37–54)
EOSINOPHIL # BLD AUTO: 0.06 10*3/MM3 (ref 0–0.4)
EOSINOPHIL NFR BLD AUTO: 1.2 % (ref 0.3–6.2)
ERYTHROCYTE [DISTWIDTH] IN BLOOD BY AUTOMATED COUNT: 16.5 % (ref 12.3–15.4)
GFR SERPL CREATININE-BSD FRML MDRD: 84 ML/MIN/1.73
GLOBULIN UR ELPH-MCNC: 2.7 GM/DL
GLUCOSE BLD-MCNC: 63 MG/DL (ref 65–99)
HCT VFR BLD AUTO: 35.7 % (ref 34–46.6)
HGB BLD-MCNC: 12.1 G/DL (ref 12–15.9)
IMM GRANULOCYTES # BLD AUTO: 0.04 10*3/MM3 (ref 0–0.05)
IMM GRANULOCYTES NFR BLD AUTO: 0.8 % (ref 0–0.5)
INR PPP: 2.8 (ref 0.9–1.1)
LYMPHOCYTES # BLD AUTO: 1.59 10*3/MM3 (ref 0.7–3.1)
LYMPHOCYTES NFR BLD AUTO: 31.9 % (ref 19.6–45.3)
MAGNESIUM SERPL-MCNC: 1.9 MG/DL (ref 1.6–2.6)
MCH RBC QN AUTO: 32.6 PG (ref 26.6–33)
MCHC RBC AUTO-ENTMCNC: 33.9 G/DL (ref 31.5–35.7)
MCV RBC AUTO: 96.2 FL (ref 79–97)
MONOCYTES # BLD AUTO: 0.86 10*3/MM3 (ref 0.1–0.9)
MONOCYTES NFR BLD AUTO: 17.2 % (ref 5–12)
NEUTROPHILS # BLD AUTO: 2.31 10*3/MM3 (ref 1.4–7)
NEUTROPHILS NFR BLD AUTO: 46.3 % (ref 42.7–76)
NRBC BLD AUTO-RTO: 0.4 /100 WBC (ref 0–0)
PHOSPHATE SERPL-MCNC: 3.7 MG/DL (ref 2.5–4.5)
PLATELET # BLD AUTO: 224 10*3/MM3 (ref 140–450)
PMV BLD AUTO: 9.8 FL (ref 6–12)
POTASSIUM BLD-SCNC: 4.1 MMOL/L (ref 3.5–5.2)
PROT SERPL-MCNC: 6.7 G/DL (ref 6–8.5)
RBC # BLD AUTO: 3.71 10*6/MM3 (ref 3.77–5.28)
SODIUM BLD-SCNC: 142 MMOL/L (ref 136–145)
WBC NRBC COR # BLD: 4.99 10*3/MM3 (ref 3.4–10.8)

## 2019-04-19 PROCEDURE — 83735 ASSAY OF MAGNESIUM: CPT

## 2019-04-19 PROCEDURE — G8417 CALC BMI ABV UP PARAM F/U: HCPCS | Performed by: INTERNAL MEDICINE

## 2019-04-19 PROCEDURE — 99214 OFFICE O/P EST MOD 30 MIN: CPT | Performed by: INTERNAL MEDICINE

## 2019-04-19 PROCEDURE — 80053 COMPREHEN METABOLIC PANEL: CPT

## 2019-04-19 PROCEDURE — 84100 ASSAY OF PHOSPHORUS: CPT

## 2019-04-19 PROCEDURE — 96374 THER/PROPH/DIAG INJ IV PUSH: CPT | Performed by: INTERNAL MEDICINE

## 2019-04-19 PROCEDURE — 85025 COMPLETE CBC W/AUTO DIFF WBC: CPT

## 2019-04-19 PROCEDURE — 1123F ACP DISCUSS/DSCN MKR DOCD: CPT | Performed by: INTERNAL MEDICINE

## 2019-04-19 PROCEDURE — 86300 IMMUNOASSAY TUMOR CA 15-3: CPT

## 2019-04-19 PROCEDURE — 25010000002 ZOLEDRONIC ACID PER 1 MG: Performed by: INTERNAL MEDICINE

## 2019-04-19 PROCEDURE — 85610 PROTHROMBIN TIME: CPT | Performed by: NURSE PRACTITIONER

## 2019-04-19 RX ORDER — SODIUM CHLORIDE 0.9 % (FLUSH) 0.9 %
10 SYRINGE (ML) INJECTION AS NEEDED
Status: DISCONTINUED | OUTPATIENT
Start: 2019-04-19 | End: 2019-04-19 | Stop reason: HOSPADM

## 2019-04-19 RX ORDER — SODIUM CHLORIDE 9 MG/ML
250 INJECTION, SOLUTION INTRAVENOUS ONCE
Status: COMPLETED | OUTPATIENT
Start: 2019-04-19 | End: 2019-04-19

## 2019-04-19 RX ORDER — GABAPENTIN 300 MG/1
300 CAPSULE ORAL 3 TIMES DAILY
Qty: 90 CAPSULE | Refills: 0 | Status: SHIPPED | OUTPATIENT
Start: 2019-04-19 | End: 2019-06-28 | Stop reason: SDUPTHER

## 2019-04-19 RX ORDER — SODIUM CHLORIDE 9 MG/ML
250 INJECTION, SOLUTION INTRAVENOUS ONCE
Status: CANCELLED | OUTPATIENT
Start: 2019-04-19

## 2019-04-19 RX ORDER — SODIUM CHLORIDE 0.9 % (FLUSH) 0.9 %
10 SYRINGE (ML) INJECTION AS NEEDED
Status: CANCELLED | OUTPATIENT
Start: 2019-04-19

## 2019-04-19 RX ADMIN — SODIUM CHLORIDE, PRESERVATIVE FREE 10 ML: 5 INJECTION INTRAVENOUS at 09:19

## 2019-04-19 RX ADMIN — Medication 500 UNITS: at 11:36

## 2019-04-19 RX ADMIN — SODIUM CHLORIDE, PRESERVATIVE FREE 10 ML: 5 INJECTION INTRAVENOUS at 11:35

## 2019-04-19 RX ADMIN — ZOLEDRONIC ACID 4 MG: 4 INJECTION, SOLUTION, CONCENTRATE INTRAVENOUS at 11:08

## 2019-04-19 RX ADMIN — SODIUM CHLORIDE 250 ML: 9 INJECTION, SOLUTION INTRAVENOUS at 10:33

## 2019-04-19 NOTE — PROGRESS NOTES
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  Started on palliative chemotherapy with Taxotere and Cytoxan on July 26, 2017.  Patient received 7 cycle of Taxotere and Cytoxan on December 6, 2017.  After that patient was changed over to treatment with Palbociclib and letrozole on January 30, 2018.  Palbociclib was held from January 17, 2019 until February 8, 2019 secondary to wisdom tooth extraction.  Patient started cycle 11 of palbociclib and letrozole on February 8, 2019.  Patient had restaging CT of chest, abdomen and pelvis with contrast done on April 17, 2019.  Patient is here to discuss results of CT scan and further recommendation.  She is here for follow-up appointment today.  Complains of worsening back pain and pain in right elbow.  States wound on left lower jaw after wisdom tooth extraction is healing well.   Denies any nausea or vomiting or diarrhea with current treatment.  Complains of tingling and numbness affecting bilateral hand. Denies any bowel or bladder incontinence.      PAST MEDICAL HISTORY:    Past Medical History:   Diagnosis Date   • Anxiety    • Depression    • Encounter for gynecological examination    • Malignant neoplasm of female breast (CMS/HCC)     hx of dcis s/p mastectomy and reconstruction and augmentation      • Primary malignant neoplasm of breast (CMS/HCC)     dcis in rt breast s/p mastectomy,reconstruction      • Ventricular premature beats        SOCIAL HISTORY:    Social History     Tobacco Use   • Smoking status: Never Smoker   • Smokeless tobacco: Never Used   Substance Use Topics   • Alcohol  use: No   • Drug use: No       Surgical History :  Past Surgical History:   Procedure Laterality Date   • AUGMENTATION MAMMAPLASTY     • AXILLARY LYMPH NODE BIOPSY/EXCISION Right 7/10/2017    Procedure: BIOSPY RIGHT AXILLARY MASS AND OR LYMPH;  Surgeon: Sourav Ernst MD;  Location: [facility];  Service:    • BREAST BIOPSY  12/07/2007    Mammotome biopsy of the right side with clip placement   • BREAST LUMPECTOMY     • BREAST RECONSTRUCTION  10/28/2008    Abscence of right breast secondary to mastectomy. Implant exchange for reconstruction of right breast with open para-prosthetic capsulotomy   • BREAST SURGERY  10/01/2009    Left breast ptosis and breast asymmetry secondary to right breast reconstruction for breast cancer. Left breast mastopexy with augmentation.   • CARDIAC CATHETERIZATION  09/18/2008    Normal coronary arteriography. Normal left ventricular angiogram   • EP STUDY     • MASTECTOMY Right    • MASTECTOMY  02/05/2008    Multifocal right breast ductal carcinoma-in-situ. Right total mastectomy   • MASTECTOMY, PARTIAL  01/03/2008    Ductal carcinoma in-situ of the right breast, proven by mammotome biopsy. Right lumpectomy, medial portion of the breast, between 2 o'clock and 4 o'clock, 5 cm from the nipple, with clip placement, radiographic inter. of severo. specimen, & ultrasound   • PAP SMEAR  03/03/2009    Negative   • DE INSJ TUNNELED CVC W/O SUBQ PORT/ AGE 5 YR/> Right 7/10/2017    Procedure: MEDIPORT     (c-arm#2);  Surgeon: Sourav Ernst MD;  Location: [facility];  Service: General       ALLERGIES:    Allergies   Allergen Reactions   • Percocet [Oxycodone-Acetaminophen] Nausea And Vomiting       FAMILY HISTORY:  Family History   Problem Relation Age of Onset   • Anemia Mother    • Multiple sclerosis Mother    • No Known Problems Father    • Diabetes Other    • Multiple sclerosis Other        REVIEW OF SYSTEMS:      CONSTITUTIONAL: Complains of fatigue.  Has gained 4 pounds since last clinic  "visit.  Denies any fever, chills .      HEENT:  No epistaxis, mouth sores or difficulty swallowing.     RESPIRATORY: No new shortness of breath. No new cough or hemoptysis.     CARDIOVASCULAR: No chest pain or palpitations.     GASTROINTESTINAL: No nausea or vomiting. Not requiring scopolamine patch.  No new abdominal pain. No blood in the stool.     GENITOURINARY: No Dysuria or Hematuria.     MUSCULOSKELETAL:Complains of arthritic pain affecting bilateral hands. Complains of pain in bilateral hip.  Complains of worsening of back pain .     LYMPHATICS: No new lymph node swelling.     NEUROLOGICAL : Complains of intermittent tingling and numbness affecting bilateral hand. Complains of tingling and numbness from back radiating down to bilateral lower extremity.  No dizziness. No seizures or balance problems.     SKIN: Positive for history of melanoma status post surgical removal. Denies any new skin lesion worrisome for melanoma.              PHYSICAL EXAMINATION:      VITAL SIGNS:  /75   Pulse 74   Temp 98.2 °F (36.8 °C) (Temporal)   Resp 18   Ht 170.2 cm (67.01\")   Wt 104 kg (230 lb 3.2 oz)   SpO2 96%   BMI 36.05 kg/m²      ECOG performance status:1    GENERAL:  Not in any distress.    HEENT:  Normocephalic, Atraumatic.Mild Conjunctival pallor. No icterus. Extraocular Movements Intact. No Facial Asymmetry noted.  Wound after extraction of left lower jaw wisdom tooth is healed.    NECK:  No adenopathy. No JVD.    RESPIRATORY:  Fair air entry bilateral. No rhonchi or wheezing.    CARDIOVASCULAR:  S1, S2. Regular rate and rhythm. No murmur or gallop appreciated.    ABDOMEN:  Soft, obese, nontender. Bowel sounds present in all four quadrants.  No hepatosplenomegaly appreciated.    MUSCULOSKELETAL:  No edema.No Calf Tenderness.  Decreased range of motion.    NEUROLOGIC:  Alert, awake and oriented ×3.  No  Motor  deficit appreciated. Cranial Nerves 2-12 grossly intact.    LYMPHATICS: No new lymph node " enlargement in neck or supraclavicular area.    PSYCHIATRY: Alert, awake and oriented ×3. Normal affect. Normal Judgement.Makes good eye contact.          DIAGNOSTIC DATA:    Glucose   Date Value Ref Range Status   04/19/2019 63 (L) 65 - 99 mg/dL Final     Sodium   Date Value Ref Range Status   04/19/2019 142 136 - 145 mmol/L Final     Potassium   Date Value Ref Range Status   04/19/2019 4.1 3.5 - 5.2 mmol/L Final     CO2   Date Value Ref Range Status   04/19/2019 27.0 22.0 - 29.0 mmol/L Final     Chloride   Date Value Ref Range Status   04/19/2019 106 98 - 107 mmol/L Final     Anion Gap   Date Value Ref Range Status   04/19/2019 9.0 mmol/L Final     Creatinine   Date Value Ref Range Status   04/19/2019 0.73 0.57 - 1.00 mg/dL Final     BUN   Date Value Ref Range Status   04/19/2019 17 6 - 20 mg/dL Final     BUN/Creatinine Ratio   Date Value Ref Range Status   04/19/2019 23.3 7.0 - 25.0 Final     Calcium   Date Value Ref Range Status   04/19/2019 9.2 8.6 - 10.5 mg/dL Final     eGFR Non  Amer   Date Value Ref Range Status   04/19/2019 84 >60 mL/min/1.73 Final     Alkaline Phosphatase   Date Value Ref Range Status   04/19/2019 99 39 - 117 U/L Final     Total Protein   Date Value Ref Range Status   04/19/2019 6.7 6.0 - 8.5 g/dL Final     ALT (SGPT)   Date Value Ref Range Status   04/19/2019 50 (H) 1 - 33 U/L Final     AST (SGOT)   Date Value Ref Range Status   04/19/2019 34 (H) 1 - 32 U/L Final     Total Bilirubin   Date Value Ref Range Status   04/19/2019 0.2 0.2 - 1.2 mg/dL Final     Albumin   Date Value Ref Range Status   04/19/2019 4.00 3.50 - 5.20 g/dL Final     Globulin   Date Value Ref Range Status   04/19/2019 2.7 gm/dL Final     Lab Results   Component Value Date    WBC 4.99 04/19/2019    HGB 12.1 04/19/2019    HCT 35.7 04/19/2019    MCV 96.2 04/19/2019     04/19/2019     Lab Results   Component Value Date    NEUTROABS 2.31 04/19/2019     Lab Results   Component Value Date     32.6 (H)  03/15/2019    LABCA2 31.2 03/15/2019         2 D Echo Done on July 19, 2017 showed:  Interpretation Summary   · The left ventricular cavity is borderline dilated.  · Left ventricular systolic function is normal. Estimated EF = 53%.  · Left ventricular diastolic dysfunction (grade I) consistent with impaired relaxation.  · IAS aneurysm noted. No PFO or ASD           Radiology Data :  CT of chest, abdomen and pelvis with contrast done on April 17, 2019 was reviewed, discussed with patient, it showed:  IMPRESSION:  CONCLUSION:      1. Stable examination. Presumed treated hepatic metastases in the  left lobe, unchanged. Multiple predominantly osteosclerotic bone  lesions, unchanged.           CT of chest, abdomen and pelvis with contrast done on December 7, 2018 was reviewed, discussed with patient, it showed:  IMPRESSION:  CONCLUSION:     1. Stable hepatic metastases anterior aspect of the liver.  2. Stable extensive skeletal metastases with pathologic fracture  involving L4.       MRI of brain with and without contrast done on July 9, 2018 was reviewed, discussed with patient, it showed:  IMPRESSION:  No evidence of intracranial hemorrhage, mass/mass effect, acute  infarct, or pathologic contrast enhancement.     Previous described/known enhancing calvarial metastatic lesions  are significantly less conspicuous on today's examination. The  finding would support favorable treatment response.     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          Doppler ultrasound of right upper extremity done on November 10, 2017 showed:  IMPRESSION:  CONCLUSION:   Abnormal exam with thrombus visualized in the right internal  jugular vein, consistent with DVT.      PET/CT done on July 3, 2017 showed:  IMPRESSION:  CONCLUSION:  1. Extensive metastatic disease. Pathologic uptake within  enlarged right-sided axillary lymph nodes. Metastatic adenopathy  in both laurie and mediastinum. Tumor replacing most of the  left  lobe of liver. Intra-abdominal retroperitoneal metastases,  adenopathy.     Extensive skeletal metastases including a pathologic fracture at  L4.        Nuclear bone scan done on June 20, 2017 showed:  1. Increased uptake at L4 suggestive of metastatic process.  2. 3 or 4 areas of increased activity in bony calvarium  3. Some increased activity in tips posteriorly and anteriorly suggestive of metastatic carcinoma to the skeletal system.           Pathology :    Pathology data from July 10, 2017 showed:  Final Diagnosis   Mass right axilla, excisional biopsy:      Metastatic poorly differentiated ductal carcinoma, breast primary      Estrogen receptor positive, 95% stainability, strong intensity      Progesterone receptor positive, 95% stainability, strong intensity      HER-2 2+(equivocal), sent to [facility] for FISH     Addendum   Her 2 FISH result from [facility] is NEGATIVE         Pathology report from December 7, 2007 showed:  FINAL DIAGNOSIS:   RIGHT BREAST MAMMOTOME BIOPSY:        DUCTAL CARCINOMA IN SITU, INTERMEDIATE NUCLEAR GRADE              WITH MICROCALCIFICATION.      ADDENDUM:   Estrogen receptors are positive (90-95% stainability).   Progesterone receptors are positive (90-95% stainability).         ASSESSMENT AND PLAN:      1.  Metastatic cancer with extensive adenopathy in right axilla, mediastinum, hilar, abdominal, retroperitoneal with extensive liver and bone metastasis on PET/CT.  Patient underwent right apatient underwent right axillary lymph node excision by Dr. Ernst on July 10, 2017.Pathology report confirmed ductal carcinoma of breast with estrogen and progesterone positivity.  At her on palliative chemotherapy with Taxotere and cytoxan on July 26, 2017.   Patient was  seen at [facility] for second opinion regarding her breast cancer.  Case was discussed with Dr vazquez oncologist that has seen patient at [facility].  She agreed with her chemotherapy at this point.  The  scan on December 20, 2017 shows relative stability of disease, lesion in the liver as well as bone lesion at about same.  Her tumor marker CA 27-29 as well as CA 15-3 are less than 100 at this point.  Since patient is developing some neuropathy in bilateral upper and lower extremity recommend changing her therapy to Palbociclib with letrozole.  Patient started taking first round of Palbociclib and letrozole on January 30, 2018.  Patient was scheduled to start cycle  6 of chemotherapy today with Palbociclib today on July 16, 2018.   -CT of chest, abdomen and pelvis with contrast done on July 9, 2018 show stable disease which was discussed with patient.  -MRI of brain with and without contrast done on July 9, 2018 shows significant improvement in calvarial metastasis which was discussed with patient.  -CT of chest, abdomen and pelvis with contrast done on September 19, 2018 showed stable disease without any new lymphadenopathy or any new lesions.  Result of CT scan were discussed with patient and her family.  -CT of chest, abdomen and pelvis with contrast done on December 7, 2018 after cycle 9 of Palbociclib and letrozole showed stable disease involving liver and bones.  Result of CT scan were discussed with patient.  -Palbociclib was held from January 17, 2019 until February 8, 2019 secondary to wisdom tooth extraction.  -Patient resume cycle 11 of palbociclib and letrozole on February 8, 2019.  -CT of chest, abdomen and pelvis with contrast done on April 17, 2019 shows stable finding involving bone metastases and liver lesion.  Results of CT scan were discussed with patient and her family.  -CBC done earlier today on April 19, 2019 shows normal white blood cell count with normal neutrophil count.  Will go ahead with cycle 13 of palbociclib and letrozole today on April 19, 2019.  -Tumor marker consisting of CA 15-3 and CA 27-29 has been drawn earlier today.  We will follow-up on the result of tumor marker.  -We will  ask patient to return to clinic in 5 weeks with repeat CBC, CMP and tumor marker consisting of CA 15-3 and CA 27-29 to be done on that day.  Next surveillance CT scan will be planned around July 2019.  This recommendation were discussed with patient.      2.  Brain metastasis:MRi Brain with and without done on November 9, 2017 was reviewed and compared with t MRI done 6 weeks prior to that.  Brain calvarial metastasis are stable or somewhat improved.  No need to do radiation at this point which was discussed with patient.    -MRI of brain with and without contrast done on July 9, 2018 shows improvement in calvarial metastasis.    3.  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  She is currently on Coumadin.    She is being followed by Coumadin clinic  -Since patient is scheduled to undergo wisdom tooth extraction, recommend holding Coumadin 5 days prior to procedure and getting Lovenox 1.5 mg/kg during that period.  We will resume Coumadin with Lovenox after wisdom tooth extraction.    4.  History of melanoma in situ status post excision by Dr. Linn.    5.  Bone metastasis: Patient was started on Zometa on August 23, 2017.  Continue with Zometa as scheduled for now.  Patient denies any mouth sores or jaw pain.  CT scan shows about loss of 70% of height of L4 vertebral body, patient has been referred to Dr. Pugh to get evaluated for kyphoplasty.  Patient went for second opinion with orthopedic surgeon in [place], as per patient's second surgeon did not recommend any surgery.    -Zometa Has been held since November 2018 due to wisdom tooth extraction requirement.  -Wound after wisdom tooth extraction is  completely healed.  Will resume Zometa today on April 19, 2019       6.  History of ductal carcinoma in situ of the right breast diagnosed in 2007.  Status post mastectomy followed by adjuvant tamoxifen for 5 years which was finished in 2013.    7.  Neutropenia:  Secondary to Palbociclib.resolved. WBC is normal at 4.99 with ANC 2.31 .      8.  Elevated liver function:  -AST and ALT are mildly elevated.  Will monitor with CMP for now.  CT of abdomen and pelvis does not show any evidence of worsening hepatic metastasis.    9.  Health maintenance: Patient does not smoke.  Not a candidate for screening colonoscopy at this point.      10.  Prescriptions: Patient has enough prescription of Decadron, Zofran,  and Ultram.  Prescription for Neurontin 300 mg 3 times a day has been sent to her pharmacy today on April 19, 2019.    11.  Advance care planning: Patient remains full code for now and is able to make on her decisions.  Patient has healthcare surrogate Mentioned on Chart.    12. Obesity: Patient's Body mass index is 36.05 kg/m². BMI is higher than reference range.  Patient was notified about elevated BMI.  Patient was counseled about diet and exercise for weight loss.        Ovidio Meadows MD  4/19/2019  5:07 PM        EMR Dragon/Transcription disclaimer:   Much of this encounter note is an electronic transcription/translation of spoken language to printed text. The electronic translation of spoken language may permit erroneous, or at times, nonsensical words or phrases to be inadvertently transcribed; Although I have reviewed the note for such errors, some may still exist.

## 2019-04-19 NOTE — PROGRESS NOTES
Today's INR is 2.8 . PT denies any new medications or bleeding issues. PT denies any s/s of blood clot. PT instructed to continue same dose and Coumadin friendly diet. PT will be seen in 5 weeks. Patient instructed regarding medication; results given and questions answered. Nutritional counseling given.  Dietary factors affecting therapy addressed.  Patient instructed to monitor for excessive bruising or bleeding. PT verbalizes understanding.        This document has been electronically signed by LYLA Augustin @ on April 19, 2019 9:04 AM

## 2019-04-20 LAB — CANCER AG27-29 SERPL-ACNC: 23.1 U/ML (ref 0–38.6)

## 2019-05-07 RX ORDER — PALBOCICLIB 125 MG/1
CAPSULE ORAL
Qty: 21 CAPSULE | Refills: 1 | Status: SHIPPED | OUTPATIENT
Start: 2019-05-07 | End: 2019-07-30 | Stop reason: SDUPTHER

## 2019-05-24 ENCOUNTER — ANTICOAGULATION VISIT (OUTPATIENT)
Dept: CARDIAC SURGERY | Facility: CLINIC | Age: 49
End: 2019-05-24

## 2019-05-24 ENCOUNTER — LAB (OUTPATIENT)
Dept: ONCOLOGY | Facility: HOSPITAL | Age: 49
End: 2019-05-24

## 2019-05-24 ENCOUNTER — INFUSION (OUTPATIENT)
Dept: ONCOLOGY | Facility: HOSPITAL | Age: 49
End: 2019-05-24

## 2019-05-24 ENCOUNTER — OFFICE VISIT (OUTPATIENT)
Dept: ONCOLOGY | Facility: CLINIC | Age: 49
End: 2019-05-24

## 2019-05-24 VITALS
HEIGHT: 67 IN | TEMPERATURE: 98.8 F | WEIGHT: 229.2 LBS | SYSTOLIC BLOOD PRESSURE: 128 MMHG | BODY MASS INDEX: 35.97 KG/M2 | DIASTOLIC BLOOD PRESSURE: 75 MMHG | RESPIRATION RATE: 18 BRPM | HEART RATE: 78 BPM | OXYGEN SATURATION: 96 %

## 2019-05-24 VITALS — DIASTOLIC BLOOD PRESSURE: 68 MMHG | HEART RATE: 86 BPM | OXYGEN SATURATION: 97 % | SYSTOLIC BLOOD PRESSURE: 122 MMHG

## 2019-05-24 DIAGNOSIS — Z85.820 HISTORY OF MALIGNANT MELANOMA OF SKIN: ICD-10-CM

## 2019-05-24 DIAGNOSIS — D64.81 ANEMIA DUE TO ANTINEOPLASTIC CHEMOTHERAPY: ICD-10-CM

## 2019-05-24 DIAGNOSIS — Z45.2 ENCOUNTER FOR VENOUS ACCESS DEVICE CARE: ICD-10-CM

## 2019-05-24 DIAGNOSIS — Z17.0 MALIGNANT NEOPLASM OF RIGHT BREAST IN FEMALE, ESTROGEN RECEPTOR POSITIVE, UNSPECIFIED SITE OF BREAST (HCC): Primary | ICD-10-CM

## 2019-05-24 DIAGNOSIS — IMO0001 OTHER COMPLICATION DUE TO VENOUS ACCESS DEVICE, SUBSEQUENT ENCOUNTER: ICD-10-CM

## 2019-05-24 DIAGNOSIS — C50.911 MALIGNANT NEOPLASM OF RIGHT BREAST IN FEMALE, ESTROGEN RECEPTOR POSITIVE, UNSPECIFIED SITE OF BREAST (HCC): Primary | ICD-10-CM

## 2019-05-24 DIAGNOSIS — C79.51 BONE METASTASIS: ICD-10-CM

## 2019-05-24 DIAGNOSIS — Z79.01 LONG TERM CURRENT USE OF ANTICOAGULANT THERAPY: ICD-10-CM

## 2019-05-24 DIAGNOSIS — Z45.2 ENCOUNTER FOR ADJUSTMENT OR MANAGEMENT OF VASCULAR ACCESS DEVICE: ICD-10-CM

## 2019-05-24 DIAGNOSIS — T45.1X5A ANEMIA DUE TO ANTINEOPLASTIC CHEMOTHERAPY: ICD-10-CM

## 2019-05-24 DIAGNOSIS — D70.1 CHEMOTHERAPY-INDUCED NEUTROPENIA (HCC): ICD-10-CM

## 2019-05-24 DIAGNOSIS — T45.1X5A CHEMOTHERAPY-INDUCED NEUTROPENIA (HCC): ICD-10-CM

## 2019-05-24 DIAGNOSIS — Z17.0 MALIGNANT NEOPLASM OF RIGHT BREAST IN FEMALE, ESTROGEN RECEPTOR POSITIVE, UNSPECIFIED SITE OF BREAST (HCC): ICD-10-CM

## 2019-05-24 DIAGNOSIS — C50.911 MALIGNANT NEOPLASM OF RIGHT BREAST IN FEMALE, ESTROGEN RECEPTOR POSITIVE, UNSPECIFIED SITE OF BREAST (HCC): ICD-10-CM

## 2019-05-24 DIAGNOSIS — R79.89 ELEVATED LIVER FUNCTION TESTS: ICD-10-CM

## 2019-05-24 DIAGNOSIS — C79.51 BONE METASTASIS: Primary | ICD-10-CM

## 2019-05-24 LAB
ALBUMIN SERPL-MCNC: 3.9 G/DL (ref 3.5–5.2)
ALBUMIN/GLOB SERPL: 1.3 G/DL
ALP SERPL-CCNC: 87 U/L (ref 39–117)
ALT SERPL W P-5'-P-CCNC: 30 U/L (ref 1–33)
ANION GAP SERPL CALCULATED.3IONS-SCNC: 13 MMOL/L
AST SERPL-CCNC: 27 U/L (ref 1–32)
BASOPHILS # BLD AUTO: 0.1 10*3/MM3 (ref 0–0.2)
BASOPHILS NFR BLD AUTO: 1.9 % (ref 0–1.5)
BILIRUB SERPL-MCNC: 0.2 MG/DL (ref 0.2–1.2)
BUN BLD-MCNC: 16 MG/DL (ref 6–20)
BUN/CREAT SERPL: 22.2 (ref 7–25)
CALCIUM SPEC-SCNC: 9.4 MG/DL (ref 8.6–10.5)
CANCER AG15-3 SERPL-ACNC: 28.3 U/ML
CHLORIDE SERPL-SCNC: 103 MMOL/L (ref 98–107)
CO2 SERPL-SCNC: 25 MMOL/L (ref 22–29)
CREAT BLD-MCNC: 0.72 MG/DL (ref 0.57–1)
DEPRECATED RDW RBC AUTO: 57 FL (ref 37–54)
EOSINOPHIL # BLD AUTO: 0.08 10*3/MM3 (ref 0–0.4)
EOSINOPHIL NFR BLD AUTO: 1.5 % (ref 0.3–6.2)
ERYTHROCYTE [DISTWIDTH] IN BLOOD BY AUTOMATED COUNT: 16 % (ref 12.3–15.4)
GFR SERPL CREATININE-BSD FRML MDRD: 86 ML/MIN/1.73
GLOBULIN UR ELPH-MCNC: 3.1 GM/DL
GLUCOSE BLD-MCNC: 55 MG/DL (ref 65–99)
HCT VFR BLD AUTO: 36.5 % (ref 34–46.6)
HGB BLD-MCNC: 12.5 G/DL (ref 12–15.9)
IMM GRANULOCYTES # BLD AUTO: 0.03 10*3/MM3 (ref 0–0.05)
IMM GRANULOCYTES NFR BLD AUTO: 0.6 % (ref 0–0.5)
INR PPP: 3 (ref 0.9–1.1)
LYMPHOCYTES # BLD AUTO: 1.74 10*3/MM3 (ref 0.7–3.1)
LYMPHOCYTES NFR BLD AUTO: 32.3 % (ref 19.6–45.3)
MCH RBC QN AUTO: 32.9 PG (ref 26.6–33)
MCHC RBC AUTO-ENTMCNC: 34.2 G/DL (ref 31.5–35.7)
MCV RBC AUTO: 96.1 FL (ref 79–97)
MONOCYTES # BLD AUTO: 0.96 10*3/MM3 (ref 0.1–0.9)
MONOCYTES NFR BLD AUTO: 17.8 % (ref 5–12)
NEUTROPHILS # BLD AUTO: 2.47 10*3/MM3 (ref 1.7–7)
NEUTROPHILS NFR BLD AUTO: 45.9 % (ref 42.7–76)
NRBC BLD AUTO-RTO: 0 /100 WBC (ref 0–0.2)
PLATELET # BLD AUTO: 221 10*3/MM3 (ref 140–450)
PMV BLD AUTO: 9.8 FL (ref 6–12)
POTASSIUM BLD-SCNC: 4 MMOL/L (ref 3.5–5.2)
PROT SERPL-MCNC: 7 G/DL (ref 6–8.5)
RBC # BLD AUTO: 3.8 10*6/MM3 (ref 3.77–5.28)
SODIUM BLD-SCNC: 141 MMOL/L (ref 136–145)
WBC NRBC COR # BLD: 5.38 10*3/MM3 (ref 3.4–10.8)

## 2019-05-24 PROCEDURE — 86300 IMMUNOASSAY TUMOR CA 15-3: CPT

## 2019-05-24 PROCEDURE — 1123F ACP DISCUSS/DSCN MKR DOCD: CPT | Performed by: INTERNAL MEDICINE

## 2019-05-24 PROCEDURE — 85610 PROTHROMBIN TIME: CPT | Performed by: NURSE PRACTITIONER

## 2019-05-24 PROCEDURE — 80053 COMPREHEN METABOLIC PANEL: CPT

## 2019-05-24 PROCEDURE — G8417 CALC BMI ABV UP PARAM F/U: HCPCS | Performed by: INTERNAL MEDICINE

## 2019-05-24 PROCEDURE — G9903 PT SCRN TBCO ID AS NON USER: HCPCS | Performed by: INTERNAL MEDICINE

## 2019-05-24 PROCEDURE — 96365 THER/PROPH/DIAG IV INF INIT: CPT | Performed by: INTERNAL MEDICINE

## 2019-05-24 PROCEDURE — 99214 OFFICE O/P EST MOD 30 MIN: CPT | Performed by: INTERNAL MEDICINE

## 2019-05-24 PROCEDURE — 25010000002 ZOLEDRONIC ACID PER 1 MG: Performed by: INTERNAL MEDICINE

## 2019-05-24 PROCEDURE — 85025 COMPLETE CBC W/AUTO DIFF WBC: CPT

## 2019-05-24 RX ORDER — SODIUM CHLORIDE 9 MG/ML
250 INJECTION, SOLUTION INTRAVENOUS ONCE
Status: CANCELLED | OUTPATIENT
Start: 2019-05-24

## 2019-05-24 RX ORDER — SODIUM CHLORIDE 0.9 % (FLUSH) 0.9 %
10 SYRINGE (ML) INJECTION AS NEEDED
Status: CANCELLED | OUTPATIENT
Start: 2019-05-24

## 2019-05-24 RX ORDER — SODIUM CHLORIDE 0.9 % (FLUSH) 0.9 %
10 SYRINGE (ML) INJECTION AS NEEDED
Status: DISCONTINUED | OUTPATIENT
Start: 2019-05-24 | End: 2019-05-24 | Stop reason: HOSPADM

## 2019-05-24 RX ORDER — SODIUM CHLORIDE 9 MG/ML
250 INJECTION, SOLUTION INTRAVENOUS ONCE
Status: COMPLETED | OUTPATIENT
Start: 2019-05-24 | End: 2019-05-24

## 2019-05-24 RX ADMIN — SODIUM CHLORIDE 250 ML: 9 INJECTION, SOLUTION INTRAVENOUS at 09:53

## 2019-05-24 RX ADMIN — SODIUM CHLORIDE, PRESERVATIVE FREE 10 ML: 5 INJECTION INTRAVENOUS at 10:39

## 2019-05-24 RX ADMIN — Medication 500 UNITS: at 10:39

## 2019-05-24 RX ADMIN — ZOLEDRONIC ACID 4 MG: 4 INJECTION, SOLUTION, CONCENTRATE INTRAVENOUS at 10:18

## 2019-05-24 RX ADMIN — SODIUM CHLORIDE, PRESERVATIVE FREE 10 ML: 5 INJECTION INTRAVENOUS at 08:59

## 2019-05-24 NOTE — PROGRESS NOTES
Today's INR is 3.0 . PT denies any new medications or bleeding issues. PT denies any s/s of blood clot. PT instructed to continue same dose and Coumadin friendly diet. PT will be seen in 5 weeks. Patient instructed regarding medication; results given and questions answered. Nutritional counseling given.  Dietary factors affecting therapy addressed.  Patient instructed to monitor for excessive bruising or bleeding. PT instructed to increase vit k slightly.  PT verbalizes understanding.         This document has been electronically signed by LYLA Augustin @ on May 24, 2019 8:40 AM

## 2019-05-24 NOTE — PROGRESS NOTES
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  Started on palliative chemotherapy with Taxotere and Cytoxan on July 26, 2017.  Patient received 7 cycle of Taxotere and Cytoxan on December 6, 2017.  After that patient was changed over to treatment with Palbociclib and letrozole on January 30, 2018.  Palbociclib was held from January 17, 2019 until February 8, 2019 secondary to wisdom tooth extraction.  Patient is due to start  cycle 14 of palbociclib and letrozole today on may 24, 2019.   She is here for follow-up appointment today.     Denies any nausea or vomiting or diarrhea with current treatment.  Complains of tingling and numbness affecting bilateral hand. Denies any bowel or bladder incontinence.      PAST MEDICAL HISTORY:    Past Medical History:   Diagnosis Date   • Anxiety    • Depression    • Encounter for gynecological examination    • Malignant neoplasm of female breast (CMS/HCC)     hx of dcis s/p mastectomy and reconstruction and augmentation      • Primary malignant neoplasm of breast (CMS/HCC)     dcis in rt breast s/p mastectomy,reconstruction      • Ventricular premature beats        SOCIAL HISTORY:    Social History     Tobacco Use   • Smoking status: Never Smoker   • Smokeless tobacco: Never Used   Substance Use Topics   • Alcohol use: No   • Drug use: No       Surgical History :  Past Surgical History:   Procedure Laterality Date   • AUGMENTATION MAMMAPLASTY     • AXILLARY LYMPH NODE BIOPSY/EXCISION Right 7/10/2017    Procedure: BIOSPY RIGHT AXILLARY MASS AND OR LYMPH;  Surgeon: Sourav Ernst MD;  Location: [facility]  OR;  Service:    • BREAST BIOPSY  12/07/2007    Mammotome biopsy of the right side with clip placement   • BREAST LUMPECTOMY     • BREAST RECONSTRUCTION  10/28/2008    Abscence of right breast secondary to mastectomy. Implant exchange for reconstruction of right breast with open para-prosthetic capsulotomy   • BREAST SURGERY  10/01/2009    Left breast ptosis and breast asymmetry secondary to right breast reconstruction for breast cancer. Left breast mastopexy with augmentation.   • CARDIAC CATHETERIZATION  09/18/2008    Normal coronary arteriography. Normal left ventricular angiogram   • EP STUDY     • MASTECTOMY Right    • MASTECTOMY  02/05/2008    Multifocal right breast ductal carcinoma-in-situ. Right total mastectomy   • MASTECTOMY, PARTIAL  01/03/2008    Ductal carcinoma in-situ of the right breast, proven by mammotome biopsy. Right lumpectomy, medial portion of the breast, between 2 o'clock and 4 o'clock, 5 cm from the nipple, with clip placement, radiographic inter. of severo. specimen, & ultrasound   • PAP SMEAR  03/03/2009    Negative   • AK INSJ TUNNELED CVC W/O SUBQ PORT/ AGE 5 YR/> Right 7/10/2017    Procedure: MEDIPORT     (c-arm#2);  Surgeon: Sourav Ernst MD;  Location: [facility];  Service: General       ALLERGIES:    Allergies   Allergen Reactions   • Percocet [Oxycodone-Acetaminophen] Nausea And Vomiting       FAMILY HISTORY:  Family History   Problem Relation Age of Onset   • Anemia Mother    • Multiple sclerosis Mother    • No Known Problems Father    • Diabetes Other    • Multiple sclerosis Other        REVIEW OF SYSTEMS:      CONSTITUTIONAL: Complains of fatigue.  Has lost 1 pounds since last clinic visit.  Denies any fever, chills .      HEENT:  No epistaxis, mouth sores or difficulty swallowing.     RESPIRATORY: No new shortness of breath. No new cough or hemoptysis.     CARDIOVASCULAR: No chest pain or palpitations.     GASTROINTESTINAL: No nausea or vomiting. Not requiring scopolamine  "patch.  No new abdominal pain. No blood in the stool.     GENITOURINARY: No Dysuria or Hematuria.     MUSCULOSKELETAL:Complains of arthritic pain affecting bilateral hands. Complains of pain in bilateral hip.  Complains of worsening of back pain .     LYMPHATICS: No new lymph node swelling.     NEUROLOGICAL : Complains of intermittent tingling and numbness affecting bilateral hand. Complains of tingling and numbness from back radiating down to bilateral lower extremity.  No dizziness. No seizures or balance problems.     SKIN: Positive for history of melanoma status post surgical removal. Denies any new skin lesion worrisome for melanoma.              PHYSICAL EXAMINATION:      VITAL SIGNS:  /75   Pulse 78   Temp 98.8 °F (37.1 °C) (Temporal)   Resp 18   Ht 170.2 cm (67.01\")   Wt 104 kg (229 lb 3.2 oz)   SpO2 96%   BMI 35.89 kg/m²      ECOG performance status:1    GENERAL:  Not in any distress.    HEENT:  Normocephalic, Atraumatic.Mild Conjunctival pallor. No icterus. Extraocular Movements Intact. No Facial Asymmetry noted.  Wound after extraction of left lower jaw wisdom tooth is healed.    NECK:  No adenopathy. No JVD.Trachea in midline    RESPIRATORY:  Fair air entry bilateral. No rhonchi or wheezing.    CARDIOVASCULAR:  S1, S2. Regular rate and rhythm. No murmur or gallop appreciated.    ABDOMEN:  Soft, obese, nontender. Bowel sounds present in all four quadrants.  No hepatosplenomegaly appreciated.    MUSCULOSKELETAL:  No edema.No Calf Tenderness.  Decreased range of motion.    NEUROLOGIC:  Alert, awake and oriented ×3.  No  Motor  deficit appreciated. Cranial Nerves 2-12 grossly intact.    LYMPHATICS: No new lymph node enlargement in neck or supraclavicular area.    PSYCHIATRY: Alert, awake and oriented ×3. Normal affect. Normal Judgement.Makes good eye contact.          DIAGNOSTIC DATA:    Glucose   Date Value Ref Range Status   05/24/2019 55 (L) 65 - 99 mg/dL Final     Sodium   Date Value Ref " Range Status   05/24/2019 141 136 - 145 mmol/L Final     Potassium   Date Value Ref Range Status   05/24/2019 4.0 3.5 - 5.2 mmol/L Final     CO2   Date Value Ref Range Status   05/24/2019 25.0 22.0 - 29.0 mmol/L Final     Chloride   Date Value Ref Range Status   05/24/2019 103 98 - 107 mmol/L Final     Anion Gap   Date Value Ref Range Status   05/24/2019 13.0 mmol/L Final     Creatinine   Date Value Ref Range Status   05/24/2019 0.72 0.57 - 1.00 mg/dL Final     BUN   Date Value Ref Range Status   05/24/2019 16 6 - 20 mg/dL Final     BUN/Creatinine Ratio   Date Value Ref Range Status   05/24/2019 22.2 7.0 - 25.0 Final     Calcium   Date Value Ref Range Status   05/24/2019 9.4 8.6 - 10.5 mg/dL Final     eGFR Non  Amer   Date Value Ref Range Status   05/24/2019 86 >60 mL/min/1.73 Final     Alkaline Phosphatase   Date Value Ref Range Status   05/24/2019 87 39 - 117 U/L Final     Total Protein   Date Value Ref Range Status   05/24/2019 7.0 6.0 - 8.5 g/dL Final     ALT (SGPT)   Date Value Ref Range Status   05/24/2019 30 1 - 33 U/L Final     AST (SGOT)   Date Value Ref Range Status   05/24/2019 27 1 - 32 U/L Final     Total Bilirubin   Date Value Ref Range Status   05/24/2019 0.2 0.2 - 1.2 mg/dL Final     Albumin   Date Value Ref Range Status   05/24/2019 3.90 3.50 - 5.20 g/dL Final     Globulin   Date Value Ref Range Status   05/24/2019 3.1 gm/dL Final     Lab Results   Component Value Date    WBC 5.38 05/24/2019    HGB 12.5 05/24/2019    HCT 36.5 05/24/2019    MCV 96.1 05/24/2019     05/24/2019     Lab Results   Component Value Date    NEUTROABS 2.47 05/24/2019     Lab Results   Component Value Date     29.2 (H) 04/19/2019    LABCA2 23.1 04/19/2019         2 D Echo Done on July 19, 2017 showed:  Interpretation Summary   · The left ventricular cavity is borderline dilated.  · Left ventricular systolic function is normal. Estimated EF = 53%.  · Left ventricular diastolic dysfunction (grade I) consistent  with impaired relaxation.  · IAS aneurysm noted. No PFO or ASD           Radiology Data :  CT of chest, abdomen and pelvis with contrast done on April 17, 2019 was reviewed, discussed with patient, it showed:  IMPRESSION:  CONCLUSION:      1. Stable examination. Presumed treated hepatic metastases in the  left lobe, unchanged. Multiple predominantly osteosclerotic bone  lesions, unchanged.           CT of chest, abdomen and pelvis with contrast done on December 7, 2018 was reviewed, discussed with patient, it showed:  IMPRESSION:  CONCLUSION:     1. Stable hepatic metastases anterior aspect of the liver.  2. Stable extensive skeletal metastases with pathologic fracture  involving L4.       MRI of brain with and without contrast done on July 9, 2018 was reviewed, discussed with patient, it showed:  IMPRESSION:  No evidence of intracranial hemorrhage, mass/mass effect, acute  infarct, or pathologic contrast enhancement.     Previous described/known enhancing calvarial metastatic lesions  are significantly less conspicuous on today's examination. The  finding would support favorable treatment response.     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          Doppler ultrasound of right upper extremity done on November 10, 2017 showed:  IMPRESSION:  CONCLUSION:   Abnormal exam with thrombus visualized in the right internal  jugular vein, consistent with DVT.      PET/CT done on July 3, 2017 showed:  IMPRESSION:  CONCLUSION:  1. Extensive metastatic disease. Pathologic uptake within  enlarged right-sided axillary lymph nodes. Metastatic adenopathy  in both laurie and mediastinum. Tumor replacing most of the left  lobe of liver. Intra-abdominal retroperitoneal metastases,  adenopathy.     Extensive skeletal metastases including a pathologic fracture at  L4.        Nuclear bone scan done on June 20, 2017 showed:  1. Increased uptake at L4 suggestive of metastatic process.  2. 3 or 4 areas of  increased activity in bony calvarium  3. Some increased activity in tips posteriorly and anteriorly suggestive of metastatic carcinoma to the skeletal system.           Pathology :    Pathology data from July 10, 2017 showed:  Final Diagnosis   Mass right axilla, excisional biopsy:      Metastatic poorly differentiated ductal carcinoma, breast primary      Estrogen receptor positive, 95% stainability, strong intensity      Progesterone receptor positive, 95% stainability, strong intensity      HER-2 2+(equivocal), sent to [facility] for FISH     Addendum   Her 2 FISH result from [facility] is NEGATIVE         Pathology report from December 7, 2007 showed:  FINAL DIAGNOSIS:   RIGHT BREAST MAMMOTOME BIOPSY:        DUCTAL CARCINOMA IN SITU, INTERMEDIATE NUCLEAR GRADE              WITH MICROCALCIFICATION.      ADDENDUM:   Estrogen receptors are positive (90-95% stainability).   Progesterone receptors are positive (90-95% stainability).         ASSESSMENT AND PLAN:      1.  Metastatic cancer with extensive adenopathy in right axilla, mediastinum, hilar, abdominal, retroperitoneal with extensive liver and bone metastasis on PET/CT.  Patient underwent right apatient underwent right axillary lymph node excision by Dr. Ernst on July 10, 2017.Pathology report confirmed ductal carcinoma of breast with estrogen and progesterone positivity.  At her on palliative chemotherapy with Taxotere and cytoxan on July 26, 2017.   Patient was  seen at [facility] for second opinion regarding her breast cancer.  Case was discussed with Dr vazquez oncologist that has seen patient at [facility].  She agreed with her chemotherapy at this point.  The scan on December 20, 2017 shows relative stability of disease, lesion in the liver as well as bone lesion at about same.  Her tumor marker CA 27-29 as well as CA 15-3 are less than 100 at this point.  Since patient is developing some neuropathy in bilateral upper and lower extremity  recommend changing her therapy to Palbociclib with letrozole.  Patient started taking first round of Palbociclib and letrozole on January 30, 2018.  Patient was scheduled to start cycle  6 of chemotherapy today with Palbociclib today on July 16, 2018.   -CT of chest, abdomen and pelvis with contrast done on July 9, 2018 show stable disease which was discussed with patient.  -MRI of brain with and without contrast done on July 9, 2018 shows significant improvement in calvarial metastasis which was discussed with patient.  -CT of chest, abdomen and pelvis with contrast done on September 19, 2018 showed stable disease without any new lymphadenopathy or any new lesions.  Result of CT scan were discussed with patient and her family.  -CT of chest, abdomen and pelvis with contrast done on December 7, 2018 after cycle 9 of Palbociclib and letrozole showed stable disease involving liver and bones.  Result of CT scan were discussed with patient.  -Palbociclib was held from January 17, 2019 until February 8, 2019 secondary to wisdom tooth extraction.  -Patient resume cycle 11 of palbociclib and letrozole on February 8, 2019.  -CT of chest, abdomen and pelvis with contrast done on April 17, 2019 shows stable finding involving bone metastases and liver lesion.  Results of CT scan were discussed with patient and her family.  -CBC done earlier today on April 19, 2019 shows normal white blood cell count with normal neutrophil count.  Will go ahead with cycle 14 of palbociclib and letrozole today on may 24, 2019.  -Tumor marker consisting of CA 15-3 and CA 27-29 has been drawn earlier today.  We will follow-up on the result of tumor marker.  -We will ask patient to return to clinic in 5 weeks with repeat CBC, CMP and tumor marker consisting of CA 15-3 and CA 27-29 to be done on that day.  Next surveillance CT scan will be planned around July 2019.  This recommendation were discussed with patient.      2.  Brain metastasis:MRi Brain  with and without done on November 9, 2017 was reviewed and compared with t MRI done 6 weeks prior to that.  Brain calvarial metastasis are stable or somewhat improved.  No need to do radiation at this point which was discussed with patient.    -MRI of brain with and without contrast done on July 9, 2018 shows improvement in calvarial metastasis.    3.  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  She is currently on Coumadin.    She is being followed by Coumadin clinic  -Since patient is scheduled to undergo wisdom tooth extraction, recommend holding Coumadin 5 days prior to procedure and getting Lovenox 1.5 mg/kg during that period.  We will resume Coumadin with Lovenox after wisdom tooth extraction.    4.  History of melanoma in situ status post excision by Dr. Linn.    5.  Bone metastasis: Patient was started on Zometa on August 23, 2017.  Continue with Zometa as scheduled for now.  Patient denies any mouth sores or jaw pain.  CT scan shows about loss of 70% of height of L4 vertebral body, patient has been referred to Dr. Pugh to get evaluated for kyphoplasty.  Patient went for second opinion with orthopedic surgeon in [place], as per patient's second surgeon did not recommend any surgery.    -Zometa Has been held since November 2018 due to wisdom tooth extraction requirement.  -Wound after wisdom tooth extraction is  completely healed.   Zometa has been resumed  on April 19, 2019       6.  History of ductal carcinoma in situ of the right breast diagnosed in 2007.  Status post mastectomy followed by adjuvant tamoxifen for 5 years which was finished in 2013.    7.  Neutropenia: Secondary to Palbociclib.resolved. WBC is normal at 5.38 with ANC 2.47 .      8.  Elevated liver function:  -resolved.  Will monitor with CMP for now.  CT of abdomen and pelvis does not show any evidence of worsening hepatic metastasis.    9.  Health maintenance: Patient does not smoke.   Not a candidate for screening colonoscopy at this point.      10.  Prescriptions: Patient has enough prescription of Decadron, Zofran,  and Ultram.  Prescription for Neurontin 300 mg 3 times a day has been sent to her pharmacy  on April 19, 2019.    11.  Advance care planning: Patient remains full code for now and is able to make on her decisions.  Patient has healthcare surrogate Mentioned on Chart.    12. Obesity: Patient's Body mass index is 35.89 kg/m². BMI is higher than reference range.  Patient was notified about elevated BMI.  Patient was counseled about diet and exercise for weight loss.        Ovidio Meadows MD  5/24/2019  11:53 AM        EMR Dragon/Transcription disclaimer:   Much of this encounter note is an electronic transcription/translation of spoken language to printed text. The electronic translation of spoken language may permit erroneous, or at times, nonsensical words or phrases to be inadvertently transcribed; Although I have reviewed the note for such errors, some may still exist.

## 2019-05-25 LAB — CANCER AG27-29 SERPL-ACNC: 35 U/ML (ref 0–38.6)

## 2019-06-28 ENCOUNTER — OFFICE VISIT (OUTPATIENT)
Dept: ONCOLOGY | Facility: CLINIC | Age: 49
End: 2019-06-28

## 2019-06-28 ENCOUNTER — ANTICOAGULATION VISIT (OUTPATIENT)
Dept: CARDIAC SURGERY | Facility: CLINIC | Age: 49
End: 2019-06-28

## 2019-06-28 ENCOUNTER — INFUSION (OUTPATIENT)
Dept: ONCOLOGY | Facility: HOSPITAL | Age: 49
End: 2019-06-28

## 2019-06-28 ENCOUNTER — LAB (OUTPATIENT)
Dept: ONCOLOGY | Facility: HOSPITAL | Age: 49
End: 2019-06-28

## 2019-06-28 VITALS
SYSTOLIC BLOOD PRESSURE: 135 MMHG | OXYGEN SATURATION: 98 % | WEIGHT: 229.4 LBS | DIASTOLIC BLOOD PRESSURE: 79 MMHG | HEIGHT: 67 IN | TEMPERATURE: 98 F | BODY MASS INDEX: 36 KG/M2 | HEART RATE: 68 BPM | RESPIRATION RATE: 18 BRPM

## 2019-06-28 VITALS — DIASTOLIC BLOOD PRESSURE: 60 MMHG | HEART RATE: 87 BPM | SYSTOLIC BLOOD PRESSURE: 102 MMHG | OXYGEN SATURATION: 96 %

## 2019-06-28 DIAGNOSIS — Z45.2 ENCOUNTER FOR VENOUS ACCESS DEVICE CARE: Primary | ICD-10-CM

## 2019-06-28 DIAGNOSIS — C50.911 MALIGNANT NEOPLASM OF RIGHT BREAST IN FEMALE, ESTROGEN RECEPTOR POSITIVE, UNSPECIFIED SITE OF BREAST (HCC): Primary | ICD-10-CM

## 2019-06-28 DIAGNOSIS — C79.51 BONE METASTASIS: Primary | ICD-10-CM

## 2019-06-28 DIAGNOSIS — Z17.0 MALIGNANT NEOPLASM OF RIGHT BREAST IN FEMALE, ESTROGEN RECEPTOR POSITIVE, UNSPECIFIED SITE OF BREAST (HCC): ICD-10-CM

## 2019-06-28 DIAGNOSIS — C50.911 MALIGNANT NEOPLASM OF RIGHT BREAST IN FEMALE, ESTROGEN RECEPTOR POSITIVE, UNSPECIFIED SITE OF BREAST (HCC): ICD-10-CM

## 2019-06-28 DIAGNOSIS — Z79.01 LONG TERM CURRENT USE OF ANTICOAGULANT THERAPY: ICD-10-CM

## 2019-06-28 DIAGNOSIS — Z45.2 ENCOUNTER FOR VENOUS ACCESS DEVICE CARE: ICD-10-CM

## 2019-06-28 DIAGNOSIS — C79.31 METASTASIS TO BRAIN (HCC): ICD-10-CM

## 2019-06-28 DIAGNOSIS — T45.1X5A NEUROPATHY DUE TO CHEMOTHERAPEUTIC DRUG (HCC): ICD-10-CM

## 2019-06-28 DIAGNOSIS — R79.89 ELEVATED LIVER FUNCTION TESTS: ICD-10-CM

## 2019-06-28 DIAGNOSIS — G62.0 NEUROPATHY DUE TO CHEMOTHERAPEUTIC DRUG (HCC): ICD-10-CM

## 2019-06-28 DIAGNOSIS — Z85.820 HISTORY OF MALIGNANT MELANOMA OF SKIN: ICD-10-CM

## 2019-06-28 DIAGNOSIS — Z86.000 HISTORY OF DUCTAL CARCINOMA IN SITU (DCIS) OF BREAST: ICD-10-CM

## 2019-06-28 DIAGNOSIS — Z17.0 MALIGNANT NEOPLASM OF RIGHT BREAST IN FEMALE, ESTROGEN RECEPTOR POSITIVE, UNSPECIFIED SITE OF BREAST (HCC): Primary | ICD-10-CM

## 2019-06-28 LAB
ALBUMIN SERPL-MCNC: 4.1 G/DL (ref 3.5–5.2)
ALBUMIN/GLOB SERPL: 1.6 G/DL
ALP SERPL-CCNC: 84 U/L (ref 39–117)
ALT SERPL W P-5'-P-CCNC: 39 U/L (ref 1–33)
ANION GAP SERPL CALCULATED.3IONS-SCNC: 11 MMOL/L (ref 5–15)
AST SERPL-CCNC: 37 U/L (ref 1–32)
BASOPHILS # BLD AUTO: 0.09 10*3/MM3 (ref 0–0.2)
BASOPHILS NFR BLD AUTO: 2.3 % (ref 0–1.5)
BILIRUB SERPL-MCNC: 0.2 MG/DL (ref 0.2–1.2)
BUN BLD-MCNC: 9 MG/DL (ref 6–20)
BUN/CREAT SERPL: 11.1 (ref 7–25)
CALCIUM SPEC-SCNC: 9.5 MG/DL (ref 8.6–10.5)
CHLORIDE SERPL-SCNC: 104 MMOL/L (ref 98–107)
CO2 SERPL-SCNC: 26 MMOL/L (ref 22–29)
CREAT BLD-MCNC: 0.81 MG/DL (ref 0.57–1)
DEPRECATED RDW RBC AUTO: 55.7 FL (ref 37–54)
EOSINOPHIL # BLD AUTO: 0.05 10*3/MM3 (ref 0–0.4)
EOSINOPHIL NFR BLD AUTO: 1.3 % (ref 0.3–6.2)
ERYTHROCYTE [DISTWIDTH] IN BLOOD BY AUTOMATED COUNT: 15.6 % (ref 12.3–15.4)
GFR SERPL CREATININE-BSD FRML MDRD: 75 ML/MIN/1.73
GLOBULIN UR ELPH-MCNC: 2.6 GM/DL
GLUCOSE BLD-MCNC: 109 MG/DL (ref 65–99)
HCT VFR BLD AUTO: 34.9 % (ref 34–46.6)
HGB BLD-MCNC: 12.3 G/DL (ref 12–15.9)
IMM GRANULOCYTES # BLD AUTO: 0.02 10*3/MM3 (ref 0–0.05)
IMM GRANULOCYTES NFR BLD AUTO: 0.5 % (ref 0–0.5)
INR PPP: 3.7 (ref 0.9–1.1)
LYMPHOCYTES # BLD AUTO: 1.25 10*3/MM3 (ref 0.7–3.1)
LYMPHOCYTES NFR BLD AUTO: 31.4 % (ref 19.6–45.3)
MAGNESIUM SERPL-MCNC: 1.7 MG/DL (ref 1.6–2.6)
MCH RBC QN AUTO: 34.4 PG (ref 26.6–33)
MCHC RBC AUTO-ENTMCNC: 35.2 G/DL (ref 31.5–35.7)
MCV RBC AUTO: 97.5 FL (ref 79–97)
MONOCYTES # BLD AUTO: 0.69 10*3/MM3 (ref 0.1–0.9)
MONOCYTES NFR BLD AUTO: 17.3 % (ref 5–12)
NEUTROPHILS # BLD AUTO: 1.88 10*3/MM3 (ref 1.7–7)
NEUTROPHILS NFR BLD AUTO: 47.2 % (ref 42.7–76)
NRBC BLD AUTO-RTO: 0 /100 WBC (ref 0–0.2)
PHOSPHATE SERPL-MCNC: 3.9 MG/DL (ref 2.5–4.5)
PLATELET # BLD AUTO: 214 10*3/MM3 (ref 140–450)
PMV BLD AUTO: 9.7 FL (ref 6–12)
POTASSIUM BLD-SCNC: 3.8 MMOL/L (ref 3.5–5.2)
PROT SERPL-MCNC: 6.7 G/DL (ref 6–8.5)
RBC # BLD AUTO: 3.58 10*6/MM3 (ref 3.77–5.28)
SODIUM BLD-SCNC: 141 MMOL/L (ref 136–145)
WBC NRBC COR # BLD: 3.98 10*3/MM3 (ref 3.4–10.8)

## 2019-06-28 PROCEDURE — 86300 IMMUNOASSAY TUMOR CA 15-3: CPT

## 2019-06-28 PROCEDURE — 84100 ASSAY OF PHOSPHORUS: CPT

## 2019-06-28 PROCEDURE — 99214 OFFICE O/P EST MOD 30 MIN: CPT | Performed by: INTERNAL MEDICINE

## 2019-06-28 PROCEDURE — G8417 CALC BMI ABV UP PARAM F/U: HCPCS | Performed by: INTERNAL MEDICINE

## 2019-06-28 PROCEDURE — 83735 ASSAY OF MAGNESIUM: CPT

## 2019-06-28 PROCEDURE — 99211 OFF/OP EST MAY X REQ PHY/QHP: CPT | Performed by: NURSE PRACTITIONER

## 2019-06-28 PROCEDURE — 25010000002 ZOLEDRONIC ACID PER 1 MG: Performed by: INTERNAL MEDICINE

## 2019-06-28 PROCEDURE — G9903 PT SCRN TBCO ID AS NON USER: HCPCS | Performed by: INTERNAL MEDICINE

## 2019-06-28 PROCEDURE — 96374 THER/PROPH/DIAG INJ IV PUSH: CPT | Performed by: INTERNAL MEDICINE

## 2019-06-28 PROCEDURE — 1123F ACP DISCUSS/DSCN MKR DOCD: CPT | Performed by: INTERNAL MEDICINE

## 2019-06-28 PROCEDURE — 80053 COMPREHEN METABOLIC PANEL: CPT

## 2019-06-28 PROCEDURE — 85025 COMPLETE CBC W/AUTO DIFF WBC: CPT

## 2019-06-28 PROCEDURE — 85610 PROTHROMBIN TIME: CPT | Performed by: NURSE PRACTITIONER

## 2019-06-28 RX ORDER — SODIUM CHLORIDE 0.9 % (FLUSH) 0.9 %
10 SYRINGE (ML) INJECTION AS NEEDED
Status: CANCELLED | OUTPATIENT
Start: 2019-06-28

## 2019-06-28 RX ORDER — SODIUM CHLORIDE 0.9 % (FLUSH) 0.9 %
10 SYRINGE (ML) INJECTION AS NEEDED
Status: DISCONTINUED | OUTPATIENT
Start: 2019-06-28 | End: 2019-06-28 | Stop reason: HOSPADM

## 2019-06-28 RX ORDER — GABAPENTIN 300 MG/1
300 CAPSULE ORAL 3 TIMES DAILY
Qty: 90 CAPSULE | Refills: 0 | Status: SHIPPED | OUTPATIENT
Start: 2019-06-28 | End: 2019-09-06 | Stop reason: SDUPTHER

## 2019-06-28 RX ORDER — SODIUM CHLORIDE 9 MG/ML
250 INJECTION, SOLUTION INTRAVENOUS ONCE
Status: CANCELLED | OUTPATIENT
Start: 2019-06-28

## 2019-06-28 RX ORDER — SODIUM CHLORIDE 9 MG/ML
250 INJECTION, SOLUTION INTRAVENOUS ONCE
Status: COMPLETED | OUTPATIENT
Start: 2019-06-28 | End: 2019-06-28

## 2019-06-28 RX ADMIN — SODIUM CHLORIDE 250 ML: 9 INJECTION, SOLUTION INTRAVENOUS at 10:28

## 2019-06-28 RX ADMIN — SODIUM CHLORIDE, PRESERVATIVE FREE 10 ML: 5 INJECTION INTRAVENOUS at 11:07

## 2019-06-28 RX ADMIN — Medication 500 UNITS: at 11:07

## 2019-06-28 RX ADMIN — SODIUM CHLORIDE, PRESERVATIVE FREE 10 ML: 5 INJECTION INTRAVENOUS at 09:36

## 2019-06-28 RX ADMIN — ZOLEDRONIC ACID 4 MG: 4 INJECTION, SOLUTION, CONCENTRATE INTRAVENOUS at 10:37

## 2019-06-28 NOTE — PROGRESS NOTES
Today's INR is 3.7. PT denies any med changes or bleeding issues but states she has not been eating much green lately. Denies any s/s of blood clot. PT can not return to [facility] for 5 weeks due to work. Adjusted pt's dose today and pt will resume usual diet. PT has CoaguChek machine at lab at work and will recheck INR in 2 weeks. PT will call if INR is not in range. Patient instructed regarding medication; results given and questions answered. Nutritional counseling given.  Dietary factors affecting therapy addressed.  Patient instructed to monitor for excessive bruising or bleeding. PT verbalizes understanding.         This document has been electronically signed by Patt Alvarez, LYLA @ on June 28, 2019 9:14 AM

## 2019-06-28 NOTE — PATIENT INSTRUCTIONS
Patient Instructions for CT Scan    · Your CT scan is being done without any oral contrast.  Your CT scan may be done with IV contrast, if you have an allergy to iodine--please tell your nurse.    · Do not eat or drink 4 hours prior to scan.     · You may take your medications with sips of water, except DO NOT take your diabetic pill the morning of the test.    Arrive at the Outpatient Surgery entrance at [facility] 20 minutes prior to appointment time.    You will receive a phone call with an appointment for your CT scan.  Please call our office, if someone does not contact you with 3 days.    Vijaya Campos RN  June 28, 2019  10:34 AM

## 2019-06-28 NOTE — PROGRESS NOTES
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  Started on palliative chemotherapy with Taxotere and Cytoxan on July 26, 2017.  Patient received 7 cycle of Taxotere and Cytoxan on December 6, 2017.  After that patient was changed over to treatment with Palbociclib and letrozole on January 30, 2018.  Palbociclib was held from January 17, 2019 until February 8, 2019 secondary to wisdom tooth extraction.  Patient is due to start  cycle 15 of palbociclib and letrozole today on June 28, 2019.   She is here for follow-up appointment today.     Denies any nausea or vomiting or diarrhea with current treatment.  Complains of tingling and numbness affecting bilateral hand. Denies any bowel or bladder incontinence.      PAST MEDICAL HISTORY:    Past Medical History:   Diagnosis Date   • Anxiety    • Depression    • Encounter for gynecological examination    • Malignant neoplasm of female breast (CMS/HCC)     hx of dcis s/p mastectomy and reconstruction and augmentation      • Primary malignant neoplasm of breast (CMS/HCC)     dcis in rt breast s/p mastectomy,reconstruction      • Ventricular premature beats        SOCIAL HISTORY:    Social History     Tobacco Use   • Smoking status: Never Smoker   • Smokeless tobacco: Never Used   Substance Use Topics   • Alcohol use: No   • Drug use: No       Surgical History :  Past Surgical History:   Procedure Laterality Date   • AUGMENTATION MAMMAPLASTY     • AXILLARY LYMPH NODE BIOPSY/EXCISION Right 7/10/2017    Procedure: BIOSPY RIGHT AXILLARY MASS AND OR LYMPH;  Surgeon: Sourav Ernst MD;   Location: [facility];  Service:    • BREAST BIOPSY  12/07/2007    Mammotome biopsy of the right side with clip placement   • BREAST LUMPECTOMY     • BREAST RECONSTRUCTION  10/28/2008    Abscence of right breast secondary to mastectomy. Implant exchange for reconstruction of right breast with open para-prosthetic capsulotomy   • BREAST SURGERY  10/01/2009    Left breast ptosis and breast asymmetry secondary to right breast reconstruction for breast cancer. Left breast mastopexy with augmentation.   • CARDIAC CATHETERIZATION  09/18/2008    Normal coronary arteriography. Normal left ventricular angiogram   • EP STUDY     • MASTECTOMY Right    • MASTECTOMY  02/05/2008    Multifocal right breast ductal carcinoma-in-situ. Right total mastectomy   • MASTECTOMY, PARTIAL  01/03/2008    Ductal carcinoma in-situ of the right breast, proven by mammotome biopsy. Right lumpectomy, medial portion of the breast, between 2 o'clock and 4 o'clock, 5 cm from the nipple, with clip placement, radiographic inter. of severo. specimen, & ultrasound   • PAP SMEAR  03/03/2009    Negative   • [place] INSJ TUNNELED CVC W/O SUBQ PORT/ AGE 5 YR/> Right 7/10/2017    Procedure: MEDIPORT     (c-arm#2);  Surgeon: Sourav Ernst MD;  Location: [facility];  Service: General       ALLERGIES:    Allergies   Allergen Reactions   • Percocet [Oxycodone-Acetaminophen] Nausea And Vomiting       FAMILY HISTORY:  Family History   Problem Relation Age of Onset   • Anemia Mother    • Multiple sclerosis Mother    • No Known Problems Father    • Diabetes Other    • Multiple sclerosis Other        REVIEW OF SYSTEMS:      CONSTITUTIONAL: Complains of fatigue.  No recent weight change.  Denies any fever, chills .      HEENT:  No epistaxis, mouth sores or difficulty swallowing.     RESPIRATORY: No new shortness of breath. No new cough or hemoptysis.     CARDIOVASCULAR: No chest pain or palpitations.     GASTROINTESTINAL: No nausea or vomiting.   No new abdominal pain. No  "blood in the stool.     GENITOURINARY: No Dysuria or Hematuria.     MUSCULOSKELETAL:Complains of arthritic pain affecting bilateral hands. Complains of pain in bilateral hip.  Complains of worsening of back pain .     LYMPHATICS: No new lymph node swelling.     NEUROLOGICAL : Complains of intermittent tingling and numbness affecting bilateral hand. Complains of tingling and numbness from back radiating down to bilateral lower extremity.  No dizziness. No seizures or balance problems.     SKIN: Positive for history of melanoma status post surgical removal. Denies any new skin lesion worrisome for melanoma.              PHYSICAL EXAMINATION:      VITAL SIGNS:  /79   Pulse 68   Temp 98 °F (36.7 °C) (Temporal)   Resp 18   Ht 170.2 cm (67.01\")   Wt 104 kg (229 lb 6.4 oz)   SpO2 98%   BMI 35.92 kg/m²      ECOG performance status:1    GENERAL:  Not in any distress.    HEENT:  Normocephalic, Atraumatic.Mild Conjunctival pallor. No icterus. Extraocular Movements Intact. No Facial Asymmetry noted.  Wound after extraction of left lower jaw wisdom tooth is healed.    NECK:  No adenopathy. No JVD.Trachea in midline    RESPIRATORY:  Fair air entry bilateral. No rhonchi or wheezing.    CARDIOVASCULAR:  S1, S2. Regular rate and rhythm. No murmur or gallop appreciated.    ABDOMEN:  Soft, obese, nontender. Bowel sounds present in all four quadrants.  No hepatosplenomegaly appreciated.    MUSCULOSKELETAL:  No edema.No Calf Tenderness.  Decreased range of motion.    NEUROLOGIC:  Alert, awake and oriented ×3.  No  Motor  deficit appreciated. Cranial Nerves 2-12 grossly intact.    LYMPHATICS: No new lymph node enlargement in neck or supraclavicular area.    PSYCHIATRY: Alert, awake and oriented ×3. Normal affect. Normal Judgement.Makes good eye contact.          DIAGNOSTIC DATA:    Glucose   Date Value Ref Range Status   06/28/2019 109 (H) 65 - 99 mg/dL Final     Sodium   Date Value Ref Range Status   06/28/2019 141 136 - " 145 mmol/L Final     Potassium   Date Value Ref Range Status   06/28/2019 3.8 3.5 - 5.2 mmol/L Final     CO2   Date Value Ref Range Status   06/28/2019 26.0 22.0 - 29.0 mmol/L Final     Chloride   Date Value Ref Range Status   06/28/2019 104 98 - 107 mmol/L Final     Anion Gap   Date Value Ref Range Status   06/28/2019 11.0 5.0 - 15.0 mmol/L Final     Creatinine   Date Value Ref Range Status   06/28/2019 0.81 0.57 - 1.00 mg/dL Final     BUN   Date Value Ref Range Status   06/28/2019 9 6 - 20 mg/dL Final     BUN/Creatinine Ratio   Date Value Ref Range Status   06/28/2019 11.1 7.0 - 25.0 Final     Calcium   Date Value Ref Range Status   06/28/2019 9.5 8.6 - 10.5 mg/dL Final     eGFR Non  Amer   Date Value Ref Range Status   06/28/2019 75 >60 mL/min/1.73 Final     Alkaline Phosphatase   Date Value Ref Range Status   06/28/2019 84 39 - 117 U/L Final     Total Protein   Date Value Ref Range Status   06/28/2019 6.7 6.0 - 8.5 g/dL Final     ALT (SGPT)   Date Value Ref Range Status   06/28/2019 39 (H) 1 - 33 U/L Final     AST (SGOT)   Date Value Ref Range Status   06/28/2019 37 (H) 1 - 32 U/L Final     Total Bilirubin   Date Value Ref Range Status   06/28/2019 0.2 0.2 - 1.2 mg/dL Final     Albumin   Date Value Ref Range Status   06/28/2019 4.10 3.50 - 5.20 g/dL Final     Globulin   Date Value Ref Range Status   06/28/2019 2.6 gm/dL Final     Lab Results   Component Value Date    WBC 3.98 06/28/2019    HGB 12.3 06/28/2019    HCT 34.9 06/28/2019    MCV 97.5 (H) 06/28/2019     06/28/2019     Lab Results   Component Value Date    NEUTROABS 1.88 06/28/2019     Lab Results   Component Value Date     28.3 (H) 05/24/2019    LABCA2 35.0 05/24/2019         2 D Echo Done on July 19, 2017 showed:  Interpretation Summary   · The left ventricular cavity is borderline dilated.  · Left ventricular systolic function is normal. Estimated EF = 53%.  · Left ventricular diastolic dysfunction (grade I) consistent with impaired  relaxation.  · IAS aneurysm noted. No PFO or ASD           Radiology Data :  CT of chest, abdomen and pelvis with contrast done on April 17, 2019 was reviewed, discussed with patient, it showed:  IMPRESSION:  CONCLUSION:      1. Stable examination. Presumed treated hepatic metastases in the  left lobe, unchanged. Multiple predominantly osteosclerotic bone  lesions, unchanged.           CT of chest, abdomen and pelvis with contrast done on December 7, 2018 was reviewed, discussed with patient, it showed:  IMPRESSION:  CONCLUSION:     1. Stable hepatic metastases anterior aspect of the liver.  2. Stable extensive skeletal metastases with pathologic fracture  involving L4.       MRI of brain with and without contrast done on July 9, 2018 was reviewed, discussed with patient, it showed:  IMPRESSION:  No evidence of intracranial hemorrhage, mass/mass effect, acute  infarct, or pathologic contrast enhancement.     Previous described/known enhancing calvarial metastatic lesions  are significantly less conspicuous on today's examination. The  finding would support favorable treatment response.     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          Doppler ultrasound of right upper extremity done on November 10, 2017 showed:  IMPRESSION:  CONCLUSION:   Abnormal exam with thrombus visualized in the right internal  jugular vein, consistent with DVT.      PET/CT done on July 3, 2017 showed:  IMPRESSION:  CONCLUSION:  1. Extensive metastatic disease. Pathologic uptake within  enlarged right-sided axillary lymph nodes. Metastatic adenopathy  in both laurie and mediastinum. Tumor replacing most of the left  lobe of liver. Intra-abdominal retroperitoneal metastases,  adenopathy.     Extensive skeletal metastases including a pathologic fracture at  L4.        Nuclear bone scan done on June 20, 2017 showed:  1. Increased uptake at L4 suggestive of metastatic process.  2. 3 or 4 areas of increased activity  in bony calvarium  3. Some increased activity in tips posteriorly and anteriorly suggestive of metastatic carcinoma to the skeletal system.           Pathology :    Pathology data from July 10, 2017 showed:  Final Diagnosis   Mass right axilla, excisional biopsy:      Metastatic poorly differentiated ductal carcinoma, breast primary      Estrogen receptor positive, 95% stainability, strong intensity      Progesterone receptor positive, 95% stainability, strong intensity      HER-2 2+(equivocal), sent to [facility] for FISH     Addendum   Her 2 FISH result from [facility] is NEGATIVE         Pathology report from December 7, 2007 showed:  FINAL DIAGNOSIS:   RIGHT BREAST MAMMOTOME BIOPSY:        DUCTAL CARCINOMA IN SITU, INTERMEDIATE NUCLEAR GRADE              WITH MICROCALCIFICATION.      ADDENDUM:   Estrogen receptors are positive (90-95% stainability).   Progesterone receptors are positive (90-95% stainability).         ASSESSMENT AND PLAN:      1.  Metastatic cancer with extensive adenopathy in right axilla, mediastinum, hilar, abdominal, retroperitoneal with extensive liver and bone metastasis on PET/CT.  Patient underwent right apatient underwent right axillary lymph node excision by Dr. Ernst on July 10, 2017.Pathology report confirmed ductal carcinoma of breast with estrogen and progesterone positivity.  At her on palliative chemotherapy with Taxotere and cytoxan on July 26, 2017.   Patient was  seen at [facility] for second opinion regarding her breast cancer.  Case was discussed with Dr vazquez oncologist that has seen patient at [facility].  She agreed with her chemotherapy at this point.  The scan on December 20, 2017 shows relative stability of disease, lesion in the liver as well as bone lesion at about same.  Her tumor marker CA 27-29 as well as CA 15-3 are less than 100 at this point.  Since patient is developing some neuropathy in bilateral upper and lower extremity recommend changing her  therapy to Palbociclib with letrozole.  Patient started taking first round of Palbociclib and letrozole on January 30, 2018.  Patient was scheduled to start cycle  6 of chemotherapy today with Palbociclib today on July 16, 2018.   -CT of chest, abdomen and pelvis with contrast done on July 9, 2018 show stable disease which was discussed with patient.  -MRI of brain with and without contrast done on July 9, 2018 shows significant improvement in calvarial metastasis which was discussed with patient.  -CT of chest, abdomen and pelvis with contrast done on September 19, 2018 showed stable disease without any new lymphadenopathy or any new lesions.  Result of CT scan were discussed with patient and her family.  -CT of chest, abdomen and pelvis with contrast done on December 7, 2018 after cycle 9 of Palbociclib and letrozole showed stable disease involving liver and bones.  Result of CT scan were discussed with patient.  -Palbociclib was held from January 17, 2019 until February 8, 2019 secondary to wisdom tooth extraction.  -Patient resume cycle 11 of palbociclib and letrozole on February 8, 2019.  -CT of chest, abdomen and pelvis with contrast done on April 17, 2019 shows stable finding involving bone metastases and liver lesion.  Results of CT scan were discussed with patient and her family.  -CBC done earlier today on April 19, 2019 shows normal white blood cell count with normal neutrophil count.  Will go ahead with cycle 14 of palbociclib and letrozole today on may 24, 2019.  -Tumor marker consisting of CA 15-3 and CA 27-29 has been drawn earlier today.  We will follow-up on the result of tumor marker.  -We will ask patient to return to clinic in 5 weeks with repeat CBC, CMP and tumor marker consisting of CA 15-3 and CA 27-29 to be done on that day.    -We will get CT of chest abdomen and pelvis with contrast prior to next clinic visit in 5 weeks for restaging purpose.    2.  Brain metastasis:MRi Brain with and  without done on November 9, 2017 was reviewed and compared with t MRI done 6 weeks prior to that.  Brain calvarial metastasis are stable or somewhat improved.  No need to do radiation at this point which was discussed with patient.    -MRI of brain with and without contrast done on July 9, 2018 shows improvement in calvarial metastasis.    3.  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  She is currently on Coumadin.    She is being followed by Coumadin clinic  -Since patient is scheduled to undergo wisdom tooth extraction, recommend holding Coumadin 5 days prior to procedure and getting Lovenox 1.5 mg/kg during that period.  We will resume Coumadin with Lovenox after wisdom tooth extraction.    4.  History of melanoma in situ status post excision by Dr. Linn.    5.  Bone metastasis: Patient was started on Zometa on August 23, 2017.  Continue with Zometa as scheduled for now.  Patient denies any mouth sores or jaw pain.  CT scan shows about loss of 70% of height of L4 vertebral body, patient has been referred to Dr. Pugh to get evaluated for kyphoplasty.  Patient went for second opinion with orthopedic surgeon in [place], as per patient's second surgeon did not recommend any surgery.    -Zometa Has been held since November 2018 due to wisdom tooth extraction requirement.  -Wound after wisdom tooth extraction is  completely healed.   Zometa has been resumed  on April 19, 2019       6.  History of ductal carcinoma in situ of the right breast diagnosed in 2007.  Status post mastectomy followed by adjuvant tamoxifen for 5 years which was finished in 2013.    7.  Neutropenia: Secondary to Palbociclib.resolved. WBC is normal at 3.98 with ANC 1.88 .      8.  Elevated liver function:  -AST and ALT are mildly elevated.  Will monitor with CMP for now.  CT of abdomen and pelvis does not show any evidence of worsening hepatic metastasis.    9.  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  She remains on gabapentin 300 mg 3 times a day.  Prescription for gabapentin has been sent to her pharmacy today on June 28, 2019.    10.  Health maintenance: Patient does not smoke.  Not a candidate for screening colonoscopy at this point.      11.  Prescriptions: Patient has enough prescription of Decadron, Zofran,  and Ultram.  Prescription for Neurontin 300 mg 3 times a day has been sent to her pharmacy  on June 28, 2019.    12.  Advance care planning: Patient remains full code for now and is able to make on her decisions.  Patient has healthcare surrogate Mentioned on Chart.    13. Obesity: Patient's Body mass index is 35.92 kg/m². BMI is higher than reference range.  Patient was notified about elevated BMI.  Patient was counseled about diet and exercise for weight loss.    14.  Pain assessment:  -Patient denies any pain today.        Ovidio Meadows MD  6/28/2019  4:39 PM        EMR Dragon/Transcription disclaimer:   Much of this encounter note is an electronic transcription/translation of spoken language to printed text. The electronic translation of spoken language may permit erroneous, or at times, nonsensical words or phrases to be inadvertently transcribed; Although I have reviewed the note for such errors, some may still exist.

## 2019-06-29 LAB
CANCER AG15-3 SERPL-ACNC: 26.9 U/ML
CANCER AG27-29 SERPL-ACNC: 24.5 U/ML (ref 0–38.6)

## 2019-07-30 RX ORDER — PALBOCICLIB 125 MG/1
CAPSULE ORAL
Qty: 21 CAPSULE | Refills: 1 | Status: SHIPPED | OUTPATIENT
Start: 2019-07-30 | End: 2019-08-13 | Stop reason: SDUPTHER

## 2019-07-30 RX ORDER — WARFARIN SODIUM 5 MG/1
TABLET ORAL
Qty: 30 TABLET | Refills: 5 | Status: SHIPPED | OUTPATIENT
Start: 2019-07-30 | End: 2020-07-20

## 2019-07-31 ENCOUNTER — APPOINTMENT (OUTPATIENT)
Dept: CT IMAGING | Facility: HOSPITAL | Age: 49
End: 2019-07-31

## 2019-07-31 ENCOUNTER — HOSPITAL ENCOUNTER (OUTPATIENT)
Dept: CT IMAGING | Facility: HOSPITAL | Age: 49
Discharge: HOME | End: 2019-07-31
Admitting: INTERNAL MEDICINE

## 2019-07-31 DIAGNOSIS — Z17.0 MALIGNANT NEOPLASM OF RIGHT BREAST IN FEMALE, ESTROGEN RECEPTOR POSITIVE, UNSPECIFIED SITE OF BREAST (HCC): ICD-10-CM

## 2019-07-31 DIAGNOSIS — C50.911 MALIGNANT NEOPLASM OF RIGHT BREAST IN FEMALE, ESTROGEN RECEPTOR POSITIVE, UNSPECIFIED SITE OF BREAST (HCC): ICD-10-CM

## 2019-07-31 DIAGNOSIS — C79.51 BONE METASTASIS: ICD-10-CM

## 2019-07-31 PROCEDURE — 25010000002 IOPAMIDOL 61 % SOLUTION: Performed by: INTERNAL MEDICINE

## 2019-07-31 PROCEDURE — 74177 CT ABD & PELVIS W/CONTRAST: CPT

## 2019-07-31 PROCEDURE — 71260 CT THORAX DX C+: CPT

## 2019-07-31 RX ADMIN — IOPAMIDOL 90 ML: 612 INJECTION, SOLUTION INTRAVENOUS at 08:40

## 2019-08-02 ENCOUNTER — LAB (OUTPATIENT)
Dept: ONCOLOGY | Facility: HOSPITAL | Age: 49
End: 2019-08-02

## 2019-08-02 ENCOUNTER — OFFICE VISIT (OUTPATIENT)
Dept: ONCOLOGY | Facility: CLINIC | Age: 49
End: 2019-08-02

## 2019-08-02 ENCOUNTER — INFUSION (OUTPATIENT)
Dept: ONCOLOGY | Facility: HOSPITAL | Age: 49
End: 2019-08-02

## 2019-08-02 ENCOUNTER — ANTICOAGULATION VISIT (OUTPATIENT)
Dept: CARDIAC SURGERY | Facility: CLINIC | Age: 49
End: 2019-08-02

## 2019-08-02 VITALS
RESPIRATION RATE: 18 BRPM | SYSTOLIC BLOOD PRESSURE: 125 MMHG | WEIGHT: 229.4 LBS | DIASTOLIC BLOOD PRESSURE: 81 MMHG | BODY MASS INDEX: 36 KG/M2 | HEIGHT: 67 IN | HEART RATE: 66 BPM | TEMPERATURE: 98.1 F

## 2019-08-02 VITALS — DIASTOLIC BLOOD PRESSURE: 70 MMHG | HEART RATE: 75 BPM | OXYGEN SATURATION: 97 % | SYSTOLIC BLOOD PRESSURE: 102 MMHG

## 2019-08-02 DIAGNOSIS — C50.911 MALIGNANT NEOPLASM OF RIGHT BREAST IN FEMALE, ESTROGEN RECEPTOR POSITIVE, UNSPECIFIED SITE OF BREAST (HCC): Primary | ICD-10-CM

## 2019-08-02 DIAGNOSIS — Z17.0 MALIGNANT NEOPLASM OF RIGHT BREAST IN FEMALE, ESTROGEN RECEPTOR POSITIVE, UNSPECIFIED SITE OF BREAST (HCC): ICD-10-CM

## 2019-08-02 DIAGNOSIS — Z79.01 LONG TERM CURRENT USE OF ANTICOAGULANT THERAPY: ICD-10-CM

## 2019-08-02 DIAGNOSIS — Z86.000 HISTORY OF DUCTAL CARCINOMA IN SITU (DCIS) OF BREAST: ICD-10-CM

## 2019-08-02 DIAGNOSIS — Z45.2 ENCOUNTER FOR VENOUS ACCESS DEVICE CARE: Primary | ICD-10-CM

## 2019-08-02 DIAGNOSIS — C79.51 BONE METASTASIS: ICD-10-CM

## 2019-08-02 DIAGNOSIS — Z17.0 MALIGNANT NEOPLASM OF RIGHT BREAST IN FEMALE, ESTROGEN RECEPTOR POSITIVE, UNSPECIFIED SITE OF BREAST (HCC): Primary | ICD-10-CM

## 2019-08-02 DIAGNOSIS — T45.1X5A NEUROPATHY DUE TO CHEMOTHERAPEUTIC DRUG (HCC): ICD-10-CM

## 2019-08-02 DIAGNOSIS — G62.0 NEUROPATHY DUE TO CHEMOTHERAPEUTIC DRUG (HCC): ICD-10-CM

## 2019-08-02 DIAGNOSIS — C79.31 METASTASIS TO BRAIN (HCC): ICD-10-CM

## 2019-08-02 DIAGNOSIS — R79.89 ELEVATED LIVER FUNCTION TESTS: ICD-10-CM

## 2019-08-02 DIAGNOSIS — C50.911 MALIGNANT NEOPLASM OF RIGHT BREAST IN FEMALE, ESTROGEN RECEPTOR POSITIVE, UNSPECIFIED SITE OF BREAST (HCC): ICD-10-CM

## 2019-08-02 DIAGNOSIS — Z45.2 ENCOUNTER FOR VENOUS ACCESS DEVICE CARE: ICD-10-CM

## 2019-08-02 LAB
ALBUMIN SERPL-MCNC: 4.2 G/DL (ref 3.5–5.2)
ALBUMIN/GLOB SERPL: 1.4 G/DL
ALP SERPL-CCNC: 72 U/L (ref 39–117)
ALT SERPL W P-5'-P-CCNC: 24 U/L (ref 1–33)
ANION GAP SERPL CALCULATED.3IONS-SCNC: 12 MMOL/L (ref 5–15)
AST SERPL-CCNC: 25 U/L (ref 1–32)
BASOPHILS # BLD AUTO: 0.09 10*3/MM3 (ref 0–0.2)
BASOPHILS NFR BLD AUTO: 2.2 % (ref 0–1.5)
BILIRUB SERPL-MCNC: 0.2 MG/DL (ref 0.2–1.2)
BUN BLD-MCNC: 13 MG/DL (ref 6–20)
BUN/CREAT SERPL: 17.6 (ref 7–25)
CALCIUM SPEC-SCNC: 9 MG/DL (ref 8.6–10.5)
CANCER AG15-3 SERPL-ACNC: 26.8 U/ML
CHLORIDE SERPL-SCNC: 105 MMOL/L (ref 98–107)
CO2 SERPL-SCNC: 24 MMOL/L (ref 22–29)
CREAT BLD-MCNC: 0.74 MG/DL (ref 0.57–1)
DEPRECATED RDW RBC AUTO: 53 FL (ref 37–54)
EOSINOPHIL # BLD AUTO: 0.06 10*3/MM3 (ref 0–0.4)
EOSINOPHIL NFR BLD AUTO: 1.5 % (ref 0.3–6.2)
ERYTHROCYTE [DISTWIDTH] IN BLOOD BY AUTOMATED COUNT: 15.2 % (ref 12.3–15.4)
GFR SERPL CREATININE-BSD FRML MDRD: 83 ML/MIN/1.73
GLOBULIN UR ELPH-MCNC: 3 GM/DL
GLUCOSE BLD-MCNC: 90 MG/DL (ref 65–99)
HCT VFR BLD AUTO: 33.6 % (ref 34–46.6)
HGB BLD-MCNC: 12.1 G/DL (ref 12–15.9)
IMM GRANULOCYTES # BLD AUTO: 0.01 10*3/MM3 (ref 0–0.05)
IMM GRANULOCYTES NFR BLD AUTO: 0.2 % (ref 0–0.5)
INR PPP: 1.6 (ref 0.9–1.1)
LYMPHOCYTES # BLD AUTO: 1.32 10*3/MM3 (ref 0.7–3.1)
LYMPHOCYTES NFR BLD AUTO: 32.9 % (ref 19.6–45.3)
MAGNESIUM SERPL-MCNC: 2 MG/DL (ref 1.6–2.6)
MCH RBC QN AUTO: 34.3 PG (ref 26.6–33)
MCHC RBC AUTO-ENTMCNC: 36 G/DL (ref 31.5–35.7)
MCV RBC AUTO: 95.2 FL (ref 79–97)
MONOCYTES # BLD AUTO: 0.73 10*3/MM3 (ref 0.1–0.9)
MONOCYTES NFR BLD AUTO: 18.2 % (ref 5–12)
NEUTROPHILS # BLD AUTO: 1.8 10*3/MM3 (ref 1.7–7)
NEUTROPHILS NFR BLD AUTO: 45 % (ref 42.7–76)
NRBC BLD AUTO-RTO: 0 /100 WBC (ref 0–0.2)
PHOSPHATE SERPL-MCNC: 3 MG/DL (ref 2.5–4.5)
PLATELET # BLD AUTO: 194 10*3/MM3 (ref 140–450)
PMV BLD AUTO: 9.7 FL (ref 6–12)
POTASSIUM BLD-SCNC: 4.1 MMOL/L (ref 3.5–5.2)
PROT SERPL-MCNC: 7.2 G/DL (ref 6–8.5)
RBC # BLD AUTO: 3.53 10*6/MM3 (ref 3.77–5.28)
SODIUM BLD-SCNC: 141 MMOL/L (ref 136–145)
WBC NRBC COR # BLD: 4.01 10*3/MM3 (ref 3.4–10.8)

## 2019-08-02 PROCEDURE — 86300 IMMUNOASSAY TUMOR CA 15-3: CPT

## 2019-08-02 PROCEDURE — G8731 PAIN NEG NO PLAN: HCPCS | Performed by: INTERNAL MEDICINE

## 2019-08-02 PROCEDURE — 99214 OFFICE O/P EST MOD 30 MIN: CPT | Performed by: INTERNAL MEDICINE

## 2019-08-02 PROCEDURE — 1123F ACP DISCUSS/DSCN MKR DOCD: CPT | Performed by: INTERNAL MEDICINE

## 2019-08-02 PROCEDURE — 85025 COMPLETE CBC W/AUTO DIFF WBC: CPT

## 2019-08-02 PROCEDURE — 99211 OFF/OP EST MAY X REQ PHY/QHP: CPT | Performed by: NURSE PRACTITIONER

## 2019-08-02 PROCEDURE — G9903 PT SCRN TBCO ID AS NON USER: HCPCS | Performed by: INTERNAL MEDICINE

## 2019-08-02 PROCEDURE — 96374 THER/PROPH/DIAG INJ IV PUSH: CPT | Performed by: INTERNAL MEDICINE

## 2019-08-02 PROCEDURE — 85610 PROTHROMBIN TIME: CPT | Performed by: NURSE PRACTITIONER

## 2019-08-02 PROCEDURE — 84100 ASSAY OF PHOSPHORUS: CPT

## 2019-08-02 PROCEDURE — 36591 DRAW BLOOD OFF VENOUS DEVICE: CPT | Performed by: INTERNAL MEDICINE

## 2019-08-02 PROCEDURE — 80053 COMPREHEN METABOLIC PANEL: CPT

## 2019-08-02 PROCEDURE — 25010000002 ZOLEDRONIC ACID PER 1 MG: Performed by: INTERNAL MEDICINE

## 2019-08-02 PROCEDURE — 83735 ASSAY OF MAGNESIUM: CPT

## 2019-08-02 RX ORDER — SODIUM CHLORIDE 0.9 % (FLUSH) 0.9 %
10 SYRINGE (ML) INJECTION AS NEEDED
Status: DISCONTINUED | OUTPATIENT
Start: 2019-08-02 | End: 2019-08-02 | Stop reason: HOSPADM

## 2019-08-02 RX ORDER — SODIUM CHLORIDE 9 MG/ML
250 INJECTION, SOLUTION INTRAVENOUS ONCE
Status: CANCELLED | OUTPATIENT
Start: 2019-08-02

## 2019-08-02 RX ORDER — SODIUM CHLORIDE 0.9 % (FLUSH) 0.9 %
10 SYRINGE (ML) INJECTION AS NEEDED
Status: CANCELLED | OUTPATIENT
Start: 2019-08-02

## 2019-08-02 RX ORDER — SODIUM CHLORIDE 9 MG/ML
250 INJECTION, SOLUTION INTRAVENOUS ONCE
Status: COMPLETED | OUTPATIENT
Start: 2019-08-02 | End: 2019-08-02

## 2019-08-02 RX ORDER — SODIUM CHLORIDE 0.9 % (FLUSH) 0.9 %
10 SYRINGE (ML) INJECTION AS NEEDED
Status: CANCELLED | OUTPATIENT
Start: 2019-09-06

## 2019-08-02 RX ADMIN — SODIUM CHLORIDE, PRESERVATIVE FREE 10 ML: 5 INJECTION INTRAVENOUS at 10:43

## 2019-08-02 RX ADMIN — SODIUM CHLORIDE, PRESERVATIVE FREE 10 ML: 5 INJECTION INTRAVENOUS at 09:21

## 2019-08-02 RX ADMIN — ZOLEDRONIC ACID 4 MG: 4 INJECTION, SOLUTION, CONCENTRATE INTRAVENOUS at 10:12

## 2019-08-02 RX ADMIN — Medication 500 UNITS: at 10:43

## 2019-08-02 RX ADMIN — SODIUM CHLORIDE 250 ML: 9 INJECTION, SOLUTION INTRAVENOUS at 09:59

## 2019-08-02 NOTE — PROGRESS NOTES
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  Started on palliative chemotherapy with Taxotere and Cytoxan on July 26, 2017.  Patient received 7 cycle of Taxotere and Cytoxan on December 6, 2017.  After that patient was changed over to treatment with Palbociclib and letrozole on January 30, 2018.  Palbociclib was held from January 17, 2019 until February 8, 2019 secondary to wisdom tooth extraction.  Patient is due to start  cycle 16 of palbociclib and letrozole today on August 2, 2019.  She had a restaging CT of chest, abdomen and pelvis with contrast done on July 31, 2019, patient is here to discuss the results and further recommendation.    Denies any nausea or vomiting or diarrhea with current treatment.  Complains of tingling and numbness affecting bilateral hand. Denies any bowel or bladder incontinence.      PAST MEDICAL HISTORY:    Past Medical History:   Diagnosis Date   • Anxiety    • Depression    • Encounter for gynecological examination    • Malignant neoplasm of female breast (CMS/HCC)     hx of dcis s/p mastectomy and reconstruction and augmentation      • Primary malignant neoplasm of breast (CMS/HCC)     dcis in rt breast s/p mastectomy,reconstruction      • Ventricular premature beats        SOCIAL HISTORY:    Social History     Tobacco Use   • Smoking status: Never Smoker   • Smokeless tobacco: Never Used   Substance Use Topics   • Alcohol use: No   • Drug use: No       Surgical History :  Past Surgical History:   Procedure Laterality Date   • AUGMENTATION MAMMAPLASTY     • AXILLARY LYMPH NODE  BIOPSY/EXCISION Right 7/10/2017    Procedure: BIOSPY RIGHT AXILLARY MASS AND OR LYMPH;  Surgeon: Sourav Ernst MD;  Location: [facility];  Service:    • BREAST BIOPSY  12/07/2007    Mammotome biopsy of the right side with clip placement   • BREAST LUMPECTOMY     • BREAST RECONSTRUCTION  10/28/2008    Abscence of right breast secondary to mastectomy. Implant exchange for reconstruction of right breast with open para-prosthetic capsulotomy   • BREAST SURGERY  10/01/2009    Left breast ptosis and breast asymmetry secondary to right breast reconstruction for breast cancer. Left breast mastopexy with augmentation.   • CARDIAC CATHETERIZATION  09/18/2008    Normal coronary arteriography. Normal left ventricular angiogram   • EP STUDY     • MASTECTOMY Right    • MASTECTOMY  02/05/2008    Multifocal right breast ductal carcinoma-in-situ. Right total mastectomy   • MASTECTOMY, PARTIAL  01/03/2008    Ductal carcinoma in-situ of the right breast, proven by mammotome biopsy. Right lumpectomy, medial portion of the breast, between 2 o'clock and 4 o'clock, 5 cm from the nipple, with clip placement, radiographic inter. of severo. specimen, & ultrasound   • PAP SMEAR  03/03/2009    Negative   • GA INSJ TUNNELED CVC W/O SUBQ PORT/ AGE 3 YR/> Right 7/10/2017    Procedure: MEDIPORT     (c-arm#2);  Surgeon: Sourav Ernst MD;  Location: [facility];  Service: General       ALLERGIES:    Allergies   Allergen Reactions   • Percocet [Oxycodone-Acetaminophen] Nausea And Vomiting       FAMILY HISTORY:  Family History   Problem Relation Age of Onset   • Anemia Mother    • Multiple sclerosis Mother    • No Known Problems Father    • Diabetes Other    • Multiple sclerosis Other        REVIEW OF SYSTEMS:      CONSTITUTIONAL: Complains of fatigue.  No recent weight change.  Denies any fever, chills .      HEENT:  No epistaxis, mouth sores or difficulty swallowing.     RESPIRATORY: No new shortness of breath. No new cough or  "hemoptysis.     CARDIOVASCULAR: No chest pain or palpitations.     GASTROINTESTINAL: No nausea or vomiting.   No new abdominal pain. No blood in the stool.     GENITOURINARY: No Dysuria or Hematuria.     MUSCULOSKELETAL:Complains of arthritic pain affecting bilateral hands. Complains of pain in bilateral hip.  Complains of worsening of back pain .     LYMPHATICS: No new lymph node swelling.     NEUROLOGICAL : Complains of intermittent tingling and numbness affecting bilateral hand. Complains of tingling and numbness from back radiating down to bilateral lower extremity.  No dizziness. No seizures or balance problems.     SKIN: Positive for history of melanoma status post surgical removal. Denies any new skin lesion worrisome for melanoma.              PHYSICAL EXAMINATION:      VITAL SIGNS:  /81   Pulse 66   Temp 98.1 °F (36.7 °C) (Temporal)   Resp 18   Ht 170.2 cm (67.01\")   Wt 104 kg (229 lb 6.4 oz)   BMI 35.92 kg/m²      ECOG performance status:1    GENERAL:  Not in any distress.    HEENT:  Normocephalic, Atraumatic.Mild Conjunctival pallor. No icterus. Extraocular Movements Intact. No Facial Asymmetry noted.  Wound after extraction of left lower jaw wisdom tooth is healed.    NECK:  No adenopathy. No JVD.Trachea in midline    RESPIRATORY:  Fair air entry bilateral. No rhonchi or wheezing.    CARDIOVASCULAR:  S1, S2. Regular rate and rhythm. No murmur or gallop appreciated.    ABDOMEN:  Soft, obese, nontender. Bowel sounds present in all four quadrants.  No hepatosplenomegaly appreciated.    MUSCULOSKELETAL:  No edema.No Calf Tenderness.  Decreased range of motion.    NEUROLOGIC:  Alert, awake and oriented ×3.  No  Motor  deficit appreciated. Cranial Nerves 2-12 grossly intact.    LYMPHATICS: No new lymph node enlargement in neck or supraclavicular area.    PSYCHIATRY: Alert, awake and oriented ×3. Normal affect. Normal Judgement.Makes good eye contact.          DIAGNOSTIC DATA:    Glucose   Date " Value Ref Range Status   08/02/2019 90 65 - 99 mg/dL Final     Sodium   Date Value Ref Range Status   08/02/2019 141 136 - 145 mmol/L Final     Potassium   Date Value Ref Range Status   08/02/2019 4.1 3.5 - 5.2 mmol/L Final     CO2   Date Value Ref Range Status   08/02/2019 24.0 22.0 - 29.0 mmol/L Final     Chloride   Date Value Ref Range Status   08/02/2019 105 98 - 107 mmol/L Final     Anion Gap   Date Value Ref Range Status   08/02/2019 12.0 5.0 - 15.0 mmol/L Final     Creatinine   Date Value Ref Range Status   08/02/2019 0.74 0.57 - 1.00 mg/dL Final     BUN   Date Value Ref Range Status   08/02/2019 13 6 - 20 mg/dL Final     BUN/Creatinine Ratio   Date Value Ref Range Status   08/02/2019 17.6 7.0 - 25.0 Final     Calcium   Date Value Ref Range Status   08/02/2019 9.0 8.6 - 10.5 mg/dL Final     eGFR Non  Amer   Date Value Ref Range Status   08/02/2019 83 >60 mL/min/1.73 Final     Alkaline Phosphatase   Date Value Ref Range Status   08/02/2019 72 39 - 117 U/L Final     Total Protein   Date Value Ref Range Status   08/02/2019 7.2 6.0 - 8.5 g/dL Final     ALT (SGPT)   Date Value Ref Range Status   08/02/2019 24 1 - 33 U/L Final     AST (SGOT)   Date Value Ref Range Status   08/02/2019 25 1 - 32 U/L Final     Total Bilirubin   Date Value Ref Range Status   08/02/2019 0.2 0.2 - 1.2 mg/dL Final     Albumin   Date Value Ref Range Status   08/02/2019 4.20 3.50 - 5.20 g/dL Final     Globulin   Date Value Ref Range Status   08/02/2019 3.0 gm/dL Final     Lab Results   Component Value Date    WBC 4.01 08/02/2019    HGB 12.1 08/02/2019    HCT 33.6 (L) 08/02/2019    MCV 95.2 08/02/2019     08/02/2019     Lab Results   Component Value Date    NEUTROABS 1.80 08/02/2019     Lab Results   Component Value Date     26.9 (H) 06/28/2019    LABCA2 24.5 06/28/2019         2 D Echo Done on July 19, 2017 showed:  Interpretation Summary   · The left ventricular cavity is borderline dilated.  · Left ventricular systolic  function is normal. Estimated EF = 53%.  · Left ventricular diastolic dysfunction (grade I) consistent with impaired relaxation.  · IAS aneurysm noted. No PFO or ASD         Component CA 27.29   Latest Ref Rng & Units 0.0 - 38.6 U/mL   6/29/2017 1817.1 (H)   8/23/2017 1075.9 (H)   10/4/2017 288.9 (H)   10/25/2017 181.2 (H)   11/15/2017 112.2 (H)   12/6/2017 101.6 (H)   12/27/2017 90.3 (H)   2/28/2018 52.1 (H)   4/2/2018 12.4   5/7/2018 5/14/2018 38.1   6/11/2018 39.7 (H)   7/9/2018 37.9   8/27/2018 34.1   9/24/2018 38.1   11/5/2018 31.2   12/13/2018 30.7   3/15/2019 31.2   4/19/2019 23.1   5/24/2019 35.0   6/28/2019 24.5           Component CA 15-3   Latest Ref Rng & Units <=25.0 U/mL   6/29/2017 2107.0 (H)   8/23/2017 943.0 (H)   10/4/2017 282.2 (H)   10/25/2017 162.2 (H)   11/15/2017 118.0 (H)   12/6/2017 99.6 (H)   12/27/2017 88.3 (H)   2/28/2018 47.2 (H)   4/2/2018 11.9   5/7/2018 5/14/2018 41.1 (H)   6/11/2018 37.3 (H)   7/9/2018 35.3 (H)   8/27/2018 36.7 (H)   9/24/2018 31.1 (H)   11/5/2018 32.9 (H)   12/13/2018 32.6 (H)   3/15/2019 32.6 (H)   4/19/2019 29.2 (H)   5/24/2019 28.3 (H)   6/28/2019 26.9 (H)             Radiology Data :  CT of chest, abdomen and pelvis with contrast done on July 31, 2019 was reviewed, discussed with patient, it showed:  - - - CT CHEST - - -  Right-sided Mediport terminates in the superior vena cava.     PULMONARY PARENCHYMA:      - air spaces: Negative    - interstitium: Grossly within normal limits for age    - misc: No pulmonary nodules or mass     MEDIASTINUM / LATANYA:      - heart: Normal size, no pericardial fluid    - aorta/great vessels: Normal caliber and configuration for age    - misc: No mediastinal mass / significant adenopathy     PLEURAL COMPARTMENT:      - misc: No pleural fluid or mass        MISC:      - inferior neck: Negative    - osseous / body wall: Postoperative changes of both breasts  are present.      - - - CT ABDOMEN - - -      ABDOMEN:   - LIVER:   Normal size / contour, no ductal dilatation, no focal  lesion   - GB:  Grossly negative    - CBD:  Grossly negative   - SPLEEN:  The spleen is enlarged at 14 cm in greatest  dimension. A low-attenuation lesion in the left lobe of the liver  anteriorly containing numerous calcifications is present,  measuring 5.2 x 5.1 cm in greatest axial dimension, unchanged,  and consistent with treated metastasis. No new hepatic lesions  are identified.   - PANCREAS:  Normal in size, contour, no focal mass    - VISCERA:  Normal caliber, no wall thickening   - MESENTERY:  No mesenteric mass   - CAVITY:  No free abdominal fluid, no free intraperitoneal air   - BODY WALL:  Within normal limits   - OSSEOUS:  Innumerable primarily sclerotic osseous metastases  are seen throughout the visualized portion of the skeleton.  Severe compression fracture of L4 is unchanged from the previous  study, including spinal stenosis at this level due to  retropulsion of fracture fragments.      RETROPERITONEUM:   - KIDNEYS:Normal size / contour, no collecting system dilation                    No evidence of an enhancing mass   - URETERS:  Normal course, caliber   - ADRENALS:  Normal size, contour   - MISC:  No sig. retroperitoneal adenopathy or mass   - VASCULAR:  Aorta/iliacs: within normal limits for age     - - - CT PELVIS - - -       - VISCERA:  Normal caliber small/large bowel, no focal   thickening/mass    - APPENDIX:  Within normal limits   - MESENTERY:  No mass   - VASCULAR:  Within normal limits for age   - CAVITY:  No free fluid/air   - BLADDER:  Unremarkable   - OSSEOUS:  Within normal limits    - MISC:   - UTERUS/OVARY: grossly unremarkable     IMPRESSION:  1. Stable single hepatic metastasis.  2. Extensive mostly sclerotic osseous metastases, stable, with  unchanged appearance of pathologic fracture of L4.  3. No new, worrisome lesions identified.          CT of chest, abdomen and pelvis with contrast done on April 17, 2019 was  reviewed, discussed with patient, it showed:  IMPRESSION:  CONCLUSION:      1. Stable examination. Presumed treated hepatic metastases in the  left lobe, unchanged. Multiple predominantly osteosclerotic bone  lesions, unchanged.             MRI of brain with and without contrast done on July 9, 2018 was reviewed, discussed with patient, it showed:  IMPRESSION:  No evidence of intracranial hemorrhage, mass/mass effect, acute  infarct, or pathologic contrast enhancement.     Previous described/known enhancing calvarial metastatic lesions  are significantly less conspicuous on today's examination. The  finding would support favorable treatment response.     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          Doppler ultrasound of right upper extremity done on November 10, 2017 showed:  IMPRESSION:  CONCLUSION:   Abnormal exam with thrombus visualized in the right internal  jugular vein, consistent with DVT.      PET/CT done on July 3, 2017 showed:  IMPRESSION:  CONCLUSION:  1. Extensive metastatic disease. Pathologic uptake within  enlarged right-sided axillary lymph nodes. Metastatic adenopathy  in both laurie and mediastinum. Tumor replacing most of the left  lobe of liver. Intra-abdominal retroperitoneal metastases,  adenopathy.     Extensive skeletal metastases including a pathologic fracture at  L4.        Nuclear bone scan done on June 20, 2017 showed:  1. Increased uptake at L4 suggestive of metastatic process.  2. 3 or 4 areas of increased activity in bony calvarium  3. Some increased activity in tips posteriorly and anteriorly suggestive of metastatic carcinoma to the skeletal system.           Pathology :    Pathology data from July 10, 2017 showed:  Final Diagnosis   Mass right axilla, excisional biopsy:      Metastatic poorly differentiated ductal carcinoma, breast primary      Estrogen receptor positive, 95% stainability, strong intensity      Progesterone receptor positive, 95%  stainability, strong intensity      HER-2 2+(equivocal), sent to [facility] for FISH     Addendum   Her 2 FISH result from [facility] is NEGATIVE         Pathology report from December 7, 2007 showed:  FINAL DIAGNOSIS:   RIGHT BREAST MAMMOTOME BIOPSY:        DUCTAL CARCINOMA IN SITU, INTERMEDIATE NUCLEAR GRADE              WITH MICROCALCIFICATION.      ADDENDUM:   Estrogen receptors are positive (90-95% stainability).   Progesterone receptors are positive (90-95% stainability).         ASSESSMENT AND PLAN:      1.  Metastatic cancer with extensive adenopathy in right axilla, mediastinum, hilar, abdominal, retroperitoneal with extensive liver and bone metastasis on PET/CT.  Patient underwent right apatient underwent right axillary lymph node excision by Dr. Ernst on July 10, 2017.Pathology report confirmed ductal carcinoma of breast with estrogen and progesterone positivity.  At her on palliative chemotherapy with Taxotere and cytoxan on July 26, 2017.   Patient was  seen at [facility] for second opinion regarding her breast cancer.  Case was discussed with Dr vazquez oncologist that has seen patient at [facility].  She agreed with her chemotherapy at this point.  The scan on December 20, 2017 shows relative stability of disease, lesion in the liver as well as bone lesion at about same.  Her tumor marker CA 27-29 as well as CA 15-3 are less than 100 at this point.  Since patient is developing some neuropathy in bilateral upper and lower extremity recommend changing her therapy to Palbociclib with letrozole.  Patient started taking first round of Palbociclib and letrozole on January 30, 2018.  Patient was scheduled to start cycle  6 of chemotherapy today with Palbociclib today on July 16, 2018.   -CT of chest, abdomen and pelvis with contrast done on July 9, 2018 show stable disease which was discussed with patient.  -MRI of brain with and without contrast done on July 9, 2018 shows significant improvement  in calvarial metastasis which was discussed with patient.  -CT of chest, abdomen and pelvis with contrast done on September 19, 2018 showed stable disease without any new lymphadenopathy or any new lesions.  Result of CT scan were discussed with patient and her family.  -CT of chest, abdomen and pelvis with contrast done on December 7, 2018 after cycle 9 of Palbociclib and letrozole showed stable disease involving liver and bones.  Result of CT scan were discussed with patient.  -Palbociclib was held from January 17, 2019 until February 8, 2019 secondary to wisdom tooth extraction.  -Patient resume cycle 11 of palbociclib and letrozole on February 8, 2019.  -CT of chest, abdomen and pelvis with contrast done on July 31, 2019 shows stable metastatic disease involving liver and bones.  There is no evidence of progression.  - We will go ahead with cycle 16 of palbociclib and letrozole today.  -We will ask patient to return to clinic in 5 weeks with repeat CBC, CMP and tumor marker consisting of CA 15-3 and CA 27-29 to be done on that day.    -We will get CT of chest abdomen and pelvis with contrast  After cycle 18 of palbociclib and letrozole    2.  Brain metastasis:MRi Brain with and without done on November 9, 2017 was reviewed and compared with t MRI done 6 weeks prior to that.  Brain calvarial metastasis are stable or somewhat improved.  No need to do radiation at this point which was discussed with patient.    -MRI of brain with and without contrast done on July 9, 2018 shows improvement in calvarial metastasis.  And of repeating more MRI unless patient has any symptoms suggestive of worsening brain involvement.    3.  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  She is currently on Coumadin.    She is being followed by Coumadin clinic    4.  History of melanoma in situ status post excision by Dr. Linn.    5.  Bone metastasis: Patient was started on Zometa on  August 23, 2017.  Continue with Zometa as scheduled for now.  Patient denies any mouth sores or jaw pain.  CT scan shows about loss of 70% of height of L4 vertebral body, patient has been referred to Dr. Pugh to get evaluated for kyphoplasty.  Patient went for second opinion with orthopedic surgeon in [place], as per patient's second surgeon did not recommend any surgery.    -Zometa Has been held since November 2018 due to wisdom tooth extraction requirement.  -Wound after wisdom tooth extraction is  completely healed.   Zometa has been resumed  on April 19, 2019       6.  History of ductal carcinoma in situ of the right breast diagnosed in 2007.  Status post mastectomy followed by adjuvant tamoxifen for 5 years which was finished in 2013.    7.  Neutropenia: Secondary to Palbociclib.resolved. WBC is normal at 4.01 with ANC 1.80 .      8.  Elevated liver function:  -AST and ALT are normal today.  Will monitor with CMP for now.  CT of abdomen and pelvis does not show any evidence of worsening hepatic metastasis.    9.  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  She remains on gabapentin 300 mg 3 times a day.  Prescription for gabapentin has been sent to her pharmacy today on June 28, 2019.    10.  Health maintenance: Patient does not smoke.  Not a candidate for screening colonoscopy at this point.      11.  Prescriptions: Patient has enough prescription of Decadron, Zofran,  and Ultram.  Prescription for Neurontin 300 mg 3 times a day has been sent to her pharmacy  on June 28, 2019.    12.  Advance care planning: Patient remains full code for now and is able to make on her decisions.  Patient has healthcare surrogate Mentioned on Chart.    13. Obesity: Patient's Body mass index is 35.92 kg/m². BMI is higher than reference range.  Patient was notified about elevated BMI.  Patient was counseled about diet and exercise for weight loss.    14.  Pain  assessment:  -Patient denies any pain today.        Ovidio Meadows MD  8/2/2019  9:54 AM        EMR Dragon/Transcription disclaimer:   Much of this encounter note is an electronic transcription/translation of spoken language to printed text. The electronic translation of spoken language may permit erroneous, or at times, nonsensical words or phrases to be inadvertently transcribed; Although I have reviewed the note for such errors, some may still exist.

## 2019-08-02 NOTE — PROGRESS NOTES
Today's INR is 1.6. PT missed 3 days of Coumadin due to pharmacy error. Denies any med changes or bleeding issues. Adjusted pt's dose for today only and instructed to hold green vegs for 2 days. Will recheck INR in 5 weeks when returning for [facility]. Patient instructed regarding medication; results given and questions answered. Nutritional counseling given.  Dietary factors affecting therapy addressed.  Patient instructed to monitor for excessive bruising or bleeding. PT verbalizes understanding.       This document has been electronically signed by EDIS Bond-BC @  On August 2, 2019 9:00 AM

## 2019-08-03 LAB — CANCER AG27-29 SERPL-ACNC: 27.4 U/ML (ref 0–38.6)

## 2019-08-13 RX ORDER — PALBOCICLIB 125 MG/1
CAPSULE ORAL
Qty: 21 CAPSULE | Refills: 1 | Status: SHIPPED | OUTPATIENT
Start: 2019-08-13 | End: 2019-10-07 | Stop reason: SDUPTHER

## 2019-09-06 ENCOUNTER — INFUSION (OUTPATIENT)
Dept: ONCOLOGY | Facility: HOSPITAL | Age: 49
End: 2019-09-06

## 2019-09-06 ENCOUNTER — OFFICE VISIT (OUTPATIENT)
Dept: ONCOLOGY | Facility: CLINIC | Age: 49
End: 2019-09-06

## 2019-09-06 ENCOUNTER — ANTICOAGULATION VISIT (OUTPATIENT)
Dept: CARDIAC SURGERY | Facility: CLINIC | Age: 49
End: 2019-09-06

## 2019-09-06 VITALS
DIASTOLIC BLOOD PRESSURE: 70 MMHG | TEMPERATURE: 97.9 F | WEIGHT: 227.2 LBS | RESPIRATION RATE: 18 BRPM | OXYGEN SATURATION: 97 % | BODY MASS INDEX: 35.66 KG/M2 | HEIGHT: 67 IN | SYSTOLIC BLOOD PRESSURE: 113 MMHG | HEART RATE: 68 BPM

## 2019-09-06 VITALS — OXYGEN SATURATION: 97 % | SYSTOLIC BLOOD PRESSURE: 117 MMHG | DIASTOLIC BLOOD PRESSURE: 64 MMHG | HEART RATE: 79 BPM

## 2019-09-06 DIAGNOSIS — Z45.2 ENCOUNTER FOR VENOUS ACCESS DEVICE CARE: ICD-10-CM

## 2019-09-06 DIAGNOSIS — C79.51 BONE METASTASIS: Primary | ICD-10-CM

## 2019-09-06 DIAGNOSIS — Z79.01 LONG TERM CURRENT USE OF ANTICOAGULANT THERAPY: ICD-10-CM

## 2019-09-06 DIAGNOSIS — C50.911 MALIGNANT NEOPLASM OF RIGHT BREAST IN FEMALE, ESTROGEN RECEPTOR POSITIVE, UNSPECIFIED SITE OF BREAST (HCC): Primary | ICD-10-CM

## 2019-09-06 DIAGNOSIS — Z17.0 MALIGNANT NEOPLASM OF RIGHT BREAST IN FEMALE, ESTROGEN RECEPTOR POSITIVE, UNSPECIFIED SITE OF BREAST (HCC): Primary | ICD-10-CM

## 2019-09-06 LAB
ALBUMIN SERPL-MCNC: 4.1 G/DL (ref 3.5–5.2)
ALBUMIN/GLOB SERPL: 1.4 G/DL
ALP SERPL-CCNC: 76 U/L (ref 39–117)
ALT SERPL W P-5'-P-CCNC: 39 U/L (ref 1–33)
ANION GAP SERPL CALCULATED.3IONS-SCNC: 13 MMOL/L (ref 5–15)
AST SERPL-CCNC: 29 U/L (ref 1–32)
BASOPHILS # BLD AUTO: 0.07 10*3/MM3 (ref 0–0.2)
BASOPHILS NFR BLD AUTO: 1.9 % (ref 0–1.5)
BILIRUB SERPL-MCNC: 0.2 MG/DL (ref 0.2–1.2)
BUN BLD-MCNC: 14 MG/DL (ref 6–20)
BUN/CREAT SERPL: 17.7 (ref 7–25)
CALCIUM SPEC-SCNC: 9.7 MG/DL (ref 8.6–10.5)
CANCER AG15-3 SERPL-ACNC: 25.9 U/ML
CHLORIDE SERPL-SCNC: 104 MMOL/L (ref 98–107)
CO2 SERPL-SCNC: 26 MMOL/L (ref 22–29)
CREAT BLD-MCNC: 0.79 MG/DL (ref 0.57–1)
DEPRECATED RDW RBC AUTO: 54.8 FL (ref 37–54)
EOSINOPHIL # BLD AUTO: 0.06 10*3/MM3 (ref 0–0.4)
EOSINOPHIL NFR BLD AUTO: 1.7 % (ref 0.3–6.2)
ERYTHROCYTE [DISTWIDTH] IN BLOOD BY AUTOMATED COUNT: 15.3 % (ref 12.3–15.4)
GFR SERPL CREATININE-BSD FRML MDRD: 77 ML/MIN/1.73
GLOBULIN UR ELPH-MCNC: 2.9 GM/DL
GLUCOSE BLD-MCNC: 118 MG/DL (ref 65–99)
HCT VFR BLD AUTO: 34.1 % (ref 34–46.6)
HGB BLD-MCNC: 12.1 G/DL (ref 12–15.9)
IMM GRANULOCYTES # BLD AUTO: 0.01 10*3/MM3 (ref 0–0.05)
IMM GRANULOCYTES NFR BLD AUTO: 0.3 % (ref 0–0.5)
INR PPP: 4.2 (ref 0.9–1.1)
LYMPHOCYTES # BLD AUTO: 1.13 10*3/MM3 (ref 0.7–3.1)
LYMPHOCYTES NFR BLD AUTO: 31.1 % (ref 19.6–45.3)
MAGNESIUM SERPL-MCNC: 1.7 MG/DL (ref 1.6–2.6)
MCH RBC QN AUTO: 34.6 PG (ref 26.6–33)
MCHC RBC AUTO-ENTMCNC: 35.5 G/DL (ref 31.5–35.7)
MCV RBC AUTO: 97.4 FL (ref 79–97)
MONOCYTES # BLD AUTO: 0.46 10*3/MM3 (ref 0.1–0.9)
MONOCYTES NFR BLD AUTO: 12.7 % (ref 5–12)
NEUTROPHILS # BLD AUTO: 1.9 10*3/MM3 (ref 1.7–7)
NEUTROPHILS NFR BLD AUTO: 52.3 % (ref 42.7–76)
NRBC BLD AUTO-RTO: 0 /100 WBC (ref 0–0.2)
PHOSPHATE SERPL-MCNC: 3.7 MG/DL (ref 2.5–4.5)
PLATELET # BLD AUTO: 202 10*3/MM3 (ref 140–450)
PMV BLD AUTO: 9.6 FL (ref 6–12)
POTASSIUM BLD-SCNC: 3.8 MMOL/L (ref 3.5–5.2)
PROT SERPL-MCNC: 7 G/DL (ref 6–8.5)
RBC # BLD AUTO: 3.5 10*6/MM3 (ref 3.77–5.28)
SODIUM BLD-SCNC: 143 MMOL/L (ref 136–145)
WBC NRBC COR # BLD: 3.63 10*3/MM3 (ref 3.4–10.8)

## 2019-09-06 PROCEDURE — 83735 ASSAY OF MAGNESIUM: CPT

## 2019-09-06 PROCEDURE — 86300 IMMUNOASSAY TUMOR CA 15-3: CPT

## 2019-09-06 PROCEDURE — 84100 ASSAY OF PHOSPHORUS: CPT

## 2019-09-06 PROCEDURE — 99214 OFFICE O/P EST MOD 30 MIN: CPT | Performed by: NURSE PRACTITIONER

## 2019-09-06 PROCEDURE — 25010000002 ZOLEDRONIC ACID PER 1 MG: Performed by: NURSE PRACTITIONER

## 2019-09-06 PROCEDURE — 96374 THER/PROPH/DIAG INJ IV PUSH: CPT | Performed by: NURSE PRACTITIONER

## 2019-09-06 PROCEDURE — 85610 PROTHROMBIN TIME: CPT | Performed by: NURSE PRACTITIONER

## 2019-09-06 PROCEDURE — 99211 OFF/OP EST MAY X REQ PHY/QHP: CPT | Performed by: NURSE PRACTITIONER

## 2019-09-06 PROCEDURE — 80053 COMPREHEN METABOLIC PANEL: CPT

## 2019-09-06 PROCEDURE — 85025 COMPLETE CBC W/AUTO DIFF WBC: CPT

## 2019-09-06 RX ORDER — SODIUM CHLORIDE 0.9 % (FLUSH) 0.9 %
10 SYRINGE (ML) INJECTION AS NEEDED
Status: DISCONTINUED | OUTPATIENT
Start: 2019-09-06 | End: 2019-09-06 | Stop reason: HOSPADM

## 2019-09-06 RX ORDER — GABAPENTIN 300 MG/1
300 CAPSULE ORAL 3 TIMES DAILY
Qty: 90 CAPSULE | Refills: 0 | Status: SHIPPED | OUTPATIENT
Start: 2019-09-06 | End: 2019-12-09 | Stop reason: SDUPTHER

## 2019-09-06 RX ORDER — SODIUM CHLORIDE 0.9 % (FLUSH) 0.9 %
10 SYRINGE (ML) INJECTION AS NEEDED
Status: CANCELLED | OUTPATIENT
Start: 2019-09-06

## 2019-09-06 RX ORDER — SODIUM CHLORIDE 9 MG/ML
250 INJECTION, SOLUTION INTRAVENOUS ONCE
Status: CANCELLED | OUTPATIENT
Start: 2019-09-06

## 2019-09-06 RX ORDER — SODIUM CHLORIDE 0.9 % (FLUSH) 0.9 %
10 SYRINGE (ML) INJECTION AS NEEDED
Status: CANCELLED | OUTPATIENT
Start: 2019-10-11

## 2019-09-06 RX ORDER — SODIUM CHLORIDE 9 MG/ML
250 INJECTION, SOLUTION INTRAVENOUS ONCE
Status: COMPLETED | OUTPATIENT
Start: 2019-09-06 | End: 2019-09-06

## 2019-09-06 RX ADMIN — SODIUM CHLORIDE, PRESERVATIVE FREE 10 ML: 5 INJECTION INTRAVENOUS at 11:05

## 2019-09-06 RX ADMIN — SODIUM CHLORIDE 250 ML: 9 INJECTION, SOLUTION INTRAVENOUS at 10:40

## 2019-09-06 RX ADMIN — ZOLEDRONIC ACID 4 MG: 4 INJECTION, SOLUTION, CONCENTRATE INTRAVENOUS at 10:40

## 2019-09-06 RX ADMIN — SODIUM CHLORIDE, PRESERVATIVE FREE 10 ML: 5 INJECTION INTRAVENOUS at 08:55

## 2019-09-06 RX ADMIN — Medication 500 UNITS: at 11:05

## 2019-09-06 NOTE — PROGRESS NOTES
Today's INR is 4.2.  Pt denies med changes, bleeding problems, diarrhea, or alcohol, cranberry, grapefruit, or carlton intake. Unable to determine cause of elevation. Dose adjusted and pt instructed to have a serving of green veggies today; pt verbalized. Patient instructed regarding medication; results given and questions answered. Nutritional counseling given.  Dietary factors affecting therapy addressed.  Patient instructed to monitor for excessive bruising or bleeding. Will recheck next week.       This document has been electronically signed by EDIS Bond-BC Terry  On September 6, 2019 8:39 AM

## 2019-09-07 LAB — CANCER AG27-29 SERPL-ACNC: 23.3 U/ML (ref 0–38.6)

## 2019-09-09 NOTE — PROGRESS NOTES
DATE OF VISIT: 9/6/2019    REASON FOR VISIT:  Metastatic breast cancer , Neutropenia, Bone metastasis, Liver metastasis ,Elelvated LFT, neuropathy        HISTORY OF PRESENT ILLNESS:    49-year-old female with a past medical history significant for history of ductal carcinoma in situ of the right breast diagnosed in December 2007 patient eventually had a mastectomy done in February 2008 and took adjuvant tamoxifen for 5 years until 2013.  Started on palliative chemotherapy with Taxotere and Cytoxan on July 26, 2017.  Patient received 7 cycle of Taxotere and Cytoxan on December 6, 2017.  After that patient was changed over to treatment with Palbociclib and letrozole on January 30, 2018.  Palbociclib was held from January 17, 2019 until February 8, 2019 secondary to wisdom tooth extraction.  Patient is due to start  cycle 16 of palbociclib and letrozole today on August 2, 2019.  She had a restaging CT of chest, abdomen and pelvis with contrast done on July 31, 2019 that showed stable disease; planning to repeat scans after cycle #18. She is due for Zometa today. Denies any nausea or vomiting or diarrhea with current treatment.  Complains of tingling and numbness affecting bilateral hand for which she is taking Neurontin for; she is needing refill today.Denies any bowel or bladder incontinence.      PAST MEDICAL HISTORY:    Past Medical History:   Diagnosis Date   • Anxiety    • Depression    • Encounter for gynecological examination    • Malignant neoplasm of female breast (CMS/HCC)     hx of dcis s/p mastectomy and reconstruction and augmentation      • Primary malignant neoplasm of breast (CMS/HCC)     dcis in rt breast s/p mastectomy,reconstruction      • Ventricular premature beats        SOCIAL HISTORY:    Social History     Tobacco Use   • Smoking status: Never Smoker   • Smokeless tobacco: Never Used   Substance Use Topics   • Alcohol use: No   • Drug use: No       Surgical History :  Past Surgical History:    Procedure Laterality Date   • AUGMENTATION MAMMAPLASTY     • AXILLARY LYMPH NODE BIOPSY/EXCISION Right 7/10/2017    Procedure: BIOSPY RIGHT AXILLARY MASS AND OR LYMPH;  Surgeon: Sourav Ernst MD;  Location: [facility];  Service:    • BREAST BIOPSY  12/07/2007    Mammotome biopsy of the right side with clip placement   • BREAST LUMPECTOMY     • BREAST RECONSTRUCTION  10/28/2008    Abscence of right breast secondary to mastectomy. Implant exchange for reconstruction of right breast with open para-prosthetic capsulotomy   • BREAST SURGERY  10/01/2009    Left breast ptosis and breast asymmetry secondary to right breast reconstruction for breast cancer. Left breast mastopexy with augmentation.   • CARDIAC CATHETERIZATION  09/18/2008    Normal coronary arteriography. Normal left ventricular angiogram   • EP STUDY     • MASTECTOMY Right    • MASTECTOMY  02/05/2008    Multifocal right breast ductal carcinoma-in-situ. Right total mastectomy   • MASTECTOMY, PARTIAL  01/03/2008    Ductal carcinoma in-situ of the right breast, proven by mammotome biopsy. Right lumpectomy, medial portion of the breast, between 2 o'clock and 4 o'clock, 5 cm from the nipple, with clip placement, radiographic inter. of severo. specimen, & ultrasound   • PAP SMEAR  03/03/2009    Negative   • OH INSJ TUNNELED CVC W/O SUBQ PORT/ AGE 3 YR/> Right 7/10/2017    Procedure: MEDIPORT     (c-arm#2);  Surgeon: Sourav Ernst MD;  Location: [facility];  Service: General       ALLERGIES:    Allergies   Allergen Reactions   • Percocet [Oxycodone-Acetaminophen] Nausea And Vomiting       REVIEW OF SYSTEMS:      CONSTITUTIONAL:  No fever, chills, or night sweats.     HEENT:  No epistaxis, mouth sores, or difficulty swallowing.    RESPIRATORY:  No new shortness of breath or cough at present.    CARDIOVASCULAR:  No chest pain or palpitations.    GASTROINTESTINAL:  No abdominal pain, nausea, vomiting, or blood in the stool.    GENITOURINARY:  No dysuria or  "hematuria.    MUSCULOSKELETAL:  No any new back pain or arthralgias.     NEUROLOGICAL: Complains of intermittent tingling and numbness affecting bilateral hand. Complains of tingling and numbness from back radiating down to bilateral lower extremity.  No dizziness. No seizures or balance problems.     LYMPHATICS:  Denies any abnormal swollen and anywhere in the body.    SKIN:  Denies any new skin rash.    PHYSICAL EXAMINATION:      VITAL SIGNS:  /70   Pulse 68   Temp 97.9 °F (36.6 °C) (Temporal)   Resp 18   Ht 170.2 cm (67.01\")   Wt 103 kg (227 lb 3.2 oz)   SpO2 97%   BMI 35.58 kg/m²     GENERAL:  Not in any distress.    HEENT:  Normocephalic, Atraumatic.Mild Conjunctival pallor. No icterus. Extraocular Movements Intact. No Facial Asymmetry noted.    NECK:  No adenopathy. No JVD.    RESPIRATORY:  Fair air entry bilateral. No rhonchi or wheezing.    CARDIOVASCULAR:  S1, S2. Regular rate and rhythm. No murmur or gallop appreciated.    ABDOMEN:  Soft, obese, nontender. Bowel sounds present in all four quadrants.  No organomegaly appreciated.    EXTREMITIES:  No edema.No Calf Tenderness.    NEUROLOGIC:  Alert, awake and oriented ×3.  No  Motor or sensory deficit appreciated. Cranial Nerves 2-12 grossly intact.    SKIN : No new skin lesion identified  DIAGNOSTIC DATA:    Glucose   Date Value Ref Range Status   09/06/2019 118 (H) 65 - 99 mg/dL Final     Sodium   Date Value Ref Range Status   09/06/2019 143 136 - 145 mmol/L Final     Potassium   Date Value Ref Range Status   09/06/2019 3.8 3.5 - 5.2 mmol/L Final     CO2   Date Value Ref Range Status   09/06/2019 26.0 22.0 - 29.0 mmol/L Final     Chloride   Date Value Ref Range Status   09/06/2019 104 98 - 107 mmol/L Final     Anion Gap   Date Value Ref Range Status   09/06/2019 13.0 5.0 - 15.0 mmol/L Final     Creatinine   Date Value Ref Range Status   09/06/2019 0.79 0.57 - 1.00 mg/dL Final     BUN   Date Value Ref Range Status   09/06/2019 14 6 - 20 mg/dL " Final     BUN/Creatinine Ratio   Date Value Ref Range Status   09/06/2019 17.7 7.0 - 25.0 Final     Calcium   Date Value Ref Range Status   09/06/2019 9.7 8.6 - 10.5 mg/dL Final     eGFR Non  Amer   Date Value Ref Range Status   09/06/2019 77 >60 mL/min/1.73 Final     Alkaline Phosphatase   Date Value Ref Range Status   09/06/2019 76 39 - 117 U/L Final     Total Protein   Date Value Ref Range Status   09/06/2019 7.0 6.0 - 8.5 g/dL Final     ALT (SGPT)   Date Value Ref Range Status   09/06/2019 39 (H) 1 - 33 U/L Final     AST (SGOT)   Date Value Ref Range Status   09/06/2019 29 1 - 32 U/L Final     Total Bilirubin   Date Value Ref Range Status   09/06/2019 0.2 0.2 - 1.2 mg/dL Final     Albumin   Date Value Ref Range Status   09/06/2019 4.10 3.50 - 5.20 g/dL Final     Globulin   Date Value Ref Range Status   09/06/2019 2.9 gm/dL Final     Lab Results   Component Value Date    WBC 3.63 09/06/2019    HGB 12.1 09/06/2019    HCT 34.1 09/06/2019    MCV 97.4 (H) 09/06/2019     09/06/2019     Lab Results   Component Value Date    NEUTROABS 1.90 09/06/2019     Lab Results   Component Value Date     25.9 (H) 09/06/2019    LABCA2 23.3 09/06/2019   ]  D Echo Done on July 19, 2017 showed:  Interpretation Summary   · The left ventricular cavity is borderline dilated.  · Left ventricular systolic function is normal. Estimated EF = 53%.  · Left ventricular diastolic dysfunction (grade I) consistent with impaired relaxation.  · IAS aneurysm noted. No PFO or ASD            Component CA 27.29   Latest Ref Rng & Units 0.0 - 38.6 U/mL   6/29/2017 1817.1 (H)   8/23/2017 1075.9 (H)   10/4/2017 288.9 (H)   10/25/2017 181.2 (H)   11/15/2017 112.2 (H)   12/6/2017 101.6 (H)   12/27/2017 90.3 (H)   2/28/2018 52.1 (H)   4/2/2018 12.4   5/7/2018 5/14/2018 38.1   6/11/2018 39.7 (H)   7/9/2018 37.9   8/27/2018 34.1   9/24/2018 38.1   11/5/2018 31.2   12/13/2018 30.7   3/15/2019 31.2   4/19/2019 23.1   5/24/2019 35.0    6/28/2019 24.5               Component CA 15-3   Latest Ref Rng & Units <=25.0 U/mL   6/29/2017 2107.0 (H)   8/23/2017 943.0 (H)   10/4/2017 282.2 (H)   10/25/2017 162.2 (H)   11/15/2017 118.0 (H)   12/6/2017 99.6 (H)   12/27/2017 88.3 (H)   2/28/2018 47.2 (H)   4/2/2018 11.9   5/7/2018 5/14/2018 41.1 (H)   6/11/2018 37.3 (H)   7/9/2018 35.3 (H)   8/27/2018 36.7 (H)   9/24/2018 31.1 (H)   11/5/2018 32.9 (H)   12/13/2018 32.6 (H)   3/15/2019 32.6 (H)   4/19/2019 29.2 (H)   5/24/2019 28.3 (H)   6/28/2019 26.9 (H)                  Radiology Data :  CT of chest, abdomen and pelvis with contrast done on July 31, 2019 was reviewed, discussed with patient, it showed:  - - - CT CHEST - - -  Right-sided Mediport terminates in the superior vena cava.     PULMONARY PARENCHYMA:      - air spaces: Negative    - interstitium: Grossly within normal limits for age    - misc: No pulmonary nodules or mass     MEDIASTINUM / LATANYA:      - heart: Normal size, no pericardial fluid    - aorta/great vessels: Normal caliber and configuration for age    - misc: No mediastinal mass / significant adenopathy     PLEURAL COMPARTMENT:      - misc: No pleural fluid or mass        MISC:      - inferior neck: Negative    - osseous / body wall: Postoperative changes of both breasts  are present.      - - - CT ABDOMEN - - -      ABDOMEN:   - LIVER:  Normal size / contour, no ductal dilatation, no focal  lesion   - GB:  Grossly negative    - CBD:  Grossly negative   - SPLEEN:  The spleen is enlarged at 14 cm in greatest  dimension. A low-attenuation lesion in the left lobe of the liver  anteriorly containing numerous calcifications is present,  measuring 5.2 x 5.1 cm in greatest axial dimension, unchanged,  and consistent with treated metastasis. No new hepatic lesions  are identified.   - PANCREAS:  Normal in size, contour, no focal mass    - VISCERA:  Normal caliber, no wall thickening   - MESENTERY:  No mesenteric mass   - CAVITY:  No free  abdominal fluid, no free intraperitoneal air   - BODY WALL:  Within normal limits   - OSSEOUS:  Innumerable primarily sclerotic osseous metastases  are seen throughout the visualized portion of the skeleton.  Severe compression fracture of L4 is unchanged from the previous  study, including spinal stenosis at this level due to  retropulsion of fracture fragments.      RETROPERITONEUM:   - KIDNEYS:Normal size / contour, no collecting system dilation                    No evidence of an enhancing mass   - URETERS:  Normal course, caliber   - ADRENALS:  Normal size, contour   - MISC:  No sig. retroperitoneal adenopathy or mass   - VASCULAR:  Aorta/iliacs: within normal limits for age     - - - CT PELVIS - - -       - VISCERA:  Normal caliber small/large bowel, no focal   thickening/mass    - APPENDIX:  Within normal limits   - MESENTERY:  No mass   - VASCULAR:  Within normal limits for age   - CAVITY:  No free fluid/air   - BLADDER:  Unremarkable   - OSSEOUS:  Within normal limits    - MISC:   - UTERUS/OVARY: grossly unremarkable     IMPRESSION:  1. Stable single hepatic metastasis.  2. Extensive mostly sclerotic osseous metastases, stable, with  unchanged appearance of pathologic fracture of L4.  3. No new, worrisome lesions identified.              CT of chest, abdomen and pelvis with contrast done on April 17, 2019 was reviewed, discussed with patient, it showed:  IMPRESSION:  CONCLUSION:      1. Stable examination. Presumed treated hepatic metastases in the  left lobe, unchanged. Multiple predominantly osteosclerotic bone  lesions, unchanged.                 MRI of brain with and without contrast done on July 9, 2018 was reviewed, discussed with patient, it showed:  IMPRESSION:  No evidence of intracranial hemorrhage, mass/mass effect, acute  infarct, or pathologic contrast enhancement.     Previous described/known enhancing calvarial metastatic lesions  are significantly less conspicuous on today's examination.  The  finding would support favorable treatment response.     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              Doppler ultrasound of right upper extremity done on November 10, 2017 showed:  IMPRESSION:  CONCLUSION:   Abnormal exam with thrombus visualized in the right internal  jugular vein, consistent with DVT.        PET/CT done on July 3, 2017 showed:  IMPRESSION:  CONCLUSION:  1. Extensive metastatic disease. Pathologic uptake within  enlarged right-sided axillary lymph nodes. Metastatic adenopathy  in both laurie and mediastinum. Tumor replacing most of the left  lobe of liver. Intra-abdominal retroperitoneal metastases,  adenopathy.     Extensive skeletal metastases including a pathologic fracture at  L4.           Nuclear bone scan done on June 20, 2017 showed:  1. Increased uptake at L4 suggestive of metastatic process.  2. 3 or 4 areas of increased activity in bony calvarium  3. Some increased activity in tips posteriorly and anteriorly suggestive of metastatic carcinoma to the skeletal system.           Pathology :     Pathology data from July 10, 2017 showed:  Final Diagnosis   Mass right axilla, excisional biopsy:      Metastatic poorly differentiated ductal carcinoma, breast primary      Estrogen receptor positive, 95% stainability, strong intensity      Progesterone receptor positive, 95% stainability, strong intensity      HER-2 2+(equivocal), sent to [facility] for FISH      Addendum   Her 2 FISH result from [facility] is NEGATIVE            Pathology report from December 7, 2007 showed:  FINAL DIAGNOSIS:   RIGHT BREAST MAMMOTOME BIOPSY:        DUCTAL CARCINOMA IN SITU, INTERMEDIATE NUCLEAR GRADE              WITH MICROCALCIFICATION.      ADDENDUM:   Estrogen receptors are positive (90-95% stainability).   Progesterone receptors are positive (90-95% stainability).            ASSESSMENT AND PLAN:       1.  Metastatic cancer with extensive adenopathy in right axilla,  mediastinum, hilar, abdominal, retroperitoneal with extensive liver and bone metastasis on PET/CT.  Patient underwent right apatient underwent right axillary lymph node excision by Dr. Ernst on July 10, 2017.Pathology report confirmed ductal carcinoma of breast with estrogen and progesterone positivity.  At her on palliative chemotherapy with Taxotere and cytoxan on July 26, 2017.   Patient was  seen at [facility] for second opinion regarding her breast cancer.  Case was discussed with Dr vazquez oncologist that has seen patient at [facility].  She agreed with her chemotherapy at this point.  The scan on December 20, 2017 shows relative stability of disease, lesion in the liver as well as bone lesion at about same.  Her tumor marker CA 27-29 as well as CA 15-3 are less than 100 at this point.  Since patient is developing some neuropathy in bilateral upper and lower extremity recommend changing her therapy to Palbociclib with letrozole.  Patient started taking first round of Palbociclib and letrozole on January 30, 2018.  Patient was scheduled to start cycle  6 of chemotherapy today with Palbociclib today on July 16, 2018.   -CT of chest, abdomen and pelvis with contrast done on July 9, 2018 show stable disease which was discussed with patient.  -MRI of brain with and without contrast done on July 9, 2018 shows significant improvement in calvarial metastasis which was discussed with patient.  -CT of chest, abdomen and pelvis with contrast done on September 19, 2018 showed stable disease without any new lymphadenopathy or any new lesions.  Result of CT scan were discussed with patient and her family.  -CT of chest, abdomen and pelvis with contrast done on December 7, 2018 after cycle 9 of Palbociclib and letrozole showed stable disease involving liver and bones.  Result of CT scan were discussed with patient.  -Palbociclib was held from January 17, 2019 until February 8, 2019 secondary to wisdom tooth  extraction.  -Patient resume cycle 11 of palbociclib and letrozole on February 8, 2019.  -CT of chest, abdomen and pelvis with contrast done on July 31, 2019 shows stable metastatic disease involving liver and bones.  There is no evidence of progression.  - We will go ahead with cycle 17 of palbociclib and letrozole today.  -We will ask patient to return to clinic in 5 weeks with repeat CBC, CMP and tumor marker consisting of CA 15-3 and CA 27-29 to be done on that day.    -We will get CT of chest abdomen and pelvis with contrast  After cycle 18 of palbociclib and letrozole     2.  Brain metastasis:MRi Brain with and without done on November 9, 2017 was reviewed and compared with t MRI done 6 weeks prior to that.  Brain calvarial metastasis are stable or somewhat improved.  No need to do radiation at this point which was discussed with patient.    -MRI of brain with and without contrast done on July 9, 2018 shows improvement in calvarial metastasis.  And of repeating more MRI unless patient has any symptoms suggestive of worsening brain involvement.     3.  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  She is currently on Coumadin.    She is being followed by Coumadin clinic     4.  History of melanoma in situ status post excision by Dr. Linn.     5.  Bone metastasis: Patient was started on Zometa on August 23, 2017.  Continue with Zometa as scheduled for now.  Patient denies any mouth sores or jaw pain.  CT scan shows about loss of 70% of height of L4 vertebral body, patient has been referred to Dr. Pugh to get evaluated for kyphoplasty.  Patient went for second opinion with orthopedic surgeon in [place], as per patient's second surgeon did not recommend any surgery.    -Zometa Has been held since November 2018 due to wisdom tooth extraction requirement.  -Wound after wisdom tooth extraction is  completely healed.   Zometa was resumed on April 2019.      6.   History of ductal carcinoma in situ of the right breast diagnosed in 2007.  Status post mastectomy followed by adjuvant tamoxifen for 5 years which was finished in 2013.     7.  Neutropenia: Secondary to Palbociclib.resolved. WBC is normal at 3.63 with ANC 1900.       8.  Elevated liver function:  -AST and ALT are normal today.  Will monitor with CMP for now.  CT of abdomen and pelvis does not show any evidence of worsening hepatic metastasis.     9.  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  She remains on gabapentin 300 mg 3 times a day.  Prescription for gabapentin has been sent to her pharmacy today on September 6, 2019        This document has been signed by EVELYN Xiong on September 9, 2019 1:26 PM

## 2019-09-13 ENCOUNTER — ANTICOAGULATION VISIT (OUTPATIENT)
Dept: CARDIAC SURGERY | Facility: CLINIC | Age: 49
End: 2019-09-13

## 2019-09-13 VITALS — DIASTOLIC BLOOD PRESSURE: 72 MMHG | SYSTOLIC BLOOD PRESSURE: 118 MMHG

## 2019-09-13 DIAGNOSIS — Z79.01 LONG TERM CURRENT USE OF ANTICOAGULANT THERAPY: ICD-10-CM

## 2019-09-13 LAB — INR PPP: 2.9 (ref 0.9–1.1)

## 2019-09-13 PROCEDURE — 85610 PROTHROMBIN TIME: CPT | Performed by: NURSE PRACTITIONER

## 2019-09-13 NOTE — PROGRESS NOTES
Today's INR is 2.9. Denies any med changes, bleeding issues or s/s of blood clot. PT left on lesser Coumadin dose and will be seen in 4 weeks when she can return due to work schedule. Patient instructed regarding medication; results given and questions answered. Nutritional counseling given.  Dietary factors affecting therapy addressed.  Patient instructed to monitor for excessive bruising or bleeding. PT verbalizes understanding.         This document has been electronically signed by Patt Alvarez, LYLA @ on September 13, 2019 8:30 AM

## 2019-09-30 ENCOUNTER — TELEPHONE (OUTPATIENT)
Dept: ONCOLOGY | Facility: HOSPITAL | Age: 49
End: 2019-09-30

## 2019-09-30 RX ORDER — LETROZOLE 2.5 MG/1
2.5 TABLET, FILM COATED ORAL DAILY
Qty: 30 TABLET | Refills: 3 | Status: SHIPPED | OUTPATIENT
Start: 2019-09-30 | End: 2020-01-25 | Stop reason: SDUPTHER

## 2019-09-30 NOTE — TELEPHONE ENCOUNTER
----- Message from Ovidio Meadows MD sent at 9/30/2019  4:15 PM CDT -----  Regarding: RE: refill  Prescription sent.  Thank you  ----- Message -----  From: CONSUELO Montemayor RN  Sent: 9/30/2019   9:20 AM  To: Ovidio Meadows MD  Subject: refill                                           Refill request.      ----- Message -----  From: Janine Greenwood  Sent: 9/30/2019   8:33 AM  To:  [facility]    Needs refill letrozole send to [facility]

## 2019-10-07 RX ORDER — PALBOCICLIB 125 MG/1
CAPSULE ORAL
Qty: 21 CAPSULE | Refills: 2 | Status: SHIPPED | OUTPATIENT
Start: 2019-10-07 | End: 2020-08-19 | Stop reason: SDUPTHER

## 2019-10-10 ENCOUNTER — TELEPHONE (OUTPATIENT)
Dept: ONCOLOGY | Facility: HOSPITAL | Age: 49
End: 2019-10-10

## 2019-10-11 ENCOUNTER — APPOINTMENT (OUTPATIENT)
Dept: ONCOLOGY | Facility: HOSPITAL | Age: 49
End: 2019-10-11

## 2019-10-11 ENCOUNTER — APPOINTMENT (OUTPATIENT)
Dept: ONCOLOGY | Facility: CLINIC | Age: 49
End: 2019-10-11

## 2019-10-11 ENCOUNTER — INFUSION (OUTPATIENT)
Dept: ONCOLOGY | Facility: HOSPITAL | Age: 49
End: 2019-10-11

## 2019-10-11 ENCOUNTER — ANTICOAGULATION VISIT (OUTPATIENT)
Dept: CARDIAC SURGERY | Facility: CLINIC | Age: 49
End: 2019-10-11

## 2019-10-11 VITALS — OXYGEN SATURATION: 98 % | DIASTOLIC BLOOD PRESSURE: 72 MMHG | SYSTOLIC BLOOD PRESSURE: 114 MMHG | HEART RATE: 80 BPM

## 2019-10-11 DIAGNOSIS — Z45.2 ENCOUNTER FOR VENOUS ACCESS DEVICE CARE: ICD-10-CM

## 2019-10-11 DIAGNOSIS — Z79.01 LONG TERM CURRENT USE OF ANTICOAGULANT THERAPY: ICD-10-CM

## 2019-10-11 DIAGNOSIS — C50.911 MALIGNANT NEOPLASM OF RIGHT BREAST IN FEMALE, ESTROGEN RECEPTOR POSITIVE, UNSPECIFIED SITE OF BREAST (HCC): Primary | ICD-10-CM

## 2019-10-11 DIAGNOSIS — Z17.0 MALIGNANT NEOPLASM OF RIGHT BREAST IN FEMALE, ESTROGEN RECEPTOR POSITIVE, UNSPECIFIED SITE OF BREAST (HCC): Primary | ICD-10-CM

## 2019-10-11 LAB
ALBUMIN SERPL-MCNC: 4.2 G/DL (ref 3.5–5.2)
ALBUMIN/GLOB SERPL: 1.6 G/DL
ALP SERPL-CCNC: 78 U/L (ref 39–117)
ALT SERPL W P-5'-P-CCNC: 31 U/L (ref 1–33)
ANION GAP SERPL CALCULATED.3IONS-SCNC: 8 MMOL/L (ref 5–15)
ANISOCYTOSIS BLD QL: ABNORMAL
AST SERPL-CCNC: 29 U/L (ref 1–32)
BILIRUB SERPL-MCNC: 0.3 MG/DL (ref 0.2–1.2)
BUN BLD-MCNC: 8 MG/DL (ref 6–20)
BUN/CREAT SERPL: 10.7 (ref 7–25)
CALCIUM SPEC-SCNC: 9.5 MG/DL (ref 8.6–10.5)
CANCER AG15-3 SERPL-ACNC: 29.6 U/ML
CHLORIDE SERPL-SCNC: 105 MMOL/L (ref 98–107)
CO2 SERPL-SCNC: 27 MMOL/L (ref 22–29)
CREAT BLD-MCNC: 0.75 MG/DL (ref 0.57–1)
DEPRECATED RDW RBC AUTO: 54.6 FL (ref 37–54)
ERYTHROCYTE [DISTWIDTH] IN BLOOD BY AUTOMATED COUNT: 15 % (ref 12.3–15.4)
GFR SERPL CREATININE-BSD FRML MDRD: 81 ML/MIN/1.73
GLOBULIN UR ELPH-MCNC: 2.6 GM/DL
GLUCOSE BLD-MCNC: 128 MG/DL (ref 65–99)
HCT VFR BLD AUTO: 34.8 % (ref 34–46.6)
HGB BLD-MCNC: 12.1 G/DL (ref 12–15.9)
INR PPP: 4.2 (ref 0.9–1.1)
LYMPHOCYTES # BLD MANUAL: 1.32 10*3/MM3 (ref 0.7–3.1)
LYMPHOCYTES NFR BLD MANUAL: 32 % (ref 19.6–45.3)
LYMPHOCYTES NFR BLD MANUAL: 9 % (ref 5–12)
MAGNESIUM SERPL-MCNC: 1.7 MG/DL (ref 1.6–2.6)
MCH RBC QN AUTO: 34.1 PG (ref 26.6–33)
MCHC RBC AUTO-ENTMCNC: 34.8 G/DL (ref 31.5–35.7)
MCV RBC AUTO: 98 FL (ref 79–97)
METAMYELOCYTES NFR BLD MANUAL: 1 % (ref 0–0)
MONOCYTES # BLD AUTO: 0.37 10*3/MM3 (ref 0.1–0.9)
NEUTROPHILS # BLD AUTO: 2.26 10*3/MM3 (ref 1.7–7)
NEUTROPHILS NFR BLD MANUAL: 55 % (ref 42.7–76)
NRBC SPEC MANUAL: 1 /100 WBC (ref 0–0.2)
PHOSPHATE SERPL-MCNC: 3.3 MG/DL (ref 2.5–4.5)
PLAT MORPH BLD: NORMAL
PLATELET # BLD AUTO: 210 10*3/MM3 (ref 140–450)
PMV BLD AUTO: 9.7 FL (ref 6–12)
POTASSIUM BLD-SCNC: 3.7 MMOL/L (ref 3.5–5.2)
PROT SERPL-MCNC: 6.8 G/DL (ref 6–8.5)
RBC # BLD AUTO: 3.55 10*6/MM3 (ref 3.77–5.28)
SMUDGE CELLS IN BLOOD BY LIGHT MICROSCOPY: 2 /100 WBC
SODIUM BLD-SCNC: 140 MMOL/L (ref 136–145)
VARIANT LYMPHS NFR BLD MANUAL: 3 % (ref 0–5)
WBC MORPH BLD: NORMAL
WBC NRBC COR # BLD: 4.11 10*3/MM3 (ref 3.4–10.8)

## 2019-10-11 PROCEDURE — 86300 IMMUNOASSAY TUMOR CA 15-3: CPT

## 2019-10-11 PROCEDURE — 85610 PROTHROMBIN TIME: CPT | Performed by: NURSE PRACTITIONER

## 2019-10-11 PROCEDURE — 99211 OFF/OP EST MAY X REQ PHY/QHP: CPT | Performed by: NURSE PRACTITIONER

## 2019-10-11 PROCEDURE — 83735 ASSAY OF MAGNESIUM: CPT

## 2019-10-11 PROCEDURE — 80053 COMPREHEN METABOLIC PANEL: CPT

## 2019-10-11 PROCEDURE — 84100 ASSAY OF PHOSPHORUS: CPT

## 2019-10-11 PROCEDURE — 96374 THER/PROPH/DIAG INJ IV PUSH: CPT | Performed by: INTERNAL MEDICINE

## 2019-10-11 PROCEDURE — 85007 BL SMEAR W/DIFF WBC COUNT: CPT

## 2019-10-11 PROCEDURE — 25010000002 ZOLEDRONIC ACID PER 1 MG: Performed by: INTERNAL MEDICINE

## 2019-10-11 PROCEDURE — 85025 COMPLETE CBC W/AUTO DIFF WBC: CPT

## 2019-10-11 RX ORDER — SODIUM CHLORIDE 9 MG/ML
250 INJECTION, SOLUTION INTRAVENOUS ONCE
Status: COMPLETED | OUTPATIENT
Start: 2019-10-11 | End: 2019-10-11

## 2019-10-11 RX ORDER — SODIUM CHLORIDE 0.9 % (FLUSH) 0.9 %
10 SYRINGE (ML) INJECTION AS NEEDED
Status: CANCELLED | OUTPATIENT
Start: 2019-11-15

## 2019-10-11 RX ORDER — SODIUM CHLORIDE 0.9 % (FLUSH) 0.9 %
10 SYRINGE (ML) INJECTION AS NEEDED
Status: DISCONTINUED | OUTPATIENT
Start: 2019-10-11 | End: 2019-10-11 | Stop reason: HOSPADM

## 2019-10-11 RX ADMIN — Medication 500 UNITS: at 11:36

## 2019-10-11 RX ADMIN — SODIUM CHLORIDE 250 ML: 9 INJECTION, SOLUTION INTRAVENOUS at 11:00

## 2019-10-11 RX ADMIN — ZOLEDRONIC ACID 4 MG: 4 INJECTION, SOLUTION, CONCENTRATE INTRAVENOUS at 11:01

## 2019-10-11 RX ADMIN — SODIUM CHLORIDE, PRESERVATIVE FREE 10 ML: 5 INJECTION INTRAVENOUS at 11:36

## 2019-10-11 NOTE — PROGRESS NOTES
Today's INR is 4.2.  Pt states she is no longer taking Lexapro but is on Zoloft 25mg daily and will be increasing to 50mg daily this weekend. Pt also states that she accidentally went back to 5mg of coumadin nightly after the first week or two of her last appt. Pt denies bleeding problems. Dose adjusted today and pt instructed to increase vitamin K intake with a serving of green veggies; pt verbalized. Patient instructed regarding medication; results given and questions answered. Nutritional counseling given.  Dietary factors affecting therapy addressed.  Patient instructed to monitor for excessive bruising or bleeding. Will recheck next week.      This document has been electronically signed by Andrzej Coe AGACNP-BC @  On October 11, 2019 8:56 AM

## 2019-10-12 LAB — CANCER AG27-29 SERPL-ACNC: 22.9 U/ML (ref 0–38.6)

## 2019-10-14 ENCOUNTER — TELEPHONE (OUTPATIENT)
Dept: ONCOLOGY | Facility: HOSPITAL | Age: 49
End: 2019-10-14

## 2019-10-14 NOTE — TELEPHONE ENCOUNTER
----- Message from Ovidio Meadows MD sent at 10/14/2019  2:01 PM CDT -----  Regarding: RE: flu shot  Contact: [phone]  Yes. It is okay.Thanks  ----- Message -----  From: Vijaya Campos RN  Sent: 10/14/2019   9:07 AM  To: Ovidio Meadows MD  Subject: FW: flu shot                                         ----- Message -----  From: Carmenza Unger  Sent: 10/14/2019   8:46 AM  To: [facility]  Subject: flu shot                                         Pt would like to know if it okay for her to get flu shot, she is On day 3 of treatment. Or does she need to wait to next cycle?  Please review and call pt.

## 2019-10-18 ENCOUNTER — ANTICOAGULATION VISIT (OUTPATIENT)
Dept: CARDIAC SURGERY | Facility: CLINIC | Age: 49
End: 2019-10-18

## 2019-10-18 VITALS — HEART RATE: 86 BPM | OXYGEN SATURATION: 96 % | DIASTOLIC BLOOD PRESSURE: 72 MMHG | SYSTOLIC BLOOD PRESSURE: 110 MMHG

## 2019-10-18 DIAGNOSIS — Z79.01 LONG TERM CURRENT USE OF ANTICOAGULANT THERAPY: ICD-10-CM

## 2019-10-18 LAB — INR PPP: 3.5 (ref 0.9–1.1)

## 2019-10-18 PROCEDURE — 85610 PROTHROMBIN TIME: CPT | Performed by: NURSE PRACTITIONER

## 2019-10-18 PROCEDURE — 99211 OFF/OP EST MAY X REQ PHY/QHP: CPT | Performed by: NURSE PRACTITIONER

## 2019-10-18 NOTE — PROGRESS NOTES
Today's INR is 3.5. Took took dose as directed. Denies any med changes, bleeding issues or s/s of blood clot. PT's dose adjusted since INR is still slightly elevated and pt will be seen in 2 weeks due to work. Patient instructed regarding medication; results given and questions answered. Nutritional counseling given.  Dietary factors affecting therapy addressed.  Patient instructed to monitor for excessive bruising or bleeding. PT verbalizes understanding.         This document has been electronically signed by Patt Alvarez, LYLA @ on October 18, 2019 9:17 AM

## 2019-10-30 ENCOUNTER — ANTICOAGULATION VISIT (OUTPATIENT)
Dept: CARDIAC SURGERY | Facility: CLINIC | Age: 49
End: 2019-10-30

## 2019-10-30 VITALS — HEART RATE: 83 BPM

## 2019-10-30 DIAGNOSIS — Z79.01 LONG TERM CURRENT USE OF ANTICOAGULANT THERAPY: ICD-10-CM

## 2019-10-30 LAB — INR PPP: 3 (ref 0.9–1.1)

## 2019-10-30 PROCEDURE — 85610 PROTHROMBIN TIME: CPT | Performed by: NURSE PRACTITIONER

## 2019-11-15 ENCOUNTER — LAB (OUTPATIENT)
Dept: ONCOLOGY | Facility: HOSPITAL | Age: 49
End: 2019-11-15

## 2019-11-15 ENCOUNTER — ANTICOAGULATION VISIT (OUTPATIENT)
Dept: CARDIAC SURGERY | Facility: CLINIC | Age: 49
End: 2019-11-15

## 2019-11-15 ENCOUNTER — OFFICE VISIT (OUTPATIENT)
Dept: ONCOLOGY | Facility: CLINIC | Age: 49
End: 2019-11-15

## 2019-11-15 ENCOUNTER — INFUSION (OUTPATIENT)
Dept: ONCOLOGY | Facility: HOSPITAL | Age: 49
End: 2019-11-15

## 2019-11-15 VITALS — OXYGEN SATURATION: 98 % | HEART RATE: 76 BPM | DIASTOLIC BLOOD PRESSURE: 78 MMHG | SYSTOLIC BLOOD PRESSURE: 115 MMHG

## 2019-11-15 VITALS
BODY MASS INDEX: 36.73 KG/M2 | RESPIRATION RATE: 18 BRPM | HEART RATE: 73 BPM | WEIGHT: 234 LBS | DIASTOLIC BLOOD PRESSURE: 66 MMHG | HEIGHT: 67 IN | SYSTOLIC BLOOD PRESSURE: 121 MMHG | TEMPERATURE: 97.9 F

## 2019-11-15 DIAGNOSIS — Z17.0 MALIGNANT NEOPLASM OF RIGHT BREAST IN FEMALE, ESTROGEN RECEPTOR POSITIVE, UNSPECIFIED SITE OF BREAST (HCC): Primary | ICD-10-CM

## 2019-11-15 DIAGNOSIS — Z79.01 LONG TERM CURRENT USE OF ANTICOAGULANT THERAPY: ICD-10-CM

## 2019-11-15 DIAGNOSIS — C79.51 BONE METASTASIS: ICD-10-CM

## 2019-11-15 DIAGNOSIS — C50.911 MALIGNANT NEOPLASM OF RIGHT BREAST IN FEMALE, ESTROGEN RECEPTOR POSITIVE, UNSPECIFIED SITE OF BREAST (HCC): Primary | ICD-10-CM

## 2019-11-15 DIAGNOSIS — Z86.000 HISTORY OF DUCTAL CARCINOMA IN SITU (DCIS) OF BREAST: ICD-10-CM

## 2019-11-15 DIAGNOSIS — Z85.820 HISTORY OF MALIGNANT MELANOMA OF SKIN: ICD-10-CM

## 2019-11-15 DIAGNOSIS — Z45.2 ENCOUNTER FOR VENOUS ACCESS DEVICE CARE: ICD-10-CM

## 2019-11-15 LAB
ALBUMIN SERPL-MCNC: 4.1 G/DL (ref 3.5–5.2)
ALBUMIN/GLOB SERPL: 1.5 G/DL
ALP SERPL-CCNC: 73 U/L (ref 39–117)
ALT SERPL W P-5'-P-CCNC: 29 U/L (ref 1–33)
ANION GAP SERPL CALCULATED.3IONS-SCNC: 13 MMOL/L (ref 5–15)
AST SERPL-CCNC: 26 U/L (ref 1–32)
BASOPHILS # BLD AUTO: 0.1 10*3/MM3 (ref 0–0.2)
BASOPHILS NFR BLD AUTO: 2.3 % (ref 0–1.5)
BILIRUB SERPL-MCNC: 0.2 MG/DL (ref 0.2–1.2)
BUN BLD-MCNC: 11 MG/DL (ref 6–20)
BUN/CREAT SERPL: 14.1 (ref 7–25)
CALCIUM SPEC-SCNC: 9.4 MG/DL (ref 8.6–10.5)
CANCER AG15-3 SERPL-ACNC: 28.8 U/ML
CHLORIDE SERPL-SCNC: 102 MMOL/L (ref 98–107)
CO2 SERPL-SCNC: 25 MMOL/L (ref 22–29)
CREAT BLD-MCNC: 0.78 MG/DL (ref 0.57–1)
DEPRECATED RDW RBC AUTO: 52.3 FL (ref 37–54)
EOSINOPHIL # BLD AUTO: 0.11 10*3/MM3 (ref 0–0.4)
EOSINOPHIL NFR BLD AUTO: 2.5 % (ref 0.3–6.2)
ERYTHROCYTE [DISTWIDTH] IN BLOOD BY AUTOMATED COUNT: 14.9 % (ref 12.3–15.4)
GFR SERPL CREATININE-BSD FRML MDRD: 78 ML/MIN/1.73
GLOBULIN UR ELPH-MCNC: 2.8 GM/DL
GLUCOSE BLD-MCNC: 110 MG/DL (ref 65–99)
HCT VFR BLD AUTO: 34 % (ref 34–46.6)
HGB BLD-MCNC: 12.1 G/DL (ref 12–15.9)
IMM GRANULOCYTES # BLD AUTO: 0.02 10*3/MM3 (ref 0–0.05)
IMM GRANULOCYTES NFR BLD AUTO: 0.5 % (ref 0–0.5)
INR PPP: 2.8 (ref 0.9–1.1)
LYMPHOCYTES # BLD AUTO: 1.22 10*3/MM3 (ref 0.7–3.1)
LYMPHOCYTES NFR BLD AUTO: 28.2 % (ref 19.6–45.3)
MAGNESIUM SERPL-MCNC: 1.8 MG/DL (ref 1.6–2.6)
MCH RBC QN AUTO: 34.3 PG (ref 26.6–33)
MCHC RBC AUTO-ENTMCNC: 35.6 G/DL (ref 31.5–35.7)
MCV RBC AUTO: 96.3 FL (ref 79–97)
MONOCYTES # BLD AUTO: 0.71 10*3/MM3 (ref 0.1–0.9)
MONOCYTES NFR BLD AUTO: 16.4 % (ref 5–12)
NEUTROPHILS # BLD AUTO: 2.17 10*3/MM3 (ref 1.7–7)
NEUTROPHILS NFR BLD AUTO: 50.1 % (ref 42.7–76)
NRBC BLD AUTO-RTO: 0 /100 WBC (ref 0–0.2)
PHOSPHATE SERPL-MCNC: 3.7 MG/DL (ref 2.5–4.5)
PLATELET # BLD AUTO: 219 10*3/MM3 (ref 140–450)
PMV BLD AUTO: 9.7 FL (ref 6–12)
POTASSIUM BLD-SCNC: 3.9 MMOL/L (ref 3.5–5.2)
PROT SERPL-MCNC: 6.9 G/DL (ref 6–8.5)
RBC # BLD AUTO: 3.53 10*6/MM3 (ref 3.77–5.28)
SODIUM BLD-SCNC: 140 MMOL/L (ref 136–145)
WBC NRBC COR # BLD: 4.33 10*3/MM3 (ref 3.4–10.8)

## 2019-11-15 PROCEDURE — 99214 OFFICE O/P EST MOD 30 MIN: CPT | Performed by: INTERNAL MEDICINE

## 2019-11-15 PROCEDURE — 36591 DRAW BLOOD OFF VENOUS DEVICE: CPT | Performed by: INTERNAL MEDICINE

## 2019-11-15 PROCEDURE — 84100 ASSAY OF PHOSPHORUS: CPT

## 2019-11-15 PROCEDURE — 86300 IMMUNOASSAY TUMOR CA 15-3: CPT

## 2019-11-15 PROCEDURE — G9903 PT SCRN TBCO ID AS NON USER: HCPCS | Performed by: INTERNAL MEDICINE

## 2019-11-15 PROCEDURE — 85025 COMPLETE CBC W/AUTO DIFF WBC: CPT

## 2019-11-15 PROCEDURE — G8731 PAIN NEG NO PLAN: HCPCS | Performed by: INTERNAL MEDICINE

## 2019-11-15 PROCEDURE — 85610 PROTHROMBIN TIME: CPT | Performed by: NURSE PRACTITIONER

## 2019-11-15 PROCEDURE — 83735 ASSAY OF MAGNESIUM: CPT

## 2019-11-15 PROCEDURE — 25010000002 ZOLEDRONIC ACID PER 1 MG: Performed by: INTERNAL MEDICINE

## 2019-11-15 PROCEDURE — 1123F ACP DISCUSS/DSCN MKR DOCD: CPT | Performed by: INTERNAL MEDICINE

## 2019-11-15 PROCEDURE — 80053 COMPREHEN METABOLIC PANEL: CPT

## 2019-11-15 PROCEDURE — 96374 THER/PROPH/DIAG INJ IV PUSH: CPT | Performed by: INTERNAL MEDICINE

## 2019-11-15 RX ORDER — SODIUM CHLORIDE 0.9 % (FLUSH) 0.9 %
10 SYRINGE (ML) INJECTION AS NEEDED
Status: DISCONTINUED | OUTPATIENT
Start: 2019-11-15 | End: 2019-11-15 | Stop reason: HOSPADM

## 2019-11-15 RX ORDER — SODIUM CHLORIDE 0.9 % (FLUSH) 0.9 %
10 SYRINGE (ML) INJECTION AS NEEDED
Status: CANCELLED | OUTPATIENT
Start: 2019-11-15

## 2019-11-15 RX ORDER — SODIUM CHLORIDE 9 MG/ML
250 INJECTION, SOLUTION INTRAVENOUS ONCE
Status: COMPLETED | OUTPATIENT
Start: 2019-11-15 | End: 2019-11-15

## 2019-11-15 RX ORDER — SODIUM CHLORIDE 0.9 % (FLUSH) 0.9 %
10 SYRINGE (ML) INJECTION AS NEEDED
Status: CANCELLED | OUTPATIENT
Start: 2019-12-20

## 2019-11-15 RX ORDER — SODIUM CHLORIDE 9 MG/ML
250 INJECTION, SOLUTION INTRAVENOUS ONCE
Status: CANCELLED | OUTPATIENT
Start: 2019-11-15

## 2019-11-15 RX ADMIN — SODIUM CHLORIDE, PRESERVATIVE FREE 10 ML: 5 INJECTION INTRAVENOUS at 11:12

## 2019-11-15 RX ADMIN — SODIUM CHLORIDE, PRESERVATIVE FREE 10 ML: 5 INJECTION INTRAVENOUS at 09:16

## 2019-11-15 RX ADMIN — SODIUM CHLORIDE 250 ML: 9 INJECTION, SOLUTION INTRAVENOUS at 10:10

## 2019-11-15 RX ADMIN — ZOLEDRONIC ACID 4 MG: 4 INJECTION, SOLUTION, CONCENTRATE INTRAVENOUS at 10:41

## 2019-11-15 RX ADMIN — Medication 500 UNITS: at 11:12

## 2019-11-15 NOTE — PATIENT INSTRUCTIONS
Patient Instructions for CT Scan    · Your CT scan is being done without any oral contrast.  Your CT scan may be done with IV contrast, if you have an allergy to iodine--please tell your nurse.    · Do not eat or drink 4 hours prior to scan.     · You may take your medications with sips of water, except DO NOT take your diabetic pill the morning of the test.    Arrive at the Outpatient Surgery entrance at [facility] 20 minutes prior to appointment time.    You will receive a phone call with an appointment for your CT scan.  Please call our office, if someone does not contact you with 3 days.    Miguelangel Estes RN  November 15, 2019  9:55 AM

## 2019-11-15 NOTE — PROGRESS NOTES
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  Started on palliative chemotherapy with Taxotere and Cytoxan on July 26, 2017.  Patient received 7 cycle of Taxotere and Cytoxan on December 6, 2017.  After that patient was changed over to treatment with Palbociclib and letrozole on January 30, 2018.  Palbociclib was held from January 17, 2019 until February 8, 2019 secondary to wisdom tooth extraction.  Patient is due to start  cycle 19 of palbociclib and letrozole tomorrow on November 16, 2019.    Denies any nausea or vomiting or diarrhea with current treatment.  Complains of tingling and numbness affecting bilateral hand.  Complains of mild worsening of tingling and numbness affecting bilateral lower extremity.  Denies any bowel or bladder incontinence.      PAST MEDICAL HISTORY:    Past Medical History:   Diagnosis Date   • Anxiety    • Depression    • Encounter for gynecological examination    • Malignant neoplasm of female breast (CMS/HCC)     hx of dcis s/p mastectomy and reconstruction and augmentation      • Primary malignant neoplasm of breast (CMS/HCC)     dcis in rt breast s/p mastectomy,reconstruction      • Ventricular premature beats        SOCIAL HISTORY:    Social History     Tobacco Use   • Smoking status: Never Smoker   • Smokeless tobacco: Never Used   Substance Use Topics   • Alcohol use: No   • Drug use: No       Surgical History :  Past Surgical History:   Procedure Laterality Date   • AUGMENTATION MAMMAPLASTY     • AXILLARY LYMPH NODE BIOPSY/EXCISION Right 7/10/2017    Procedure: BIOSPY RIGHT AXILLARY MASS  AND OR LYMPH;  Surgeon: Sourav Ernst MD;  Location: [facility];  Service:    • BREAST BIOPSY  12/07/2007    Mammotome biopsy of the right side with clip placement   • BREAST LUMPECTOMY     • BREAST RECONSTRUCTION  10/28/2008    Abscence of right breast secondary to mastectomy. Implant exchange for reconstruction of right breast with open para-prosthetic capsulotomy   • BREAST SURGERY  10/01/2009    Left breast ptosis and breast asymmetry secondary to right breast reconstruction for breast cancer. Left breast mastopexy with augmentation.   • CARDIAC CATHETERIZATION  09/18/2008    Normal coronary arteriography. Normal left ventricular angiogram   • EP STUDY     • MASTECTOMY Right    • MASTECTOMY  02/05/2008    Multifocal right breast ductal carcinoma-in-situ. Right total mastectomy   • MASTECTOMY, PARTIAL  01/03/2008    Ductal carcinoma in-situ of the right breast, proven by mammotome biopsy. Right lumpectomy, medial portion of the breast, between 2 o'clock and 4 o'clock, 5 cm from the nipple, with clip placement, radiographic inter. of severo. specimen, & ultrasound   • PAP SMEAR  03/03/2009    Negative   • MT INSJ TUNNELED CVC W/O SUBQ PORT/ AGE 3 YR/> Right 7/10/2017    Procedure: MEDIPORT     (c-arm#2);  Surgeon: Sourav Ernst MD;  Location: [facility];  Service: General       ALLERGIES:    Allergies   Allergen Reactions   • Percocet [Oxycodone-Acetaminophen] Nausea And Vomiting       FAMILY HISTORY:  Family History   Problem Relation Age of Onset   • Anemia Mother    • Multiple sclerosis Mother    • No Known Problems Father    • Diabetes Other    • Multiple sclerosis Other        REVIEW OF SYSTEMS:      CONSTITUTIONAL: Complains of fatigue.  No recent weight change.  Denies any fever, chills .      HEENT:  No epistaxis, mouth sores or difficulty swallowing.     RESPIRATORY: No new shortness of breath. No new cough or hemoptysis.     CARDIOVASCULAR: No chest pain or palpitations.     GASTROINTESTINAL: No  "nausea or vomiting.   No new abdominal pain. No blood in the stool.     GENITOURINARY: No Dysuria or Hematuria.     MUSCULOSKELETAL:Complains of arthritic pain affecting bilateral hands. Complains of pain in bilateral hip.  Complains of worsening of back pain .     LYMPHATICS: No new lymph node swelling.     NEUROLOGICAL : Complains of intermittent tingling and numbness affecting bilateral hand. Complains of tingling and numbness from back radiating down to bilateral lower extremity which has got mild worse recently.  No dizziness. No seizures or balance problems.     SKIN: Positive for history of melanoma status post surgical removal. Denies any new skin lesion worrisome for melanoma.              PHYSICAL EXAMINATION:      VITAL SIGNS:  /66   Pulse 73   Temp 97.9 °F (36.6 °C) (Temporal)   Resp 18   Ht 170.2 cm (67.01\")   Wt 106 kg (234 lb)   BMI 36.64 kg/m²      ECOG performance status:1    GENERAL:  Not in any distress.    HEENT:  Normocephalic, Atraumatic.Mild Conjunctival pallor. No icterus. Extraocular Movements Intact. No Facial Asymmetry noted.  Wound after extraction of left lower jaw wisdom tooth is healed.    NECK:  No adenopathy. No JVD.Trachea in midline    RESPIRATORY:  Fair air entry bilateral. No rhonchi or wheezing.    CARDIOVASCULAR:  S1, S2. Regular rate and rhythm. No murmur or gallop appreciated.    ABDOMEN:  Soft, obese, nontender. Bowel sounds present in all four quadrants.  No hepatosplenomegaly appreciated.    MUSCULOSKELETAL:  No edema.No Calf Tenderness.  Decreased range of motion.    NEUROLOGIC:  Alert, awake and oriented ×3.  No  Motor  deficit appreciated. Cranial Nerves 2-12 grossly intact.    LYMPHATICS: No new lymph node enlargement in neck or supraclavicular area.    PSYCHIATRY: Alert, awake and oriented ×3. Normal affect. Normal Judgement.Makes good eye contact.          DIAGNOSTIC DATA:    Glucose   Date Value Ref Range Status   11/15/2019 110 (H) 65 - 99 mg/dL Final "     Sodium   Date Value Ref Range Status   11/15/2019 140 136 - 145 mmol/L Final     Potassium   Date Value Ref Range Status   11/15/2019 3.9 3.5 - 5.2 mmol/L Final     CO2   Date Value Ref Range Status   11/15/2019 25.0 22.0 - 29.0 mmol/L Final     Chloride   Date Value Ref Range Status   11/15/2019 102 98 - 107 mmol/L Final     Anion Gap   Date Value Ref Range Status   11/15/2019 13.0 5.0 - 15.0 mmol/L Final     Creatinine   Date Value Ref Range Status   11/15/2019 0.78 0.57 - 1.00 mg/dL Final     BUN   Date Value Ref Range Status   11/15/2019 11 6 - 20 mg/dL Final     BUN/Creatinine Ratio   Date Value Ref Range Status   11/15/2019 14.1 7.0 - 25.0 Final     Calcium   Date Value Ref Range Status   11/15/2019 9.4 8.6 - 10.5 mg/dL Final     eGFR Non  Amer   Date Value Ref Range Status   11/15/2019 78 >60 mL/min/1.73 Final     Alkaline Phosphatase   Date Value Ref Range Status   11/15/2019 73 39 - 117 U/L Final     Total Protein   Date Value Ref Range Status   11/15/2019 6.9 6.0 - 8.5 g/dL Final     ALT (SGPT)   Date Value Ref Range Status   11/15/2019 29 1 - 33 U/L Final     AST (SGOT)   Date Value Ref Range Status   11/15/2019 26 1 - 32 U/L Final     Total Bilirubin   Date Value Ref Range Status   11/15/2019 0.2 0.2 - 1.2 mg/dL Final     Albumin   Date Value Ref Range Status   11/15/2019 4.10 3.50 - 5.20 g/dL Final     Globulin   Date Value Ref Range Status   11/15/2019 2.8 gm/dL Final     Lab Results   Component Value Date    WBC 4.33 11/15/2019    HGB 12.1 11/15/2019    HCT 34.0 11/15/2019    MCV 96.3 11/15/2019     11/15/2019     Lab Results   Component Value Date    NEUTROABS 2.17 11/15/2019     Lab Results   Component Value Date     29.6 (H) 10/11/2019    LABCA2 22.9 10/11/2019         2 D Echo Done on July 19, 2017 showed:  Interpretation Summary   · The left ventricular cavity is borderline dilated.  · Left ventricular systolic function is normal. Estimated EF = 53%.  · Left ventricular  diastolic dysfunction (grade I) consistent with impaired relaxation.  · IAS aneurysm noted. No PFO or ASD         Component CA 27.29   Latest Ref Rng & Units 0.0 - 38.6 U/mL   6/29/2017 1817.1 (H)   8/23/2017 1075.9 (H)   10/4/2017 288.9 (H)   10/25/2017 181.2 (H)   11/15/2017 112.2 (H)   12/6/2017 101.6 (H)   12/27/2017 90.3 (H)   2/28/2018 52.1 (H)   4/2/2018 12.4   5/7/2018 5/14/2018 38.1   6/11/2018 39.7 (H)   7/9/2018 37.9   8/27/2018 34.1   9/24/2018 38.1   11/5/2018 31.2   12/13/2018 30.7   3/15/2019 31.2   4/19/2019 23.1   5/24/2019 35.0   6/28/2019 24.5   8/2/2019 27.4   9/6/2019 23.3   10/11/2019 22.9               Component CA 15-3   Latest Ref Rng & Units <=25.0 U/mL   6/29/2017 2107.0 (H)   8/23/2017 943.0 (H)   10/4/2017 282.2 (H)   10/25/2017 162.2 (H)   11/15/2017 118.0 (H)   12/6/2017 99.6 (H)   12/27/2017 88.3 (H)   2/28/2018 47.2 (H)   4/2/2018 11.9   5/7/2018 5/14/2018 41.1 (H)   6/11/2018 37.3 (H)   7/9/2018 35.3 (H)   8/27/2018 36.7 (H)   9/24/2018 31.1 (H)   11/5/2018 32.9 (H)   12/13/2018 32.6 (H)   3/15/2019 32.6 (H)   4/19/2019 29.2 (H)   5/24/2019 28.3 (H)   6/28/2019 26.9 (H)   8/2/2019 26.8 (H)   9/6/2019 25.9 (H)   10/11/2019 29.6 (H)             Radiology Data :  CT of chest, abdomen and pelvis with contrast done on July 31, 2019 was reviewed, discussed with patient, it showed:  - - - CT CHEST - - -  Right-sided Mediport terminates in the superior vena cava.     PULMONARY PARENCHYMA:      - air spaces: Negative    - interstitium: Grossly within normal limits for age    - misc: No pulmonary nodules or mass     MEDIASTINUM / LATANYA:      - heart: Normal size, no pericardial fluid    - aorta/great vessels: Normal caliber and configuration for age    - misc: No mediastinal mass / significant adenopathy     PLEURAL COMPARTMENT:      - misc: No pleural fluid or mass        MISC:      - inferior neck: Negative    - osseous / body wall: Postoperative changes of both breasts  are  present.      - - - CT ABDOMEN - - -      ABDOMEN:   - LIVER:  Normal size / contour, no ductal dilatation, no focal  lesion   - GB:  Grossly negative    - CBD:  Grossly negative   - SPLEEN:  The spleen is enlarged at 14 cm in greatest  dimension. A low-attenuation lesion in the left lobe of the liver  anteriorly containing numerous calcifications is present,  measuring 5.2 x 5.1 cm in greatest axial dimension, unchanged,  and consistent with treated metastasis. No new hepatic lesions  are identified.   - PANCREAS:  Normal in size, contour, no focal mass    - VISCERA:  Normal caliber, no wall thickening   - MESENTERY:  No mesenteric mass   - CAVITY:  No free abdominal fluid, no free intraperitoneal air   - BODY WALL:  Within normal limits   - OSSEOUS:  Innumerable primarily sclerotic osseous metastases  are seen throughout the visualized portion of the skeleton.  Severe compression fracture of L4 is unchanged from the previous  study, including spinal stenosis at this level due to  retropulsion of fracture fragments.      RETROPERITONEUM:   - KIDNEYS:Normal size / contour, no collecting system dilation                    No evidence of an enhancing mass   - URETERS:  Normal course, caliber   - ADRENALS:  Normal size, contour   - MISC:  No sig. retroperitoneal adenopathy or mass   - VASCULAR:  Aorta/iliacs: within normal limits for age     - - - CT PELVIS - - -       - VISCERA:  Normal caliber small/large bowel, no focal   thickening/mass    - APPENDIX:  Within normal limits   - MESENTERY:  No mass   - VASCULAR:  Within normal limits for age   - CAVITY:  No free fluid/air   - BLADDER:  Unremarkable   - OSSEOUS:  Within normal limits    - MISC:   - UTERUS/OVARY: grossly unremarkable     IMPRESSION:  1. Stable single hepatic metastasis.  2. Extensive mostly sclerotic osseous metastases, stable, with  unchanged appearance of pathologic fracture of L4.  3. No new, worrisome lesions identified.          CT of chest,  abdomen and pelvis with contrast done on April 17, 2019 was reviewed, discussed with patient, it showed:  IMPRESSION:  CONCLUSION:      1. Stable examination. Presumed treated hepatic metastases in the  left lobe, unchanged. Multiple predominantly osteosclerotic bone  lesions, unchanged.             MRI of brain with and without contrast done on July 9, 2018 was reviewed, discussed with patient, it showed:  IMPRESSION:  No evidence of intracranial hemorrhage, mass/mass effect, acute  infarct, or pathologic contrast enhancement.     Previous described/known enhancing calvarial metastatic lesions  are significantly less conspicuous on today's examination. The  finding would support favorable treatment response.     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          Doppler ultrasound of right upper extremity done on November 10, 2017 showed:  IMPRESSION:  CONCLUSION:   Abnormal exam with thrombus visualized in the right internal  jugular vein, consistent with DVT.      PET/CT done on July 3, 2017 showed:  IMPRESSION:  CONCLUSION:  1. Extensive metastatic disease. Pathologic uptake within  enlarged right-sided axillary lymph nodes. Metastatic adenopathy  in both laurie and mediastinum. Tumor replacing most of the left  lobe of liver. Intra-abdominal retroperitoneal metastases,  adenopathy.     Extensive skeletal metastases including a pathologic fracture at  L4.        Nuclear bone scan done on June 20, 2017 showed:  1. Increased uptake at L4 suggestive of metastatic process.  2. 3 or 4 areas of increased activity in bony calvarium  3. Some increased activity in tips posteriorly and anteriorly suggestive of metastatic carcinoma to the skeletal system.           Pathology :    Pathology data from July 10, 2017 showed:  Final Diagnosis   Mass right axilla, excisional biopsy:      Metastatic poorly differentiated ductal carcinoma, breast primary      Estrogen receptor positive, 95% stainability,  strong intensity      Progesterone receptor positive, 95% stainability, strong intensity      HER-2 2+(equivocal), sent to [facility] for FISH     Addendum   Her 2 FISH result from [facility] is NEGATIVE         Pathology report from December 7, 2007 showed:  FINAL DIAGNOSIS:   RIGHT BREAST MAMMOTOME BIOPSY:        DUCTAL CARCINOMA IN SITU, INTERMEDIATE NUCLEAR GRADE              WITH MICROCALCIFICATION.      ADDENDUM:   Estrogen receptors are positive (90-95% stainability).   Progesterone receptors are positive (90-95% stainability).         ASSESSMENT AND PLAN:      1.  Metastatic cancer with extensive adenopathy in right axilla, mediastinum, hilar, abdominal, retroperitoneal with extensive liver and bone metastasis on PET/CT.  Patient underwent right apatient underwent right axillary lymph node excision by Dr. Ernst on July 10, 2017.Pathology report confirmed ductal carcinoma of breast with estrogen and progesterone positivity.  At her on palliative chemotherapy with Taxotere and cytoxan on July 26, 2017.   Patient was  seen at [facility] for second opinion regarding her breast cancer.  Case was discussed with Dr vazquez oncologist that has seen patient at [facility].  She agreed with her chemotherapy at this point.  The scan on December 20, 2017 shows relative stability of disease, lesion in the liver as well as bone lesion at about same.  Her tumor marker CA 27-29 as well as CA 15-3 are less than 100 at this point.  Since patient is developing some neuropathy in bilateral upper and lower extremity recommend changing her therapy to Palbociclib with letrozole.  Patient started taking first round of Palbociclib and letrozole on January 30, 2018.  Patient was scheduled to start cycle  6 of chemotherapy today with Palbociclib today on July 16, 2018.   -CT of chest, abdomen and pelvis with contrast done on July 9, 2018 show stable disease which was discussed with patient.  -MRI of brain with and without  contrast done on July 9, 2018 shows significant improvement in calvarial metastasis which was discussed with patient.  -CT of chest, abdomen and pelvis with contrast done on September 19, 2018 showed stable disease without any new lymphadenopathy or any new lesions.  Result of CT scan were discussed with patient and her family.  -CT of chest, abdomen and pelvis with contrast done on December 7, 2018 after cycle 9 of Palbociclib and letrozole showed stable disease involving liver and bones.  Result of CT scan were discussed with patient.  -Palbociclib was held from January 17, 2019 until February 8, 2019 secondary to wisdom tooth extraction.  -Patient resume cycle 11 of palbociclib and letrozole on February 8, 2019.  -CT of chest, abdomen and pelvis with contrast done on July 31, 2019 shows stable metastatic disease involving liver and bones.  There is no evidence of progression.  - We will go ahead with cycle 19 of palbociclib and letrozole tomorrow on November 16, 2019  -We will ask patient to return to clinic in 5 weeks with repeat CBC, CMP and tumor marker consisting of CA 15-3 and CA 27-29 to be done on that day.    -We will get CT of chest abdomen and pelvis with contrast prior to next clinic visit in 5 weeks    2.  Brain metastasis:MRi Brain with and without done on November 9, 2017 was reviewed and compared with t MRI done 6 weeks prior to that.  Brain calvarial metastasis are stable or somewhat improved.  No need to do radiation at this point which was discussed with patient.    -MRI of brain with and without contrast done on July 9, 2018 shows improvement in calvarial metastasis.  And of repeating more MRI unless patient has any symptoms suggestive of worsening brain involvement.    3.  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  She is currently on Coumadin.    She is being followed by Coumadin clinic    4.  History of melanoma in situ status post excision  by Dr. Linn.    5.  Bone metastasis: Patient was started on Zometa on August 23, 2017.  Continue with Zometa as scheduled for now.  Patient denies any mouth sores or jaw pain.  CT scan shows about loss of 70% of height of L4 vertebral body, patient has been referred to Dr. Pugh to get evaluated for kyphoplasty.  Patient went for second opinion with orthopedic surgeon in [place], as per patient's second surgeon did not recommend any surgery.    -Zometa Has been held since November 2018 due to wisdom tooth extraction requirement.  -Wound after wisdom tooth extraction is  completely healed.   Zometa has been resumed  on April 19, 2019       6.  History of ductal carcinoma in situ of the right breast diagnosed in 2007.  Status post mastectomy followed by adjuvant tamoxifen for 5 years which was finished in 2013.    7.  Neutropenia: Secondary to Palbociclib.resolved. WBC is normal at 4.33 with ANC 2.17 .      8.  Elevated liver function:  -AST and ALT are normal today.  Will monitor with CMP for now.  CT of abdomen and pelvis does not show any evidence of worsening hepatic metastasis.    9.  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  She remains on gabapentin 300 mg 3 times a day.  Prescription for gabapentin has been sent to her pharmacy today on June 28, 2019.    10.  Health maintenance: Patient does not smoke.  Not a candidate for screening colonoscopy at this point.      11.  Prescriptions: Patient has enough prescription of Decadron, Zofran,  and Ultram.  Prescription for Neurontin 300 mg 3 times a day has been sent to her pharmacy  on June 28, 2019.    12.  Advance care planning: Patient remains full code for now and is able to make on her decisions.  Patient has healthcare surrogate Mentioned on Chart.    13. Obesity: Patient's Body mass index is 36.64 kg/m². BMI is higher than reference range.  Patient was notified about elevated BMI.   Patient was counseled about diet and exercise for weight loss.    14.  Pain assessment:  -Patient denies any pain today.        Ovidio Meadows MD  11/15/2019  9:56 AM        EMR Dragon/Transcription disclaimer:   Much of this encounter note is an electronic transcription/translation of spoken language to printed text. The electronic translation of spoken language may permit erroneous, or at times, nonsensical words or phrases to be inadvertently transcribed; Although I have reviewed the note for such errors, some may still exist.

## 2019-11-15 NOTE — PROGRESS NOTES
Today's INR is 2.8.  Patient states no med changes or bleeding problems or unexplained bruising. Patient instructed to continue current dosing schedule. Verbalizes understanding. Will recheck in 3 weeks. Patient instructed regarding medication; results given and questions answered. Nutritional counseling given.  Dietary factors affecting therapy addressed.  Patient instructed to monitor for excessive bruising or bleeding. Findings reported by Tata Nunez RN.         This document has been electronically signed by Patt Alvarez, LYLA @ on November 15, 2019 8:46 AM

## 2019-11-16 LAB — CANCER AG27-29 SERPL-ACNC: 27.3 U/ML (ref 0–38.6)

## 2019-12-09 ENCOUNTER — ANTICOAGULATION VISIT (OUTPATIENT)
Dept: CARDIAC SURGERY | Facility: CLINIC | Age: 49
End: 2019-12-09

## 2019-12-09 ENCOUNTER — TELEPHONE (OUTPATIENT)
Dept: ONCOLOGY | Facility: HOSPITAL | Age: 49
End: 2019-12-09

## 2019-12-09 VITALS — DIASTOLIC BLOOD PRESSURE: 82 MMHG | HEART RATE: 78 BPM | SYSTOLIC BLOOD PRESSURE: 130 MMHG | OXYGEN SATURATION: 98 %

## 2019-12-09 DIAGNOSIS — T45.1X5A NEUROPATHY DUE TO CHEMOTHERAPEUTIC DRUG (HCC): Primary | ICD-10-CM

## 2019-12-09 DIAGNOSIS — G62.0 NEUROPATHY DUE TO CHEMOTHERAPEUTIC DRUG (HCC): Primary | ICD-10-CM

## 2019-12-09 DIAGNOSIS — Z79.01 LONG TERM CURRENT USE OF ANTICOAGULANT THERAPY: ICD-10-CM

## 2019-12-09 LAB — INR PPP: 3.3 (ref 0.9–1.1)

## 2019-12-09 PROCEDURE — 85610 PROTHROMBIN TIME: CPT | Performed by: NURSE PRACTITIONER

## 2019-12-09 PROCEDURE — 99211 OFF/OP EST MAY X REQ PHY/QHP: CPT | Performed by: NURSE PRACTITIONER

## 2019-12-09 RX ORDER — GABAPENTIN 300 MG/1
300 CAPSULE ORAL 3 TIMES DAILY
Qty: 90 CAPSULE | Refills: 0 | Status: SHIPPED | OUTPATIENT
Start: 2019-12-09 | End: 2020-02-28 | Stop reason: SDUPTHER

## 2019-12-09 NOTE — TELEPHONE ENCOUNTER
----- Message from Ovidio Meadows MD sent at 12/9/2019  1:10 PM CST -----  Regarding: RE: refill  Contact: [phone]  Prescription sent.  Thank you  ----- Message -----  From: Vijaya Campos RN  Sent: 12/9/2019  12:22 PM CST  To: Ovidio Meadows MD  Subject: FW: refill                                       Patient needs refill.     ----- Message -----  From: Carmenza Unger  Sent: 12/9/2019  11:57 AM CST  To: [facility]  Subject: refill                                           Gabapentin, sent to [facility]

## 2019-12-09 NOTE — PROGRESS NOTES
Today's INR is 3.3. Denies any med changes or bleeding issues. Denies any s/s of blood clot. States diet is consistent. Adjusted pt's dose and instructed to increase vit k today. Recheck INR next week when she will be in town for infusion. Patient instructed regarding medication; results given and questions answered. Nutritional counseling given.  Dietary factors affecting therapy addressed.  Patient instructed to monitor for excessive bruising or bleeding. PT verbalizes understanding. Findings reported by Elvia Allen RN.         This document has been electronically signed by LYLA Augustin @ on December 9, 2019 10:14 AM

## 2019-12-18 ENCOUNTER — HOSPITAL ENCOUNTER (OUTPATIENT)
Dept: CT IMAGING | Facility: HOSPITAL | Age: 49
Discharge: HOME | End: 2019-12-18
Admitting: INTERNAL MEDICINE

## 2019-12-18 DIAGNOSIS — Z17.0 MALIGNANT NEOPLASM OF RIGHT BREAST IN FEMALE, ESTROGEN RECEPTOR POSITIVE, UNSPECIFIED SITE OF BREAST (HCC): ICD-10-CM

## 2019-12-18 DIAGNOSIS — C50.911 MALIGNANT NEOPLASM OF RIGHT BREAST IN FEMALE, ESTROGEN RECEPTOR POSITIVE, UNSPECIFIED SITE OF BREAST (HCC): ICD-10-CM

## 2019-12-18 DIAGNOSIS — C79.51 BONE METASTASIS: ICD-10-CM

## 2019-12-18 PROCEDURE — 25010000002 IOPAMIDOL 61 % SOLUTION: Performed by: INTERNAL MEDICINE

## 2019-12-18 PROCEDURE — 71260 CT THORAX DX C+: CPT

## 2019-12-18 PROCEDURE — 74177 CT ABD & PELVIS W/CONTRAST: CPT

## 2019-12-18 RX ADMIN — IOPAMIDOL 94 ML: 612 INJECTION, SOLUTION INTRAVENOUS at 08:27

## 2019-12-20 ENCOUNTER — ANTICOAGULATION VISIT (OUTPATIENT)
Dept: CARDIAC SURGERY | Facility: CLINIC | Age: 49
End: 2019-12-20

## 2019-12-20 ENCOUNTER — OFFICE VISIT (OUTPATIENT)
Dept: ONCOLOGY | Facility: CLINIC | Age: 49
End: 2019-12-20

## 2019-12-20 ENCOUNTER — INFUSION (OUTPATIENT)
Dept: ONCOLOGY | Facility: HOSPITAL | Age: 49
End: 2019-12-20

## 2019-12-20 ENCOUNTER — LAB (OUTPATIENT)
Dept: ONCOLOGY | Facility: HOSPITAL | Age: 49
End: 2019-12-20

## 2019-12-20 VITALS
WEIGHT: 230 LBS | TEMPERATURE: 97.4 F | SYSTOLIC BLOOD PRESSURE: 132 MMHG | HEART RATE: 80 BPM | HEIGHT: 67 IN | BODY MASS INDEX: 36.1 KG/M2 | DIASTOLIC BLOOD PRESSURE: 69 MMHG | RESPIRATION RATE: 18 BRPM

## 2019-12-20 VITALS — SYSTOLIC BLOOD PRESSURE: 124 MMHG | HEART RATE: 88 BPM | DIASTOLIC BLOOD PRESSURE: 76 MMHG

## 2019-12-20 DIAGNOSIS — T45.1X5A ANEMIA DUE TO ANTINEOPLASTIC CHEMOTHERAPY: ICD-10-CM

## 2019-12-20 DIAGNOSIS — T45.1X5A NEUROPATHY DUE TO CHEMOTHERAPEUTIC DRUG (HCC): ICD-10-CM

## 2019-12-20 DIAGNOSIS — C50.911 MALIGNANT NEOPLASM OF RIGHT BREAST IN FEMALE, ESTROGEN RECEPTOR POSITIVE, UNSPECIFIED SITE OF BREAST (HCC): ICD-10-CM

## 2019-12-20 DIAGNOSIS — C50.911 MALIGNANT NEOPLASM OF RIGHT BREAST IN FEMALE, ESTROGEN RECEPTOR POSITIVE, UNSPECIFIED SITE OF BREAST (HCC): Primary | ICD-10-CM

## 2019-12-20 DIAGNOSIS — C79.51 BONE METASTASIS: ICD-10-CM

## 2019-12-20 DIAGNOSIS — Z45.2 ENCOUNTER FOR VENOUS ACCESS DEVICE CARE: Primary | ICD-10-CM

## 2019-12-20 DIAGNOSIS — D64.81 ANEMIA DUE TO ANTINEOPLASTIC CHEMOTHERAPY: ICD-10-CM

## 2019-12-20 DIAGNOSIS — C78.7 LIVER METASTASIS: ICD-10-CM

## 2019-12-20 DIAGNOSIS — G62.0 NEUROPATHY DUE TO CHEMOTHERAPEUTIC DRUG (HCC): ICD-10-CM

## 2019-12-20 DIAGNOSIS — Z79.01 LONG TERM CURRENT USE OF ANTICOAGULANT THERAPY: ICD-10-CM

## 2019-12-20 DIAGNOSIS — Z17.0 MALIGNANT NEOPLASM OF RIGHT BREAST IN FEMALE, ESTROGEN RECEPTOR POSITIVE, UNSPECIFIED SITE OF BREAST (HCC): Primary | ICD-10-CM

## 2019-12-20 DIAGNOSIS — Z45.2 ENCOUNTER FOR VENOUS ACCESS DEVICE CARE: ICD-10-CM

## 2019-12-20 DIAGNOSIS — Z17.0 MALIGNANT NEOPLASM OF RIGHT BREAST IN FEMALE, ESTROGEN RECEPTOR POSITIVE, UNSPECIFIED SITE OF BREAST (HCC): ICD-10-CM

## 2019-12-20 LAB
ALBUMIN SERPL-MCNC: 4 G/DL (ref 3.5–5.2)
ALBUMIN/GLOB SERPL: 1.5 G/DL
ALP SERPL-CCNC: 80 U/L (ref 39–117)
ALT SERPL W P-5'-P-CCNC: 24 U/L (ref 1–33)
ANION GAP SERPL CALCULATED.3IONS-SCNC: 11 MMOL/L (ref 5–15)
AST SERPL-CCNC: 21 U/L (ref 1–32)
BASOPHILS # BLD AUTO: 0.1 10*3/MM3 (ref 0–0.2)
BASOPHILS NFR BLD AUTO: 2.3 % (ref 0–1.5)
BILIRUB SERPL-MCNC: 0.2 MG/DL (ref 0.2–1.2)
BUN BLD-MCNC: 12 MG/DL (ref 6–20)
BUN/CREAT SERPL: 14.6 (ref 7–25)
CALCIUM SPEC-SCNC: 8.9 MG/DL (ref 8.6–10.5)
CANCER AG15-3 SERPL-ACNC: 24.5 U/ML
CHLORIDE SERPL-SCNC: 105 MMOL/L (ref 98–107)
CO2 SERPL-SCNC: 25 MMOL/L (ref 22–29)
CREAT BLD-MCNC: 0.82 MG/DL (ref 0.57–1)
DEPRECATED RDW RBC AUTO: 53.2 FL (ref 37–54)
EOSINOPHIL # BLD AUTO: 0.09 10*3/MM3 (ref 0–0.4)
EOSINOPHIL NFR BLD AUTO: 2.1 % (ref 0.3–6.2)
ERYTHROCYTE [DISTWIDTH] IN BLOOD BY AUTOMATED COUNT: 14.8 % (ref 12.3–15.4)
GFR SERPL CREATININE-BSD FRML MDRD: 73 ML/MIN/1.73
GLOBULIN UR ELPH-MCNC: 2.7 GM/DL
GLUCOSE BLD-MCNC: 110 MG/DL (ref 65–99)
HCT VFR BLD AUTO: 35.1 % (ref 34–46.6)
HGB BLD-MCNC: 12.1 G/DL (ref 12–15.9)
IMM GRANULOCYTES # BLD AUTO: 0.01 10*3/MM3 (ref 0–0.05)
IMM GRANULOCYTES NFR BLD AUTO: 0.2 % (ref 0–0.5)
INR PPP: 1.9 (ref 0.9–1.1)
LYMPHOCYTES # BLD AUTO: 1.43 10*3/MM3 (ref 0.7–3.1)
LYMPHOCYTES NFR BLD AUTO: 32.9 % (ref 19.6–45.3)
MAGNESIUM SERPL-MCNC: 1.8 MG/DL (ref 1.6–2.6)
MCH RBC QN AUTO: 33.6 PG (ref 26.6–33)
MCHC RBC AUTO-ENTMCNC: 34.5 G/DL (ref 31.5–35.7)
MCV RBC AUTO: 97.5 FL (ref 79–97)
MONOCYTES # BLD AUTO: 0.65 10*3/MM3 (ref 0.1–0.9)
MONOCYTES NFR BLD AUTO: 15 % (ref 5–12)
NEUTROPHILS # BLD AUTO: 2.06 10*3/MM3 (ref 1.7–7)
NEUTROPHILS NFR BLD AUTO: 47.5 % (ref 42.7–76)
NRBC BLD AUTO-RTO: 0 /100 WBC (ref 0–0.2)
PHOSPHATE SERPL-MCNC: 2.9 MG/DL (ref 2.5–4.5)
PLATELET # BLD AUTO: 204 10*3/MM3 (ref 140–450)
PMV BLD AUTO: 9.8 FL (ref 6–12)
POTASSIUM BLD-SCNC: 3.9 MMOL/L (ref 3.5–5.2)
PROT SERPL-MCNC: 6.7 G/DL (ref 6–8.5)
RBC # BLD AUTO: 3.6 10*6/MM3 (ref 3.77–5.28)
SODIUM BLD-SCNC: 141 MMOL/L (ref 136–145)
WBC NRBC COR # BLD: 4.34 10*3/MM3 (ref 3.4–10.8)

## 2019-12-20 PROCEDURE — 25010000002 ZOLEDRONIC ACID PER 1 MG: Performed by: INTERNAL MEDICINE

## 2019-12-20 PROCEDURE — 99214 OFFICE O/P EST MOD 30 MIN: CPT | Performed by: INTERNAL MEDICINE

## 2019-12-20 PROCEDURE — 96374 THER/PROPH/DIAG INJ IV PUSH: CPT | Performed by: INTERNAL MEDICINE

## 2019-12-20 PROCEDURE — 1123F ACP DISCUSS/DSCN MKR DOCD: CPT | Performed by: INTERNAL MEDICINE

## 2019-12-20 PROCEDURE — 86300 IMMUNOASSAY TUMOR CA 15-3: CPT

## 2019-12-20 PROCEDURE — G9903 PT SCRN TBCO ID AS NON USER: HCPCS | Performed by: INTERNAL MEDICINE

## 2019-12-20 PROCEDURE — G8731 PAIN NEG NO PLAN: HCPCS | Performed by: INTERNAL MEDICINE

## 2019-12-20 PROCEDURE — 85610 PROTHROMBIN TIME: CPT | Performed by: NURSE PRACTITIONER

## 2019-12-20 PROCEDURE — 80053 COMPREHEN METABOLIC PANEL: CPT

## 2019-12-20 PROCEDURE — 85025 COMPLETE CBC W/AUTO DIFF WBC: CPT

## 2019-12-20 PROCEDURE — 83735 ASSAY OF MAGNESIUM: CPT

## 2019-12-20 PROCEDURE — 84100 ASSAY OF PHOSPHORUS: CPT

## 2019-12-20 RX ORDER — HEPARIN SODIUM (PORCINE) LOCK FLUSH IV SOLN 100 UNIT/ML 100 UNIT/ML
500 SOLUTION INTRAVENOUS AS NEEDED
Status: CANCELLED | OUTPATIENT
Start: 2020-01-24

## 2019-12-20 RX ORDER — SODIUM CHLORIDE 0.9 % (FLUSH) 0.9 %
10 SYRINGE (ML) INJECTION AS NEEDED
Status: CANCELLED | OUTPATIENT
Start: 2019-12-20

## 2019-12-20 RX ORDER — SODIUM CHLORIDE 9 MG/ML
250 INJECTION, SOLUTION INTRAVENOUS ONCE
Status: CANCELLED | OUTPATIENT
Start: 2019-12-20

## 2019-12-20 RX ORDER — SODIUM CHLORIDE 0.9 % (FLUSH) 0.9 %
10 SYRINGE (ML) INJECTION AS NEEDED
Status: CANCELLED | OUTPATIENT
Start: 2020-01-24

## 2019-12-20 RX ORDER — SODIUM CHLORIDE 9 MG/ML
250 INJECTION, SOLUTION INTRAVENOUS ONCE
Status: COMPLETED | OUTPATIENT
Start: 2019-12-20 | End: 2019-12-20

## 2019-12-20 RX ORDER — SODIUM CHLORIDE 0.9 % (FLUSH) 0.9 %
10 SYRINGE (ML) INJECTION AS NEEDED
Status: DISCONTINUED | OUTPATIENT
Start: 2019-12-20 | End: 2019-12-20 | Stop reason: HOSPADM

## 2019-12-20 RX ADMIN — SODIUM CHLORIDE, PRESERVATIVE FREE 10 ML: 5 INJECTION INTRAVENOUS at 11:12

## 2019-12-20 RX ADMIN — ZOLEDRONIC ACID 4 MG: 4 INJECTION, SOLUTION, CONCENTRATE INTRAVENOUS at 10:37

## 2019-12-20 RX ADMIN — SODIUM CHLORIDE 250 ML: 9 INJECTION, SOLUTION INTRAVENOUS at 10:37

## 2019-12-20 RX ADMIN — HEPARIN 500 UNITS: 100 SYRINGE at 11:13

## 2019-12-20 NOTE — PROGRESS NOTES
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  Started on palliative chemotherapy with Taxotere and Cytoxan on July 26, 2017.  Patient received 7 cycle of Taxotere and Cytoxan on December 6, 2017.  After that patient was changed over to treatment with Palbociclib and letrozole on January 30, 2018.  Palbociclib was held from January 17, 2019 until February 8, 2019 secondary to wisdom tooth extraction.  Patient is due to start  cycle 20 of palbociclib and letrozole tomorrow on November 16, 2019.  Had a restaging CT of chest, abdomen and pelvis with contrast done on December 18, 2019, she is here to discuss the results and further recommendation.  Denies any nausea or vomiting or diarrhea with current treatment.  Complains of tingling and numbness affecting bilateral hand and feet.   Denies any bowel or bladder incontinence.      PAST MEDICAL HISTORY:    Past Medical History:   Diagnosis Date   • Anxiety    • Depression    • Encounter for gynecological examination    • Malignant neoplasm of female breast (CMS/HCC)     hx of dcis s/p mastectomy and reconstruction and augmentation      • Primary malignant neoplasm of breast (CMS/HCC)     dcis in rt breast s/p mastectomy,reconstruction      • Ventricular premature beats        SOCIAL HISTORY:    Social History     Tobacco Use   • Smoking status: Never Smoker   • Smokeless tobacco: Never Used   Substance Use Topics   • Alcohol use: No   • Drug use: No       Surgical History :  Past Surgical History:   Procedure Laterality Date   • AUGMENTATION MAMMAPLASTY     • AXILLARY LYMPH NODE  BIOPSY/EXCISION Right 7/10/2017    Procedure: BIOSPY RIGHT AXILLARY MASS AND OR LYMPH;  Surgeon: Sourav Ernst MD;  Location: [facility];  Service:    • BREAST BIOPSY  12/07/2007    Mammotome biopsy of the right side with clip placement   • BREAST LUMPECTOMY     • BREAST RECONSTRUCTION  10/28/2008    Abscence of right breast secondary to mastectomy. Implant exchange for reconstruction of right breast with open para-prosthetic capsulotomy   • BREAST SURGERY  10/01/2009    Left breast ptosis and breast asymmetry secondary to right breast reconstruction for breast cancer. Left breast mastopexy with augmentation.   • CARDIAC CATHETERIZATION  09/18/2008    Normal coronary arteriography. Normal left ventricular angiogram   • EP STUDY     • MASTECTOMY Right    • MASTECTOMY  02/05/2008    Multifocal right breast ductal carcinoma-in-situ. Right total mastectomy   • MASTECTOMY, PARTIAL  01/03/2008    Ductal carcinoma in-situ of the right breast, proven by mammotome biopsy. Right lumpectomy, medial portion of the breast, between 2 o'clock and 4 o'clock, 5 cm from the nipple, with clip placement, radiographic inter. of severo. specimen, & ultrasound   • PAP SMEAR  03/03/2009    Negative   • MA INSJ TUNNELED CVC W/O SUBQ PORT/ AGE 3 YR/> Right 7/10/2017    Procedure: MEDIPORT     (c-arm#2);  Surgeon: Sourav Ernst MD;  Location: [facility];  Service: General       ALLERGIES:    Allergies   Allergen Reactions   • Percocet [Oxycodone-Acetaminophen] Nausea And Vomiting       FAMILY HISTORY:  Family History   Problem Relation Age of Onset   • Anemia Mother    • Multiple sclerosis Mother    • No Known Problems Father    • Diabetes Other    • Multiple sclerosis Other        REVIEW OF SYSTEMS:      CONSTITUTIONAL: Complains of fatigue.  No recent weight change.  Denies any fever, chills .      HEENT:  No epistaxis, mouth sores or difficulty swallowing.     RESPIRATORY: No new shortness of breath. No new cough or  "hemoptysis.     CARDIOVASCULAR: No chest pain or palpitations.     GASTROINTESTINAL: No nausea or vomiting.   No new abdominal pain. No blood in the stool.     GENITOURINARY: No Dysuria or Hematuria.     MUSCULOSKELETAL:Complains of arthritic pain affecting bilateral hands. Complains of pain in bilateral hip.  Complains of worsening of back pain .     LYMPHATICS: No new lymph node swelling.     NEUROLOGICAL : Complains of intermittent tingling and numbness affecting bilateral hand. Complains of tingling and numbness from back radiating down to bilateral lower extremity .  No dizziness. No seizures or balance problems.     SKIN: Positive for history of melanoma status post surgical removal. Denies any new skin lesion worrisome for melanoma.              PHYSICAL EXAMINATION:      VITAL SIGNS:  /69   Pulse 80   Temp 97.4 °F (36.3 °C) (Temporal)   Resp 18   Ht 170.2 cm (67.01\")   Wt 104 kg (230 lb)   BMI 36.01 kg/m²      ECOG performance status:1    GENERAL:  Not in any distress.    HEENT:  Normocephalic, Atraumatic.Mild Conjunctival pallor. No icterus. Extraocular Movements Intact. No Facial Asymmetry noted.  Wound after extraction of left lower jaw wisdom tooth is healed.    NECK:  No adenopathy. No JVD.Trachea in midline    RESPIRATORY:  Fair air entry bilateral. No rhonchi or wheezing.    CARDIOVASCULAR:  S1, S2. Regular rate and rhythm. No murmur or gallop appreciated.    ABDOMEN:  Soft, obese, nontender. Bowel sounds present in all four quadrants.  No hepatosplenomegaly appreciated.    MUSCULOSKELETAL:  No edema.No Calf Tenderness.  Decreased range of motion.    NEUROLOGIC:  Alert, awake and oriented ×3.  No  Motor  deficit appreciated. Cranial Nerves 2-12 grossly intact.    LYMPHATICS: No new lymph node enlargement in neck or supraclavicular area.    PSYCHIATRY: Alert, awake and oriented ×3. Normal affect. Normal Judgement.Makes good eye contact.          DIAGNOSTIC DATA:    Glucose   Date Value Ref " Range Status   12/20/2019 110 (H) 65 - 99 mg/dL Final     Sodium   Date Value Ref Range Status   12/20/2019 141 136 - 145 mmol/L Final     Potassium   Date Value Ref Range Status   12/20/2019 3.9 3.5 - 5.2 mmol/L Final     CO2   Date Value Ref Range Status   12/20/2019 25.0 22.0 - 29.0 mmol/L Final     Chloride   Date Value Ref Range Status   12/20/2019 105 98 - 107 mmol/L Final     Anion Gap   Date Value Ref Range Status   12/20/2019 11.0 5.0 - 15.0 mmol/L Final     Creatinine   Date Value Ref Range Status   12/20/2019 0.82 0.57 - 1.00 mg/dL Final     BUN   Date Value Ref Range Status   12/20/2019 12 6 - 20 mg/dL Final     BUN/Creatinine Ratio   Date Value Ref Range Status   12/20/2019 14.6 7.0 - 25.0 Final     Calcium   Date Value Ref Range Status   12/20/2019 8.9 8.6 - 10.5 mg/dL Final     eGFR Non  Amer   Date Value Ref Range Status   12/20/2019 73 >60 mL/min/1.73 Final     Alkaline Phosphatase   Date Value Ref Range Status   12/20/2019 80 39 - 117 U/L Final     Total Protein   Date Value Ref Range Status   12/20/2019 6.7 6.0 - 8.5 g/dL Final     ALT (SGPT)   Date Value Ref Range Status   12/20/2019 24 1 - 33 U/L Final     AST (SGOT)   Date Value Ref Range Status   12/20/2019 21 1 - 32 U/L Final     Total Bilirubin   Date Value Ref Range Status   12/20/2019 0.2 0.2 - 1.2 mg/dL Final     Albumin   Date Value Ref Range Status   12/20/2019 4.00 3.50 - 5.20 g/dL Final     Globulin   Date Value Ref Range Status   12/20/2019 2.7 gm/dL Final     Lab Results   Component Value Date    WBC 4.34 12/20/2019    HGB 12.1 12/20/2019    HCT 35.1 12/20/2019    MCV 97.5 (H) 12/20/2019     12/20/2019     Lab Results   Component Value Date    NEUTROABS 2.06 12/20/2019     Lab Results   Component Value Date     28.8 (H) 11/15/2019    LABCA2 27.3 11/15/2019         2 D Echo Done on July 19, 2017 showed:  Interpretation Summary   · The left ventricular cavity is borderline dilated.  · Left ventricular systolic  function is normal. Estimated EF = 53%.  · Left ventricular diastolic dysfunction (grade I) consistent with impaired relaxation.  · IAS aneurysm noted. No PFO or ASD         Component CA 27.29   Latest Ref Rng & Units 0.0 - 38.6 U/mL   6/29/2017 1817.1 (H)   8/23/2017 1075.9 (H)   10/4/2017 288.9 (H)   10/25/2017 181.2 (H)   11/15/2017 112.2 (H)   12/6/2017 101.6 (H)   12/27/2017 90.3 (H)   2/28/2018 52.1 (H)   4/2/2018 12.4   5/7/2018 5/14/2018 38.1   6/11/2018 39.7 (H)   7/9/2018 37.9   8/27/2018 34.1   9/24/2018 38.1   11/5/2018 31.2   12/13/2018 30.7   3/15/2019 31.2   4/19/2019 23.1   5/24/2019 35.0   6/28/2019 24.5   8/2/2019 27.4   9/6/2019 23.3   10/11/2019 22.9               Component CA 15-3   Latest Ref Rng & Units <=25.0 U/mL   6/29/2017 2107.0 (H)   8/23/2017 943.0 (H)   10/4/2017 282.2 (H)   10/25/2017 162.2 (H)   11/15/2017 118.0 (H)   12/6/2017 99.6 (H)   12/27/2017 88.3 (H)   2/28/2018 47.2 (H)   4/2/2018 11.9   5/7/2018 5/14/2018 41.1 (H)   6/11/2018 37.3 (H)   7/9/2018 35.3 (H)   8/27/2018 36.7 (H)   9/24/2018 31.1 (H)   11/5/2018 32.9 (H)   12/13/2018 32.6 (H)   3/15/2019 32.6 (H)   4/19/2019 29.2 (H)   5/24/2019 28.3 (H)   6/28/2019 26.9 (H)   8/2/2019 26.8 (H)   9/6/2019 25.9 (H)   10/11/2019 29.6 (H)             Radiology Data :  CT of chest, abdomen and pelvis with contrast done on December 18, 2019 was reviewed, discussed with patient, it showed:  IMPRESSION:  No acute pulmonary or pleural finding.     No pulmonary mass, suspicious nodule, or adenopathy.     Redemonstration of the suspected treated metastatic lesion  involving the left lobe of the liver demonstrating very slight  decreased size. No new hepatic mass or ductal dilation.     No CT evidence of acute abdominal or pelvic finding. No  adenopathy.     Redemonstration of extensive sclerotic metastatic disease.        CT of chest, abdomen and pelvis with contrast done on July 31, 2019 was reviewed, discussed with patient, it  showed:  - - - CT CHEST - - -  Right-sided Mediport terminates in the superior vena cava.     PULMONARY PARENCHYMA:      - air spaces: Negative    - interstitium: Grossly within normal limits for age    - misc: No pulmonary nodules or mass     MEDIASTINUM / LATANYA:      - heart: Normal size, no pericardial fluid    - aorta/great vessels: Normal caliber and configuration for age    - misc: No mediastinal mass / significant adenopathy     PLEURAL COMPARTMENT:      - misc: No pleural fluid or mass        MISC:      - inferior neck: Negative    - osseous / body wall: Postoperative changes of both breasts  are present.      - - - CT ABDOMEN - - -      ABDOMEN:   - LIVER:  Normal size / contour, no ductal dilatation, no focal  lesion   - GB:  Grossly negative    - CBD:  Grossly negative   - SPLEEN:  The spleen is enlarged at 14 cm in greatest  dimension. A low-attenuation lesion in the left lobe of the liver  anteriorly containing numerous calcifications is present,  measuring 5.2 x 5.1 cm in greatest axial dimension, unchanged,  and consistent with treated metastasis. No new hepatic lesions  are identified.   - PANCREAS:  Normal in size, contour, no focal mass    - VISCERA:  Normal caliber, no wall thickening   - MESENTERY:  No mesenteric mass   - CAVITY:  No free abdominal fluid, no free intraperitoneal air   - BODY WALL:  Within normal limits   - OSSEOUS:  Innumerable primarily sclerotic osseous metastases  are seen throughout the visualized portion of the skeleton.  Severe compression fracture of L4 is unchanged from the previous  study, including spinal stenosis at this level due to  retropulsion of fracture fragments.      RETROPERITONEUM:   - KIDNEYS:Normal size / contour, no collecting system dilation                    No evidence of an enhancing mass   - URETERS:  Normal course, caliber   - ADRENALS:  Normal size, contour   - MISC:  No sig. retroperitoneal adenopathy or mass   - VASCULAR:  Aorta/iliacs: within  normal limits for age     - - - CT PELVIS - - -       - VISCERA:  Normal caliber small/large bowel, no focal   thickening/mass    - APPENDIX:  Within normal limits   - MESENTERY:  No mass   - VASCULAR:  Within normal limits for age   - CAVITY:  No free fluid/air   - BLADDER:  Unremarkable   - OSSEOUS:  Within normal limits    - MISC:   - UTERUS/OVARY: grossly unremarkable     IMPRESSION:  1. Stable single hepatic metastasis.  2. Extensive mostly sclerotic osseous metastases, stable, with  unchanged appearance of pathologic fracture of L4.  3. No new, worrisome lesions identified.          CT of chest, abdomen and pelvis with contrast done on April 17, 2019 was reviewed, discussed with patient, it showed:  IMPRESSION:  CONCLUSION:      1. Stable examination. Presumed treated hepatic metastases in the  left lobe, unchanged. Multiple predominantly osteosclerotic bone  lesions, unchanged.             MRI of brain with and without contrast done on July 9, 2018 was reviewed, discussed with patient, it showed:  IMPRESSION:  No evidence of intracranial hemorrhage, mass/mass effect, acute  infarct, or pathologic contrast enhancement.     Previous described/known enhancing calvarial metastatic lesions  are significantly less conspicuous on today's examination. The  finding would support favorable treatment response.     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          Doppler ultrasound of right upper extremity done on November 10, 2017 showed:  IMPRESSION:  CONCLUSION:   Abnormal exam with thrombus visualized in the right internal  jugular vein, consistent with DVT.      PET/CT done on July 3, 2017 showed:  IMPRESSION:  CONCLUSION:  1. Extensive metastatic disease. Pathologic uptake within  enlarged right-sided axillary lymph nodes. Metastatic adenopathy  in both laurie and mediastinum. Tumor replacing most of the left  lobe of liver. Intra-abdominal retroperitoneal  metastases,  adenopathy.     Extensive skeletal metastases including a pathologic fracture at  L4.        Nuclear bone scan done on June 20, 2017 showed:  1. Increased uptake at L4 suggestive of metastatic process.  2. 3 or 4 areas of increased activity in bony calvarium  3. Some increased activity in tips posteriorly and anteriorly suggestive of metastatic carcinoma to the skeletal system.           Pathology :    Pathology data from July 10, 2017 showed:  Final Diagnosis   Mass right axilla, excisional biopsy:      Metastatic poorly differentiated ductal carcinoma, breast primary      Estrogen receptor positive, 95% stainability, strong intensity      Progesterone receptor positive, 95% stainability, strong intensity      HER-2 2+(equivocal), sent to [facility] for FISH     Addendum   Her 2 FISH result from [facility] is NEGATIVE         Pathology report from December 7, 2007 showed:  FINAL DIAGNOSIS:   RIGHT BREAST MAMMOTOME BIOPSY:        DUCTAL CARCINOMA IN SITU, INTERMEDIATE NUCLEAR GRADE              WITH MICROCALCIFICATION.      ADDENDUM:   Estrogen receptors are positive (90-95% stainability).   Progesterone receptors are positive (90-95% stainability).         ASSESSMENT AND PLAN:      1.  Metastatic cancer with extensive adenopathy in right axilla, mediastinum, hilar, abdominal, retroperitoneal with extensive liver and bone metastasis on PET/CT.  Patient underwent right apatient underwent right axillary lymph node excision by Dr. Ernst on July 10, 2017.Pathology report confirmed ductal carcinoma of breast with estrogen and progesterone positivity.  At her on palliative chemotherapy with Taxotere and cytoxan on July 26, 2017.   Patient was  seen at [facility] for second opinion regarding her breast cancer.  Case was discussed with Dr vazquez oncologist that has seen patient at [facility].  She agreed with her chemotherapy at this point.  The scan on December 20, 2017 shows relative stability of  disease, lesion in the liver as well as bone lesion at about same.  Her tumor marker CA 27-29 as well as CA 15-3 are less than 100 at this point.  Since patient is developing some neuropathy in bilateral upper and lower extremity recommend changing her therapy to Palbociclib with letrozole.  Patient started taking first round of Palbociclib and letrozole on January 30, 2018.  Patient was scheduled to start cycle  6 of chemotherapy today with Palbociclib today on July 16, 2018.   -CT of chest, abdomen and pelvis with contrast done on July 9, 2018 show stable disease which was discussed with patient.  -MRI of brain with and without contrast done on July 9, 2018 shows significant improvement in calvarial metastasis which was discussed with patient.  -CT of chest, abdomen and pelvis with contrast done on September 19, 2018 showed stable disease without any new lymphadenopathy or any new lesions.  Result of CT scan were discussed with patient and her family.  -CT of chest, abdomen and pelvis with contrast done on December 7, 2018 after cycle 9 of Palbociclib and letrozole showed stable disease involving liver and bones.  Result of CT scan were discussed with patient.  -Palbociclib was held from January 17, 2019 until February 8, 2019 secondary to wisdom tooth extraction.  -Patient resume cycle 11 of palbociclib and letrozole on February 8, 2019.  -CT of chest, abdomen and pelvis with contrast done on December 18, 2019 shows stable metastatic disease involving liver and bones.  There is no evidence of progression.  - We will go ahead with cycle 20 of palbociclib and letrozole today on December 20, 2019  -We will ask patient to return to clinic in 5 weeks with repeat CBC, CMP and tumor marker consisting of CA 15-3 and CA 27-29 to be done on that day.    -We will get CT of chest abdomen and pelvis with contrast around March 2020.    2.  Brain metastasis:MRi Brain with and without done on November 9, 2017 was reviewed and  compared with t MRI done 6 weeks prior to that.  Brain calvarial metastasis are stable or somewhat improved.  No need to do radiation at this point which was discussed with patient.    -MRI of brain with and without contrast done on July 9, 2018 shows improvement in calvarial metastasis.  And of repeating more MRI unless patient has any symptoms suggestive of worsening brain involvement.    3.  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  She is currently on Coumadin.    She is being followed by Coumadin clinic    4.  History of melanoma in situ status post excision by Dr. Linn.    5.  Bone metastasis: Patient was started on Zometa on August 23, 2017.  Continue with Zometa as scheduled for now.  Patient denies any mouth sores or jaw pain.  CT scan shows about loss of 70% of height of L4 vertebral body, patient has been referred to Dr. Pugh to get evaluated for kyphoplasty.  Patient went for second opinion with orthopedic surgeon in [place], as per patient's second surgeon did not recommend any surgery.    -Zometa Has been held since November 2018 due to wisdom tooth extraction requirement.  -   Zometa has been resumed  on April 19, 2019       6.  History of ductal carcinoma in situ of the right breast diagnosed in 2007.  Status post mastectomy followed by adjuvant tamoxifen for 5 years which was finished in 2013.    7.  Neutropenia: Secondary to Palbociclib.resolved. WBC is normal at 4.34 with ANC 2.06 .      8.  Elevated liver function:  -AST and ALT are normal today.  Will monitor with CMP for now.  CT of abdomen and pelvis does not show any evidence of worsening hepatic metastasis.    9.  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  She remains on gabapentin 300 mg 3 times a day.  Prescription for gabapentin has been sent to her pharmacy today on December 9, 2019.    10.  Health maintenance:  Patient does not smoke.  Not a candidate for screening colonoscopy at this point.      11.  Prescriptions: Patient has enough prescription of Decadron, Zofran,  and Ultram.  Prescription for Neurontin 300 mg 3 times a day has been sent to her pharmacy  on December 9, 2019.    12.  Advance care planning: Patient remains full code for now and is able to make on her decisions.  Patient has healthcare surrogate Mentioned on Chart.    13. Obesity: Patient's Body mass index is 36.01 kg/m². BMI is higher than reference range.  Patient was notified about elevated BMI.  Patient was counseled about diet and exercise for weight loss.    14.  Pain assessment:  -Patient denies any pain today.        Ovidio Meadows MD  12/20/2019  10:22 AM        EMR Dragon/Transcription disclaimer:   Much of this encounter note is an electronic transcription/translation of spoken language to printed text. The electronic translation of spoken language may permit erroneous, or at times, nonsensical words or phrases to be inadvertently transcribed; Although I have reviewed the note for such errors, some may still exist.

## 2019-12-20 NOTE — PROGRESS NOTES
Today's INR is 1.9.  Pt denies med changes or bleeding problems. Pt states she missed Wednesday nights dose. Pt was instructed to continue current dose and avoid green veggies 2 days; pt verbalized. Patient instructed regarding medication; results given and questions answered. Nutritional counseling given.  Dietary factors affecting therapy addressed.  Patient instructed to monitor for excessive bruising or bleeding. Will recheck in 3 weeks. Findings reported by Tata Nunez RN.       This document has been electronically signed by EDIS Bond-BC @  On December 20, 2019 8:44 AM

## 2019-12-21 LAB — CANCER AG27-29 SERPL-ACNC: 21.5 U/ML (ref 0–38.6)

## 2020-01-24 ENCOUNTER — INFUSION (OUTPATIENT)
Dept: ONCOLOGY | Facility: HOSPITAL | Age: 50
End: 2020-01-24

## 2020-01-24 ENCOUNTER — OFFICE VISIT (OUTPATIENT)
Dept: ONCOLOGY | Facility: CLINIC | Age: 50
End: 2020-01-24

## 2020-01-24 ENCOUNTER — ANTICOAGULATION VISIT (OUTPATIENT)
Dept: CARDIAC SURGERY | Facility: CLINIC | Age: 50
End: 2020-01-24

## 2020-01-24 VITALS
BODY MASS INDEX: 35.63 KG/M2 | TEMPERATURE: 97.5 F | SYSTOLIC BLOOD PRESSURE: 120 MMHG | HEIGHT: 67 IN | HEART RATE: 72 BPM | RESPIRATION RATE: 18 BRPM | WEIGHT: 227 LBS | DIASTOLIC BLOOD PRESSURE: 73 MMHG

## 2020-01-24 VITALS — OXYGEN SATURATION: 98 % | SYSTOLIC BLOOD PRESSURE: 114 MMHG | HEART RATE: 80 BPM | DIASTOLIC BLOOD PRESSURE: 68 MMHG

## 2020-01-24 DIAGNOSIS — C50.911 MALIGNANT NEOPLASM OF RIGHT BREAST IN FEMALE, ESTROGEN RECEPTOR POSITIVE, UNSPECIFIED SITE OF BREAST (HCC): ICD-10-CM

## 2020-01-24 DIAGNOSIS — Z01.419 ENCOUNTER FOR GYNECOLOGICAL EXAMINATION: ICD-10-CM

## 2020-01-24 DIAGNOSIS — C79.31 METASTASIS TO BRAIN (HCC): ICD-10-CM

## 2020-01-24 DIAGNOSIS — T45.1X5A CHEMOTHERAPY-INDUCED NEUTROPENIA (HCC): ICD-10-CM

## 2020-01-24 DIAGNOSIS — Z17.0 MALIGNANT NEOPLASM OF RIGHT BREAST IN FEMALE, ESTROGEN RECEPTOR POSITIVE, UNSPECIFIED SITE OF BREAST (HCC): Primary | ICD-10-CM

## 2020-01-24 DIAGNOSIS — C50.911 MALIGNANT NEOPLASM OF RIGHT BREAST IN FEMALE, ESTROGEN RECEPTOR POSITIVE, UNSPECIFIED SITE OF BREAST (HCC): Primary | ICD-10-CM

## 2020-01-24 DIAGNOSIS — C79.51 BONE METASTASIS: ICD-10-CM

## 2020-01-24 DIAGNOSIS — Z17.0 MALIGNANT NEOPLASM OF RIGHT BREAST IN FEMALE, ESTROGEN RECEPTOR POSITIVE, UNSPECIFIED SITE OF BREAST (HCC): ICD-10-CM

## 2020-01-24 DIAGNOSIS — D70.1 CHEMOTHERAPY-INDUCED NEUTROPENIA (HCC): ICD-10-CM

## 2020-01-24 DIAGNOSIS — Z79.01 LONG TERM CURRENT USE OF ANTICOAGULANT THERAPY: ICD-10-CM

## 2020-01-24 DIAGNOSIS — Z45.2 ENCOUNTER FOR VENOUS ACCESS DEVICE CARE: ICD-10-CM

## 2020-01-24 DIAGNOSIS — C78.7 LIVER METASTASIS: ICD-10-CM

## 2020-01-24 LAB
ALBUMIN SERPL-MCNC: 4.2 G/DL (ref 3.5–5.2)
ALBUMIN/GLOB SERPL: 1.5 G/DL
ALP SERPL-CCNC: 66 U/L (ref 39–117)
ALT SERPL W P-5'-P-CCNC: 19 U/L (ref 1–33)
ANION GAP SERPL CALCULATED.3IONS-SCNC: 11 MMOL/L (ref 5–15)
AST SERPL-CCNC: 21 U/L (ref 1–32)
BASOPHILS # BLD AUTO: 0.08 10*3/MM3 (ref 0–0.2)
BASOPHILS NFR BLD AUTO: 2.4 % (ref 0–1.5)
BILIRUB SERPL-MCNC: 0.2 MG/DL (ref 0.2–1.2)
BUN BLD-MCNC: 11 MG/DL (ref 6–20)
BUN/CREAT SERPL: 13.3 (ref 7–25)
CALCIUM SPEC-SCNC: 8.9 MG/DL (ref 8.6–10.5)
CANCER AG15-3 SERPL-ACNC: 26.3 U/ML
CHLORIDE SERPL-SCNC: 104 MMOL/L (ref 98–107)
CO2 SERPL-SCNC: 25 MMOL/L (ref 22–29)
CREAT BLD-MCNC: 0.83 MG/DL (ref 0.57–1)
DEPRECATED RDW RBC AUTO: 51.8 FL (ref 37–54)
EOSINOPHIL # BLD AUTO: 0.06 10*3/MM3 (ref 0–0.4)
EOSINOPHIL NFR BLD AUTO: 1.8 % (ref 0.3–6.2)
ERYTHROCYTE [DISTWIDTH] IN BLOOD BY AUTOMATED COUNT: 14.7 % (ref 12.3–15.4)
GFR SERPL CREATININE-BSD FRML MDRD: 72 ML/MIN/1.73
GLOBULIN UR ELPH-MCNC: 2.8 GM/DL
GLUCOSE BLD-MCNC: 107 MG/DL (ref 65–99)
HCT VFR BLD AUTO: 34.9 % (ref 34–46.6)
HGB BLD-MCNC: 12 G/DL (ref 12–15.9)
IMM GRANULOCYTES # BLD AUTO: 0.01 10*3/MM3 (ref 0–0.05)
IMM GRANULOCYTES NFR BLD AUTO: 0.3 % (ref 0–0.5)
INR PPP: 2 (ref 0.9–1.1)
LYMPHOCYTES # BLD AUTO: 1.11 10*3/MM3 (ref 0.7–3.1)
LYMPHOCYTES NFR BLD AUTO: 33.7 % (ref 19.6–45.3)
MAGNESIUM SERPL-MCNC: 2 MG/DL (ref 1.6–2.6)
MCH RBC QN AUTO: 33.1 PG (ref 26.6–33)
MCHC RBC AUTO-ENTMCNC: 34.4 G/DL (ref 31.5–35.7)
MCV RBC AUTO: 96.1 FL (ref 79–97)
MONOCYTES # BLD AUTO: 0.51 10*3/MM3 (ref 0.1–0.9)
MONOCYTES NFR BLD AUTO: 15.5 % (ref 5–12)
NEUTROPHILS # BLD AUTO: 1.52 10*3/MM3 (ref 1.7–7)
NEUTROPHILS NFR BLD AUTO: 46.3 % (ref 42.7–76)
NRBC BLD AUTO-RTO: 0 /100 WBC (ref 0–0.2)
PHOSPHATE SERPL-MCNC: 3 MG/DL (ref 2.5–4.5)
PLATELET # BLD AUTO: 201 10*3/MM3 (ref 140–450)
PMV BLD AUTO: 9.9 FL (ref 6–12)
POTASSIUM BLD-SCNC: 3.7 MMOL/L (ref 3.5–5.2)
PROT SERPL-MCNC: 7 G/DL (ref 6–8.5)
RBC # BLD AUTO: 3.63 10*6/MM3 (ref 3.77–5.28)
SODIUM BLD-SCNC: 140 MMOL/L (ref 136–145)
WBC NRBC COR # BLD: 3.29 10*3/MM3 (ref 3.4–10.8)

## 2020-01-24 PROCEDURE — 25010000002 ZOLEDRONIC ACID PER 1 MG: Performed by: INTERNAL MEDICINE

## 2020-01-24 PROCEDURE — 84100 ASSAY OF PHOSPHORUS: CPT

## 2020-01-24 PROCEDURE — 80053 COMPREHEN METABOLIC PANEL: CPT

## 2020-01-24 PROCEDURE — 86300 IMMUNOASSAY TUMOR CA 15-3: CPT

## 2020-01-24 PROCEDURE — 96374 THER/PROPH/DIAG INJ IV PUSH: CPT | Performed by: INTERNAL MEDICINE

## 2020-01-24 PROCEDURE — G8730 PAIN DOC POS AND PLAN: HCPCS | Performed by: INTERNAL MEDICINE

## 2020-01-24 PROCEDURE — 99214 OFFICE O/P EST MOD 30 MIN: CPT | Performed by: INTERNAL MEDICINE

## 2020-01-24 PROCEDURE — 1123F ACP DISCUSS/DSCN MKR DOCD: CPT | Performed by: INTERNAL MEDICINE

## 2020-01-24 PROCEDURE — 25010000003 HEPARIN LOCK FLUCH PER 10 UNITS

## 2020-01-24 PROCEDURE — G9903 PT SCRN TBCO ID AS NON USER: HCPCS | Performed by: INTERNAL MEDICINE

## 2020-01-24 PROCEDURE — 85025 COMPLETE CBC W/AUTO DIFF WBC: CPT

## 2020-01-24 PROCEDURE — 85610 PROTHROMBIN TIME: CPT | Performed by: NURSE PRACTITIONER

## 2020-01-24 PROCEDURE — 83735 ASSAY OF MAGNESIUM: CPT

## 2020-01-24 RX ORDER — SODIUM CHLORIDE 9 MG/ML
250 INJECTION, SOLUTION INTRAVENOUS ONCE
Status: CANCELLED | OUTPATIENT
Start: 2020-01-24

## 2020-01-24 RX ORDER — SODIUM CHLORIDE 0.9 % (FLUSH) 0.9 %
10 SYRINGE (ML) INJECTION AS NEEDED
Status: CANCELLED | OUTPATIENT
Start: 2020-02-28

## 2020-01-24 RX ORDER — HEPARIN SODIUM (PORCINE) LOCK FLUSH IV SOLN 100 UNIT/ML 100 UNIT/ML
500 SOLUTION INTRAVENOUS AS NEEDED
Status: DISCONTINUED | OUTPATIENT
Start: 2020-01-24 | End: 2020-01-24 | Stop reason: HOSPADM

## 2020-01-24 RX ORDER — SODIUM CHLORIDE 0.9 % (FLUSH) 0.9 %
10 SYRINGE (ML) INJECTION AS NEEDED
Status: DISCONTINUED | OUTPATIENT
Start: 2020-01-24 | End: 2020-01-24 | Stop reason: HOSPADM

## 2020-01-24 RX ORDER — HEPARIN SODIUM (PORCINE) LOCK FLUSH IV SOLN 100 UNIT/ML 100 UNIT/ML
SOLUTION INTRAVENOUS
Status: COMPLETED
Start: 2020-01-24 | End: 2020-01-24

## 2020-01-24 RX ORDER — HEPARIN SODIUM (PORCINE) LOCK FLUSH IV SOLN 100 UNIT/ML 100 UNIT/ML
500 SOLUTION INTRAVENOUS AS NEEDED
Status: CANCELLED | OUTPATIENT
Start: 2020-02-28

## 2020-01-24 RX ORDER — SODIUM CHLORIDE 9 MG/ML
250 INJECTION, SOLUTION INTRAVENOUS ONCE
Status: COMPLETED | OUTPATIENT
Start: 2020-01-24 | End: 2020-01-24

## 2020-01-24 RX ADMIN — ZOLEDRONIC ACID 4 MG: 4 INJECTION, SOLUTION, CONCENTRATE INTRAVENOUS at 10:56

## 2020-01-24 RX ADMIN — SODIUM CHLORIDE, PRESERVATIVE FREE 10 ML: 5 INJECTION INTRAVENOUS at 11:37

## 2020-01-24 RX ADMIN — HEPARIN 500 UNITS: 100 SYRINGE at 11:38

## 2020-01-24 RX ADMIN — SODIUM CHLORIDE 250 ML: 9 INJECTION, SOLUTION INTRAVENOUS at 10:57

## 2020-01-24 RX ADMIN — HEPARIN SODIUM (PORCINE) LOCK FLUSH IV SOLN 100 UNIT/ML 500 UNITS: 100 SOLUTION at 11:38

## 2020-01-24 NOTE — PROGRESS NOTES
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  Started on palliative chemotherapy with Taxotere and Cytoxan on July 26, 2017.  Patient received 7 cycle of Taxotere and Cytoxan on December 6, 2017.  After that patient was changed over to treatment with Palbociclib and letrozole on January 30, 2018.    Patient is due to start  cycle 21 of palbociclib and letrozole today on January 24, 2020.    Denies any nausea or vomiting or diarrhea with current treatment.  Complains of tingling and numbness affecting bilateral hand and feet.   Denies any bowel or bladder incontinence.      PAST MEDICAL HISTORY:    Past Medical History:   Diagnosis Date   • Anxiety    • Depression    • Encounter for gynecological examination    • Malignant neoplasm of female breast (CMS/HCC)     hx of dcis s/p mastectomy and reconstruction and augmentation      • Primary malignant neoplasm of breast (CMS/HCC)     dcis in rt breast s/p mastectomy,reconstruction      • Ventricular premature beats        SOCIAL HISTORY:    Social History     Tobacco Use   • Smoking status: Never Smoker   • Smokeless tobacco: Never Used   Substance Use Topics   • Alcohol use: No   • Drug use: No       Surgical History :  Past Surgical History:   Procedure Laterality Date   • AUGMENTATION MAMMAPLASTY     • AXILLARY LYMPH NODE BIOPSY/EXCISION Right 7/10/2017    Procedure: BIOSPY RIGHT AXILLARY MASS AND OR LYMPH;  Surgeon: Sourav Ernst MD;  Location: [facility];  Service:    • BREAST BIOPSY  12/07/2007    Mammotome biopsy of the right side with clip placement   • BREAST  LUMPECTOMY     • BREAST RECONSTRUCTION  10/28/2008    Abscence of right breast secondary to mastectomy. Implant exchange for reconstruction of right breast with open para-prosthetic capsulotomy   • BREAST SURGERY  10/01/2009    Left breast ptosis and breast asymmetry secondary to right breast reconstruction for breast cancer. Left breast mastopexy with augmentation.   • CARDIAC CATHETERIZATION  09/18/2008    Normal coronary arteriography. Normal left ventricular angiogram   • EP STUDY     • MASTECTOMY Right    • MASTECTOMY  02/05/2008    Multifocal right breast ductal carcinoma-in-situ. Right total mastectomy   • MASTECTOMY, PARTIAL  01/03/2008    Ductal carcinoma in-situ of the right breast, proven by mammotome biopsy. Right lumpectomy, medial portion of the breast, between 2 o'clock and 4 o'clock, 5 cm from the nipple, with clip placement, radiographic inter. of severo. specimen, & ultrasound   • PAP SMEAR  03/03/2009    Negative   • MT INSJ TUNNELED CVC W/O SUBQ PORT/ AGE 3 YR/> Right 7/10/2017    Procedure: MEDIPORT     (c-arm#2);  Surgeon: Sourav Ernst MD;  Location: [facility];  Service: General       ALLERGIES:    Allergies   Allergen Reactions   • Percocet [Oxycodone-Acetaminophen] Nausea And Vomiting       FAMILY HISTORY:  Family History   Problem Relation Age of Onset   • Anemia Mother    • Multiple sclerosis Mother    • No Known Problems Father    • Diabetes Other    • Multiple sclerosis Other        REVIEW OF SYSTEMS:      CONSTITUTIONAL: Complains of fatigue.  Has lost 3 pounds since last clinic visit.  Denies any fever, chills .      HEENT:  No epistaxis, mouth sores or difficulty swallowing.     RESPIRATORY: No new shortness of breath. No new cough or hemoptysis.     CARDIOVASCULAR: No chest pain or palpitations.     GASTROINTESTINAL: No nausea or vomiting.   No new abdominal pain. No blood in the stool.     GENITOURINARY: No Dysuria or Hematuria.     MUSCULOSKELETAL:Complains of arthritic pain  "affecting bilateral hands. Complains of pain in bilateral hip.  Complains of  of back pain .     LYMPHATICS: No new lymph node swelling.     NEUROLOGICAL : Complains of intermittent tingling and numbness affecting bilateral hand. Complains of tingling and numbness from back radiating down to bilateral lower extremity .  No dizziness. No seizures or balance problems.     SKIN: Positive for history of melanoma status post surgical removal. Denies any new skin lesion worrisome for melanoma.              PHYSICAL EXAMINATION:      VITAL SIGNS:  /73   Pulse 72   Temp 97.5 °F (36.4 °C) (Temporal)   Resp 18   Ht 170.2 cm (67.01\")   Wt 103 kg (227 lb)   BMI 35.54 kg/m²      ECOG performance status:1    GENERAL:  Not in any distress.    HEENT:  Normocephalic, Atraumatic.Mild Conjunctival pallor. No icterus. Extraocular Movements Intact. No Facial Asymmetry noted.  Wound after extraction of left lower jaw wisdom tooth is healed.    NECK:  No adenopathy. No JVD.Trachea in midline    RESPIRATORY:  Fair air entry bilateral. No rhonchi or wheezing.    CARDIOVASCULAR:  S1, S2. Regular rate and rhythm. No murmur or gallop appreciated.    ABDOMEN:  Soft, obese, nontender. Bowel sounds present in all four quadrants.  No hepatosplenomegaly appreciated.    MUSCULOSKELETAL:  No edema.No Calf Tenderness.  Decreased range of motion.    NEUROLOGIC:  Alert, awake and oriented ×3.  No  Motor  deficit appreciated. Cranial Nerves 2-12 grossly intact.    LYMPHATICS: No new lymph node enlargement in neck or supraclavicular area.    PSYCHIATRY: Alert, awake and oriented ×3. Normal affect. Normal Judgement.Makes good eye contact.          DIAGNOSTIC DATA:    Glucose   Date Value Ref Range Status   01/24/2020 107 (H) 65 - 99 mg/dL Final     Sodium   Date Value Ref Range Status   01/24/2020 140 136 - 145 mmol/L Final     Potassium   Date Value Ref Range Status   01/24/2020 3.7 3.5 - 5.2 mmol/L Final     CO2   Date Value Ref Range Status "   01/24/2020 25.0 22.0 - 29.0 mmol/L Final     Chloride   Date Value Ref Range Status   01/24/2020 104 98 - 107 mmol/L Final     Anion Gap   Date Value Ref Range Status   01/24/2020 11.0 5.0 - 15.0 mmol/L Final     Creatinine   Date Value Ref Range Status   01/24/2020 0.83 0.57 - 1.00 mg/dL Final     BUN   Date Value Ref Range Status   01/24/2020 11 6 - 20 mg/dL Final     BUN/Creatinine Ratio   Date Value Ref Range Status   01/24/2020 13.3 7.0 - 25.0 Final     Calcium   Date Value Ref Range Status   01/24/2020 8.9 8.6 - 10.5 mg/dL Final     eGFR Non  Amer   Date Value Ref Range Status   01/24/2020 72 >60 mL/min/1.73 Final     Alkaline Phosphatase   Date Value Ref Range Status   01/24/2020 66 39 - 117 U/L Final     Total Protein   Date Value Ref Range Status   01/24/2020 7.0 6.0 - 8.5 g/dL Final     ALT (SGPT)   Date Value Ref Range Status   01/24/2020 19 1 - 33 U/L Final     AST (SGOT)   Date Value Ref Range Status   01/24/2020 21 1 - 32 U/L Final     Total Bilirubin   Date Value Ref Range Status   01/24/2020 0.2 0.2 - 1.2 mg/dL Final     Albumin   Date Value Ref Range Status   01/24/2020 4.20 3.50 - 5.20 g/dL Final     Globulin   Date Value Ref Range Status   01/24/2020 2.8 gm/dL Final     Lab Results   Component Value Date    WBC 3.29 (L) 01/24/2020    HGB 12.0 01/24/2020    HCT 34.9 01/24/2020    MCV 96.1 01/24/2020     01/24/2020     Lab Results   Component Value Date    NEUTROABS 1.52 (L) 01/24/2020     Lab Results   Component Value Date     24.5 12/20/2019    LABCA2 21.5 12/20/2019         2 D Echo Done on July 19, 2017 showed:  Interpretation Summary   · The left ventricular cavity is borderline dilated.  · Left ventricular systolic function is normal. Estimated EF = 53%.  · Left ventricular diastolic dysfunction (grade I) consistent with impaired relaxation.  · IAS aneurysm noted. No PFO or ASD         Component CA 27.29   Latest Ref Rng & Units 0.0 - 38.6 U/mL   6/29/2017 1817.1 (H)    8/23/2017 1075.9 (H)   10/4/2017 288.9 (H)   10/25/2017 181.2 (H)   11/15/2017 112.2 (H)   12/6/2017 101.6 (H)   12/27/2017 90.3 (H)   2/28/2018 52.1 (H)   4/2/2018 12.4   5/7/2018 5/14/2018 38.1   6/11/2018 39.7 (H)   7/9/2018 37.9   8/27/2018 34.1   9/24/2018 38.1   11/5/2018 31.2   12/13/2018 30.7   3/15/2019 31.2   4/19/2019 23.1   5/24/2019 35.0   6/28/2019 24.5   8/2/2019 27.4   9/6/2019 23.3   10/11/2019 22.9               Component CA 15-3   Latest Ref Rng & Units <=25.0 U/mL   6/29/2017 2107.0 (H)   8/23/2017 943.0 (H)   10/4/2017 282.2 (H)   10/25/2017 162.2 (H)   11/15/2017 118.0 (H)   12/6/2017 99.6 (H)   12/27/2017 88.3 (H)   2/28/2018 47.2 (H)   4/2/2018 11.9   5/7/2018 5/14/2018 41.1 (H)   6/11/2018 37.3 (H)   7/9/2018 35.3 (H)   8/27/2018 36.7 (H)   9/24/2018 31.1 (H)   11/5/2018 32.9 (H)   12/13/2018 32.6 (H)   3/15/2019 32.6 (H)   4/19/2019 29.2 (H)   5/24/2019 28.3 (H)   6/28/2019 26.9 (H)   8/2/2019 26.8 (H)   9/6/2019 25.9 (H)   10/11/2019 29.6 (H)             Radiology Data :  CT of chest, abdomen and pelvis with contrast done on December 18, 2019 was reviewed, discussed with patient, it showed:  IMPRESSION:  No acute pulmonary or pleural finding.     No pulmonary mass, suspicious nodule, or adenopathy.     Redemonstration of the suspected treated metastatic lesion  involving the left lobe of the liver demonstrating very slight  decreased size. No new hepatic mass or ductal dilation.     No CT evidence of acute abdominal or pelvic finding. No  adenopathy.     Redemonstration of extensive sclerotic metastatic disease.        CT of chest, abdomen and pelvis with contrast done on July 31, 2019 was reviewed, discussed with patient, it showed:  - - - CT CHEST - - -  Right-sided Mediport terminates in the superior vena cava.     PULMONARY PARENCHYMA:      - air spaces: Negative    - interstitium: Grossly within normal limits for age    - misc: No pulmonary nodules or mass     MEDIASTINUM /  LATANYA:      - heart: Normal size, no pericardial fluid    - aorta/great vessels: Normal caliber and configuration for age    - misc: No mediastinal mass / significant adenopathy     PLEURAL COMPARTMENT:      - misc: No pleural fluid or mass        MISC:      - inferior neck: Negative    - osseous / body wall: Postoperative changes of both breasts  are present.      - - - CT ABDOMEN - - -      ABDOMEN:   - LIVER:  Normal size / contour, no ductal dilatation, no focal  lesion   - GB:  Grossly negative    - CBD:  Grossly negative   - SPLEEN:  The spleen is enlarged at 14 cm in greatest  dimension. A low-attenuation lesion in the left lobe of the liver  anteriorly containing numerous calcifications is present,  measuring 5.2 x 5.1 cm in greatest axial dimension, unchanged,  and consistent with treated metastasis. No new hepatic lesions  are identified.   - PANCREAS:  Normal in size, contour, no focal mass    - VISCERA:  Normal caliber, no wall thickening   - MESENTERY:  No mesenteric mass   - CAVITY:  No free abdominal fluid, no free intraperitoneal air   - BODY WALL:  Within normal limits   - OSSEOUS:  Innumerable primarily sclerotic osseous metastases  are seen throughout the visualized portion of the skeleton.  Severe compression fracture of L4 is unchanged from the previous  study, including spinal stenosis at this level due to  retropulsion of fracture fragments.      RETROPERITONEUM:   - KIDNEYS:Normal size / contour, no collecting system dilation                    No evidence of an enhancing mass   - URETERS:  Normal course, caliber   - ADRENALS:  Normal size, contour   - MISC:  No sig. retroperitoneal adenopathy or mass   - VASCULAR:  Aorta/iliacs: within normal limits for age     - - - CT PELVIS - - -       - VISCERA:  Normal caliber small/large bowel, no focal   thickening/mass    - APPENDIX:  Within normal limits   - MESENTERY:  No mass   - VASCULAR:  Within normal limits for age   - CAVITY:  No free  fluid/air   - BLADDER:  Unremarkable   - OSSEOUS:  Within normal limits    - MISC:   - UTERUS/OVARY: grossly unremarkable     IMPRESSION:  1. Stable single hepatic metastasis.  2. Extensive mostly sclerotic osseous metastases, stable, with  unchanged appearance of pathologic fracture of L4.  3. No new, worrisome lesions identified.          CT of chest, abdomen and pelvis with contrast done on April 17, 2019 was reviewed, discussed with patient, it showed:  IMPRESSION:  CONCLUSION:      1. Stable examination. Presumed treated hepatic metastases in the  left lobe, unchanged. Multiple predominantly osteosclerotic bone  lesions, unchanged.             MRI of brain with and without contrast done on July 9, 2018 was reviewed, discussed with patient, it showed:  IMPRESSION:  No evidence of intracranial hemorrhage, mass/mass effect, acute  infarct, or pathologic contrast enhancement.     Previous described/known enhancing calvarial metastatic lesions  are significantly less conspicuous on today's examination. The  finding would support favorable treatment response.     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          Doppler ultrasound of right upper extremity done on November 10, 2017 showed:  IMPRESSION:  CONCLUSION:   Abnormal exam with thrombus visualized in the right internal  jugular vein, consistent with DVT.      PET/CT done on July 3, 2017 showed:  IMPRESSION:  CONCLUSION:  1. Extensive metastatic disease. Pathologic uptake within  enlarged right-sided axillary lymph nodes. Metastatic adenopathy  in both laurie and mediastinum. Tumor replacing most of the left  lobe of liver. Intra-abdominal retroperitoneal metastases,  adenopathy.     Extensive skeletal metastases including a pathologic fracture at  L4.        Nuclear bone scan done on June 20, 2017 showed:  1. Increased uptake at L4 suggestive of metastatic process.  2. 3 or 4 areas of increased activity in bony calvarium  3. Some  increased activity in tips posteriorly and anteriorly suggestive of metastatic carcinoma to the skeletal system.           Pathology :    Pathology data from July 10, 2017 showed:  Final Diagnosis   Mass right axilla, excisional biopsy:      Metastatic poorly differentiated ductal carcinoma, breast primary      Estrogen receptor positive, 95% stainability, strong intensity      Progesterone receptor positive, 95% stainability, strong intensity      HER-2 2+(equivocal), sent to [facility] for FISH     Addendum   Her 2 FISH result from [facility] is NEGATIVE         Pathology report from December 7, 2007 showed:  FINAL DIAGNOSIS:   RIGHT BREAST MAMMOTOME BIOPSY:        DUCTAL CARCINOMA IN SITU, INTERMEDIATE NUCLEAR GRADE              WITH MICROCALCIFICATION.      ADDENDUM:   Estrogen receptors are positive (90-95% stainability).   Progesterone receptors are positive (90-95% stainability).         ASSESSMENT AND PLAN:      1.  Metastatic cancer with extensive adenopathy in right axilla, mediastinum, hilar, abdominal, retroperitoneal with extensive liver and bone metastasis on PET/CT.  Patient underwent right apatient underwent right axillary lymph node excision by Dr. Ernst on July 10, 2017.Pathology report confirmed ductal carcinoma of breast with estrogen and progesterone positivity.  At her on palliative chemotherapy with Taxotere and cytoxan on July 26, 2017.   Patient was  seen at [facility] for second opinion regarding her breast cancer.  Case was discussed with Dr vazquez oncologist that has seen patient at [facility].  She agreed with her chemotherapy at this point.  The scan on December 20, 2017 shows relative stability of disease, lesion in the liver as well as bone lesion at about same.  Her tumor marker CA 27-29 as well as CA 15-3 are less than 100 at this point.  Since patient is developing some neuropathy in bilateral upper and lower extremity recommend changing her therapy to Palbociclib with  letrozole.  Patient started taking first round of Palbociclib and letrozole on January 30, 2018.  Patient was scheduled to start cycle  6 of chemotherapy today with Palbociclib today on July 16, 2018.   -CT of chest, abdomen and pelvis with contrast done on July 9, 2018 show stable disease which was discussed with patient.  -MRI of brain with and without contrast done on July 9, 2018 shows significant improvement in calvarial metastasis which was discussed with patient.  -CT of chest, abdomen and pelvis with contrast done on September 19, 2018 showed stable disease without any new lymphadenopathy or any new lesions.  Result of CT scan were discussed with patient and her family.  -CT of chest, abdomen and pelvis with contrast done on December 7, 2018 after cycle 9 of Palbociclib and letrozole showed stable disease involving liver and bones.  Result of CT scan were discussed with patient.  -Palbociclib was held from January 17, 2019 until February 8, 2019 secondary to wisdom tooth extraction.  -Patient resume cycle 11 of palbociclib and letrozole on February 8, 2019.  -CT of chest, abdomen and pelvis with contrast done on December 18, 2019 shows stable metastatic disease involving liver and bones.  There is no evidence of progression.  - We will go ahead with cycle 21 of palbociclib and letrozole today on January 24 2020  -We will ask patient to return to clinic in 5 weeks with repeat CBC, CMP and tumor marker consisting of CA 15-3 and CA 27-29 to be done on that day.    -We will get CT of chest abdomen and pelvis with contrast around March 2020.    2.  Brain metastasis:MRi Brain with and without done on November 9, 2017 was reviewed and compared with t MRI done 6 weeks prior to that.  Brain calvarial metastasis are stable or somewhat improved.  No need to do radiation at this point which was discussed with patient.    -MRI of brain with and without contrast done on July 9, 2018 shows improvement in calvarial  metastasis.  And of repeating more MRI unless patient has any symptoms suggestive of worsening brain involvement.    3.  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  She is currently on Coumadin.    She is being followed by Coumadin clinic    4.  History of melanoma in situ status post excision by Dr. Linn.    5.  Bone metastasis: Patient was started on Zometa on August 23, 2017.  Continue with Zometa as scheduled for now.  Patient denies any mouth sores or jaw pain.  CT scan shows about loss of 70% of height of L4 vertebral body, patient has been referred to Dr. Pugh to get evaluated for kyphoplasty.  Patient went for second opinion with orthopedic surgeon in [place], as per patient's second surgeon did not recommend any surgery.    -Zometa Has been held since November 2018 due to wisdom tooth extraction requirement.  -   Zometa has been resumed  on April 19, 2019       6.  History of ductal carcinoma in situ of the right breast diagnosed in 2007.  Status post mastectomy followed by adjuvant tamoxifen for 5 years which was finished in 2013.    7.  Neutropenia: Secondary to Palbociclib.White blood cell count is 3.29 today with absolute neutrophil count of 1520.  Since ANC is greater than 1500 okay to resume palbociclib from today.    8.  Elevated liver function:  -AST and ALT are normal today.  Will monitor with CMP for now.  CT of abdomen and pelvis does not show any evidence of worsening hepatic metastasis.    9.  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  She remains on gabapentin 300 mg 3 times a day.  Prescription for gabapentin has been sent to her pharmacy today on December 9, 2019.    10.  Health maintenance: Patient does not smoke.  Not a candidate for screening colonoscopy at this point.      11.  Prescriptions: Patient has enough prescription of Decadron, Zofran,  and Ultram.   Prescription for Neurontin 300 mg 3 times a day has been sent to her pharmacy  on December 9, 2019.    12.  Advance care planning: Patient remains full code for now and is able to make on her decisions.  Patient has healthcare surrogate Mentioned on Chart.    13. Obesity: Patient's Body mass index is 35.54 kg/m². BMI is higher than reference range.  Patient was notified about elevated BMI.  Patient was counseled about diet and exercise for weight loss.    14.  Kylie García reports a pain score of 2.  Given her pain assessment as noted, treatment options were discussed and the following options were decided upon as a follow-up plan to address the patient's pain: Patient has enough prescription for Ultram and gabapentin.    15. PHQ-9 Total Score:               Ovidio Meadows MD  1/24/2020  9:42 AM        EMR Dragon/Transcription disclaimer:   Much of this encounter note is an electronic transcription/translation of spoken language to printed text. The electronic translation of spoken language may permit erroneous, or at times, nonsensical words or phrases to be inadvertently transcribed; Although I have reviewed the note for such errors, some may still exist.

## 2020-01-24 NOTE — PROGRESS NOTES
Today's INR is 2.0.  Patient states no med changes or bleeding problems or unexplained bruising. Patient instructed to continue current dosing schedule. Verbalizes understanding. Will recheck 1 month.  Patient instructed regarding medication; results given and questions answered. Nutritional counseling given.  Dietary factors affecting therapy addressed.  Patient instructed to monitor for excessive bruising or bleeding. Findings reported by Tata Nunez RN.       This document has been electronically signed by DAJUAN BondCNP-BC Terry  On January 24, 2020 8:29 AM

## 2020-01-25 LAB — CANCER AG27-29 SERPL-ACNC: 27.2 U/ML (ref 0–38.6)

## 2020-01-25 RX ORDER — LETROZOLE 2.5 MG/1
2.5 TABLET, FILM COATED ORAL DAILY
Qty: 30 TABLET | Refills: 3 | Status: SHIPPED | OUTPATIENT
Start: 2020-01-25 | End: 2020-05-08 | Stop reason: SDUPTHER

## 2020-01-31 ENCOUNTER — TELEPHONE (OUTPATIENT)
Dept: ONCOLOGY | Facility: CLINIC | Age: 50
End: 2020-01-31

## 2020-01-31 NOTE — TELEPHONE ENCOUNTER
Called pt, she is needing to send her FMLA paper work for us to fill out. Gave instructions on where to send them. Pt voiced understanding.

## 2020-02-17 ENCOUNTER — TELEPHONE (OUTPATIENT)
Dept: ONCOLOGY | Facility: CLINIC | Age: 50
End: 2020-02-17

## 2020-02-17 DIAGNOSIS — C79.51 BONE METASTASIS: Primary | ICD-10-CM

## 2020-02-17 RX ORDER — TRAMADOL HYDROCHLORIDE 50 MG/1
50 TABLET ORAL EVERY 8 HOURS PRN
Qty: 90 TABLET | Refills: 0 | Status: SHIPPED | OUTPATIENT
Start: 2020-02-17 | End: 2021-02-12 | Stop reason: SDUPTHER

## 2020-02-17 NOTE — TELEPHONE ENCOUNTER
Tramadol -1 tab every 8 hours prn  Gabapentin     Pt has just a few days left.      Pharmacy in [facility] is correct.    [phone]

## 2020-02-24 ENCOUNTER — DOCUMENTATION (OUTPATIENT)
Dept: CARDIAC SURGERY | Facility: CLINIC | Age: 50
End: 2020-02-24

## 2020-02-28 ENCOUNTER — INFUSION (OUTPATIENT)
Dept: ONCOLOGY | Facility: HOSPITAL | Age: 50
End: 2020-02-28

## 2020-02-28 ENCOUNTER — INFUSION (OUTPATIENT)
Dept: PEDIATRICS | Facility: HOSPITAL | Age: 50
End: 2020-02-28

## 2020-02-28 ENCOUNTER — ANTICOAGULATION VISIT (OUTPATIENT)
Dept: CARDIAC SURGERY | Facility: CLINIC | Age: 50
End: 2020-02-28

## 2020-02-28 ENCOUNTER — OFFICE VISIT (OUTPATIENT)
Dept: ONCOLOGY | Facility: CLINIC | Age: 50
End: 2020-02-28

## 2020-02-28 VITALS — HEART RATE: 72 BPM | OXYGEN SATURATION: 97 % | SYSTOLIC BLOOD PRESSURE: 128 MMHG | DIASTOLIC BLOOD PRESSURE: 72 MMHG

## 2020-02-28 VITALS
SYSTOLIC BLOOD PRESSURE: 127 MMHG | HEART RATE: 66 BPM | RESPIRATION RATE: 18 BRPM | BODY MASS INDEX: 35.52 KG/M2 | DIASTOLIC BLOOD PRESSURE: 75 MMHG | HEIGHT: 67 IN | WEIGHT: 226.3 LBS | TEMPERATURE: 98.3 F

## 2020-02-28 DIAGNOSIS — Z17.0 MALIGNANT NEOPLASM OF RIGHT BREAST IN FEMALE, ESTROGEN RECEPTOR POSITIVE, UNSPECIFIED SITE OF BREAST (HCC): ICD-10-CM

## 2020-02-28 DIAGNOSIS — Z45.2 ENCOUNTER FOR VENOUS ACCESS DEVICE CARE: ICD-10-CM

## 2020-02-28 DIAGNOSIS — C79.51 BONE METASTASIS: ICD-10-CM

## 2020-02-28 DIAGNOSIS — T45.1X5A NEUROPATHY DUE TO CHEMOTHERAPEUTIC DRUG (HCC): ICD-10-CM

## 2020-02-28 DIAGNOSIS — C78.7 LIVER METASTASIS: Primary | ICD-10-CM

## 2020-02-28 DIAGNOSIS — C50.911 MALIGNANT NEOPLASM OF RIGHT BREAST IN FEMALE, ESTROGEN RECEPTOR POSITIVE, UNSPECIFIED SITE OF BREAST (HCC): ICD-10-CM

## 2020-02-28 DIAGNOSIS — Z45.2 ENCOUNTER FOR VENOUS ACCESS DEVICE CARE: Primary | ICD-10-CM

## 2020-02-28 DIAGNOSIS — G62.0 NEUROPATHY DUE TO CHEMOTHERAPEUTIC DRUG (HCC): ICD-10-CM

## 2020-02-28 DIAGNOSIS — C79.31 METASTASIS TO BRAIN (HCC): ICD-10-CM

## 2020-02-28 DIAGNOSIS — C50.911 MALIGNANT NEOPLASM OF RIGHT BREAST IN FEMALE, ESTROGEN RECEPTOR POSITIVE, UNSPECIFIED SITE OF BREAST (HCC): Primary | ICD-10-CM

## 2020-02-28 DIAGNOSIS — Z17.0 MALIGNANT NEOPLASM OF RIGHT BREAST IN FEMALE, ESTROGEN RECEPTOR POSITIVE, UNSPECIFIED SITE OF BREAST (HCC): Primary | ICD-10-CM

## 2020-02-28 DIAGNOSIS — Z79.01 LONG TERM CURRENT USE OF ANTICOAGULANT THERAPY: ICD-10-CM

## 2020-02-28 LAB
ALBUMIN SERPL-MCNC: 4.2 G/DL (ref 3.5–5.2)
ALBUMIN/GLOB SERPL: 1.7 G/DL
ALP SERPL-CCNC: 69 U/L (ref 39–117)
ALT SERPL W P-5'-P-CCNC: 33 U/L (ref 1–33)
ANION GAP SERPL CALCULATED.3IONS-SCNC: 10 MMOL/L (ref 5–15)
AST SERPL-CCNC: 32 U/L (ref 1–32)
BASOPHILS # BLD AUTO: 0.11 10*3/MM3 (ref 0–0.2)
BASOPHILS NFR BLD AUTO: 3.4 % (ref 0–1.5)
BILIRUB SERPL-MCNC: 0.2 MG/DL (ref 0.2–1.2)
BUN BLD-MCNC: 7 MG/DL (ref 6–20)
BUN/CREAT SERPL: 8.4 (ref 7–25)
CALCIUM SPEC-SCNC: 9.6 MG/DL (ref 8.6–10.5)
CANCER AG15-3 SERPL-ACNC: 25.6 U/ML
CHLORIDE SERPL-SCNC: 107 MMOL/L (ref 98–107)
CO2 SERPL-SCNC: 25 MMOL/L (ref 22–29)
CREAT BLD-MCNC: 0.83 MG/DL (ref 0.57–1)
DEPRECATED RDW RBC AUTO: 53.6 FL (ref 37–54)
EOSINOPHIL # BLD AUTO: 0.06 10*3/MM3 (ref 0–0.4)
EOSINOPHIL NFR BLD AUTO: 1.8 % (ref 0.3–6.2)
ERYTHROCYTE [DISTWIDTH] IN BLOOD BY AUTOMATED COUNT: 15.1 % (ref 12.3–15.4)
GFR SERPL CREATININE-BSD FRML MDRD: 72 ML/MIN/1.73
GLOBULIN UR ELPH-MCNC: 2.5 GM/DL
GLUCOSE BLD-MCNC: 110 MG/DL (ref 65–99)
HCT VFR BLD AUTO: 34.6 % (ref 34–46.6)
HGB BLD-MCNC: 11.9 G/DL (ref 12–15.9)
IMM GRANULOCYTES # BLD AUTO: 0.02 10*3/MM3 (ref 0–0.05)
IMM GRANULOCYTES NFR BLD AUTO: 0.6 % (ref 0–0.5)
INR PPP: 2.3 (ref 0.9–1.1)
LYMPHOCYTES # BLD AUTO: 1.05 10*3/MM3 (ref 0.7–3.1)
LYMPHOCYTES NFR BLD AUTO: 32.2 % (ref 19.6–45.3)
MAGNESIUM SERPL-MCNC: 1.8 MG/DL (ref 1.6–2.6)
MCH RBC QN AUTO: 33.1 PG (ref 26.6–33)
MCHC RBC AUTO-ENTMCNC: 34.4 G/DL (ref 31.5–35.7)
MCV RBC AUTO: 96.4 FL (ref 79–97)
MONOCYTES # BLD AUTO: 0.5 10*3/MM3 (ref 0.1–0.9)
MONOCYTES NFR BLD AUTO: 15.3 % (ref 5–12)
NEUTROPHILS # BLD AUTO: 1.52 10*3/MM3 (ref 1.7–7)
NEUTROPHILS NFR BLD AUTO: 46.7 % (ref 42.7–76)
NRBC BLD AUTO-RTO: 0 /100 WBC (ref 0–0.2)
PHOSPHATE SERPL-MCNC: 3.7 MG/DL (ref 2.5–4.5)
PLATELET # BLD AUTO: 206 10*3/MM3 (ref 140–450)
PMV BLD AUTO: 9.7 FL (ref 6–12)
POTASSIUM BLD-SCNC: 3.9 MMOL/L (ref 3.5–5.2)
PROT SERPL-MCNC: 6.7 G/DL (ref 6–8.5)
RBC # BLD AUTO: 3.59 10*6/MM3 (ref 3.77–5.28)
SODIUM BLD-SCNC: 142 MMOL/L (ref 136–145)
WBC NRBC COR # BLD: 3.26 10*3/MM3 (ref 3.4–10.8)

## 2020-02-28 PROCEDURE — 25010000003 HEPARIN LOCK FLUCH PER 10 UNITS: Performed by: INTERNAL MEDICINE

## 2020-02-28 PROCEDURE — 80053 COMPREHEN METABOLIC PANEL: CPT

## 2020-02-28 PROCEDURE — 83735 ASSAY OF MAGNESIUM: CPT

## 2020-02-28 PROCEDURE — 86300 IMMUNOASSAY TUMOR CA 15-3: CPT

## 2020-02-28 PROCEDURE — 99214 OFFICE O/P EST MOD 30 MIN: CPT | Performed by: INTERNAL MEDICINE

## 2020-02-28 PROCEDURE — 96374 THER/PROPH/DIAG INJ IV PUSH: CPT | Performed by: INTERNAL MEDICINE

## 2020-02-28 PROCEDURE — G9903 PT SCRN TBCO ID AS NON USER: HCPCS | Performed by: INTERNAL MEDICINE

## 2020-02-28 PROCEDURE — 85610 PROTHROMBIN TIME: CPT | Performed by: NURSE PRACTITIONER

## 2020-02-28 PROCEDURE — G8730 PAIN DOC POS AND PLAN: HCPCS | Performed by: INTERNAL MEDICINE

## 2020-02-28 PROCEDURE — 84100 ASSAY OF PHOSPHORUS: CPT

## 2020-02-28 PROCEDURE — 85025 COMPLETE CBC W/AUTO DIFF WBC: CPT

## 2020-02-28 PROCEDURE — 25010000002 ZOLEDRONIC ACID PER 1 MG: Performed by: INTERNAL MEDICINE

## 2020-02-28 PROCEDURE — 1123F ACP DISCUSS/DSCN MKR DOCD: CPT | Performed by: INTERNAL MEDICINE

## 2020-02-28 RX ORDER — GABAPENTIN 300 MG/1
300 CAPSULE ORAL 3 TIMES DAILY
Qty: 90 CAPSULE | Refills: 0 | Status: SHIPPED | OUTPATIENT
Start: 2020-02-28 | End: 2020-05-04 | Stop reason: SDUPTHER

## 2020-02-28 RX ORDER — HEPARIN SODIUM (PORCINE) LOCK FLUSH IV SOLN 100 UNIT/ML 100 UNIT/ML
500 SOLUTION INTRAVENOUS AS NEEDED
Status: DISCONTINUED | OUTPATIENT
Start: 2020-02-28 | End: 2020-02-28 | Stop reason: HOSPADM

## 2020-02-28 RX ORDER — SODIUM CHLORIDE 0.9 % (FLUSH) 0.9 %
10 SYRINGE (ML) INJECTION AS NEEDED
Status: CANCELLED | OUTPATIENT
Start: 2020-04-03

## 2020-02-28 RX ORDER — HEPARIN SODIUM (PORCINE) LOCK FLUSH IV SOLN 100 UNIT/ML 100 UNIT/ML
500 SOLUTION INTRAVENOUS AS NEEDED
Status: CANCELLED | OUTPATIENT
Start: 2020-04-03

## 2020-02-28 RX ORDER — SODIUM CHLORIDE 0.9 % (FLUSH) 0.9 %
10 SYRINGE (ML) INJECTION AS NEEDED
Status: DISCONTINUED | OUTPATIENT
Start: 2020-02-28 | End: 2020-02-28 | Stop reason: HOSPADM

## 2020-02-28 RX ORDER — HEPARIN SODIUM (PORCINE) LOCK FLUSH IV SOLN 100 UNIT/ML 100 UNIT/ML
500 SOLUTION INTRAVENOUS AS NEEDED
Status: CANCELLED | OUTPATIENT
Start: 2020-02-28

## 2020-02-28 RX ORDER — SODIUM CHLORIDE 9 MG/ML
250 INJECTION, SOLUTION INTRAVENOUS ONCE
Status: COMPLETED | OUTPATIENT
Start: 2020-02-28 | End: 2020-02-28

## 2020-02-28 RX ORDER — SODIUM CHLORIDE 9 MG/ML
250 INJECTION, SOLUTION INTRAVENOUS ONCE
Status: CANCELLED | OUTPATIENT
Start: 2020-02-28

## 2020-02-28 RX ORDER — SODIUM CHLORIDE 0.9 % (FLUSH) 0.9 %
10 SYRINGE (ML) INJECTION AS NEEDED
Status: CANCELLED | OUTPATIENT
Start: 2020-02-28

## 2020-02-28 RX ADMIN — SODIUM CHLORIDE, PRESERVATIVE FREE 10 ML: 5 INJECTION INTRAVENOUS at 11:17

## 2020-02-28 RX ADMIN — SODIUM CHLORIDE 250 ML: 9 INJECTION, SOLUTION INTRAVENOUS at 10:32

## 2020-02-28 RX ADMIN — HEPARIN 500 UNITS: 100 SYRINGE at 11:17

## 2020-02-28 RX ADMIN — ZOLEDRONIC ACID 4 MG: 4 INJECTION, SOLUTION, CONCENTRATE INTRAVENOUS at 10:45

## 2020-02-28 NOTE — PROGRESS NOTES
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  Started on palliative chemotherapy with Taxotere and Cytoxan on July 26, 2017.  Patient received 7 cycle of Taxotere and Cytoxan on December 6, 2017.  After that patient was changed over to treatment with Palbociclib and letrozole on January 30, 2018.    Patient is due to start  cycle 22 of palbociclib and letrozole today on February 28, 2020.  Complains of increasing hip soreness for last few days.   Denies any nausea or vomiting or diarrhea with current treatment.  Complains of stable tingling and numbness affecting bilateral hand and feet.   Denies any bowel or bladder incontinence.      PAST MEDICAL HISTORY:    Past Medical History:   Diagnosis Date   • Anxiety    • Depression    • Encounter for gynecological examination    • Malignant neoplasm of female breast (CMS/HCC)     hx of dcis s/p mastectomy and reconstruction and augmentation      • Primary malignant neoplasm of breast (CMS/HCC)     dcis in rt breast s/p mastectomy,reconstruction      • Ventricular premature beats        SOCIAL HISTORY:    Social History     Tobacco Use   • Smoking status: Never Smoker   • Smokeless tobacco: Never Used   Substance Use Topics   • Alcohol use: No   • Drug use: No       Surgical History :  Past Surgical History:   Procedure Laterality Date   • AUGMENTATION MAMMAPLASTY     • AXILLARY LYMPH NODE BIOPSY/EXCISION Right 7/10/2017    Procedure: BIOSPY RIGHT AXILLARY MASS AND OR LYMPH;  Surgeon: Sourav Ernst MD;  Location: [facility];  Service:    • BREAST BIOPSY  12/07/2007    Mammotome  biopsy of the right side with clip placement   • BREAST LUMPECTOMY     • BREAST RECONSTRUCTION  10/28/2008    Abscence of right breast secondary to mastectomy. Implant exchange for reconstruction of right breast with open para-prosthetic capsulotomy   • BREAST SURGERY  10/01/2009    Left breast ptosis and breast asymmetry secondary to right breast reconstruction for breast cancer. Left breast mastopexy with augmentation.   • CARDIAC CATHETERIZATION  09/18/2008    Normal coronary arteriography. Normal left ventricular angiogram   • EP STUDY     • MASTECTOMY Right    • MASTECTOMY  02/05/2008    Multifocal right breast ductal carcinoma-in-situ. Right total mastectomy   • MASTECTOMY, PARTIAL  01/03/2008    Ductal carcinoma in-situ of the right breast, proven by mammotome biopsy. Right lumpectomy, medial portion of the breast, between 2 o'clock and 4 o'clock, 5 cm from the nipple, with clip placement, radiographic inter. of severo. specimen, & ultrasound   • PAP SMEAR  03/03/2009    Negative   • MA INSJ TUNNELED CVC W/O SUBQ PORT/ AGE 3 YR/> Right 7/10/2017    Procedure: MEDIPORT     (c-arm#2);  Surgeon: Sourav Ernst MD;  Location: [facility];  Service: General       ALLERGIES:    Allergies   Allergen Reactions   • Percocet [Oxycodone-Acetaminophen] Nausea And Vomiting       FAMILY HISTORY:  Family History   Problem Relation Age of Onset   • Anemia Mother    • Multiple sclerosis Mother    • No Known Problems Father    • Diabetes Other    • Multiple sclerosis Other        REVIEW OF SYSTEMS:      CONSTITUTIONAL: Complains of fatigue.  No recent weight change.  Denies any fever, chills .      HEENT:  No epistaxis, mouth sores or difficulty swallowing.     RESPIRATORY: No new shortness of breath. No new cough or hemoptysis.     CARDIOVASCULAR: No chest pain or palpitations.     GASTROINTESTINAL: No nausea or vomiting.   No new abdominal pain. No blood in the stool.     GENITOURINARY: No Dysuria or  "Hematuria.     MUSCULOSKELETAL:Complains of arthritic pain affecting bilateral hands. Complains of mild worsening pain in bilateral hip.  Complains of  of back pain .     LYMPHATICS: No new lymph node swelling.     NEUROLOGICAL : Complains of intermittent tingling and numbness affecting bilateral hand. Complains of tingling and numbness from back radiating down to bilateral lower extremity .  No dizziness. No seizures or balance problems.     SKIN: Positive for history of melanoma status post surgical removal. Denies any new skin lesion worrisome for melanoma.              PHYSICAL EXAMINATION:      VITAL SIGNS:  /75   Pulse 66   Temp 98.3 °F (36.8 °C) (Temporal)   Resp 18   Ht 170.2 cm (67.01\")   Wt 103 kg (226 lb 4.8 oz)   BMI 35.44 kg/m²      ECOG performance status:1    GENERAL:  Not in any distress.    HEENT:  Normocephalic, Atraumatic.Mild Conjunctival pallor. No icterus. Extraocular Movements Intact. No Facial Asymmetry noted.  Wound after extraction of left lower jaw wisdom tooth is healed.    NECK:  No adenopathy. No JVD.Trachea in midline    RESPIRATORY:  Fair air entry bilateral. No rhonchi or wheezing.    CARDIOVASCULAR:  S1, S2. Regular rate and rhythm. No murmur or gallop appreciated.    ABDOMEN:  Soft, obese, nontender. Bowel sounds present in all four quadrants.  No hepatosplenomegaly appreciated.    MUSCULOSKELETAL:  No edema.No Calf Tenderness.  Decreased range of motion.    NEUROLOGIC:  Alert, awake and oriented ×3.  No  Motor  deficit appreciated. Cranial Nerves 2-12 grossly intact.    LYMPHATICS: No new lymph node enlargement in neck or supraclavicular area.    PSYCHIATRY: Alert, awake and oriented ×3. Normal affect. Normal Judgement.Makes good eye contact.          DIAGNOSTIC DATA:    Glucose   Date Value Ref Range Status   02/28/2020 110 (H) 65 - 99 mg/dL Final     Sodium   Date Value Ref Range Status   02/28/2020 142 136 - 145 mmol/L Final     Potassium   Date Value Ref Range " Status   02/28/2020 3.9 3.5 - 5.2 mmol/L Final     CO2   Date Value Ref Range Status   02/28/2020 25.0 22.0 - 29.0 mmol/L Final     Chloride   Date Value Ref Range Status   02/28/2020 107 98 - 107 mmol/L Final     Anion Gap   Date Value Ref Range Status   02/28/2020 10.0 5.0 - 15.0 mmol/L Final     Creatinine   Date Value Ref Range Status   02/28/2020 0.83 0.57 - 1.00 mg/dL Final     BUN   Date Value Ref Range Status   02/28/2020 7 6 - 20 mg/dL Final     BUN/Creatinine Ratio   Date Value Ref Range Status   02/28/2020 8.4 7.0 - 25.0 Final     Calcium   Date Value Ref Range Status   02/28/2020 9.6 8.6 - 10.5 mg/dL Final     eGFR Non  Amer   Date Value Ref Range Status   02/28/2020 72 >60 mL/min/1.73 Final     Alkaline Phosphatase   Date Value Ref Range Status   02/28/2020 69 39 - 117 U/L Final     Total Protein   Date Value Ref Range Status   02/28/2020 6.7 6.0 - 8.5 g/dL Final     ALT (SGPT)   Date Value Ref Range Status   02/28/2020 33 1 - 33 U/L Final     AST (SGOT)   Date Value Ref Range Status   02/28/2020 32 1 - 32 U/L Final     Total Bilirubin   Date Value Ref Range Status   02/28/2020 0.2 0.2 - 1.2 mg/dL Final     Albumin   Date Value Ref Range Status   02/28/2020 4.20 3.50 - 5.20 g/dL Final     Globulin   Date Value Ref Range Status   02/28/2020 2.5 gm/dL Final     Lab Results   Component Value Date    WBC 3.26 (L) 02/28/2020    HGB 11.9 (L) 02/28/2020    HCT 34.6 02/28/2020    MCV 96.4 02/28/2020     02/28/2020     Lab Results   Component Value Date    NEUTROABS 1.52 (L) 02/28/2020     Lab Results   Component Value Date     26.3 (H) 01/24/2020    LABCA2 27.2 01/24/2020         2 D Echo Done on July 19, 2017 showed:  Interpretation Summary   · The left ventricular cavity is borderline dilated.  · Left ventricular systolic function is normal. Estimated EF = 53%.  · Left ventricular diastolic dysfunction (grade I) consistent with impaired relaxation.  · IAS aneurysm noted. No PFO or ASD          Component CA 27.29   Latest Ref Rng & Units 0.0 - 38.6 U/mL   6/29/2017 1817.1 (H)   8/23/2017 1075.9 (H)   10/4/2017 288.9 (H)   10/25/2017 181.2 (H)   11/15/2017 112.2 (H)   12/6/2017 101.6 (H)   12/27/2017 90.3 (H)   2/28/2018 52.1 (H)   4/2/2018 12.4   5/7/2018 5/14/2018 38.1   6/11/2018 39.7 (H)   7/9/2018 37.9   8/27/2018 34.1   9/24/2018 38.1   11/5/2018 31.2   12/13/2018 30.7   3/15/2019 31.2   4/19/2019 23.1   5/24/2019 35.0   6/28/2019 24.5   8/2/2019 27.4   9/6/2019 23.3   10/11/2019 22.9               Component CA 15-3   Latest Ref Rng & Units <=25.0 U/mL   6/29/2017 2107.0 (H)   8/23/2017 943.0 (H)   10/4/2017 282.2 (H)   10/25/2017 162.2 (H)   11/15/2017 118.0 (H)   12/6/2017 99.6 (H)   12/27/2017 88.3 (H)   2/28/2018 47.2 (H)   4/2/2018 11.9   5/7/2018 5/14/2018 41.1 (H)   6/11/2018 37.3 (H)   7/9/2018 35.3 (H)   8/27/2018 36.7 (H)   9/24/2018 31.1 (H)   11/5/2018 32.9 (H)   12/13/2018 32.6 (H)   3/15/2019 32.6 (H)   4/19/2019 29.2 (H)   5/24/2019 28.3 (H)   6/28/2019 26.9 (H)   8/2/2019 26.8 (H)   9/6/2019 25.9 (H)   10/11/2019 29.6 (H)             Radiology Data :  CT of chest, abdomen and pelvis with contrast done on December 18, 2019 was reviewed, discussed with patient, it showed:  IMPRESSION:  No acute pulmonary or pleural finding.     No pulmonary mass, suspicious nodule, or adenopathy.     Redemonstration of the suspected treated metastatic lesion  involving the left lobe of the liver demonstrating very slight  decreased size. No new hepatic mass or ductal dilation.     No CT evidence of acute abdominal or pelvic finding. No  adenopathy.     Redemonstration of extensive sclerotic metastatic disease.        CT of chest, abdomen and pelvis with contrast done on July 31, 2019 was reviewed, discussed with patient, it showed:  - - - CT CHEST - - -  Right-sided Mediport terminates in the superior vena cava.     PULMONARY PARENCHYMA:      - air spaces: Negative    - interstitium:  Grossly within normal limits for age    - misc: No pulmonary nodules or mass     MEDIASTINUM / LATANYA:      - heart: Normal size, no pericardial fluid    - aorta/great vessels: Normal caliber and configuration for age    - misc: No mediastinal mass / significant adenopathy     PLEURAL COMPARTMENT:      - misc: No pleural fluid or mass        MISC:      - inferior neck: Negative    - osseous / body wall: Postoperative changes of both breasts  are present.      - - - CT ABDOMEN - - -      ABDOMEN:   - LIVER:  Normal size / contour, no ductal dilatation, no focal  lesion   - GB:  Grossly negative    - CBD:  Grossly negative   - SPLEEN:  The spleen is enlarged at 14 cm in greatest  dimension. A low-attenuation lesion in the left lobe of the liver  anteriorly containing numerous calcifications is present,  measuring 5.2 x 5.1 cm in greatest axial dimension, unchanged,  and consistent with treated metastasis. No new hepatic lesions  are identified.   - PANCREAS:  Normal in size, contour, no focal mass    - VISCERA:  Normal caliber, no wall thickening   - MESENTERY:  No mesenteric mass   - CAVITY:  No free abdominal fluid, no free intraperitoneal air   - BODY WALL:  Within normal limits   - OSSEOUS:  Innumerable primarily sclerotic osseous metastases  are seen throughout the visualized portion of the skeleton.  Severe compression fracture of L4 is unchanged from the previous  study, including spinal stenosis at this level due to  retropulsion of fracture fragments.      RETROPERITONEUM:   - KIDNEYS:Normal size / contour, no collecting system dilation                    No evidence of an enhancing mass   - URETERS:  Normal course, caliber   - ADRENALS:  Normal size, contour   - MISC:  No sig. retroperitoneal adenopathy or mass   - VASCULAR:  Aorta/iliacs: within normal limits for age     - - - CT PELVIS - - -       - VISCERA:  Normal caliber small/large bowel, no focal   thickening/mass    - APPENDIX:  Within normal limits    - MESENTERY:  No mass   - VASCULAR:  Within normal limits for age   - CAVITY:  No free fluid/air   - BLADDER:  Unremarkable   - OSSEOUS:  Within normal limits    - MISC:   - UTERUS/OVARY: grossly unremarkable     IMPRESSION:  1. Stable single hepatic metastasis.  2. Extensive mostly sclerotic osseous metastases, stable, with  unchanged appearance of pathologic fracture of L4.  3. No new, worrisome lesions identified.          CT of chest, abdomen and pelvis with contrast done on April 17, 2019 was reviewed, discussed with patient, it showed:  IMPRESSION:  CONCLUSION:      1. Stable examination. Presumed treated hepatic metastases in the  left lobe, unchanged. Multiple predominantly osteosclerotic bone  lesions, unchanged.             MRI of brain with and without contrast done on July 9, 2018 was reviewed, discussed with patient, it showed:  IMPRESSION:  No evidence of intracranial hemorrhage, mass/mass effect, acute  infarct, or pathologic contrast enhancement.     Previous described/known enhancing calvarial metastatic lesions  are significantly less conspicuous on today's examination. The  finding would support favorable treatment response.     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          Doppler ultrasound of right upper extremity done on November 10, 2017 showed:  IMPRESSION:  CONCLUSION:   Abnormal exam with thrombus visualized in the right internal  jugular vein, consistent with DVT.      PET/CT done on July 3, 2017 showed:  IMPRESSION:  CONCLUSION:  1. Extensive metastatic disease. Pathologic uptake within  enlarged right-sided axillary lymph nodes. Metastatic adenopathy  in both laurie and mediastinum. Tumor replacing most of the left  lobe of liver. Intra-abdominal retroperitoneal metastases,  adenopathy.     Extensive skeletal metastases including a pathologic fracture at  L4.        Nuclear bone scan done on June 20, 2017 showed:  1. Increased uptake at L4 suggestive of  metastatic process.  2. 3 or 4 areas of increased activity in bony calvarium  3. Some increased activity in tips posteriorly and anteriorly suggestive of metastatic carcinoma to the skeletal system.           Pathology :    Pathology data from July 10, 2017 showed:  Final Diagnosis   Mass right axilla, excisional biopsy:      Metastatic poorly differentiated ductal carcinoma, breast primary      Estrogen receptor positive, 95% stainability, strong intensity      Progesterone receptor positive, 95% stainability, strong intensity      HER-2 2+(equivocal), sent to [facility] for FISH     Addendum   Her 2 FISH result from [facility] is NEGATIVE         Pathology report from December 7, 2007 showed:  FINAL DIAGNOSIS:   RIGHT BREAST MAMMOTOME BIOPSY:        DUCTAL CARCINOMA IN SITU, INTERMEDIATE NUCLEAR GRADE              WITH MICROCALCIFICATION.      ADDENDUM:   Estrogen receptors are positive (90-95% stainability).   Progesterone receptors are positive (90-95% stainability).         ASSESSMENT AND PLAN:      1.  Metastatic cancer with extensive adenopathy in right axilla, mediastinum, hilar, abdominal, retroperitoneal with extensive liver and bone metastasis on PET/CT.  Patient underwent right apatient underwent right axillary lymph node excision by Dr. Ernst on July 10, 2017.Pathology report confirmed ductal carcinoma of breast with estrogen and progesterone positivity.  At her on palliative chemotherapy with Taxotere and cytoxan on July 26, 2017.   Patient was  seen at [facility] for second opinion regarding her breast cancer.  Case was discussed with Dr vazquez oncologist that has seen patient at [facility].  She agreed with her chemotherapy at this point.  The scan on December 20, 2017 shows relative stability of disease, lesion in the liver as well as bone lesion at about same.  Her tumor marker CA 27-29 as well as CA 15-3 are less than 100 at this point.  Since patient is developing some neuropathy in  bilateral upper and lower extremity recommend changing her therapy to Palbociclib with letrozole.  Patient started taking first round of Palbociclib and letrozole on January 30, 2018.  Patient was scheduled to start cycle  6 of chemotherapy today with Palbociclib today on July 16, 2018.   -CT of chest, abdomen and pelvis with contrast done on July 9, 2018 show stable disease which was discussed with patient.  -MRI of brain with and without contrast done on July 9, 2018 shows significant improvement in calvarial metastasis which was discussed with patient.  -CT of chest, abdomen and pelvis with contrast done on September 19, 2018 showed stable disease without any new lymphadenopathy or any new lesions.  Result of CT scan were discussed with patient and her family.  -CT of chest, abdomen and pelvis with contrast done on December 7, 2018 after cycle 9 of Palbociclib and letrozole showed stable disease involving liver and bones.  Result of CT scan were discussed with patient.  -Palbociclib was held from January 17, 2019 until February 8, 2019 secondary to wisdom tooth extraction.  -Patient resume cycle 11 of palbociclib and letrozole on February 8, 2019.  -CT of chest, abdomen and pelvis with contrast done on December 18, 2019 shows stable metastatic disease involving liver and bones.  There is no evidence of progression.  - We will go ahead with cycle 22 of palbociclib and letrozole today on February 28, 2020  -We will ask patient to return to clinic in 5 weeks with repeat CBC, CMP and tumor marker consisting of CA 15-3 and CA 27-29 to be done on that day.    -We will get CT of chest abdomen and pelvis with contrast prior to next clinic visit in 5    2.  Brain metastasis:MRi Brain with and without done on November 9, 2017 was reviewed and compared with t MRI done 6 weeks prior to that.  Brain calvarial metastasis are stable or somewhat improved.  No need to do radiation at this point which was discussed with patient.     -MRI of brain with and without contrast done on July 9, 2018 shows improvement in calvarial metastasis.  And of repeating more MRI unless patient has any symptoms suggestive of worsening brain involvement.    3.  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  She is currently on Coumadin.    She is being followed by Coumadin clinic    4.  History of melanoma in situ status post excision by Dr. Linn.    5.  Bone metastasis: Patient was started on Zometa on August 23, 2017.  Continue with Zometa as scheduled for now.  Patient denies any mouth sores or jaw pain.  CT scan shows about loss of 70% of height of L4 vertebral body, patient has been referred to Dr. Pugh to get evaluated for kyphoplasty.  Patient went for second opinion with orthopedic surgeon in [place], as per patient's second surgeon did not recommend any surgery.    -Zometa Has been held since November 2018 due to wisdom tooth extraction requirement.  -   Zometa has been resumed  on April 19, 2019       6.  History of ductal carcinoma in situ of the right breast diagnosed in 2007.  Status post mastectomy followed by adjuvant tamoxifen for 5 years which was finished in 2013.    7.  Neutropenia: Secondary to Palbociclib.White blood cell count is 3.29 today with absolute neutrophil count of 1520.  Since ANC is greater than 1500 okay to resume palbociclib from today.    8.  Elevated liver function:  -AST and ALT are normal today.  Will monitor with CMP for now.  CT of abdomen and pelvis does not show any evidence of worsening hepatic metastasis.    9.  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  She remains on gabapentin 300 mg 3 times a day.  Prescription for gabapentin has been sent to her pharmacy today on February 28, 2020.    10.  Health maintenance: Patient does not smoke.  Not a candidate for screening colonoscopy at this point.       11.  Prescriptions: Patient has enough prescription of Decadron, Zofran,  and Ultram.  Prescription for Neurontin 300 mg 3 times a day has been sent to her pharmacy  on December 9, 2019.    12.  Advance care planning: Patient remains full code for now and is able to make on her decisions.  Patient has healthcare surrogate Mentioned on Chart.    13. Obesity: Patient's Body mass index is 35.44 kg/m². BMI is higher than reference range.  Patient was notified about elevated BMI.  Patient was counseled about diet and exercise for weight loss.    14.  Kylie García reports a pain score of 2.  Given her pain assessment as noted, treatment options were discussed and the following options were decided upon as a follow-up plan to address the patient's pain: Patient has enough prescription for Ultram and gabapentin.    15. PHQ-9 Total Score: 5   Patient  is not homicidal or suicidal.  No acute intervention required.            Ovidio Meadows MD  2/28/2020  10:31 AM        EMR Dragon/Transcription disclaimer:   Much of this encounter note is an electronic transcription/translation of spoken language to printed text. The electronic translation of spoken language may permit erroneous, or at times, nonsensical words or phrases to be inadvertently transcribed; Although I have reviewed the note for such errors, some may still exist.

## 2020-02-28 NOTE — PROGRESS NOTES
Today's INR is 2.3. PT denies any new medications or bleeding issues. PT denies any s/s of blood clot. PT instructed to continue same dose and Coumadin friendly diet. PT will be seen in 5 weeks. Patient instructed regarding medication; results given and questions answered. Nutritional counseling given.  Dietary factors affecting therapy addressed.  Patient instructed to monitor for excessive bruising or bleeding. PT verbalizes understanding. Findings reported by Elvia Allen RN.         This document has been electronically signed by LYLA Augustin @ on February 28, 2020 8:40 AM

## 2020-02-29 LAB — CANCER AG27-29 SERPL-ACNC: 23.9 U/ML (ref 0–38.6)

## 2020-03-12 ENCOUNTER — PATIENT MESSAGE (OUTPATIENT)
Dept: ONCOLOGY | Facility: CLINIC | Age: 50
End: 2020-03-12

## 2020-04-01 ENCOUNTER — HOSPITAL ENCOUNTER (OUTPATIENT)
Dept: CT IMAGING | Facility: HOSPITAL | Age: 50
Discharge: HOME | End: 2020-04-01
Admitting: INTERNAL MEDICINE

## 2020-04-01 DIAGNOSIS — C78.7 LIVER METASTASIS: ICD-10-CM

## 2020-04-01 DIAGNOSIS — C79.51 BONE METASTASIS: ICD-10-CM

## 2020-04-01 DIAGNOSIS — Z17.0 MALIGNANT NEOPLASM OF RIGHT BREAST IN FEMALE, ESTROGEN RECEPTOR POSITIVE, UNSPECIFIED SITE OF BREAST (HCC): ICD-10-CM

## 2020-04-01 DIAGNOSIS — C50.911 MALIGNANT NEOPLASM OF RIGHT BREAST IN FEMALE, ESTROGEN RECEPTOR POSITIVE, UNSPECIFIED SITE OF BREAST (HCC): ICD-10-CM

## 2020-04-01 PROCEDURE — 25010000003 HEPARIN LOCK FLUCH PER 10 UNITS

## 2020-04-01 PROCEDURE — 25010000002 IOPAMIDOL 61 % SOLUTION: Performed by: INTERNAL MEDICINE

## 2020-04-01 PROCEDURE — 74177 CT ABD & PELVIS W/CONTRAST: CPT

## 2020-04-01 PROCEDURE — 71260 CT THORAX DX C+: CPT

## 2020-04-01 RX ORDER — SODIUM CHLORIDE 9 MG/ML
10 INJECTION INTRAVENOUS ONCE
Status: COMPLETED | OUTPATIENT
Start: 2020-04-01 | End: 2020-04-01

## 2020-04-01 RX ORDER — HEPARIN SODIUM (PORCINE) LOCK FLUSH IV SOLN 100 UNIT/ML 100 UNIT/ML
SOLUTION INTRAVENOUS
Status: COMPLETED
Start: 2020-04-01 | End: 2020-04-01

## 2020-04-01 RX ORDER — HEPARIN SODIUM (PORCINE) LOCK FLUSH IV SOLN 100 UNIT/ML 100 UNIT/ML
500 SOLUTION INTRAVENOUS ONCE
Status: COMPLETED | OUTPATIENT
Start: 2020-04-01 | End: 2020-04-01

## 2020-04-01 RX ADMIN — HEPARIN SODIUM (PORCINE) LOCK FLUSH IV SOLN 100 UNIT/ML 500 UNITS: 100 SOLUTION at 08:15

## 2020-04-01 RX ADMIN — SODIUM CHLORIDE 10 ML: 9 INJECTION, SOLUTION INTRAMUSCULAR; INTRAVENOUS; SUBCUTANEOUS at 08:15

## 2020-04-01 RX ADMIN — IOPAMIDOL 90 ML: 612 INJECTION, SOLUTION INTRAVENOUS at 08:01

## 2020-04-01 RX ADMIN — HEPARIN 500 UNITS: 100 SYRINGE at 08:15

## 2020-04-03 ENCOUNTER — ANTICOAGULATION VISIT (OUTPATIENT)
Dept: CARDIAC SURGERY | Facility: CLINIC | Age: 50
End: 2020-04-03

## 2020-04-03 ENCOUNTER — INFUSION (OUTPATIENT)
Dept: ONCOLOGY | Facility: HOSPITAL | Age: 50
End: 2020-04-03

## 2020-04-03 ENCOUNTER — OFFICE VISIT (OUTPATIENT)
Dept: ONCOLOGY | Facility: CLINIC | Age: 50
End: 2020-04-03

## 2020-04-03 VITALS
DIASTOLIC BLOOD PRESSURE: 62 MMHG | HEART RATE: 61 BPM | HEIGHT: 67 IN | WEIGHT: 222.4 LBS | TEMPERATURE: 97.8 F | BODY MASS INDEX: 34.91 KG/M2 | SYSTOLIC BLOOD PRESSURE: 128 MMHG | RESPIRATION RATE: 18 BRPM

## 2020-04-03 VITALS — TEMPERATURE: 98.5 F

## 2020-04-03 DIAGNOSIS — C50.911 MALIGNANT NEOPLASM OF RIGHT BREAST IN FEMALE, ESTROGEN RECEPTOR POSITIVE, UNSPECIFIED SITE OF BREAST (HCC): Primary | ICD-10-CM

## 2020-04-03 DIAGNOSIS — C50.911 MALIGNANT NEOPLASM OF RIGHT BREAST IN FEMALE, ESTROGEN RECEPTOR POSITIVE, UNSPECIFIED SITE OF BREAST (HCC): ICD-10-CM

## 2020-04-03 DIAGNOSIS — Z79.01 LONG TERM CURRENT USE OF ANTICOAGULANT THERAPY: ICD-10-CM

## 2020-04-03 DIAGNOSIS — Z17.0 MALIGNANT NEOPLASM OF RIGHT BREAST IN FEMALE, ESTROGEN RECEPTOR POSITIVE, UNSPECIFIED SITE OF BREAST (HCC): ICD-10-CM

## 2020-04-03 DIAGNOSIS — C78.7 LIVER METASTASIS: ICD-10-CM

## 2020-04-03 DIAGNOSIS — Z17.0 MALIGNANT NEOPLASM OF RIGHT BREAST IN FEMALE, ESTROGEN RECEPTOR POSITIVE, UNSPECIFIED SITE OF BREAST (HCC): Primary | ICD-10-CM

## 2020-04-03 DIAGNOSIS — G62.0 NEUROPATHY DUE TO CHEMOTHERAPEUTIC DRUG (HCC): ICD-10-CM

## 2020-04-03 DIAGNOSIS — Z45.2 ENCOUNTER FOR VENOUS ACCESS DEVICE CARE: Primary | ICD-10-CM

## 2020-04-03 DIAGNOSIS — C79.31 METASTASIS TO BRAIN (HCC): ICD-10-CM

## 2020-04-03 DIAGNOSIS — T45.1X5A NEUROPATHY DUE TO CHEMOTHERAPEUTIC DRUG (HCC): ICD-10-CM

## 2020-04-03 LAB
ALBUMIN SERPL-MCNC: 4.3 G/DL (ref 3.5–5.2)
ALBUMIN/GLOB SERPL: 1.6 G/DL
ALP SERPL-CCNC: 65 U/L (ref 39–117)
ALT SERPL W P-5'-P-CCNC: 29 U/L (ref 1–33)
ANION GAP SERPL CALCULATED.3IONS-SCNC: 12 MMOL/L (ref 5–15)
AST SERPL-CCNC: 31 U/L (ref 1–32)
BASOPHILS # BLD AUTO: 0.08 10*3/MM3 (ref 0–0.2)
BASOPHILS NFR BLD AUTO: 2.3 % (ref 0–1.5)
BILIRUB SERPL-MCNC: 0.2 MG/DL (ref 0.2–1.2)
BUN BLD-MCNC: 7 MG/DL (ref 6–20)
BUN/CREAT SERPL: 7.9 (ref 7–25)
CALCIUM SPEC-SCNC: 9.8 MG/DL (ref 8.6–10.5)
CANCER AG15-3 SERPL-ACNC: 27.6 U/ML
CHLORIDE SERPL-SCNC: 104 MMOL/L (ref 98–107)
CO2 SERPL-SCNC: 24 MMOL/L (ref 22–29)
CREAT BLD-MCNC: 0.89 MG/DL (ref 0.57–1)
DEPRECATED RDW RBC AUTO: 53.9 FL (ref 37–54)
EOSINOPHIL # BLD AUTO: 0.05 10*3/MM3 (ref 0–0.4)
EOSINOPHIL NFR BLD AUTO: 1.4 % (ref 0.3–6.2)
ERYTHROCYTE [DISTWIDTH] IN BLOOD BY AUTOMATED COUNT: 15.1 % (ref 12.3–15.4)
GFR SERPL CREATININE-BSD FRML MDRD: 67 ML/MIN/1.73
GLOBULIN UR ELPH-MCNC: 2.7 GM/DL
GLUCOSE BLD-MCNC: 76 MG/DL (ref 65–99)
HCT VFR BLD AUTO: 35.1 % (ref 34–46.6)
HGB BLD-MCNC: 12.3 G/DL (ref 12–15.9)
HOLD SPECIMEN: NORMAL
IMM GRANULOCYTES # BLD AUTO: 0.01 10*3/MM3 (ref 0–0.05)
IMM GRANULOCYTES NFR BLD AUTO: 0.3 % (ref 0–0.5)
INR PPP: 2.5 (ref 0.9–1.1)
LYMPHOCYTES # BLD AUTO: 1.16 10*3/MM3 (ref 0.7–3.1)
LYMPHOCYTES NFR BLD AUTO: 33.2 % (ref 19.6–45.3)
MAGNESIUM SERPL-MCNC: 1.9 MG/DL (ref 1.6–2.6)
MCH RBC QN AUTO: 33.7 PG (ref 26.6–33)
MCHC RBC AUTO-ENTMCNC: 35 G/DL (ref 31.5–35.7)
MCV RBC AUTO: 96.2 FL (ref 79–97)
MONOCYTES # BLD AUTO: 0.59 10*3/MM3 (ref 0.1–0.9)
MONOCYTES NFR BLD AUTO: 16.9 % (ref 5–12)
NEUTROPHILS # BLD AUTO: 1.6 10*3/MM3 (ref 1.7–7)
NEUTROPHILS NFR BLD AUTO: 45.9 % (ref 42.7–76)
NRBC BLD AUTO-RTO: 0 /100 WBC (ref 0–0.2)
PHOSPHATE SERPL-MCNC: 3.7 MG/DL (ref 2.5–4.5)
PLATELET # BLD AUTO: 220 10*3/MM3 (ref 140–450)
PMV BLD AUTO: 9.4 FL (ref 6–12)
POTASSIUM BLD-SCNC: 4 MMOL/L (ref 3.5–5.2)
PROT SERPL-MCNC: 7 G/DL (ref 6–8.5)
RBC # BLD AUTO: 3.65 10*6/MM3 (ref 3.77–5.28)
SODIUM BLD-SCNC: 140 MMOL/L (ref 136–145)
WBC NRBC COR # BLD: 3.49 10*3/MM3 (ref 3.4–10.8)

## 2020-04-03 PROCEDURE — 85610 PROTHROMBIN TIME: CPT | Performed by: NURSE PRACTITIONER

## 2020-04-03 PROCEDURE — 25010000002 ZOLEDRONIC ACID PER 1 MG: Performed by: INTERNAL MEDICINE

## 2020-04-03 PROCEDURE — 96374 THER/PROPH/DIAG INJ IV PUSH: CPT | Performed by: INTERNAL MEDICINE

## 2020-04-03 PROCEDURE — 86300 IMMUNOASSAY TUMOR CA 15-3: CPT

## 2020-04-03 PROCEDURE — 99214 OFFICE O/P EST MOD 30 MIN: CPT | Performed by: INTERNAL MEDICINE

## 2020-04-03 PROCEDURE — 83735 ASSAY OF MAGNESIUM: CPT

## 2020-04-03 PROCEDURE — 25010000003 HEPARIN LOCK FLUCH PER 10 UNITS: Performed by: INTERNAL MEDICINE

## 2020-04-03 PROCEDURE — 85025 COMPLETE CBC W/AUTO DIFF WBC: CPT

## 2020-04-03 PROCEDURE — 80053 COMPREHEN METABOLIC PANEL: CPT

## 2020-04-03 PROCEDURE — 36591 DRAW BLOOD OFF VENOUS DEVICE: CPT | Performed by: INTERNAL MEDICINE

## 2020-04-03 PROCEDURE — G8730 PAIN DOC POS AND PLAN: HCPCS | Performed by: INTERNAL MEDICINE

## 2020-04-03 PROCEDURE — 84100 ASSAY OF PHOSPHORUS: CPT

## 2020-04-03 PROCEDURE — 1123F ACP DISCUSS/DSCN MKR DOCD: CPT | Performed by: INTERNAL MEDICINE

## 2020-04-03 PROCEDURE — G9903 PT SCRN TBCO ID AS NON USER: HCPCS | Performed by: INTERNAL MEDICINE

## 2020-04-03 RX ORDER — SODIUM CHLORIDE 9 MG/ML
250 INJECTION, SOLUTION INTRAVENOUS ONCE
Status: CANCELLED | OUTPATIENT
Start: 2020-04-03

## 2020-04-03 RX ORDER — SODIUM CHLORIDE 0.9 % (FLUSH) 0.9 %
10 SYRINGE (ML) INJECTION AS NEEDED
Status: CANCELLED | OUTPATIENT
Start: 2020-05-08

## 2020-04-03 RX ORDER — SODIUM CHLORIDE 9 MG/ML
250 INJECTION, SOLUTION INTRAVENOUS ONCE
Status: COMPLETED | OUTPATIENT
Start: 2020-04-03 | End: 2020-04-03

## 2020-04-03 RX ORDER — HEPARIN SODIUM (PORCINE) LOCK FLUSH IV SOLN 100 UNIT/ML 100 UNIT/ML
500 SOLUTION INTRAVENOUS AS NEEDED
Status: CANCELLED | OUTPATIENT
Start: 2020-05-08

## 2020-04-03 RX ORDER — HEPARIN SODIUM (PORCINE) LOCK FLUSH IV SOLN 100 UNIT/ML 100 UNIT/ML
500 SOLUTION INTRAVENOUS AS NEEDED
Status: DISCONTINUED | OUTPATIENT
Start: 2020-04-03 | End: 2020-04-03 | Stop reason: HOSPADM

## 2020-04-03 RX ORDER — SODIUM CHLORIDE 0.9 % (FLUSH) 0.9 %
10 SYRINGE (ML) INJECTION AS NEEDED
Status: DISCONTINUED | OUTPATIENT
Start: 2020-04-03 | End: 2020-04-03 | Stop reason: HOSPADM

## 2020-04-03 RX ADMIN — ZOLEDRONIC ACID 4 MG: 4 INJECTION, SOLUTION, CONCENTRATE INTRAVENOUS at 11:06

## 2020-04-03 RX ADMIN — SODIUM CHLORIDE 250 ML: 9 INJECTION, SOLUTION INTRAVENOUS at 11:06

## 2020-04-03 RX ADMIN — SODIUM CHLORIDE, PRESERVATIVE FREE 10 ML: 5 INJECTION INTRAVENOUS at 11:41

## 2020-04-03 RX ADMIN — HEPARIN 500 UNITS: 100 SYRINGE at 11:41

## 2020-04-03 NOTE — PROGRESS NOTES
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  Started on palliative chemotherapy with Taxotere and Cytoxan on July 26, 2017.  Patient received 7 cycle of Taxotere and Cytoxan on December 6, 2017.  After that patient was changed over to treatment with Palbociclib and letrozole on January 30, 2018.    Patient is due to start  cycle 23 of palbociclib and letrozole today on April 3, 2020.  Patient had a restaging CT of chest abdomen and pelvis with contrast done on April 1, 2020, she is here to discuss the results and further recommendation.  Complains of chronic back pain and hip pain.   Denies any nausea or vomiting or diarrhea with current treatment.  Complains of stable tingling and numbness affecting bilateral hand and feet.   Denies any bowel or bladder incontinence.      PAST MEDICAL HISTORY:    Past Medical History:   Diagnosis Date   • Anxiety    • Depression    • Encounter for gynecological examination    • Malignant neoplasm of female breast (CMS/HCC)     hx of dcis s/p mastectomy and reconstruction and augmentation      • Primary malignant neoplasm of breast (CMS/HCC)     dcis in rt breast s/p mastectomy,reconstruction      • Ventricular premature beats        SOCIAL HISTORY:    Social History     Tobacco Use   • Smoking status: Never Smoker   • Smokeless tobacco: Never Used   Substance Use Topics   • Alcohol use: No   • Drug use: No       Surgical History :  Past Surgical History:   Procedure Laterality Date   • AUGMENTATION MAMMAPLASTY     • AXILLARY LYMPH NODE BIOPSY/EXCISION Right 7/10/2017    Procedure: BIOSPY  RIGHT AXILLARY MASS AND OR LYMPH;  Surgeon: Sourav Ernst MD;  Location: [facility];  Service:    • BREAST BIOPSY  12/07/2007    Mammotome biopsy of the right side with clip placement   • BREAST LUMPECTOMY     • BREAST RECONSTRUCTION  10/28/2008    Abscence of right breast secondary to mastectomy. Implant exchange for reconstruction of right breast with open para-prosthetic capsulotomy   • BREAST SURGERY  10/01/2009    Left breast ptosis and breast asymmetry secondary to right breast reconstruction for breast cancer. Left breast mastopexy with augmentation.   • CARDIAC CATHETERIZATION  09/18/2008    Normal coronary arteriography. Normal left ventricular angiogram   • EP STUDY     • MASTECTOMY Right    • MASTECTOMY  02/05/2008    Multifocal right breast ductal carcinoma-in-situ. Right total mastectomy   • MASTECTOMY, PARTIAL  01/03/2008    Ductal carcinoma in-situ of the right breast, proven by mammotome biopsy. Right lumpectomy, medial portion of the breast, between 2 o'clock and 4 o'clock, 5 cm from the nipple, with clip placement, radiographic inter. of severo. specimen, & ultrasound   • PAP SMEAR  03/03/2009    Negative   • WI INSJ TUNNELED CVC W/O SUBQ PORT/ AGE 3 YR/> Right 7/10/2017    Procedure: MEDIPORT     (c-arm#2);  Surgeon: Sourav Ernst MD;  Location: [facility];  Service: General       ALLERGIES:    Allergies   Allergen Reactions   • Percocet [Oxycodone-Acetaminophen] Nausea And Vomiting       FAMILY HISTORY:  Family History   Problem Relation Age of Onset   • Anemia Mother    • Multiple sclerosis Mother    • No Known Problems Father    • Diabetes Other    • Multiple sclerosis Other        REVIEW OF SYSTEMS:      CONSTITUTIONAL: Complains of fatigue.  Has lost 4 pounds since last clinic visit.  Denies any fever, chills .      HEENT:  No epistaxis, mouth sores or difficulty swallowing.     RESPIRATORY: No new shortness of breath. No new cough or hemoptysis.     CARDIOVASCULAR: No chest pain or  "palpitations.     GASTROINTESTINAL: No nausea or vomiting.   No new abdominal pain. No blood in the stool.     GENITOURINARY: No Dysuria or Hematuria.     MUSCULOSKELETAL:Complains of arthritic pain affecting bilateral hands. Complains of  pain in bilateral hip.  Complains of  of back pain .     LYMPHATICS: No new lymph node swelling.     NEUROLOGICAL : Complains of intermittent tingling and numbness affecting bilateral hand. Complains of tingling and numbness from back radiating down to bilateral lower extremity .  No dizziness. No seizures or balance problems.     SKIN: Positive for history of melanoma status post surgical removal. Denies any new skin lesion worrisome for melanoma.              PHYSICAL EXAMINATION:      VITAL SIGNS:  /62   Pulse 61   Temp 97.8 °F (36.6 °C) (Temporal)   Resp 18   Ht 170.2 cm (67.01\")   Wt 101 kg (222 lb 6.4 oz)   BMI 34.82 kg/m²      ECOG performance status:1    GENERAL:  Not in any distress.    HEENT:  Normocephalic, Atraumatic.Mild Conjunctival pallor. No icterus. Extraocular Movements Intact. No Facial Asymmetry noted.  Wound after extraction of left lower jaw wisdom tooth is healed.    NECK:  No adenopathy. No JVD.Trachea in midline    RESPIRATORY:  Fair air entry bilateral. No rhonchi or wheezing.    CARDIOVASCULAR:  S1, S2. Regular rate and rhythm. No murmur or gallop appreciated.    ABDOMEN:  Soft, obese, nontender. Bowel sounds present in all four quadrants.  No hepatosplenomegaly appreciated.    MUSCULOSKELETAL:  No edema.No Calf Tenderness.  Decreased range of motion.    NEUROLOGIC:  Alert, awake and oriented ×3.  No  Motor  deficit appreciated. Cranial Nerves 2-12 grossly intact.    LYMPHATICS: No new lymph node enlargement in neck or supraclavicular area.    PSYCHIATRY: Alert, awake and oriented ×3. Normal affect. Normal Judgement.Makes good eye contact.          DIAGNOSTIC DATA:    Glucose   Date Value Ref Range Status   04/03/2020 76 65 - 99 mg/dL Final "     Sodium   Date Value Ref Range Status   04/03/2020 140 136 - 145 mmol/L Final     Potassium   Date Value Ref Range Status   04/03/2020 4.0 3.5 - 5.2 mmol/L Final     CO2   Date Value Ref Range Status   04/03/2020 24.0 22.0 - 29.0 mmol/L Final     Chloride   Date Value Ref Range Status   04/03/2020 104 98 - 107 mmol/L Final     Anion Gap   Date Value Ref Range Status   04/03/2020 12.0 5.0 - 15.0 mmol/L Final     Creatinine   Date Value Ref Range Status   04/03/2020 0.89 0.57 - 1.00 mg/dL Final     BUN   Date Value Ref Range Status   04/03/2020 7 6 - 20 mg/dL Final     BUN/Creatinine Ratio   Date Value Ref Range Status   04/03/2020 7.9 7.0 - 25.0 Final     Calcium   Date Value Ref Range Status   04/03/2020 9.8 8.6 - 10.5 mg/dL Final     eGFR Non  Amer   Date Value Ref Range Status   04/03/2020 67 >60 mL/min/1.73 Final     Alkaline Phosphatase   Date Value Ref Range Status   04/03/2020 65 39 - 117 U/L Final     Total Protein   Date Value Ref Range Status   04/03/2020 7.0 6.0 - 8.5 g/dL Final     ALT (SGPT)   Date Value Ref Range Status   04/03/2020 29 1 - 33 U/L Final     AST (SGOT)   Date Value Ref Range Status   04/03/2020 31 1 - 32 U/L Final     Total Bilirubin   Date Value Ref Range Status   04/03/2020 0.2 0.2 - 1.2 mg/dL Final     Albumin   Date Value Ref Range Status   04/03/2020 4.30 3.50 - 5.20 g/dL Final     Globulin   Date Value Ref Range Status   04/03/2020 2.7 gm/dL Final     Lab Results   Component Value Date    WBC 3.49 04/03/2020    HGB 12.3 04/03/2020    HCT 35.1 04/03/2020    MCV 96.2 04/03/2020     04/03/2020     Lab Results   Component Value Date    NEUTROABS 1.60 (L) 04/03/2020     Lab Results   Component Value Date     25.6 (H) 02/28/2020    LABCA2 23.9 02/28/2020         2 D Echo Done on July 19, 2017 showed:  Interpretation Summary   · The left ventricular cavity is borderline dilated.  · Left ventricular systolic function is normal. Estimated EF = 53%.  · Left ventricular  diastolic dysfunction (grade I) consistent with impaired relaxation.  · IAS aneurysm noted. No PFO or ASD               Component CA 27.29   Latest Ref Rng & Units 0.0 - 38.6 U/mL   6/29/2017 1817.1 (H)   8/23/2017 1075.9 (H)   10/4/2017 288.9 (H)   10/25/2017 181.2 (H)   11/15/2017 112.2 (H)   12/6/2017 101.6 (H)   12/27/2017 90.3 (H)   2/28/2018 52.1 (H)   4/2/2018 12.4   5/7/2018 5/14/2018 38.1   6/11/2018 39.7 (H)   7/9/2018 37.9   8/27/2018 34.1   9/24/2018 38.1   11/5/2018 31.2   12/13/2018 30.7   3/15/2019 31.2   4/19/2019 23.1   5/24/2019 35.0   6/28/2019 24.5   8/2/2019 27.4   9/6/2019 23.3   10/11/2019 22.9   11/15/2019 27.3   12/20/2019 21.5   1/24/2020 27.2   2/28/2020 23.9           Component CA 15-3   Latest Ref Rng & Units <=25.0 U/mL   6/29/2017 2107.0 (H)   8/23/2017 943.0 (H)   10/4/2017 282.2 (H)   10/25/2017 162.2 (H)   11/15/2017 118.0 (H)   12/6/2017 99.6 (H)   12/27/2017 88.3 (H)   2/28/2018 47.2 (H)   4/2/2018 11.9   5/7/2018 5/14/2018 41.1 (H)   6/11/2018 37.3 (H)   7/9/2018 35.3 (H)   8/27/2018 36.7 (H)   9/24/2018 31.1 (H)   11/5/2018 32.9 (H)   12/13/2018 32.6 (H)   3/15/2019 32.6 (H)   4/19/2019 29.2 (H)   5/24/2019 28.3 (H)   6/28/2019 26.9 (H)   8/2/2019 26.8 (H)   9/6/2019 25.9 (H)   10/11/2019 29.6 (H)   11/15/2019 28.8 (H)   12/20/2019 24.5   1/24/2020 26.3 (H)   2/28/2020 25.6 (H)             Radiology Data :  CT of chest, abdomen and pelvis with contrast done on April 1, 2020 was reviewed, discussed with patient, it showed:    computer tomography sequential imaging was performed from the  thoracic inlet through the symphysis pubis after administration  of IV contrast. .Sagittal and coronal reformation was performed .     Mediastinal structures of the chest are normal. There is no  lymphadenopathy or pericardial effusion. Right jugular  Port-A-Cath with tip in the SVC. Lungs are clear. Pleural spaces  are normal. Stable bilateral breast tissue expanders  versus  implants.     Lateral segment left lobe of liver dome hypodense mass lesion  with associated calcifications which on today's study measures  5.13 x 4.87 x 3.92 cm. On prior study, this lesion measured 5.45  x 4.88 x 3.7 cm. The liver is otherwise unremarkable. The  gallbladder is normal. The biliary system is normal. The pancreas  is normal. The spleen is normal. Bilateral adrenal glands are  normal. The right kidney and ureter are normal. The left kidney  and ureter are normal. The bladder is normal. The ureter and  ovaries appear within normal limits. The hollow viscera is  normal. The appendix is identified appears normal. No  lymphadenopathy in the abdomen or the pelvis. Extensive skeletal  osteoblastic metastatic disease is again noted. There is been  mild enlargement of the lower lumbar spine, sacrum, left iliac  wing and right femoral neck osteoblastic metastatic foci. Stable  chronic old pathologic versus old compression fracture of the L4  vertebral body with loss of vertebral body height by 50-60%.     IMPRESSION:  1. Lateral segment left lobe of liver mass lesion as described  above consistent with metastatic disease.  2. Extensive skeletal osteoblastic metastatic disease with mild  enlargement of the metastatic foci involving the lower lumbar  spine, sacrum, left iliac wing, and right femoral neck.  3. Stable chronic old pathologic versus old compression fracture  of the L4 vertebral body with loss of vertebral body height by  50-60%.  4. Remainder CT chest abdomen and pelvis study unremarkable.          CT of chest, abdomen and pelvis with contrast done on December 18, 2019 was reviewed, discussed with patient, it showed:  IMPRESSION:  No acute pulmonary or pleural finding.     No pulmonary mass, suspicious nodule, or adenopathy.     Redemonstration of the suspected treated metastatic lesion  involving the left lobe of the liver demonstrating very slight  decreased size. No new hepatic mass or  ductal dilation.     No CT evidence of acute abdominal or pelvic finding. No  adenopathy.     Redemonstration of extensive sclerotic metastatic disease.        CT of chest, abdomen and pelvis with contrast done on July 31, 2019 was reviewed, discussed with patient, it showed:  - - - CT CHEST - - -  Right-sided Mediport terminates in the superior vena cava.     PULMONARY PARENCHYMA:      - air spaces: Negative    - interstitium: Grossly within normal limits for age    - misc: No pulmonary nodules or mass     MEDIASTINUM / LATANYA:      - heart: Normal size, no pericardial fluid    - aorta/great vessels: Normal caliber and configuration for age    - misc: No mediastinal mass / significant adenopathy     PLEURAL COMPARTMENT:      - misc: No pleural fluid or mass        MISC:      - inferior neck: Negative    - osseous / body wall: Postoperative changes of both breasts  are present.      - - - CT ABDOMEN - - -      ABDOMEN:   - LIVER:  Normal size / contour, no ductal dilatation, no focal  lesion   - GB:  Grossly negative    - CBD:  Grossly negative   - SPLEEN:  The spleen is enlarged at 14 cm in greatest  dimension. A low-attenuation lesion in the left lobe of the liver  anteriorly containing numerous calcifications is present,  measuring 5.2 x 5.1 cm in greatest axial dimension, unchanged,  and consistent with treated metastasis. No new hepatic lesions  are identified.   - PANCREAS:  Normal in size, contour, no focal mass    - VISCERA:  Normal caliber, no wall thickening   - MESENTERY:  No mesenteric mass   - CAVITY:  No free abdominal fluid, no free intraperitoneal air   - BODY WALL:  Within normal limits   - OSSEOUS:  Innumerable primarily sclerotic osseous metastases  are seen throughout the visualized portion of the skeleton.  Severe compression fracture of L4 is unchanged from the previous  study, including spinal stenosis at this level due to  retropulsion of fracture fragments.      RETROPERITONEUM:   -  KIDNEYS:Normal size / contour, no collecting system dilation                    No evidence of an enhancing mass   - URETERS:  Normal course, caliber   - ADRENALS:  Normal size, contour   - MISC:  No sig. retroperitoneal adenopathy or mass   - VASCULAR:  Aorta/iliacs: within normal limits for age     - - - CT PELVIS - - -       - VISCERA:  Normal caliber small/large bowel, no focal   thickening/mass    - APPENDIX:  Within normal limits   - MESENTERY:  No mass   - VASCULAR:  Within normal limits for age   - CAVITY:  No free fluid/air   - BLADDER:  Unremarkable   - OSSEOUS:  Within normal limits    - MISC:   - UTERUS/OVARY: grossly unremarkable     IMPRESSION:  1. Stable single hepatic metastasis.  2. Extensive mostly sclerotic osseous metastases, stable, with  unchanged appearance of pathologic fracture of L4.  3. No new, worrisome lesions identified.                  Pathology :    Pathology data from July 10, 2017 showed:  Final Diagnosis   Mass right axilla, excisional biopsy:      Metastatic poorly differentiated ductal carcinoma, breast primary      Estrogen receptor positive, 95% stainability, strong intensity      Progesterone receptor positive, 95% stainability, strong intensity      HER-2 2+(equivocal), sent to [facility] for FISH     Addendum   Her 2 FISH result from [facility] is NEGATIVE         Pathology report from December 7, 2007 showed:  FINAL DIAGNOSIS:   RIGHT BREAST MAMMOTOME BIOPSY:        DUCTAL CARCINOMA IN SITU, INTERMEDIATE NUCLEAR GRADE              WITH MICROCALCIFICATION.      ADDENDUM:   Estrogen receptors are positive (90-95% stainability).   Progesterone receptors are positive (90-95% stainability).         ASSESSMENT AND PLAN:      1.  Metastatic cancer with extensive adenopathy in right axilla, mediastinum, hilar, abdominal, retroperitoneal with extensive liver and bone metastasis on PET/CT.  Patient underwent right apatient underwent right axillary lymph node excision by   Sujata on July 10, 2017.Pathology report confirmed ductal carcinoma of breast with estrogen and progesterone positivity.  At her on palliative chemotherapy with Taxotere and cytoxan on July 26, 2017.   Patient was  seen at [facility] for second opinion regarding her breast cancer.  Case was discussed with Dr vazquez oncologist that has seen patient at [facility].  She agreed with her chemotherapy at this point.  The scan on December 20, 2017 shows relative stability of disease, lesion in the liver as well as bone lesion at about same.  Her tumor marker CA 27-29 as well as CA 15-3 are less than 100 at this point.  Since patient is developing some neuropathy in bilateral upper and lower extremity recommend changing her therapy to Palbociclib with letrozole.  Patient started taking first round of Palbociclib and letrozole on January 30, 2018.  Patient was scheduled to start cycle  6 of chemotherapy today with Palbociclib today on July 16, 2018.   -CT of chest, abdomen and pelvis with contrast done on July 9, 2018 show stable disease which was discussed with patient.  -MRI of brain with and without contrast done on July 9, 2018 shows significant improvement in calvarial metastasis which was discussed with patient.  -CT of chest, abdomen and pelvis with contrast done on September 19, 2018 showed stable disease without any new lymphadenopathy or any new lesions.  Result of CT scan were discussed with patient and her family.  -CT of chest, abdomen and pelvis with contrast done on December 7, 2018 after cycle 9 of Palbociclib and letrozole showed stable disease involving liver and bones.  Result of CT scan were discussed with patient.  -Palbociclib was held from January 17, 2019 until February 8, 2019 secondary to wisdom tooth extraction.  -Patient resume cycle 11 of palbociclib and letrozole on February 8, 2019.  -CT of chest, abdomen and pelvis with contrast done on April 1,2020 shows stable metastatic disease  involving liver and bones.  There is no evidence of progression.  - We will go ahead with cycle 23 of palbociclib and letrozole today on April 1, 2020  -We will ask patient to return to clinic in 5 weeks with repeat CBC, CMP and tumor marker consisting of CA 15-3 and CA 27-29 to be done on that day.    -We will get CT of chest abdomen and pelvis with contrast around August 2020    2.  Brain metastasis:MRi Brain with and without done on November 9, 2017 was reviewed and compared with t MRI done 6 weeks prior to that.  Brain calvarial metastasis are stable or somewhat improved.  No need to do radiation at this point which was discussed with patient.    -MRI of brain with and without contrast done on July 9, 2018 shows improvement in calvarial metastasis.  And of repeating more MRI unless patient has any symptoms suggestive of worsening brain involvement.    3.  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  She is currently on Coumadin.    She is being followed by Coumadin clinic    4.  History of melanoma in situ status post excision by Dr. Linn.    5.  Bone metastasis: Patient was started on Zometa on August 23, 2017.  Continue with Zometa as scheduled for now.  Patient denies any mouth sores or jaw pain.  CT scan shows about loss of 70% of height of L4 vertebral body, patient has been referred to Dr. Pugh to get evaluated for kyphoplasty.  Patient went for second opinion with orthopedic surgeon in [place], as per patient's second surgeon did not recommend any surgery.    -Zometa Has been held since November 2018 due to wisdom tooth extraction requirement.  -   Zometa has been resumed  on April 19, 2019       6.  History of ductal carcinoma in situ of the right breast diagnosed in 2007.  Status post mastectomy followed by adjuvant tamoxifen for 5 years which was finished in 2013.    7.  Neutropenia: Secondary to Palbociclib.White blood cell count is 3.29 today with  absolute neutrophil count of 1520.  Since ANC is greater than 1500 okay to resume palbociclib from today.    8.  Elevated liver function:  -AST and ALT are normal today.  Will monitor with CMP for now.  CT of abdomen and pelvis does not show any evidence of worsening hepatic metastasis.    9.  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  She remains on gabapentin 300 mg 3 times a day.  Prescription for gabapentin has been sent to her pharmacy today on February 28, 2020.    10.  Health maintenance: Patient does not smoke.  Not a candidate for screening colonoscopy at this point.      11.  Prescriptions: Patient has enough prescription of Decadron, Zofran,  and Ultram.  Prescription for Neurontin 300 mg 3 times a day has been sent to her pharmacy  on December 9, 2019.    12.  Advance care planning: Patient remains full code for now and is able to make on her decisions.  Patient has healthcare surrogate Mentioned on Chart.    13. Obesity: Patient's Body mass index is 34.82 kg/m². BMI is higher than reference range.  Patient was notified about elevated BMI.  Patient was counseled about diet and exercise for weight loss.    14.  Kylie García reports a pain score of 2.  Given her pain assessment as noted, treatment options were discussed and the following options were decided upon as a follow-up plan to address the patient's pain: Patient has enough prescription for Ultram and gabapentin.    15. PHQ-9 Total Score:     Patient  is not homicidal or suicidal.  No acute intervention required.            Ovidio Meadows MD  4/3/2020  15:01        EMR Dragon/Transcription disclaimer:   Much of this encounter note is an electronic transcription/translation of spoken language to printed text. The electronic translation of spoken language may permit erroneous, or at times, nonsensical words or phrases to be inadvertently transcribed; Although I have reviewed the  note for such errors, some may still exist.

## 2020-04-03 NOTE — PROGRESS NOTES
Today's INR is 2.5.  Patient checked curbside due to COVID 19 pandemic precautions.  Patient states no med changes or bleeding problems or unexplained bruising. Patient instructed to continue current dosing schedule. Verbalizes understanding. Will recheck in 6 weeks. Patient instructed regarding medication; results given and questions answered. Nutritional counseling given.  Dietary factors affecting therapy addressed.  Patient instructed to monitor for excessive bruising or bleeding. Findings reported by Tata Nunez RN.          This document has been electronically signed by LYLA Augustin @ on April 3, 2020 08:16

## 2020-04-04 LAB — CANCER AG27-29 SERPL-ACNC: 25.9 U/ML (ref 0–38.6)

## 2020-04-21 ENCOUNTER — TELEPHONE (OUTPATIENT)
Dept: ONCOLOGY | Facility: HOSPITAL | Age: 50
End: 2020-04-21

## 2020-04-21 NOTE — TELEPHONE ENCOUNTER
Spoke with the patient today. She is requesting a letter for her  returning to work. Informed the patient that I would get India Puri our  involved with this. She verbalized understanding.

## 2020-04-22 ENCOUNTER — TELEPHONE (OUTPATIENT)
Dept: ONCOLOGY | Facility: CLINIC | Age: 50
End: 2020-04-22

## 2020-04-22 NOTE — TELEPHONE ENCOUNTER
SW spoke with physician regarding pt request.  Physician states inability to facilitate a letter in regard to non patient employment requests.  SW contacted and spoke with Mrs. García and advised. She states the employer is the entity that requested notice. SW suggested pt/ contact employer to see if they have a form/document that designates request.  Pt responded understanding of info provided.

## 2020-04-22 NOTE — TELEPHONE ENCOUNTER
Oncology SW contacted pt by phone in follow up to request for letter related to her needs and support from  who remains employed during current pandemic.  Pt. Verified need and explained concerns with her weakened immune system and request that he stay at home in with her as he continues to be her source of support and care giving . SW to follow up with MD for authorization and will notify pt accordingly.

## 2020-05-04 DIAGNOSIS — T45.1X5A NEUROPATHY DUE TO CHEMOTHERAPEUTIC DRUG (HCC): ICD-10-CM

## 2020-05-04 DIAGNOSIS — G62.0 NEUROPATHY DUE TO CHEMOTHERAPEUTIC DRUG (HCC): ICD-10-CM

## 2020-05-04 RX ORDER — GABAPENTIN 300 MG/1
300 CAPSULE ORAL 3 TIMES DAILY
Qty: 90 CAPSULE | Refills: 0 | Status: SHIPPED | OUTPATIENT
Start: 2020-05-04 | End: 2020-06-19 | Stop reason: SDUPTHER

## 2020-05-04 NOTE — TELEPHONE ENCOUNTER
PT called for a refill of gabapentin (NEURONTIN) 300 MG capsule, pt has 7 pills left. Pharmacy on file is correct.     Best call back number: [phone]    Requested Prescriptions     Pending Prescriptions Disp Refills   • gabapentin (NEURONTIN) 300 MG capsule 90 capsule 0     Sig: Take 1 capsule by mouth 3 (Three) Times a Day.

## 2020-05-08 ENCOUNTER — INFUSION (OUTPATIENT)
Dept: ONCOLOGY | Facility: HOSPITAL | Age: 50
End: 2020-05-08

## 2020-05-08 ENCOUNTER — OFFICE VISIT (OUTPATIENT)
Dept: ONCOLOGY | Facility: CLINIC | Age: 50
End: 2020-05-08

## 2020-05-08 ENCOUNTER — ANTICOAGULATION VISIT (OUTPATIENT)
Dept: CARDIAC SURGERY | Facility: CLINIC | Age: 50
End: 2020-05-08

## 2020-05-08 VITALS
SYSTOLIC BLOOD PRESSURE: 111 MMHG | DIASTOLIC BLOOD PRESSURE: 71 MMHG | RESPIRATION RATE: 18 BRPM | HEIGHT: 67 IN | BODY MASS INDEX: 34.45 KG/M2 | HEART RATE: 70 BPM | WEIGHT: 219.5 LBS | TEMPERATURE: 98.2 F

## 2020-05-08 DIAGNOSIS — G62.0 NEUROPATHY DUE TO CHEMOTHERAPEUTIC DRUG (HCC): Primary | ICD-10-CM

## 2020-05-08 DIAGNOSIS — C78.7 LIVER METASTASIS: ICD-10-CM

## 2020-05-08 DIAGNOSIS — C50.911 MALIGNANT NEOPLASM OF RIGHT BREAST IN FEMALE, ESTROGEN RECEPTOR POSITIVE, UNSPECIFIED SITE OF BREAST (HCC): ICD-10-CM

## 2020-05-08 DIAGNOSIS — C79.31 METASTASIS TO BRAIN (HCC): ICD-10-CM

## 2020-05-08 DIAGNOSIS — Z17.0 MALIGNANT NEOPLASM OF RIGHT BREAST IN FEMALE, ESTROGEN RECEPTOR POSITIVE, UNSPECIFIED SITE OF BREAST (HCC): ICD-10-CM

## 2020-05-08 DIAGNOSIS — Z45.2 ENCOUNTER FOR VENOUS ACCESS DEVICE CARE: ICD-10-CM

## 2020-05-08 DIAGNOSIS — T45.1X5A NEUROPATHY DUE TO CHEMOTHERAPEUTIC DRUG (HCC): Primary | ICD-10-CM

## 2020-05-08 DIAGNOSIS — D70.1 CHEMOTHERAPY-INDUCED NEUTROPENIA (HCC): ICD-10-CM

## 2020-05-08 DIAGNOSIS — Z17.0 MALIGNANT NEOPLASM OF RIGHT BREAST IN FEMALE, ESTROGEN RECEPTOR POSITIVE, UNSPECIFIED SITE OF BREAST (HCC): Primary | ICD-10-CM

## 2020-05-08 DIAGNOSIS — Z79.01 LONG TERM CURRENT USE OF ANTICOAGULANT THERAPY: ICD-10-CM

## 2020-05-08 DIAGNOSIS — C50.911 MALIGNANT NEOPLASM OF RIGHT BREAST IN FEMALE, ESTROGEN RECEPTOR POSITIVE, UNSPECIFIED SITE OF BREAST (HCC): Primary | ICD-10-CM

## 2020-05-08 DIAGNOSIS — C79.51 BONE METASTASIS: ICD-10-CM

## 2020-05-08 DIAGNOSIS — T45.1X5A CHEMOTHERAPY-INDUCED NEUTROPENIA (HCC): ICD-10-CM

## 2020-05-08 LAB
ALBUMIN SERPL-MCNC: 4.1 G/DL (ref 3.5–5.2)
ALBUMIN/GLOB SERPL: 1.6 G/DL
ALP SERPL-CCNC: 63 U/L (ref 39–117)
ALT SERPL W P-5'-P-CCNC: 22 U/L (ref 1–33)
ANION GAP SERPL CALCULATED.3IONS-SCNC: 13 MMOL/L (ref 5–15)
AST SERPL-CCNC: 27 U/L (ref 1–32)
BASOPHILS # BLD AUTO: 0.09 10*3/MM3 (ref 0–0.2)
BASOPHILS NFR BLD AUTO: 2.8 % (ref 0–1.5)
BILIRUB SERPL-MCNC: 0.2 MG/DL (ref 0.2–1.2)
BUN BLD-MCNC: 10 MG/DL (ref 6–20)
BUN/CREAT SERPL: 12.7 (ref 7–25)
CALCIUM SPEC-SCNC: 9.5 MG/DL (ref 8.6–10.5)
CANCER AG15-3 SERPL-ACNC: 26.8 U/ML
CHLORIDE SERPL-SCNC: 104 MMOL/L (ref 98–107)
CO2 SERPL-SCNC: 22 MMOL/L (ref 22–29)
CREAT BLD-MCNC: 0.79 MG/DL (ref 0.57–1)
DEPRECATED RDW RBC AUTO: 52.5 FL (ref 37–54)
EOSINOPHIL # BLD AUTO: 0.05 10*3/MM3 (ref 0–0.4)
EOSINOPHIL NFR BLD AUTO: 1.6 % (ref 0.3–6.2)
ERYTHROCYTE [DISTWIDTH] IN BLOOD BY AUTOMATED COUNT: 14.7 % (ref 12.3–15.4)
GFR SERPL CREATININE-BSD FRML MDRD: 77 ML/MIN/1.73
GLOBULIN UR ELPH-MCNC: 2.6 GM/DL
GLUCOSE BLD-MCNC: 76 MG/DL (ref 65–99)
HCT VFR BLD AUTO: 33.6 % (ref 34–46.6)
HGB BLD-MCNC: 12 G/DL (ref 12–15.9)
IMM GRANULOCYTES # BLD AUTO: 0.01 10*3/MM3 (ref 0–0.05)
IMM GRANULOCYTES NFR BLD AUTO: 0.3 % (ref 0–0.5)
INR PPP: 2.4 (ref 0.9–1.1)
LYMPHOCYTES # BLD AUTO: 1.04 10*3/MM3 (ref 0.7–3.1)
LYMPHOCYTES NFR BLD AUTO: 32.3 % (ref 19.6–45.3)
MAGNESIUM SERPL-MCNC: 1.8 MG/DL (ref 1.6–2.6)
MCH RBC QN AUTO: 34.6 PG (ref 26.6–33)
MCHC RBC AUTO-ENTMCNC: 35.7 G/DL (ref 31.5–35.7)
MCV RBC AUTO: 96.8 FL (ref 79–97)
MONOCYTES # BLD AUTO: 0.58 10*3/MM3 (ref 0.1–0.9)
MONOCYTES NFR BLD AUTO: 18 % (ref 5–12)
NEUTROPHILS # BLD AUTO: 1.45 10*3/MM3 (ref 1.7–7)
NEUTROPHILS NFR BLD AUTO: 45 % (ref 42.7–76)
NRBC BLD AUTO-RTO: 0 /100 WBC (ref 0–0.2)
PHOSPHATE SERPL-MCNC: 3.6 MG/DL (ref 2.5–4.5)
PLATELET # BLD AUTO: 202 10*3/MM3 (ref 140–450)
PMV BLD AUTO: 9.8 FL (ref 6–12)
POTASSIUM BLD-SCNC: 3.9 MMOL/L (ref 3.5–5.2)
PROT SERPL-MCNC: 6.7 G/DL (ref 6–8.5)
RBC # BLD AUTO: 3.47 10*6/MM3 (ref 3.77–5.28)
SODIUM BLD-SCNC: 139 MMOL/L (ref 136–145)
WBC NRBC COR # BLD: 3.22 10*3/MM3 (ref 3.4–10.8)

## 2020-05-08 PROCEDURE — 83735 ASSAY OF MAGNESIUM: CPT

## 2020-05-08 PROCEDURE — 85610 PROTHROMBIN TIME: CPT | Performed by: NURSE PRACTITIONER

## 2020-05-08 PROCEDURE — 99214 OFFICE O/P EST MOD 30 MIN: CPT | Performed by: INTERNAL MEDICINE

## 2020-05-08 PROCEDURE — 25010000002 ZOLEDRONIC ACID PER 1 MG: Performed by: INTERNAL MEDICINE

## 2020-05-08 PROCEDURE — 86300 IMMUNOASSAY TUMOR CA 15-3: CPT

## 2020-05-08 PROCEDURE — 25010000003 HEPARIN LOCK FLUSH PER 10 UNITS: Performed by: INTERNAL MEDICINE

## 2020-05-08 PROCEDURE — 96365 THER/PROPH/DIAG IV INF INIT: CPT | Performed by: INTERNAL MEDICINE

## 2020-05-08 PROCEDURE — 84100 ASSAY OF PHOSPHORUS: CPT

## 2020-05-08 PROCEDURE — 80053 COMPREHEN METABOLIC PANEL: CPT

## 2020-05-08 PROCEDURE — 85025 COMPLETE CBC W/AUTO DIFF WBC: CPT

## 2020-05-08 RX ORDER — SODIUM CHLORIDE 0.9 % (FLUSH) 0.9 %
10 SYRINGE (ML) INJECTION AS NEEDED
Status: CANCELLED | OUTPATIENT
Start: 2020-05-15

## 2020-05-08 RX ORDER — SODIUM CHLORIDE 0.9 % (FLUSH) 0.9 %
10 SYRINGE (ML) INJECTION AS NEEDED
Status: DISCONTINUED | OUTPATIENT
Start: 2020-05-08 | End: 2020-05-08 | Stop reason: HOSPADM

## 2020-05-08 RX ORDER — LETROZOLE 2.5 MG/1
2.5 TABLET, FILM COATED ORAL DAILY
Qty: 30 TABLET | Refills: 3 | Status: SHIPPED | OUTPATIENT
Start: 2020-05-08 | End: 2020-06-19 | Stop reason: SDUPTHER

## 2020-05-08 RX ORDER — HEPARIN SODIUM (PORCINE) LOCK FLUSH IV SOLN 100 UNIT/ML 100 UNIT/ML
500 SOLUTION INTRAVENOUS AS NEEDED
Status: DISCONTINUED | OUTPATIENT
Start: 2020-05-08 | End: 2020-05-08 | Stop reason: HOSPADM

## 2020-05-08 RX ORDER — HEPARIN SODIUM (PORCINE) LOCK FLUSH IV SOLN 100 UNIT/ML 100 UNIT/ML
500 SOLUTION INTRAVENOUS AS NEEDED
Status: CANCELLED | OUTPATIENT
Start: 2020-05-15

## 2020-05-08 RX ORDER — SODIUM CHLORIDE 9 MG/ML
250 INJECTION, SOLUTION INTRAVENOUS ONCE
Status: COMPLETED | OUTPATIENT
Start: 2020-05-08 | End: 2020-05-08

## 2020-05-08 RX ORDER — SODIUM CHLORIDE 9 MG/ML
250 INJECTION, SOLUTION INTRAVENOUS ONCE
Status: CANCELLED | OUTPATIENT
Start: 2020-05-08

## 2020-05-08 RX ADMIN — HEPARIN 500 UNITS: 100 SYRINGE at 10:29

## 2020-05-08 RX ADMIN — SODIUM CHLORIDE 250 ML: 9 INJECTION, SOLUTION INTRAVENOUS at 09:40

## 2020-05-08 RX ADMIN — ZOLEDRONIC ACID 4 MG: 4 INJECTION, SOLUTION, CONCENTRATE INTRAVENOUS at 09:40

## 2020-05-08 RX ADMIN — SODIUM CHLORIDE, PRESERVATIVE FREE 10 ML: 5 INJECTION INTRAVENOUS at 10:29

## 2020-05-08 NOTE — PROGRESS NOTES
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  Started on palliative chemotherapy with Taxotere and Cytoxan on July 26, 2017.  Patient received 7 cycle of Taxotere and Cytoxan on December 6, 2017.  After that patient was changed over to treatment with Palbociclib and letrozole on January 30, 2018.    Patient is due to start  cycle 24 of palbociclib and letrozole today on May 8, 2020.  Complains of increasing stiffness and pain affecting bilateral shoulder, back and hips.  Denies any nausea or vomiting or diarrhea with current treatment.  Complains of stable tingling and numbness affecting bilateral hand and feet.   Denies any bowel or bladder incontinence.      PAST MEDICAL HISTORY:    Past Medical History:   Diagnosis Date   • Anxiety    • Depression    • Encounter for gynecological examination    • Malignant neoplasm of female breast (CMS/HCC)     hx of dcis s/p mastectomy and reconstruction and augmentation      • Primary malignant neoplasm of breast (CMS/HCC)     dcis in rt breast s/p mastectomy,reconstruction      • Ventricular premature beats        SOCIAL HISTORY:    Social History     Tobacco Use   • Smoking status: Never Smoker   • Smokeless tobacco: Never Used   Substance Use Topics   • Alcohol use: No   • Drug use: No       Surgical History :  Past Surgical History:   Procedure Laterality Date   • AUGMENTATION MAMMAPLASTY     • AXILLARY LYMPH NODE BIOPSY/EXCISION Right 7/10/2017    Procedure: BIOSPY RIGHT AXILLARY MASS AND OR LYMPH;  Surgeon: Sourav Ernst MD;  Location: [facility];  Service:    • BREAST BIOPSY  12/07/2007     Mammotome biopsy of the right side with clip placement   • BREAST LUMPECTOMY     • BREAST RECONSTRUCTION  10/28/2008    Abscence of right breast secondary to mastectomy. Implant exchange for reconstruction of right breast with open para-prosthetic capsulotomy   • BREAST SURGERY  10/01/2009    Left breast ptosis and breast asymmetry secondary to right breast reconstruction for breast cancer. Left breast mastopexy with augmentation.   • CARDIAC CATHETERIZATION  09/18/2008    Normal coronary arteriography. Normal left ventricular angiogram   • EP STUDY     • MASTECTOMY Right    • MASTECTOMY  02/05/2008    Multifocal right breast ductal carcinoma-in-situ. Right total mastectomy   • MASTECTOMY, PARTIAL  01/03/2008    Ductal carcinoma in-situ of the right breast, proven by mammotome biopsy. Right lumpectomy, medial portion of the breast, between 2 o'clock and 4 o'clock, 5 cm from the nipple, with clip placement, radiographic inter. of severo. specimen, & ultrasound   • PAP SMEAR  03/03/2009    Negative   • OK INSJ TUNNELED CVC W/O SUBQ PORT/ AGE 5 YR/> Right 7/10/2017    Procedure: MEDIPORT     (c-arm#2);  Surgeon: Sourav Ernst MD;  Location: [facility];  Service: General       ALLERGIES:    Allergies   Allergen Reactions   • Percocet [Oxycodone-Acetaminophen] Nausea And Vomiting       FAMILY HISTORY:  Family History   Problem Relation Age of Onset   • Anemia Mother    • Multiple sclerosis Mother    • No Known Problems Father    • Diabetes Other    • Multiple sclerosis Other        REVIEW OF SYSTEMS:      CONSTITUTIONAL: Complains of fatigue.  Has lost 3 pounds since last clinic visit.  Denies any fever, chills .      HEENT:  No epistaxis, mouth sores or difficulty swallowing.     RESPIRATORY: No new shortness of breath. No new cough or hemoptysis.     CARDIOVASCULAR: No chest pain or palpitations.     GASTROINTESTINAL: No nausea or vomiting.   No new abdominal pain. No blood in the stool.     GENITOURINARY: No  "Dysuria or Hematuria.     MUSCULOSKELETAL:Complains of arthritic pain affecting bilateral hands. Complains of increasing stiffness affecting bilateral shoulder, back and hips.     LYMPHATICS: No new lymph node swelling.     NEUROLOGICAL : Complains of intermittent tingling and numbness affecting bilateral hand. Complains of tingling and numbness from back radiating down to bilateral lower extremity .  No dizziness. No seizures or balance problems.     SKIN: Positive for history of melanoma status post surgical removal. Denies any new skin lesion worrisome for melanoma.              PHYSICAL EXAMINATION:      VITAL SIGNS:  /71   Pulse 70   Temp 98.2 °F (36.8 °C)   Resp 18   Ht 170.2 cm (67\")   Wt 99.6 kg (219 lb 8 oz)   BMI 34.38 kg/m²      ECOG performance status:1    GENERAL:  Not in any distress.    HEENT:  Normocephalic, Atraumatic.Mild Conjunctival pallor. No icterus. Extraocular Movements Intact. No Facial Asymmetry noted.  Wound after extraction of left lower jaw wisdom tooth is healed.    NECK:  No adenopathy. No JVD.Trachea in midline    RESPIRATORY:  Fair air entry bilateral. No rhonchi or wheezing.    CARDIOVASCULAR:  S1, S2. Regular rate and rhythm. No murmur or gallop appreciated.    ABDOMEN:  Soft, obese, nontender. Bowel sounds present in all four quadrants.  No hepatosplenomegaly appreciated.    MUSCULOSKELETAL:  No edema.No Calf Tenderness.  Decreased range of motion.    NEUROLOGIC:  Alert, awake and oriented ×3.  No  Motor  deficit appreciated. Cranial Nerves 2-12 grossly intact.    LYMPHATICS: No new lymph node enlargement in neck or supraclavicular area.    PSYCHIATRY: Alert, awake and oriented ×3. Normal affect. Normal Judgement.Makes good eye contact.          DIAGNOSTIC DATA:    Glucose   Date Value Ref Range Status   05/08/2020 76 65 - 99 mg/dL Final     Sodium   Date Value Ref Range Status   05/08/2020 139 136 - 145 mmol/L Final     Potassium   Date Value Ref Range Status "   05/08/2020 3.9 3.5 - 5.2 mmol/L Final     CO2   Date Value Ref Range Status   05/08/2020 22.0 22.0 - 29.0 mmol/L Final     Chloride   Date Value Ref Range Status   05/08/2020 104 98 - 107 mmol/L Final     Anion Gap   Date Value Ref Range Status   05/08/2020 13.0 5.0 - 15.0 mmol/L Final     Creatinine   Date Value Ref Range Status   05/08/2020 0.79 0.57 - 1.00 mg/dL Final     BUN   Date Value Ref Range Status   05/08/2020 10 6 - 20 mg/dL Final     BUN/Creatinine Ratio   Date Value Ref Range Status   05/08/2020 12.7 7.0 - 25.0 Final     Calcium   Date Value Ref Range Status   05/08/2020 9.5 8.6 - 10.5 mg/dL Final     eGFR Non  Amer   Date Value Ref Range Status   05/08/2020 77 >60 mL/min/1.73 Final     Alkaline Phosphatase   Date Value Ref Range Status   05/08/2020 63 39 - 117 U/L Final     Total Protein   Date Value Ref Range Status   05/08/2020 6.7 6.0 - 8.5 g/dL Final     ALT (SGPT)   Date Value Ref Range Status   05/08/2020 22 1 - 33 U/L Final     AST (SGOT)   Date Value Ref Range Status   05/08/2020 27 1 - 32 U/L Final     Total Bilirubin   Date Value Ref Range Status   05/08/2020 0.2 0.2 - 1.2 mg/dL Final     Albumin   Date Value Ref Range Status   05/08/2020 4.10 3.50 - 5.20 g/dL Final     Globulin   Date Value Ref Range Status   05/08/2020 2.6 gm/dL Final     Lab Results   Component Value Date    WBC 3.22 (L) 05/08/2020    HGB 12.0 05/08/2020    HCT 33.6 (L) 05/08/2020    MCV 96.8 05/08/2020     05/08/2020     Lab Results   Component Value Date    NEUTROABS 1.45 (L) 05/08/2020     Lab Results   Component Value Date     27.6 (H) 04/03/2020    LABCA2 25.9 04/03/2020         2 D Echo Done on July 19, 2017 showed:  Interpretation Summary   · The left ventricular cavity is borderline dilated.  · Left ventricular systolic function is normal. Estimated EF = 53%.  · Left ventricular diastolic dysfunction (grade I) consistent with impaired relaxation.  · IAS aneurysm noted. No PFO or ASD                Component CA 27.29   Latest Ref Rng & Units 0.0 - 38.6 U/mL   6/29/2017 1817.1 (H)   8/23/2017 1075.9 (H)   10/4/2017 288.9 (H)   10/25/2017 181.2 (H)   11/15/2017 112.2 (H)   12/6/2017 101.6 (H)   12/27/2017 90.3 (H)   2/28/2018 52.1 (H)   4/2/2018 12.4   5/7/2018 5/14/2018 38.1   6/11/2018 39.7 (H)   7/9/2018 37.9   8/27/2018 34.1   9/24/2018 38.1   11/5/2018 31.2   12/13/2018 30.7   3/15/2019 31.2   4/19/2019 23.1   5/24/2019 35.0   6/28/2019 24.5   8/2/2019 27.4   9/6/2019 23.3   10/11/2019 22.9   11/15/2019 27.3   12/20/2019 21.5   1/24/2020 27.2   2/28/2020 23.9           Component CA 15-3   Latest Ref Rng & Units <=25.0 U/mL   6/29/2017 2107.0 (H)   8/23/2017 943.0 (H)   10/4/2017 282.2 (H)   10/25/2017 162.2 (H)   11/15/2017 118.0 (H)   12/6/2017 99.6 (H)   12/27/2017 88.3 (H)   2/28/2018 47.2 (H)   4/2/2018 11.9   5/7/2018 5/14/2018 41.1 (H)   6/11/2018 37.3 (H)   7/9/2018 35.3 (H)   8/27/2018 36.7 (H)   9/24/2018 31.1 (H)   11/5/2018 32.9 (H)   12/13/2018 32.6 (H)   3/15/2019 32.6 (H)   4/19/2019 29.2 (H)   5/24/2019 28.3 (H)   6/28/2019 26.9 (H)   8/2/2019 26.8 (H)   9/6/2019 25.9 (H)   10/11/2019 29.6 (H)   11/15/2019 28.8 (H)   12/20/2019 24.5   1/24/2020 26.3 (H)   2/28/2020 25.6 (H)             Radiology Data :  CT of chest, abdomen and pelvis with contrast done on April 1, 2020 was reviewed, discussed with patient, it showed:    computer tomography sequential imaging was performed from the  thoracic inlet through the symphysis pubis after administration  of IV contrast. .Sagittal and coronal reformation was performed .     Mediastinal structures of the chest are normal. There is no  lymphadenopathy or pericardial effusion. Right jugular  Port-A-Cath with tip in the SVC. Lungs are clear. Pleural spaces  are normal. Stable bilateral breast tissue expanders versus  implants.     Lateral segment left lobe of liver dome hypodense mass lesion  with associated calcifications which on  today's study measures  5.13 x 4.87 x 3.92 cm. On prior study, this lesion measured 5.45  x 4.88 x 3.7 cm. The liver is otherwise unremarkable. The  gallbladder is normal. The biliary system is normal. The pancreas  is normal. The spleen is normal. Bilateral adrenal glands are  normal. The right kidney and ureter are normal. The left kidney  and ureter are normal. The bladder is normal. The ureter and  ovaries appear within normal limits. The hollow viscera is  normal. The appendix is identified appears normal. No  lymphadenopathy in the abdomen or the pelvis. Extensive skeletal  osteoblastic metastatic disease is again noted. There is been  mild enlargement of the lower lumbar spine, sacrum, left iliac  wing and right femoral neck osteoblastic metastatic foci. Stable  chronic old pathologic versus old compression fracture of the L4  vertebral body with loss of vertebral body height by 50-60%.     IMPRESSION:  1. Lateral segment left lobe of liver mass lesion as described  above consistent with metastatic disease.  2. Extensive skeletal osteoblastic metastatic disease with mild  enlargement of the metastatic foci involving the lower lumbar  spine, sacrum, left iliac wing, and right femoral neck.  3. Stable chronic old pathologic versus old compression fracture  of the L4 vertebral body with loss of vertebral body height by  50-60%.  4. Remainder CT chest abdomen and pelvis study unremarkable.          CT of chest, abdomen and pelvis with contrast done on December 18, 2019 was reviewed, discussed with patient, it showed:  IMPRESSION:  No acute pulmonary or pleural finding.     No pulmonary mass, suspicious nodule, or adenopathy.     Redemonstration of the suspected treated metastatic lesion  involving the left lobe of the liver demonstrating very slight  decreased size. No new hepatic mass or ductal dilation.     No CT evidence of acute abdominal or pelvic finding. No  adenopathy.     Redemonstration of extensive  sclerotic metastatic disease.        CT of chest, abdomen and pelvis with contrast done on July 31, 2019 was reviewed, discussed with patient, it showed:  - - - CT CHEST - - -  Right-sided Mediport terminates in the superior vena cava.     PULMONARY PARENCHYMA:      - air spaces: Negative    - interstitium: Grossly within normal limits for age    - misc: No pulmonary nodules or mass     MEDIASTINUM / LATANYA:      - heart: Normal size, no pericardial fluid    - aorta/great vessels: Normal caliber and configuration for age    - misc: No mediastinal mass / significant adenopathy     PLEURAL COMPARTMENT:      - misc: No pleural fluid or mass        MISC:      - inferior neck: Negative    - osseous / body wall: Postoperative changes of both breasts  are present.      - - - CT ABDOMEN - - -      ABDOMEN:   - LIVER:  Normal size / contour, no ductal dilatation, no focal  lesion   - GB:  Grossly negative    - CBD:  Grossly negative   - SPLEEN:  The spleen is enlarged at 14 cm in greatest  dimension. A low-attenuation lesion in the left lobe of the liver  anteriorly containing numerous calcifications is present,  measuring 5.2 x 5.1 cm in greatest axial dimension, unchanged,  and consistent with treated metastasis. No new hepatic lesions  are identified.   - PANCREAS:  Normal in size, contour, no focal mass    - VISCERA:  Normal caliber, no wall thickening   - MESENTERY:  No mesenteric mass   - CAVITY:  No free abdominal fluid, no free intraperitoneal air   - BODY WALL:  Within normal limits   - OSSEOUS:  Innumerable primarily sclerotic osseous metastases  are seen throughout the visualized portion of the skeleton.  Severe compression fracture of L4 is unchanged from the previous  study, including spinal stenosis at this level due to  retropulsion of fracture fragments.      RETROPERITONEUM:   - KIDNEYS:Normal size / contour, no collecting system dilation                    No evidence of an enhancing mass   - URETERS:  Normal  course, caliber   - ADRENALS:  Normal size, contour   - MISC:  No sig. retroperitoneal adenopathy or mass   - VASCULAR:  Aorta/iliacs: within normal limits for age     - - - CT PELVIS - - -       - VISCERA:  Normal caliber small/large bowel, no focal   thickening/mass    - APPENDIX:  Within normal limits   - MESENTERY:  No mass   - VASCULAR:  Within normal limits for age   - CAVITY:  No free fluid/air   - BLADDER:  Unremarkable   - OSSEOUS:  Within normal limits    - MISC:   - UTERUS/OVARY: grossly unremarkable     IMPRESSION:  1. Stable single hepatic metastasis.  2. Extensive mostly sclerotic osseous metastases, stable, with  unchanged appearance of pathologic fracture of L4.  3. No new, worrisome lesions identified.                  Pathology :    Pathology data from July 10, 2017 showed:  Final Diagnosis   Mass right axilla, excisional biopsy:      Metastatic poorly differentiated ductal carcinoma, breast primary      Estrogen receptor positive, 95% stainability, strong intensity      Progesterone receptor positive, 95% stainability, strong intensity      HER-2 2+(equivocal), sent to [facility] for FISH     Addendum   Her 2 FISH result from [facility] is NEGATIVE         Pathology report from December 7, 2007 showed:  FINAL DIAGNOSIS:   RIGHT BREAST MAMMOTOME BIOPSY:        DUCTAL CARCINOMA IN SITU, INTERMEDIATE NUCLEAR GRADE              WITH MICROCALCIFICATION.      ADDENDUM:   Estrogen receptors are positive (90-95% stainability).   Progesterone receptors are positive (90-95% stainability).         ASSESSMENT AND PLAN:      1.  Metastatic cancer with extensive adenopathy in right axilla, mediastinum, hilar, abdominal, retroperitoneal with extensive liver and bone metastasis on PET/CT.  Patient underwent right apatient underwent right axillary lymph node excision by Dr. Ernst on July 10, 2017.Pathology report confirmed ductal carcinoma of breast with estrogen and progesterone positivity.  At her on  palliative chemotherapy with Taxotere and cytoxan on July 26, 2017.   Patient was  seen at [facility] for second opinion regarding her breast cancer.  Case was discussed with Dr vazquez oncologist that has seen patient at [facility].  She agreed with her chemotherapy at this point.  The scan on December 20, 2017 shows relative stability of disease, lesion in the liver as well as bone lesion at about same.  Her tumor marker CA 27-29 as well as CA 15-3 are less than 100 at this point.  Since patient is developing some neuropathy in bilateral upper and lower extremity recommend changing her therapy to Palbociclib with letrozole.  Patient started taking first round of Palbociclib and letrozole on January 30, 2018.  Patient was scheduled to start cycle  6 of chemotherapy today with Palbociclib today on July 16, 2018.   -CT of chest, abdomen and pelvis with contrast done on July 9, 2018 show stable disease which was discussed with patient.  -MRI of brain with and without contrast done on July 9, 2018 shows significant improvement in calvarial metastasis which was discussed with patient.  -CT of chest, abdomen and pelvis with contrast done on September 19, 2018 showed stable disease without any new lymphadenopathy or any new lesions.  Result of CT scan were discussed with patient and her family.  -CT of chest, abdomen and pelvis with contrast done on December 7, 2018 after cycle 9 of Palbociclib and letrozole showed stable disease involving liver and bones.  Result of CT scan were discussed with patient.  -Palbociclib was held from January 17, 2019 until February 8, 2019 secondary to wisdom tooth extraction.  -Patient resume cycle 11 of palbociclib and letrozole on February 8, 2019.  -CT of chest, abdomen and pelvis with contrast done on April 1,2020 shows stable metastatic disease involving liver and bones.  There is no evidence of progression.  -Patient was scheduled to start cycle 24 of palbociclib and letrozole  today on May 8, 2020, due to neutropenia with absolute neutrophil count of 1450.  Will recommend holding palbociclib for 1 week.  Will recheck CBC next week and resume cycle 24 if white blood cell count and neutrophils are adequate.  -We will ask patient to return to clinic in 6 weeks with repeat CBC, CMP and tumor marker consisting of CA 15-3 and CA 27-29 to be done on that day.    -We will get CT of chest abdomen and pelvis with contrast prior to next clinic visit in 6 weeks.    2.  Brain metastasis:MRi Brain with and without done on November 9, 2017 was reviewed and compared with t MRI done 6 weeks prior to that.  Brain calvarial metastasis are stable or somewhat improved.  No need to do radiation at this point which was discussed with patient.    -MRI of brain with and without contrast done on July 9, 2018 shows improvement in calvarial metastasis.  And of repeating more MRI unless patient has any symptoms suggestive of worsening brain involvement.    3.  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  She is currently on Coumadin.    She is being followed by Coumadin clinic    4.  History of melanoma in situ status post excision by Dr. iLnn.    5.  Bone metastasis: Patient was started on Zometa on August 23, 2017.  Continue with Zometa as scheduled for now.  Patient denies any mouth sores or jaw pain.  CT scan shows about loss of 70% of height of L4 vertebral body, patient has been referred to Dr. Pugh to get evaluated for kyphoplasty.  Patient went for second opinion with orthopedic surgeon in [place], as per patient's second surgeon did not recommend any surgery.    -Zometa Has been held since November 2018 due to wisdom tooth extraction requirement.  -   Zometa has been resumed  on April 19, 2019       6.  History of ductal carcinoma in situ of the right breast diagnosed in 2007.  Status post mastectomy followed by adjuvant tamoxifen for 5 years which was  finished in 2013.    7.  Neutropenia: Secondary to Palbociclib.White blood cell count is 3.22 today with absolute neutrophil count of 1450.  If ANC is greater than 1500, will resume palbociclib next week.    8.  Elevated liver function:  -AST and ALT are normal today.  Will monitor with CMP for now.  CT of abdomen and pelvis does not show any evidence of worsening hepatic metastasis.    9.  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  She remains on gabapentin 300 mg 3 times a day.  Prescription for gabapentin has been sent to her pharmacy today on February 28, 2020.    10.  Health maintenance: Patient does not smoke.  Not a candidate for screening colonoscopy at this point.      11.  Prescriptions: Patient has enough prescription of  Zofran, Ultram and Neurontin.  Prescription for letrozole 30-day supply with 3 refill has been sent to her pharmacy today on May 8, 2020.    12.  Advance care planning: Patient remains full code for now and is able to make on her decisions.  Patient has healthcare surrogate Mentioned on Chart.    13.  Kylie García reports a pain score of 2.  Given her pain assessment as noted, treatment options were discussed and the following options were decided upon as a follow-up plan to address the patient's pain: Patient has enough prescription for Ultram and gabapentin.    14. PHQ-9 Total Score:     Patient  is not homicidal or suicidal.  No acute intervention required.            Ovidio Meadows MD  5/8/2020  12:41        EMR Dragon/Transcription disclaimer:   Much of this encounter note is an electronic transcription/translation of spoken language to printed text. The electronic translation of spoken language may permit erroneous, or at times, nonsensical words or phrases to be inadvertently transcribed; Although I have reviewed the note for such errors, some may still exist.

## 2020-05-09 LAB — CANCER AG27-29 SERPL-ACNC: 23.3 U/ML (ref 0–38.6)

## 2020-05-15 ENCOUNTER — INFUSION (OUTPATIENT)
Dept: ONCOLOGY | Facility: HOSPITAL | Age: 50
End: 2020-05-15

## 2020-05-15 DIAGNOSIS — Z45.2 ENCOUNTER FOR VENOUS ACCESS DEVICE CARE: Primary | ICD-10-CM

## 2020-05-15 DIAGNOSIS — Z17.0 MALIGNANT NEOPLASM OF RIGHT BREAST IN FEMALE, ESTROGEN RECEPTOR POSITIVE, UNSPECIFIED SITE OF BREAST (HCC): ICD-10-CM

## 2020-05-15 DIAGNOSIS — C50.911 MALIGNANT NEOPLASM OF RIGHT BREAST IN FEMALE, ESTROGEN RECEPTOR POSITIVE, UNSPECIFIED SITE OF BREAST (HCC): ICD-10-CM

## 2020-05-15 LAB
BASOPHILS # BLD AUTO: 0.16 10*3/MM3 (ref 0–0.2)
BASOPHILS NFR BLD AUTO: 3.3 % (ref 0–1.5)
DEPRECATED RDW RBC AUTO: 49.7 FL (ref 37–54)
EOSINOPHIL # BLD AUTO: 0.18 10*3/MM3 (ref 0–0.4)
EOSINOPHIL NFR BLD AUTO: 3.8 % (ref 0.3–6.2)
ERYTHROCYTE [DISTWIDTH] IN BLOOD BY AUTOMATED COUNT: 13.9 % (ref 12.3–15.4)
HCT VFR BLD AUTO: 36.5 % (ref 34–46.6)
HGB BLD-MCNC: 12.8 G/DL (ref 12–15.9)
IMM GRANULOCYTES # BLD AUTO: 0.01 10*3/MM3 (ref 0–0.05)
IMM GRANULOCYTES NFR BLD AUTO: 0.2 % (ref 0–0.5)
LYMPHOCYTES # BLD AUTO: 1.35 10*3/MM3 (ref 0.7–3.1)
LYMPHOCYTES NFR BLD AUTO: 28.1 % (ref 19.6–45.3)
MCH RBC QN AUTO: 33.8 PG (ref 26.6–33)
MCHC RBC AUTO-ENTMCNC: 35.1 G/DL (ref 31.5–35.7)
MCV RBC AUTO: 96.3 FL (ref 79–97)
MONOCYTES # BLD AUTO: 0.58 10*3/MM3 (ref 0.1–0.9)
MONOCYTES NFR BLD AUTO: 12.1 % (ref 5–12)
NEUTROPHILS # BLD AUTO: 2.52 10*3/MM3 (ref 1.7–7)
NEUTROPHILS NFR BLD AUTO: 52.5 % (ref 42.7–76)
NRBC BLD AUTO-RTO: 0 /100 WBC (ref 0–0.2)
PLATELET # BLD AUTO: 299 10*3/MM3 (ref 140–450)
PMV BLD AUTO: 10.1 FL (ref 6–12)
RBC # BLD AUTO: 3.79 10*6/MM3 (ref 3.77–5.28)
WBC NRBC COR # BLD: 4.8 10*3/MM3 (ref 3.4–10.8)

## 2020-05-15 PROCEDURE — 85025 COMPLETE CBC W/AUTO DIFF WBC: CPT

## 2020-05-15 PROCEDURE — 25010000003 HEPARIN LOCK FLUSH PER 10 UNITS: Performed by: INTERNAL MEDICINE

## 2020-05-15 PROCEDURE — 36591 DRAW BLOOD OFF VENOUS DEVICE: CPT | Performed by: INTERNAL MEDICINE

## 2020-05-15 RX ORDER — HEPARIN SODIUM (PORCINE) LOCK FLUSH IV SOLN 100 UNIT/ML 100 UNIT/ML
500 SOLUTION INTRAVENOUS AS NEEDED
Status: DISCONTINUED | OUTPATIENT
Start: 2020-05-15 | End: 2020-05-15 | Stop reason: HOSPADM

## 2020-05-15 RX ORDER — SODIUM CHLORIDE 0.9 % (FLUSH) 0.9 %
10 SYRINGE (ML) INJECTION AS NEEDED
Status: DISCONTINUED | OUTPATIENT
Start: 2020-05-15 | End: 2020-05-15 | Stop reason: HOSPADM

## 2020-05-15 RX ORDER — HEPARIN SODIUM (PORCINE) LOCK FLUSH IV SOLN 100 UNIT/ML 100 UNIT/ML
500 SOLUTION INTRAVENOUS AS NEEDED
Status: CANCELLED | OUTPATIENT
Start: 2020-06-19

## 2020-05-15 RX ORDER — SODIUM CHLORIDE 0.9 % (FLUSH) 0.9 %
10 SYRINGE (ML) INJECTION AS NEEDED
Status: CANCELLED | OUTPATIENT
Start: 2020-06-19

## 2020-05-15 RX ADMIN — SODIUM CHLORIDE, PRESERVATIVE FREE 10 ML: 5 INJECTION INTRAVENOUS at 09:24

## 2020-05-15 RX ADMIN — HEPARIN 500 UNITS: 100 SYRINGE at 09:24

## 2020-06-17 ENCOUNTER — HOSPITAL ENCOUNTER (OUTPATIENT)
Dept: CT IMAGING | Facility: HOSPITAL | Age: 50
Discharge: HOME | End: 2020-06-17
Admitting: INTERNAL MEDICINE

## 2020-06-17 DIAGNOSIS — C79.51 BONE METASTASIS: ICD-10-CM

## 2020-06-17 DIAGNOSIS — Z17.0 MALIGNANT NEOPLASM OF RIGHT BREAST IN FEMALE, ESTROGEN RECEPTOR POSITIVE, UNSPECIFIED SITE OF BREAST (HCC): ICD-10-CM

## 2020-06-17 DIAGNOSIS — C50.911 MALIGNANT NEOPLASM OF RIGHT BREAST IN FEMALE, ESTROGEN RECEPTOR POSITIVE, UNSPECIFIED SITE OF BREAST (HCC): ICD-10-CM

## 2020-06-17 DIAGNOSIS — C78.7 LIVER METASTASIS: ICD-10-CM

## 2020-06-17 PROCEDURE — 25010000002 IOPAMIDOL 61 % SOLUTION: Performed by: INTERNAL MEDICINE

## 2020-06-17 PROCEDURE — 71260 CT THORAX DX C+: CPT

## 2020-06-17 PROCEDURE — 25010000003 HEPARIN LOCK FLUSH PER 10 UNITS

## 2020-06-17 PROCEDURE — 74177 CT ABD & PELVIS W/CONTRAST: CPT

## 2020-06-17 RX ORDER — SODIUM CHLORIDE 9 MG/ML
10 INJECTION INTRAVENOUS ONCE
Status: COMPLETED | OUTPATIENT
Start: 2020-06-17 | End: 2020-06-17

## 2020-06-17 RX ORDER — HEPARIN SODIUM (PORCINE) LOCK FLUSH IV SOLN 100 UNIT/ML 100 UNIT/ML
SOLUTION INTRAVENOUS
Status: COMPLETED
Start: 2020-06-17 | End: 2020-06-17

## 2020-06-17 RX ORDER — HEPARIN SODIUM (PORCINE) LOCK FLUSH IV SOLN 100 UNIT/ML 100 UNIT/ML
500 SOLUTION INTRAVENOUS ONCE
Status: COMPLETED | OUTPATIENT
Start: 2020-06-17 | End: 2020-06-17

## 2020-06-17 RX ADMIN — HEPARIN 500 UNITS: 100 SYRINGE at 08:27

## 2020-06-17 RX ADMIN — IOPAMIDOL 90 ML: 612 INJECTION, SOLUTION INTRAVENOUS at 08:23

## 2020-06-17 RX ADMIN — HEPARIN SODIUM (PORCINE) LOCK FLUSH IV SOLN 100 UNIT/ML 500 UNITS: 100 SOLUTION at 08:27

## 2020-06-17 RX ADMIN — SODIUM CHLORIDE 10 ML: 9 INJECTION, SOLUTION INTRAMUSCULAR; INTRAVENOUS; SUBCUTANEOUS at 08:27

## 2020-06-19 ENCOUNTER — ANTICOAGULATION VISIT (OUTPATIENT)
Dept: CARDIAC SURGERY | Facility: CLINIC | Age: 50
End: 2020-06-19

## 2020-06-19 ENCOUNTER — INFUSION (OUTPATIENT)
Dept: ONCOLOGY | Facility: HOSPITAL | Age: 50
End: 2020-06-19

## 2020-06-19 ENCOUNTER — OFFICE VISIT (OUTPATIENT)
Dept: ONCOLOGY | Facility: CLINIC | Age: 50
End: 2020-06-19

## 2020-06-19 VITALS
WEIGHT: 219.9 LBS | RESPIRATION RATE: 18 BRPM | TEMPERATURE: 98.4 F | DIASTOLIC BLOOD PRESSURE: 79 MMHG | BODY MASS INDEX: 34.51 KG/M2 | SYSTOLIC BLOOD PRESSURE: 119 MMHG | HEIGHT: 67 IN | HEART RATE: 64 BPM

## 2020-06-19 VITALS — OXYGEN SATURATION: 99 % | HEART RATE: 80 BPM

## 2020-06-19 DIAGNOSIS — G62.0 NEUROPATHY DUE TO CHEMOTHERAPEUTIC DRUG (HCC): ICD-10-CM

## 2020-06-19 DIAGNOSIS — D70.1 CHEMOTHERAPY-INDUCED NEUTROPENIA (HCC): ICD-10-CM

## 2020-06-19 DIAGNOSIS — C79.31 METASTASIS TO BRAIN (HCC): ICD-10-CM

## 2020-06-19 DIAGNOSIS — C50.911 MALIGNANT NEOPLASM OF RIGHT BREAST IN FEMALE, ESTROGEN RECEPTOR POSITIVE, UNSPECIFIED SITE OF BREAST (HCC): Primary | ICD-10-CM

## 2020-06-19 DIAGNOSIS — T45.1X5A CHEMOTHERAPY-INDUCED NEUTROPENIA (HCC): ICD-10-CM

## 2020-06-19 DIAGNOSIS — Z79.01 LONG TERM CURRENT USE OF ANTICOAGULANT THERAPY: ICD-10-CM

## 2020-06-19 DIAGNOSIS — Z17.0 MALIGNANT NEOPLASM OF RIGHT BREAST IN FEMALE, ESTROGEN RECEPTOR POSITIVE, UNSPECIFIED SITE OF BREAST (HCC): Primary | ICD-10-CM

## 2020-06-19 DIAGNOSIS — C50.911 MALIGNANT NEOPLASM OF RIGHT BREAST IN FEMALE, ESTROGEN RECEPTOR POSITIVE, UNSPECIFIED SITE OF BREAST (HCC): ICD-10-CM

## 2020-06-19 DIAGNOSIS — Z17.0 MALIGNANT NEOPLASM OF RIGHT BREAST IN FEMALE, ESTROGEN RECEPTOR POSITIVE, UNSPECIFIED SITE OF BREAST (HCC): ICD-10-CM

## 2020-06-19 DIAGNOSIS — T45.1X5A NEUROPATHY DUE TO CHEMOTHERAPEUTIC DRUG (HCC): ICD-10-CM

## 2020-06-19 DIAGNOSIS — C79.51 BONE METASTASIS: ICD-10-CM

## 2020-06-19 DIAGNOSIS — Z45.2 ENCOUNTER FOR VENOUS ACCESS DEVICE CARE: ICD-10-CM

## 2020-06-19 DIAGNOSIS — Z86.000 HISTORY OF DUCTAL CARCINOMA IN SITU (DCIS) OF BREAST: ICD-10-CM

## 2020-06-19 DIAGNOSIS — C78.7 LIVER METASTASIS: ICD-10-CM

## 2020-06-19 LAB
ALBUMIN SERPL-MCNC: 4.1 G/DL (ref 3.5–5.2)
ALBUMIN/GLOB SERPL: 1.6 G/DL
ALP SERPL-CCNC: 75 U/L (ref 39–117)
ALT SERPL W P-5'-P-CCNC: 36 U/L (ref 1–33)
ANION GAP SERPL CALCULATED.3IONS-SCNC: 10 MMOL/L (ref 5–15)
AST SERPL-CCNC: 36 U/L (ref 1–32)
BASOPHILS # BLD AUTO: 0.11 10*3/MM3 (ref 0–0.2)
BASOPHILS NFR BLD AUTO: 2.8 % (ref 0–1.5)
BILIRUB SERPL-MCNC: 0.2 MG/DL (ref 0.2–1.2)
BUN BLD-MCNC: 10 MG/DL (ref 6–20)
BUN/CREAT SERPL: 13.7 (ref 7–25)
CALCIUM SPEC-SCNC: 9.1 MG/DL (ref 8.6–10.5)
CANCER AG15-3 SERPL-ACNC: 25.6 U/ML
CHLORIDE SERPL-SCNC: 106 MMOL/L (ref 98–107)
CO2 SERPL-SCNC: 24 MMOL/L (ref 22–29)
CREAT BLD-MCNC: 0.73 MG/DL (ref 0.57–1)
DEPRECATED RDW RBC AUTO: 52.5 FL (ref 37–54)
EOSINOPHIL # BLD AUTO: 0.11 10*3/MM3 (ref 0–0.4)
EOSINOPHIL NFR BLD AUTO: 2.8 % (ref 0.3–6.2)
ERYTHROCYTE [DISTWIDTH] IN BLOOD BY AUTOMATED COUNT: 14.6 % (ref 12.3–15.4)
GFR SERPL CREATININE-BSD FRML MDRD: 84 ML/MIN/1.73
GLOBULIN UR ELPH-MCNC: 2.5 GM/DL
GLUCOSE BLD-MCNC: 86 MG/DL (ref 65–99)
HCT VFR BLD AUTO: 34.9 % (ref 34–46.6)
HGB BLD-MCNC: 12.2 G/DL (ref 12–15.9)
IMM GRANULOCYTES # BLD AUTO: 0.01 10*3/MM3 (ref 0–0.05)
IMM GRANULOCYTES NFR BLD AUTO: 0.3 % (ref 0–0.5)
INR PPP: 2.8 (ref 0.9–1.1)
LYMPHOCYTES # BLD AUTO: 1.11 10*3/MM3 (ref 0.7–3.1)
LYMPHOCYTES NFR BLD AUTO: 28.8 % (ref 19.6–45.3)
MAGNESIUM SERPL-MCNC: 1.7 MG/DL (ref 1.6–2.6)
MCH RBC QN AUTO: 33.7 PG (ref 26.6–33)
MCHC RBC AUTO-ENTMCNC: 35 G/DL (ref 31.5–35.7)
MCV RBC AUTO: 96.4 FL (ref 79–97)
MONOCYTES # BLD AUTO: 0.62 10*3/MM3 (ref 0.1–0.9)
MONOCYTES NFR BLD AUTO: 16.1 % (ref 5–12)
NEUTROPHILS # BLD AUTO: 1.9 10*3/MM3 (ref 1.7–7)
NEUTROPHILS NFR BLD AUTO: 49.2 % (ref 42.7–76)
NRBC BLD AUTO-RTO: 0 /100 WBC (ref 0–0.2)
PHOSPHATE SERPL-MCNC: 4 MG/DL (ref 2.5–4.5)
PLATELET # BLD AUTO: 216 10*3/MM3 (ref 140–450)
PMV BLD AUTO: 9.6 FL (ref 6–12)
POTASSIUM BLD-SCNC: 3.8 MMOL/L (ref 3.5–5.2)
PROT SERPL-MCNC: 6.6 G/DL (ref 6–8.5)
RBC # BLD AUTO: 3.62 10*6/MM3 (ref 3.77–5.28)
SODIUM BLD-SCNC: 140 MMOL/L (ref 136–145)
WBC NRBC COR # BLD: 3.86 10*3/MM3 (ref 3.4–10.8)

## 2020-06-19 PROCEDURE — 85610 PROTHROMBIN TIME: CPT | Performed by: NURSE PRACTITIONER

## 2020-06-19 PROCEDURE — 25010000002 ZOLEDRONIC ACID PER 1 MG: Performed by: INTERNAL MEDICINE

## 2020-06-19 PROCEDURE — 80053 COMPREHEN METABOLIC PANEL: CPT

## 2020-06-19 PROCEDURE — 96365 THER/PROPH/DIAG IV INF INIT: CPT | Performed by: INTERNAL MEDICINE

## 2020-06-19 PROCEDURE — 84100 ASSAY OF PHOSPHORUS: CPT

## 2020-06-19 PROCEDURE — 99214 OFFICE O/P EST MOD 30 MIN: CPT | Performed by: INTERNAL MEDICINE

## 2020-06-19 PROCEDURE — 86300 IMMUNOASSAY TUMOR CA 15-3: CPT

## 2020-06-19 PROCEDURE — 25010000003 HEPARIN LOCK FLUSH PER 10 UNITS: Performed by: INTERNAL MEDICINE

## 2020-06-19 PROCEDURE — 83735 ASSAY OF MAGNESIUM: CPT

## 2020-06-19 PROCEDURE — 85025 COMPLETE CBC W/AUTO DIFF WBC: CPT

## 2020-06-19 RX ORDER — HEPARIN SODIUM (PORCINE) LOCK FLUSH IV SOLN 100 UNIT/ML 100 UNIT/ML
500 SOLUTION INTRAVENOUS AS NEEDED
Status: CANCELLED | OUTPATIENT
Start: 2020-07-24

## 2020-06-19 RX ORDER — SODIUM CHLORIDE 0.9 % (FLUSH) 0.9 %
10 SYRINGE (ML) INJECTION AS NEEDED
Status: CANCELLED | OUTPATIENT
Start: 2020-07-24

## 2020-06-19 RX ORDER — SODIUM CHLORIDE 9 MG/ML
250 INJECTION, SOLUTION INTRAVENOUS ONCE
Status: CANCELLED | OUTPATIENT
Start: 2020-06-19

## 2020-06-19 RX ORDER — SODIUM CHLORIDE 0.9 % (FLUSH) 0.9 %
10 SYRINGE (ML) INJECTION AS NEEDED
Status: DISCONTINUED | OUTPATIENT
Start: 2020-06-19 | End: 2020-06-19 | Stop reason: HOSPADM

## 2020-06-19 RX ORDER — LETROZOLE 2.5 MG/1
2.5 TABLET, FILM COATED ORAL DAILY
Qty: 30 TABLET | Refills: 3 | Status: SHIPPED | OUTPATIENT
Start: 2020-06-19 | End: 2020-10-30 | Stop reason: ALTCHOICE

## 2020-06-19 RX ORDER — HEPARIN SODIUM (PORCINE) LOCK FLUSH IV SOLN 100 UNIT/ML 100 UNIT/ML
500 SOLUTION INTRAVENOUS AS NEEDED
Status: DISCONTINUED | OUTPATIENT
Start: 2020-06-19 | End: 2020-06-19 | Stop reason: HOSPADM

## 2020-06-19 RX ORDER — GABAPENTIN 300 MG/1
300 CAPSULE ORAL 3 TIMES DAILY
Qty: 90 CAPSULE | Refills: 0 | Status: SHIPPED | OUTPATIENT
Start: 2020-06-19 | End: 2020-08-28 | Stop reason: SDUPTHER

## 2020-06-19 RX ORDER — SODIUM CHLORIDE 9 MG/ML
250 INJECTION, SOLUTION INTRAVENOUS ONCE
Status: COMPLETED | OUTPATIENT
Start: 2020-06-19 | End: 2020-06-19

## 2020-06-19 RX ADMIN — SODIUM CHLORIDE 250 ML: 9 INJECTION, SOLUTION INTRAVENOUS at 10:13

## 2020-06-19 RX ADMIN — SODIUM CHLORIDE, PRESERVATIVE FREE 10 ML: 5 INJECTION INTRAVENOUS at 10:50

## 2020-06-19 RX ADMIN — HEPARIN 500 UNITS: 100 SYRINGE at 10:50

## 2020-06-19 RX ADMIN — ZOLEDRONIC ACID 4 MG: 4 INJECTION, SOLUTION, CONCENTRATE INTRAVENOUS at 10:14

## 2020-06-19 NOTE — PROGRESS NOTES
Today's INR is 2.8.  Patient states no med changes or bleeding problems or unexplained bruising. Patient instructed to continue current dosing schedule. Verbalizes understanding. Will recheck in 5 weeks. Patient instructed regarding medication; results given and questions answered. Nutritional counseling given.  Dietary factors affecting therapy addressed.  Patient instructed to monitor for excessive bruising or bleeding. Findings reported by Tata Nunez RN.          This document has been electronically signed by LYLA Augustin @ on June 19, 2020 08:53

## 2020-06-19 NOTE — PROGRESS NOTES
DATE OF VISIT: 06/19/2020      REASON FOR VISIT:  Metastatic breast cancer , Neutropenia, Bone metastasis, Liver metastasis , neuropathy      HISTORY OF PRESENT ILLNESS:    49-year-old female with medical problem consisting of history of DCIS of right breast in 2007, anxiety/depression has been following up with [facility] since June 2017 for evaluation of metastatic breast cancer.  Currently patient has been on palbociclib with letrozole since January 2018.  Patient is scheduled to start cycle 25 of viable Cyclomen letrozole today on June 19, 2020.  Patient had a CT scan of chest abdomen and pelvis with contrast done recently, she is here to discuss the result and further recommendation.  Still complains of back pain and hip pain.  Denies any new lymph node enlargement.  Denies any bleeding.  States tingling and numbness affecting bilateral hand and feet are stable.              PAST MEDICAL HISTORY:    Past Medical History:   Diagnosis Date   • Anxiety    • Depression    • Encounter for gynecological examination    • Malignant neoplasm of female breast (CMS/HCC)     hx of dcis s/p mastectomy and reconstruction and augmentation      • Primary malignant neoplasm of breast (CMS/HCC)     dcis in rt breast s/p mastectomy,reconstruction      • Ventricular premature beats        SOCIAL HISTORY:    Social History     Tobacco Use   • Smoking status: Never Smoker   • Smokeless tobacco: Never Used   Substance Use Topics   • Alcohol use: No   • Drug use: No       Surgical History :  Past Surgical History:   Procedure Laterality Date   • AUGMENTATION MAMMAPLASTY     • AXILLARY LYMPH NODE BIOPSY/EXCISION Right 7/10/2017    Procedure: BIOSPY RIGHT AXILLARY MASS AND OR LYMPH;  Surgeon: Sourav Ernst MD;  Location: [facility];  Service:    • BREAST BIOPSY  12/07/2007    Mammotome biopsy of the right side with clip placement   • BREAST LUMPECTOMY     • BREAST RECONSTRUCTION  10/28/2008    Abscence of right breast  secondary to mastectomy. Implant exchange for reconstruction of right breast with open para-prosthetic capsulotomy   • BREAST SURGERY  10/01/2009    Left breast ptosis and breast asymmetry secondary to right breast reconstruction for breast cancer. Left breast mastopexy with augmentation.   • CARDIAC CATHETERIZATION  09/18/2008    Normal coronary arteriography. Normal left ventricular angiogram   • EP STUDY     • MASTECTOMY Right    • MASTECTOMY  02/05/2008    Multifocal right breast ductal carcinoma-in-situ. Right total mastectomy   • MASTECTOMY, PARTIAL  01/03/2008    Ductal carcinoma in-situ of the right breast, proven by mammotome biopsy. Right lumpectomy, medial portion of the breast, between 2 o'clock and 4 o'clock, 5 cm from the nipple, with clip placement, radiographic inter. of severo. specimen, & ultrasound   • PAP SMEAR  03/03/2009    Negative   • AR INSJ TUNNELED CVC W/O SUBQ PORT/ AGE 5 YR/> Right 7/10/2017    Procedure: MEDIPORT     (c-arm#2);  Surgeon: oSurav Ernst MD;  Location: [facility];  Service: General       ALLERGIES:    Allergies   Allergen Reactions   • Percocet [Oxycodone-Acetaminophen] Nausea And Vomiting       FAMILY HISTORY:  Family History   Problem Relation Age of Onset   • Anemia Mother    • Multiple sclerosis Mother    • No Known Problems Father    • Diabetes Other    • Multiple sclerosis Other        REVIEW OF SYSTEMS:      CONSTITUTIONAL: Complains of fatigue.  No recent weight change.  Denies any fever, chills .      HEENT:  No epistaxis, mouth sores or difficulty swallowing.     RESPIRATORY: No new shortness of breath. No new cough or hemoptysis.     CARDIOVASCULAR: No chest pain or palpitations.     GASTROINTESTINAL: No nausea or vomiting.   No new abdominal pain. No blood in the stool.     GENITOURINARY: No Dysuria or Hematuria.     MUSCULOSKELETAL:Complains of arthritic pain affecting bilateral hands.  States stiffness affecting bilateral shoulders back and hips improved  "since last clinic visit     LYMPHATICS: No new lymph node swelling.     NEUROLOGICAL : Complains of intermittent tingling and numbness affecting bilateral hand. Complains of tingling and numbness from back radiating down to bilateral lower extremity .  No dizziness. No seizures or balance problems.     SKIN: Positive for history of melanoma status post surgical removal. Denies any new skin lesion worrisome for melanoma.              PHYSICAL EXAMINATION:      VITAL SIGNS:  /79   Pulse 64   Temp 98.4 °F (36.9 °C) (Temporal)   Resp 18   Ht 170.2 cm (67.01\")   Wt 99.7 kg (219 lb 14.4 oz)   BMI 34.43 kg/m²      ECOG performance status:1    GENERAL:  Not in any distress.    HEENT:  Normocephalic, Atraumatic.Mild Conjunctival pallor. No icterus. Extraocular Movements Intact. No Facial Asymmetry noted.  Wound after extraction of left lower jaw wisdom tooth is healed.    NECK:  No adenopathy. No JVD.Trachea in midline    RESPIRATORY:  Fair air entry bilateral. No rhonchi or wheezing.    CARDIOVASCULAR:  S1, S2. Regular rate and rhythm. No murmur or gallop appreciated.  Port-A-Cath present.    ABDOMEN:  Soft, obese, nontender. Bowel sounds present in all four quadrants.  No hepatosplenomegaly appreciated.    MUSCULOSKELETAL:  No edema.No Calf Tenderness.  Decreased range of motion.    NEUROLOGIC:  Alert, awake and oriented ×3.  No  Motor  deficit appreciated. Cranial Nerves 2-12 grossly intact.    LYMPHATICS: No new lymph node enlargement in neck or supraclavicular area.    PSYCHIATRY: Alert, awake and oriented ×3. Normal affect. Normal Judgement.Makes good eye contact.          DIAGNOSTIC DATA:    Glucose   Date Value Ref Range Status   06/19/2020 86 65 - 99 mg/dL Final     Sodium   Date Value Ref Range Status   06/19/2020 140 136 - 145 mmol/L Final     Potassium   Date Value Ref Range Status   06/19/2020 3.8 3.5 - 5.2 mmol/L Final     CO2   Date Value Ref Range Status   06/19/2020 24.0 22.0 - 29.0 mmol/L Final "     Chloride   Date Value Ref Range Status   06/19/2020 106 98 - 107 mmol/L Final     Anion Gap   Date Value Ref Range Status   06/19/2020 10.0 5.0 - 15.0 mmol/L Final     Creatinine   Date Value Ref Range Status   06/19/2020 0.73 0.57 - 1.00 mg/dL Final     BUN   Date Value Ref Range Status   06/19/2020 10 6 - 20 mg/dL Final     BUN/Creatinine Ratio   Date Value Ref Range Status   06/19/2020 13.7 7.0 - 25.0 Final     Calcium   Date Value Ref Range Status   06/19/2020 9.1 8.6 - 10.5 mg/dL Final     eGFR Non  Amer   Date Value Ref Range Status   06/19/2020 84 >60 mL/min/1.73 Final     Alkaline Phosphatase   Date Value Ref Range Status   06/19/2020 75 39 - 117 U/L Final     Total Protein   Date Value Ref Range Status   06/19/2020 6.6 6.0 - 8.5 g/dL Final     ALT (SGPT)   Date Value Ref Range Status   06/19/2020 36 (H) 1 - 33 U/L Final     AST (SGOT)   Date Value Ref Range Status   06/19/2020 36 (H) 1 - 32 U/L Final     Total Bilirubin   Date Value Ref Range Status   06/19/2020 0.2 0.2 - 1.2 mg/dL Final     Albumin   Date Value Ref Range Status   06/19/2020 4.10 3.50 - 5.20 g/dL Final     Globulin   Date Value Ref Range Status   06/19/2020 2.5 gm/dL Final     Lab Results   Component Value Date    WBC 3.86 06/19/2020    HGB 12.2 06/19/2020    HCT 34.9 06/19/2020    MCV 96.4 06/19/2020     06/19/2020     Lab Results   Component Value Date    NEUTROABS 1.90 06/19/2020     Lab Results   Component Value Date     26.8 (H) 05/08/2020    LABCA2 23.3 05/08/2020         2 D Echo Done on July 19, 2017 showed:  Interpretation Summary   · The left ventricular cavity is borderline dilated.  · Left ventricular systolic function is normal. Estimated EF = 53%.  · Left ventricular diastolic dysfunction (grade I) consistent with impaired relaxation.  · IAS aneurysm noted. No PFO or ASD               Component CA 27.29   Latest Ref Rng & Units 0.0 - 38.6 U/mL   6/29/2017 1817.1 (H)   8/23/2017 1075.9 (H)   10/4/2017  288.9 (H)   10/25/2017 181.2 (H)   11/15/2017 112.2 (H)   12/6/2017 101.6 (H)   12/27/2017 90.3 (H)   2/28/2018 52.1 (H)   4/2/2018 12.4   5/7/2018 5/14/2018 38.1   6/11/2018 39.7 (H)   7/9/2018 37.9   8/27/2018 34.1   9/24/2018 38.1   11/5/2018 31.2   12/13/2018 30.7   3/15/2019 31.2   4/19/2019 23.1   5/24/2019 35.0   6/28/2019 24.5   8/2/2019 27.4   9/6/2019 23.3   10/11/2019 22.9   11/15/2019 27.3   12/20/2019 21.5   1/24/2020 27.2   2/28/2020 23.9           Component CA 15-3   Latest Ref Rng & Units <=25.0 U/mL   6/29/2017 2107.0 (H)   8/23/2017 943.0 (H)   10/4/2017 282.2 (H)   10/25/2017 162.2 (H)   11/15/2017 118.0 (H)   12/6/2017 99.6 (H)   12/27/2017 88.3 (H)   2/28/2018 47.2 (H)   4/2/2018 11.9   5/7/2018 5/14/2018 41.1 (H)   6/11/2018 37.3 (H)   7/9/2018 35.3 (H)   8/27/2018 36.7 (H)   9/24/2018 31.1 (H)   11/5/2018 32.9 (H)   12/13/2018 32.6 (H)   3/15/2019 32.6 (H)   4/19/2019 29.2 (H)   5/24/2019 28.3 (H)   6/28/2019 26.9 (H)   8/2/2019 26.8 (H)   9/6/2019 25.9 (H)   10/11/2019 29.6 (H)   11/15/2019 28.8 (H)   12/20/2019 24.5   1/24/2020 26.3 (H)   2/28/2020 25.6 (H)             Radiology Data :  CT Of chest, abdomen and pelvis with contrast done on June 17, 2020 was reviewed, discussed with patient, it showed:    CHEST FINDINGS:     LUNGS/PLEURA: There are a few scattered calcified granulomas  noted. No noncalcified pulmonary nodules are identified. There  are no focal infiltrates seen. There are no pleural effusions.  TRACHEA AND BRONCHI:  The trachea and bronchi are patent.  MEDIASTINUM, LATANYA AND LYMPH NODES: The mediastinum, latanya and  lymph nodes are normal.  HEART AND PERICARDIUM: The heart and pericardium are normal.  VASCULAR: A Port-A-Cath is noted with the distal tip in the  superior vena cava.  OSSEOUS STRUCTURES: There are diffuse mixed osteoblastic and  osteolytic lesions noted throughout the bony thorax compatible  with bony metastasis from the patient's known breast cancer.  These  findings are stable. No pathologic fractures are identified  in the bony thorax.      Other: Bilateral breast implants are noted.        ABDOMINAL/PELVIC FINDINGS:     HEPATOBILIARY: There is a 5.4 x 4.3 x 3.8 cm hypodense mass in  the lateral segment of the left lobe of the liver with coarse  calcifications noted throughout the mass. These findings are  compatible with hepatic metastasis and are stable when compared  to the prior exam. No new lesions are identified within the  liver. There is no evidence of intra or extrahepatic biliary  dilatation. The gallbladder is present within normal limits.  SPLEEN: Unremarkable.  PANCREAS: Unremarkable  ADRENAL GLANDS: Unremarkable.  KIDNEYS/URETERS: No evidence of hydronephrosis or suspicious  mass.     GASTROINTESTINAL: Unremarkable  REPRODUCTIVE ORGANS: Unremarkable.  URINARY BLADDER: Unremarkable     VASCULAR: Unremarkable  LYMPH NODES: No pathologically enlarged nodes by size criteria.  PERITONEUM/RETROPERITONEUM: Unremarkable.      OSSEOUS STRUCTURES: There are diffuse predominant osteoblastic  metastasis throughout the lumbar spine and bony pelvis as well as  the proximal femurs bilaterally. There is a pathologic fracture  at L4 redemonstrated. There is central canal stenosis at this  level which is moderate in severity secondary to posterior  displacement of the vertebral body and facet hypertrophy. These  findings are unchanged.        IMPRESSION:     1. Stable appearance of the chest abdomen pelvis with no interval  change in the solitary liver metastasis and numerous  predominantly osteoblastic metastasis.     2. Pathologic fracture at L4 with posterior displacement of the  vertebral body resulting in moderate central canal stenosis  redemonstrated. These findings are unchanged.                          Pathology :    Pathology data from July 10, 2017 showed:  Final Diagnosis   Mass right axilla, excisional biopsy:      Metastatic poorly differentiated ductal  carcinoma, breast primary      Estrogen receptor positive, 95% stainability, strong intensity      Progesterone receptor positive, 95% stainability, strong intensity      HER-2 2+(equivocal), sent to [facility] for FISH     Addendum   Her 2 FISH result from [facility] is NEGATIVE         Pathology report from December 7, 2007 showed:  FINAL DIAGNOSIS:   RIGHT BREAST MAMMOTOME BIOPSY:        DUCTAL CARCINOMA IN SITU, INTERMEDIATE NUCLEAR GRADE              WITH MICROCALCIFICATION.      ADDENDUM:   Estrogen receptors are positive (90-95% stainability).   Progesterone receptors are positive (90-95% stainability).         ASSESSMENT AND PLAN:    1.  Metastatic breast cancer with axillary adenopathy mediastinal hilar adenopathy, liver and bone metastases:  - Initial axillary biopsy done by Dr. Ernst on July 10, 2017 confirmed metastatic breast cancer with estrogen receptor progesterone positivity.  - Patient was started on chemotherapy with Taxotere and Cytoxan on July 26, 2017.  Patient was seen and MD in [facility] for second opinion and recommended same therapy.  - Patient received 6 cycles of chemotherapy between July 26, 2017 to December 6, 2017.  - Treatment was changed to palbociclib and letrozole on January 30, 2020.  So far patient has tolerated treatment well.  -We will go ahead with cycle 25th of palbociclib and letrozole today on June 19, 2020.  - Recent CT of chest abdomen and pelvis with contrast done on June 17, 2020 shows stable finding without any evidence of progression.  Results were discussed with patient today.  -We will ask patient to return to clinic in 5 weeks with a repeat CBC CMP tumor marker on that day.  -Next surveillance CT of chest abdomen and pelvis with contrast will be done around September 2020.      2.  Brain metastases:  - Patient does have a calvarial brain metastases.  Due to being asymptomatic patient has not received any radiation so far.  No plan of repeating any MRI  unless patient becomes symptomatic.    3.  Right internal jugular vein thrombosis:  -Patient remains on Coumadin being followed by Coumadin clinic.  Denies any bleeding.    4.  Bone metastases:  -Patient has been on Zometa since August 23, 2017.  We will continue with Zometa every 5 weeks for now.    5.  History of melanoma in situ status post excision by Dr. Linn.    6.  History of DCIS of right breast diagnosed in 2007.  Status post mastectomy followed by tamoxifen for 5 years that completed in 2013.      7.  Elevated liver function test:  -Both AST and ALT are minimally elevated today.  Secondary to medication.  CT scan shows stable metastatic disease in the liver.  Will monitor with CMP for now.      8.  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  Prescription has been sent to her pharmacy today on June 19, 2020.      9.  Health maintenance: Patient does not smoke.    10.Advance Care Planning: For now patient remains full code and is able to make decisions.  Patient has health care surrogate mentioned on chart.      11.  Kylie García reports a pain score of 0.  Given her pain assessment as noted, treatment options were discussed and the following options were decided upon as a follow-up plan to address the patient's pain: Patient has enough prescription for Ultram and gabapentin.    12. PHQ-9 Total Score: 3   Patient  is not homicidal or suicidal.  No acute intervention required.            Ovidio Meadows MD  6/19/2020  15:32        EMR Dragon/Transcription disclaimer:   Much of this encounter note is an electronic transcription/translation of spoken language to printed text. The electronic translation of spoken language may permit erroneous, or at times, nonsensical words or phrases to be inadvertently transcribed; Although I have reviewed the note for such errors, some may still exist.

## 2020-06-20 LAB — CANCER AG27-29 SERPL-ACNC: 28.3 U/ML (ref 0–38.6)

## 2020-07-20 RX ORDER — WARFARIN SODIUM 5 MG/1
TABLET ORAL
Qty: 30 TABLET | Refills: 2 | Status: SHIPPED | OUTPATIENT
Start: 2020-07-20 | End: 2020-11-18

## 2020-07-24 ENCOUNTER — ANTICOAGULATION VISIT (OUTPATIENT)
Dept: CARDIAC SURGERY | Facility: CLINIC | Age: 50
End: 2020-07-24

## 2020-07-24 ENCOUNTER — INFUSION (OUTPATIENT)
Dept: ONCOLOGY | Facility: HOSPITAL | Age: 50
End: 2020-07-24

## 2020-07-24 ENCOUNTER — OFFICE VISIT (OUTPATIENT)
Dept: ONCOLOGY | Facility: CLINIC | Age: 50
End: 2020-07-24

## 2020-07-24 VITALS
TEMPERATURE: 97.9 F | HEIGHT: 67 IN | WEIGHT: 219 LBS | HEART RATE: 68 BPM | SYSTOLIC BLOOD PRESSURE: 115 MMHG | BODY MASS INDEX: 34.37 KG/M2 | RESPIRATION RATE: 18 BRPM | DIASTOLIC BLOOD PRESSURE: 69 MMHG

## 2020-07-24 VITALS — OXYGEN SATURATION: 98 % | HEART RATE: 84 BPM

## 2020-07-24 DIAGNOSIS — C50.911 MALIGNANT NEOPLASM OF RIGHT BREAST IN FEMALE, ESTROGEN RECEPTOR POSITIVE, UNSPECIFIED SITE OF BREAST (HCC): ICD-10-CM

## 2020-07-24 DIAGNOSIS — Z17.0 MALIGNANT NEOPLASM OF RIGHT BREAST IN FEMALE, ESTROGEN RECEPTOR POSITIVE, UNSPECIFIED SITE OF BREAST (HCC): Primary | ICD-10-CM

## 2020-07-24 DIAGNOSIS — C50.911 MALIGNANT NEOPLASM OF RIGHT BREAST IN FEMALE, ESTROGEN RECEPTOR POSITIVE, UNSPECIFIED SITE OF BREAST (HCC): Primary | ICD-10-CM

## 2020-07-24 DIAGNOSIS — T45.1X5A NEUROPATHY DUE TO CHEMOTHERAPEUTIC DRUG (HCC): ICD-10-CM

## 2020-07-24 DIAGNOSIS — C79.51 BONE METASTASIS: ICD-10-CM

## 2020-07-24 DIAGNOSIS — Z45.2 ENCOUNTER FOR VENOUS ACCESS DEVICE CARE: ICD-10-CM

## 2020-07-24 DIAGNOSIS — C78.7 LIVER METASTASIS: ICD-10-CM

## 2020-07-24 DIAGNOSIS — Z17.0 MALIGNANT NEOPLASM OF RIGHT BREAST IN FEMALE, ESTROGEN RECEPTOR POSITIVE, UNSPECIFIED SITE OF BREAST (HCC): ICD-10-CM

## 2020-07-24 DIAGNOSIS — C79.31 METASTASIS TO BRAIN (HCC): ICD-10-CM

## 2020-07-24 DIAGNOSIS — Z79.01 LONG TERM CURRENT USE OF ANTICOAGULANT THERAPY: ICD-10-CM

## 2020-07-24 DIAGNOSIS — G62.0 NEUROPATHY DUE TO CHEMOTHERAPEUTIC DRUG (HCC): ICD-10-CM

## 2020-07-24 DIAGNOSIS — Z86.000 HISTORY OF DUCTAL CARCINOMA IN SITU (DCIS) OF BREAST: ICD-10-CM

## 2020-07-24 LAB
ALBUMIN SERPL-MCNC: 4.2 G/DL (ref 3.5–5.2)
ALBUMIN/GLOB SERPL: 1.8 G/DL
ALP SERPL-CCNC: 65 U/L (ref 39–117)
ALT SERPL W P-5'-P-CCNC: 25 U/L (ref 1–33)
ANION GAP SERPL CALCULATED.3IONS-SCNC: 10 MMOL/L (ref 5–15)
AST SERPL-CCNC: 26 U/L (ref 1–32)
BASOPHILS # BLD AUTO: 0.09 10*3/MM3 (ref 0–0.2)
BASOPHILS NFR BLD AUTO: 2.3 % (ref 0–1.5)
BILIRUB SERPL-MCNC: 0.2 MG/DL (ref 0–1.2)
BUN SERPL-MCNC: 9 MG/DL (ref 6–20)
BUN/CREAT SERPL: 11.4 (ref 7–25)
CALCIUM SPEC-SCNC: 8.8 MG/DL (ref 8.6–10.5)
CANCER AG15-3 SERPL-ACNC: 26.2 U/ML
CHLORIDE SERPL-SCNC: 105 MMOL/L (ref 98–107)
CO2 SERPL-SCNC: 25 MMOL/L (ref 22–29)
CREAT SERPL-MCNC: 0.79 MG/DL (ref 0.57–1)
DEPRECATED RDW RBC AUTO: 53.1 FL (ref 37–54)
EOSINOPHIL # BLD AUTO: 0.09 10*3/MM3 (ref 0–0.4)
EOSINOPHIL NFR BLD AUTO: 2.3 % (ref 0.3–6.2)
ERYTHROCYTE [DISTWIDTH] IN BLOOD BY AUTOMATED COUNT: 14.9 % (ref 12.3–15.4)
GFR SERPL CREATININE-BSD FRML MDRD: 76 ML/MIN/1.73
GLOBULIN UR ELPH-MCNC: 2.4 GM/DL
GLUCOSE SERPL-MCNC: 81 MG/DL (ref 65–99)
HCT VFR BLD AUTO: 34.6 % (ref 34–46.6)
HGB BLD-MCNC: 12.2 G/DL (ref 12–15.9)
IMM GRANULOCYTES # BLD AUTO: 0.01 10*3/MM3 (ref 0–0.05)
IMM GRANULOCYTES NFR BLD AUTO: 0.3 % (ref 0–0.5)
INR PPP: 3.5 (ref 0.9–1.1)
LYMPHOCYTES # BLD AUTO: 1.22 10*3/MM3 (ref 0.7–3.1)
LYMPHOCYTES NFR BLD AUTO: 31 % (ref 19.6–45.3)
MAGNESIUM SERPL-MCNC: 2.1 MG/DL (ref 1.6–2.6)
MCH RBC QN AUTO: 33.8 PG (ref 26.6–33)
MCHC RBC AUTO-ENTMCNC: 35.3 G/DL (ref 31.5–35.7)
MCV RBC AUTO: 95.8 FL (ref 79–97)
MONOCYTES # BLD AUTO: 0.63 10*3/MM3 (ref 0.1–0.9)
MONOCYTES NFR BLD AUTO: 16 % (ref 5–12)
NEUTROPHILS NFR BLD AUTO: 1.9 10*3/MM3 (ref 1.7–7)
NEUTROPHILS NFR BLD AUTO: 48.1 % (ref 42.7–76)
NRBC BLD AUTO-RTO: 0 /100 WBC (ref 0–0.2)
PHOSPHATE SERPL-MCNC: 3.2 MG/DL (ref 2.5–4.5)
PLATELET # BLD AUTO: 220 10*3/MM3 (ref 140–450)
PMV BLD AUTO: 9.7 FL (ref 6–12)
POTASSIUM SERPL-SCNC: 4 MMOL/L (ref 3.5–5.2)
PROT SERPL-MCNC: 6.6 G/DL (ref 6–8.5)
RBC # BLD AUTO: 3.61 10*6/MM3 (ref 3.77–5.28)
SODIUM SERPL-SCNC: 140 MMOL/L (ref 136–145)
WBC # BLD AUTO: 3.94 10*3/MM3 (ref 3.4–10.8)

## 2020-07-24 PROCEDURE — 86300 IMMUNOASSAY TUMOR CA 15-3: CPT

## 2020-07-24 PROCEDURE — 85025 COMPLETE CBC W/AUTO DIFF WBC: CPT

## 2020-07-24 PROCEDURE — 84100 ASSAY OF PHOSPHORUS: CPT

## 2020-07-24 PROCEDURE — 83735 ASSAY OF MAGNESIUM: CPT

## 2020-07-24 PROCEDURE — 96365 THER/PROPH/DIAG IV INF INIT: CPT | Performed by: INTERNAL MEDICINE

## 2020-07-24 PROCEDURE — 25010000002 ZOLEDRONIC ACID PER 1 MG: Performed by: INTERNAL MEDICINE

## 2020-07-24 PROCEDURE — 80053 COMPREHEN METABOLIC PANEL: CPT

## 2020-07-24 PROCEDURE — 99211 OFF/OP EST MAY X REQ PHY/QHP: CPT | Performed by: NURSE PRACTITIONER

## 2020-07-24 PROCEDURE — 99214 OFFICE O/P EST MOD 30 MIN: CPT | Performed by: INTERNAL MEDICINE

## 2020-07-24 PROCEDURE — 85610 PROTHROMBIN TIME: CPT | Performed by: NURSE PRACTITIONER

## 2020-07-24 PROCEDURE — 25010000003 HEPARIN LOCK FLUSH PER 10 UNITS: Performed by: INTERNAL MEDICINE

## 2020-07-24 RX ORDER — SODIUM CHLORIDE 0.9 % (FLUSH) 0.9 %
10 SYRINGE (ML) INJECTION AS NEEDED
Status: CANCELLED | OUTPATIENT
Start: 2020-08-28

## 2020-07-24 RX ORDER — SODIUM CHLORIDE 9 MG/ML
250 INJECTION, SOLUTION INTRAVENOUS ONCE
Status: CANCELLED | OUTPATIENT
Start: 2020-07-24

## 2020-07-24 RX ORDER — SODIUM CHLORIDE 9 MG/ML
250 INJECTION, SOLUTION INTRAVENOUS ONCE
Status: COMPLETED | OUTPATIENT
Start: 2020-07-24 | End: 2020-07-24

## 2020-07-24 RX ORDER — HEPARIN SODIUM (PORCINE) LOCK FLUSH IV SOLN 100 UNIT/ML 100 UNIT/ML
500 SOLUTION INTRAVENOUS AS NEEDED
Status: CANCELLED | OUTPATIENT
Start: 2020-08-28

## 2020-07-24 RX ORDER — SODIUM CHLORIDE 0.9 % (FLUSH) 0.9 %
10 SYRINGE (ML) INJECTION AS NEEDED
Status: DISCONTINUED | OUTPATIENT
Start: 2020-07-24 | End: 2020-07-24 | Stop reason: HOSPADM

## 2020-07-24 RX ORDER — HEPARIN SODIUM (PORCINE) LOCK FLUSH IV SOLN 100 UNIT/ML 100 UNIT/ML
500 SOLUTION INTRAVENOUS AS NEEDED
Status: DISCONTINUED | OUTPATIENT
Start: 2020-07-24 | End: 2020-07-24 | Stop reason: HOSPADM

## 2020-07-24 RX ADMIN — SODIUM CHLORIDE, PRESERVATIVE FREE 10 ML: 5 INJECTION INTRAVENOUS at 10:52

## 2020-07-24 RX ADMIN — HEPARIN 500 UNITS: 100 SYRINGE at 10:52

## 2020-07-24 RX ADMIN — SODIUM CHLORIDE 250 ML: 9 INJECTION, SOLUTION INTRAVENOUS at 09:50

## 2020-07-24 RX ADMIN — ZOLEDRONIC ACID 4 MG: 4 INJECTION, SOLUTION, CONCENTRATE INTRAVENOUS at 10:17

## 2020-07-24 NOTE — PROGRESS NOTES
Today's INR is 3.5.  Pt denies med changes or bleeding problems. Pt states she likely hasn't eaten enough vitamin k. Dose adjusted one day only and pt instructed to increase vitamin K intake today with a serving of green veggies; pt verbalized. Patient instructed regarding medication; results given and questions answered. Nutritional counseling given.  Dietary factors affecting therapy addressed.  Patient instructed to monitor for excessive bruising or bleeding. Will recheck next month. Findings reported by Tata Nunez RN.        This document has been electronically signed by HITESH Bond @  On July 24, 2020 08:34

## 2020-07-24 NOTE — PROGRESS NOTES
DATE OF VISIT: 7/24/2020      REASON FOR VISIT: Metastatic breast cancer, neutropenia, bone metastases, liver metastases, neuropathy      HISTORY OF PRESENT ILLNESS:    50-year-old female with medical problem consisting of history of DCIS of right breast in 2007, anxiety/depression, metastatic breast cancer with bone and liver metastasis currently on treatment with palbociclib and letrozole is here for follow-up appointment today.  Patient is scheduled to start cycle 26 of palbociclib and letrozole today on July 24, 2020.  Complains of chronic back discomfort.  Denies any recent worsening of tingling and numbness.  Denies any bowel or bladder incontinence.  Denies any bleeding.          PAST MEDICAL HISTORY:    Past Medical History:   Diagnosis Date   • Anxiety    • Depression    • Encounter for gynecological examination    • Malignant neoplasm of female breast (CMS/HCC)     hx of dcis s/p mastectomy and reconstruction and augmentation      • Primary malignant neoplasm of breast (CMS/HCC)     dcis in rt breast s/p mastectomy,reconstruction      • Ventricular premature beats        SOCIAL HISTORY:    Social History     Tobacco Use   • Smoking status: Never Smoker   • Smokeless tobacco: Never Used   Substance Use Topics   • Alcohol use: No   • Drug use: No       Surgical History :  Past Surgical History:   Procedure Laterality Date   • AUGMENTATION MAMMAPLASTY     • AXILLARY LYMPH NODE BIOPSY/EXCISION Right 7/10/2017    Procedure: BIOSPY RIGHT AXILLARY MASS AND OR LYMPH;  Surgeon: Sourav Ernst MD;  Location: [facility];  Service:    • BREAST BIOPSY  12/07/2007    Mammotome biopsy of the right side with clip placement   • BREAST LUMPECTOMY     • BREAST RECONSTRUCTION  10/28/2008    Abscence of right breast secondary to mastectomy. Implant exchange for reconstruction of right breast with open para-prosthetic capsulotomy   • BREAST SURGERY  10/01/2009    Left breast ptosis and breast asymmetry secondary to right  breast reconstruction for breast cancer. Left breast mastopexy with augmentation.   • CARDIAC CATHETERIZATION  09/18/2008    Normal coronary arteriography. Normal left ventricular angiogram   • EP STUDY     • MASTECTOMY Right    • MASTECTOMY  02/05/2008    Multifocal right breast ductal carcinoma-in-situ. Right total mastectomy   • MASTECTOMY, PARTIAL  01/03/2008    Ductal carcinoma in-situ of the right breast, proven by mammotome biopsy. Right lumpectomy, medial portion of the breast, between 2 o'clock and 4 o'clock, 5 cm from the nipple, with clip placement, radiographic inter. of severo. specimen, & ultrasound   • PAP SMEAR  03/03/2009    Negative   • [place] INSJ TUNNELED CVC W/O SUBQ PORT/ AGE 5 YR/> Right 7/10/2017    Procedure: MEDIPORT     (c-arm#2);  Surgeon: Sourav Ernst MD;  Location: [facility];  Service: General       ALLERGIES:    Allergies   Allergen Reactions   • Percocet [Oxycodone-Acetaminophen] Nausea And Vomiting         FAMILY HISTORY:  Family History   Problem Relation Age of Onset   • Anemia Mother    • Multiple sclerosis Mother    • No Known Problems Father    • Diabetes Other    • Multiple sclerosis Other            REVIEW OF SYSTEMS:      CONSTITUTIONAL:  Complains of fatigue. Denies any fever, chills or weight loss.     EYES: No visual disturbances. No discharge. No new lesions    ENMT:  No epistaxis, mouth sores or difficulty swallowing.    RESPIRATORY:  No new shortness of breath. No new cough or hemoptysis.    CARDIOVASCULAR:  No chest pain or palpitations.    GASTROINTESTINAL:  No abdominal pain nausea, vomiting or blood in the stool.    GENITOURINARY: No Dysuria or Hematuria.    MUSCULOSKELETAL: Positive for chronic pain affecting bilateral shoulders and hips and hand.  Denies any recent worsening.    LYMPHATICS:  Denies any abnormal swollen glands anywhere in the body.    NEUROLOGICAL : Denies any recent worsening of tingling and numbness affecting bilateral hand and lower extremity.   "No headache or dizziness. No seizures or balance problems.    SKIN: No new skin lesions.    ENDOCRINE : No new hot or cold intolerance. No new polyuria . No polydipsia.        PHYSICAL EXAMINATION:      VITAL SIGNS:  /69   Pulse 68   Temp 97.9 °F (36.6 °C) (Temporal)   Resp 18   Ht 170.2 cm (67.01\")   Wt 99.3 kg (219 lb)   BMI 34.29 kg/m²       07/24/20  0849   Weight: 99.3 kg (219 lb)         ECOG performance status: 1    CONSTITUTIONAL:  Not in any distress.    EYES: Mild conjunctival Pallor. No Icterus. No Pterygium. Extraocular Movements intact.No ptosis.    ENMT:  Normocephalic, Atraumatic.No Facial Asymmetry noted.    NECK:  No adenopathy.Trachea midline. NO JVD.    RESPIRATORY:  Fair air entry bilateral. No rhonchi or wheezing.Fair respiratory effort.    CARDIOVASCULAR:  S1, S2. Regular rate and rhythm. No murmur or gallop appreciated.  Port-A-Cath present.    ABDOMEN:  Soft, obese, nontender. Bowel sounds present in all four quadrants.  No Hepatosplenomegaly appreciated.    MUSCULOSKELETAL:  No edema.No Calf Tenderness.Decreased range of motion.    NEUROLOGIC:    No  Motor  deficit appreciated. Cranial Nerves 2-12 grossly intact.    SKIN : No new skin lesion identified. Skin is warm and moist to touch.    LYMPHATICS: No new enlarged lymph nodes in neck or supraclavicular area.    PSYCHIATRY: Alert, awake and oriented ×3.Normal affect.  Normal judgment.  Makes good eye contact.        DIAGNOSTIC DATA:    Glucose   Date Value Ref Range Status   07/24/2020 81 65 - 99 mg/dL Final     Sodium   Date Value Ref Range Status   07/24/2020 140 136 - 145 mmol/L Final     Potassium   Date Value Ref Range Status   07/24/2020 4.0 3.5 - 5.2 mmol/L Final     CO2   Date Value Ref Range Status   07/24/2020 25.0 22.0 - 29.0 mmol/L Final     Chloride   Date Value Ref Range Status   07/24/2020 105 98 - 107 mmol/L Final     Anion Gap   Date Value Ref Range Status   07/24/2020 10.0 5.0 - 15.0 mmol/L Final     Creatinine "   Date Value Ref Range Status   07/24/2020 0.79 0.57 - 1.00 mg/dL Final     BUN   Date Value Ref Range Status   07/24/2020 9 6 - 20 mg/dL Final     BUN/Creatinine Ratio   Date Value Ref Range Status   07/24/2020 11.4 7.0 - 25.0 Final     Calcium   Date Value Ref Range Status   07/24/2020 8.8 8.6 - 10.5 mg/dL Final     eGFR Non  Amer   Date Value Ref Range Status   07/24/2020 76 >60 mL/min/1.73 Final     Alkaline Phosphatase   Date Value Ref Range Status   07/24/2020 65 39 - 117 U/L Final     Total Protein   Date Value Ref Range Status   07/24/2020 6.6 6.0 - 8.5 g/dL Final     ALT (SGPT)   Date Value Ref Range Status   07/24/2020 25 1 - 33 U/L Final     AST (SGOT)   Date Value Ref Range Status   07/24/2020 26 1 - 32 U/L Final     Total Bilirubin   Date Value Ref Range Status   07/24/2020 0.2 0.0 - 1.2 mg/dL Final     Albumin   Date Value Ref Range Status   07/24/2020 4.20 3.50 - 5.20 g/dL Final     Globulin   Date Value Ref Range Status   07/24/2020 2.4 gm/dL Final     Lab Results   Component Value Date    WBC 3.94 07/24/2020    HGB 12.2 07/24/2020    HCT 34.6 07/24/2020    MCV 95.8 07/24/2020     07/24/2020     Lab Results   Component Value Date    NEUTROABS 1.90 07/24/2020     Lab Results   Component Value Date     25.6 (H) 06/19/2020    LABCA2 28.3 06/19/2020               ASSESSMENT AND PLAN:      1.  Metastatic breast cancer with axillary adenopathy, hilar adenopathy liver and bone metastases:  - Patient was diagnosed in July 10, 2017 with axillary biopsy.  - Patient received 6 cycles of chemotherapy with Taxotere and Cytoxan between July 26, 2017 and December 6, 2017.  Patient was seen by oncologist at [facility] for a second opinion.  -Treatment was changed to palbociclib and letrozole in January 30, 2018.  - We will go ahead with cycle 26 of palbociclib and letrozole today on July 24, 2020.  - We will ask patient to return to clinic in 5 weeks with repeat CBC, CMP, CA 15-3 and CA 27-29 to  be done on that day.  - Neck surveillance CT of chest abdomen and pelvis with contrast will be done towards the end of September 2020 which was discussed with patient and her family.    2.  Brain metastases:  - Patient has a calvarial metastases without any brain parenchymal mets.  Patient is not asymptomatic no plan to do any routine MRI unless patient has any symptoms.    3.  Right internal jugular vein thrombosis:  -Remains on Coumadin being followed by Coumadin clinic.  Denies any bleeding.    4.  Bone metastases:  -Has been on Zometa since August 23, 2017.  We will continue with Zometa every 5 weeks for now.    5.  History of melanoma in situ status post excision by Dr. Linn    6.  History of DCIS of right breast diagnosed in 2007 status post mastectomy followed by tamoxifen for 5 years that completed in 2013    7.  Health maintenance: Patient does not smoke.    8.  Prescriptions: Patient has enough prescription for ultram, gabapentin, letrozole and palbociclib    9. Advance Care Planning: For now patient remains full code and is able to make decisions.  Patient has health care surrogate mentioned on chart.         PHQ-9 Total Score:       Kylie García reports a pain score of 0.  Given her pain assessment as noted, treatment options were discussed and the following options were decided upon as a follow-up plan to address the patient's pain: continuation of current treatment plan for pain.         Ovidio Meadows MD  7/24/2020  09:34        Part of this note may be an electronic transcription/translation of spoken language to printed text using the Dragon Dictation System.

## 2020-07-25 LAB — CANCER AG27-29 SERPL-ACNC: 30.4 U/ML (ref 0–38.6)

## 2020-07-26 NOTE — PROGRESS NOTES
Today's INR is 2.4.  Patient checked curbside due to COVID 19 pandemic precautions.  Pt states she increased her Zoloft but that has been awhile back. Pt also states she is taking ativan and trazodone prn. Pt was advised if she were to start taking trazodone regularly to call CC; pt verbalized. Patient instructed regarding medication; results given and questions answered. Nutritional counseling given.  Dietary factors affecting therapy addressed.  Patient instructed to monitor for excessive bruising or bleeding. Will recheck in 5 weeks. Findings reported by Tata Nunez RN.        This document has been electronically signed by DAJUAN BondCNP-BC @  On May 8, 2020 08:11     no

## 2020-08-28 ENCOUNTER — INFUSION (OUTPATIENT)
Dept: ONCOLOGY | Facility: HOSPITAL | Age: 50
End: 2020-08-28

## 2020-08-28 ENCOUNTER — OFFICE VISIT (OUTPATIENT)
Dept: ONCOLOGY | Facility: CLINIC | Age: 50
End: 2020-08-28

## 2020-08-28 ENCOUNTER — ANTICOAGULATION VISIT (OUTPATIENT)
Dept: CARDIAC SURGERY | Facility: CLINIC | Age: 50
End: 2020-08-28

## 2020-08-28 VITALS
DIASTOLIC BLOOD PRESSURE: 69 MMHG | SYSTOLIC BLOOD PRESSURE: 112 MMHG | HEART RATE: 68 BPM | WEIGHT: 217.9 LBS | BODY MASS INDEX: 34.2 KG/M2 | RESPIRATION RATE: 18 BRPM | TEMPERATURE: 98 F | HEIGHT: 67 IN

## 2020-08-28 VITALS — HEART RATE: 84 BPM | OXYGEN SATURATION: 98 %

## 2020-08-28 DIAGNOSIS — Z17.0 MALIGNANT NEOPLASM OF RIGHT BREAST IN FEMALE, ESTROGEN RECEPTOR POSITIVE, UNSPECIFIED SITE OF BREAST (HCC): Primary | ICD-10-CM

## 2020-08-28 DIAGNOSIS — C50.911 MALIGNANT NEOPLASM OF RIGHT BREAST IN FEMALE, ESTROGEN RECEPTOR POSITIVE, UNSPECIFIED SITE OF BREAST (HCC): Primary | ICD-10-CM

## 2020-08-28 DIAGNOSIS — C78.7 LIVER METASTASIS: ICD-10-CM

## 2020-08-28 DIAGNOSIS — Z17.0 MALIGNANT NEOPLASM OF RIGHT BREAST IN FEMALE, ESTROGEN RECEPTOR POSITIVE, UNSPECIFIED SITE OF BREAST (HCC): ICD-10-CM

## 2020-08-28 DIAGNOSIS — G62.0 NEUROPATHY DUE TO CHEMOTHERAPEUTIC DRUG (HCC): ICD-10-CM

## 2020-08-28 DIAGNOSIS — Z45.2 ENCOUNTER FOR VENOUS ACCESS DEVICE CARE: ICD-10-CM

## 2020-08-28 DIAGNOSIS — Z86.000 HISTORY OF DUCTAL CARCINOMA IN SITU (DCIS) OF BREAST: ICD-10-CM

## 2020-08-28 DIAGNOSIS — Z79.01 LONG TERM CURRENT USE OF ANTICOAGULANT THERAPY: ICD-10-CM

## 2020-08-28 DIAGNOSIS — D70.1 CHEMOTHERAPY-INDUCED NEUTROPENIA (HCC): ICD-10-CM

## 2020-08-28 DIAGNOSIS — C79.51 BONE METASTASIS: ICD-10-CM

## 2020-08-28 DIAGNOSIS — T45.1X5A CHEMOTHERAPY-INDUCED NEUTROPENIA (HCC): ICD-10-CM

## 2020-08-28 DIAGNOSIS — C50.911 MALIGNANT NEOPLASM OF RIGHT BREAST IN FEMALE, ESTROGEN RECEPTOR POSITIVE, UNSPECIFIED SITE OF BREAST (HCC): ICD-10-CM

## 2020-08-28 DIAGNOSIS — C79.31 METASTASIS TO BRAIN (HCC): ICD-10-CM

## 2020-08-28 DIAGNOSIS — T45.1X5A NEUROPATHY DUE TO CHEMOTHERAPEUTIC DRUG (HCC): ICD-10-CM

## 2020-08-28 LAB
ALBUMIN SERPL-MCNC: 4.3 G/DL (ref 3.5–5.2)
ALBUMIN/GLOB SERPL: 1.9 G/DL
ALP SERPL-CCNC: 66 U/L (ref 39–117)
ALT SERPL W P-5'-P-CCNC: 22 U/L (ref 1–33)
ANION GAP SERPL CALCULATED.3IONS-SCNC: 12 MMOL/L (ref 5–15)
AST SERPL-CCNC: 20 U/L (ref 1–32)
BASOPHILS # BLD AUTO: 0.09 10*3/MM3 (ref 0–0.2)
BASOPHILS NFR BLD AUTO: 2.5 % (ref 0–1.5)
BILIRUB SERPL-MCNC: 0.2 MG/DL (ref 0–1.2)
BUN SERPL-MCNC: 15 MG/DL (ref 6–20)
BUN/CREAT SERPL: 16 (ref 7–25)
CALCIUM SPEC-SCNC: 9.1 MG/DL (ref 8.6–10.5)
CANCER AG15-3 SERPL-ACNC: 28.9 U/ML
CHLORIDE SERPL-SCNC: 106 MMOL/L (ref 98–107)
CO2 SERPL-SCNC: 24 MMOL/L (ref 22–29)
CREAT SERPL-MCNC: 0.94 MG/DL (ref 0.57–1)
DEPRECATED RDW RBC AUTO: 53.7 FL (ref 37–54)
EOSINOPHIL # BLD AUTO: 0.06 10*3/MM3 (ref 0–0.4)
EOSINOPHIL NFR BLD AUTO: 1.7 % (ref 0.3–6.2)
ERYTHROCYTE [DISTWIDTH] IN BLOOD BY AUTOMATED COUNT: 15.2 % (ref 12.3–15.4)
GFR SERPL CREATININE-BSD FRML MDRD: 63 ML/MIN/1.73
GLOBULIN UR ELPH-MCNC: 2.3 GM/DL
GLUCOSE SERPL-MCNC: 95 MG/DL (ref 65–99)
HCT VFR BLD AUTO: 34.1 % (ref 34–46.6)
HGB BLD-MCNC: 12.2 G/DL (ref 12–15.9)
IMM GRANULOCYTES # BLD AUTO: 0.01 10*3/MM3 (ref 0–0.05)
IMM GRANULOCYTES NFR BLD AUTO: 0.3 % (ref 0–0.5)
INR PPP: 2.7 (ref 0.9–1.1)
LYMPHOCYTES # BLD AUTO: 1.14 10*3/MM3 (ref 0.7–3.1)
LYMPHOCYTES NFR BLD AUTO: 32.1 % (ref 19.6–45.3)
MAGNESIUM SERPL-MCNC: 1.9 MG/DL (ref 1.6–2.6)
MCH RBC QN AUTO: 33.7 PG (ref 26.6–33)
MCHC RBC AUTO-ENTMCNC: 35.8 G/DL (ref 31.5–35.7)
MCV RBC AUTO: 94.2 FL (ref 79–97)
MONOCYTES # BLD AUTO: 0.6 10*3/MM3 (ref 0.1–0.9)
MONOCYTES NFR BLD AUTO: 16.9 % (ref 5–12)
NEUTROPHILS NFR BLD AUTO: 1.65 10*3/MM3 (ref 1.7–7)
NEUTROPHILS NFR BLD AUTO: 46.5 % (ref 42.7–76)
NRBC BLD AUTO-RTO: 0 /100 WBC (ref 0–0.2)
PHOSPHATE SERPL-MCNC: 4 MG/DL (ref 2.5–4.5)
PLATELET # BLD AUTO: 205 10*3/MM3 (ref 140–450)
PMV BLD AUTO: 9.9 FL (ref 6–12)
POTASSIUM SERPL-SCNC: 4.1 MMOL/L (ref 3.5–5.2)
PROT SERPL-MCNC: 6.6 G/DL (ref 6–8.5)
RBC # BLD AUTO: 3.62 10*6/MM3 (ref 3.77–5.28)
SODIUM SERPL-SCNC: 142 MMOL/L (ref 136–145)
WBC # BLD AUTO: 3.55 10*3/MM3 (ref 3.4–10.8)

## 2020-08-28 PROCEDURE — 84100 ASSAY OF PHOSPHORUS: CPT

## 2020-08-28 PROCEDURE — 25010000002 ZOLEDRONIC ACID PER 1 MG: Performed by: INTERNAL MEDICINE

## 2020-08-28 PROCEDURE — 96365 THER/PROPH/DIAG IV INF INIT: CPT | Performed by: INTERNAL MEDICINE

## 2020-08-28 PROCEDURE — 83735 ASSAY OF MAGNESIUM: CPT

## 2020-08-28 PROCEDURE — 25010000003 HEPARIN LOCK FLUSH PER 10 UNITS: Performed by: INTERNAL MEDICINE

## 2020-08-28 PROCEDURE — 80053 COMPREHEN METABOLIC PANEL: CPT

## 2020-08-28 PROCEDURE — 85610 PROTHROMBIN TIME: CPT | Performed by: NURSE PRACTITIONER

## 2020-08-28 PROCEDURE — 86300 IMMUNOASSAY TUMOR CA 15-3: CPT

## 2020-08-28 PROCEDURE — 85025 COMPLETE CBC W/AUTO DIFF WBC: CPT

## 2020-08-28 PROCEDURE — 99214 OFFICE O/P EST MOD 30 MIN: CPT | Performed by: INTERNAL MEDICINE

## 2020-08-28 RX ORDER — GABAPENTIN 300 MG/1
300 CAPSULE ORAL 3 TIMES DAILY
Qty: 90 CAPSULE | Refills: 0 | Status: SHIPPED | OUTPATIENT
Start: 2020-08-28 | End: 2020-11-13 | Stop reason: SDUPTHER

## 2020-08-28 RX ORDER — HEPARIN SODIUM (PORCINE) LOCK FLUSH IV SOLN 100 UNIT/ML 100 UNIT/ML
500 SOLUTION INTRAVENOUS AS NEEDED
Status: DISCONTINUED | OUTPATIENT
Start: 2020-08-28 | End: 2020-08-28 | Stop reason: HOSPADM

## 2020-08-28 RX ORDER — SODIUM CHLORIDE 0.9 % (FLUSH) 0.9 %
10 SYRINGE (ML) INJECTION AS NEEDED
Status: CANCELLED | OUTPATIENT
Start: 2020-08-28

## 2020-08-28 RX ORDER — SODIUM CHLORIDE 9 MG/ML
250 INJECTION, SOLUTION INTRAVENOUS ONCE
Status: COMPLETED | OUTPATIENT
Start: 2020-08-28 | End: 2020-08-28

## 2020-08-28 RX ORDER — SODIUM CHLORIDE 0.9 % (FLUSH) 0.9 %
20 SYRINGE (ML) INJECTION AS NEEDED
Status: CANCELLED | OUTPATIENT
Start: 2020-08-28

## 2020-08-28 RX ORDER — HEPARIN SODIUM (PORCINE) LOCK FLUSH IV SOLN 100 UNIT/ML 100 UNIT/ML
500 SOLUTION INTRAVENOUS AS NEEDED
Status: CANCELLED | OUTPATIENT
Start: 2020-08-28

## 2020-08-28 RX ORDER — SODIUM CHLORIDE 0.9 % (FLUSH) 0.9 %
20 SYRINGE (ML) INJECTION AS NEEDED
Status: DISCONTINUED | OUTPATIENT
Start: 2020-08-28 | End: 2020-08-28 | Stop reason: HOSPADM

## 2020-08-28 RX ORDER — SODIUM CHLORIDE 0.9 % (FLUSH) 0.9 %
10 SYRINGE (ML) INJECTION AS NEEDED
Status: DISCONTINUED | OUTPATIENT
Start: 2020-08-28 | End: 2020-08-28 | Stop reason: HOSPADM

## 2020-08-28 RX ORDER — SODIUM CHLORIDE 9 MG/ML
250 INJECTION, SOLUTION INTRAVENOUS ONCE
Status: CANCELLED | OUTPATIENT
Start: 2020-08-28

## 2020-08-28 RX ADMIN — SODIUM CHLORIDE, PRESERVATIVE FREE 10 ML: 5 INJECTION INTRAVENOUS at 10:55

## 2020-08-28 RX ADMIN — ZOLEDRONIC ACID 4 MG: 4 INJECTION, SOLUTION, CONCENTRATE INTRAVENOUS at 10:23

## 2020-08-28 RX ADMIN — HEPARIN 500 UNITS: 100 SYRINGE at 10:55

## 2020-08-28 RX ADMIN — SODIUM CHLORIDE 250 ML: 9 INJECTION, SOLUTION INTRAVENOUS at 10:23

## 2020-08-28 NOTE — PROGRESS NOTES
DATE OF VISIT: 8/28/2020      REASON FOR VISIT: Metastatic breast cancer, bone metastases, new liver metastases, neuropathy, cancer related pain      HISTORY OF PRESENT ILLNESS:    50-year-old female with medical problem consisting of ductal carcinoma in situ of right breast diagnosed in 2007, anxiety/depression, metastatic breast cancer with bone and liver metastases for which patient is currently on treatment with palbociclib and letrozole is here for follow-up appointment today.  Patient is scheduled to start cycle 27 of palbociclib and letrozole today on August 28, 2020.  Complains of chronic back discomfort without any recent worsening.  Complains of worsening discomfort in the right axilla where she had lymph node excision done previously.  Denies any recent worsening of tingling or numbness.  Denies any bowel or bladder incontinence.  Denies any new lymph node enlargement.  Denies any bleeding.              PAST MEDICAL HISTORY:    Past Medical History:   Diagnosis Date   • Anxiety    • Depression    • Encounter for gynecological examination    • Malignant neoplasm of female breast (CMS/HCC)     hx of dcis s/p mastectomy and reconstruction and augmentation      • Primary malignant neoplasm of breast (CMS/HCC)     dcis in rt breast s/p mastectomy,reconstruction      • Ventricular premature beats        SOCIAL HISTORY:    Social History     Tobacco Use   • Smoking status: Never Smoker   • Smokeless tobacco: Never Used   Substance Use Topics   • Alcohol use: No   • Drug use: No       Surgical History :  Past Surgical History:   Procedure Laterality Date   • AUGMENTATION MAMMAPLASTY     • AXILLARY LYMPH NODE BIOPSY/EXCISION Right 7/10/2017    Procedure: BIOSPY RIGHT AXILLARY MASS AND OR LYMPH;  Surgeon: Sourav Ernst MD;  Location: [facility];  Service:    • BREAST BIOPSY  12/07/2007    Mammotome biopsy of the right side with clip placement   • BREAST LUMPECTOMY     • BREAST RECONSTRUCTION  10/28/2008     Abscence of right breast secondary to mastectomy. Implant exchange for reconstruction of right breast with open para-prosthetic capsulotomy   • BREAST SURGERY  10/01/2009    Left breast ptosis and breast asymmetry secondary to right breast reconstruction for breast cancer. Left breast mastopexy with augmentation.   • CARDIAC CATHETERIZATION  09/18/2008    Normal coronary arteriography. Normal left ventricular angiogram   • EP STUDY     • MASTECTOMY Right    • MASTECTOMY  02/05/2008    Multifocal right breast ductal carcinoma-in-situ. Right total mastectomy   • MASTECTOMY, PARTIAL  01/03/2008    Ductal carcinoma in-situ of the right breast, proven by mammotome biopsy. Right lumpectomy, medial portion of the breast, between 2 o'clock and 4 o'clock, 5 cm from the nipple, with clip placement, radiographic inter. of severo. specimen, & ultrasound   • PAP SMEAR  03/03/2009    Negative   • [place] INSJ TUNNELED CVC W/O SUBQ PORT/ AGE 3 YR/> Right 7/10/2017    Procedure: MEDIPORT     (c-arm#2);  Surgeon: Sourav Ernst MD;  Location: [facility];  Service: General       ALLERGIES:    Allergies   Allergen Reactions   • Percocet [Oxycodone-Acetaminophen] Nausea And Vomiting         FAMILY HISTORY:  Family History   Problem Relation Age of Onset   • Anemia Mother    • Multiple sclerosis Mother    • No Known Problems Father    • Diabetes Other    • Multiple sclerosis Other            REVIEW OF SYSTEMS:      CONSTITUTIONAL:  Complains of fatigue. Denies any fever, chills or weight loss.     EYES: No visual disturbances. No discharge. No new lesions    ENMT:  No epistaxis, mouth sores or difficulty swallowing.    RESPIRATORY:  No new shortness of breath. No new cough or hemoptysis.    CARDIOVASCULAR:  No chest pain or palpitations.    GASTROINTESTINAL:  No abdominal pain nausea, vomiting or blood in the stool.    GENITOURINARY: No Dysuria or Hematuria.    MUSCULOSKELETAL: Positive for chronic back pain affecting bilateral shoulders,  "hips and hands.  Denies any recent worsening.    LYMPHATICS:  Denies any abnormal swollen glands anywhere in the body.    NEUROLOGICAL : Denies any recent worsening of tingling and numbness affecting bilateral hand and feet. No headache or dizziness. No seizures or balance problems.    SKIN: No new skin lesions.    ENDOCRINE : No new hot or cold intolerance. No new polyuria . No polydipsia.    BREAST: Complains of worsening discomfort in right axilla.        PHYSICAL EXAMINATION:      VITAL SIGNS:  /69   Pulse 68   Temp 98 °F (36.7 °C) (Temporal)   Resp 18   Ht 170.2 cm (67.01\")   Wt 98.8 kg (217 lb 14.4 oz)   BMI 34.12 kg/m²       08/28/20  0847   Weight: 98.8 kg (217 lb 14.4 oz)         ECOG performance status: 1    CONSTITUTIONAL:  Not in any distress.    EYES: Mild conjunctival Pallor. No Icterus. No Pterygium. Extraocular Movements intact.No ptosis.    ENMT:  Normocephalic, Atraumatic.No Facial Asymmetry noted.    NECK:  No adenopathy.Trachea midline. NO JVD.    RESPIRATORY:  Fair air entry bilateral. No rhonchi or wheezing.Fair respiratory effort.    CARDIOVASCULAR:  S1, S2. Regular rate and rhythm. No murmur or gallop appreciated.  Port-A-Cath present.    BREAST: Breast exam was performed in presence of registered nurse Solange.  In patient's area of concern there is a firm tissue with consistency of fibrous tissue measuring up to 3 x 2 cm.  No other enlarged lymph nodes palpable.    ABDOMEN:  Soft, obese, nontender. Bowel sounds present in all four quadrants.  No Hepatosplenomegaly appreciated.    MUSCULOSKELETAL:  No edema.No Calf Tenderness.Decreased range of motion.    NEUROLOGIC:    No  Motor  deficit appreciated. Cranial Nerves 2-12 grossly intact.    SKIN : No new skin lesion identified. Skin is warm and moist to touch.    LYMPHATICS: No new enlarged lymph nodes in neck or supraclavicular area.    PSYCHIATRY: Alert, awake and oriented ×3.Normal affect.  Normal judgment.  Makes good eye " contact.        DIAGNOSTIC DATA:    Glucose   Date Value Ref Range Status   08/28/2020 95 65 - 99 mg/dL Final     Sodium   Date Value Ref Range Status   08/28/2020 142 136 - 145 mmol/L Final     Potassium   Date Value Ref Range Status   08/28/2020 4.1 3.5 - 5.2 mmol/L Final     CO2   Date Value Ref Range Status   08/28/2020 24.0 22.0 - 29.0 mmol/L Final     Chloride   Date Value Ref Range Status   08/28/2020 106 98 - 107 mmol/L Final     Anion Gap   Date Value Ref Range Status   08/28/2020 12.0 5.0 - 15.0 mmol/L Final     Creatinine   Date Value Ref Range Status   08/28/2020 0.94 0.57 - 1.00 mg/dL Final     BUN   Date Value Ref Range Status   08/28/2020 15 6 - 20 mg/dL Final     BUN/Creatinine Ratio   Date Value Ref Range Status   08/28/2020 16.0 7.0 - 25.0 Final     Calcium   Date Value Ref Range Status   08/28/2020 9.1 8.6 - 10.5 mg/dL Final     eGFR Non  Amer   Date Value Ref Range Status   08/28/2020 63 >60 mL/min/1.73 Final     Alkaline Phosphatase   Date Value Ref Range Status   08/28/2020 66 39 - 117 U/L Final     Total Protein   Date Value Ref Range Status   08/28/2020 6.6 6.0 - 8.5 g/dL Final     ALT (SGPT)   Date Value Ref Range Status   08/28/2020 22 1 - 33 U/L Final     AST (SGOT)   Date Value Ref Range Status   08/28/2020 20 1 - 32 U/L Final     Total Bilirubin   Date Value Ref Range Status   08/28/2020 0.2 0.0 - 1.2 mg/dL Final     Albumin   Date Value Ref Range Status   08/28/2020 4.30 3.50 - 5.20 g/dL Final     Globulin   Date Value Ref Range Status   08/28/2020 2.3 gm/dL Final     Lab Results   Component Value Date    WBC 3.55 08/28/2020    HGB 12.2 08/28/2020    HCT 34.1 08/28/2020    MCV 94.2 08/28/2020     08/28/2020     Lab Results   Component Value Date    NEUTROABS 1.65 (L) 08/28/2020     Lab Results   Component Value Date     26.2 (H) 07/24/2020    LABCA2 30.4 07/24/2020             ASSESSMENT AND PLAN:      1.  Metastatic breast cancer with axillary adenopathy, hilar  adenopathy, liver and bone metastases:  - Patient was initially diagnosed on July 10, 2017 with axillary biopsy.  -Received 6 cycles of chemotherapy with Taxotere and Cytoxan between July 26, 2017 and December 6, 2017.  Patient was seen by an oncologist at [facility] for second opinion.  - Patient was started on palbociclib and letrozole on January 30, 2018.  -We will go ahead with cycle 27 of palbociclib and letrozole today on August 20, 2020.  -We will ask patient to return to clinic in 4 weeks with repeat CBC, CMP, CA 15-3 and CA 27-29 to be done on that day.  - We will get CT of chest abdomen and pelvis with contrast prior to next clinic visit in 5 weeks.  Recommendation were discussed with patient and her family.    2.  Brain metastases:  - Patient had calvarial metastasis to any brain parenchymal mets.  Patient was evaluated by Dr. Pulido and no radiation was recommended secondary to being asymptomatic.    3.  Right internal jugular vein thrombosis:  -Remains on Coumadin being followed by Coumadin clinic.  Denies any abnormal bleeding.    4.  Bone metastases:  - Remains on Zometa since August 23, 2017.  Denies any mouth sores or any swelling of jaw.    5.  History of melanoma in situ status post excision by Dr. Linn.    6.  History of DCIS of right breast diagnosed in 2007 status post mastectomy followed by tamoxifen for 5 years that completed in 2013    7.  Therapy induced neutropenia:  -White blood cell count is 3.5 with absolute neutrophil count of 1650.  Since ANC is greater than 1500 will continue with palbociclib as scheduled.    8.  Chemotherapy-induced neuropathy:  -Stable on gabapentin.  Prescription for gabapentin has been sent to her pharmacy today on August 20, 2020.    9.  Health maintenance: Patient does not smoke.    10.  Prescriptions: Patient has enough prescription for Ultram, gabapentin, letrozole and palbociclib    11. Advance Care Planning: For now patient remains full code and is able  to make decisions.  Patient has health care surrogate mentioned on chart.         PHQ-9 Total Score: 3   Patient is not homicidal or suicidal.  No acute intervention required.    Kylie García reports a pain score of 0.  Given her pain assessment as noted, treatment options were discussed and the following options were decided upon as a follow-up plan to address the patient's pain: continuation of current treatment plan for pain.         Ovidio Meadows MD  8/28/2020  09:41        Part of this note may be an electronic transcription/translation of spoken language to printed text using the Dragon Dictation System.

## 2020-08-28 NOTE — PROGRESS NOTES
Today's INR is 2.7.  Patient states no med changes or bleeding problems or unexplained bruising. Patient instructed to continue current dosing schedule. Verbalizes understanding. Will recheck 1 month.  Patient instructed regarding medication; results given and questions answered. Nutritional counseling given.  Dietary factors affecting therapy addressed.  Patient instructed to monitor for excessive bruising or bleeding. Findings reported by Tata Nunez RN.         This document has been electronically signed by LYLA Augustin @ on August 28, 2020 08:30

## 2020-08-29 LAB — CANCER AG27-29 SERPL-ACNC: 30.6 U/ML (ref 0–38.6)

## 2020-09-30 ENCOUNTER — HOSPITAL ENCOUNTER (OUTPATIENT)
Dept: CT IMAGING | Facility: HOSPITAL | Age: 50
Discharge: HOME | End: 2020-09-30
Admitting: INTERNAL MEDICINE

## 2020-09-30 DIAGNOSIS — C50.911 MALIGNANT NEOPLASM OF RIGHT BREAST IN FEMALE, ESTROGEN RECEPTOR POSITIVE, UNSPECIFIED SITE OF BREAST (HCC): ICD-10-CM

## 2020-09-30 DIAGNOSIS — C78.7 LIVER METASTASIS: ICD-10-CM

## 2020-09-30 DIAGNOSIS — C79.51 BONE METASTASIS: ICD-10-CM

## 2020-09-30 DIAGNOSIS — C79.31 METASTASIS TO BRAIN (HCC): ICD-10-CM

## 2020-09-30 DIAGNOSIS — Z17.0 MALIGNANT NEOPLASM OF RIGHT BREAST IN FEMALE, ESTROGEN RECEPTOR POSITIVE, UNSPECIFIED SITE OF BREAST (HCC): ICD-10-CM

## 2020-09-30 PROCEDURE — 25010000002 IOPAMIDOL 61 % SOLUTION: Performed by: INTERNAL MEDICINE

## 2020-09-30 PROCEDURE — 71260 CT THORAX DX C+: CPT

## 2020-09-30 PROCEDURE — 25010000003 HEPARIN LOCK FLUSH PER 10 UNITS: Performed by: INTERNAL MEDICINE

## 2020-09-30 PROCEDURE — 74177 CT ABD & PELVIS W/CONTRAST: CPT

## 2020-09-30 RX ORDER — HEPARIN SODIUM (PORCINE) LOCK FLUSH IV SOLN 100 UNIT/ML 100 UNIT/ML
SOLUTION INTRAVENOUS
Status: DISPENSED
Start: 2020-09-30 | End: 2020-09-30

## 2020-09-30 RX ORDER — HEPARIN SODIUM (PORCINE) LOCK FLUSH IV SOLN 100 UNIT/ML 100 UNIT/ML
500 SOLUTION INTRAVENOUS ONCE
Status: COMPLETED | OUTPATIENT
Start: 2020-09-30 | End: 2020-09-30

## 2020-09-30 RX ORDER — SODIUM CHLORIDE 9 MG/ML
10 INJECTION INTRAVENOUS EVERY 12 HOURS SCHEDULED
Status: DISCONTINUED | OUTPATIENT
Start: 2020-09-30 | End: 2020-10-01 | Stop reason: HOSPADM

## 2020-09-30 RX ADMIN — SODIUM CHLORIDE 10 ML: 9 INJECTION, SOLUTION INTRAMUSCULAR; INTRAVENOUS; SUBCUTANEOUS at 08:55

## 2020-09-30 RX ADMIN — IOPAMIDOL 90 ML: 612 INJECTION, SOLUTION INTRAVENOUS at 08:51

## 2020-09-30 RX ADMIN — HEPARIN SODIUM (PORCINE) LOCK FLUSH IV SOLN 100 UNIT/ML 500 UNITS: 100 SOLUTION at 08:50

## 2020-10-02 ENCOUNTER — OFFICE VISIT (OUTPATIENT)
Dept: ONCOLOGY | Facility: CLINIC | Age: 50
End: 2020-10-02

## 2020-10-02 ENCOUNTER — INFUSION (OUTPATIENT)
Dept: ONCOLOGY | Facility: HOSPITAL | Age: 50
End: 2020-10-02

## 2020-10-02 ENCOUNTER — ANTICOAGULATION VISIT (OUTPATIENT)
Dept: CARDIAC SURGERY | Facility: CLINIC | Age: 50
End: 2020-10-02

## 2020-10-02 VITALS
TEMPERATURE: 98.5 F | SYSTOLIC BLOOD PRESSURE: 115 MMHG | RESPIRATION RATE: 18 BRPM | WEIGHT: 222.1 LBS | HEART RATE: 72 BPM | BODY MASS INDEX: 34.78 KG/M2 | DIASTOLIC BLOOD PRESSURE: 72 MMHG | OXYGEN SATURATION: 95 %

## 2020-10-02 VITALS — OXYGEN SATURATION: 99 % | HEART RATE: 75 BPM

## 2020-10-02 DIAGNOSIS — C50.911 MALIGNANT NEOPLASM OF RIGHT BREAST IN FEMALE, ESTROGEN RECEPTOR POSITIVE, UNSPECIFIED SITE OF BREAST (HCC): Primary | ICD-10-CM

## 2020-10-02 DIAGNOSIS — C79.51 BONE METASTASIS: ICD-10-CM

## 2020-10-02 DIAGNOSIS — G62.0 NEUROPATHY DUE TO CHEMOTHERAPEUTIC DRUG (HCC): ICD-10-CM

## 2020-10-02 DIAGNOSIS — Z45.2 ENCOUNTER FOR VENOUS ACCESS DEVICE CARE: ICD-10-CM

## 2020-10-02 DIAGNOSIS — T45.1X5A NEUROPATHY DUE TO CHEMOTHERAPEUTIC DRUG (HCC): ICD-10-CM

## 2020-10-02 DIAGNOSIS — C50.911 MALIGNANT NEOPLASM OF RIGHT BREAST IN FEMALE, ESTROGEN RECEPTOR POSITIVE, UNSPECIFIED SITE OF BREAST (HCC): ICD-10-CM

## 2020-10-02 DIAGNOSIS — Z17.0 MALIGNANT NEOPLASM OF RIGHT BREAST IN FEMALE, ESTROGEN RECEPTOR POSITIVE, UNSPECIFIED SITE OF BREAST (HCC): Primary | ICD-10-CM

## 2020-10-02 DIAGNOSIS — R79.89 ELEVATED LIVER FUNCTION TESTS: ICD-10-CM

## 2020-10-02 DIAGNOSIS — Z23 FLU VACCINE NEED: ICD-10-CM

## 2020-10-02 DIAGNOSIS — Z17.0 MALIGNANT NEOPLASM OF RIGHT BREAST IN FEMALE, ESTROGEN RECEPTOR POSITIVE, UNSPECIFIED SITE OF BREAST (HCC): ICD-10-CM

## 2020-10-02 DIAGNOSIS — Z79.01 LONG TERM CURRENT USE OF ANTICOAGULANT THERAPY: ICD-10-CM

## 2020-10-02 DIAGNOSIS — C78.7 LIVER METASTASIS: ICD-10-CM

## 2020-10-02 LAB
ALBUMIN SERPL-MCNC: 4.2 G/DL (ref 3.5–5.2)
ALBUMIN/GLOB SERPL: 1.6 G/DL
ALP SERPL-CCNC: 65 U/L (ref 39–117)
ALT SERPL W P-5'-P-CCNC: 35 U/L (ref 1–33)
ANION GAP SERPL CALCULATED.3IONS-SCNC: 8 MMOL/L (ref 5–15)
AST SERPL-CCNC: 28 U/L (ref 1–32)
BASOPHILS # BLD AUTO: 0.11 10*3/MM3 (ref 0–0.2)
BASOPHILS NFR BLD AUTO: 2.7 % (ref 0–1.5)
BILIRUB SERPL-MCNC: 0.2 MG/DL (ref 0–1.2)
BUN SERPL-MCNC: 11 MG/DL (ref 6–20)
BUN/CREAT SERPL: 13.9 (ref 7–25)
CALCIUM SPEC-SCNC: 9.2 MG/DL (ref 8.6–10.5)
CANCER AG15-3 SERPL-ACNC: 29.4 U/ML
CHLORIDE SERPL-SCNC: 105 MMOL/L (ref 98–107)
CO2 SERPL-SCNC: 25 MMOL/L (ref 22–29)
CREAT SERPL-MCNC: 0.79 MG/DL (ref 0.57–1)
DEPRECATED RDW RBC AUTO: 52.5 FL (ref 37–54)
EOSINOPHIL # BLD AUTO: 0.08 10*3/MM3 (ref 0–0.4)
EOSINOPHIL NFR BLD AUTO: 2 % (ref 0.3–6.2)
ERYTHROCYTE [DISTWIDTH] IN BLOOD BY AUTOMATED COUNT: 14.6 % (ref 12.3–15.4)
GFR SERPL CREATININE-BSD FRML MDRD: 76 ML/MIN/1.73
GLOBULIN UR ELPH-MCNC: 2.7 GM/DL
GLUCOSE SERPL-MCNC: 86 MG/DL (ref 65–99)
HCT VFR BLD AUTO: 36.9 % (ref 34–46.6)
HGB BLD-MCNC: 12.6 G/DL (ref 12–15.9)
IMM GRANULOCYTES # BLD AUTO: 0.01 10*3/MM3 (ref 0–0.05)
IMM GRANULOCYTES NFR BLD AUTO: 0.2 % (ref 0–0.5)
INR PPP: 2.5 (ref 0.9–1.1)
LYMPHOCYTES # BLD AUTO: 1.15 10*3/MM3 (ref 0.7–3.1)
LYMPHOCYTES NFR BLD AUTO: 28.4 % (ref 19.6–45.3)
MAGNESIUM SERPL-MCNC: 1.9 MG/DL (ref 1.6–2.6)
MCH RBC QN AUTO: 33.4 PG (ref 26.6–33)
MCHC RBC AUTO-ENTMCNC: 34.1 G/DL (ref 31.5–35.7)
MCV RBC AUTO: 97.9 FL (ref 79–97)
MONOCYTES # BLD AUTO: 0.6 10*3/MM3 (ref 0.1–0.9)
MONOCYTES NFR BLD AUTO: 14.8 % (ref 5–12)
NEUTROPHILS NFR BLD AUTO: 2.1 10*3/MM3 (ref 1.7–7)
NEUTROPHILS NFR BLD AUTO: 51.9 % (ref 42.7–76)
NRBC BLD AUTO-RTO: 0 /100 WBC (ref 0–0.2)
PHOSPHATE SERPL-MCNC: 3.3 MG/DL (ref 2.5–4.5)
PLATELET # BLD AUTO: 232 10*3/MM3 (ref 140–450)
PMV BLD AUTO: 9.6 FL (ref 6–12)
POTASSIUM SERPL-SCNC: 3.8 MMOL/L (ref 3.5–5.2)
PROT SERPL-MCNC: 6.9 G/DL (ref 6–8.5)
RBC # BLD AUTO: 3.77 10*6/MM3 (ref 3.77–5.28)
SODIUM SERPL-SCNC: 138 MMOL/L (ref 136–145)
WBC # BLD AUTO: 4.05 10*3/MM3 (ref 3.4–10.8)

## 2020-10-02 PROCEDURE — 86300 IMMUNOASSAY TUMOR CA 15-3: CPT

## 2020-10-02 PROCEDURE — 90686 IIV4 VACC NO PRSV 0.5 ML IM: CPT | Performed by: INTERNAL MEDICINE

## 2020-10-02 PROCEDURE — 84100 ASSAY OF PHOSPHORUS: CPT

## 2020-10-02 PROCEDURE — 83735 ASSAY OF MAGNESIUM: CPT

## 2020-10-02 PROCEDURE — 99215 OFFICE O/P EST HI 40 MIN: CPT | Performed by: INTERNAL MEDICINE

## 2020-10-02 PROCEDURE — 85025 COMPLETE CBC W/AUTO DIFF WBC: CPT

## 2020-10-02 PROCEDURE — 80053 COMPREHEN METABOLIC PANEL: CPT

## 2020-10-02 PROCEDURE — G0008 ADMIN INFLUENZA VIRUS VAC: HCPCS | Performed by: INTERNAL MEDICINE

## 2020-10-02 PROCEDURE — 85610 PROTHROMBIN TIME: CPT | Performed by: NURSE PRACTITIONER

## 2020-10-02 PROCEDURE — 96374 THER/PROPH/DIAG INJ IV PUSH: CPT | Performed by: INTERNAL MEDICINE

## 2020-10-02 PROCEDURE — 25010000002 INFLUENZA VAC SPLIT QUAD 0.5 ML SUSPENSION PREFILLED SYRINGE: Performed by: INTERNAL MEDICINE

## 2020-10-02 PROCEDURE — 25010000002 ZOLEDRONIC ACID PER 1 MG: Performed by: INTERNAL MEDICINE

## 2020-10-02 PROCEDURE — 25010000003 HEPARIN LOCK FLUSH PER 10 UNITS: Performed by: INTERNAL MEDICINE

## 2020-10-02 RX ORDER — SODIUM CHLORIDE 0.9 % (FLUSH) 0.9 %
10 SYRINGE (ML) INJECTION AS NEEDED
Status: CANCELLED | OUTPATIENT
Start: 2020-10-02

## 2020-10-02 RX ORDER — SODIUM CHLORIDE 9 MG/ML
250 INJECTION, SOLUTION INTRAVENOUS ONCE
Status: CANCELLED | OUTPATIENT
Start: 2020-10-02

## 2020-10-02 RX ORDER — SODIUM CHLORIDE 9 MG/ML
250 INJECTION, SOLUTION INTRAVENOUS ONCE
Status: COMPLETED | OUTPATIENT
Start: 2020-10-02 | End: 2020-10-02

## 2020-10-02 RX ORDER — HEPARIN SODIUM (PORCINE) LOCK FLUSH IV SOLN 100 UNIT/ML 100 UNIT/ML
SOLUTION INTRAVENOUS
Status: DISCONTINUED
Start: 2020-10-02 | End: 2020-10-02 | Stop reason: HOSPADM

## 2020-10-02 RX ORDER — HEPARIN SODIUM (PORCINE) LOCK FLUSH IV SOLN 100 UNIT/ML 100 UNIT/ML
500 SOLUTION INTRAVENOUS AS NEEDED
Status: DISCONTINUED | OUTPATIENT
Start: 2020-10-02 | End: 2020-10-02 | Stop reason: HOSPADM

## 2020-10-02 RX ORDER — SODIUM CHLORIDE 0.9 % (FLUSH) 0.9 %
20 SYRINGE (ML) INJECTION AS NEEDED
Status: CANCELLED | OUTPATIENT
Start: 2020-10-02

## 2020-10-02 RX ORDER — SODIUM CHLORIDE 0.9 % (FLUSH) 0.9 %
10 SYRINGE (ML) INJECTION AS NEEDED
Status: DISCONTINUED | OUTPATIENT
Start: 2020-10-02 | End: 2020-10-02 | Stop reason: HOSPADM

## 2020-10-02 RX ORDER — HEPARIN SODIUM (PORCINE) LOCK FLUSH IV SOLN 100 UNIT/ML 100 UNIT/ML
500 SOLUTION INTRAVENOUS AS NEEDED
Status: CANCELLED | OUTPATIENT
Start: 2020-10-02

## 2020-10-02 RX ADMIN — HEPARIN 500 UNITS: 100 SYRINGE at 10:45

## 2020-10-02 RX ADMIN — SODIUM CHLORIDE, PRESERVATIVE FREE 10 ML: 5 INJECTION INTRAVENOUS at 10:45

## 2020-10-02 RX ADMIN — INFLUENZA VIRUS VACCINE 0.5 ML: 15; 15; 15; 15 SUSPENSION INTRAMUSCULAR at 10:14

## 2020-10-02 RX ADMIN — ZOLEDRONIC ACID 4 MG: 4 INJECTION, SOLUTION, CONCENTRATE INTRAVENOUS at 10:14

## 2020-10-02 RX ADMIN — SODIUM CHLORIDE 250 ML: 9 INJECTION, SOLUTION INTRAVENOUS at 10:14

## 2020-10-02 NOTE — PROGRESS NOTES
Today's INR is 2.5. PT denies any new medications or bleeding issues. PT denies any s/s of blood clot. PT instructed to continue same dose and Coumadin friendly diet. PT will be seen in 5 weeks. Patient instructed regarding medication; results given and questions answered. Nutritional counseling given.  Dietary factors affecting therapy addressed.  Patient instructed to monitor for excessive bruising or bleeding. PT verbalizes understanding. Findings reported by Elvia Allen RN.       This document has been electronically signed by EDIS Bond-BC @  On October 2, 2020 08:31 CDT

## 2020-10-02 NOTE — PROGRESS NOTES
DATE OF VISIT: 10/2/2020      REASON FOR VISIT: Metastatic breast cancer with bone, liver metastases, neuropathy from chemotherapy, cancer related pain, DVT on anticoagulation with Coumadin      HISTORY OF PRESENT ILLNESS:    50-year-old female with medical problem consisting of ductal carcinoma in situ of right breast diagnosed in 2007, anxiety/depression, metastatic breast cancer with bone, liver metastases who has been on treatment with palbociclib and letrozole currently is here for follow-up appointment today.  Patient had a restaging CT of chest abdomen and pelvis with contrast done recently, she is here to discuss the result.  Still complains of discomfort affecting right axilla.  Denies any bleeding.  Denies any worsening of chronic back pain.  Denies any worsening tingling or numbness recently.            PAST MEDICAL HISTORY:    Past Medical History:   Diagnosis Date   • Anxiety    • Depression    • Encounter for gynecological examination    • Malignant neoplasm of female breast (CMS/HCC)     hx of dcis s/p mastectomy and reconstruction and augmentation      • Primary malignant neoplasm of breast (CMS/HCC)     dcis in rt breast s/p mastectomy,reconstruction      • Ventricular premature beats        SOCIAL HISTORY:    Social History     Tobacco Use   • Smoking status: Never Smoker   • Smokeless tobacco: Never Used   Substance Use Topics   • Alcohol use: No   • Drug use: No       Surgical History :  Past Surgical History:   Procedure Laterality Date   • AUGMENTATION MAMMAPLASTY     • AXILLARY LYMPH NODE BIOPSY/EXCISION Right 7/10/2017    Procedure: BIOSPY RIGHT AXILLARY MASS AND OR LYMPH;  Surgeon: Sourav Ernst MD;  Location: [facility];  Service:    • BREAST BIOPSY  12/07/2007    Mammotome biopsy of the right side with clip placement   • BREAST LUMPECTOMY     • BREAST RECONSTRUCTION  10/28/2008    Abscence of right breast secondary to mastectomy. Implant exchange for reconstruction of right breast with  open para-prosthetic capsulotomy   • BREAST SURGERY  10/01/2009    Left breast ptosis and breast asymmetry secondary to right breast reconstruction for breast cancer. Left breast mastopexy with augmentation.   • CARDIAC CATHETERIZATION  09/18/2008    Normal coronary arteriography. Normal left ventricular angiogram   • EP STUDY     • MASTECTOMY Right    • MASTECTOMY  02/05/2008    Multifocal right breast ductal carcinoma-in-situ. Right total mastectomy   • MASTECTOMY, PARTIAL  01/03/2008    Ductal carcinoma in-situ of the right breast, proven by mammotome biopsy. Right lumpectomy, medial portion of the breast, between 2 o'clock and 4 o'clock, 5 cm from the nipple, with clip placement, radiographic inter. of severo. specimen, & ultrasound   • PAP SMEAR  03/03/2009    Negative   • VA INSJ TUNNELED CVC W/O SUBQ PORT/ AGE 5 YR/> Right 7/10/2017    Procedure: MEDIPORT     (c-arm#2);  Surgeon: Sourav Ernst MD;  Location: [facility];  Service: General       ALLERGIES:    Allergies   Allergen Reactions   • Percocet [Oxycodone-Acetaminophen] Nausea And Vomiting         FAMILY HISTORY:  Family History   Problem Relation Age of Onset   • Anemia Mother    • Multiple sclerosis Mother    • No Known Problems Father    • Diabetes Other    • Multiple sclerosis Other            REVIEW OF SYSTEMS:      CONSTITUTIONAL:  Complains of fatigue.  Has gained 5 pounds since last clinic visit.  Denies any fever, chills .     EYES: No visual disturbances. No discharge. No new lesions    ENMT:  No epistaxis, mouth sores or difficulty swallowing.    RESPIRATORY:  No new shortness of breath. No new cough or hemoptysis.    CARDIOVASCULAR:  No chest pain or palpitations.    GASTROINTESTINAL:  No abdominal pain nausea, vomiting or blood in the stool.    GENITOURINARY: No Dysuria or Hematuria.    MUSCULOSKELETAL: Positive for chronic pain affecting bilateral shoulders, hips and hands.    LYMPHATICS: Complains of worsening discomfort in right  axilla.    NEUROLOGICAL : Positive for chronic tingling and numbness affecting bilateral hand and feet, denies any recent worsening. No headache or dizziness. No seizures or balance problems.    SKIN: No new skin lesions.    ENDOCRINE : No new hot or cold intolerance. No new polyuria . No polydipsia.        PHYSICAL EXAMINATION:      VITAL SIGNS:  /72   Pulse 72   Temp 98.5 °F (36.9 °C)   Resp 18   Wt 101 kg (222 lb 1.6 oz)   SpO2 95%   BMI 34.78 kg/m²       10/02/20  0854   Weight: 101 kg (222 lb 1.6 oz)         ECOG performance status: 1    CONSTITUTIONAL:  Not in any distress.    EYES: Mild conjunctival Pallor. No Icterus. No Pterygium. Extraocular Movements intact.No ptosis.    ENMT:  Normocephalic, Atraumatic.No Facial Asymmetry noted.    NECK:  No adenopathy.Trachea midline. NO JVD.    RESPIRATORY:  Fair air entry bilateral. No rhonchi or wheezing.Fair respiratory effort.    CARDIOVASCULAR:  S1, S2. Regular rate and rhythm. No murmur or gallop appreciated.    ABDOMEN:  Soft, obese, nontender. Bowel sounds present in all four quadrants.  No Hepatosplenomegaly appreciated.    MUSCULOSKELETAL:  No edema.No Calf Tenderness.Decreased range of motion.    NEUROLOGIC:    No  Motor  deficit appreciated. Cranial Nerves 2-12 grossly intact.    SKIN : No new skin lesion identified. Skin is warm and moist to touch.    LYMPHATICS: No new enlarged lymph nodes in neck or supraclavicular area.    PSYCHIATRY: Alert, awake and oriented ×3.Normal affect.  Normal judgment.  Makes good eye contact.        DIAGNOSTIC DATA:    Glucose   Date Value Ref Range Status   08/28/2020 95 65 - 99 mg/dL Final     Sodium   Date Value Ref Range Status   08/28/2020 142 136 - 145 mmol/L Final     Potassium   Date Value Ref Range Status   08/28/2020 4.1 3.5 - 5.2 mmol/L Final     CO2   Date Value Ref Range Status   08/28/2020 24.0 22.0 - 29.0 mmol/L Final     Chloride   Date Value Ref Range Status   08/28/2020 106 98 - 107 mmol/L Final      Anion Gap   Date Value Ref Range Status   08/28/2020 12.0 5.0 - 15.0 mmol/L Final     Creatinine   Date Value Ref Range Status   08/28/2020 0.94 0.57 - 1.00 mg/dL Final     BUN   Date Value Ref Range Status   08/28/2020 15 6 - 20 mg/dL Final     BUN/Creatinine Ratio   Date Value Ref Range Status   08/28/2020 16.0 7.0 - 25.0 Final     Calcium   Date Value Ref Range Status   08/28/2020 9.1 8.6 - 10.5 mg/dL Final     eGFR Non  Amer   Date Value Ref Range Status   08/28/2020 63 >60 mL/min/1.73 Final     Alkaline Phosphatase   Date Value Ref Range Status   08/28/2020 66 39 - 117 U/L Final     Total Protein   Date Value Ref Range Status   08/28/2020 6.6 6.0 - 8.5 g/dL Final     ALT (SGPT)   Date Value Ref Range Status   08/28/2020 22 1 - 33 U/L Final     AST (SGOT)   Date Value Ref Range Status   08/28/2020 20 1 - 32 U/L Final     Total Bilirubin   Date Value Ref Range Status   08/28/2020 0.2 0.0 - 1.2 mg/dL Final     Albumin   Date Value Ref Range Status   08/28/2020 4.30 3.50 - 5.20 g/dL Final     Globulin   Date Value Ref Range Status   08/28/2020 2.3 gm/dL Final     Lab Results   Component Value Date    WBC 4.05 10/02/2020    HGB 12.6 10/02/2020    HCT 36.9 10/02/2020    MCV 97.9 (H) 10/02/2020     10/02/2020     Lab Results   Component Value Date    NEUTROABS 2.10 10/02/2020     Lab Results   Component Value Date     28.9 (H) 08/28/2020    LABCA2 30.6 08/28/2020       Component CA 15-3   Latest Ref Rng & Units <=25.0 U/mL   6/29/2017 2107.0 (H)   8/23/2017 943.0 (H)   10/4/2017 282.2 (H)   10/25/2017 162.2 (H)   11/15/2017 118.0 (H)   12/6/2017 99.6 (H)   12/27/2017 88.3 (H)   2/28/2018 47.2 (H)   4/2/2018 11.9   5/7/2018 5/14/2018 41.1 (H)   6/11/2018 37.3 (H)   7/9/2018 35.3 (H)   8/27/2018 36.7 (H)   9/24/2018 31.1 (H)   11/5/2018 32.9 (H)   12/13/2018 32.6 (H)   3/15/2019 32.6 (H)   4/19/2019 29.2 (H)   5/24/2019 28.3 (H)   6/28/2019 26.9 (H)   8/2/2019 26.8 (H)   9/6/2019 25.9 (H)    10/11/2019 29.6 (H)   11/15/2019 28.8 (H)   12/20/2019 24.5   1/24/2020 26.3 (H)   2/28/2020 25.6 (H)   4/3/2020 27.6 (H)   5/8/2020 26.8 (H)   6/19/2020 25.6 (H)   7/24/2020 26.2 (H)   8/28/2020 28.9 (H)   10/2/2020 29.4 (H)         Component CA 27.29   Latest Ref Rng & Units 0.0 - 38.6 U/mL   6/29/2017 1817.1 (H)   8/23/2017 1075.9 (H)   10/4/2017 288.9 (H)   10/25/2017 181.2 (H)   11/15/2017 112.2 (H)   12/6/2017 101.6 (H)   12/27/2017 90.3 (H)   2/28/2018 52.1 (H)   4/2/2018 12.4   5/7/2018 5/14/2018 38.1   6/11/2018 39.7 (H)   7/9/2018 37.9   8/27/2018 34.1   9/24/2018 38.1   11/5/2018 31.2   12/13/2018 30.7   3/15/2019 31.2   4/19/2019 23.1   5/24/2019 35.0   6/28/2019 24.5   8/2/2019 27.4   9/6/2019 23.3   10/11/2019 22.9   11/15/2019 27.3   12/20/2019 21.5   1/24/2020 27.2   2/28/2020 23.9   4/3/2020 25.9   5/8/2020 23.3   6/19/2020 28.3   7/24/2020 30.4   8/28/2020 30.6   10/2/2020 37.9               RADIOLOGY DATA :  Ct Chest With Contrast    Result Date: 10/1/2020  1.Right axilla postsurgical changes with associated small nodular density involving the right axilla of the breast which measures 2.05 x 1.54 cm. This may represent postsurgical changes versus residual or recurrent breast neoplasm in this region. Recommend clinical correlation and consideration of mammogram and/or breast ultrasound to further evaluate. 2. Stable lateral segment left lobe of liver metastatic lesion. 3.The appendix is identified and has a very small focus of hyperdense material within the proximal lumen suspicious for hyperdense ingested food materials versus small appendicolith. The appendix is otherwise unremarkable. 4. Stable extensive skeletal osteoblastic metastatic disease with associated stable L4 pathologic compression fracture. 5. Remainder of CT chest abdomen and pelvis study unremarkable. Electronically signed by:  Oscar Valentine MD  10/1/2020 8:38 AM CDT Workstation: [id number]    Ct Abdomen Pelvis With  Contrast    Result Date: 10/1/2020  1.Right axilla postsurgical changes with associated small nodular density involving the right axilla of the breast which measures 2.05 x 1.54 cm. This may represent postsurgical changes versus residual or recurrent breast neoplasm in this region. Recommend clinical correlation and consideration of mammogram and/or breast ultrasound to further evaluate. 2. Stable lateral segment left lobe of liver metastatic lesion. 3.The appendix is identified and has a very small focus of hyperdense material within the proximal lumen suspicious for hyperdense ingested food materials versus small appendicolith. The appendix is otherwise unremarkable. 4. Stable extensive skeletal osteoblastic metastatic disease with associated stable L4 pathologic compression fracture. 5. Remainder of CT chest abdomen and pelvis study unremarkable. Electronically signed by:  Oscar Valentine MD  10/1/2020 8:38 AM CDT Workstation: [id number]        ASSESSMENT AND PLAN:      1.  Metastatic breast cancer with axillary adenopathy, hilar adenopathy, liver and bone metastases:  -Patient was diagnosed in July 2017 with axillary biopsy on right side.  - Patient received 6 cycles of chemotherapy with Taxotere and Cytoxan between July 26, 2017 and December 6, 2017.  Patient was seen in oncologist Dr Poe at [facility] for second opinion.  -Patient was started on palbociclib with letrozole on January 30, 2018.  Patient has received 27 cycles with palbociclib and letrozole between January 30, 2018 and October 2, 2020.  - Recent CT scan of chest abdomen and pelvis with contrast done on October 1, 2020 shows enlarging adenopathy in the right axilla worrisome for progression of disease.  Results of CT scan showing progression were discussed with patient and her family.  - Recommend continue with letrozole today but holding starting new cycle of Ibrance.  - We will get a PET/CT done for restaging purposes and ask patient to come  back to clinic after PET/CT for further recommendation.  -Plan will be to change her to Faslodex after PET/CT if PET/CT confirms progression of disease.  -Tumor marker consisting of CA 15-3 and CA 27-29 showing progression of disease with rising number.  -Patient does understand her cancer is progressing which is potentially life-threatening.    2.  Brain metastases:  -Patient had calvarial metastases without any parenchymal mets.  Patient was evaluated by Dr. Pulido and no radiation treatment was recommended.    3.  Right internal jugular vein thrombosis:  -Remains on Coumadin being followed by Coumadin clinic.    4.  Bone metastases:  -Has been on Zometa since August 23, 2017.    5.  History of melanoma in situ status post excision by Dr. Linn    6.  History of DCIS of right breast diagnosed in 2007.  Status post mastectomy followed by tamoxifen for 5 years that was completed in 2013    7.  Chemotherapy-induced neuropathy:  -Stable on gabapentin.    8.  Health maintenance: Patient does not smoke.    9. Advance Care Planning: For now patient remains full code and is able to make decisions.  Patient has health care surrogate mentioned on chart.           PHQ-9 Total Score:       Kylie García reports a pain score of 0.  Given her pain assessment as noted, treatment options were discussed and the following options were decided upon as a follow-up plan to address the patient's pain: continuation of current treatment plan for pain.         Ovidio Meadows MD  10/2/2020  09:29 CDT        Part of this note may be an electronic transcription/translation of spoken language to printed text using the Dragon Dictation System.

## 2020-10-03 LAB — CANCER AG27-29 SERPL-ACNC: 37.9 U/ML (ref 0–38.6)

## 2020-10-08 DIAGNOSIS — C50.911 MALIGNANT NEOPLASM OF RIGHT BREAST IN FEMALE, ESTROGEN RECEPTOR POSITIVE, UNSPECIFIED SITE OF BREAST (HCC): ICD-10-CM

## 2020-10-08 DIAGNOSIS — Z17.0 MALIGNANT NEOPLASM OF RIGHT BREAST IN FEMALE, ESTROGEN RECEPTOR POSITIVE, UNSPECIFIED SITE OF BREAST (HCC): ICD-10-CM

## 2020-10-08 DIAGNOSIS — C79.51 BONE METASTASIS: Primary | ICD-10-CM

## 2020-10-08 NOTE — PROGRESS NOTES
Patient's insurance company did not approve PET/CT.  Patient was notified about insurance not approving PET/CT.  Since patient has enlarging lymph node in the right axilla with rising tumor marker.  Recommend changing treatment to Faslodex.  Patient is in agreement at this point.  We will start Faslodex next week.  We will see patient back in clinic with beginning of second month of Faslodex.

## 2020-10-09 ENCOUNTER — APPOINTMENT (OUTPATIENT)
Dept: PET IMAGING | Facility: HOSPITAL | Age: 50
End: 2020-10-09

## 2020-10-16 ENCOUNTER — INFUSION (OUTPATIENT)
Dept: ONCOLOGY | Facility: HOSPITAL | Age: 50
End: 2020-10-16

## 2020-10-16 VITALS
RESPIRATION RATE: 18 BRPM | TEMPERATURE: 98.3 F | DIASTOLIC BLOOD PRESSURE: 69 MMHG | SYSTOLIC BLOOD PRESSURE: 119 MMHG | HEART RATE: 69 BPM

## 2020-10-16 DIAGNOSIS — C79.51 BONE METASTASIS: Primary | ICD-10-CM

## 2020-10-16 DIAGNOSIS — Z45.2 ENCOUNTER FOR VENOUS ACCESS DEVICE CARE: Primary | ICD-10-CM

## 2020-10-16 DIAGNOSIS — Z17.0 MALIGNANT NEOPLASM OF RIGHT BREAST IN FEMALE, ESTROGEN RECEPTOR POSITIVE, UNSPECIFIED SITE OF BREAST (HCC): ICD-10-CM

## 2020-10-16 DIAGNOSIS — Z45.2 ENCOUNTER FOR VENOUS ACCESS DEVICE CARE: ICD-10-CM

## 2020-10-16 DIAGNOSIS — C50.911 MALIGNANT NEOPLASM OF RIGHT BREAST IN FEMALE, ESTROGEN RECEPTOR POSITIVE, UNSPECIFIED SITE OF BREAST (HCC): ICD-10-CM

## 2020-10-16 DIAGNOSIS — C79.51 BONE METASTASIS: ICD-10-CM

## 2020-10-16 LAB
ALBUMIN SERPL-MCNC: 4.2 G/DL (ref 3.5–5.2)
ALBUMIN/GLOB SERPL: 1.4 G/DL
ALP SERPL-CCNC: 81 U/L (ref 39–117)
ALT SERPL W P-5'-P-CCNC: 30 U/L (ref 1–33)
ANION GAP SERPL CALCULATED.3IONS-SCNC: 9 MMOL/L (ref 5–15)
AST SERPL-CCNC: 21 U/L (ref 1–32)
BASOPHILS # BLD AUTO: 0.12 10*3/MM3 (ref 0–0.2)
BASOPHILS NFR BLD AUTO: 1.7 % (ref 0–1.5)
BILIRUB SERPL-MCNC: 0.2 MG/DL (ref 0–1.2)
BUN SERPL-MCNC: 14 MG/DL (ref 6–20)
BUN/CREAT SERPL: 17.1 (ref 7–25)
CALCIUM SPEC-SCNC: 9.4 MG/DL (ref 8.6–10.5)
CHLORIDE SERPL-SCNC: 107 MMOL/L (ref 98–107)
CO2 SERPL-SCNC: 26 MMOL/L (ref 22–29)
CREAT SERPL-MCNC: 0.82 MG/DL (ref 0.57–1)
DEPRECATED RDW RBC AUTO: 47.8 FL (ref 37–54)
EOSINOPHIL # BLD AUTO: 0.52 10*3/MM3 (ref 0–0.4)
EOSINOPHIL NFR BLD AUTO: 7.3 % (ref 0.3–6.2)
ERYTHROCYTE [DISTWIDTH] IN BLOOD BY AUTOMATED COUNT: 13.5 % (ref 12.3–15.4)
GFR SERPL CREATININE-BSD FRML MDRD: 73 ML/MIN/1.73
GLOBULIN UR ELPH-MCNC: 3 GM/DL
GLUCOSE SERPL-MCNC: 100 MG/DL (ref 65–99)
HCT VFR BLD AUTO: 36.8 % (ref 34–46.6)
HGB BLD-MCNC: 12.7 G/DL (ref 12–15.9)
HOLD SPECIMEN: NORMAL
IMM GRANULOCYTES # BLD AUTO: 0.03 10*3/MM3 (ref 0–0.05)
IMM GRANULOCYTES NFR BLD AUTO: 0.4 % (ref 0–0.5)
LYMPHOCYTES # BLD AUTO: 1.47 10*3/MM3 (ref 0.7–3.1)
LYMPHOCYTES NFR BLD AUTO: 20.7 % (ref 19.6–45.3)
MCH RBC QN AUTO: 33 PG (ref 26.6–33)
MCHC RBC AUTO-ENTMCNC: 34.5 G/DL (ref 31.5–35.7)
MCV RBC AUTO: 95.6 FL (ref 79–97)
MONOCYTES # BLD AUTO: 0.71 10*3/MM3 (ref 0.1–0.9)
MONOCYTES NFR BLD AUTO: 10 % (ref 5–12)
NEUTROPHILS NFR BLD AUTO: 4.24 10*3/MM3 (ref 1.7–7)
NEUTROPHILS NFR BLD AUTO: 59.9 % (ref 42.7–76)
NRBC BLD AUTO-RTO: 0 /100 WBC (ref 0–0.2)
PLATELET # BLD AUTO: 284 10*3/MM3 (ref 140–450)
PMV BLD AUTO: 10.1 FL (ref 6–12)
POTASSIUM SERPL-SCNC: 4 MMOL/L (ref 3.5–5.2)
PROT SERPL-MCNC: 7.2 G/DL (ref 6–8.5)
RBC # BLD AUTO: 3.85 10*6/MM3 (ref 3.77–5.28)
SODIUM SERPL-SCNC: 142 MMOL/L (ref 136–145)
WBC # BLD AUTO: 7.09 10*3/MM3 (ref 3.4–10.8)

## 2020-10-16 PROCEDURE — 36591 DRAW BLOOD OFF VENOUS DEVICE: CPT | Performed by: NURSE PRACTITIONER

## 2020-10-16 PROCEDURE — 96402 CHEMO HORMON ANTINEOPL SQ/IM: CPT | Performed by: NURSE PRACTITIONER

## 2020-10-16 PROCEDURE — 85025 COMPLETE CBC W/AUTO DIFF WBC: CPT | Performed by: INTERNAL MEDICINE

## 2020-10-16 PROCEDURE — 25010000002 FULVESTRANT PER 25 MG: Performed by: INTERNAL MEDICINE

## 2020-10-16 PROCEDURE — 25010000003 HEPARIN LOCK FLUSH PER 10 UNITS: Performed by: INTERNAL MEDICINE

## 2020-10-16 PROCEDURE — 80053 COMPREHEN METABOLIC PANEL: CPT | Performed by: INTERNAL MEDICINE

## 2020-10-16 RX ORDER — SODIUM CHLORIDE 0.9 % (FLUSH) 0.9 %
20 SYRINGE (ML) INJECTION AS NEEDED
Status: DISCONTINUED | OUTPATIENT
Start: 2020-10-16 | End: 2020-10-16 | Stop reason: HOSPADM

## 2020-10-16 RX ORDER — HEPARIN SODIUM (PORCINE) LOCK FLUSH IV SOLN 100 UNIT/ML 100 UNIT/ML
500 SOLUTION INTRAVENOUS AS NEEDED
Status: CANCELLED | OUTPATIENT
Start: 2020-10-16

## 2020-10-16 RX ORDER — SODIUM CHLORIDE 0.9 % (FLUSH) 0.9 %
10 SYRINGE (ML) INJECTION AS NEEDED
Status: DISCONTINUED | OUTPATIENT
Start: 2020-10-16 | End: 2020-10-16 | Stop reason: HOSPADM

## 2020-10-16 RX ORDER — SODIUM CHLORIDE 0.9 % (FLUSH) 0.9 %
10 SYRINGE (ML) INJECTION AS NEEDED
Status: CANCELLED | OUTPATIENT
Start: 2020-10-16

## 2020-10-16 RX ORDER — SODIUM CHLORIDE 0.9 % (FLUSH) 0.9 %
20 SYRINGE (ML) INJECTION AS NEEDED
Status: CANCELLED | OUTPATIENT
Start: 2020-10-16

## 2020-10-16 RX ORDER — HEPARIN SODIUM (PORCINE) LOCK FLUSH IV SOLN 100 UNIT/ML 100 UNIT/ML
500 SOLUTION INTRAVENOUS AS NEEDED
Status: DISCONTINUED | OUTPATIENT
Start: 2020-10-16 | End: 2020-10-16 | Stop reason: HOSPADM

## 2020-10-16 RX ADMIN — HEPARIN 500 UNITS: 100 SYRINGE at 11:00

## 2020-10-16 RX ADMIN — FULVESTRANT 500 MG: 50 INJECTION INTRAMUSCULAR at 11:00

## 2020-10-30 ENCOUNTER — APPOINTMENT (OUTPATIENT)
Dept: ONCOLOGY | Facility: HOSPITAL | Age: 50
End: 2020-10-30

## 2020-10-30 ENCOUNTER — ANTICOAGULATION VISIT (OUTPATIENT)
Dept: CARDIAC SURGERY | Facility: CLINIC | Age: 50
End: 2020-10-30

## 2020-10-30 ENCOUNTER — INFUSION (OUTPATIENT)
Dept: ONCOLOGY | Facility: HOSPITAL | Age: 50
End: 2020-10-30

## 2020-10-30 ENCOUNTER — OFFICE VISIT (OUTPATIENT)
Dept: ONCOLOGY | Facility: CLINIC | Age: 50
End: 2020-10-30

## 2020-10-30 VITALS
DIASTOLIC BLOOD PRESSURE: 78 MMHG | HEART RATE: 72 BPM | TEMPERATURE: 97.9 F | RESPIRATION RATE: 18 BRPM | SYSTOLIC BLOOD PRESSURE: 134 MMHG | BODY MASS INDEX: 34.36 KG/M2 | WEIGHT: 218.9 LBS | HEIGHT: 67 IN

## 2020-10-30 VITALS — OXYGEN SATURATION: 97 % | HEART RATE: 90 BPM

## 2020-10-30 DIAGNOSIS — C50.911 MALIGNANT NEOPLASM OF RIGHT BREAST IN FEMALE, ESTROGEN RECEPTOR POSITIVE, UNSPECIFIED SITE OF BREAST (HCC): ICD-10-CM

## 2020-10-30 DIAGNOSIS — Z17.0 MALIGNANT NEOPLASM OF RIGHT BREAST IN FEMALE, ESTROGEN RECEPTOR POSITIVE, UNSPECIFIED SITE OF BREAST (HCC): ICD-10-CM

## 2020-10-30 DIAGNOSIS — Z45.2 ENCOUNTER FOR VENOUS ACCESS DEVICE CARE: Primary | ICD-10-CM

## 2020-10-30 DIAGNOSIS — Z51.81 ENCOUNTER FOR THERAPEUTIC DRUG MONITORING: ICD-10-CM

## 2020-10-30 DIAGNOSIS — Z79.01 LONG TERM CURRENT USE OF ANTICOAGULANT THERAPY: ICD-10-CM

## 2020-10-30 DIAGNOSIS — C79.51 BONE METASTASIS: ICD-10-CM

## 2020-10-30 DIAGNOSIS — Z17.0 MALIGNANT NEOPLASM OF RIGHT BREAST IN FEMALE, ESTROGEN RECEPTOR POSITIVE, UNSPECIFIED SITE OF BREAST (HCC): Primary | ICD-10-CM

## 2020-10-30 DIAGNOSIS — C50.911 MALIGNANT NEOPLASM OF RIGHT BREAST IN FEMALE, ESTROGEN RECEPTOR POSITIVE, UNSPECIFIED SITE OF BREAST (HCC): Primary | ICD-10-CM

## 2020-10-30 LAB
ALBUMIN SERPL-MCNC: 4.2 G/DL (ref 3.5–5.2)
ALBUMIN/GLOB SERPL: 1.6 G/DL
ALP SERPL-CCNC: 75 U/L (ref 39–117)
ALT SERPL W P-5'-P-CCNC: 25 U/L (ref 1–33)
ANION GAP SERPL CALCULATED.3IONS-SCNC: 10 MMOL/L (ref 5–15)
AST SERPL-CCNC: 25 U/L (ref 1–32)
BASOPHILS # BLD AUTO: 0.08 10*3/MM3 (ref 0–0.2)
BASOPHILS NFR BLD AUTO: 1.3 % (ref 0–1.5)
BILIRUB SERPL-MCNC: 0.3 MG/DL (ref 0–1.2)
BUN SERPL-MCNC: 10 MG/DL (ref 6–20)
BUN/CREAT SERPL: 11.2 (ref 7–25)
CALCIUM SPEC-SCNC: 9.4 MG/DL (ref 8.6–10.5)
CANCER AG15-3 SERPL-ACNC: 25.4 U/ML
CHLORIDE SERPL-SCNC: 105 MMOL/L (ref 98–107)
CO2 SERPL-SCNC: 26 MMOL/L (ref 22–29)
CREAT SERPL-MCNC: 0.89 MG/DL (ref 0.57–1)
DEPRECATED RDW RBC AUTO: 44.1 FL (ref 37–54)
EOSINOPHIL # BLD AUTO: 0.43 10*3/MM3 (ref 0–0.4)
EOSINOPHIL NFR BLD AUTO: 6.9 % (ref 0.3–6.2)
ERYTHROCYTE [DISTWIDTH] IN BLOOD BY AUTOMATED COUNT: 13 % (ref 12.3–15.4)
GFR SERPL CREATININE-BSD FRML MDRD: 67 ML/MIN/1.73
GLOBULIN UR ELPH-MCNC: 2.7 GM/DL
GLUCOSE SERPL-MCNC: 65 MG/DL (ref 65–99)
HCT VFR BLD AUTO: 37.3 % (ref 34–46.6)
HGB BLD-MCNC: 13 G/DL (ref 12–15.9)
IMM GRANULOCYTES # BLD AUTO: 0.02 10*3/MM3 (ref 0–0.05)
IMM GRANULOCYTES NFR BLD AUTO: 0.3 % (ref 0–0.5)
INR PPP: 2.5 (ref 0.9–1.1)
LYMPHOCYTES # BLD AUTO: 1.35 10*3/MM3 (ref 0.7–3.1)
LYMPHOCYTES NFR BLD AUTO: 21.8 % (ref 19.6–45.3)
MCH RBC QN AUTO: 32.3 PG (ref 26.6–33)
MCHC RBC AUTO-ENTMCNC: 34.9 G/DL (ref 31.5–35.7)
MCV RBC AUTO: 92.8 FL (ref 79–97)
MONOCYTES # BLD AUTO: 0.71 10*3/MM3 (ref 0.1–0.9)
MONOCYTES NFR BLD AUTO: 11.5 % (ref 5–12)
NEUTROPHILS NFR BLD AUTO: 3.6 10*3/MM3 (ref 1.7–7)
NEUTROPHILS NFR BLD AUTO: 58.2 % (ref 42.7–76)
NRBC BLD AUTO-RTO: 0 /100 WBC (ref 0–0.2)
PLATELET # BLD AUTO: 238 10*3/MM3 (ref 140–450)
PMV BLD AUTO: 9.8 FL (ref 6–12)
POTASSIUM SERPL-SCNC: 4.1 MMOL/L (ref 3.5–5.2)
PROT SERPL-MCNC: 6.9 G/DL (ref 6–8.5)
RBC # BLD AUTO: 4.02 10*6/MM3 (ref 3.77–5.28)
SODIUM SERPL-SCNC: 141 MMOL/L (ref 136–145)
WBC # BLD AUTO: 6.19 10*3/MM3 (ref 3.4–10.8)

## 2020-10-30 PROCEDURE — 25010000003 HEPARIN LOCK FLUSH PER 10 UNITS: Performed by: INTERNAL MEDICINE

## 2020-10-30 PROCEDURE — 36416 COLLJ CAPILLARY BLOOD SPEC: CPT | Performed by: NURSE PRACTITIONER

## 2020-10-30 PROCEDURE — 80053 COMPREHEN METABOLIC PANEL: CPT

## 2020-10-30 PROCEDURE — 93793 ANTICOAG MGMT PT WARFARIN: CPT | Performed by: NURSE PRACTITIONER

## 2020-10-30 PROCEDURE — 86300 IMMUNOASSAY TUMOR CA 15-3: CPT

## 2020-10-30 PROCEDURE — 99214 OFFICE O/P EST MOD 30 MIN: CPT | Performed by: INTERNAL MEDICINE

## 2020-10-30 PROCEDURE — 25010000002 FULVESTRANT PER 25 MG: Performed by: INTERNAL MEDICINE

## 2020-10-30 PROCEDURE — 96402 CHEMO HORMON ANTINEOPL SQ/IM: CPT | Performed by: INTERNAL MEDICINE

## 2020-10-30 PROCEDURE — 85025 COMPLETE CBC W/AUTO DIFF WBC: CPT

## 2020-10-30 PROCEDURE — 85610 PROTHROMBIN TIME: CPT | Performed by: NURSE PRACTITIONER

## 2020-10-30 RX ORDER — SODIUM CHLORIDE 0.9 % (FLUSH) 0.9 %
10 SYRINGE (ML) INJECTION AS NEEDED
Status: DISCONTINUED | OUTPATIENT
Start: 2020-10-30 | End: 2020-10-30 | Stop reason: HOSPADM

## 2020-10-30 RX ORDER — HEPARIN SODIUM (PORCINE) LOCK FLUSH IV SOLN 100 UNIT/ML 100 UNIT/ML
500 SOLUTION INTRAVENOUS AS NEEDED
Status: CANCELLED | OUTPATIENT
Start: 2020-10-30

## 2020-10-30 RX ORDER — HEPARIN SODIUM (PORCINE) LOCK FLUSH IV SOLN 100 UNIT/ML 100 UNIT/ML
500 SOLUTION INTRAVENOUS AS NEEDED
Status: DISCONTINUED | OUTPATIENT
Start: 2020-10-30 | End: 2020-10-30 | Stop reason: HOSPADM

## 2020-10-30 RX ORDER — SODIUM CHLORIDE 0.9 % (FLUSH) 0.9 %
20 SYRINGE (ML) INJECTION AS NEEDED
Status: CANCELLED | OUTPATIENT
Start: 2020-10-30

## 2020-10-30 RX ORDER — SODIUM CHLORIDE 0.9 % (FLUSH) 0.9 %
20 SYRINGE (ML) INJECTION AS NEEDED
Status: DISCONTINUED | OUTPATIENT
Start: 2020-10-30 | End: 2020-10-30 | Stop reason: HOSPADM

## 2020-10-30 RX ORDER — SODIUM CHLORIDE 0.9 % (FLUSH) 0.9 %
10 SYRINGE (ML) INJECTION AS NEEDED
Status: CANCELLED | OUTPATIENT
Start: 2020-10-30

## 2020-10-30 RX ADMIN — HEPARIN 500 UNITS: 100 SYRINGE at 09:54

## 2020-10-30 RX ADMIN — FULVESTRANT 500 MG: 50 INJECTION INTRAMUSCULAR at 09:50

## 2020-10-30 NOTE — PROGRESS NOTES
Today's INR is 2.5. PT stopped Femara and Ibrance. PT started Faslodex which does not show interaction. PT denies any bleeding issues or s/s of blood clot. Instructed pt to continue same dose and will be seen in 6 weeks. Patient instructed regarding medication; results given and questions answered. Nutritional counseling given.  Dietary factors affecting therapy addressed.  Patient instructed to monitor for excessive bruising or bleeding. PT verbalizes understanding. Findings reported by Elvia Allen RN.       This document has been electronically signed by EDIS Bond-BC @  On October 30, 2020 08:38 CDT

## 2020-10-30 NOTE — PROGRESS NOTES
DATE OF VISIT: 10/30/2020      REASON FOR VISIT: Metastatic breast cancer with bone, liver metastases, neuropathy from chemotherapy, cancer related pain, DVT on anticoagulation with Coumadin      HISTORY OF PRESENT ILLNESS:    50-year-old female with medical problem consisting of DCIS of right breast diagnosed in 2007, anxiety/depression, metastatic breast cancer with bone, liver metastases was on treatment with palbociclib and letrozole until October 2, 2020.  CT scan showing progression of disease and patient has been started on Faslodex on October 16, 2020.  Patient is here to get day 15 of cycle 1 of Faslodex today.  Denies any bleeding.  Still complains of discomfort in right axilla.  Denies any worsening of chronic back pain.  Denies any bowel or bladder incontinence.  Denies any worsening tingling or numbness recently.              PAST MEDICAL HISTORY:    Past Medical History:   Diagnosis Date   • Anxiety    • Depression    • Encounter for gynecological examination    • Malignant neoplasm of female breast (CMS/HCC)     hx of dcis s/p mastectomy and reconstruction and augmentation      • Primary malignant neoplasm of breast (CMS/HCC)     dcis in rt breast s/p mastectomy,reconstruction      • Ventricular premature beats        SOCIAL HISTORY:    Social History     Tobacco Use   • Smoking status: Never Smoker   • Smokeless tobacco: Never Used   Substance Use Topics   • Alcohol use: No   • Drug use: No       Surgical History :  Past Surgical History:   Procedure Laterality Date   • AUGMENTATION MAMMAPLASTY     • AXILLARY LYMPH NODE BIOPSY/EXCISION Right 7/10/2017    Procedure: BIOSPY RIGHT AXILLARY MASS AND OR LYMPH;  Surgeon: Sourav Ernst MD;  Location: [facility];  Service:    • BREAST BIOPSY  12/07/2007    Mammotome biopsy of the right side with clip placement   • BREAST LUMPECTOMY     • BREAST RECONSTRUCTION  10/28/2008    Abscence of right breast secondary to mastectomy. Implant exchange for  reconstruction of right breast with open para-prosthetic capsulotomy   • BREAST SURGERY  10/01/2009    Left breast ptosis and breast asymmetry secondary to right breast reconstruction for breast cancer. Left breast mastopexy with augmentation.   • CARDIAC CATHETERIZATION  09/18/2008    Normal coronary arteriography. Normal left ventricular angiogram   • EP STUDY     • MASTECTOMY Right    • MASTECTOMY  02/05/2008    Multifocal right breast ductal carcinoma-in-situ. Right total mastectomy   • MASTECTOMY, PARTIAL  01/03/2008    Ductal carcinoma in-situ of the right breast, proven by mammotome biopsy. Right lumpectomy, medial portion of the breast, between 2 o'clock and 4 o'clock, 5 cm from the nipple, with clip placement, radiographic inter. of severo. specimen, & ultrasound   • PAP SMEAR  03/03/2009    Negative   • [place] INSJ TUNNELED CVC W/O SUBQ PORT/ AGE 5 YR/> Right 7/10/2017    Procedure: MEDIPORT     (c-arm#2);  Surgeon: Sourav Ernst MD;  Location: [facility];  Service: General       ALLERGIES:    Allergies   Allergen Reactions   • Percocet [Oxycodone-Acetaminophen] Nausea And Vomiting         FAMILY HISTORY:  Family History   Problem Relation Age of Onset   • Anemia Mother    • Multiple sclerosis Mother    • No Known Problems Father    • Diabetes Other    • Multiple sclerosis Other            REVIEW OF SYSTEMS:      CONSTITUTIONAL:  Complains of fatigue. Denies any fever, chills or weight loss.     EYES: No visual disturbances. No discharge. No new lesions    ENMT:  No epistaxis, mouth sores or difficulty swallowing.    RESPIRATORY:  No new shortness of breath. No new cough or hemoptysis.    CARDIOVASCULAR:  No chest pain or palpitations.    GASTROINTESTINAL:  No abdominal pain nausea, vomiting or blood in the stool.    GENITOURINARY: No Dysuria or Hematuria.    MUSCULOSKELETAL: Positive for chronic pain affecting bilateral shoulders, hips and hands.  Complains of worsening neck stiffness.    LYMPHATICS:  "Complains of axillary discomfort with skin dimpling in right axilla.    NEUROLOGICAL : Positive for chronic tingling and numbness affecting bilateral hands and feet. No headache or dizziness. No seizures or balance problems.    SKIN: No new skin lesions.    ENDOCRINE : No new hot or cold intolerance. No new polyuria . No polydipsia.        PHYSICAL EXAMINATION:      VITAL SIGNS:  /78   Pulse 72   Temp 97.9 °F (36.6 °C) (Temporal)   Resp 18   Ht 170.2 cm (67.01\")   Wt 99.3 kg (218 lb 14.4 oz)   BMI 34.28 kg/m²       10/30/20  0830   Weight: 99.3 kg (218 lb 14.4 oz)         ECOG performance status: 1    CONSTITUTIONAL:  Not in any distress.    EYES: Mild conjunctival Pallor. No Icterus. No Pterygium. Extraocular Movements intact.No ptosis.    ENMT:  Normocephalic, Atraumatic.No Facial Asymmetry noted.    NECK:  No adenopathy.Trachea midline. NO JVD.    RESPIRATORY:  Fair air entry bilateral. No rhonchi or wheezing.Fair respiratory effort.    CARDIOVASCULAR:  S1, S2. Regular rate and rhythm. No murmur or gallop appreciated.  Port-A-Cath present.    BREAST: Breast exam was performed in presence of registered nurse Solange.  In the area of patient's concern in the right axilla that is enlarging lymph node measuring roughly 3 3 cm in size with overlying skin dimpling and erythema.  No discharge was seen.    ABDOMEN:  Soft, obese, nontender. Bowel sounds present in all four quadrants.  No Hepatosplenomegaly appreciated.    MUSCULOSKELETAL:  No edema.No Calf Tenderness.Decreased range of motion.    NEUROLOGIC:    No  Motor  deficit appreciated. Cranial Nerves 2-12 grossly intact.    SKIN : No new skin lesion identified. Skin is warm and moist to touch.    LYMPHATICS: No new enlarged lymph nodes in neck or supraclavicular area.    PSYCHIATRY: Alert, awake and oriented ×3.Normal affect.  Normal judgment.  Makes good eye contact.        DIAGNOSTIC DATA:    Glucose   Date Value Ref Range Status   10/30/2020 65 65 - " 99 mg/dL Final     Sodium   Date Value Ref Range Status   10/30/2020 141 136 - 145 mmol/L Final     Potassium   Date Value Ref Range Status   10/30/2020 4.1 3.5 - 5.2 mmol/L Final     CO2   Date Value Ref Range Status   10/30/2020 26.0 22.0 - 29.0 mmol/L Final     Chloride   Date Value Ref Range Status   10/30/2020 105 98 - 107 mmol/L Final     Anion Gap   Date Value Ref Range Status   10/30/2020 10.0 5.0 - 15.0 mmol/L Final     Creatinine   Date Value Ref Range Status   10/30/2020 0.89 0.57 - 1.00 mg/dL Final     BUN   Date Value Ref Range Status   10/30/2020 10 6 - 20 mg/dL Final     BUN/Creatinine Ratio   Date Value Ref Range Status   10/30/2020 11.2 7.0 - 25.0 Final     Calcium   Date Value Ref Range Status   10/30/2020 9.4 8.6 - 10.5 mg/dL Final     eGFR Non  Amer   Date Value Ref Range Status   10/30/2020 67 >60 mL/min/1.73 Final     Alkaline Phosphatase   Date Value Ref Range Status   10/30/2020 75 39 - 117 U/L Final     Total Protein   Date Value Ref Range Status   10/30/2020 6.9 6.0 - 8.5 g/dL Final     ALT (SGPT)   Date Value Ref Range Status   10/30/2020 25 1 - 33 U/L Final     AST (SGOT)   Date Value Ref Range Status   10/30/2020 25 1 - 32 U/L Final     Total Bilirubin   Date Value Ref Range Status   10/30/2020 0.3 0.0 - 1.2 mg/dL Final     Albumin   Date Value Ref Range Status   10/30/2020 4.20 3.50 - 5.20 g/dL Final     Globulin   Date Value Ref Range Status   10/30/2020 2.7 gm/dL Final     Lab Results   Component Value Date    WBC 6.19 10/30/2020    HGB 13.0 10/30/2020    HCT 37.3 10/30/2020    MCV 92.8 10/30/2020     10/30/2020     Lab Results   Component Value Date    NEUTROABS 3.60 10/30/2020     Lab Results   Component Value Date     29.4 (H) 10/02/2020    LABCA2 37.9 10/02/2020       Component CA 15-3   Latest Ref Rng & Units <=25.0 U/mL   6/29/2017 2107.0 (H)   8/23/2017 943.0 (H)   10/4/2017 282.2 (H)   10/25/2017 162.2 (H)   11/15/2017 118.0 (H)   12/6/2017 99.6 (H)    12/27/2017 88.3 (H)   2/28/2018 47.2 (H)   4/2/2018 11.9   5/7/2018 5/14/2018 41.1 (H)   6/11/2018 37.3 (H)   7/9/2018 35.3 (H)   8/27/2018 36.7 (H)   9/24/2018 31.1 (H)   11/5/2018 32.9 (H)   12/13/2018 32.6 (H)   3/15/2019 32.6 (H)   4/19/2019 29.2 (H)   5/24/2019 28.3 (H)   6/28/2019 26.9 (H)   8/2/2019 26.8 (H)   9/6/2019 25.9 (H)   10/11/2019 29.6 (H)   11/15/2019 28.8 (H)   12/20/2019 24.5   1/24/2020 26.3 (H)   2/28/2020 25.6 (H)   4/3/2020 27.6 (H)   5/8/2020 26.8 (H)   6/19/2020 25.6 (H)   7/24/2020 26.2 (H)   8/28/2020 28.9 (H)   10/2/2020 29.4 (H)           Component CA 27.29   Latest Ref Rng & Units 0.0 - 38.6 U/mL   6/29/2017 1817.1 (H)   8/23/2017 1075.9 (H)   10/4/2017 288.9 (H)   10/25/2017 181.2 (H)   11/15/2017 112.2 (H)   12/6/2017 101.6 (H)   12/27/2017 90.3 (H)   2/28/2018 52.1 (H)   4/2/2018 12.4   5/7/2018 5/14/2018 38.1   6/11/2018 39.7 (H)   7/9/2018 37.9   8/27/2018 34.1   9/24/2018 38.1   11/5/2018 31.2   12/13/2018 30.7   3/15/2019 31.2   4/19/2019 23.1   5/24/2019 35.0   6/28/2019 24.5   8/2/2019 27.4   9/6/2019 23.3   10/11/2019 22.9   11/15/2019 27.3   12/20/2019 21.5   1/24/2020 27.2   2/28/2020 23.9   4/3/2020 25.9   5/8/2020 23.3   6/19/2020 28.3   7/24/2020 30.4   8/28/2020 30.6   10/2/2020 37.9             PATHOLOGY:  * Cannot find OR log *         RADIOLOGY DATA :  No radiology results for the last 7 days      ASSESSMENT AND PLAN:      1.  Metastatic breast cancer with axillary adenopathy, hilar adenopathy, liver and bone metastases:  -Patient was initially diagnosed in July 2017 with axillary biopsy on right side.  -Patient received 6 cycles of chemotherapy with Taxotere and Cytoxan between July 26, 2017 and December 6, 2017.  Patient was seen by oncologist Dr. Poe at [facility] in [place] for second opinion.   -Patient received 27 cycles of palbociclib and letrozole between January 30, 2018 and October 2, 2020.  -CT of chest, abdomen and pelvis with contrast done on  October 1, 2020 shows enlarging adenopathy in the right axilla worrisome for progression of disease.  -Patient has been started on Faslodex on October 16, 2020.  -We will go ahead with day 15 of Faslodex today.  -We will ask patient to return to clinic in 2 weeks with cycle 2 of Faslodex.  If patient continues to have worsening enlargement of axillary lymph node or skin breakdown in the right axilla.  May need to change her treatment to chemotherapy which was discussed with patient and her family.  -Plan is to do restaging CT of chest abdomen and pelvis with contrast after cycle 3 of Faslodex.  Recommendation was discussed with patient.        2.  Brain metastases:  -Patient had calvarial metastases without any evidence of parenchymal mets patient patient was evaluated by Dr. Ramirez and been noted radiation treatment was recommended.    3.  Right internal jugular vein thrombosis:  -Remains on Coumadin being followed by Coumadin clinic    4.  Bone metastases:  -Remains on Zometa since October 23, 2017.  -We will start patient on Zometa every 4 weeks starting November 13, 2020 with cycle 2 of Faslodex.    5.  History of melanoma in situ status post excision by Dr. Linn    6.  History of DCIS of right breast diagnosed in 2007.  Status post mastectomy followed by tamoxifen for 5 years and was completed in 2013    7.  Chemotherapy-induced neuropathy:  -Remains in gabapentin.    8.  Health maintenance: Patient does not smoke    9. Advance Care Planning: For now patient remains full code and is able to make decisions.  Patient has health care surrogate mentioned on chart.        PHQ-9 Total Score: 0   -Patient is not homicidal or suicidal.  No acute intervention required.    Kylie Davisn Raquel reports a pain score of 0.  Given her pain assessment as noted, treatment options were discussed and the following options were decided upon as a follow-up plan to address the patient's pain: continuation of current treatment plan for  pain.         Ovidio Meadows MD  10/30/2020  09:10 CDT        Part of this note may be an electronic transcription/translation of spoken language to printed text using the Dragon Dictation System.

## 2020-10-31 LAB — CANCER AG27-29 SERPL-ACNC: 32.2 U/ML (ref 0–38.6)

## 2020-11-12 NOTE — PROGRESS NOTES
DATE OF VISIT: 11/13/2020      REASON FOR VISIT: Metastatic breast cancer with bone, liver metastases, neuropathy from chemotherapy, cancer related pain, DVT on anticoagulation with Coumadin          HISTORY OF PRESENT ILLNESS:    50-year-old female with medical problem consisting of DCIS of right breast diagnosed in 2007, anxiety/depression, metastatic breast cancer with bone metastases, liver metastases currently on treatment with Faslodex is here for follow-up appointment today.  Complains of discomfort in axilla.  Denies any worsening throbbing.  Denies any bowel or bladder incontinence.  Denies any worsening of tingling or numbness recently.  Denies any bleeding.            PAST MEDICAL HISTORY:    Past Medical History:   Diagnosis Date   • Anxiety    • Depression    • Encounter for gynecological examination    • Malignant neoplasm of female breast (CMS/HCC)     hx of dcis s/p mastectomy and reconstruction and augmentation      • Primary malignant neoplasm of breast (CMS/HCC)     dcis in rt breast s/p mastectomy,reconstruction      • Ventricular premature beats        SOCIAL HISTORY:    Social History     Tobacco Use   • Smoking status: Never Smoker   • Smokeless tobacco: Never Used   Substance Use Topics   • Alcohol use: No   • Drug use: No       Surgical History :  Past Surgical History:   Procedure Laterality Date   • AUGMENTATION MAMMAPLASTY     • AXILLARY LYMPH NODE BIOPSY/EXCISION Right 7/10/2017    Procedure: BIOSPY RIGHT AXILLARY MASS AND OR LYMPH;  Surgeon: Sourav Ernst MD;  Location: [facility];  Service:    • BREAST BIOPSY  12/07/2007    Mammotome biopsy of the right side with clip placement   • BREAST LUMPECTOMY     • BREAST RECONSTRUCTION  10/28/2008    Abscence of right breast secondary to mastectomy. Implant exchange for reconstruction of right breast with open para-prosthetic capsulotomy   • BREAST SURGERY  10/01/2009    Left breast ptosis and breast asymmetry secondary to right breast  reconstruction for breast cancer. Left breast mastopexy with augmentation.   • CARDIAC CATHETERIZATION  09/18/2008    Normal coronary arteriography. Normal left ventricular angiogram   • EP STUDY     • MASTECTOMY Right    • MASTECTOMY  02/05/2008    Multifocal right breast ductal carcinoma-in-situ. Right total mastectomy   • MASTECTOMY, PARTIAL  01/03/2008    Ductal carcinoma in-situ of the right breast, proven by mammotome biopsy. Right lumpectomy, medial portion of the breast, between 2 o'clock and 4 o'clock, 5 cm from the nipple, with clip placement, radiographic inter. of severo. specimen, & ultrasound   • PAP SMEAR  03/03/2009    Negative   • [place] INSJ TUNNELED CVC W/O SUBQ PORT/ AGE 3 YR/> Right 7/10/2017    Procedure: MEDIPORT     (c-arm#2);  Surgeon: Sourav Ernst MD;  Location: [facility];  Service: General       ALLERGIES:    Allergies   Allergen Reactions   • Percocet [Oxycodone-Acetaminophen] Nausea And Vomiting         FAMILY HISTORY:  Family History   Problem Relation Age of Onset   • Anemia Mother    • Multiple sclerosis Mother    • No Known Problems Father    • Diabetes Other    • Multiple sclerosis Other            REVIEW OF SYSTEMS:      CONSTITUTIONAL:  Complains of fatigue.  Has gained 6 pounds since last clinic visit.  Denies any fever, chills .    EYES: No visual disturbances. No discharge. No new lesions    ENMT:  No epistaxis, mouth sores or difficulty swallowing.    RESPIRATORY:  No new shortness of breath. No new cough or hemoptysis.    CARDIOVASCULAR:  No chest pain or palpitations.    GASTROINTESTINAL:  No abdominal pain nausea, vomiting or blood in the stool.    GENITOURINARY: No Dysuria or Hematuria.    MUSCULOSKELETAL: Positive for chronic pain affecting bilateral shoulders, hips and hands.    LYMPHATICS: Complains of axillary discomfort  On right side with skin dimpling.    NEUROLOGICAL : Positive for chronic tingling and numbness affecting bilateral hand and feet. No headache or  "dizziness. No seizures or balance problems.    SKIN: No new skin lesions.    ENDOCRINE : No new hot or cold intolerance. No new polyuria . No polydipsia.        PHYSICAL EXAMINATION:      VITAL SIGNS:  /89   Pulse 65   Temp 98 °F (36.7 °C) (Temporal)   Resp 18   Ht 170.2 cm (67.01\")   Wt 102 kg (224 lb 3.2 oz)   BMI 35.11 kg/m²       11/13/20  0926   Weight: 102 kg (224 lb 3.2 oz)         ECOG performance status: 1    CONSTITUTIONAL:  Not in any distress.    EYES: Mild conjunctival Pallor. No Icterus. No Pterygium. Extraocular Movements intact.No ptosis.    ENMT:  Normocephalic, Atraumatic.No Facial Asymmetry noted.    NECK:  No adenopathy.Trachea midline. NO JVD.    RESPIRATORY:  Fair air entry bilateral. No rhonchi or wheezing.Fair respiratory effort.    CARDIOVASCULAR:  S1, S2. Regular rate and rhythm. No murmur or gallop appreciated.    BREAST: Axillary exam on right side was performed in presence of registered nurse Miguelangel.  Enlarged lymph node in right axilla.  Sanitary smaller as compared to 2 weeks ago.  There is area of skin dimpling present with opening.  No purulent discharge was seen.    ABDOMEN:  Soft, obese, nontender. Bowel sounds present in all four quadrants.  No Hepatosplenomegaly appreciated.    MUSCULOSKELETAL:  No edema.No Calf Tenderness.ecreased range of motion.    NEUROLOGIC:    No  Motor  deficit appreciated. Cranial Nerves 2-12 grossly intact.    SKIN : No new skin lesion identified. Skin is warm and moist to touch.    LYMPHATICS: No new enlarged lymph nodes in neck or supraclavicular area.    PSYCHIATRY: Alert, awake and oriented ×3.Normal affect.  Normal judgment.  Makes good eye contact.        DIAGNOSTIC DATA:    Glucose   Date Value Ref Range Status   11/13/2020 76 65 - 99 mg/dL Final     Sodium   Date Value Ref Range Status   11/13/2020 141 136 - 145 mmol/L Final     Potassium   Date Value Ref Range Status   11/13/2020 4.2 3.5 - 5.2 mmol/L Final     CO2   Date Value Ref " Range Status   11/13/2020 25.0 22.0 - 29.0 mmol/L Final     Chloride   Date Value Ref Range Status   11/13/2020 104 98 - 107 mmol/L Final     Anion Gap   Date Value Ref Range Status   11/13/2020 12.0 5.0 - 15.0 mmol/L Final     Creatinine   Date Value Ref Range Status   11/13/2020 0.76 0.57 - 1.00 mg/dL Final     BUN   Date Value Ref Range Status   11/13/2020 13 6 - 20 mg/dL Final     BUN/Creatinine Ratio   Date Value Ref Range Status   11/13/2020 17.1 7.0 - 25.0 Final     Calcium   Date Value Ref Range Status   11/13/2020 9.8 8.6 - 10.5 mg/dL Final     eGFR Non  Amer   Date Value Ref Range Status   11/13/2020 80 >60 mL/min/1.73 Final     Alkaline Phosphatase   Date Value Ref Range Status   11/13/2020 77 39 - 117 U/L Final     Total Protein   Date Value Ref Range Status   11/13/2020 6.9 6.0 - 8.5 g/dL Final     ALT (SGPT)   Date Value Ref Range Status   11/13/2020 30 1 - 33 U/L Final     AST (SGOT)   Date Value Ref Range Status   11/13/2020 28 1 - 32 U/L Final     Total Bilirubin   Date Value Ref Range Status   11/13/2020 0.2 0.0 - 1.2 mg/dL Final     Albumin   Date Value Ref Range Status   11/13/2020 4.30 3.50 - 5.20 g/dL Final     Globulin   Date Value Ref Range Status   11/13/2020 2.6 gm/dL Final     Lab Results   Component Value Date    WBC 5.88 11/13/2020    HGB 13.0 11/13/2020    HCT 37.5 11/13/2020    MCV 92.6 11/13/2020     11/13/2020     Lab Results   Component Value Date    NEUTROABS 3.09 11/13/2020     Lab Results   Component Value Date     25.4 (H) 10/30/2020    LABCA2 32.2 10/30/2020           PATHOLOGY:  * Cannot find OR log *         RADIOLOGY DATA :  No radiology results for the last 7 days      ASSESSMENT AND PLAN:      1.  Metastatic breast cancer with axillary adenopathy, hilar adenopathy, liver and bone metastases:  -Patient was initially diagnosed in July 2017 with axillary biopsy on right side.  -Patient received 6 cycles of chemotherapy with Taxotere and Cytoxan between July  26, 2017 and December 6, 2017.  Patient was seen by oncologist Dr. Poe at [facility] in [place] for second opinion.           -Patient received 27 cycles of palbociclib and letrozole between January 30, 2018 and October 2, 2020.  -CT of chest, abdomen and pelvis with contrast done on October 1, 2020 shows enlarging adenopathy in the right axilla worrisome for progression of disease.  -Patient has been started on Faslodex on October 16, 2020.  -We will avoid with day 1 of cycle 2 of Faslodex today.  -Plan is to do restaging CT of chest abdomen and pelvis with contrast after cycle 3 of Faslodex.  Recommendation were discussed with patient and her family.    2.  Brain metastases:  -Patient had calvarial metastases without any evidence of parenchymal mets.  Patient was evaluated by Dr. Pulido and no radiation treatment was recommended.    3.  Right internal jugular vein thrombosis:  -Remains on Coumadin.  Being followed by Coumadin clinic    4.  Bone metastases:  -Remains on Zometa since October 23, 2017.  -We will change Zometa to every 4 weeks starting today on November 13, 2020.    5.  History of melanoma in situ status post excision by Dr. Linn    6.  History of DCIS of right breast diagnosed in 2007.  Status post mastectomy followed by tamoxifen for 5 years that was completed in 2013.    7.  Chemotherapy-induced neuropathy:  -Remains on gabapentin.    8.  Health maintenance: Patient does not smoke.      9. Advance Care Planning: For now patient remains full code and is able to make decisions.  Patient has health care surrogate mentioned on chart.        PHQ-9 Total Score: 0   -Patient is not homicidal or suicidal.  No acute intervention required.    Kylie García reports a pain score of 0.  Given her pain assessment as noted, treatment options were discussed and the following options were decided upon as a follow-up plan to address the patient's pain: continuation of current treatment plan for pain.          Ovidio Meadows MD  11/13/2020  13:22 CST        Part of this note may be an electronic transcription/translation of spoken language to printed text using the Dragon Dictation System.

## 2020-11-13 ENCOUNTER — INFUSION (OUTPATIENT)
Dept: ONCOLOGY | Facility: HOSPITAL | Age: 50
End: 2020-11-13

## 2020-11-13 ENCOUNTER — OFFICE VISIT (OUTPATIENT)
Dept: ONCOLOGY | Facility: CLINIC | Age: 50
End: 2020-11-13

## 2020-11-13 VITALS
DIASTOLIC BLOOD PRESSURE: 89 MMHG | RESPIRATION RATE: 18 BRPM | HEART RATE: 65 BPM | BODY MASS INDEX: 35.19 KG/M2 | WEIGHT: 224.2 LBS | TEMPERATURE: 98 F | HEIGHT: 67 IN | SYSTOLIC BLOOD PRESSURE: 138 MMHG

## 2020-11-13 DIAGNOSIS — Z17.0 MALIGNANT NEOPLASM OF RIGHT BREAST IN FEMALE, ESTROGEN RECEPTOR POSITIVE, UNSPECIFIED SITE OF BREAST (HCC): ICD-10-CM

## 2020-11-13 DIAGNOSIS — T45.1X5A NEUROPATHY DUE TO CHEMOTHERAPEUTIC DRUG (HCC): ICD-10-CM

## 2020-11-13 DIAGNOSIS — C50.911 MALIGNANT NEOPLASM OF RIGHT BREAST IN FEMALE, ESTROGEN RECEPTOR POSITIVE, UNSPECIFIED SITE OF BREAST (HCC): Primary | ICD-10-CM

## 2020-11-13 DIAGNOSIS — C78.7 LIVER METASTASIS: ICD-10-CM

## 2020-11-13 DIAGNOSIS — Z86.000 HISTORY OF DUCTAL CARCINOMA IN SITU (DCIS) OF BREAST: ICD-10-CM

## 2020-11-13 DIAGNOSIS — Z45.2 ENCOUNTER FOR VENOUS ACCESS DEVICE CARE: Primary | ICD-10-CM

## 2020-11-13 DIAGNOSIS — G62.0 NEUROPATHY DUE TO CHEMOTHERAPEUTIC DRUG (HCC): ICD-10-CM

## 2020-11-13 DIAGNOSIS — C50.911 MALIGNANT NEOPLASM OF RIGHT BREAST IN FEMALE, ESTROGEN RECEPTOR POSITIVE, UNSPECIFIED SITE OF BREAST (HCC): ICD-10-CM

## 2020-11-13 DIAGNOSIS — Z17.0 MALIGNANT NEOPLASM OF RIGHT BREAST IN FEMALE, ESTROGEN RECEPTOR POSITIVE, UNSPECIFIED SITE OF BREAST (HCC): Primary | ICD-10-CM

## 2020-11-13 DIAGNOSIS — Z79.01 LONG TERM CURRENT USE OF ANTICOAGULANT THERAPY: ICD-10-CM

## 2020-11-13 DIAGNOSIS — C79.51 BONE METASTASIS: ICD-10-CM

## 2020-11-13 DIAGNOSIS — C79.31 METASTASIS TO BRAIN (HCC): ICD-10-CM

## 2020-11-13 LAB
ALBUMIN SERPL-MCNC: 4.3 G/DL (ref 3.5–5.2)
ALBUMIN/GLOB SERPL: 1.7 G/DL
ALP SERPL-CCNC: 77 U/L (ref 39–117)
ALT SERPL W P-5'-P-CCNC: 30 U/L (ref 1–33)
ANION GAP SERPL CALCULATED.3IONS-SCNC: 12 MMOL/L (ref 5–15)
AST SERPL-CCNC: 28 U/L (ref 1–32)
BASOPHILS # BLD AUTO: 0.08 10*3/MM3 (ref 0–0.2)
BASOPHILS NFR BLD AUTO: 1.4 % (ref 0–1.5)
BILIRUB SERPL-MCNC: 0.2 MG/DL (ref 0–1.2)
BUN SERPL-MCNC: 13 MG/DL (ref 6–20)
BUN/CREAT SERPL: 17.1 (ref 7–25)
CALCIUM SPEC-SCNC: 9.8 MG/DL (ref 8.6–10.5)
CANCER AG15-3 SERPL-ACNC: 28.2 U/ML
CHLORIDE SERPL-SCNC: 104 MMOL/L (ref 98–107)
CO2 SERPL-SCNC: 25 MMOL/L (ref 22–29)
CREAT SERPL-MCNC: 0.76 MG/DL (ref 0.57–1)
DEPRECATED RDW RBC AUTO: 43.8 FL (ref 37–54)
EOSINOPHIL # BLD AUTO: 0.52 10*3/MM3 (ref 0–0.4)
EOSINOPHIL NFR BLD AUTO: 8.8 % (ref 0.3–6.2)
ERYTHROCYTE [DISTWIDTH] IN BLOOD BY AUTOMATED COUNT: 12.9 % (ref 12.3–15.4)
GFR SERPL CREATININE-BSD FRML MDRD: 80 ML/MIN/1.73
GLOBULIN UR ELPH-MCNC: 2.6 GM/DL
GLUCOSE SERPL-MCNC: 76 MG/DL (ref 65–99)
HCT VFR BLD AUTO: 37.5 % (ref 34–46.6)
HGB BLD-MCNC: 13 G/DL (ref 12–15.9)
IMM GRANULOCYTES # BLD AUTO: 0.01 10*3/MM3 (ref 0–0.05)
IMM GRANULOCYTES NFR BLD AUTO: 0.2 % (ref 0–0.5)
LYMPHOCYTES # BLD AUTO: 1.49 10*3/MM3 (ref 0.7–3.1)
LYMPHOCYTES NFR BLD AUTO: 25.3 % (ref 19.6–45.3)
MAGNESIUM SERPL-MCNC: 2 MG/DL (ref 1.6–2.6)
MCH RBC QN AUTO: 32.1 PG (ref 26.6–33)
MCHC RBC AUTO-ENTMCNC: 34.7 G/DL (ref 31.5–35.7)
MCV RBC AUTO: 92.6 FL (ref 79–97)
MONOCYTES # BLD AUTO: 0.69 10*3/MM3 (ref 0.1–0.9)
MONOCYTES NFR BLD AUTO: 11.7 % (ref 5–12)
NEUTROPHILS NFR BLD AUTO: 3.09 10*3/MM3 (ref 1.7–7)
NEUTROPHILS NFR BLD AUTO: 52.6 % (ref 42.7–76)
NRBC BLD AUTO-RTO: 0 /100 WBC (ref 0–0.2)
PHOSPHATE SERPL-MCNC: 3.9 MG/DL (ref 2.5–4.5)
PLATELET # BLD AUTO: 257 10*3/MM3 (ref 140–450)
PMV BLD AUTO: 10.1 FL (ref 6–12)
POTASSIUM SERPL-SCNC: 4.2 MMOL/L (ref 3.5–5.2)
PROT SERPL-MCNC: 6.9 G/DL (ref 6–8.5)
RBC # BLD AUTO: 4.05 10*6/MM3 (ref 3.77–5.28)
SODIUM SERPL-SCNC: 141 MMOL/L (ref 136–145)
WBC # BLD AUTO: 5.88 10*3/MM3 (ref 3.4–10.8)

## 2020-11-13 PROCEDURE — 96402 CHEMO HORMON ANTINEOPL SQ/IM: CPT | Performed by: INTERNAL MEDICINE

## 2020-11-13 PROCEDURE — 84100 ASSAY OF PHOSPHORUS: CPT

## 2020-11-13 PROCEDURE — 25010000002 ZOLEDRONIC ACID PER 1 MG: Performed by: INTERNAL MEDICINE

## 2020-11-13 PROCEDURE — 86300 IMMUNOASSAY TUMOR CA 15-3: CPT

## 2020-11-13 PROCEDURE — 25010000003 HEPARIN LOCK FLUSH PER 10 UNITS: Performed by: INTERNAL MEDICINE

## 2020-11-13 PROCEDURE — 80053 COMPREHEN METABOLIC PANEL: CPT

## 2020-11-13 PROCEDURE — 83735 ASSAY OF MAGNESIUM: CPT

## 2020-11-13 PROCEDURE — 85025 COMPLETE CBC W/AUTO DIFF WBC: CPT

## 2020-11-13 PROCEDURE — 96365 THER/PROPH/DIAG IV INF INIT: CPT | Performed by: INTERNAL MEDICINE

## 2020-11-13 PROCEDURE — 25010000002 FULVESTRANT PER 25 MG: Performed by: INTERNAL MEDICINE

## 2020-11-13 PROCEDURE — 99214 OFFICE O/P EST MOD 30 MIN: CPT | Performed by: INTERNAL MEDICINE

## 2020-11-13 RX ORDER — HEPARIN SODIUM (PORCINE) LOCK FLUSH IV SOLN 100 UNIT/ML 100 UNIT/ML
500 SOLUTION INTRAVENOUS AS NEEDED
Status: DISCONTINUED | OUTPATIENT
Start: 2020-11-13 | End: 2020-11-13 | Stop reason: HOSPADM

## 2020-11-13 RX ORDER — GABAPENTIN 300 MG/1
300 CAPSULE ORAL 3 TIMES DAILY
Qty: 90 CAPSULE | Refills: 0 | Status: SHIPPED | OUTPATIENT
Start: 2020-11-13 | End: 2020-12-28 | Stop reason: SDUPTHER

## 2020-11-13 RX ORDER — SODIUM CHLORIDE 9 MG/ML
250 INJECTION, SOLUTION INTRAVENOUS ONCE
Status: COMPLETED | OUTPATIENT
Start: 2020-11-13 | End: 2020-11-13

## 2020-11-13 RX ORDER — HEPARIN SODIUM (PORCINE) LOCK FLUSH IV SOLN 100 UNIT/ML 100 UNIT/ML
500 SOLUTION INTRAVENOUS AS NEEDED
Status: CANCELLED | OUTPATIENT
Start: 2020-11-13

## 2020-11-13 RX ORDER — SODIUM CHLORIDE 0.9 % (FLUSH) 0.9 %
10 SYRINGE (ML) INJECTION AS NEEDED
Status: CANCELLED | OUTPATIENT
Start: 2020-11-13

## 2020-11-13 RX ORDER — SODIUM CHLORIDE 0.9 % (FLUSH) 0.9 %
10 SYRINGE (ML) INJECTION AS NEEDED
Status: DISCONTINUED | OUTPATIENT
Start: 2020-11-13 | End: 2020-11-13 | Stop reason: HOSPADM

## 2020-11-13 RX ORDER — SODIUM CHLORIDE 9 MG/ML
250 INJECTION, SOLUTION INTRAVENOUS ONCE
Status: CANCELLED | OUTPATIENT
Start: 2020-11-13

## 2020-11-13 RX ORDER — SODIUM CHLORIDE 0.9 % (FLUSH) 0.9 %
20 SYRINGE (ML) INJECTION AS NEEDED
Status: DISCONTINUED | OUTPATIENT
Start: 2020-11-13 | End: 2020-11-13 | Stop reason: HOSPADM

## 2020-11-13 RX ORDER — SODIUM CHLORIDE 0.9 % (FLUSH) 0.9 %
20 SYRINGE (ML) INJECTION AS NEEDED
Status: CANCELLED | OUTPATIENT
Start: 2020-11-13

## 2020-11-13 RX ADMIN — FULVESTRANT 500 MG: 50 INJECTION INTRAMUSCULAR at 11:21

## 2020-11-13 RX ADMIN — SODIUM CHLORIDE 250 ML: 9 INJECTION, SOLUTION INTRAVENOUS at 10:46

## 2020-11-13 RX ADMIN — HEPARIN 500 UNITS: 100 SYRINGE at 11:18

## 2020-11-13 RX ADMIN — ZOLEDRONIC ACID 4 MG: 4 INJECTION, SOLUTION, CONCENTRATE INTRAVENOUS at 10:46

## 2020-11-14 LAB — CANCER AG27-29 SERPL-ACNC: 32.5 U/ML (ref 0–38.6)

## 2020-11-18 DIAGNOSIS — Z79.01 LONG TERM (CURRENT) USE OF ANTICOAGULANTS: Primary | ICD-10-CM

## 2020-11-18 RX ORDER — WARFARIN SODIUM 5 MG/1
TABLET ORAL
Qty: 30 TABLET | Refills: 1 | Status: SHIPPED | OUTPATIENT
Start: 2020-11-18 | End: 2021-02-05 | Stop reason: SDUPTHER

## 2020-12-11 ENCOUNTER — INFUSION (OUTPATIENT)
Dept: ONCOLOGY | Facility: HOSPITAL | Age: 50
End: 2020-12-11

## 2020-12-11 ENCOUNTER — OFFICE VISIT (OUTPATIENT)
Dept: ONCOLOGY | Facility: CLINIC | Age: 50
End: 2020-12-11

## 2020-12-11 ENCOUNTER — ANTICOAGULATION VISIT (OUTPATIENT)
Dept: CARDIAC SURGERY | Facility: CLINIC | Age: 50
End: 2020-12-11

## 2020-12-11 VITALS
TEMPERATURE: 97.8 F | RESPIRATION RATE: 18 BRPM | WEIGHT: 223.8 LBS | SYSTOLIC BLOOD PRESSURE: 129 MMHG | HEIGHT: 67 IN | HEART RATE: 69 BPM | DIASTOLIC BLOOD PRESSURE: 75 MMHG | BODY MASS INDEX: 35.12 KG/M2

## 2020-12-11 VITALS — OXYGEN SATURATION: 96 % | HEART RATE: 81 BPM

## 2020-12-11 DIAGNOSIS — Z45.2 ENCOUNTER FOR VENOUS ACCESS DEVICE CARE: ICD-10-CM

## 2020-12-11 DIAGNOSIS — C50.911 MALIGNANT NEOPLASM OF RIGHT BREAST IN FEMALE, ESTROGEN RECEPTOR POSITIVE, UNSPECIFIED SITE OF BREAST (HCC): Primary | ICD-10-CM

## 2020-12-11 DIAGNOSIS — Z51.81 ENCOUNTER FOR THERAPEUTIC DRUG MONITORING: ICD-10-CM

## 2020-12-11 DIAGNOSIS — C78.7 LIVER METASTASIS: ICD-10-CM

## 2020-12-11 DIAGNOSIS — Z86.000 HISTORY OF DUCTAL CARCINOMA IN SITU (DCIS) OF BREAST: ICD-10-CM

## 2020-12-11 DIAGNOSIS — Z85.820 HISTORY OF MALIGNANT MELANOMA OF SKIN: ICD-10-CM

## 2020-12-11 DIAGNOSIS — C79.51 BONE METASTASIS: ICD-10-CM

## 2020-12-11 DIAGNOSIS — Z79.01 LONG TERM CURRENT USE OF ANTICOAGULANT THERAPY: ICD-10-CM

## 2020-12-11 DIAGNOSIS — C79.31 METASTASIS TO BRAIN (HCC): ICD-10-CM

## 2020-12-11 DIAGNOSIS — Z17.0 MALIGNANT NEOPLASM OF RIGHT BREAST IN FEMALE, ESTROGEN RECEPTOR POSITIVE, UNSPECIFIED SITE OF BREAST (HCC): Primary | ICD-10-CM

## 2020-12-11 DIAGNOSIS — Z17.0 MALIGNANT NEOPLASM OF RIGHT BREAST IN FEMALE, ESTROGEN RECEPTOR POSITIVE, UNSPECIFIED SITE OF BREAST (HCC): ICD-10-CM

## 2020-12-11 DIAGNOSIS — G62.0 NEUROPATHY DUE TO CHEMOTHERAPEUTIC DRUG (HCC): ICD-10-CM

## 2020-12-11 DIAGNOSIS — C50.911 MALIGNANT NEOPLASM OF RIGHT BREAST IN FEMALE, ESTROGEN RECEPTOR POSITIVE, UNSPECIFIED SITE OF BREAST (HCC): ICD-10-CM

## 2020-12-11 DIAGNOSIS — T45.1X5A NEUROPATHY DUE TO CHEMOTHERAPEUTIC DRUG (HCC): ICD-10-CM

## 2020-12-11 LAB
ALBUMIN SERPL-MCNC: 4.2 G/DL (ref 3.5–5.2)
ALBUMIN/GLOB SERPL: 1.6 G/DL
ALP SERPL-CCNC: 77 U/L (ref 39–117)
ALT SERPL W P-5'-P-CCNC: 22 U/L (ref 1–33)
ANION GAP SERPL CALCULATED.3IONS-SCNC: 9 MMOL/L (ref 5–15)
AST SERPL-CCNC: 22 U/L (ref 1–32)
BASOPHILS # BLD AUTO: 0.05 10*3/MM3 (ref 0–0.2)
BASOPHILS NFR BLD AUTO: 0.9 % (ref 0–1.5)
BILIRUB SERPL-MCNC: 0.2 MG/DL (ref 0–1.2)
BUN SERPL-MCNC: 13 MG/DL (ref 6–20)
BUN/CREAT SERPL: 16.9 (ref 7–25)
CALCIUM SPEC-SCNC: 9.6 MG/DL (ref 8.6–10.5)
CANCER AG15-3 SERPL-ACNC: 30.5 U/ML
CHLORIDE SERPL-SCNC: 105 MMOL/L (ref 98–107)
CO2 SERPL-SCNC: 27 MMOL/L (ref 22–29)
CREAT SERPL-MCNC: 0.77 MG/DL (ref 0.57–1)
DEPRECATED RDW RBC AUTO: 41.4 FL (ref 37–54)
EOSINOPHIL # BLD AUTO: 0.38 10*3/MM3 (ref 0–0.4)
EOSINOPHIL NFR BLD AUTO: 7.1 % (ref 0.3–6.2)
ERYTHROCYTE [DISTWIDTH] IN BLOOD BY AUTOMATED COUNT: 12.6 % (ref 12.3–15.4)
GFR SERPL CREATININE-BSD FRML MDRD: 79 ML/MIN/1.73
GLOBULIN UR ELPH-MCNC: 2.7 GM/DL
GLUCOSE SERPL-MCNC: 74 MG/DL (ref 65–99)
HCT VFR BLD AUTO: 38.2 % (ref 34–46.6)
HGB BLD-MCNC: 13 G/DL (ref 12–15.9)
IMM GRANULOCYTES # BLD AUTO: 0.01 10*3/MM3 (ref 0–0.05)
IMM GRANULOCYTES NFR BLD AUTO: 0.2 % (ref 0–0.5)
INR PPP: 2.5 (ref 0.9–1.1)
LYMPHOCYTES # BLD AUTO: 1.41 10*3/MM3 (ref 0.7–3.1)
LYMPHOCYTES NFR BLD AUTO: 26.2 % (ref 19.6–45.3)
MAGNESIUM SERPL-MCNC: 1.8 MG/DL (ref 1.6–2.6)
MCH RBC QN AUTO: 31 PG (ref 26.6–33)
MCHC RBC AUTO-ENTMCNC: 34 G/DL (ref 31.5–35.7)
MCV RBC AUTO: 91.2 FL (ref 79–97)
MONOCYTES # BLD AUTO: 0.51 10*3/MM3 (ref 0.1–0.9)
MONOCYTES NFR BLD AUTO: 9.5 % (ref 5–12)
NEUTROPHILS NFR BLD AUTO: 3.03 10*3/MM3 (ref 1.7–7)
NEUTROPHILS NFR BLD AUTO: 56.1 % (ref 42.7–76)
NRBC BLD AUTO-RTO: 0 /100 WBC (ref 0–0.2)
PHOSPHATE SERPL-MCNC: 4.1 MG/DL (ref 2.5–4.5)
PLATELET # BLD AUTO: 252 10*3/MM3 (ref 140–450)
PMV BLD AUTO: 9.8 FL (ref 6–12)
POTASSIUM SERPL-SCNC: 4.2 MMOL/L (ref 3.5–5.2)
PROT SERPL-MCNC: 6.9 G/DL (ref 6–8.5)
RBC # BLD AUTO: 4.19 10*6/MM3 (ref 3.77–5.28)
SODIUM SERPL-SCNC: 141 MMOL/L (ref 136–145)
WBC # BLD AUTO: 5.39 10*3/MM3 (ref 3.4–10.8)

## 2020-12-11 PROCEDURE — 99214 OFFICE O/P EST MOD 30 MIN: CPT | Performed by: INTERNAL MEDICINE

## 2020-12-11 PROCEDURE — 84100 ASSAY OF PHOSPHORUS: CPT

## 2020-12-11 PROCEDURE — 25010000002 FULVESTRANT PER 25 MG: Performed by: INTERNAL MEDICINE

## 2020-12-11 PROCEDURE — 80053 COMPREHEN METABOLIC PANEL: CPT

## 2020-12-11 PROCEDURE — 96402 CHEMO HORMON ANTINEOPL SQ/IM: CPT | Performed by: INTERNAL MEDICINE

## 2020-12-11 PROCEDURE — 86300 IMMUNOASSAY TUMOR CA 15-3: CPT

## 2020-12-11 PROCEDURE — 93793 ANTICOAG MGMT PT WARFARIN: CPT | Performed by: NURSE PRACTITIONER

## 2020-12-11 PROCEDURE — 25010000003 HEPARIN LOCK FLUSH PER 10 UNITS: Performed by: INTERNAL MEDICINE

## 2020-12-11 PROCEDURE — 96365 THER/PROPH/DIAG IV INF INIT: CPT | Performed by: INTERNAL MEDICINE

## 2020-12-11 PROCEDURE — 36416 COLLJ CAPILLARY BLOOD SPEC: CPT | Performed by: NURSE PRACTITIONER

## 2020-12-11 PROCEDURE — 85025 COMPLETE CBC W/AUTO DIFF WBC: CPT

## 2020-12-11 PROCEDURE — 83735 ASSAY OF MAGNESIUM: CPT

## 2020-12-11 PROCEDURE — 85610 PROTHROMBIN TIME: CPT | Performed by: NURSE PRACTITIONER

## 2020-12-11 PROCEDURE — 25010000002 ZOLEDRONIC ACID PER 1 MG: Performed by: INTERNAL MEDICINE

## 2020-12-11 RX ORDER — SODIUM CHLORIDE 0.9 % (FLUSH) 0.9 %
10 SYRINGE (ML) INJECTION AS NEEDED
Status: DISCONTINUED | OUTPATIENT
Start: 2020-12-11 | End: 2020-12-11 | Stop reason: HOSPADM

## 2020-12-11 RX ORDER — HEPARIN SODIUM (PORCINE) LOCK FLUSH IV SOLN 100 UNIT/ML 100 UNIT/ML
500 SOLUTION INTRAVENOUS AS NEEDED
Status: CANCELLED | OUTPATIENT
Start: 2020-12-11

## 2020-12-11 RX ORDER — SODIUM CHLORIDE 0.9 % (FLUSH) 0.9 %
20 SYRINGE (ML) INJECTION AS NEEDED
Status: CANCELLED | OUTPATIENT
Start: 2020-12-11

## 2020-12-11 RX ORDER — SODIUM CHLORIDE 9 MG/ML
250 INJECTION, SOLUTION INTRAVENOUS ONCE
Status: COMPLETED | OUTPATIENT
Start: 2020-12-11 | End: 2020-12-11

## 2020-12-11 RX ORDER — SODIUM CHLORIDE 0.9 % (FLUSH) 0.9 %
10 SYRINGE (ML) INJECTION AS NEEDED
Status: CANCELLED | OUTPATIENT
Start: 2020-12-11

## 2020-12-11 RX ORDER — SODIUM CHLORIDE 9 MG/ML
250 INJECTION, SOLUTION INTRAVENOUS ONCE
Status: CANCELLED | OUTPATIENT
Start: 2020-12-11

## 2020-12-11 RX ORDER — HEPARIN SODIUM (PORCINE) LOCK FLUSH IV SOLN 100 UNIT/ML 100 UNIT/ML
500 SOLUTION INTRAVENOUS AS NEEDED
Status: DISCONTINUED | OUTPATIENT
Start: 2020-12-11 | End: 2020-12-11 | Stop reason: HOSPADM

## 2020-12-11 RX ADMIN — SODIUM CHLORIDE 250 ML: 9 INJECTION, SOLUTION INTRAVENOUS at 10:19

## 2020-12-11 RX ADMIN — FULVESTRANT 500 MG: 50 INJECTION INTRAMUSCULAR at 10:53

## 2020-12-11 RX ADMIN — SODIUM CHLORIDE, PRESERVATIVE FREE 10 ML: 5 INJECTION INTRAVENOUS at 10:53

## 2020-12-11 RX ADMIN — ZOLEDRONIC ACID 4 MG: 4 INJECTION, SOLUTION, CONCENTRATE INTRAVENOUS at 10:20

## 2020-12-11 RX ADMIN — HEPARIN 500 UNITS: 100 SYRINGE at 10:53

## 2020-12-11 NOTE — PROGRESS NOTES
DATE OF VISIT: 12/11/2020      REASON FOR VISIT: Metastatic breast cancer with bone, liver metastases on Faslodex, neuropathy from chemotherapy, cancer related pain, DVT on anticoagulation with Coumadin      HISTORY OF PRESENT ILLNESS:    50-year-old female with medical problem consisting of ductal carcinoma in situ of right breast diagnosed in 2007, anxiety/depression, metastatic breast cancer with bone metastases, liver metastases currently on treatment with Faslodex is here for follow-up appointment today.  Patient is scheduled to get cycle 3 of Faslodex today.  Complains of discomfort in right axilla.  Denies any bleeding.  Denies any worsening discharge.  Denies any worsening neuropathy affecting upper or lower extremity.  Denies any recent worsening of chronic back pain.  Denies any bowel or bladder incontinence.  Denies any bleeding.            PAST MEDICAL HISTORY:    Past Medical History:   Diagnosis Date   • Anxiety    • Depression    • Encounter for gynecological examination    • Malignant neoplasm of female breast (CMS/HCC)     hx of dcis s/p mastectomy and reconstruction and augmentation      • Primary malignant neoplasm of breast (CMS/HCC)     dcis in rt breast s/p mastectomy,reconstruction      • Ventricular premature beats        SOCIAL HISTORY:    Social History     Tobacco Use   • Smoking status: Never Smoker   • Smokeless tobacco: Never Used   Substance Use Topics   • Alcohol use: No   • Drug use: No       Surgical History :  Past Surgical History:   Procedure Laterality Date   • AUGMENTATION MAMMAPLASTY     • AXILLARY LYMPH NODE BIOPSY/EXCISION Right 7/10/2017    Procedure: BIOSPY RIGHT AXILLARY MASS AND OR LYMPH;  Surgeon: Sourav Ernst MD;  Location: [facility];  Service:    • BREAST BIOPSY  12/07/2007    Mammotome biopsy of the right side with clip placement   • BREAST LUMPECTOMY     • BREAST RECONSTRUCTION  10/28/2008    Abscence of right breast secondary to mastectomy. Implant exchange  for reconstruction of right breast with open para-prosthetic capsulotomy   • BREAST SURGERY  10/01/2009    Left breast ptosis and breast asymmetry secondary to right breast reconstruction for breast cancer. Left breast mastopexy with augmentation.   • CARDIAC CATHETERIZATION  09/18/2008    Normal coronary arteriography. Normal left ventricular angiogram   • EP STUDY     • MASTECTOMY Right    • MASTECTOMY  02/05/2008    Multifocal right breast ductal carcinoma-in-situ. Right total mastectomy   • MASTECTOMY, PARTIAL  01/03/2008    Ductal carcinoma in-situ of the right breast, proven by mammotome biopsy. Right lumpectomy, medial portion of the breast, between 2 o'clock and 4 o'clock, 5 cm from the nipple, with clip placement, radiographic inter. of severo. specimen, & ultrasound   • PAP SMEAR  03/03/2009    Negative   • AK INSJ TUNNELED CVC W/O SUBQ PORT/ AGE 5 YR/> Right 7/10/2017    Procedure: MEDIPORT     (c-arm#2);  Surgeon: Sourav Ernst MD;  Location: [facility];  Service: General       ALLERGIES:    Allergies   Allergen Reactions   • Percocet [Oxycodone-Acetaminophen] Nausea And Vomiting         FAMILY HISTORY:  Family History   Problem Relation Age of Onset   • Anemia Mother    • Multiple sclerosis Mother    • No Known Problems Father    • Diabetes Other    • Multiple sclerosis Other            REVIEW OF SYSTEMS:      CONSTITUTIONAL:  Complains of fatigue. Denies any fever, chills or weight loss.     EYES: No visual disturbances. No discharge. No new lesions    ENMT:  No epistaxis, mouth sores or difficulty swallowing.    RESPIRATORY:  No new shortness of breath. No new cough or hemoptysis.    CARDIOVASCULAR:  No chest pain or palpitations.    GASTROINTESTINAL:  No abdominal pain nausea, vomiting or blood in the stool.    GENITOURINARY: No Dysuria or Hematuria.    MUSCULOSKELETAL: Positive for chronic pain affecting shoulders, hips and hands.  Complains of worsening left hip pain since last clinic  "visit.    LYMPHATICS: Complains of worsening axillary discomfort on right side with skin dimpling especially at night.    NEUROLOGICAL : Positive for chronic tingling and numbness affecting bilateral hand and feet. No headache or dizziness. No seizures or balance problems.    SKIN: No new skin lesions.    ENDOCRINE : No new hot or cold intolerance. No new polyuria . No polydipsia.        PHYSICAL EXAMINATION:      VITAL SIGNS:  /75   Pulse 69   Temp 97.8 °F (36.6 °C) (Temporal)   Resp 18   Ht 170.2 cm (67.01\")   Wt 102 kg (223 lb 12.8 oz)   BMI 35.04 kg/m²       12/11/20  0853   Weight: 102 kg (223 lb 12.8 oz)         ECOG performance status: 1    CONSTITUTIONAL:  Not in any distress.    EYES: Mild conjunctival Pallor. No Icterus. No Pterygium. Extraocular Movements intact.No ptosis.    ENMT:  Normocephalic, Atraumatic.No Facial Asymmetry noted.    NECK:  No adenopathy.Trachea midline. NO JVD.    RESPIRATORY:  Fair air entry bilateral. No rhonchi or wheezing.Fair respiratory effort.    CARDIOVASCULAR:  S1, S2. Regular rate and rhythm. No murmur or gallop appreciated.  Port-A-Cath present.    BREAST: Breast exam was performed in presence of registered nurse Solange.  Right axillary lymph node appears minimally enlarged as compared to last clinic visit.  There is evidence of erythema with skin dimpling.  No foul-smelling discharge or bleeding seen.    ABDOMEN:  Soft, obese, nontender. Bowel sounds present in all four quadrants.  No Hepatosplenomegaly appreciated.    MUSCULOSKELETAL:  No edema.No Calf Tenderness.Decreased range of motion.    NEUROLOGIC:    No  Motor or sensory deficit appreciated. Cranial Nerves 2-12 grossly intact.    SKIN : No new skin lesion identified. Skin is warm and moist to touch.    LYMPHATICS: No new enlarged lymph nodes in neck or supraclavicular area.    PSYCHIATRY: Alert, awake and oriented ×3.Normal affect.  Normal judgment.  Makes good eye contact.        DIAGNOSTIC DATA:  "   Glucose   Date Value Ref Range Status   12/11/2020 74 65 - 99 mg/dL Final     Sodium   Date Value Ref Range Status   12/11/2020 141 136 - 145 mmol/L Final     Potassium   Date Value Ref Range Status   12/11/2020 4.2 3.5 - 5.2 mmol/L Final     CO2   Date Value Ref Range Status   12/11/2020 27.0 22.0 - 29.0 mmol/L Final     Chloride   Date Value Ref Range Status   12/11/2020 105 98 - 107 mmol/L Final     Anion Gap   Date Value Ref Range Status   12/11/2020 9.0 5.0 - 15.0 mmol/L Final     Creatinine   Date Value Ref Range Status   12/11/2020 0.77 0.57 - 1.00 mg/dL Final     BUN   Date Value Ref Range Status   12/11/2020 13 6 - 20 mg/dL Final     BUN/Creatinine Ratio   Date Value Ref Range Status   12/11/2020 16.9 7.0 - 25.0 Final     Calcium   Date Value Ref Range Status   12/11/2020 9.6 8.6 - 10.5 mg/dL Final     eGFR Non  Amer   Date Value Ref Range Status   12/11/2020 79 >60 mL/min/1.73 Final     Alkaline Phosphatase   Date Value Ref Range Status   12/11/2020 77 39 - 117 U/L Final     Total Protein   Date Value Ref Range Status   12/11/2020 6.9 6.0 - 8.5 g/dL Final     ALT (SGPT)   Date Value Ref Range Status   12/11/2020 22 1 - 33 U/L Final     AST (SGOT)   Date Value Ref Range Status   12/11/2020 22 1 - 32 U/L Final     Total Bilirubin   Date Value Ref Range Status   12/11/2020 0.2 0.0 - 1.2 mg/dL Final     Albumin   Date Value Ref Range Status   12/11/2020 4.20 3.50 - 5.20 g/dL Final     Globulin   Date Value Ref Range Status   12/11/2020 2.7 gm/dL Final     Lab Results   Component Value Date    WBC 5.39 12/11/2020    HGB 13.0 12/11/2020    HCT 38.2 12/11/2020    MCV 91.2 12/11/2020     12/11/2020     Lab Results   Component Value Date    NEUTROABS 3.03 12/11/2020     Lab Results   Component Value Date     28.2 (H) 11/13/2020    LABCA2 32.5 11/13/2020       Component CA 27.29   Latest Ref Rng & Units 0.0 - 38.6 U/mL   6/29/2017 1817.1 (H)   8/23/2017 1075.9 (H)   10/4/2017 288.9 (H)    10/25/2017 181.2 (H)   11/15/2017 112.2 (H)   12/6/2017 101.6 (H)   12/27/2017 90.3 (H)   2/28/2018 52.1 (H)   4/2/2018 12.4   5/7/2018 5/14/2018 38.1   6/11/2018 39.7 (H)   7/9/2018 37.9   8/27/2018 34.1   9/24/2018 38.1   11/5/2018 31.2   12/13/2018 30.7   3/15/2019 31.2   4/19/2019 23.1   5/24/2019 35.0   6/28/2019 24.5   8/2/2019 27.4   9/6/2019 23.3   10/11/2019 22.9   11/15/2019 27.3   12/20/2019 21.5   1/24/2020 27.2   2/28/2020 23.9   4/3/2020 25.9   5/8/2020 23.3   6/19/2020 28.3   7/24/2020 30.4   8/28/2020 30.6   10/2/2020 37.9   10/30/2020 32.2   11/13/2020 32.5           Component CA 15-3   Latest Ref Rng & Units <=25.0 U/mL   6/29/2017 2107.0 (H)   8/23/2017 943.0 (H)   10/4/2017 282.2 (H)   10/25/2017 162.2 (H)   11/15/2017 118.0 (H)   12/6/2017 99.6 (H)   12/27/2017 88.3 (H)   2/28/2018 47.2 (H)   4/2/2018 11.9   5/7/2018 5/14/2018 41.1 (H)   6/11/2018 37.3 (H)   7/9/2018 35.3 (H)   8/27/2018 36.7 (H)   9/24/2018 31.1 (H)   11/5/2018 32.9 (H)   12/13/2018 32.6 (H)   3/15/2019 32.6 (H)   4/19/2019 29.2 (H)   5/24/2019 28.3 (H)   6/28/2019 26.9 (H)   8/2/2019 26.8 (H)   9/6/2019 25.9 (H)   10/11/2019 29.6 (H)   11/15/2019 28.8 (H)   12/20/2019 24.5   1/24/2020 26.3 (H)   2/28/2020 25.6 (H)   4/3/2020 27.6 (H)   5/8/2020 26.8 (H)   6/19/2020 25.6 (H)   7/24/2020 26.2 (H)   8/28/2020 28.9 (H)   10/2/2020 29.4 (H)   10/30/2020 25.4 (H)   11/13/2020 28.2 (H)             PATHOLOGY:  * Cannot find OR log *         RADIOLOGY DATA :  No radiology results for the last 7 days      ASSESSMENT AND PLAN:      1.  Metastatic breast cancer with axillary adenopathy, hilar adenopathy, liver and bone metastases:  -Patient was initially diagnosed in July 2027 with axillary biopsy on right side.  -Received 6 cycles of chemotherapy with Taxotere and Cytoxan between July 26, 2017 and December 6, 2017.  Patient was evaluated by Dr. Poe at [facility] in [place] for second opinion.  -Patient received 27 cycles of  palbociclib and letrozole between January 30, 2018 and October 2, 2020.  -CT of chest, abdomen and pelvis with contrast done on October 1, 2020 shows enlarging adenopathy in right axilla worrisome for progression of disease.  -Patient has been started on Faslodex on October 16, 2020.  -We will proceed with day 1 of cycle 3 of Faslodex today.  -We will ask patient to return to clinic in 4 weeks with restaging CT of chest abdomen and pelvis with contrast to be done prior to next clinic visit.  -We will follow-up on result of CA 15-3 as well as CA 27-29 from today.    2.  Brain metastases:  -Patient had calvarial metastases without any evidence of parenchymal mets.  Patient was evaluated by radiation oncologist Dr. Pulido and no radiation treatment was recommended.    3.  Right internal jugular vein thrombosis:  -Remains on Coumadin.  Being followed by Coumadin clinic.    4.  Bone metastases:  -Remains on Zometa since October 23, 2017.  -We will continue with Zometa every 4 weeks with her Faslodex.    5.  History of melanoma in situ status post excision by Dr. Linn     6.  History of DCIS of right breast diagnosed in 2007.  -Status post mastectomy followed by tamoxifen for 5 years that completed in 2013.    7.  Chemotherapy-induced neuropathy:  -Remains on gabapentin    8.  Health maintenance: Patient does not smoke    9. Advance Care Planning: For now patient remains full code and is able to make decisions.  Patient has health care surrogate mentioned on chart.                 PHQ-9 Total Score: 0   -Patient is not homicidal or suicidal.  No acute intervention required.    Kylie García reports a pain score of 0.  Given her pain assessment as noted, treatment options were discussed and the following options were decided upon as a follow-up plan to address the patient's pain: continuation of current treatment plan for pain.         Ovidio Meadows MD  12/11/2020  10:16 CST        Part of this note may be an  electronic transcription/translation of spoken language to printed text using the Dragon Dictation System.

## 2020-12-11 NOTE — PROGRESS NOTES
PT continues Ibrance. Denies any new medications or bleeding issues. Denies any s/s of blood clot. PT will be seen in 4 weeks when returning for next infusion. Patient instructed regarding medication; results given and questions answered. Nutritional counseling given.  Dietary factors affecting therapy addressed.  Patient instructed to monitor for excessive bruising or bleeding. PT verbalizes understanding. Findings reported by Elvia Allen RN.   Today's INR is   Lab Results - Last 18 Months   Lab Units 12/11/20   INR  2.50*

## 2020-12-12 LAB — CANCER AG27-29 SERPL-ACNC: 31.2 U/ML (ref 0–38.6)

## 2020-12-28 ENCOUNTER — TELEPHONE (OUTPATIENT)
Dept: ONCOLOGY | Facility: CLINIC | Age: 50
End: 2020-12-28

## 2020-12-28 DIAGNOSIS — T45.1X5A NEUROPATHY DUE TO CHEMOTHERAPEUTIC DRUG (HCC): ICD-10-CM

## 2020-12-28 DIAGNOSIS — G62.0 NEUROPATHY DUE TO CHEMOTHERAPEUTIC DRUG (HCC): ICD-10-CM

## 2020-12-28 RX ORDER — GABAPENTIN 300 MG/1
300 CAPSULE ORAL 3 TIMES DAILY
Qty: 90 CAPSULE | Refills: 0 | Status: SHIPPED | OUTPATIENT
Start: 2020-12-28 | End: 2021-03-19 | Stop reason: SDUPTHER

## 2020-12-28 NOTE — TELEPHONE ENCOUNTER
Caller: URI FIORE    Relationship: SELF    Best call back number: [phone]    Medication needed: gabapentin (NEURONTIN) 300 MG     When do you need the refill by: 12/30    Does the patient have less than a 3 day supply:  [x] Yes  [] No    What is the patient's preferred pharmacy:      [facility] [address] [facility] [phone] [facility] [phone]

## 2021-01-01 ENCOUNTER — INFUSION (OUTPATIENT)
Dept: ONCOLOGY | Facility: HOSPITAL | Age: 51
End: 2021-01-01

## 2021-01-01 ENCOUNTER — APPOINTMENT (OUTPATIENT)
Dept: ONCOLOGY | Facility: HOSPITAL | Age: 51
End: 2021-01-01

## 2021-01-01 ENCOUNTER — OFFICE VISIT (OUTPATIENT)
Dept: ONCOLOGY | Facility: CLINIC | Age: 51
End: 2021-01-01

## 2021-01-01 ENCOUNTER — ANTICOAGULATION VISIT (OUTPATIENT)
Dept: CARDIAC SURGERY | Facility: CLINIC | Age: 51
End: 2021-01-01

## 2021-01-01 VITALS
WEIGHT: 236.3 LBS | OXYGEN SATURATION: 97 % | DIASTOLIC BLOOD PRESSURE: 71 MMHG | BODY MASS INDEX: 37 KG/M2 | SYSTOLIC BLOOD PRESSURE: 137 MMHG | TEMPERATURE: 98.1 F | HEART RATE: 86 BPM

## 2021-01-01 VITALS
WEIGHT: 233.7 LBS | HEART RATE: 88 BPM | SYSTOLIC BLOOD PRESSURE: 124 MMHG | DIASTOLIC BLOOD PRESSURE: 77 MMHG | OXYGEN SATURATION: 95 % | BODY MASS INDEX: 36.59 KG/M2

## 2021-01-01 VITALS — OXYGEN SATURATION: 99 % | HEART RATE: 96 BPM

## 2021-01-01 DIAGNOSIS — G62.0 NEUROPATHY DUE TO CHEMOTHERAPEUTIC DRUG (HCC): ICD-10-CM

## 2021-01-01 DIAGNOSIS — Z79.01 LONG TERM CURRENT USE OF ANTICOAGULANT THERAPY: Chronic | ICD-10-CM

## 2021-01-01 DIAGNOSIS — C50.911 MALIGNANT NEOPLASM OF RIGHT BREAST IN FEMALE, ESTROGEN RECEPTOR POSITIVE, UNSPECIFIED SITE OF BREAST (HCC): Primary | ICD-10-CM

## 2021-01-01 DIAGNOSIS — Z45.2 ENCOUNTER FOR VENOUS ACCESS DEVICE CARE: ICD-10-CM

## 2021-01-01 DIAGNOSIS — R79.89 ELEVATED LFTS: Primary | ICD-10-CM

## 2021-01-01 DIAGNOSIS — C78.7 LIVER METASTASIS: Chronic | ICD-10-CM

## 2021-01-01 DIAGNOSIS — C79.51 BONE METASTASIS: Chronic | ICD-10-CM

## 2021-01-01 DIAGNOSIS — C50.911 MALIGNANT NEOPLASM OF RIGHT BREAST IN FEMALE, ESTROGEN RECEPTOR POSITIVE, UNSPECIFIED SITE OF BREAST (HCC): ICD-10-CM

## 2021-01-01 DIAGNOSIS — G62.0 NEUROPATHY DUE TO CHEMOTHERAPEUTIC DRUG (HCC): Chronic | ICD-10-CM

## 2021-01-01 DIAGNOSIS — Z17.0 MALIGNANT NEOPLASM OF RIGHT BREAST IN FEMALE, ESTROGEN RECEPTOR POSITIVE, UNSPECIFIED SITE OF BREAST (HCC): ICD-10-CM

## 2021-01-01 DIAGNOSIS — Z85.820 HISTORY OF MALIGNANT MELANOMA OF SKIN: Primary | ICD-10-CM

## 2021-01-01 DIAGNOSIS — T45.1X5A NEUROPATHY DUE TO CHEMOTHERAPEUTIC DRUG (HCC): ICD-10-CM

## 2021-01-01 DIAGNOSIS — R79.89 ELEVATED LIVER FUNCTION TESTS: Chronic | ICD-10-CM

## 2021-01-01 DIAGNOSIS — C50.911 MALIGNANT NEOPLASM OF RIGHT BREAST IN FEMALE, ESTROGEN RECEPTOR POSITIVE, UNSPECIFIED SITE OF BREAST (HCC): Primary | Chronic | ICD-10-CM

## 2021-01-01 DIAGNOSIS — Z86.000 HISTORY OF DUCTAL CARCINOMA IN SITU (DCIS) OF BREAST: Chronic | ICD-10-CM

## 2021-01-01 DIAGNOSIS — T45.1X5A NEUROPATHY DUE TO CHEMOTHERAPEUTIC DRUG (HCC): Chronic | ICD-10-CM

## 2021-01-01 DIAGNOSIS — Z17.0 MALIGNANT NEOPLASM OF RIGHT BREAST IN FEMALE, ESTROGEN RECEPTOR POSITIVE, UNSPECIFIED SITE OF BREAST (HCC): Primary | Chronic | ICD-10-CM

## 2021-01-01 DIAGNOSIS — Z17.0 MALIGNANT NEOPLASM OF RIGHT BREAST IN FEMALE, ESTROGEN RECEPTOR POSITIVE, UNSPECIFIED SITE OF BREAST (HCC): Primary | ICD-10-CM

## 2021-01-01 DIAGNOSIS — Z79.01 LONG TERM CURRENT USE OF ANTICOAGULANT THERAPY: Primary | ICD-10-CM

## 2021-01-01 DIAGNOSIS — C79.31 METASTASIS TO BRAIN (HCC): Chronic | ICD-10-CM

## 2021-01-01 DIAGNOSIS — Z51.81 ENCOUNTER FOR THERAPEUTIC DRUG MONITORING: ICD-10-CM

## 2021-01-01 DIAGNOSIS — Z85.820 HISTORY OF MALIGNANT MELANOMA OF SKIN: Chronic | ICD-10-CM

## 2021-01-01 LAB
ALBUMIN SERPL-MCNC: 3.8 G/DL (ref 3.5–5.2)
ALBUMIN SERPL-MCNC: 4.1 G/DL (ref 3.5–5.2)
ALBUMIN SERPL-MCNC: 4.3 G/DL (ref 3.5–5.2)
ALBUMIN/GLOB SERPL: 1.2 G/DL
ALBUMIN/GLOB SERPL: 1.4 G/DL
ALBUMIN/GLOB SERPL: 1.5 G/DL
ALP SERPL-CCNC: 72 U/L (ref 39–117)
ALP SERPL-CCNC: 79 U/L (ref 39–117)
ALP SERPL-CCNC: 93 U/L (ref 39–117)
ALT SERPL W P-5'-P-CCNC: 147 U/L (ref 1–33)
ALT SERPL W P-5'-P-CCNC: 44 U/L (ref 1–33)
ALT SERPL W P-5'-P-CCNC: 60 U/L (ref 1–33)
ANION GAP SERPL CALCULATED.3IONS-SCNC: 10 MMOL/L (ref 5–15)
ANION GAP SERPL CALCULATED.3IONS-SCNC: 11 MMOL/L (ref 5–15)
ANION GAP SERPL CALCULATED.3IONS-SCNC: 8 MMOL/L (ref 5–15)
AST SERPL-CCNC: 37 U/L (ref 1–32)
AST SERPL-CCNC: 39 U/L (ref 1–32)
AST SERPL-CCNC: 81 U/L (ref 1–32)
BASOPHILS # BLD AUTO: 0.03 10*3/MM3 (ref 0–0.2)
BASOPHILS # BLD AUTO: 0.04 10*3/MM3 (ref 0–0.2)
BASOPHILS NFR BLD AUTO: 0.6 % (ref 0–1.5)
BASOPHILS NFR BLD AUTO: 1 % (ref 0–1.5)
BILIRUB SERPL-MCNC: 0.2 MG/DL (ref 0–1.2)
BILIRUB SERPL-MCNC: 0.2 MG/DL (ref 0–1.2)
BILIRUB SERPL-MCNC: 0.3 MG/DL (ref 0–1.2)
BUN SERPL-MCNC: 10 MG/DL (ref 6–20)
BUN SERPL-MCNC: 11 MG/DL (ref 6–20)
BUN SERPL-MCNC: 9 MG/DL (ref 6–20)
BUN/CREAT SERPL: 10.6 (ref 7–25)
BUN/CREAT SERPL: 12.3 (ref 7–25)
BUN/CREAT SERPL: 12.4 (ref 7–25)
CALCIUM SPEC-SCNC: 9.2 MG/DL (ref 8.6–10.5)
CALCIUM SPEC-SCNC: 9.3 MG/DL (ref 8.6–10.5)
CALCIUM SPEC-SCNC: 9.8 MG/DL (ref 8.6–10.5)
CANCER AG15-3 SERPL-ACNC: 55.8 U/ML
CANCER AG15-3 SERPL-ACNC: 78.7 U/ML
CANCER AG27-29 SERPL-ACNC: 100.9 U/ML (ref 0–38.6)
CANCER AG27-29 SERPL-ACNC: 81.9 U/ML (ref 0–38.6)
CHLORIDE SERPL-SCNC: 100 MMOL/L (ref 98–107)
CHLORIDE SERPL-SCNC: 107 MMOL/L (ref 98–107)
CHLORIDE SERPL-SCNC: 108 MMOL/L (ref 98–107)
CO2 SERPL-SCNC: 24 MMOL/L (ref 22–29)
CO2 SERPL-SCNC: 27 MMOL/L (ref 22–29)
CO2 SERPL-SCNC: 28 MMOL/L (ref 22–29)
CREAT SERPL-MCNC: 0.81 MG/DL (ref 0.57–1)
CREAT SERPL-MCNC: 0.85 MG/DL (ref 0.57–1)
CREAT SERPL-MCNC: 0.89 MG/DL (ref 0.57–1)
DEPRECATED RDW RBC AUTO: 38.2 FL (ref 37–54)
DEPRECATED RDW RBC AUTO: 40.2 FL (ref 37–54)
EOSINOPHIL # BLD AUTO: 0.19 10*3/MM3 (ref 0–0.4)
EOSINOPHIL # BLD AUTO: 0.28 10*3/MM3 (ref 0–0.4)
EOSINOPHIL NFR BLD AUTO: 4.7 % (ref 0.3–6.2)
EOSINOPHIL NFR BLD AUTO: 5.5 % (ref 0.3–6.2)
ERYTHROCYTE [DISTWIDTH] IN BLOOD BY AUTOMATED COUNT: 12.8 % (ref 12.3–15.4)
ERYTHROCYTE [DISTWIDTH] IN BLOOD BY AUTOMATED COUNT: 13.3 % (ref 12.3–15.4)
GFR SERPL CREATININE-BSD FRML MDRD: 66 ML/MIN/1.73
GFR SERPL CREATININE-BSD FRML MDRD: 70 ML/MIN/1.73
GFR SERPL CREATININE-BSD FRML MDRD: 74 ML/MIN/1.73
GLOBULIN UR ELPH-MCNC: 2.8 GM/DL
GLOBULIN UR ELPH-MCNC: 3 GM/DL
GLOBULIN UR ELPH-MCNC: 3.2 GM/DL
GLUCOSE SERPL-MCNC: 129 MG/DL (ref 65–99)
GLUCOSE SERPL-MCNC: 86 MG/DL (ref 65–99)
GLUCOSE SERPL-MCNC: 87 MG/DL (ref 65–99)
HCT VFR BLD AUTO: 36.8 % (ref 34–46.6)
HCT VFR BLD AUTO: 37.9 % (ref 34–46.6)
HGB BLD-MCNC: 12.2 G/DL (ref 12–15.9)
HGB BLD-MCNC: 12.7 G/DL (ref 12–15.9)
IMM GRANULOCYTES # BLD AUTO: 0.01 10*3/MM3 (ref 0–0.05)
IMM GRANULOCYTES # BLD AUTO: 0.02 10*3/MM3 (ref 0–0.05)
IMM GRANULOCYTES NFR BLD AUTO: 0.2 % (ref 0–0.5)
IMM GRANULOCYTES NFR BLD AUTO: 0.4 % (ref 0–0.5)
INR PPP: 2.8 (ref 0.9–1.1)
LYMPHOCYTES # BLD AUTO: 0.96 10*3/MM3 (ref 0.7–3.1)
LYMPHOCYTES # BLD AUTO: 0.99 10*3/MM3 (ref 0.7–3.1)
LYMPHOCYTES NFR BLD AUTO: 19.3 % (ref 19.6–45.3)
LYMPHOCYTES NFR BLD AUTO: 23.6 % (ref 19.6–45.3)
MAGNESIUM SERPL-MCNC: 1.8 MG/DL (ref 1.6–2.6)
MAGNESIUM SERPL-MCNC: 1.8 MG/DL (ref 1.6–2.6)
MCH RBC QN AUTO: 27.4 PG (ref 26.6–33)
MCH RBC QN AUTO: 27.5 PG (ref 26.6–33)
MCHC RBC AUTO-ENTMCNC: 33.2 G/DL (ref 31.5–35.7)
MCHC RBC AUTO-ENTMCNC: 33.5 G/DL (ref 31.5–35.7)
MCV RBC AUTO: 82.2 FL (ref 79–97)
MCV RBC AUTO: 82.7 FL (ref 79–97)
MONOCYTES # BLD AUTO: 0.57 10*3/MM3 (ref 0.1–0.9)
MONOCYTES # BLD AUTO: 0.59 10*3/MM3 (ref 0.1–0.9)
MONOCYTES NFR BLD AUTO: 11.5 % (ref 5–12)
MONOCYTES NFR BLD AUTO: 14 % (ref 5–12)
NEUTROPHILS NFR BLD AUTO: 2.29 10*3/MM3 (ref 1.7–7)
NEUTROPHILS NFR BLD AUTO: 3.22 10*3/MM3 (ref 1.7–7)
NEUTROPHILS NFR BLD AUTO: 56.5 % (ref 42.7–76)
NEUTROPHILS NFR BLD AUTO: 62.7 % (ref 42.7–76)
NRBC BLD AUTO-RTO: 0 /100 WBC (ref 0–0.2)
NRBC BLD AUTO-RTO: 0 /100 WBC (ref 0–0.2)
PHOSPHATE SERPL-MCNC: 2.8 MG/DL (ref 2.5–4.5)
PHOSPHATE SERPL-MCNC: 3 MG/DL (ref 2.5–4.5)
PLATELET # BLD AUTO: 219 10*3/MM3 (ref 140–450)
PLATELET # BLD AUTO: 244 10*3/MM3 (ref 140–450)
PMV BLD AUTO: 9.4 FL (ref 6–12)
PMV BLD AUTO: 9.7 FL (ref 6–12)
POTASSIUM SERPL-SCNC: 3.5 MMOL/L (ref 3.5–5.2)
POTASSIUM SERPL-SCNC: 3.7 MMOL/L (ref 3.5–5.2)
POTASSIUM SERPL-SCNC: 3.9 MMOL/L (ref 3.5–5.2)
PROT SERPL-MCNC: 6.9 G/DL (ref 6–8.5)
PROT SERPL-MCNC: 7 G/DL (ref 6–8.5)
PROT SERPL-MCNC: 7.3 G/DL (ref 6–8.5)
RBC # BLD AUTO: 4.45 10*6/MM3 (ref 3.77–5.28)
RBC # BLD AUTO: 4.61 10*6/MM3 (ref 3.77–5.28)
SODIUM SERPL-SCNC: 138 MMOL/L (ref 136–145)
SODIUM SERPL-SCNC: 142 MMOL/L (ref 136–145)
SODIUM SERPL-SCNC: 143 MMOL/L (ref 136–145)
WBC NRBC COR # BLD: 4.06 10*3/MM3 (ref 3.4–10.8)
WBC NRBC COR # BLD: 5.13 10*3/MM3 (ref 3.4–10.8)

## 2021-01-01 PROCEDURE — 85025 COMPLETE CBC W/AUTO DIFF WBC: CPT

## 2021-01-01 PROCEDURE — 80053 COMPREHEN METABOLIC PANEL: CPT

## 2021-01-01 PROCEDURE — 25010000002 HEPARIN LOCK FLUSH PER 10 UNITS: Performed by: NURSE PRACTITIONER

## 2021-01-01 PROCEDURE — 36416 COLLJ CAPILLARY BLOOD SPEC: CPT | Performed by: NURSE PRACTITIONER

## 2021-01-01 PROCEDURE — 86300 IMMUNOASSAY TUMOR CA 15-3: CPT | Performed by: INTERNAL MEDICINE

## 2021-01-01 PROCEDURE — 99214 OFFICE O/P EST MOD 30 MIN: CPT | Performed by: INTERNAL MEDICINE

## 2021-01-01 PROCEDURE — 86300 IMMUNOASSAY TUMOR CA 15-3: CPT

## 2021-01-01 PROCEDURE — 25010000002 ZOLEDRONIC ACID PER 1 MG: Performed by: NURSE PRACTITIONER

## 2021-01-01 PROCEDURE — 83735 ASSAY OF MAGNESIUM: CPT

## 2021-01-01 PROCEDURE — 36591 DRAW BLOOD OFF VENOUS DEVICE: CPT | Performed by: INTERNAL MEDICINE

## 2021-01-01 PROCEDURE — 25010000002 ZOLEDRONIC ACID PER 1 MG: Performed by: INTERNAL MEDICINE

## 2021-01-01 PROCEDURE — 84100 ASSAY OF PHOSPHORUS: CPT

## 2021-01-01 PROCEDURE — 99214 OFFICE O/P EST MOD 30 MIN: CPT | Performed by: NURSE PRACTITIONER

## 2021-01-01 PROCEDURE — 96374 THER/PROPH/DIAG INJ IV PUSH: CPT | Performed by: INTERNAL MEDICINE

## 2021-01-01 PROCEDURE — 93793 ANTICOAG MGMT PT WARFARIN: CPT | Performed by: NURSE PRACTITIONER

## 2021-01-01 PROCEDURE — 85610 PROTHROMBIN TIME: CPT | Performed by: NURSE PRACTITIONER

## 2021-01-01 RX ORDER — SODIUM CHLORIDE 0.9 % (FLUSH) 0.9 %
20 SYRINGE (ML) INJECTION AS NEEDED
Status: CANCELLED | OUTPATIENT
Start: 2021-01-01

## 2021-01-01 RX ORDER — MINOCYCLINE HYDROCHLORIDE 100 MG/1
100 CAPSULE ORAL DAILY
Status: ON HOLD | COMMUNITY
End: 2022-01-01

## 2021-01-01 RX ORDER — SODIUM CHLORIDE 0.9 % (FLUSH) 0.9 %
10 SYRINGE (ML) INJECTION AS NEEDED
Status: DISCONTINUED | OUTPATIENT
Start: 2021-01-01 | End: 2021-01-01 | Stop reason: HOSPADM

## 2021-01-01 RX ORDER — HEPARIN SODIUM (PORCINE) LOCK FLUSH IV SOLN 100 UNIT/ML 100 UNIT/ML
500 SOLUTION INTRAVENOUS AS NEEDED
Status: DISCONTINUED | OUTPATIENT
Start: 2021-01-01 | End: 2021-01-01 | Stop reason: HOSPADM

## 2021-01-01 RX ORDER — SODIUM CHLORIDE 0.9 % (FLUSH) 0.9 %
10 SYRINGE (ML) INJECTION AS NEEDED
Status: CANCELLED | OUTPATIENT
Start: 2022-01-01

## 2021-01-01 RX ORDER — SODIUM CHLORIDE 0.9 % (FLUSH) 0.9 %
10 SYRINGE (ML) INJECTION AS NEEDED
Status: CANCELLED | OUTPATIENT
Start: 2021-01-01

## 2021-01-01 RX ORDER — EVEROLIMUS 5 MG/1
5 TABLET ORAL DAILY
Qty: 30 TABLET | Refills: 1 | Status: SHIPPED | OUTPATIENT
Start: 2021-01-01 | End: 2022-01-01 | Stop reason: ALTCHOICE

## 2021-01-01 RX ORDER — HEPARIN SODIUM (PORCINE) LOCK FLUSH IV SOLN 100 UNIT/ML 100 UNIT/ML
500 SOLUTION INTRAVENOUS AS NEEDED
Status: CANCELLED | OUTPATIENT
Start: 2021-01-01

## 2021-01-01 RX ORDER — GABAPENTIN 300 MG/1
300 CAPSULE ORAL 3 TIMES DAILY
Qty: 90 CAPSULE | Refills: 0 | Status: SHIPPED | OUTPATIENT
Start: 2021-01-01 | End: 2022-01-01 | Stop reason: SDUPTHER

## 2021-01-01 RX ORDER — SODIUM CHLORIDE 9 MG/ML
250 INJECTION, SOLUTION INTRAVENOUS ONCE
Status: CANCELLED | OUTPATIENT
Start: 2021-01-01

## 2021-01-01 RX ORDER — SODIUM CHLORIDE 9 MG/ML
250 INJECTION, SOLUTION INTRAVENOUS ONCE
Status: COMPLETED | OUTPATIENT
Start: 2021-01-01 | End: 2021-01-01

## 2021-01-01 RX ORDER — HEPARIN SODIUM (PORCINE) LOCK FLUSH IV SOLN 100 UNIT/ML 100 UNIT/ML
500 SOLUTION INTRAVENOUS AS NEEDED
Status: CANCELLED | OUTPATIENT
Start: 2022-01-01

## 2021-01-01 RX ORDER — SODIUM CHLORIDE 0.9 % (FLUSH) 0.9 %
20 SYRINGE (ML) INJECTION AS NEEDED
Status: CANCELLED | OUTPATIENT
Start: 2022-01-01

## 2021-01-01 RX ORDER — SODIUM CHLORIDE 0.9 % (FLUSH) 0.9 %
20 SYRINGE (ML) INJECTION AS NEEDED
Status: DISCONTINUED | OUTPATIENT
Start: 2021-01-01 | End: 2021-01-01 | Stop reason: HOSPADM

## 2021-01-01 RX ADMIN — SODIUM CHLORIDE, PRESERVATIVE FREE 10 ML: 5 INJECTION INTRAVENOUS at 11:23

## 2021-01-01 RX ADMIN — HEPARIN 500 UNITS: 100 SYRINGE at 10:42

## 2021-01-01 RX ADMIN — SODIUM CHLORIDE 250 ML: 9 INJECTION, SOLUTION INTRAVENOUS at 10:00

## 2021-01-01 RX ADMIN — SODIUM CHLORIDE, PRESERVATIVE FREE 20 ML: 5 INJECTION INTRAVENOUS at 10:41

## 2021-01-01 RX ADMIN — ZOLEDRONIC ACID 4 MG: 4 INJECTION, SOLUTION, CONCENTRATE INTRAVENOUS at 10:54

## 2021-01-01 RX ADMIN — HEPARIN 500 UNITS: 100 SYRINGE at 11:23

## 2021-01-01 RX ADMIN — HEPARIN 500 UNITS: 100 SYRINGE at 13:27

## 2021-01-01 RX ADMIN — ZOLEDRONIC ACID 4 MG: 4 INJECTION, SOLUTION, CONCENTRATE INTRAVENOUS at 10:08

## 2021-01-07 ENCOUNTER — HOSPITAL ENCOUNTER (OUTPATIENT)
Dept: CT IMAGING | Facility: HOSPITAL | Age: 51
Discharge: HOME | End: 2021-01-07
Admitting: INTERNAL MEDICINE

## 2021-01-07 DIAGNOSIS — C50.911 MALIGNANT NEOPLASM OF RIGHT BREAST IN FEMALE, ESTROGEN RECEPTOR POSITIVE, UNSPECIFIED SITE OF BREAST (HCC): ICD-10-CM

## 2021-01-07 DIAGNOSIS — Z17.0 MALIGNANT NEOPLASM OF RIGHT BREAST IN FEMALE, ESTROGEN RECEPTOR POSITIVE, UNSPECIFIED SITE OF BREAST (HCC): ICD-10-CM

## 2021-01-07 DIAGNOSIS — C78.7 LIVER METASTASIS: ICD-10-CM

## 2021-01-07 DIAGNOSIS — C79.51 BONE METASTASIS: ICD-10-CM

## 2021-01-07 PROCEDURE — 25010000003 HEPARIN LOCK FLUSH PER 10 UNITS: Performed by: INTERNAL MEDICINE

## 2021-01-07 PROCEDURE — 71260 CT THORAX DX C+: CPT

## 2021-01-07 PROCEDURE — 74177 CT ABD & PELVIS W/CONTRAST: CPT

## 2021-01-07 PROCEDURE — 25010000002 IOPAMIDOL 61 % SOLUTION: Performed by: INTERNAL MEDICINE

## 2021-01-07 RX ORDER — HEPARIN SODIUM (PORCINE) LOCK FLUSH IV SOLN 100 UNIT/ML 100 UNIT/ML
SOLUTION INTRAVENOUS
Status: DISPENSED
Start: 2021-01-07 | End: 2021-01-07

## 2021-01-07 RX ORDER — HEPARIN SODIUM (PORCINE) LOCK FLUSH IV SOLN 100 UNIT/ML 100 UNIT/ML
SOLUTION INTRAVENOUS
Status: DISCONTINUED | OUTPATIENT
Start: 2021-01-07 | End: 2021-01-08 | Stop reason: HOSPADM

## 2021-01-07 RX ADMIN — IOPAMIDOL 90 ML: 612 INJECTION, SOLUTION INTRAVENOUS at 08:44

## 2021-01-07 RX ADMIN — HEPARIN SODIUM (PORCINE) LOCK FLUSH IV SOLN 100 UNIT/ML 500 UNITS: 100 SOLUTION at 08:47

## 2021-01-11 ENCOUNTER — ANTICOAGULATION VISIT (OUTPATIENT)
Dept: CARDIAC SURGERY | Facility: CLINIC | Age: 51
End: 2021-01-11

## 2021-01-11 ENCOUNTER — INFUSION (OUTPATIENT)
Dept: ONCOLOGY | Facility: HOSPITAL | Age: 51
End: 2021-01-11

## 2021-01-11 ENCOUNTER — OFFICE VISIT (OUTPATIENT)
Dept: ONCOLOGY | Facility: CLINIC | Age: 51
End: 2021-01-11

## 2021-01-11 VITALS
WEIGHT: 226 LBS | RESPIRATION RATE: 18 BRPM | HEIGHT: 67 IN | TEMPERATURE: 97.7 F | BODY MASS INDEX: 35.47 KG/M2 | SYSTOLIC BLOOD PRESSURE: 135 MMHG | HEART RATE: 72 BPM | DIASTOLIC BLOOD PRESSURE: 78 MMHG

## 2021-01-11 VITALS — OXYGEN SATURATION: 99 % | HEART RATE: 85 BPM

## 2021-01-11 DIAGNOSIS — Z79.01 LONG TERM CURRENT USE OF ANTICOAGULANT THERAPY: ICD-10-CM

## 2021-01-11 DIAGNOSIS — C79.51 BONE METASTASIS: ICD-10-CM

## 2021-01-11 DIAGNOSIS — C78.7 LIVER METASTASIS: Chronic | ICD-10-CM

## 2021-01-11 DIAGNOSIS — Z17.0 MALIGNANT NEOPLASM OF RIGHT BREAST IN FEMALE, ESTROGEN RECEPTOR POSITIVE, UNSPECIFIED SITE OF BREAST (HCC): Primary | ICD-10-CM

## 2021-01-11 DIAGNOSIS — Z51.81 ENCOUNTER FOR THERAPEUTIC DRUG MONITORING: ICD-10-CM

## 2021-01-11 DIAGNOSIS — Z79.01 LONG TERM CURRENT USE OF ANTICOAGULANT THERAPY: Chronic | ICD-10-CM

## 2021-01-11 DIAGNOSIS — Z85.820 HISTORY OF MALIGNANT MELANOMA OF SKIN: Chronic | ICD-10-CM

## 2021-01-11 DIAGNOSIS — T45.1X5A NEUROPATHY DUE TO CHEMOTHERAPEUTIC DRUG (HCC): Chronic | ICD-10-CM

## 2021-01-11 DIAGNOSIS — C50.911 MALIGNANT NEOPLASM OF RIGHT BREAST IN FEMALE, ESTROGEN RECEPTOR POSITIVE, UNSPECIFIED SITE OF BREAST (HCC): Primary | ICD-10-CM

## 2021-01-11 DIAGNOSIS — Z86.000 HISTORY OF DUCTAL CARCINOMA IN SITU (DCIS) OF BREAST: Chronic | ICD-10-CM

## 2021-01-11 DIAGNOSIS — R79.89 ELEVATED LFTS: ICD-10-CM

## 2021-01-11 DIAGNOSIS — C79.31 METASTASIS TO BRAIN (HCC): Chronic | ICD-10-CM

## 2021-01-11 DIAGNOSIS — C50.911 MALIGNANT NEOPLASM OF RIGHT BREAST IN FEMALE, ESTROGEN RECEPTOR POSITIVE, UNSPECIFIED SITE OF BREAST (HCC): ICD-10-CM

## 2021-01-11 DIAGNOSIS — Z45.2 ENCOUNTER FOR VENOUS ACCESS DEVICE CARE: ICD-10-CM

## 2021-01-11 DIAGNOSIS — Z17.0 MALIGNANT NEOPLASM OF RIGHT BREAST IN FEMALE, ESTROGEN RECEPTOR POSITIVE, UNSPECIFIED SITE OF BREAST (HCC): ICD-10-CM

## 2021-01-11 DIAGNOSIS — G62.0 NEUROPATHY DUE TO CHEMOTHERAPEUTIC DRUG (HCC): Chronic | ICD-10-CM

## 2021-01-11 LAB
ALBUMIN SERPL-MCNC: 4 G/DL (ref 3.5–5.2)
ALBUMIN/GLOB SERPL: 1.4 G/DL
ALP SERPL-CCNC: 87 U/L (ref 39–117)
ALT SERPL W P-5'-P-CCNC: 39 U/L (ref 1–33)
ANION GAP SERPL CALCULATED.3IONS-SCNC: 7 MMOL/L (ref 5–15)
AST SERPL-CCNC: 33 U/L (ref 1–32)
BASOPHILS # BLD AUTO: 0.06 10*3/MM3 (ref 0–0.2)
BASOPHILS NFR BLD AUTO: 1 % (ref 0–1.5)
BILIRUB SERPL-MCNC: 0.2 MG/DL (ref 0–1.2)
BUN SERPL-MCNC: 10 MG/DL (ref 6–20)
BUN/CREAT SERPL: 12.2 (ref 7–25)
CALCIUM SPEC-SCNC: 9.4 MG/DL (ref 8.6–10.5)
CHLORIDE SERPL-SCNC: 107 MMOL/L (ref 98–107)
CO2 SERPL-SCNC: 26 MMOL/L (ref 22–29)
CREAT SERPL-MCNC: 0.82 MG/DL (ref 0.57–1)
DEPRECATED RDW RBC AUTO: 41.1 FL (ref 37–54)
EOSINOPHIL # BLD AUTO: 0.24 10*3/MM3 (ref 0–0.4)
EOSINOPHIL NFR BLD AUTO: 3.9 % (ref 0.3–6.2)
ERYTHROCYTE [DISTWIDTH] IN BLOOD BY AUTOMATED COUNT: 12.6 % (ref 12.3–15.4)
GFR SERPL CREATININE-BSD FRML MDRD: 73 ML/MIN/1.73
GLOBULIN UR ELPH-MCNC: 2.9 GM/DL
GLUCOSE SERPL-MCNC: 79 MG/DL (ref 65–99)
HCT VFR BLD AUTO: 38.2 % (ref 34–46.6)
HGB BLD-MCNC: 12.7 G/DL (ref 12–15.9)
IMM GRANULOCYTES # BLD AUTO: 0.02 10*3/MM3 (ref 0–0.05)
IMM GRANULOCYTES NFR BLD AUTO: 0.3 % (ref 0–0.5)
INR PPP: 1.8 (ref 0.9–1.1)
LYMPHOCYTES # BLD AUTO: 1.59 10*3/MM3 (ref 0.7–3.1)
LYMPHOCYTES NFR BLD AUTO: 26.1 % (ref 19.6–45.3)
MAGNESIUM SERPL-MCNC: 2 MG/DL (ref 1.6–2.6)
MCH RBC QN AUTO: 29.8 PG (ref 26.6–33)
MCHC RBC AUTO-ENTMCNC: 33.2 G/DL (ref 31.5–35.7)
MCV RBC AUTO: 89.7 FL (ref 79–97)
MONOCYTES # BLD AUTO: 0.64 10*3/MM3 (ref 0.1–0.9)
MONOCYTES NFR BLD AUTO: 10.5 % (ref 5–12)
NEUTROPHILS NFR BLD AUTO: 3.54 10*3/MM3 (ref 1.7–7)
NEUTROPHILS NFR BLD AUTO: 58.2 % (ref 42.7–76)
NRBC BLD AUTO-RTO: 0 /100 WBC (ref 0–0.2)
PHOSPHATE SERPL-MCNC: 3.3 MG/DL (ref 2.5–4.5)
PLATELET # BLD AUTO: 244 10*3/MM3 (ref 140–450)
PMV BLD AUTO: 9.8 FL (ref 6–12)
POTASSIUM SERPL-SCNC: 4.1 MMOL/L (ref 3.5–5.2)
PROT SERPL-MCNC: 6.9 G/DL (ref 6–8.5)
RBC # BLD AUTO: 4.26 10*6/MM3 (ref 3.77–5.28)
SODIUM SERPL-SCNC: 140 MMOL/L (ref 136–145)
WBC # BLD AUTO: 6.09 10*3/MM3 (ref 3.4–10.8)

## 2021-01-11 PROCEDURE — 85025 COMPLETE CBC W/AUTO DIFF WBC: CPT

## 2021-01-11 PROCEDURE — 93793 ANTICOAG MGMT PT WARFARIN: CPT | Performed by: NURSE PRACTITIONER

## 2021-01-11 PROCEDURE — 83735 ASSAY OF MAGNESIUM: CPT

## 2021-01-11 PROCEDURE — 25010000002 ZOLEDRONIC ACID PER 1 MG: Performed by: INTERNAL MEDICINE

## 2021-01-11 PROCEDURE — 86300 IMMUNOASSAY TUMOR CA 15-3: CPT

## 2021-01-11 PROCEDURE — 96402 CHEMO HORMON ANTINEOPL SQ/IM: CPT | Performed by: INTERNAL MEDICINE

## 2021-01-11 PROCEDURE — 96374 THER/PROPH/DIAG INJ IV PUSH: CPT | Performed by: INTERNAL MEDICINE

## 2021-01-11 PROCEDURE — 99215 OFFICE O/P EST HI 40 MIN: CPT | Performed by: INTERNAL MEDICINE

## 2021-01-11 PROCEDURE — 25010000002 FULVESTRANT PER 25 MG: Performed by: INTERNAL MEDICINE

## 2021-01-11 PROCEDURE — 25010000003 HEPARIN LOCK FLUSH PER 10 UNITS: Performed by: INTERNAL MEDICINE

## 2021-01-11 PROCEDURE — 36416 COLLJ CAPILLARY BLOOD SPEC: CPT | Performed by: NURSE PRACTITIONER

## 2021-01-11 PROCEDURE — 85610 PROTHROMBIN TIME: CPT | Performed by: NURSE PRACTITIONER

## 2021-01-11 PROCEDURE — 84100 ASSAY OF PHOSPHORUS: CPT

## 2021-01-11 PROCEDURE — 80053 COMPREHEN METABOLIC PANEL: CPT

## 2021-01-11 RX ORDER — SODIUM CHLORIDE 0.9 % (FLUSH) 0.9 %
10 SYRINGE (ML) INJECTION AS NEEDED
Status: DISCONTINUED | OUTPATIENT
Start: 2021-01-11 | End: 2021-01-11 | Stop reason: HOSPADM

## 2021-01-11 RX ORDER — SODIUM CHLORIDE 0.9 % (FLUSH) 0.9 %
20 SYRINGE (ML) INJECTION AS NEEDED
Status: CANCELLED | OUTPATIENT
Start: 2021-01-11

## 2021-01-11 RX ORDER — HEPARIN SODIUM (PORCINE) LOCK FLUSH IV SOLN 100 UNIT/ML 100 UNIT/ML
500 SOLUTION INTRAVENOUS AS NEEDED
Status: DISCONTINUED | OUTPATIENT
Start: 2021-01-11 | End: 2021-01-11 | Stop reason: HOSPADM

## 2021-01-11 RX ORDER — HEPARIN SODIUM (PORCINE) LOCK FLUSH IV SOLN 100 UNIT/ML 100 UNIT/ML
500 SOLUTION INTRAVENOUS AS NEEDED
Status: CANCELLED | OUTPATIENT
Start: 2021-01-11

## 2021-01-11 RX ORDER — SODIUM CHLORIDE 0.9 % (FLUSH) 0.9 %
20 SYRINGE (ML) INJECTION AS NEEDED
Status: DISCONTINUED | OUTPATIENT
Start: 2021-01-11 | End: 2021-01-11 | Stop reason: HOSPADM

## 2021-01-11 RX ORDER — SODIUM CHLORIDE 9 MG/ML
250 INJECTION, SOLUTION INTRAVENOUS ONCE
Status: COMPLETED | OUTPATIENT
Start: 2021-01-11 | End: 2021-01-11

## 2021-01-11 RX ORDER — SODIUM CHLORIDE 9 MG/ML
250 INJECTION, SOLUTION INTRAVENOUS ONCE
Status: CANCELLED | OUTPATIENT
Start: 2021-01-11

## 2021-01-11 RX ORDER — SODIUM CHLORIDE 0.9 % (FLUSH) 0.9 %
10 SYRINGE (ML) INJECTION AS NEEDED
Status: CANCELLED | OUTPATIENT
Start: 2021-01-11

## 2021-01-11 RX ADMIN — FULVESTRANT 500 MG: 50 INJECTION INTRAMUSCULAR at 11:13

## 2021-01-11 RX ADMIN — SODIUM CHLORIDE 250 ML: 9 INJECTION, SOLUTION INTRAVENOUS at 10:42

## 2021-01-11 RX ADMIN — ZOLEDRONIC ACID 4 MG: 4 INJECTION, SOLUTION, CONCENTRATE INTRAVENOUS at 10:42

## 2021-01-11 RX ADMIN — SODIUM CHLORIDE, PRESERVATIVE FREE 10 ML: 5 INJECTION INTRAVENOUS at 11:14

## 2021-01-11 RX ADMIN — HEPARIN 500 UNITS: 100 SYRINGE at 11:14

## 2021-01-11 NOTE — PROGRESS NOTES
"    Reason for clinic visit: Metastatic breast cancer with bone, liver metastases on Faslodex, neuropathy from chemotherapy, cancer related pain, DVT on anticoagulation with Coumadin      HISTORY OF PRESENT ILLNESS:     History of Present Illness   50-year-old female with medical problem consisting of ductal carcinoma in situ of right breast diagnosed in 2007, anxiety/depression, metastatic breast cancer with bone metastases, liver metastases currently on treatment with Faslodex is here for follow-up appointment today.  Patient is scheduled to get cycle 4 of Faslodex today along with Zometa.  Patient had a restaging CT of chest abdomen and pelvis done recently.  Complains of discomfort in right axilla.  Denies any recent worsening of neuropathy affecting upper or lower extremity.  Denies any recent worsening of back pain.  Denies any bowel or bladder incontinence.      Past Medical History, Past Surgical History, Social History, Family History have been reviewed and are without significant changes except as mentioned.    Review of Systems   Constitutional: Positive for fatigue.   Musculoskeletal: Positive for back pain.   Neurological: Positive for numbness.   Hematological: Positive for adenopathy (Right axilla).      A comprehensive 14 point review of systems was performed and was negative except as mentioned.    Medications:  The current medication list was reviewed in the EMR    ALLERGIES:    Allergies   Allergen Reactions   • Percocet [Oxycodone-Acetaminophen] Nausea And Vomiting       Objective      Vitals:    01/11/21 0931   BP: 135/78   Pulse: 72   Resp: 18   Temp: 97.7 °F (36.5 °C)   TempSrc: Temporal   Weight: 103 kg (226 lb)   Height: 170.2 cm (67.01\")   PainSc: 0-No pain     Current Status 1/11/2021   ECOG score 0       Physical Exam  Constitutional:       Appearance: Normal appearance.   HENT:      Head: Normocephalic and atraumatic.   Eyes:      Extraocular Movements: Extraocular movements intact.      " Pupils: Pupils are equal, round, and reactive to light.   Cardiovascular:      Rate and Rhythm: Normal rate and regular rhythm.   Pulmonary:      Effort: Pulmonary effort is normal.      Breath sounds: Normal breath sounds.   Musculoskeletal:      Comments: Decreased range of motion   Skin:     General: Skin is warm.   Neurological:      Mental Status: She is oriented to person, place, and time.           RECENT LABS:  Hematology WBC   Date Value Ref Range Status   01/11/2021 6.09 3.40 - 10.80 10*3/mm3 Final     RBC   Date Value Ref Range Status   01/11/2021 4.26 3.77 - 5.28 10*6/mm3 Final     Hemoglobin   Date Value Ref Range Status   01/11/2021 12.7 12.0 - 15.9 g/dL Final     Hematocrit   Date Value Ref Range Status   01/11/2021 38.2 34.0 - 46.6 % Final     Platelets   Date Value Ref Range Status   01/11/2021 244 140 - 450 10*3/mm3 Final            Glucose   Date Value Ref Range Status   01/11/2021 79 65 - 99 mg/dL Final     Sodium   Date Value Ref Range Status   01/11/2021 140 136 - 145 mmol/L Final     Potassium   Date Value Ref Range Status   01/11/2021 4.1 3.5 - 5.2 mmol/L Final     CO2   Date Value Ref Range Status   01/11/2021 26.0 22.0 - 29.0 mmol/L Final     Chloride   Date Value Ref Range Status   01/11/2021 107 98 - 107 mmol/L Final     Anion Gap   Date Value Ref Range Status   01/11/2021 7.0 5.0 - 15.0 mmol/L Final     Creatinine   Date Value Ref Range Status   01/11/2021 0.82 0.57 - 1.00 mg/dL Final     BUN   Date Value Ref Range Status   01/11/2021 10 6 - 20 mg/dL Final     BUN/Creatinine Ratio   Date Value Ref Range Status   01/11/2021 12.2 7.0 - 25.0 Final     Calcium   Date Value Ref Range Status   01/11/2021 9.4 8.6 - 10.5 mg/dL Final     eGFR Non  Amer   Date Value Ref Range Status   01/11/2021 73 >60 mL/min/1.73 Final     Alkaline Phosphatase   Date Value Ref Range Status   01/11/2021 87 39 - 117 U/L Final     Total Protein   Date Value Ref Range Status   01/11/2021 6.9 6.0 - 8.5 g/dL  Final     ALT (SGPT)   Date Value Ref Range Status   01/11/2021 39 (H) 1 - 33 U/L Final     AST (SGOT)   Date Value Ref Range Status   01/11/2021 33 (H) 1 - 32 U/L Final     Total Bilirubin   Date Value Ref Range Status   01/11/2021 0.2 0.0 - 1.2 mg/dL Final     Albumin   Date Value Ref Range Status   01/11/2021 4.00 3.50 - 5.20 g/dL Final     Globulin   Date Value Ref Range Status   01/11/2021 2.9 gm/dL Final           Component CA 27.29   Latest Ref Rng & Units 0.0 - 38.6 U/mL   6/29/2017 1817.1 (H)   8/23/2017 1075.9 (H)   10/4/2017 288.9 (H)   10/25/2017 181.2 (H)   11/15/2017 112.2 (H)   12/6/2017 101.6 (H)   12/27/2017 90.3 (H)   2/28/2018 52.1 (H)   4/2/2018 12.4   5/7/2018 5/14/2018 38.1   6/11/2018 39.7 (H)   7/9/2018 37.9   8/27/2018 34.1   9/24/2018 38.1   11/5/2018 31.2   12/13/2018 30.7   3/15/2019 31.2   4/19/2019 23.1   5/24/2019 35.0   6/28/2019 24.5   8/2/2019 27.4   9/6/2019 23.3   10/11/2019 22.9   11/15/2019 27.3   12/20/2019 21.5   1/24/2020 27.2   2/28/2020 23.9   4/3/2020 25.9   5/8/2020 23.3   6/19/2020 28.3   7/24/2020 30.4   8/28/2020 30.6   10/2/2020 37.9   10/30/2020 32.2   11/13/2020 32.5   12/11/2020 31.2             Component CA 15-3   Latest Ref Rng & Units <=25.0 U/mL   6/29/2017 2107.0 (H)   8/23/2017 943.0 (H)   10/4/2017 282.2 (H)   10/25/2017 162.2 (H)   11/15/2017 118.0 (H)   12/6/2017 99.6 (H)   12/27/2017 88.3 (H)   2/28/2018 47.2 (H)   4/2/2018 11.9   5/7/2018 5/14/2018 41.1 (H)   6/11/2018 37.3 (H)   7/9/2018 35.3 (H)   8/27/2018 36.7 (H)   9/24/2018 31.1 (H)   11/5/2018 32.9 (H)   12/13/2018 32.6 (H)   3/15/2019 32.6 (H)   4/19/2019 29.2 (H)   5/24/2019 28.3 (H)   6/28/2019 26.9 (H)   8/2/2019 26.8 (H)   9/6/2019 25.9 (H)   10/11/2019 29.6 (H)   11/15/2019 28.8 (H)   12/20/2019 24.5   1/24/2020 26.3 (H)   2/28/2020 25.6 (H)   4/3/2020 27.6 (H)   5/8/2020 26.8 (H)   6/19/2020 25.6 (H)   7/24/2020 26.2 (H)   8/28/2020 28.9 (H)   10/2/2020 29.4 (H)   10/30/2020 25.4 (H)    11/13/2020 28.2 (H)   12/11/2020 30.5 (H)           Radiology Data:    Ct Chest With Contrast Diagnostic    Result Date: 1/7/2021  Increase in size axillary subcutaneous lesion since prior exam. Unfavorable change since prior CT exam. Lung fields clear. Stable appearing left lobe liver lesion. Extensive skeletal metastases including the pelvis and hips without significant changes since prior exam. Electronically signed by:  David Faith MD  1/7/2021 4:04 PM CST Workstation: [id number]    Ct Abdomen Pelvis With Contrast    Result Date: 1/7/2021  Increase in size axillary subcutaneous lesion since prior exam. Unfavorable change since prior CT exam. Lung fields clear. Stable appearing left lobe liver lesion. Extensive skeletal metastases including the pelvis and hips without significant changes since prior exam. Electronically signed by:  David Faith MD  1/7/2021 4:04 PM CST Workstation: [id number]          Assessment/Plan     1.  Metastatic right breast cancer with bone and liver metastases:  -Patient was diagnosed in July 2017 with axillary biopsy on right side  -Received 6 cycles of chemotherapy with Taxotere and Cytoxan between July 26, 2017 and December 6, 2017.  Patient was also evaluated by Dr. Poe at [facility] in [place] for second opinion  -Patient received 27 cycles of palbociclib and letrozole between January 30, 2018 and October 2, 2020  -Due to enlarging right axillary mass, treatment was changed to Faslodex on October 16, 2020.  -CT of chest abdomen and pelvis with contrast done on January 7, 2020 was reviewed as well as discussed with radiologist, right axillary mass has increased in size as compared to CT scan done in September 2020.  Result of CT scan showing progression in axillary area was discussed with patient and her family.  -Option of radiation treatment to right axilla and continue with Faslodex versus starting systemic chemotherapy with gemcitabine was discussed with patient.   Patient wants to hold off on starting systemic chemotherapy at present  -Case was discussed with radiation oncologist Dr. Dooley.  He is agreeable to radiation to axilla.  Referral has been placed today  -Plan will be to do restaging CT of chest abdomen and pelvis with contrast about 6 to 7 weeks after completion of radiation treatment which was discussed with patient.  -We will ask patient to return to clinic in 4 weeks with repeat CBC, CMP, magnesium, phosphorus, CA 15-3 and CA 27-29 to be done on that day.    2.  Brain metastases:  -Patient was diagnosed with calvarial metastases without any evidence of parenchymal mets.  Patient was evaluated by Dr. Pulido and no radiation was recommended.    3.  Right internal jugular vein DVT:  -Patient remains on Coumadin    4.  Bone metastases:  -Patient has been on Zometa since October 23, 2017.  -We will continue with monthly Zometa with Faslodex    5.  Elevated liver function test:  -Secondary to liver metastases plus or minus medication.  Will monitor with CMP    6.  History of melanoma in situ status post excision by Dr. Linn    7.  History of DCIS of right breast diagnosed in 2007  -Had a mastectomy followed by tamoxifen for 5 years that completed in 2013    8.  Chemotherapy-induced neuropathy:  -Remains on gabapentin    9.  Health maintenance: Patient does not smoke    10. Advance Care Planning: For now patient remains full code and is able to make decisions.  Patient has health care surrogate mentioned on chart.    PHQ-9 Total Score: 0   -Patient is not homicidal or suicidal.  No acute intervention required.      Kylie García reports a pain score of 0.  Given her pain assessment as noted, treatment options were discussed and the following options were decided upon as a follow-up plan to address the patient's pain: continuation of current treatment plan for pain.        1/11/2021      CC:

## 2021-01-11 NOTE — PROGRESS NOTES
PT states she does not think she missed a dose and has not overate green. Denies any med changes, bleeding issues or s/s of blood clot. Adjusted pt's dose for today only due to TTR. Recheck INR in 4 weeks when pt returns for [facility] infusion. Patient instructed regarding medication; results given and questions answered. Nutritional counseling given.  Dietary factors affecting therapy addressed.  Patient instructed to monitor for excessive bruising or bleeding. PT verbalizes understanding. Findings reported by Elvia Allen RN.   Today's INR is   Lab Results - Last 18 Months   Lab Units 01/11/21   INR  1.80*

## 2021-01-12 ENCOUNTER — CONSULT (OUTPATIENT)
Dept: RADIATION ONCOLOGY | Facility: HOSPITAL | Age: 51
End: 2021-01-12

## 2021-01-12 ENCOUNTER — HOSPITAL ENCOUNTER (OUTPATIENT)
Dept: RADIATION ONCOLOGY | Facility: HOSPITAL | Age: 51
Setting detail: RADIATION/ONCOLOGY SERIES
End: 2021-01-12

## 2021-01-12 VITALS
TEMPERATURE: 97.9 F | BODY MASS INDEX: 35.62 KG/M2 | HEART RATE: 85 BPM | WEIGHT: 227.5 LBS | DIASTOLIC BLOOD PRESSURE: 93 MMHG | SYSTOLIC BLOOD PRESSURE: 126 MMHG | RESPIRATION RATE: 18 BRPM

## 2021-01-12 DIAGNOSIS — E66.01 MORBIDLY OBESE (HCC): ICD-10-CM

## 2021-01-12 DIAGNOSIS — C77.3 SECONDARY MALIGNANT NEOPLASM OF AXILLARY LYMPH NODES (HCC): Primary | ICD-10-CM

## 2021-01-12 LAB
CANCER AG15-3 SERPL-ACNC: 27.2 U/ML
CANCER AG27-29 SERPL-ACNC: 30.1 U/ML (ref 0–38.6)

## 2021-01-12 PROCEDURE — G0463 HOSPITAL OUTPT CLINIC VISIT: HCPCS | Performed by: RADIOLOGY

## 2021-01-12 PROCEDURE — 99203 OFFICE O/P NEW LOW 30 MIN: CPT | Performed by: RADIOLOGY

## 2021-01-12 PROCEDURE — 77263 THER RADIOLOGY TX PLNG CPLX: CPT | Performed by: RADIOLOGY

## 2021-01-12 NOTE — PROGRESS NOTES
New Patient Visit      Patient: Kylie García   YOB: 1970   Medical Record Number: [id number]   Date of Visit: January 12, 2021   Primary Diagnosis: Cancer Staging  Stage IV (M1) Secondary malignant neoplasm of axillary lymph nodes (CMS/HCC) [C77.3]                                              History of Present Illness:   Mrs. García is a 50-year-old female who is being seen as a new patient in the radiation oncology for evaluation of a metastatic lymph node involving her right axilla.  The primary source of her malignancy is a right breast cancer.    This is a new problem to me, and one that is potentially life-threatening.  (Old medical records were requested, reviewed, and summarized in the HPI)    Mrs. García's history of breast cancer is long.  It dates back to 2007.  The records of her initial surgery, augmentation mammoplasty, lumpectomy, breast reconstruction,, partial mastectomy, and later chemo and hormonal therapy have all been reviewed.  Most recently she has become aware of a growing mass within her right axilla.  It is not painful at rest but is tender upon touch.  She has noticed some skin changes and redness.  There has been no bleeding or purulent drainage.  She has noticed no other areas of adenopathy.  She has noticed no changes within her breast.    Review of Systems   The patient relates no headaches or neurologic signs.  Constitutionally, she complains of some fatigue.  She denies fever chills or weight loss.  There is been no no epistaxis.  She is swallow she swallows without difficulty.  Is been no shortness of breath or dyspnea upon exertion.  There is been no hoarseness or hemoptysis or dysphagia.  No chest pains or palpitations are present.  She has no abdominal or GI complaints.  Patient has intermittent hip pain.  This is worse with weightbearing and ambulation.  It goes away with rest and nonsteroidal anti-inflammatories.      All other systems reviewed and are  negative.    Past Medical History:   Diagnosis Date   • Anxiety    • Depression    • Encounter for gynecological examination    • Malignant neoplasm of female breast (CMS/HCC)     hx of dcis s/p mastectomy and reconstruction and augmentation      • Primary malignant neoplasm of breast (CMS/HCC)     dcis in rt breast s/p mastectomy,reconstruction      • Ventricular premature beats         Past Surgical History:   Procedure Laterality Date   • AUGMENTATION MAMMAPLASTY     • AXILLARY LYMPH NODE BIOPSY/EXCISION Right 7/10/2017    Procedure: BIOSPY RIGHT AXILLARY MASS AND OR LYMPH;  Surgeon: Sourav Ernst MD;  Location: [facility];  Service:    • BREAST BIOPSY  12/07/2007    Mammotome biopsy of the right side with clip placement   • BREAST LUMPECTOMY     • BREAST RECONSTRUCTION  10/28/2008    Abscence of right breast secondary to mastectomy. Implant exchange for reconstruction of right breast with open para-prosthetic capsulotomy   • BREAST SURGERY  10/01/2009    Left breast ptosis and breast asymmetry secondary to right breast reconstruction for breast cancer. Left breast mastopexy with augmentation.   • CARDIAC CATHETERIZATION  09/18/2008    Normal coronary arteriography. Normal left ventricular angiogram   • EP STUDY     • MASTECTOMY Right    • MASTECTOMY  02/05/2008    Multifocal right breast ductal carcinoma-in-situ. Right total mastectomy   • MASTECTOMY, PARTIAL  01/03/2008    Ductal carcinoma in-situ of the right breast, proven by mammotome biopsy. Right lumpectomy, medial portion of the breast, between 2 o'clock and 4 o'clock, 5 cm from the nipple, with clip placement, radiographic inter. of severo. specimen, & ultrasound   • PAP SMEAR  03/03/2009    Negative   • VT INSJ TUNNELED CVC W/O SUBQ PORT/ AGE 5 YR/> Right 7/10/2017    Procedure: MEDIPORT     (c-arm#2);  Surgeon: Sourav Ernst MD;  Location: [facility];  Service: General      Family History   Problem Relation Age of Onset   • Anemia Mother    •  Multiple sclerosis Mother    • No Known Problems Father    • Diabetes Other    • Multiple sclerosis Other         Social History     Socioeconomic History   • Marital status:      Spouse name: Not on file   • Number of children: Not on file   • Years of education: Not on file   • Highest education level: Not on file   Tobacco Use   • Smoking status: Never Smoker   • Smokeless tobacco: Never Used   Substance and Sexual Activity   • Alcohol use: No   • Drug use: No   • Sexual activity: Defer       Allergies:  Percocet [oxycodone-acetaminophen]   Prior to Admission medications    Medication Sig Start Date End Date Taking? Authorizing Provider   gabapentin (NEURONTIN) 300 MG capsule Take 1 capsule by mouth 3 (Three) Times a Day. 20   Lucille Dunlap MD   sertraline (ZOLOFT) 50 MG tablet Take 50 mg by mouth Daily. Currently 1/2 tablet but will be increasing to 50mg daily    ProviderMaki MD   traMADol (ULTRAM) 50 MG tablet Take 1 tablet by mouth Every 8 (Eight) Hours As Needed for Moderate Pain . 20   Ovidio Meadows MD   warfarin (COUMADIN) 5 MG tablet TAKE 1 TABLET NIGHTLY OR AS DIRECTED 20   Patt Alvarez APRN      Pain:(on a scale of 0-10)  0  Pain Score    21 0911   PainSc: 0-No pain        Kylie Yoon García reports a pain score of 0.         Quality of Life:   KPS: 90  ECO    Advanced Care Plan: N           Physical Examination:  Vitals:  @    Height:    Weight: Weight: 103 kg (227 lb 8 oz) Body mass index is 35.62 kg/m².      Constitutional: The patient is a well-developed, well-nourished 50-year-old female in no acute distress.  Alert and oriented ×3.  HEENT: Atraumatic. Normocephalic. No abnormalities noted.  Lymphatics: No cervical, supraclavicular or inguinal lymphadenopathy is palpated.  Within the right axilla there is a 3 cm mass associated with redness of the skin with a small area of ulceration.  There is no purulent drainage.  There is no blood  draining.  CV: Regular rate and rhythm.  No murmurs, rubs, or gallops are appreciated.  Respiratory: Lungs clear to auscultation.  Breath sounds equal bilaterally.  Breasts: Surgical scars in the right reconstructed breast as well as the augmented left breast and there are no suspicious lesions or nodules palpated.   GI: Abdomen soft, nontender, nondistended, with no hepatosplenomegaly or masses palpated.  Extremities: No clubbing, cyanosis, or edema.  Neurologic: Cranial nerves II through XII are grossly intact, with no focal neurological deficits noted on exam.  Psychiatric: Alert and oriented x3. Normal affect, with no anxiety or depression noted.    Radiographs : CT scan of the chest 10/1/2020.  Personally reviewed.  Pathology: No new  Labs:   Lab Results   Component Value Date    GLUCOSE 79 01/11/2021    BUN 10 01/11/2021    CREATININE 0.82 01/11/2021    EGFRIFNONA 73 01/11/2021    BCR 12.2 01/11/2021    K 4.1 01/11/2021    CO2 26.0 01/11/2021    CALCIUM 9.4 01/11/2021    ALBUMIN 4.00 01/11/2021    AST 33 (H) 01/11/2021    ALT 39 (H) 01/11/2021      WBC   Date Value Ref Range Status   01/11/2021 6.09 3.40 - 10.80 10*3/mm3 Final     Hemoglobin   Date Value Ref Range Status   01/11/2021 12.7 12.0 - 15.9 g/dL Final     Hematocrit   Date Value Ref Range Status   01/11/2021 38.2 34.0 - 46.6 % Final     Platelets   Date Value Ref Range Status   01/11/2021 244 140 - 450 10*3/mm3 Final    No results found for: CEA    ASSESSMENT/PLAN:     Metastatic involvement of a right axillary lymph node.  I believe the patient would be an excellent candidate for palliative radiation therapy.  The goals of treatment, steps involved, acute and late toxicity were discussed in detail with the patient and her .  With this information he agreed to go through radiation therapy as described.  An outpatient simulation appointment has been given.  Treatment planning will follow.  I plan on treating the area to a total of 4256 cGy in  16 equal fractions using a 3D conformal or an IMRT technique.    Electronically signed by George Dooley MD 1/12/2021  09:25 CST

## 2021-01-18 ENCOUNTER — HOSPITAL ENCOUNTER (OUTPATIENT)
Dept: RADIATION ONCOLOGY | Facility: HOSPITAL | Age: 51
Discharge: HOME | End: 2021-01-18

## 2021-01-18 PROCEDURE — 77290 THER RAD SIMULAJ FIELD CPLX: CPT | Performed by: RADIOLOGY

## 2021-01-18 PROCEDURE — 77334 RADIATION TREATMENT AID(S): CPT | Performed by: RADIOLOGY

## 2021-01-20 PROCEDURE — 77334 RADIATION TREATMENT AID(S): CPT | Performed by: RADIOLOGY

## 2021-01-20 PROCEDURE — 77295 3-D RADIOTHERAPY PLAN: CPT | Performed by: RADIOLOGY

## 2021-01-20 PROCEDURE — 77300 RADIATION THERAPY DOSE PLAN: CPT | Performed by: RADIOLOGY

## 2021-01-25 ENCOUNTER — HOSPITAL ENCOUNTER (OUTPATIENT)
Dept: RADIATION ONCOLOGY | Facility: HOSPITAL | Age: 51
Discharge: HOME | End: 2021-01-25

## 2021-01-25 PROCEDURE — 77280 THER RAD SIMULAJ FIELD SMPL: CPT | Performed by: RADIOLOGY

## 2021-01-25 PROCEDURE — 77412 RADIATION TX DELIVERY LVL 3: CPT | Performed by: RADIOLOGY

## 2021-01-25 PROCEDURE — G6002 STEREOSCOPIC X-RAY GUIDANCE: HCPCS | Performed by: RADIOLOGY

## 2021-01-25 PROCEDURE — 77427 RADIATION TX MANAGEMENT X5: CPT | Performed by: RADIOLOGY

## 2021-01-26 ENCOUNTER — HOSPITAL ENCOUNTER (OUTPATIENT)
Dept: RADIATION ONCOLOGY | Facility: HOSPITAL | Age: 51
Discharge: HOME | End: 2021-01-26

## 2021-01-26 PROCEDURE — G6002 STEREOSCOPIC X-RAY GUIDANCE: HCPCS | Performed by: RADIOLOGY

## 2021-01-26 PROCEDURE — 77412 RADIATION TX DELIVERY LVL 3: CPT | Performed by: RADIOLOGY

## 2021-01-26 PROCEDURE — 77417 THER RADIOLOGY PORT IMAGE(S): CPT | Performed by: RADIOLOGY

## 2021-01-27 ENCOUNTER — RADIATION ONCOLOGY WEEKLY ASSESSMENT (OUTPATIENT)
Dept: RADIATION ONCOLOGY | Facility: HOSPITAL | Age: 51
End: 2021-01-27

## 2021-01-27 ENCOUNTER — HOSPITAL ENCOUNTER (OUTPATIENT)
Dept: RADIATION ONCOLOGY | Facility: HOSPITAL | Age: 51
Discharge: HOME | End: 2021-01-27

## 2021-01-27 VITALS
WEIGHT: 226.9 LBS | HEART RATE: 83 BPM | TEMPERATURE: 98.6 F | DIASTOLIC BLOOD PRESSURE: 69 MMHG | BODY MASS INDEX: 35.53 KG/M2 | RESPIRATION RATE: 18 BRPM | SYSTOLIC BLOOD PRESSURE: 138 MMHG

## 2021-01-27 DIAGNOSIS — C77.3 SECONDARY MALIGNANT NEOPLASM OF AXILLARY LYMPH NODES (HCC): Primary | ICD-10-CM

## 2021-01-27 PROCEDURE — G6002 STEREOSCOPIC X-RAY GUIDANCE: HCPCS | Performed by: RADIOLOGY

## 2021-01-27 PROCEDURE — 77412 RADIATION TX DELIVERY LVL 3: CPT | Performed by: RADIOLOGY

## 2021-01-28 ENCOUNTER — HOSPITAL ENCOUNTER (OUTPATIENT)
Dept: RADIATION ONCOLOGY | Facility: HOSPITAL | Age: 51
Setting detail: RADIATION/ONCOLOGY SERIES
Discharge: HOME | End: 2021-01-28

## 2021-01-28 PROCEDURE — 77412 RADIATION TX DELIVERY LVL 3: CPT | Performed by: RADIOLOGY

## 2021-01-28 PROCEDURE — G6002 STEREOSCOPIC X-RAY GUIDANCE: HCPCS | Performed by: RADIOLOGY

## 2021-01-29 ENCOUNTER — DOCUMENTATION (OUTPATIENT)
Dept: NUTRITION | Facility: HOSPITAL | Age: 51
End: 2021-01-29

## 2021-01-29 ENCOUNTER — HOSPITAL ENCOUNTER (OUTPATIENT)
Dept: RADIATION ONCOLOGY | Facility: HOSPITAL | Age: 51
Discharge: HOME | End: 2021-01-29

## 2021-01-29 PROCEDURE — 77412 RADIATION TX DELIVERY LVL 3: CPT | Performed by: RADIOLOGY

## 2021-01-29 PROCEDURE — 77336 RADIATION PHYSICS CONSULT: CPT | Performed by: RADIOLOGY

## 2021-01-29 PROCEDURE — G6002 STEREOSCOPIC X-RAY GUIDANCE: HCPCS | Performed by: RADIOLOGY

## 2021-01-29 NOTE — PROGRESS NOTES
"Adult Outpatient Nutrition  Assessment    Patient Name:  Kylie García  YOB: 1970  MRN: [id number]    Assessment Date:  1/29/2021    Comments: Follow-up to check nutrition. Rad to axilla due to breast cancer with liver/bone mets. Has completed chemo \"for now\".  Wt 226.9 lb--trending up. Alb 4. No nutrition problems verbalized. Pt agreed to call on RDN as needed.                      Electronically signed by:  Sue Devine RD  01/29/21 13:02 CST   "

## 2021-02-01 ENCOUNTER — HOSPITAL ENCOUNTER (OUTPATIENT)
Dept: RADIATION ONCOLOGY | Facility: HOSPITAL | Age: 51
Discharge: HOME | End: 2021-02-01

## 2021-02-01 ENCOUNTER — HOSPITAL ENCOUNTER (OUTPATIENT)
Dept: RADIATION ONCOLOGY | Facility: HOSPITAL | Age: 51
Setting detail: RADIATION/ONCOLOGY SERIES
End: 2021-02-01

## 2021-02-01 PROCEDURE — 77412 RADIATION TX DELIVERY LVL 3: CPT | Performed by: NURSE PRACTITIONER

## 2021-02-01 PROCEDURE — 77427 RADIATION TX MANAGEMENT X5: CPT | Performed by: RADIOLOGY

## 2021-02-01 PROCEDURE — G6002 STEREOSCOPIC X-RAY GUIDANCE: HCPCS | Performed by: RADIOLOGY

## 2021-02-02 ENCOUNTER — HOSPITAL ENCOUNTER (OUTPATIENT)
Dept: RADIATION ONCOLOGY | Facility: HOSPITAL | Age: 51
Discharge: HOME | End: 2021-02-02

## 2021-02-02 PROCEDURE — 77412 RADIATION TX DELIVERY LVL 3: CPT | Performed by: RADIOLOGY

## 2021-02-02 PROCEDURE — 77417 THER RADIOLOGY PORT IMAGE(S): CPT | Performed by: RADIOLOGY

## 2021-02-02 PROCEDURE — G6002 STEREOSCOPIC X-RAY GUIDANCE: HCPCS | Performed by: RADIOLOGY

## 2021-02-03 ENCOUNTER — HOSPITAL ENCOUNTER (OUTPATIENT)
Dept: RADIATION ONCOLOGY | Facility: HOSPITAL | Age: 51
Discharge: HOME | End: 2021-02-03

## 2021-02-03 ENCOUNTER — RADIATION ONCOLOGY WEEKLY ASSESSMENT (OUTPATIENT)
Dept: RADIATION ONCOLOGY | Facility: HOSPITAL | Age: 51
End: 2021-02-03

## 2021-02-03 VITALS — SYSTOLIC BLOOD PRESSURE: 129 MMHG | TEMPERATURE: 97.6 F | DIASTOLIC BLOOD PRESSURE: 74 MMHG | HEART RATE: 77 BPM

## 2021-02-03 DIAGNOSIS — C77.3 SECONDARY MALIGNANT NEOPLASM OF AXILLARY LYMPH NODES (HCC): Primary | ICD-10-CM

## 2021-02-03 PROCEDURE — 77412 RADIATION TX DELIVERY LVL 3: CPT | Performed by: RADIOLOGY

## 2021-02-03 PROCEDURE — G6002 STEREOSCOPIC X-RAY GUIDANCE: HCPCS | Performed by: RADIOLOGY

## 2021-02-03 NOTE — PROGRESS NOTES
On Treatment Visit       Patient: Kylie García   YOB: 1970   Medical Record Number: [id number]     Date of Visit  February 3, 2021   Primary Diagnosis: Cancer Staging  Malignant neoplasm of right female breast (CMS/HCC)  Staging form: Breast, AJCC V7  - Clinical: Stage IV (M1) - Signed by Ovidio Meadows MD on 7/21/20.  Metastatic adenocarcinoma at right axilla.     was seen today for an on treatment visit.  She is at 2128 cGy of a 4256 cGy palliative regimen to the right axilla.  Patient is receiving hypofractionated radiotherapy.    Today on exam the patient is tolerating radiation therapy well and has no new disease or treatment-related complaints.  She notes some lessening of drainage from the axillary mass.  She states that the axillary mass has not changed significantly in size.    On physical examination vital signs are as listed below.  There is no erythema over the treated area.  Small open area without active drainage is noted.  No signs of infection are present.         Vitals:    02/03/21 0937   BP: 129/74   Pulse: 77   Temp: 97.6 °F (36.4 °C)           Plan: We plan to continue radiation therapy as prescribed.        Electronically signed by Octavio Petit MD 2/3/2021  09:51 CST

## 2021-02-04 ENCOUNTER — HOSPITAL ENCOUNTER (OUTPATIENT)
Dept: RADIATION ONCOLOGY | Facility: HOSPITAL | Age: 51
Discharge: HOME | End: 2021-02-04

## 2021-02-04 PROCEDURE — 77412 RADIATION TX DELIVERY LVL 3: CPT | Performed by: RADIOLOGY

## 2021-02-04 PROCEDURE — G6002 STEREOSCOPIC X-RAY GUIDANCE: HCPCS | Performed by: RADIOLOGY

## 2021-02-05 ENCOUNTER — HOSPITAL ENCOUNTER (OUTPATIENT)
Dept: RADIATION ONCOLOGY | Facility: HOSPITAL | Age: 51
Discharge: HOME | End: 2021-02-05

## 2021-02-05 DIAGNOSIS — Z79.01 LONG TERM (CURRENT) USE OF ANTICOAGULANTS: ICD-10-CM

## 2021-02-05 PROCEDURE — 77336 RADIATION PHYSICS CONSULT: CPT | Performed by: RADIOLOGY

## 2021-02-05 PROCEDURE — 77412 RADIATION TX DELIVERY LVL 3: CPT | Performed by: RADIOLOGY

## 2021-02-05 PROCEDURE — G6002 STEREOSCOPIC X-RAY GUIDANCE: HCPCS | Performed by: RADIOLOGY

## 2021-02-05 RX ORDER — WARFARIN SODIUM 5 MG/1
TABLET ORAL
Qty: 30 TABLET | Refills: 1 | Status: SHIPPED | OUTPATIENT
Start: 2021-02-05 | End: 2021-04-15 | Stop reason: SDUPTHER

## 2021-02-08 ENCOUNTER — HOSPITAL ENCOUNTER (OUTPATIENT)
Dept: RADIATION ONCOLOGY | Facility: HOSPITAL | Age: 51
Discharge: HOME | End: 2021-02-08

## 2021-02-08 ENCOUNTER — APPOINTMENT (OUTPATIENT)
Dept: ONCOLOGY | Facility: CLINIC | Age: 51
End: 2021-02-08

## 2021-02-08 PROCEDURE — G6002 STEREOSCOPIC X-RAY GUIDANCE: HCPCS | Performed by: RADIOLOGY

## 2021-02-08 PROCEDURE — 77412 RADIATION TX DELIVERY LVL 3: CPT | Performed by: RADIOLOGY

## 2021-02-08 PROCEDURE — 77427 RADIATION TX MANAGEMENT X5: CPT | Performed by: RADIOLOGY

## 2021-02-09 ENCOUNTER — HOSPITAL ENCOUNTER (OUTPATIENT)
Dept: RADIATION ONCOLOGY | Facility: HOSPITAL | Age: 51
Discharge: HOME | End: 2021-02-09

## 2021-02-09 DIAGNOSIS — C79.51 BONE METASTASIS: Primary | Chronic | ICD-10-CM

## 2021-02-09 PROCEDURE — 77412 RADIATION TX DELIVERY LVL 3: CPT | Performed by: RADIOLOGY

## 2021-02-09 PROCEDURE — G6002 STEREOSCOPIC X-RAY GUIDANCE: HCPCS | Performed by: RADIOLOGY

## 2021-02-09 PROCEDURE — 77417 THER RADIOLOGY PORT IMAGE(S): CPT | Performed by: RADIOLOGY

## 2021-02-10 ENCOUNTER — RADIATION ONCOLOGY WEEKLY ASSESSMENT (OUTPATIENT)
Dept: RADIATION ONCOLOGY | Facility: HOSPITAL | Age: 51
End: 2021-02-10

## 2021-02-10 ENCOUNTER — HOSPITAL ENCOUNTER (OUTPATIENT)
Dept: RADIATION ONCOLOGY | Facility: HOSPITAL | Age: 51
Discharge: HOME | End: 2021-02-10

## 2021-02-10 VITALS
HEART RATE: 75 BPM | BODY MASS INDEX: 36.22 KG/M2 | RESPIRATION RATE: 18 BRPM | WEIGHT: 231.3 LBS | DIASTOLIC BLOOD PRESSURE: 75 MMHG | SYSTOLIC BLOOD PRESSURE: 127 MMHG | TEMPERATURE: 97.5 F

## 2021-02-10 DIAGNOSIS — C77.3 SECONDARY MALIGNANT NEOPLASM OF AXILLARY LYMPH NODES (HCC): Primary | ICD-10-CM

## 2021-02-10 PROCEDURE — G6002 STEREOSCOPIC X-RAY GUIDANCE: HCPCS | Performed by: RADIOLOGY

## 2021-02-10 PROCEDURE — 77412 RADIATION TX DELIVERY LVL 3: CPT | Performed by: RADIOLOGY

## 2021-02-10 NOTE — PROGRESS NOTES
On Treatment Visit       Patient: Kylie García   YOB: 1970   Medical Record Number: [id number]     Date of Visit  February 10, 2021   Primary Diagnosis: Cancer Staging  Malignant neoplasm of right female breast (CMS/HCC)  Staging form: Breast, AJCC V7  - Clinical: Stage IV (M1) - Signed by Ovidio Meadows MD on 7/21/2017  The patient is receiving radiotherapy to lymphadenopathy at the right axilla.     was seen today for an on treatment visit.  To date the patient has received 3458 cGy of a 4256 cGy hypofractionated regimen to the right axilla.  The patient is tolerating treatment well.  She has no side effects to report from radiotherapy.    Today on exam, vital signs are as below.  Skin over the treated area appears unremarkable.  Patient is using a topical antibiotic cream over the draining site at the mid axilla.  No arm swelling noted.         Vitals:    02/10/21 0932   BP: 127/75   Pulse: 75   Resp: 18   Temp: 97.5 °F (36.4 °C)           Plan: We plan to continue radiation therapy as prescribed.  The patient has 3 treatment fractions pending.        Electronically signed by Octavio Petit MD 2/10/2021  09:46 CST

## 2021-02-12 ENCOUNTER — HOSPITAL ENCOUNTER (OUTPATIENT)
Dept: RADIATION ONCOLOGY | Facility: HOSPITAL | Age: 51
Discharge: HOME | End: 2021-02-12

## 2021-02-12 ENCOUNTER — OFFICE VISIT (OUTPATIENT)
Dept: ONCOLOGY | Facility: CLINIC | Age: 51
End: 2021-02-12

## 2021-02-12 ENCOUNTER — ANTICOAGULATION VISIT (OUTPATIENT)
Dept: CARDIAC SURGERY | Facility: CLINIC | Age: 51
End: 2021-02-12

## 2021-02-12 ENCOUNTER — INFUSION (OUTPATIENT)
Dept: ONCOLOGY | Facility: HOSPITAL | Age: 51
End: 2021-02-12

## 2021-02-12 VITALS
BODY MASS INDEX: 35.19 KG/M2 | WEIGHT: 224.2 LBS | HEIGHT: 67 IN | TEMPERATURE: 97.7 F | DIASTOLIC BLOOD PRESSURE: 82 MMHG | RESPIRATION RATE: 18 BRPM | SYSTOLIC BLOOD PRESSURE: 128 MMHG | HEART RATE: 81 BPM

## 2021-02-12 VITALS — OXYGEN SATURATION: 97 % | HEART RATE: 83 BPM

## 2021-02-12 DIAGNOSIS — Z85.820 HISTORY OF MALIGNANT MELANOMA OF SKIN: ICD-10-CM

## 2021-02-12 DIAGNOSIS — C79.51 BONE METASTASIS: ICD-10-CM

## 2021-02-12 DIAGNOSIS — C50.911 MALIGNANT NEOPLASM OF RIGHT BREAST IN FEMALE, ESTROGEN RECEPTOR POSITIVE, UNSPECIFIED SITE OF BREAST (HCC): ICD-10-CM

## 2021-02-12 DIAGNOSIS — T45.1X5A NEUROPATHY DUE TO CHEMOTHERAPEUTIC DRUG (HCC): ICD-10-CM

## 2021-02-12 DIAGNOSIS — Z86.000 HISTORY OF DUCTAL CARCINOMA IN SITU (DCIS) OF BREAST: ICD-10-CM

## 2021-02-12 DIAGNOSIS — C79.31 METASTASIS TO BRAIN (HCC): ICD-10-CM

## 2021-02-12 DIAGNOSIS — Z45.2 ENCOUNTER FOR VENOUS ACCESS DEVICE CARE: ICD-10-CM

## 2021-02-12 DIAGNOSIS — C78.7 LIVER METASTASIS: ICD-10-CM

## 2021-02-12 DIAGNOSIS — G62.0 NEUROPATHY DUE TO CHEMOTHERAPEUTIC DRUG (HCC): ICD-10-CM

## 2021-02-12 DIAGNOSIS — Z17.0 MALIGNANT NEOPLASM OF RIGHT BREAST IN FEMALE, ESTROGEN RECEPTOR POSITIVE, UNSPECIFIED SITE OF BREAST (HCC): Primary | ICD-10-CM

## 2021-02-12 DIAGNOSIS — C50.911 MALIGNANT NEOPLASM OF RIGHT BREAST IN FEMALE, ESTROGEN RECEPTOR POSITIVE, UNSPECIFIED SITE OF BREAST (HCC): Primary | ICD-10-CM

## 2021-02-12 DIAGNOSIS — Z79.01 LONG TERM CURRENT USE OF ANTICOAGULANT THERAPY: ICD-10-CM

## 2021-02-12 DIAGNOSIS — R79.89 ELEVATED LIVER FUNCTION TESTS: ICD-10-CM

## 2021-02-12 DIAGNOSIS — Z51.81 ENCOUNTER FOR THERAPEUTIC DRUG MONITORING: ICD-10-CM

## 2021-02-12 DIAGNOSIS — Z17.0 MALIGNANT NEOPLASM OF RIGHT BREAST IN FEMALE, ESTROGEN RECEPTOR POSITIVE, UNSPECIFIED SITE OF BREAST (HCC): ICD-10-CM

## 2021-02-12 LAB
ALBUMIN SERPL-MCNC: 4 G/DL (ref 3.5–5.2)
ALBUMIN/GLOB SERPL: 1.3 G/DL
ALP SERPL-CCNC: 83 U/L (ref 39–117)
ALT SERPL W P-5'-P-CCNC: 29 U/L (ref 1–33)
ANION GAP SERPL CALCULATED.3IONS-SCNC: 9 MMOL/L (ref 5–15)
AST SERPL-CCNC: 29 U/L (ref 1–32)
BASOPHILS # BLD AUTO: 0.05 10*3/MM3 (ref 0–0.2)
BASOPHILS NFR BLD AUTO: 0.9 % (ref 0–1.5)
BILIRUB SERPL-MCNC: 0.2 MG/DL (ref 0–1.2)
BUN SERPL-MCNC: 15 MG/DL (ref 6–20)
BUN/CREAT SERPL: 18.3 (ref 7–25)
CALCIUM SPEC-SCNC: 9.2 MG/DL (ref 8.6–10.5)
CANCER AG15-3 SERPL-ACNC: 31.6 U/ML
CHLORIDE SERPL-SCNC: 107 MMOL/L (ref 98–107)
CO2 SERPL-SCNC: 26 MMOL/L (ref 22–29)
CREAT SERPL-MCNC: 0.82 MG/DL (ref 0.57–1)
DEPRECATED RDW RBC AUTO: 41.7 FL (ref 37–54)
EOSINOPHIL # BLD AUTO: 0.39 10*3/MM3 (ref 0–0.4)
EOSINOPHIL NFR BLD AUTO: 6.8 % (ref 0.3–6.2)
ERYTHROCYTE [DISTWIDTH] IN BLOOD BY AUTOMATED COUNT: 13.1 % (ref 12.3–15.4)
GFR SERPL CREATININE-BSD FRML MDRD: 73 ML/MIN/1.73
GLOBULIN UR ELPH-MCNC: 3.1 GM/DL
GLUCOSE SERPL-MCNC: 66 MG/DL (ref 65–99)
HCT VFR BLD AUTO: 38.6 % (ref 34–46.6)
HGB BLD-MCNC: 13.2 G/DL (ref 12–15.9)
IMM GRANULOCYTES # BLD AUTO: 0.01 10*3/MM3 (ref 0–0.05)
IMM GRANULOCYTES NFR BLD AUTO: 0.2 % (ref 0–0.5)
INR PPP: 1.9 (ref 0.9–1.1)
LYMPHOCYTES # BLD AUTO: 1.6 10*3/MM3 (ref 0.7–3.1)
LYMPHOCYTES NFR BLD AUTO: 27.8 % (ref 19.6–45.3)
MAGNESIUM SERPL-MCNC: 2 MG/DL (ref 1.6–2.6)
MCH RBC QN AUTO: 30.1 PG (ref 26.6–33)
MCHC RBC AUTO-ENTMCNC: 34.2 G/DL (ref 31.5–35.7)
MCV RBC AUTO: 87.9 FL (ref 79–97)
MONOCYTES # BLD AUTO: 0.65 10*3/MM3 (ref 0.1–0.9)
MONOCYTES NFR BLD AUTO: 11.3 % (ref 5–12)
NEUTROPHILS NFR BLD AUTO: 3.06 10*3/MM3 (ref 1.7–7)
NEUTROPHILS NFR BLD AUTO: 53 % (ref 42.7–76)
NRBC BLD AUTO-RTO: 0 /100 WBC (ref 0–0.2)
PHOSPHATE SERPL-MCNC: 3.5 MG/DL (ref 2.5–4.5)
PLATELET # BLD AUTO: 243 10*3/MM3 (ref 140–450)
PMV BLD AUTO: 9.8 FL (ref 6–12)
POTASSIUM SERPL-SCNC: 4 MMOL/L (ref 3.5–5.2)
PROT SERPL-MCNC: 7.1 G/DL (ref 6–8.5)
RBC # BLD AUTO: 4.39 10*6/MM3 (ref 3.77–5.28)
SODIUM SERPL-SCNC: 142 MMOL/L (ref 136–145)
WBC # BLD AUTO: 5.76 10*3/MM3 (ref 3.4–10.8)

## 2021-02-12 PROCEDURE — 84100 ASSAY OF PHOSPHORUS: CPT

## 2021-02-12 PROCEDURE — 80053 COMPREHEN METABOLIC PANEL: CPT

## 2021-02-12 PROCEDURE — 85610 PROTHROMBIN TIME: CPT | Performed by: NURSE PRACTITIONER

## 2021-02-12 PROCEDURE — 99214 OFFICE O/P EST MOD 30 MIN: CPT | Performed by: INTERNAL MEDICINE

## 2021-02-12 PROCEDURE — 96374 THER/PROPH/DIAG INJ IV PUSH: CPT | Performed by: INTERNAL MEDICINE

## 2021-02-12 PROCEDURE — G6002 STEREOSCOPIC X-RAY GUIDANCE: HCPCS | Performed by: RADIOLOGY

## 2021-02-12 PROCEDURE — 96402 CHEMO HORMON ANTINEOPL SQ/IM: CPT | Performed by: INTERNAL MEDICINE

## 2021-02-12 PROCEDURE — 86300 IMMUNOASSAY TUMOR CA 15-3: CPT

## 2021-02-12 PROCEDURE — 25010000003 HEPARIN LOCK FLUSH PER 10 UNITS: Performed by: INTERNAL MEDICINE

## 2021-02-12 PROCEDURE — 83735 ASSAY OF MAGNESIUM: CPT

## 2021-02-12 PROCEDURE — 25010000002 FULVESTRANT PER 25 MG: Performed by: INTERNAL MEDICINE

## 2021-02-12 PROCEDURE — 36416 COLLJ CAPILLARY BLOOD SPEC: CPT | Performed by: NURSE PRACTITIONER

## 2021-02-12 PROCEDURE — 77412 RADIATION TX DELIVERY LVL 3: CPT | Performed by: RADIOLOGY

## 2021-02-12 PROCEDURE — 85025 COMPLETE CBC W/AUTO DIFF WBC: CPT

## 2021-02-12 PROCEDURE — 93793 ANTICOAG MGMT PT WARFARIN: CPT | Performed by: NURSE PRACTITIONER

## 2021-02-12 PROCEDURE — 25010000002 ZOLEDRONIC ACID PER 1 MG: Performed by: INTERNAL MEDICINE

## 2021-02-12 RX ORDER — SODIUM CHLORIDE 0.9 % (FLUSH) 0.9 %
10 SYRINGE (ML) INJECTION AS NEEDED
Status: CANCELLED | OUTPATIENT
Start: 2021-02-12

## 2021-02-12 RX ORDER — SODIUM CHLORIDE 9 MG/ML
250 INJECTION, SOLUTION INTRAVENOUS ONCE
Status: CANCELLED | OUTPATIENT
Start: 2021-02-12

## 2021-02-12 RX ORDER — SODIUM CHLORIDE 0.9 % (FLUSH) 0.9 %
20 SYRINGE (ML) INJECTION AS NEEDED
Status: DISCONTINUED | OUTPATIENT
Start: 2021-02-12 | End: 2021-02-12 | Stop reason: HOSPADM

## 2021-02-12 RX ORDER — HEPARIN SODIUM (PORCINE) LOCK FLUSH IV SOLN 100 UNIT/ML 100 UNIT/ML
500 SOLUTION INTRAVENOUS AS NEEDED
Status: DISCONTINUED | OUTPATIENT
Start: 2021-02-12 | End: 2021-02-12 | Stop reason: HOSPADM

## 2021-02-12 RX ORDER — SODIUM CHLORIDE 0.9 % (FLUSH) 0.9 %
10 SYRINGE (ML) INJECTION AS NEEDED
Status: DISCONTINUED | OUTPATIENT
Start: 2021-02-12 | End: 2021-02-12 | Stop reason: HOSPADM

## 2021-02-12 RX ORDER — TRAMADOL HYDROCHLORIDE 50 MG/1
50 TABLET ORAL EVERY 8 HOURS PRN
Qty: 90 TABLET | Refills: 0 | Status: SHIPPED | OUTPATIENT
Start: 2021-02-12 | End: 2022-01-01 | Stop reason: SDUPTHER

## 2021-02-12 RX ORDER — SODIUM CHLORIDE 9 MG/ML
250 INJECTION, SOLUTION INTRAVENOUS ONCE
Status: COMPLETED | OUTPATIENT
Start: 2021-02-12 | End: 2021-02-12

## 2021-02-12 RX ORDER — HEPARIN SODIUM (PORCINE) LOCK FLUSH IV SOLN 100 UNIT/ML 100 UNIT/ML
500 SOLUTION INTRAVENOUS AS NEEDED
Status: CANCELLED | OUTPATIENT
Start: 2021-02-12

## 2021-02-12 RX ORDER — SODIUM CHLORIDE 0.9 % (FLUSH) 0.9 %
20 SYRINGE (ML) INJECTION AS NEEDED
Status: CANCELLED | OUTPATIENT
Start: 2021-02-12

## 2021-02-12 RX ADMIN — SODIUM CHLORIDE 250 ML: 9 INJECTION, SOLUTION INTRAVENOUS at 10:15

## 2021-02-12 RX ADMIN — FULVESTRANT 500 MG: 50 INJECTION INTRAMUSCULAR at 10:57

## 2021-02-12 RX ADMIN — HEPARIN 500 UNITS: 100 SYRINGE at 10:56

## 2021-02-12 RX ADMIN — ZOLEDRONIC ACID 4 MG: 4 INJECTION, SOLUTION, CONCENTRATE INTRAVENOUS at 10:15

## 2021-02-12 RX ADMIN — SODIUM CHLORIDE, PRESERVATIVE FREE 10 ML: 5 INJECTION INTRAVENOUS at 10:56

## 2021-02-12 NOTE — PROGRESS NOTES
"DATE OF VISIT: 2/12/2021      REASON FOR VISIT: Metastatic breast cancer with bone, liver metastases on Faslodex, neuropathy from chemotherapy, cancer related pain, DVT on anticoagulation with Coumadin      HISTORY OF PRESENT ILLNESS:   50-year-old female with medical problem consisting of ductal carcinoma in situ of right breast diagnosed in 2007, anxiety/depression, metastatic breast cancer with bone, liver metastases currently on treatment with Faslodex is here for follow-up appointment today.  Patient is here to get cycle 5 of Faslodex along with Zometa.  Patient is currently getting radiation treatment to right axilla for adenopathy as well.  Denies any new lymph node enlargement.  Denies any bleeding.  Denies any recent worsening of neuropathy affecting upper or lower extremity.  Denies any bowel or bladder incontinence.            Past Medical History, Past Surgical History, Social History, Family History have been reviewed and are without significant changes except as mentioned.    Review of Systems   Constitutional: Positive for fatigue.   Musculoskeletal: Positive for back pain.   Neurological: Positive for numbness.   Hematological: Positive for adenopathy (Right axilla).      A comprehensive 14 point review of systems was performed and was negative except as mentioned.    Medications:  The current medication list was reviewed in the EMR    ALLERGIES:    Allergies   Allergen Reactions   • Percocet [Oxycodone-Acetaminophen] Nausea And Vomiting       Objective      Vitals:    02/12/21 0839   BP: 128/82   Pulse: 81   Resp: 18   Temp: 97.7 °F (36.5 °C)   TempSrc: Temporal   Weight: 102 kg (224 lb 3.2 oz)   Height: 170.2 cm (67.01\")   PainSc: 0-No pain     Current Status 2/12/2021   ECOG score 0       Physical Exam  Cardiovascular:      Rate and Rhythm: Normal rate and regular rhythm.   Pulmonary:      Effort: Pulmonary effort is normal.      Breath sounds: Normal breath sounds.   Abdominal:      General: " Bowel sounds are normal.      Palpations: Abdomen is soft.      Tenderness: There is no abdominal tenderness.   Neurological:      Mental Status: She is alert and oriented to person, place, and time.           RECENT LABS:  Glucose   Date Value Ref Range Status   02/12/2021 66 65 - 99 mg/dL Final     Sodium   Date Value Ref Range Status   02/12/2021 142 136 - 145 mmol/L Final     Potassium   Date Value Ref Range Status   02/12/2021 4.0 3.5 - 5.2 mmol/L Final     Comment:     Slight hemolysis detected by analyzer. Results may be affected.     CO2   Date Value Ref Range Status   02/12/2021 26.0 22.0 - 29.0 mmol/L Final     Chloride   Date Value Ref Range Status   02/12/2021 107 98 - 107 mmol/L Final     Anion Gap   Date Value Ref Range Status   02/12/2021 9.0 5.0 - 15.0 mmol/L Final     Creatinine   Date Value Ref Range Status   02/12/2021 0.82 0.57 - 1.00 mg/dL Final     BUN   Date Value Ref Range Status   02/12/2021 15 6 - 20 mg/dL Final     BUN/Creatinine Ratio   Date Value Ref Range Status   02/12/2021 18.3 7.0 - 25.0 Final     Calcium   Date Value Ref Range Status   02/12/2021 9.2 8.6 - 10.5 mg/dL Final     eGFR Non  Amer   Date Value Ref Range Status   02/12/2021 73 >60 mL/min/1.73 Final     Alkaline Phosphatase   Date Value Ref Range Status   02/12/2021 83 39 - 117 U/L Final     Total Protein   Date Value Ref Range Status   02/12/2021 7.1 6.0 - 8.5 g/dL Final     ALT (SGPT)   Date Value Ref Range Status   02/12/2021 29 1 - 33 U/L Final     AST (SGOT)   Date Value Ref Range Status   02/12/2021 29 1 - 32 U/L Final     Total Bilirubin   Date Value Ref Range Status   02/12/2021 0.2 0.0 - 1.2 mg/dL Final     Albumin   Date Value Ref Range Status   02/12/2021 4.00 3.50 - 5.20 g/dL Final     Globulin   Date Value Ref Range Status   02/12/2021 3.1 gm/dL Final     Lab Results   Component Value Date    WBC 5.76 02/12/2021    HGB 13.2 02/12/2021    HCT 38.6 02/12/2021    MCV 87.9 02/12/2021     02/12/2021      Lab Results   Component Value Date    NEUTROABS 3.06 02/12/2021     Lab Results   Component Value Date     27.2 (H) 01/11/2021    LABCA2 30.1 01/11/2021         PATHOLOGY:  * Cannot find OR log *         RADIOLOGY DATA :  No radiology results for the last 7 days        Assessment/Plan     1.  Metastatic right breast cancer with bone and liver metastasis:  -Patient was diagnosed in July 2017 with axillary biopsy on right side  -Received 6 cycles of chemotherapy with Taxotere and Cytoxan between July 26, 2017 and December 6, 2017.  Patient was evaluated by Dr. Poe at [facility] in [place] for second opinion as well  -Received 27 cycles of palbociclib and letrozole between January 30, 2018 and October 2, 2020.  -Due to enlarging mass in right axilla patient was started on Faslodex on October 16, 2020  -CT of chest abdomen and pelvis with contrast done on January 7, 2020 showed increasing right axillary mass without any other area of progression.  Patient has been started on radiation for that  -We will continue with cycle 5 of Faslodex today.  -Recommend continuing with radiation treatment.  -Plan is to do restaging CT of chest abdomen and pelvis with contrast after cycle 6 of Faslodex.  Recommendation were discussed with patient and her family.  -We will ask patient to return to clinic in 4 weeks with repeat CBC, CMP, magnesium, phosphorus, CA 15-3 and CA 27-29 to be done on that day.    2.  Brain metastases  -Patient was diagnosed with calvarial metastases without any evidence of parenchymal mets.  Patient was evaluated by Dr. Pulido and no radiation treatment was recommended    3.  Right internal jugular vein DVT:  -Remains on Coumadin    4.  Bone metastasis:  -Patient has been on Zometa since October 23, 2017  -We will continue with monthly Zometa    5.  Elevated liver function test:  -Secondary to liver metastases plus or minus medication.  Will monitor with CMP for now.    6.  History of melanoma  in situ status post excision by Dr. Linn    7.  History of DCIS of right breast diagnosed in 2007  -Had a mastectomy followed by tamoxifen for 5 years that completed in 2013    8.  Chemotherapy-induced neuropathy:  -Remains on gabapentin    9.  Health maintenance: Patient does not smoke    10. Advance Care Planning: For now patient remains full code and is able to make decisions.  Patient has health care surrogate mentioned on chart.      11 prescriptions: Prescription for Ultram has been sent to her pharmacy today           PHQ-9 Total Score: 0   Advance Care Planning: For now patient remains full code and is able to make decisions.  Patient has health care surrogate mentioned on chart.    Kylie García reports a pain score of 0.  Given her pain assessment as noted, treatment options were discussed and the following options were decided upon as a follow-up plan to address the patient's pain: continuation of current treatment plan for pain.         Ovidio Meadows MD  2/12/2021  09:55 CST        Part of this note may be an electronic transcription/translation of spoken language to printed text using the Dragon Dictation System.          CC:

## 2021-02-12 NOTE — PROGRESS NOTES
PT will finish radiation next week. PT denies any new medications or bleeding issues. Denies any missed doses or excessive k. Denies any s/s of blood clot. Adjusted pt's dose and instructed to hold green vegs for 2 days. Recheck INR in 2 weeks due to work. Patient instructed regarding medication; results given and questions answered. Nutritional counseling given.  Dietary factors affecting therapy addressed.  Patient instructed to monitor for excessive bruising or bleeding. PT verbalizes understanding. Findings reported by Elvia Allen RN.   Today's INR is   Lab Results - Last 18 Months   Lab Units 02/12/21   INR  1.90*

## 2021-02-13 LAB — CANCER AG27-29 SERPL-ACNC: 30.4 U/ML (ref 0–38.6)

## 2021-02-15 ENCOUNTER — HOSPITAL ENCOUNTER (OUTPATIENT)
Dept: RADIATION ONCOLOGY | Facility: HOSPITAL | Age: 51
Discharge: HOME | End: 2021-02-15

## 2021-02-15 PROCEDURE — 77412 RADIATION TX DELIVERY LVL 3: CPT | Performed by: RADIOLOGY

## 2021-02-15 PROCEDURE — 77336 RADIATION PHYSICS CONSULT: CPT | Performed by: RADIOLOGY

## 2021-02-15 PROCEDURE — G6002 STEREOSCOPIC X-RAY GUIDANCE: HCPCS | Performed by: RADIOLOGY

## 2021-02-17 ENCOUNTER — HOSPITAL ENCOUNTER (OUTPATIENT)
Dept: RADIATION ONCOLOGY | Facility: HOSPITAL | Age: 51
Discharge: HOME | End: 2021-02-17

## 2021-02-17 ENCOUNTER — RADIATION ONCOLOGY WEEKLY ASSESSMENT (OUTPATIENT)
Dept: RADIATION ONCOLOGY | Facility: HOSPITAL | Age: 51
End: 2021-02-17

## 2021-02-17 VITALS
RESPIRATION RATE: 18 BRPM | DIASTOLIC BLOOD PRESSURE: 78 MMHG | BODY MASS INDEX: 36.3 KG/M2 | TEMPERATURE: 97.4 F | WEIGHT: 231.8 LBS | SYSTOLIC BLOOD PRESSURE: 142 MMHG | HEART RATE: 77 BPM

## 2021-02-17 DIAGNOSIS — C77.3 SECONDARY MALIGNANT NEOPLASM OF AXILLARY LYMPH NODES (HCC): Primary | ICD-10-CM

## 2021-02-17 PROCEDURE — 77412 RADIATION TX DELIVERY LVL 3: CPT | Performed by: RADIOLOGY

## 2021-02-17 PROCEDURE — G6002 STEREOSCOPIC X-RAY GUIDANCE: HCPCS | Performed by: RADIOLOGY

## 2021-02-17 PROCEDURE — FACE2FACE: Performed by: RADIOLOGY

## 2021-02-17 NOTE — PATIENT INSTRUCTIONS
External Beam Radiation Therapy, Care After  This sheet gives you information about how to care for yourself after your procedure. Your health care provider may also give you more specific instructions. If you have problems or questions, contact your health care provider.  What can I expect after the procedure?  After the procedure, it is common to have:  · Fatigue.  · Red, flaking, dry skin in the treated area.  · A sunburn-like rash on the skin in the treated area.  · Hair loss in the treated area.  · Itching in the treated area.  Other side effects may occur, depending on which part of the body was exposed to radiation and how much radiation was used. These may include:  · Hair loss if the radiation therapy was directed to the head.  · Coughing or difficulty swallowing if the radiation therapy was directed to the head, neck, or chest  · Nausea, vomiting, or diarrhea if the radiation therapy was directed to the abdomen or pelvis.  · Bladder problems, frequent urination, or sexual dysfunction if the radiation therapy was directed to the bladder, kidney, or prostate.  · Memory loss and cognitive changes if the radiation therapy was directed to your brain.  Although some side effects may show up months to years later, most side effects are usually temporary and get better over time. It can take up to 3-4 weeks for you to regain your energy or for side effects to get better.  Follow these instructions at home:    Skin care  · Wash your skin with a mild soap as told by your health care provider. Do not scrub or rub your skin. Pat yourself dry.  · Use a mild shampoo and be gentle when washing your hair.  · Apply gentle lotion or cream to the treated area as told by your health care provider.  · Keep the treated area covered when you are outside. Do not expose treated skin to the sun.  · Avoid scratching the treated area.  General instructions  · Do not use a heating pad or a warm cloth to relieve pain in the treated  area.  · Take over-the-counter and prescription medicines only as told by your health care provider.  · Follow your health care provider's advice on the type and amount of liquids to drink each day.  · Try to maintain your weight during treatment. Ask your health care team for tips.  · Keep all follow-up visits as told by your health care provider. This is important. The visits are usually scheduled 6 weeks to 6 months after radiation therapy. They are needed to determine if the radiation therapy worked as it was intended to.  Contact a health care provider if:  · You have pain in the treated area.  · The redness worsens in the treated area.  · Open skin or blisters develop in the treated area.  · You have unexplained weight loss.  Get help right away if:  · You have a fever.  · You have nausea or vomiting that lasts a long time.  · You have diarrhea that lasts a long time.  Summary  · After this procedure, it is common to have fatigue, skin changes and other side effects depending on where the radiation therapy was given.  · Although some side effects may show up months to years later, most side effects are usually temporary and get better over time. It can take up to 3-4 weeks for you to regain your energy or for side effects to get better.  · Keep all follow-up visits as told by your health care provider. This is important. The visits are usually scheduled 6 weeks to 6 months after radiation therapy.  This information is not intended to replace advice given to you by your health care provider. Make sure you discuss any questions you have with your health care provider.  Document Revised: 11/30/2018 Document Reviewed: 11/22/2017  [employer] Patient Education © 2020 [employer]  RADIATION DISCHARGE INSTRUCTIONS    Kylie García  1970  [id number]    February 17, 2021    · The effects from your radiation treatments will continue for several weeks, so expect them to get better slowly.  · Fatigue (extreme  tiredness and weakness) may last for several weeks but will improve slowly.  · Continue to rest as necessary, drink plenty of fluids, and eat nutritious foods as you are able.    Skin Care: Skin reactions will slowly heal.  Follow the skin care instructions provided by your nurse.    Oral Care: Continue any oral care instructions your nurse provided for you if needed.  If you must have dental work or surgery in the treatment area, ask you dentist/physician to call, prior to the procedure.    Diet: [x]  High Calorie, High Protein for 4 weeks      []  Continue eating 6 small meals per day for 4 weeks     []  Dietary Supplement 2 times per day     []  Continue soft/liquid diet     []  Low residue-after diarrhea and abdominal cramping have stopped (3-4 weeks), gradually add foods to your diet, one food at a time every 2-3 days.      []  No spicy or foods that are high in citric acid (ex: oranges, grapefruit, tomatoes, pineapple).    Medications: Continue your regular medications.  Take a multi-vitamin daily for 6 months.     []  Follow Dexamethasone (Decadron) prescriptions provided (if applicable).    Kyleigh English RN  February 17, 2021  09:59 CST    SKIN CHECK IN 2 WEEKS - OKAY TO CANCEL IF SKIN IMPROVED  RADIATION FOLLOW-UP IN 3 MONTHS          Kylie García received 4256 cGy in 16 fractions to Right Axilla Lymph Node via External Beam Radiation - EBRT.

## 2021-02-17 NOTE — PROGRESS NOTES
Radiation Completion Note       Patient: Kylie García   YOB: 1970   Medical Record Number: [id number]   Date of Completion: 2/17/2021    Summary: Ms. García completed radiation therapy today in our department. The following is a summary of her course of treatment.    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 García received  cGy in 4256 fractions over 16 elapsed days, from 1/25/2021 through 2/17/2021, utilizing 3-D conformal external beam radiation therapy and 6 and 18 MV photons. She completed her radiation therapy as prescribed, with no significant treatment breaks.    Response/Tolerance: Ms. García tolerated her radiation therapy well and had no significant treatment-related complaints.      Disposition: We plan to see the patient back in our clinic for routine follow-up in 2 weeks for a skin evaluation, and 3 months for a follow-up visit. The patient is also scheduled to continue follow-up with medical oncology.          Electronically signed by George Dooley MD 2/17/2021 09:56 CST

## 2021-02-26 ENCOUNTER — ANTICOAGULATION VISIT (OUTPATIENT)
Dept: CARDIAC SURGERY | Facility: CLINIC | Age: 51
End: 2021-02-26

## 2021-02-26 VITALS — HEART RATE: 80 BPM | OXYGEN SATURATION: 98 %

## 2021-02-26 DIAGNOSIS — Z79.01 LONG TERM CURRENT USE OF ANTICOAGULANT THERAPY: ICD-10-CM

## 2021-02-26 DIAGNOSIS — Z51.81 ENCOUNTER FOR THERAPEUTIC DRUG MONITORING: ICD-10-CM

## 2021-02-26 LAB — INR PPP: 2 (ref 0.9–1.1)

## 2021-02-26 PROCEDURE — 85610 PROTHROMBIN TIME: CPT | Performed by: NURSE PRACTITIONER

## 2021-02-26 PROCEDURE — 93793 ANTICOAG MGMT PT WARFARIN: CPT | Performed by: NURSE PRACTITIONER

## 2021-02-26 PROCEDURE — 36416 COLLJ CAPILLARY BLOOD SPEC: CPT | Performed by: NURSE PRACTITIONER

## 2021-02-26 NOTE — PROGRESS NOTES
PT states compliant c tx. Denies any new medications or bleeding issues. Denies any s/s of blood clot. Instructed to continue same dose and Coumadin friendly diet. Recheck INR in 2 weeks with next chemo infusion. Patient instructed regarding medication; results given and questions answered. Nutritional counseling given.  Dietary factors affecting therapy addressed.  Patient instructed to monitor for excessive bruising or bleeding. PT verbalizes understanding. Findings reported by Elvia Allen RN.   Today's INR is   Lab Results - Last 18 Months   Lab Units 02/26/21   INR  2.00*

## 2021-03-04 ENCOUNTER — OFFICE VISIT (OUTPATIENT)
Dept: RADIATION ONCOLOGY | Facility: HOSPITAL | Age: 51
End: 2021-03-04

## 2021-03-04 ENCOUNTER — HOSPITAL ENCOUNTER (OUTPATIENT)
Dept: RADIATION ONCOLOGY | Facility: HOSPITAL | Age: 51
Setting detail: RADIATION/ONCOLOGY SERIES
End: 2021-03-04

## 2021-03-04 VITALS
TEMPERATURE: 97.7 F | OXYGEN SATURATION: 98 % | BODY MASS INDEX: 36.3 KG/M2 | SYSTOLIC BLOOD PRESSURE: 138 MMHG | DIASTOLIC BLOOD PRESSURE: 73 MMHG | RESPIRATION RATE: 18 BRPM | HEART RATE: 71 BPM | WEIGHT: 231.8 LBS

## 2021-03-04 DIAGNOSIS — C77.3 SECONDARY MALIGNANT NEOPLASM OF AXILLARY LYMPH NODES (HCC): Primary | ICD-10-CM

## 2021-03-04 PROCEDURE — 99212 OFFICE O/P EST SF 10 MIN: CPT | Performed by: RADIOLOGY

## 2021-03-04 NOTE — PROGRESS NOTES
Established Patient Visit      Patient: Kylie García   YOB: 1970   Medical Record Number: [id number]   Date of Visit: March 4, 2021   Primary Diagnosis: Cancer Staging  History of right breast cancer with metastases to bone and liver.  The patient completed a course of palliative radiotherapy to right axillary adenopathy on 2/17/2021.                                              History of Present Illness:   Mrs. García returns today for follow-up evaluation.  She completed a 4256 cGy hypofractionated radiotherapy regimen to right axillary adenopathy approximately 3 weeks ago.  Patient states that she is doing well.  She denies axillary pain, right arm swelling, numbness and paresthesias.  She states that there is a small ulcerated area in the armpit which is slowly healing.  Patient is using topical antibiotic cream over the open area.    Review of Systems      Patient reports mild discomfort at the right axilla.  Patient denies bone pain and abdominal/pelvic discomfort.  All other systems reviewed and are negative.        Past Medical History:   Diagnosis Date   • Anxiety    • Depression    • Encounter for gynecological examination    • Malignant neoplasm of female breast (CMS/HCC)     hx of dcis s/p mastectomy and reconstruction and augmentation      • Primary malignant neoplasm of breast (CMS/HCC)     dcis in rt breast s/p mastectomy,reconstruction      • Ventricular premature beats         Past Surgical History:   Procedure Laterality Date   • AUGMENTATION MAMMAPLASTY     • AXILLARY LYMPH NODE BIOPSY/EXCISION Right 7/10/2017    Procedure: BIOSPY RIGHT AXILLARY MASS AND OR LYMPH;  Surgeon: Sourav Ernst MD;  Location: [facility];  Service:    • BREAST BIOPSY  12/07/2007    Mammotome biopsy of the right side with clip placement   • BREAST LUMPECTOMY     • BREAST RECONSTRUCTION  10/28/2008    Abscence of right breast secondary to mastectomy. Implant exchange for reconstruction of right  breast with open para-prosthetic capsulotomy   • BREAST SURGERY  10/01/2009    Left breast ptosis and breast asymmetry secondary to right breast reconstruction for breast cancer. Left breast mastopexy with augmentation.   • CARDIAC CATHETERIZATION  09/18/2008    Normal coronary arteriography. Normal left ventricular angiogram   • EP STUDY     • MASTECTOMY Right    • MASTECTOMY  02/05/2008    Multifocal right breast ductal carcinoma-in-situ. Right total mastectomy   • MASTECTOMY, PARTIAL  01/03/2008    Ductal carcinoma in-situ of the right breast, proven by mammotome biopsy. Right lumpectomy, medial portion of the breast, between 2 o'clock and 4 o'clock, 5 cm from the nipple, with clip placement, radiographic inter. of severo. specimen, & ultrasound   • PAP SMEAR  03/03/2009    Negative   • IL INSJ TUNNELED CVC W/O SUBQ PORT/ AGE 5 YR/> Right 7/10/2017    Procedure: MEDIPORT     (c-arm#2);  Surgeon: Sourav Ernst MD;  Location: [facility];  Service: General      Family History   Problem Relation Age of Onset   • Anemia Mother    • Multiple sclerosis Mother    • No Known Problems Father    • Diabetes Other    • Multiple sclerosis Other         Social History     Socioeconomic History   • Marital status:      Spouse name: Not on file   • Number of children: Not on file   • Years of education: Not on file   • Highest education level: Not on file   Tobacco Use   • Smoking status: Never Smoker   • Smokeless tobacco: Never Used   Substance and Sexual Activity   • Alcohol use: No   • Drug use: No   • Sexual activity: Defer         Allergies:  Percocet [oxycodone-acetaminophen]   Prior to Admission medications    Medication Sig Start Date End Date Taking? Authorizing Provider   gabapentin (NEURONTIN) 300 MG capsule Take 1 capsule by mouth 3 (Three) Times a Day. 12/28/20   Lucille Dunlap MD   sertraline (ZOLOFT) 50 MG tablet Take 50 mg by mouth Daily. Currently 1/2 tablet but will be increasing to 50mg  daily    Provider, MD Maki   traMADol (ULTRAM) 50 MG tablet Take 1 tablet by mouth Every 8 (Eight) Hours As Needed for Moderate Pain . 2/12/21   Ovidio Meadows MD   warfarin (COUMADIN) 5 MG tablet Take 1 tab nightly or AS DIRECTED PER COUMADIN CLINIC 2/5/21   Patt Alvarez APRN      Pain:(on a scale of 0-10)     Kylie García reports a pain score of zero.          Quality of Life:   KPS: 90      Advanced Care Plan:   Not discussed    Physical Examination:  Vitals:  /73,  and RR 18   Height:    Weight: Weight: 105 kg (231 lb 12.8 oz) Body mass index is 36.3 kg/m².    Physical Exam (limited to area of interest)  Constitutional: The patient is a well-developed, well-nourished middle-aged female in no acute distress.  Alert and oriented ×3.  Lymphatics: No cervical, supraclavicular, or left axillary adenopathy. A firm nodular slightly isaac;le mass palpated at the right axilla. There is a small area of ulceration with some sloughed tissue noted.  No evidence of active seepage or bleeding, or overt overt signs of superimposed infection are noted.  Extremities: No clubbing, cyanosis, or edema. Full range of motion with right arm.      Lab Results   Component Value Date    GLUCOSE 66 02/12/2021    BUN 15 02/12/2021    CREATININE 0.82 02/12/2021    EGFRIFNONA 73 02/12/2021    BCR 18.3 02/12/2021    K 4.0 02/12/2021    CO2 26.0 02/12/2021    CALCIUM 9.2 02/12/2021    ALBUMIN 4.00 02/12/2021    AST 29 02/12/2021    ALT 29 02/12/2021      WBC   Date Value Ref Range Status   02/12/2021 5.76 3.40 - 10.80 10*3/mm3 Final     Hemoglobin   Date Value Ref Range Status   02/12/2021 13.2 12.0 - 15.9 g/dL Final     Hematocrit   Date Value Ref Range Status   02/12/2021 38.6 34.0 - 46.6 % Final     Platelets   Date Value Ref Range Status   02/12/2021 243 140 - 450 10*3/mm3 Final    No results found for: PSA No results found for: CEA     .      ASSESSMENT:   Clinically stable    PLAN:  Patient states that she  will see Dr. Meadows in follow-up next week.  Patient states that she is receiving endocrine therapy for her metastatic breast cancer.  She has received numerous courses of chemotherapy and immunotherapy in the past.  Mrs. García will have surveillance imaging studies (CT of chest) in April.  The study should be compared to the treatment planning CT scan to assess for reduction in adenopathy size.  Patient will return for a recheck by our service in 3 months.      Sincerely,      Electronically signed by Octavio Petit MD 3/4/2021  10:48 CST

## 2021-03-09 NOTE — PROGRESS NOTES
"DATE OF VISIT: 3/12/2021      REASON FOR VISIT: Metastatic breast cancer with bone, liver metastases on Faslodex, neuropathy from chemotherapy, cancer related pain, DVT on anticoagulation with Coumadin      HISTORY OF PRESENT ILLNESS:   50-year-old female with medical problem consisting of ductal carcinoma in situ of right breast diagnosed in 2007, anxiety/depression, metastatic breast cancer with bone, liver metastases currently on treatment with Faslodex is here for follow-up appointment today.  Patient is here to get cycle six of Faslodex along with Zometa.  Patient had radiation treatment to right axilla that completed on March 4, 2021.  Complains of yellowish discharge from the right axilla after radiation.  Denies any new lymph node enlargement.  Denies any bleeding.  Denies any recent worsening of neuropathy.  Denies any bowel or bladder incontinence.  Still complains of back pain and hip pain.          Past Medical History, Past Surgical History, Social History, Family History have been reviewed and are without significant changes except as mentioned.    Review of Systems   Constitutional: Positive for fatigue.   Musculoskeletal: Positive for arthralgias and back pain.   Neurological: Positive for numbness (Chronic neuropathy from chemotherapy affecting upper and lower extremity).   Hematological: Positive for adenopathy (Right axillary).      A comprehensive 14 point review of systems was performed and was negative except as mentioned.    Medications:  The current medication list was reviewed in the EMR    ALLERGIES:    Allergies   Allergen Reactions   • Percocet [Oxycodone-Acetaminophen] Nausea And Vomiting       Objective      Vitals:    03/12/21 0900   BP: 127/77   Pulse: 80   Resp: 18   Temp: 98 °F (36.7 °C)   TempSrc: Temporal   Weight: 104 kg (229 lb 1.6 oz)   Height: 170.2 cm (67.01\")   PainSc: 0-No pain     Current Status 3/12/2021   ECOG score 0       Physical Exam  Cardiovascular:      Rate and " Rhythm: Normal rate and regular rhythm.      Comments: Port-A-Cath present  Pulmonary:      Effort: Pulmonary effort is normal.      Breath sounds: Normal breath sounds.   Abdominal:      General: Bowel sounds are normal.      Palpations: Abdomen is soft.      Tenderness: There is no abdominal tenderness.   Skin:     Comments: Breast exam was performed in presence of registered nurse Solange.  Right axillary mass appears somewhat smaller as compared to last clinic visit.  There is yellowish dried discharge patient in right axilla without any evidence of infection or significant erythema   Neurological:      Mental Status: She is alert and oriented to person, place, and time.           RECENT LABS:  Glucose   Date Value Ref Range Status   03/12/2021 74 65 - 99 mg/dL Final     Sodium   Date Value Ref Range Status   03/12/2021 140 136 - 145 mmol/L Final     Potassium   Date Value Ref Range Status   03/12/2021 3.9 3.5 - 5.2 mmol/L Final     CO2   Date Value Ref Range Status   03/12/2021 27.0 22.0 - 29.0 mmol/L Final     Chloride   Date Value Ref Range Status   03/12/2021 105 98 - 107 mmol/L Final     Anion Gap   Date Value Ref Range Status   03/12/2021 8.0 5.0 - 15.0 mmol/L Final     Creatinine   Date Value Ref Range Status   03/12/2021 0.75 0.57 - 1.00 mg/dL Final     BUN   Date Value Ref Range Status   03/12/2021 11 6 - 20 mg/dL Final     BUN/Creatinine Ratio   Date Value Ref Range Status   03/12/2021 14.7 7.0 - 25.0 Final     Calcium   Date Value Ref Range Status   03/12/2021 9.4 8.6 - 10.5 mg/dL Final     eGFR Non  Amer   Date Value Ref Range Status   03/12/2021 81 >60 mL/min/1.73 Final     Alkaline Phosphatase   Date Value Ref Range Status   03/12/2021 81 39 - 117 U/L Final     Total Protein   Date Value Ref Range Status   03/12/2021 7.0 6.0 - 8.5 g/dL Final     ALT (SGPT)   Date Value Ref Range Status   03/12/2021 23 1 - 33 U/L Final     AST (SGOT)   Date Value Ref Range Status   03/12/2021 25 1 - 32 U/L  Final     Total Bilirubin   Date Value Ref Range Status   03/12/2021 0.2 0.0 - 1.2 mg/dL Final     Albumin   Date Value Ref Range Status   03/12/2021 4.10 3.50 - 5.20 g/dL Final     Globulin   Date Value Ref Range Status   03/12/2021 2.9 gm/dL Final     Lab Results   Component Value Date    WBC 5.54 03/12/2021    HGB 12.7 03/12/2021    HCT 37.6 03/12/2021    MCV 87.4 03/12/2021     03/12/2021     Lab Results   Component Value Date    NEUTROABS 3.09 03/12/2021     Lab Results   Component Value Date     31.6 (H) 02/12/2021    LABCA2 30.4 02/12/2021         PATHOLOGY:  * Cannot find OR log *         RADIOLOGY DATA :  No radiology results for the last 7 days        Assessment/Plan     1.  Metastatic right breast cancer with bone and liver metastases:  -Patient was diagnosed in July 2017 with axillary biopsy on right side  -Received six cycles of chemotherapy with Taxotere and Cytoxan between July 26, 2017 and December 6, 2017  -Patient was also evaluated by Dr. Poe at [facility] in [place] for second opinion.  -Received twenty-seven cycles of chemotherapy with palbociclib and letrozole between January 30, 2018 and October 2, 2020  -Due to enlarging mass in right axilla patient was started on Faslodex on October 16, 2020  -CT of chest, abdomen and pelvis with contrast done on January 7, 2020 showed increasing right axillary mass without any other area of progression.  -Radiation treatment has been done to right axilla that completed on March 4, 2021  -We will go ahead with cycle six of Faslodex today.  -We will ask patient to return to clinic in 4 weeks with repeat CBC, CMP, magnesium, phosphorus, CA 15-3, CA 27-29 to be done on that day  -We will get restaging CT of chest, abdomen and pelvis with contrast prior to next clinic visit in 4 weeks.  Recommendation were discussed with patient and her family    2.  Brain metastasis  -Patient was diagnosed with calvarial metastases without any evidence of  parenchymal mets.  Patient was evaluated by Dr. Pulido and no radiation was recommended    3.  Right internal jugular vein DVT:  -Remains on Coumadin    4.  Bone metastases:  -Patient has been on Zometa since October 23, 2017  -We will continue with monthly Zometa    5.  Elevated liver function test:  -Secondary to liver metastases plus or minus medication.  Will monitor with CMP for now    6.  History of melanoma in situ status post excision by Dr. Linn    7.  History of DCIS of the right wrist diagnosed in 2007  -Had a mastectomy followed by tamoxifen for 5 years and completed in 2013    8.  Chemotherapy-induced neuropathy  -Remains on gabapentin    9.  Health maintenance: Patient does not smoke    10. Advance Care Planning: For now patient remains full code and is able to make decisions.  Patient has health care surrogate mentioned on chart.                 PHQ-9 Total Score: 0   -Patient is not homicidal or suicidal.  No acute intervention required.    Kylie García reports a pain score of 0.  Given her pain assessment as noted, treatment options were discussed and the following options were decided upon as a follow-up plan to address the patient's pain: continuation of current treatment plan for pain.         Ovidio Meadows MD  3/12/2021  16:54 CST        Part of this note may be an electronic transcription/translation of spoken language to printed text using the Dragon Dictation System.          CC:

## 2021-03-12 ENCOUNTER — INFUSION (OUTPATIENT)
Dept: ONCOLOGY | Facility: HOSPITAL | Age: 51
End: 2021-03-12

## 2021-03-12 ENCOUNTER — ANTICOAGULATION VISIT (OUTPATIENT)
Dept: CARDIAC SURGERY | Facility: CLINIC | Age: 51
End: 2021-03-12

## 2021-03-12 ENCOUNTER — OFFICE VISIT (OUTPATIENT)
Dept: ONCOLOGY | Facility: CLINIC | Age: 51
End: 2021-03-12

## 2021-03-12 VITALS
HEART RATE: 80 BPM | WEIGHT: 229.1 LBS | DIASTOLIC BLOOD PRESSURE: 77 MMHG | TEMPERATURE: 98 F | RESPIRATION RATE: 18 BRPM | SYSTOLIC BLOOD PRESSURE: 127 MMHG | BODY MASS INDEX: 35.96 KG/M2 | HEIGHT: 67 IN

## 2021-03-12 VITALS — HEART RATE: 82 BPM | OXYGEN SATURATION: 99 %

## 2021-03-12 DIAGNOSIS — Z79.01 LONG TERM CURRENT USE OF ANTICOAGULANT THERAPY: Chronic | ICD-10-CM

## 2021-03-12 DIAGNOSIS — C77.3 SECONDARY MALIGNANT NEOPLASM OF AXILLARY LYMPH NODES (HCC): ICD-10-CM

## 2021-03-12 DIAGNOSIS — Z17.0 MALIGNANT NEOPLASM OF RIGHT BREAST IN FEMALE, ESTROGEN RECEPTOR POSITIVE, UNSPECIFIED SITE OF BREAST (HCC): Primary | ICD-10-CM

## 2021-03-12 DIAGNOSIS — C78.7 LIVER METASTASIS: Chronic | ICD-10-CM

## 2021-03-12 DIAGNOSIS — Z45.2 ENCOUNTER FOR VENOUS ACCESS DEVICE CARE: ICD-10-CM

## 2021-03-12 DIAGNOSIS — T45.1X5A NEUROPATHY DUE TO CHEMOTHERAPEUTIC DRUG (HCC): Chronic | ICD-10-CM

## 2021-03-12 DIAGNOSIS — C79.51 BONE METASTASIS: ICD-10-CM

## 2021-03-12 DIAGNOSIS — Z51.81 ENCOUNTER FOR THERAPEUTIC DRUG MONITORING: ICD-10-CM

## 2021-03-12 DIAGNOSIS — C79.31 METASTASIS TO BRAIN (HCC): Chronic | ICD-10-CM

## 2021-03-12 DIAGNOSIS — Z79.01 LONG TERM CURRENT USE OF ANTICOAGULANT THERAPY: ICD-10-CM

## 2021-03-12 DIAGNOSIS — Z17.0 MALIGNANT NEOPLASM OF RIGHT BREAST IN FEMALE, ESTROGEN RECEPTOR POSITIVE, UNSPECIFIED SITE OF BREAST (HCC): Primary | Chronic | ICD-10-CM

## 2021-03-12 DIAGNOSIS — C50.911 MALIGNANT NEOPLASM OF RIGHT BREAST IN FEMALE, ESTROGEN RECEPTOR POSITIVE, UNSPECIFIED SITE OF BREAST (HCC): ICD-10-CM

## 2021-03-12 DIAGNOSIS — G62.0 NEUROPATHY DUE TO CHEMOTHERAPEUTIC DRUG (HCC): Chronic | ICD-10-CM

## 2021-03-12 DIAGNOSIS — Z17.0 MALIGNANT NEOPLASM OF RIGHT BREAST IN FEMALE, ESTROGEN RECEPTOR POSITIVE, UNSPECIFIED SITE OF BREAST (HCC): ICD-10-CM

## 2021-03-12 DIAGNOSIS — C79.51 BONE METASTASIS: Chronic | ICD-10-CM

## 2021-03-12 DIAGNOSIS — C50.911 MALIGNANT NEOPLASM OF RIGHT BREAST IN FEMALE, ESTROGEN RECEPTOR POSITIVE, UNSPECIFIED SITE OF BREAST (HCC): Primary | ICD-10-CM

## 2021-03-12 DIAGNOSIS — C50.911 MALIGNANT NEOPLASM OF RIGHT BREAST IN FEMALE, ESTROGEN RECEPTOR POSITIVE, UNSPECIFIED SITE OF BREAST (HCC): Primary | Chronic | ICD-10-CM

## 2021-03-12 LAB
ALBUMIN SERPL-MCNC: 4.1 G/DL (ref 3.5–5.2)
ALBUMIN/GLOB SERPL: 1.4 G/DL
ALP SERPL-CCNC: 81 U/L (ref 39–117)
ALT SERPL W P-5'-P-CCNC: 23 U/L (ref 1–33)
ANION GAP SERPL CALCULATED.3IONS-SCNC: 8 MMOL/L (ref 5–15)
AST SERPL-CCNC: 25 U/L (ref 1–32)
BASOPHILS # BLD AUTO: 0.05 10*3/MM3 (ref 0–0.2)
BASOPHILS NFR BLD AUTO: 0.9 % (ref 0–1.5)
BILIRUB SERPL-MCNC: 0.2 MG/DL (ref 0–1.2)
BUN SERPL-MCNC: 11 MG/DL (ref 6–20)
BUN/CREAT SERPL: 14.7 (ref 7–25)
CALCIUM SPEC-SCNC: 9.4 MG/DL (ref 8.6–10.5)
CANCER AG15-3 SERPL-ACNC: 29.5 U/ML
CHLORIDE SERPL-SCNC: 105 MMOL/L (ref 98–107)
CO2 SERPL-SCNC: 27 MMOL/L (ref 22–29)
CREAT SERPL-MCNC: 0.75 MG/DL (ref 0.57–1)
DEPRECATED RDW RBC AUTO: 42.8 FL (ref 37–54)
EOSINOPHIL # BLD AUTO: 0.24 10*3/MM3 (ref 0–0.4)
EOSINOPHIL NFR BLD AUTO: 4.3 % (ref 0.3–6.2)
ERYTHROCYTE [DISTWIDTH] IN BLOOD BY AUTOMATED COUNT: 13.6 % (ref 12.3–15.4)
GFR SERPL CREATININE-BSD FRML MDRD: 81 ML/MIN/1.73
GLOBULIN UR ELPH-MCNC: 2.9 GM/DL
GLUCOSE SERPL-MCNC: 74 MG/DL (ref 65–99)
HCT VFR BLD AUTO: 37.6 % (ref 34–46.6)
HGB BLD-MCNC: 12.7 G/DL (ref 12–15.9)
IMM GRANULOCYTES # BLD AUTO: 0.02 10*3/MM3 (ref 0–0.05)
IMM GRANULOCYTES NFR BLD AUTO: 0.4 % (ref 0–0.5)
INR PPP: 2.3 (ref 0.9–1.1)
LYMPHOCYTES # BLD AUTO: 1.44 10*3/MM3 (ref 0.7–3.1)
LYMPHOCYTES NFR BLD AUTO: 26 % (ref 19.6–45.3)
MAGNESIUM SERPL-MCNC: 1.9 MG/DL (ref 1.6–2.6)
MCH RBC QN AUTO: 29.5 PG (ref 26.6–33)
MCHC RBC AUTO-ENTMCNC: 33.8 G/DL (ref 31.5–35.7)
MCV RBC AUTO: 87.4 FL (ref 79–97)
MONOCYTES # BLD AUTO: 0.7 10*3/MM3 (ref 0.1–0.9)
MONOCYTES NFR BLD AUTO: 12.6 % (ref 5–12)
NEUTROPHILS NFR BLD AUTO: 3.09 10*3/MM3 (ref 1.7–7)
NEUTROPHILS NFR BLD AUTO: 55.8 % (ref 42.7–76)
NRBC BLD AUTO-RTO: 0 /100 WBC (ref 0–0.2)
PHOSPHATE SERPL-MCNC: 3.6 MG/DL (ref 2.5–4.5)
PLATELET # BLD AUTO: 235 10*3/MM3 (ref 140–450)
PMV BLD AUTO: 9.6 FL (ref 6–12)
POTASSIUM SERPL-SCNC: 3.9 MMOL/L (ref 3.5–5.2)
PROT SERPL-MCNC: 7 G/DL (ref 6–8.5)
RBC # BLD AUTO: 4.3 10*6/MM3 (ref 3.77–5.28)
SODIUM SERPL-SCNC: 140 MMOL/L (ref 136–145)
WBC # BLD AUTO: 5.54 10*3/MM3 (ref 3.4–10.8)

## 2021-03-12 PROCEDURE — 86300 IMMUNOASSAY TUMOR CA 15-3: CPT

## 2021-03-12 PROCEDURE — 96374 THER/PROPH/DIAG INJ IV PUSH: CPT | Performed by: INTERNAL MEDICINE

## 2021-03-12 PROCEDURE — 25010000003 HEPARIN LOCK FLUSH PER 10 UNITS: Performed by: INTERNAL MEDICINE

## 2021-03-12 PROCEDURE — 80053 COMPREHEN METABOLIC PANEL: CPT

## 2021-03-12 PROCEDURE — 93793 ANTICOAG MGMT PT WARFARIN: CPT | Performed by: NURSE PRACTITIONER

## 2021-03-12 PROCEDURE — 84100 ASSAY OF PHOSPHORUS: CPT

## 2021-03-12 PROCEDURE — 85025 COMPLETE CBC W/AUTO DIFF WBC: CPT

## 2021-03-12 PROCEDURE — 85610 PROTHROMBIN TIME: CPT | Performed by: NURSE PRACTITIONER

## 2021-03-12 PROCEDURE — 36416 COLLJ CAPILLARY BLOOD SPEC: CPT | Performed by: NURSE PRACTITIONER

## 2021-03-12 PROCEDURE — G9903 PT SCRN TBCO ID AS NON USER: HCPCS | Performed by: INTERNAL MEDICINE

## 2021-03-12 PROCEDURE — 96402 CHEMO HORMON ANTINEOPL SQ/IM: CPT | Performed by: INTERNAL MEDICINE

## 2021-03-12 PROCEDURE — 25010000002 ZOLEDRONIC ACID PER 1 MG: Performed by: INTERNAL MEDICINE

## 2021-03-12 PROCEDURE — 99214 OFFICE O/P EST MOD 30 MIN: CPT | Performed by: INTERNAL MEDICINE

## 2021-03-12 PROCEDURE — 83735 ASSAY OF MAGNESIUM: CPT

## 2021-03-12 PROCEDURE — 25010000002 FULVESTRANT PER 25 MG: Performed by: INTERNAL MEDICINE

## 2021-03-12 RX ORDER — HEPARIN SODIUM (PORCINE) LOCK FLUSH IV SOLN 100 UNIT/ML 100 UNIT/ML
500 SOLUTION INTRAVENOUS AS NEEDED
Status: DISCONTINUED | OUTPATIENT
Start: 2021-03-12 | End: 2021-03-12 | Stop reason: HOSPADM

## 2021-03-12 RX ORDER — SODIUM CHLORIDE 0.9 % (FLUSH) 0.9 %
20 SYRINGE (ML) INJECTION AS NEEDED
Status: CANCELLED | OUTPATIENT
Start: 2021-03-12

## 2021-03-12 RX ORDER — SODIUM CHLORIDE 9 MG/ML
250 INJECTION, SOLUTION INTRAVENOUS ONCE
Status: CANCELLED | OUTPATIENT
Start: 2021-03-12

## 2021-03-12 RX ORDER — SODIUM CHLORIDE 9 MG/ML
250 INJECTION, SOLUTION INTRAVENOUS ONCE
Status: COMPLETED | OUTPATIENT
Start: 2021-03-12 | End: 2021-03-12

## 2021-03-12 RX ORDER — SODIUM CHLORIDE 0.9 % (FLUSH) 0.9 %
10 SYRINGE (ML) INJECTION AS NEEDED
Status: CANCELLED | OUTPATIENT
Start: 2021-03-12

## 2021-03-12 RX ORDER — HEPARIN SODIUM (PORCINE) LOCK FLUSH IV SOLN 100 UNIT/ML 100 UNIT/ML
500 SOLUTION INTRAVENOUS AS NEEDED
Status: CANCELLED | OUTPATIENT
Start: 2021-03-12

## 2021-03-12 RX ORDER — SODIUM CHLORIDE 0.9 % (FLUSH) 0.9 %
10 SYRINGE (ML) INJECTION AS NEEDED
Status: DISCONTINUED | OUTPATIENT
Start: 2021-03-12 | End: 2021-03-12 | Stop reason: HOSPADM

## 2021-03-12 RX ADMIN — ZOLEDRONIC ACID 4 MG: 4 INJECTION, SOLUTION, CONCENTRATE INTRAVENOUS at 10:04

## 2021-03-12 RX ADMIN — HEPARIN 500 UNITS: 100 SYRINGE at 10:43

## 2021-03-12 RX ADMIN — SODIUM CHLORIDE, PRESERVATIVE FREE 10 ML: 5 INJECTION INTRAVENOUS at 10:42

## 2021-03-12 RX ADMIN — FULVESTRANT 500 MG: 50 INJECTION INTRAMUSCULAR at 10:43

## 2021-03-12 RX ADMIN — SODIUM CHLORIDE 250 ML: 9 INJECTION, SOLUTION INTRAVENOUS at 10:04

## 2021-03-12 NOTE — PROGRESS NOTES
Patient states no med changes or bleeding problems or unexplained bruising. Patient instructed to continue current dosing schedule. Verbalizes understanding. Will recheck 1 month.  Patient instructed regarding medication; results given and questions answered. Nutritional counseling given.  Dietary factors affecting therapy addressed.  Patient instructed to monitor for excessive bruising or bleeding. Findings reported by Tata Nunez RN.    Today's INR is   Lab Results - Last 18 Months   Lab Units 03/12/21  0000   INR  2.30*

## 2021-03-13 LAB — CANCER AG27-29 SERPL-ACNC: 28.8 U/ML (ref 0–38.6)

## 2021-03-19 DIAGNOSIS — T45.1X5A NEUROPATHY DUE TO CHEMOTHERAPEUTIC DRUG (HCC): ICD-10-CM

## 2021-03-19 DIAGNOSIS — G62.0 NEUROPATHY DUE TO CHEMOTHERAPEUTIC DRUG (HCC): ICD-10-CM

## 2021-03-19 RX ORDER — GABAPENTIN 300 MG/1
300 CAPSULE ORAL 3 TIMES DAILY
Qty: 90 CAPSULE | Refills: 0 | Status: SHIPPED | OUTPATIENT
Start: 2021-03-19 | End: 2021-05-28 | Stop reason: SDUPTHER

## 2021-04-07 ENCOUNTER — HOSPITAL ENCOUNTER (OUTPATIENT)
Dept: CT IMAGING | Facility: HOSPITAL | Age: 51
Discharge: HOME | End: 2021-04-07
Admitting: INTERNAL MEDICINE

## 2021-04-07 DIAGNOSIS — C50.911 MALIGNANT NEOPLASM OF RIGHT BREAST IN FEMALE, ESTROGEN RECEPTOR POSITIVE, UNSPECIFIED SITE OF BREAST (HCC): Chronic | ICD-10-CM

## 2021-04-07 DIAGNOSIS — C77.3 SECONDARY MALIGNANT NEOPLASM OF AXILLARY LYMPH NODES (HCC): ICD-10-CM

## 2021-04-07 DIAGNOSIS — Z17.0 MALIGNANT NEOPLASM OF RIGHT BREAST IN FEMALE, ESTROGEN RECEPTOR POSITIVE, UNSPECIFIED SITE OF BREAST (HCC): Chronic | ICD-10-CM

## 2021-04-07 DIAGNOSIS — C79.51 BONE METASTASIS: Chronic | ICD-10-CM

## 2021-04-07 PROCEDURE — 25010000002 IOPAMIDOL 61 % SOLUTION: Performed by: INTERNAL MEDICINE

## 2021-04-07 PROCEDURE — 71260 CT THORAX DX C+: CPT

## 2021-04-07 PROCEDURE — 74177 CT ABD & PELVIS W/CONTRAST: CPT

## 2021-04-07 PROCEDURE — 25010000003 HEPARIN LOCK FLUSH PER 10 UNITS: Performed by: RADIOLOGY

## 2021-04-07 RX ORDER — HEPARIN SODIUM (PORCINE) LOCK FLUSH IV SOLN 100 UNIT/ML 100 UNIT/ML
SOLUTION INTRAVENOUS
Status: DISPENSED
Start: 2021-04-07 | End: 2021-04-07

## 2021-04-07 RX ORDER — HEPARIN SODIUM (PORCINE) LOCK FLUSH IV SOLN 100 UNIT/ML 100 UNIT/ML
SOLUTION INTRAVENOUS
Status: DISCONTINUED | OUTPATIENT
Start: 2021-04-07 | End: 2021-04-08 | Stop reason: HOSPADM

## 2021-04-07 RX ADMIN — HEPARIN 500 UNITS: 100 SYRINGE at 08:03

## 2021-04-07 RX ADMIN — IOPAMIDOL 90 ML: 612 INJECTION, SOLUTION INTRAVENOUS at 07:57

## 2021-04-09 ENCOUNTER — INFUSION (OUTPATIENT)
Dept: ONCOLOGY | Facility: HOSPITAL | Age: 51
End: 2021-04-09

## 2021-04-09 ENCOUNTER — ANTICOAGULATION VISIT (OUTPATIENT)
Dept: CARDIAC SURGERY | Facility: CLINIC | Age: 51
End: 2021-04-09

## 2021-04-09 ENCOUNTER — OFFICE VISIT (OUTPATIENT)
Dept: ONCOLOGY | Facility: CLINIC | Age: 51
End: 2021-04-09

## 2021-04-09 VITALS — OXYGEN SATURATION: 98 % | HEART RATE: 87 BPM

## 2021-04-09 VITALS
HEIGHT: 67 IN | RESPIRATION RATE: 18 BRPM | TEMPERATURE: 97.8 F | SYSTOLIC BLOOD PRESSURE: 118 MMHG | HEART RATE: 75 BPM | DIASTOLIC BLOOD PRESSURE: 73 MMHG | WEIGHT: 227.1 LBS | BODY MASS INDEX: 35.64 KG/M2

## 2021-04-09 DIAGNOSIS — C79.51 BONE METASTASIS: Chronic | ICD-10-CM

## 2021-04-09 DIAGNOSIS — C50.911 MALIGNANT NEOPLASM OF RIGHT BREAST IN FEMALE, ESTROGEN RECEPTOR POSITIVE, UNSPECIFIED SITE OF BREAST (HCC): ICD-10-CM

## 2021-04-09 DIAGNOSIS — Z51.81 ENCOUNTER FOR THERAPEUTIC DRUG MONITORING: ICD-10-CM

## 2021-04-09 DIAGNOSIS — Z17.0 MALIGNANT NEOPLASM OF RIGHT BREAST IN FEMALE, ESTROGEN RECEPTOR POSITIVE, UNSPECIFIED SITE OF BREAST (HCC): ICD-10-CM

## 2021-04-09 DIAGNOSIS — R79.89 ELEVATED LIVER FUNCTION TESTS: Chronic | ICD-10-CM

## 2021-04-09 DIAGNOSIS — G62.0 NEUROPATHY DUE TO CHEMOTHERAPEUTIC DRUG (HCC): Chronic | ICD-10-CM

## 2021-04-09 DIAGNOSIS — C50.911 MALIGNANT NEOPLASM OF RIGHT BREAST IN FEMALE, ESTROGEN RECEPTOR POSITIVE, UNSPECIFIED SITE OF BREAST (HCC): Primary | ICD-10-CM

## 2021-04-09 DIAGNOSIS — Z79.01 LONG TERM CURRENT USE OF ANTICOAGULANT THERAPY: Chronic | ICD-10-CM

## 2021-04-09 DIAGNOSIS — Z86.000 HISTORY OF DUCTAL CARCINOMA IN SITU (DCIS) OF BREAST: Chronic | ICD-10-CM

## 2021-04-09 DIAGNOSIS — Z85.820 HISTORY OF MALIGNANT MELANOMA OF SKIN: Chronic | ICD-10-CM

## 2021-04-09 DIAGNOSIS — T45.1X5A NEUROPATHY DUE TO CHEMOTHERAPEUTIC DRUG (HCC): Chronic | ICD-10-CM

## 2021-04-09 DIAGNOSIS — Z45.2 ENCOUNTER FOR VENOUS ACCESS DEVICE CARE: ICD-10-CM

## 2021-04-09 DIAGNOSIS — Z17.0 MALIGNANT NEOPLASM OF RIGHT BREAST IN FEMALE, ESTROGEN RECEPTOR POSITIVE, UNSPECIFIED SITE OF BREAST (HCC): Primary | ICD-10-CM

## 2021-04-09 DIAGNOSIS — Z79.01 LONG TERM CURRENT USE OF ANTICOAGULANT THERAPY: ICD-10-CM

## 2021-04-09 DIAGNOSIS — C79.51 BONE METASTASIS: ICD-10-CM

## 2021-04-09 DIAGNOSIS — C79.31 METASTASIS TO BRAIN (HCC): Chronic | ICD-10-CM

## 2021-04-09 DIAGNOSIS — C78.7 LIVER METASTASIS: Chronic | ICD-10-CM

## 2021-04-09 DIAGNOSIS — C50.911 MALIGNANT NEOPLASM OF RIGHT BREAST IN FEMALE, ESTROGEN RECEPTOR POSITIVE, UNSPECIFIED SITE OF BREAST (HCC): Primary | Chronic | ICD-10-CM

## 2021-04-09 DIAGNOSIS — Z17.0 MALIGNANT NEOPLASM OF RIGHT BREAST IN FEMALE, ESTROGEN RECEPTOR POSITIVE, UNSPECIFIED SITE OF BREAST (HCC): Primary | Chronic | ICD-10-CM

## 2021-04-09 LAB
ALBUMIN SERPL-MCNC: 3.9 G/DL (ref 3.5–5.2)
ALBUMIN/GLOB SERPL: 1.3 G/DL
ALP SERPL-CCNC: 77 U/L (ref 39–117)
ALT SERPL W P-5'-P-CCNC: 22 U/L (ref 1–33)
ANION GAP SERPL CALCULATED.3IONS-SCNC: 9 MMOL/L (ref 5–15)
AST SERPL-CCNC: 22 U/L (ref 1–32)
BASOPHILS # BLD AUTO: 0.04 10*3/MM3 (ref 0–0.2)
BASOPHILS NFR BLD AUTO: 0.8 % (ref 0–1.5)
BILIRUB SERPL-MCNC: 0.2 MG/DL (ref 0–1.2)
BUN SERPL-MCNC: 10 MG/DL (ref 6–20)
BUN/CREAT SERPL: 12.8 (ref 7–25)
CALCIUM SPEC-SCNC: 9.4 MG/DL (ref 8.6–10.5)
CANCER AG15-3 SERPL-ACNC: 29.8 U/ML
CHLORIDE SERPL-SCNC: 107 MMOL/L (ref 98–107)
CO2 SERPL-SCNC: 25 MMOL/L (ref 22–29)
CREAT SERPL-MCNC: 0.78 MG/DL (ref 0.57–1)
DEPRECATED RDW RBC AUTO: 41.7 FL (ref 37–54)
EOSINOPHIL # BLD AUTO: 0.2 10*3/MM3 (ref 0–0.4)
EOSINOPHIL NFR BLD AUTO: 3.8 % (ref 0.3–6.2)
ERYTHROCYTE [DISTWIDTH] IN BLOOD BY AUTOMATED COUNT: 13.2 % (ref 12.3–15.4)
GFR SERPL CREATININE-BSD FRML MDRD: 78 ML/MIN/1.73
GLOBULIN UR ELPH-MCNC: 3 GM/DL
GLUCOSE SERPL-MCNC: 120 MG/DL (ref 65–99)
HCT VFR BLD AUTO: 38.3 % (ref 34–46.6)
HGB BLD-MCNC: 12.7 G/DL (ref 12–15.9)
IMM GRANULOCYTES # BLD AUTO: 0.02 10*3/MM3 (ref 0–0.05)
IMM GRANULOCYTES NFR BLD AUTO: 0.4 % (ref 0–0.5)
INR PPP: 2.7 (ref 0.9–1.1)
LYMPHOCYTES # BLD AUTO: 1.3 10*3/MM3 (ref 0.7–3.1)
LYMPHOCYTES NFR BLD AUTO: 24.8 % (ref 19.6–45.3)
MAGNESIUM SERPL-MCNC: 1.8 MG/DL (ref 1.6–2.6)
MCH RBC QN AUTO: 28.9 PG (ref 26.6–33)
MCHC RBC AUTO-ENTMCNC: 33.2 G/DL (ref 31.5–35.7)
MCV RBC AUTO: 87 FL (ref 79–97)
MONOCYTES # BLD AUTO: 0.42 10*3/MM3 (ref 0.1–0.9)
MONOCYTES NFR BLD AUTO: 8 % (ref 5–12)
NEUTROPHILS NFR BLD AUTO: 3.26 10*3/MM3 (ref 1.7–7)
NEUTROPHILS NFR BLD AUTO: 62.2 % (ref 42.7–76)
NRBC BLD AUTO-RTO: 0 /100 WBC (ref 0–0.2)
PHOSPHATE SERPL-MCNC: 3.1 MG/DL (ref 2.5–4.5)
PLATELET # BLD AUTO: 237 10*3/MM3 (ref 140–450)
PMV BLD AUTO: 10 FL (ref 6–12)
POTASSIUM SERPL-SCNC: 3.9 MMOL/L (ref 3.5–5.2)
PROT SERPL-MCNC: 6.9 G/DL (ref 6–8.5)
RBC # BLD AUTO: 4.4 10*6/MM3 (ref 3.77–5.28)
SODIUM SERPL-SCNC: 141 MMOL/L (ref 136–145)
WBC # BLD AUTO: 5.24 10*3/MM3 (ref 3.4–10.8)

## 2021-04-09 PROCEDURE — 96402 CHEMO HORMON ANTINEOPL SQ/IM: CPT | Performed by: INTERNAL MEDICINE

## 2021-04-09 PROCEDURE — 86300 IMMUNOASSAY TUMOR CA 15-3: CPT

## 2021-04-09 PROCEDURE — 85025 COMPLETE CBC W/AUTO DIFF WBC: CPT

## 2021-04-09 PROCEDURE — 25010000003 HEPARIN LOCK FLUSH PER 10 UNITS: Performed by: INTERNAL MEDICINE

## 2021-04-09 PROCEDURE — 84100 ASSAY OF PHOSPHORUS: CPT

## 2021-04-09 PROCEDURE — 36416 COLLJ CAPILLARY BLOOD SPEC: CPT | Performed by: NURSE PRACTITIONER

## 2021-04-09 PROCEDURE — 83735 ASSAY OF MAGNESIUM: CPT

## 2021-04-09 PROCEDURE — 99214 OFFICE O/P EST MOD 30 MIN: CPT | Performed by: INTERNAL MEDICINE

## 2021-04-09 PROCEDURE — 25010000002 ZOLEDRONIC ACID PER 1 MG: Performed by: INTERNAL MEDICINE

## 2021-04-09 PROCEDURE — 93793 ANTICOAG MGMT PT WARFARIN: CPT | Performed by: NURSE PRACTITIONER

## 2021-04-09 PROCEDURE — 80053 COMPREHEN METABOLIC PANEL: CPT

## 2021-04-09 PROCEDURE — 85610 PROTHROMBIN TIME: CPT | Performed by: NURSE PRACTITIONER

## 2021-04-09 PROCEDURE — 25010000002 FULVESTRANT PER 25 MG: Performed by: INTERNAL MEDICINE

## 2021-04-09 PROCEDURE — 96374 THER/PROPH/DIAG INJ IV PUSH: CPT | Performed by: INTERNAL MEDICINE

## 2021-04-09 RX ORDER — SODIUM CHLORIDE 0.9 % (FLUSH) 0.9 %
20 SYRINGE (ML) INJECTION AS NEEDED
Status: CANCELLED | OUTPATIENT
Start: 2021-05-07

## 2021-04-09 RX ORDER — SODIUM CHLORIDE 0.9 % (FLUSH) 0.9 %
10 SYRINGE (ML) INJECTION AS NEEDED
Status: DISCONTINUED | OUTPATIENT
Start: 2021-04-09 | End: 2021-04-09 | Stop reason: HOSPADM

## 2021-04-09 RX ORDER — SODIUM CHLORIDE 9 MG/ML
250 INJECTION, SOLUTION INTRAVENOUS ONCE
Status: CANCELLED | OUTPATIENT
Start: 2021-04-09

## 2021-04-09 RX ORDER — SERTRALINE HYDROCHLORIDE 100 MG/1
TABLET, FILM COATED ORAL
COMMUNITY
Start: 2021-02-26 | End: 2021-09-23

## 2021-04-09 RX ORDER — SODIUM CHLORIDE 0.9 % (FLUSH) 0.9 %
10 SYRINGE (ML) INJECTION AS NEEDED
Status: CANCELLED | OUTPATIENT
Start: 2021-05-07

## 2021-04-09 RX ORDER — SODIUM CHLORIDE 9 MG/ML
250 INJECTION, SOLUTION INTRAVENOUS ONCE
Status: COMPLETED | OUTPATIENT
Start: 2021-04-09 | End: 2021-04-09

## 2021-04-09 RX ORDER — HEPARIN SODIUM (PORCINE) LOCK FLUSH IV SOLN 100 UNIT/ML 100 UNIT/ML
500 SOLUTION INTRAVENOUS AS NEEDED
Status: DISCONTINUED | OUTPATIENT
Start: 2021-04-09 | End: 2021-04-09 | Stop reason: HOSPADM

## 2021-04-09 RX ORDER — SODIUM CHLORIDE 0.9 % (FLUSH) 0.9 %
20 SYRINGE (ML) INJECTION AS NEEDED
Status: DISCONTINUED | OUTPATIENT
Start: 2021-04-09 | End: 2021-04-09 | Stop reason: HOSPADM

## 2021-04-09 RX ORDER — HEPARIN SODIUM (PORCINE) LOCK FLUSH IV SOLN 100 UNIT/ML 100 UNIT/ML
500 SOLUTION INTRAVENOUS AS NEEDED
Status: CANCELLED | OUTPATIENT
Start: 2021-05-07

## 2021-04-09 RX ADMIN — HEPARIN 500 UNITS: 100 SYRINGE at 11:19

## 2021-04-09 RX ADMIN — FULVESTRANT 500 MG: 50 INJECTION INTRAMUSCULAR at 11:21

## 2021-04-09 RX ADMIN — SODIUM CHLORIDE 250 ML: 9 INJECTION, SOLUTION INTRAVENOUS at 10:43

## 2021-04-09 RX ADMIN — SODIUM CHLORIDE, PRESERVATIVE FREE 10 ML: 5 INJECTION INTRAVENOUS at 11:18

## 2021-04-09 RX ADMIN — ZOLEDRONIC ACID 4 MG: 4 INJECTION, SOLUTION, CONCENTRATE INTRAVENOUS at 10:43

## 2021-04-09 NOTE — PROGRESS NOTES
"DATE OF VISIT: 4/9/2021      REASON FOR VISIT: Metastatic breast cancer with bone, liver metastases on Faslodex, neuropathy from chemotherapy, cancer related pain, DVT on anticoagulation with Coumadin      HISTORY OF PRESENT ILLNESS:   50-year-old female with medical problem consisting of ductal carcinoma in situ of right breast diagnosed in 2007, anxiety/depression, metastatic breast cancer with bone, liver metastasis currently on treatment with Faslodex is here for follow-up appointment today.  Patient is scheduled to start cycle 7 of Faslodex today along with Zometa.  Patient had a restaging CT of chest abdomen and pelvis with contrast done recently, she is here to discuss results and further recommendation.  Denies any bleeding.  Denies any new lymph node enlargement.  Denies any recent worsening of neuropathy.  Denies any bowel or bladder incontinence.  Continues to have back pain and hip pain.          Past Medical History, Past Surgical History, Social History, Family History have been reviewed and are without significant changes except as mentioned.    Review of Systems   Constitutional: Positive for fatigue.   Musculoskeletal: Positive for arthralgias and back pain.   Neurological: Positive for numbness (Chronic neuropathy from chemotherapy affecting upper and lower extremity.).   Hematological: Positive for adenopathy (Affecting right axilla).      A comprehensive 14 point review of systems was performed and was negative except as mentioned.    Medications:  The current medication list was reviewed in the EMR    ALLERGIES:    Allergies   Allergen Reactions   • Percocet [Oxycodone-Acetaminophen] Nausea And Vomiting       Objective      Vitals:    04/09/21 0907   BP: 118/73   Pulse: 75   Resp: 18   Temp: 97.8 °F (36.6 °C)   TempSrc: Temporal   Weight: 103 kg (227 lb 1.6 oz)   Height: 170.2 cm (67.01\")   PainSc: 0-No pain     Current Status 4/9/2021   ECOG score 0       Physical Exam  Cardiovascular:      " Rate and Rhythm: Normal rate and regular rhythm.      Comments: Port-A-Cath present  Pulmonary:      Breath sounds: Normal breath sounds.   Abdominal:      General: Bowel sounds are normal.      Palpations: Abdomen is soft.      Tenderness: There is no abdominal tenderness.   Musculoskeletal:      Comments: Decreased range of motion   Neurological:      Mental Status: She is alert and oriented to person, place, and time.           RECENT LABS:  Glucose   Date Value Ref Range Status   04/09/2021 120 (H) 65 - 99 mg/dL Final     Sodium   Date Value Ref Range Status   04/09/2021 141 136 - 145 mmol/L Final     Potassium   Date Value Ref Range Status   04/09/2021 3.9 3.5 - 5.2 mmol/L Final     CO2   Date Value Ref Range Status   04/09/2021 25.0 22.0 - 29.0 mmol/L Final     Chloride   Date Value Ref Range Status   04/09/2021 107 98 - 107 mmol/L Final     Anion Gap   Date Value Ref Range Status   04/09/2021 9.0 5.0 - 15.0 mmol/L Final     Creatinine   Date Value Ref Range Status   04/09/2021 0.78 0.57 - 1.00 mg/dL Final     BUN   Date Value Ref Range Status   04/09/2021 10 6 - 20 mg/dL Final     BUN/Creatinine Ratio   Date Value Ref Range Status   04/09/2021 12.8 7.0 - 25.0 Final     Calcium   Date Value Ref Range Status   04/09/2021 9.4 8.6 - 10.5 mg/dL Final     eGFR Non  Amer   Date Value Ref Range Status   04/09/2021 78 >60 mL/min/1.73 Final     Alkaline Phosphatase   Date Value Ref Range Status   04/09/2021 77 39 - 117 U/L Final     Total Protein   Date Value Ref Range Status   04/09/2021 6.9 6.0 - 8.5 g/dL Final     ALT (SGPT)   Date Value Ref Range Status   04/09/2021 22 1 - 33 U/L Final     AST (SGOT)   Date Value Ref Range Status   04/09/2021 22 1 - 32 U/L Final     Total Bilirubin   Date Value Ref Range Status   04/09/2021 0.2 0.0 - 1.2 mg/dL Final     Albumin   Date Value Ref Range Status   04/09/2021 3.90 3.50 - 5.20 g/dL Final     Globulin   Date Value Ref Range Status   04/09/2021 3.0 gm/dL Final      Lab Results   Component Value Date    WBC 5.24 04/09/2021    HGB 12.7 04/09/2021    HCT 38.3 04/09/2021    MCV 87.0 04/09/2021     04/09/2021     Lab Results   Component Value Date    NEUTROABS 3.26 04/09/2021     Lab Results   Component Value Date     29.5 (H) 03/12/2021    LABCA2 28.8 03/12/2021         PATHOLOGY:  * Cannot find OR log *         RADIOLOGY DATA :  CT Chest With Contrast Diagnostic    Result Date: 4/7/2021  1. Extensive but stable predominantly sclerotic osseous metastases. Stable appearance of severe L4 compression deformity 2. Interval decrease in size of soft tissue mass mass in the subcutaneous tissues of the right axilla compared to the previous study. Please see above. 3. Stable partially calcified left hepatic lobe lesion 4. No new metastases identified. Electronically signed by:  Aundrea Dykes MD  4/7/2021 2:24 PM CDT Workstation: [id number]    CT Abdomen Pelvis With Contrast    Result Date: 4/7/2021  1. Extensive but stable predominantly sclerotic osseous metastases. Stable appearance of severe L4 compression deformity 2. Interval decrease in size of soft tissue mass mass in the subcutaneous tissues of the right axilla compared to the previous study. Please see above. 3. Stable partially calcified left hepatic lobe lesion 4. No new metastases identified. Electronically signed by:  Aundrea Dykes MD  4/7/2021 2:24 PM CDT Workstation: [id number]          Assessment/Plan     1.  Metastatic right breast cancer with bone and liver metastases  -Patient was diagnosed in July 2017 with axillary biopsy on right side  -S/p 6 cycles of chemotherapy with Taxotere and Cytoxan between July 26, 2017 and December 6, 2017  -Patient was evaluated by Dr. Miguel Angel Ramirez at [facility] in [place] for second opinion  -After that patient received 27 cycles of chemotherapy with palbociclib and letrozole between January 30, 2018 and October 2, 2018.  -Due to enlarging mass in right axilla, treatment was  changed to Faslodex in October 16, 2020  -Due to enlarging months without any evidence of further progression patient had a radiation treatment done to right axilla that completed on March 4, 2021.  -CT of chest, abdomen and pelvis with contrast done on April 7, 2021 shows decrease in the size of axillary mass.  All other metastatic focus involving bone is stable.  -Recommend continuing with Faslodex for now  -We will do restaging CT of chest, abdomen and pelvis with contrast around July 2021.  -We will proceed with cycle 7 of Faslodex today.  -We will ask patient to return to clinic in 4 weeks with repeat CBC, CMP, magnesium, phosphorus CA 15-3 and CA 27-29 to be done on that day    2.  Brain metastases  -Patient was diagnosed with calvarial metastases without any evidence of parenchymal mets.  Patient was evaluated by Dr. Pulido and no radiation was recommended since patient was asymptomatic    3.  Right internal jugular vein DVT:  -Remains on Coumadin    4.  Bone metastases  -Has been on monthly Zometa since October 23, 2017  -We will continue with monthly Zometa    5.  Elevated liver function test:  -Secondary to liver metastases plus or minus medication.  Will monitor with CMP    6.  History of melanoma in situ status post excision by Dr. Linn    7.  History of DCIS of right wrist diagnosed in 2007  -Had a mastectomy followed by tamoxifen for 5 years and completed in 2013    8.  Chemotherapy-induced neuropathy  -Remains on gabapentin    9.  Health maintenance: Patient does not smoke    10. Advance Care Planning: For now patient remains full code and is able to make decisions.  Patient has health care surrogate mentioned on chart.                 PHQ-9 Total Score: 0   -Patient is not homicidal or suicidal.  No acute intervention required.    Kylie García reports a pain score of 0.  Given her pain assessment as noted, treatment options were discussed and the following options were decided upon as a  follow-up plan to address the patient's pain: No acute intervention required.         Ovidio Meadows MD  4/9/2021  15:56 CDT        Part of this note may be an electronic transcription/translation of spoken language to printed text using the Dragon Dictation System.          CC:

## 2021-04-09 NOTE — PROGRESS NOTES
Patient states no med changes or bleeding problems or unexplained bruising. Patient instructed to continue current dosing schedule. Verbalizes understanding. Will recheck 1 month.  Patient instructed regarding medication; results given and questions answered. Nutritional counseling given.  Dietary factors affecting therapy addressed.  Patient instructed to monitor for excessive bruising or bleeding. Findings reported by Tata Nunez RN.    Today's INR is   Lab Results - Last 18 Months   Lab Units 04/09/21  0000   INR  2.70*

## 2021-04-10 LAB — CANCER AG27-29 SERPL-ACNC: 32.6 U/ML (ref 0–38.6)

## 2021-04-15 DIAGNOSIS — Z79.01 LONG TERM (CURRENT) USE OF ANTICOAGULANTS: ICD-10-CM

## 2021-04-15 RX ORDER — WARFARIN SODIUM 5 MG/1
TABLET ORAL
Qty: 30 TABLET | Refills: 1 | Status: SHIPPED | OUTPATIENT
Start: 2021-04-15 | End: 2021-06-21

## 2021-05-07 ENCOUNTER — ANTICOAGULATION VISIT (OUTPATIENT)
Dept: CARDIAC SURGERY | Facility: CLINIC | Age: 51
End: 2021-05-07

## 2021-05-07 ENCOUNTER — INFUSION (OUTPATIENT)
Dept: ONCOLOGY | Facility: HOSPITAL | Age: 51
End: 2021-05-07

## 2021-05-07 ENCOUNTER — OFFICE VISIT (OUTPATIENT)
Dept: ONCOLOGY | Facility: CLINIC | Age: 51
End: 2021-05-07

## 2021-05-07 VITALS
DIASTOLIC BLOOD PRESSURE: 70 MMHG | BODY MASS INDEX: 35.75 KG/M2 | WEIGHT: 228.3 LBS | OXYGEN SATURATION: 94 % | SYSTOLIC BLOOD PRESSURE: 120 MMHG | TEMPERATURE: 98.5 F | RESPIRATION RATE: 18 BRPM | HEART RATE: 79 BPM

## 2021-05-07 VITALS — HEART RATE: 81 BPM | OXYGEN SATURATION: 97 %

## 2021-05-07 DIAGNOSIS — C50.911 MALIGNANT NEOPLASM OF RIGHT BREAST IN FEMALE, ESTROGEN RECEPTOR POSITIVE, UNSPECIFIED SITE OF BREAST (HCC): Primary | ICD-10-CM

## 2021-05-07 DIAGNOSIS — Z51.81 ENCOUNTER FOR THERAPEUTIC DRUG MONITORING: ICD-10-CM

## 2021-05-07 DIAGNOSIS — C78.7 LIVER METASTASIS: Chronic | ICD-10-CM

## 2021-05-07 DIAGNOSIS — Z86.000 HISTORY OF DUCTAL CARCINOMA IN SITU (DCIS) OF BREAST: Chronic | ICD-10-CM

## 2021-05-07 DIAGNOSIS — Z45.2 ENCOUNTER FOR VENOUS ACCESS DEVICE CARE: ICD-10-CM

## 2021-05-07 DIAGNOSIS — Z79.01 LONG TERM CURRENT USE OF ANTICOAGULANT THERAPY: Primary | ICD-10-CM

## 2021-05-07 DIAGNOSIS — Z79.01 LONG TERM CURRENT USE OF ANTICOAGULANT THERAPY: Chronic | ICD-10-CM

## 2021-05-07 DIAGNOSIS — C50.911 MALIGNANT NEOPLASM OF RIGHT BREAST IN FEMALE, ESTROGEN RECEPTOR POSITIVE, UNSPECIFIED SITE OF BREAST (HCC): Primary | Chronic | ICD-10-CM

## 2021-05-07 DIAGNOSIS — C79.51 BONE METASTASIS: ICD-10-CM

## 2021-05-07 DIAGNOSIS — T45.1X5A NEUROPATHY DUE TO CHEMOTHERAPEUTIC DRUG (HCC): Chronic | ICD-10-CM

## 2021-05-07 DIAGNOSIS — C79.31 METASTASIS TO BRAIN (HCC): Chronic | ICD-10-CM

## 2021-05-07 DIAGNOSIS — C77.3 SECONDARY MALIGNANT NEOPLASM OF AXILLARY LYMPH NODES (HCC): ICD-10-CM

## 2021-05-07 DIAGNOSIS — R79.89 ELEVATED LIVER FUNCTION TESTS: Chronic | ICD-10-CM

## 2021-05-07 DIAGNOSIS — C50.911 MALIGNANT NEOPLASM OF RIGHT BREAST IN FEMALE, ESTROGEN RECEPTOR POSITIVE, UNSPECIFIED SITE OF BREAST (HCC): ICD-10-CM

## 2021-05-07 DIAGNOSIS — Z85.820 HISTORY OF MALIGNANT MELANOMA OF SKIN: Chronic | ICD-10-CM

## 2021-05-07 DIAGNOSIS — G62.0 NEUROPATHY DUE TO CHEMOTHERAPEUTIC DRUG (HCC): Chronic | ICD-10-CM

## 2021-05-07 DIAGNOSIS — Z17.0 MALIGNANT NEOPLASM OF RIGHT BREAST IN FEMALE, ESTROGEN RECEPTOR POSITIVE, UNSPECIFIED SITE OF BREAST (HCC): Primary | ICD-10-CM

## 2021-05-07 DIAGNOSIS — Z17.0 MALIGNANT NEOPLASM OF RIGHT BREAST IN FEMALE, ESTROGEN RECEPTOR POSITIVE, UNSPECIFIED SITE OF BREAST (HCC): Primary | Chronic | ICD-10-CM

## 2021-05-07 DIAGNOSIS — Z17.0 MALIGNANT NEOPLASM OF RIGHT BREAST IN FEMALE, ESTROGEN RECEPTOR POSITIVE, UNSPECIFIED SITE OF BREAST (HCC): ICD-10-CM

## 2021-05-07 DIAGNOSIS — C79.51 BONE METASTASIS: Chronic | ICD-10-CM

## 2021-05-07 LAB
ALBUMIN SERPL-MCNC: 4 G/DL (ref 3.5–5.2)
ALBUMIN/GLOB SERPL: 1.6 G/DL
ALP SERPL-CCNC: 87 U/L (ref 39–117)
ALT SERPL W P-5'-P-CCNC: 23 U/L (ref 1–33)
ANION GAP SERPL CALCULATED.3IONS-SCNC: 5 MMOL/L (ref 5–15)
AST SERPL-CCNC: 21 U/L (ref 1–32)
BASOPHILS # BLD AUTO: 0.04 10*3/MM3 (ref 0–0.2)
BASOPHILS NFR BLD AUTO: 0.8 % (ref 0–1.5)
BILIRUB SERPL-MCNC: 0.2 MG/DL (ref 0–1.2)
BUN SERPL-MCNC: 15 MG/DL (ref 6–20)
BUN/CREAT SERPL: 20 (ref 7–25)
CALCIUM SPEC-SCNC: 9.1 MG/DL (ref 8.6–10.5)
CANCER AG15-3 SERPL-ACNC: 40.3 U/ML
CHLORIDE SERPL-SCNC: 104 MMOL/L (ref 98–107)
CO2 SERPL-SCNC: 28 MMOL/L (ref 22–29)
CREAT SERPL-MCNC: 0.75 MG/DL (ref 0.57–1)
DEPRECATED RDW RBC AUTO: 41.2 FL (ref 37–54)
EOSINOPHIL # BLD AUTO: 0.24 10*3/MM3 (ref 0–0.4)
EOSINOPHIL NFR BLD AUTO: 4.7 % (ref 0.3–6.2)
ERYTHROCYTE [DISTWIDTH] IN BLOOD BY AUTOMATED COUNT: 13.1 % (ref 12.3–15.4)
GFR SERPL CREATININE-BSD FRML MDRD: 81 ML/MIN/1.73
GLOBULIN UR ELPH-MCNC: 2.5 GM/DL
GLUCOSE SERPL-MCNC: 87 MG/DL (ref 65–99)
HCT VFR BLD AUTO: 38.1 % (ref 34–46.6)
HGB BLD-MCNC: 12.7 G/DL (ref 12–15.9)
HOLD SPECIMEN: NORMAL
IMM GRANULOCYTES # BLD AUTO: 0.02 10*3/MM3 (ref 0–0.05)
IMM GRANULOCYTES NFR BLD AUTO: 0.4 % (ref 0–0.5)
INR PPP: 3 (ref 0.9–1.1)
LYMPHOCYTES # BLD AUTO: 1.22 10*3/MM3 (ref 0.7–3.1)
LYMPHOCYTES NFR BLD AUTO: 24.1 % (ref 19.6–45.3)
MAGNESIUM SERPL-MCNC: 1.8 MG/DL (ref 1.6–2.6)
MCH RBC QN AUTO: 29.1 PG (ref 26.6–33)
MCHC RBC AUTO-ENTMCNC: 33.3 G/DL (ref 31.5–35.7)
MCV RBC AUTO: 87.2 FL (ref 79–97)
MONOCYTES # BLD AUTO: 0.59 10*3/MM3 (ref 0.1–0.9)
MONOCYTES NFR BLD AUTO: 11.6 % (ref 5–12)
NEUTROPHILS NFR BLD AUTO: 2.96 10*3/MM3 (ref 1.7–7)
NEUTROPHILS NFR BLD AUTO: 58.4 % (ref 42.7–76)
NRBC BLD AUTO-RTO: 0 /100 WBC (ref 0–0.2)
PHOSPHATE SERPL-MCNC: 3.7 MG/DL (ref 2.5–4.5)
PLATELET # BLD AUTO: 239 10*3/MM3 (ref 140–450)
PMV BLD AUTO: 9.9 FL (ref 6–12)
POTASSIUM SERPL-SCNC: 3.8 MMOL/L (ref 3.5–5.2)
PROT SERPL-MCNC: 6.5 G/DL (ref 6–8.5)
RBC # BLD AUTO: 4.37 10*6/MM3 (ref 3.77–5.28)
SODIUM SERPL-SCNC: 137 MMOL/L (ref 136–145)
WBC # BLD AUTO: 5.07 10*3/MM3 (ref 3.4–10.8)

## 2021-05-07 PROCEDURE — 96365 THER/PROPH/DIAG IV INF INIT: CPT | Performed by: INTERNAL MEDICINE

## 2021-05-07 PROCEDURE — 99214 OFFICE O/P EST MOD 30 MIN: CPT | Performed by: INTERNAL MEDICINE

## 2021-05-07 PROCEDURE — 86300 IMMUNOASSAY TUMOR CA 15-3: CPT

## 2021-05-07 PROCEDURE — 85610 PROTHROMBIN TIME: CPT | Performed by: NURSE PRACTITIONER

## 2021-05-07 PROCEDURE — 85025 COMPLETE CBC W/AUTO DIFF WBC: CPT

## 2021-05-07 PROCEDURE — 36415 COLL VENOUS BLD VENIPUNCTURE: CPT

## 2021-05-07 PROCEDURE — 96402 CHEMO HORMON ANTINEOPL SQ/IM: CPT | Performed by: INTERNAL MEDICINE

## 2021-05-07 PROCEDURE — 25010000002 ZOLEDRONIC ACID PER 1 MG: Performed by: INTERNAL MEDICINE

## 2021-05-07 PROCEDURE — 36416 COLLJ CAPILLARY BLOOD SPEC: CPT | Performed by: NURSE PRACTITIONER

## 2021-05-07 PROCEDURE — 25010000002 HEPARIN LOCK FLUSH PER 10 UNITS: Performed by: INTERNAL MEDICINE

## 2021-05-07 PROCEDURE — 84100 ASSAY OF PHOSPHORUS: CPT

## 2021-05-07 PROCEDURE — 25010000002 FULVESTRANT PER 25 MG: Performed by: INTERNAL MEDICINE

## 2021-05-07 PROCEDURE — 93793 ANTICOAG MGMT PT WARFARIN: CPT | Performed by: NURSE PRACTITIONER

## 2021-05-07 PROCEDURE — 80053 COMPREHEN METABOLIC PANEL: CPT

## 2021-05-07 PROCEDURE — 83735 ASSAY OF MAGNESIUM: CPT

## 2021-05-07 RX ORDER — SODIUM CHLORIDE 9 MG/ML
250 INJECTION, SOLUTION INTRAVENOUS ONCE
Status: CANCELLED | OUTPATIENT
Start: 2021-05-07

## 2021-05-07 RX ORDER — HEPARIN SODIUM (PORCINE) LOCK FLUSH IV SOLN 100 UNIT/ML 100 UNIT/ML
500 SOLUTION INTRAVENOUS AS NEEDED
Status: DISCONTINUED | OUTPATIENT
Start: 2021-05-07 | End: 2021-05-07 | Stop reason: HOSPADM

## 2021-05-07 RX ORDER — HEPARIN SODIUM (PORCINE) LOCK FLUSH IV SOLN 100 UNIT/ML 100 UNIT/ML
500 SOLUTION INTRAVENOUS AS NEEDED
Status: CANCELLED | OUTPATIENT
Start: 2021-05-07

## 2021-05-07 RX ORDER — SODIUM CHLORIDE 0.9 % (FLUSH) 0.9 %
10 SYRINGE (ML) INJECTION AS NEEDED
Status: CANCELLED | OUTPATIENT
Start: 2021-05-07

## 2021-05-07 RX ORDER — SODIUM CHLORIDE 0.9 % (FLUSH) 0.9 %
20 SYRINGE (ML) INJECTION AS NEEDED
Status: DISCONTINUED | OUTPATIENT
Start: 2021-05-07 | End: 2021-05-07 | Stop reason: HOSPADM

## 2021-05-07 RX ORDER — SODIUM CHLORIDE 0.9 % (FLUSH) 0.9 %
20 SYRINGE (ML) INJECTION AS NEEDED
Status: CANCELLED | OUTPATIENT
Start: 2021-05-07

## 2021-05-07 RX ORDER — SODIUM CHLORIDE 9 MG/ML
250 INJECTION, SOLUTION INTRAVENOUS ONCE
Status: COMPLETED | OUTPATIENT
Start: 2021-05-07 | End: 2021-05-07

## 2021-05-07 RX ORDER — SODIUM CHLORIDE 0.9 % (FLUSH) 0.9 %
10 SYRINGE (ML) INJECTION AS NEEDED
Status: DISCONTINUED | OUTPATIENT
Start: 2021-05-07 | End: 2021-05-07 | Stop reason: HOSPADM

## 2021-05-07 RX ADMIN — SODIUM CHLORIDE, PRESERVATIVE FREE 10 ML: 5 INJECTION INTRAVENOUS at 11:13

## 2021-05-07 RX ADMIN — ZOLEDRONIC ACID 4 MG: 4 INJECTION, SOLUTION, CONCENTRATE INTRAVENOUS at 10:38

## 2021-05-07 RX ADMIN — HEPARIN 500 UNITS: 100 SYRINGE at 11:13

## 2021-05-07 RX ADMIN — SODIUM CHLORIDE 250 ML: 9 INJECTION, SOLUTION INTRAVENOUS at 10:38

## 2021-05-07 RX ADMIN — FULVESTRANT 500 MG: 50 INJECTION INTRAMUSCULAR at 11:14

## 2021-05-07 NOTE — PROGRESS NOTES
PT states compliant c tx . Denies any new medications or bleeding issues. PT denies any s/s of blood clot. PT instructed to continue same dose and Coumadin friendly diet. PT will be seen in 4 weeks when returning for [facility]. Patient instructed regarding medication; results given and questions answered. Nutritional counseling given.  Dietary factors affecting therapy addressed.  Patient instructed to monitor for excessive bruising or bleeding. PT verbalizes understanding. Findings reported by Elvia Allen RN.   Today's INR is   Lab Results - Last 18 Months   Lab Units 05/07/21  0000   INR  3.00*

## 2021-05-07 NOTE — PROGRESS NOTES
DATE OF VISIT: 5/7/2021      REASON FOR VISIT: Metastatic breast cancer with bone, liver metastases on Faslodex, neuropathy from chemotherapy, cancer related pain, DVT on anticoagulation with Coumadin      HISTORY OF PRESENT ILLNESS:   50-year-old female with medical problem consisting of ductal carcinoma in situ of right breast diagnosed in 2007, anxiety/depression, metastatic breast cancer with bone, liver metastasis currently on treatment with Faslodex is here for follow-up appointment today.  Patient is scheduled to get cycle 8 of Faslodex today along with Zometa.  Complains of skin nodules on right axillary area that has not responded to recent antibiotic use.  Denies any bleeding.  Denies any new lymph node enlargement.  Denies any recent worsening of neuropathy.  Denies any bowel or bladder incontinence.  Complains of chronic back pain and hip pain.  Denies any fever or recent infection.        Oncology History:    1.  Metastatic right breast cancer with bone and liver metastases:  -Patient was initially diagnosed in July 2017 with axillary biopsy on right side  -S/p 6 cycles of chemotherapy with Taxotere and Cytoxan between July 26, 2017 and December 6, 2017  -Patient was evaluated by  at [facility] in [place] for second opinion.  -Patient received 20 cycles of chemotherapy with palbociclib and letrozole between July 30, 2018 and October 2, 2020.  -Due to enlarging mass in the right axilla patient's treatment was changed to Faslodex on October 16, 2020  -Due to continuous enlargement of axillary mass without any other progression patient received radiation treatment to axilla that was completed on March 4, 2021          Past Medical History, Past Surgical History, Social History, Family History have been reviewed and are without significant changes except as mentioned.    Review of Systems   Constitutional: Positive for fatigue.   Musculoskeletal: Positive for arthralgias and back pain.   Skin:         Complains of skin nodules in right axillary area.   Neurological: Positive for numbness (Positive for chronic neuropathy affecting upper and lower extremities).   Hematological: Negative for adenopathy.      A comprehensive 14 point review of systems was performed and was negative except as mentioned.    Medications:  The current medication list was reviewed in the EMR    ALLERGIES:    Allergies   Allergen Reactions   • Percocet [Oxycodone-Acetaminophen] Nausea And Vomiting       Objective      Vitals:    05/07/21 0911   BP: 120/70   Pulse: 79   Resp: 18   Temp: 98.5 °F (36.9 °C)   SpO2: 94%   Weight: 104 kg (228 lb 4.8 oz)   PainSc: 0-No pain     Current Status 4/9/2021   ECOG score 0       Physical Exam  Cardiovascular:      Rate and Rhythm: Normal rate and regular rhythm.   Pulmonary:      Breath sounds: Normal breath sounds. No wheezing or rhonchi.   Abdominal:      General: Bowel sounds are normal.      Palpations: Abdomen is soft.      Tenderness: There is no abdominal tenderness.   Musculoskeletal:      Comments: Decreased range of motion   Skin:     Comments: Breast exam was done in presence of registered nurse Miguelangel.  Skin lesion in the right axillary area and anterior axillary line is nodular .   Neurological:      Mental Status: She is alert and oriented to person, place, and time.           RECENT LABS:  Glucose   Date Value Ref Range Status   05/07/2021 87 65 - 99 mg/dL Final     Sodium   Date Value Ref Range Status   05/07/2021 137 136 - 145 mmol/L Final     Potassium   Date Value Ref Range Status   05/07/2021 3.8 3.5 - 5.2 mmol/L Final     CO2   Date Value Ref Range Status   05/07/2021 28.0 22.0 - 29.0 mmol/L Final     Chloride   Date Value Ref Range Status   05/07/2021 104 98 - 107 mmol/L Final     Anion Gap   Date Value Ref Range Status   05/07/2021 5.0 5.0 - 15.0 mmol/L Final     Creatinine   Date Value Ref Range Status   05/07/2021 0.75 0.57 - 1.00 mg/dL Final     BUN   Date Value Ref Range  Status   05/07/2021 15 6 - 20 mg/dL Final     BUN/Creatinine Ratio   Date Value Ref Range Status   05/07/2021 20.0 7.0 - 25.0 Final     Calcium   Date Value Ref Range Status   05/07/2021 9.1 8.6 - 10.5 mg/dL Final     eGFR Non  Amer   Date Value Ref Range Status   05/07/2021 81 >60 mL/min/1.73 Final     Alkaline Phosphatase   Date Value Ref Range Status   05/07/2021 87 39 - 117 U/L Final     Total Protein   Date Value Ref Range Status   05/07/2021 6.5 6.0 - 8.5 g/dL Final     ALT (SGPT)   Date Value Ref Range Status   05/07/2021 23 1 - 33 U/L Final     AST (SGOT)   Date Value Ref Range Status   05/07/2021 21 1 - 32 U/L Final     Total Bilirubin   Date Value Ref Range Status   05/07/2021 0.2 0.0 - 1.2 mg/dL Final     Albumin   Date Value Ref Range Status   05/07/2021 4.00 3.50 - 5.20 g/dL Final     Globulin   Date Value Ref Range Status   05/07/2021 2.5 gm/dL Final     Lab Results   Component Value Date    WBC 5.07 05/07/2021    HGB 12.7 05/07/2021    HCT 38.1 05/07/2021    MCV 87.2 05/07/2021     05/07/2021     Lab Results   Component Value Date    NEUTROABS 2.96 05/07/2021     Lab Results   Component Value Date     29.8 (H) 04/09/2021    LABCA2 32.6 04/09/2021         PATHOLOGY:  * Cannot find OR log *         RADIOLOGY DATA :  No radiology results for the last 7 days        Assessment/Plan     1.  Metastatic right breast cancer with bone and liver metastases:  -Please review oncology history for prior treatment details  -Has been on Faslodex since October 16, 2020.  -Recommend continue with Faslodex for now  -We will proceed with cycle 8 of Faslodex today  -We will ask patient to return to clinic in 4 weeks with a repeat CBC, CMP, magnesium, phosphorus, CA 15-3 and CA 27-29 to be done on that day  -Plan is to do restaging CT of chest abdomen and pelvis with contrast after cycle 9 of Faslodex which was discussed with patient and her family.      2.  Brain metastases:  -Patient was diagnosed with  calvarial metastases without any evidence of parenchymal mets.  Patient was evaluated by Dr. Pulido and no radiation was recommended.    3.  Right internal jugular vein DVT:  -Remains on Coumadin    4.  Bone metastases:  -Remains on monthly Zometa since October 23, 2017  -We will continue with monthly Zometa    5.  Elevated liver function test:  -Secondary to liver metastases plus or minus medication.  Will monitor with CMP    6.  History of melanoma in situ status post excision by Dr. Linn    7.  History of DCIS of right breast diagnosed in 2007  -Had a mastectomy followed by tamoxifen for 5 years that completed in 2013    8.  Chemotherapy-induced neuropathy  -Remains on gabapentin    9.  Cancer related pain:  -Remains on as needed Ultram    10.  Skin lesion:  -Skin lesion in the right axilla is worrisome for metastasis.  Patient has upcoming appointment with dermatologist next week.  Recommend skin biopsy    10.  Health maintenance: Patient does not smoke    11. Advance Care Planning: For now patient remains full code and is able to make decisions.  Patient has health care surrogate mentioned on chart.                 PHQ-9 Total Score:       Kylie García reports a pain score of 0.  Given her pain assessment as noted, treatment options were discussed and the following options were decided upon as a follow-up plan to address the patient's pain: continuation of current treatment plan for pain.         Ovidio Meadows MD  5/7/2021  18:04 CDT        Part of this note may be an electronic transcription/translation of spoken language to printed text using the Dragon Dictation System.          CC:

## 2021-05-08 LAB — CANCER AG27-29 SERPL-ACNC: 45 U/ML (ref 0–38.6)

## 2021-05-17 ENCOUNTER — OFFICE VISIT (OUTPATIENT)
Dept: RADIATION ONCOLOGY | Facility: HOSPITAL | Age: 51
End: 2021-05-17

## 2021-05-17 ENCOUNTER — HOSPITAL ENCOUNTER (OUTPATIENT)
Dept: RADIATION ONCOLOGY | Facility: HOSPITAL | Age: 51
Setting detail: RADIATION/ONCOLOGY SERIES
End: 2021-05-17

## 2021-05-17 VITALS
BODY MASS INDEX: 35.94 KG/M2 | HEART RATE: 72 BPM | SYSTOLIC BLOOD PRESSURE: 123 MMHG | RESPIRATION RATE: 18 BRPM | DIASTOLIC BLOOD PRESSURE: 77 MMHG | TEMPERATURE: 97.4 F | WEIGHT: 229.5 LBS

## 2021-05-17 DIAGNOSIS — Z86.000 HISTORY OF DUCTAL CARCINOMA IN SITU (DCIS) OF BREAST: Chronic | ICD-10-CM

## 2021-05-17 DIAGNOSIS — C50.911 MALIGNANT NEOPLASM OF RIGHT BREAST IN FEMALE, ESTROGEN RECEPTOR POSITIVE, UNSPECIFIED SITE OF BREAST (HCC): Primary | ICD-10-CM

## 2021-05-17 DIAGNOSIS — C79.51 BONE METASTASIS: ICD-10-CM

## 2021-05-17 DIAGNOSIS — Z17.0 MALIGNANT NEOPLASM OF RIGHT BREAST IN FEMALE, ESTROGEN RECEPTOR POSITIVE, UNSPECIFIED SITE OF BREAST (HCC): Primary | ICD-10-CM

## 2021-05-17 DIAGNOSIS — C79.2 METASTASIS TO SKIN (HCC): ICD-10-CM

## 2021-05-17 PROCEDURE — 99213 OFFICE O/P EST LOW 20 MIN: CPT | Performed by: RADIOLOGY

## 2021-05-17 PROCEDURE — G0463 HOSPITAL OUTPT CLINIC VISIT: HCPCS | Performed by: RADIOLOGY

## 2021-05-17 RX ORDER — MINOCYCLINE HYDROCHLORIDE 100 MG/1
CAPSULE ORAL
COMMUNITY
Start: 2021-05-14 | End: 2021-06-03 | Stop reason: ALTCHOICE

## 2021-05-17 NOTE — PROGRESS NOTES
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  The patient completed a course of palliative radiotherapy to right axillary adenopathy on 02/17/2021.                                          History of Present Illness:   Mrs. Kylie García is a 50-year-old lady with a history of ductal carcinoma  of the right breast diagnosed in 2017.  Patient is status post right mastectomy with breast reconstruction; 6 cycles of Taxotere and Cytoxan; and 20 cycles of palbociclib with letrozole completed in early October 2020.  Patient was then receiving Faslodex.  Patient was noted to have an enlarging right axillary mass in mid October 2020.  Patient completed a 4256 cGy hypofractionated regimen to the right axilla on 02/17/2021.  The patient returns today for follow-up evaluation.    CT scans of the chest, abdomen and pelvis (04/07/2021 showed reduction in the size of right axillary adenopathy from 2.6 x 3.1 cm (on CT 01/07/2021) to 1.7 x 2.4 cm.  Stable predominantly sclerotic osseous metastases were noted.  Patient has a stable partially calcified lesion in the left hepatic lobe.  No new sites of metastases were identified.    Patient was seen in follow-up by Dr. Meadows on 05/07/2021.  Physical examination revealed new nodular lesions in the right axilla.  Punch biopsy of a lesion over the right upper flank (05/11/2021) revealed invasive mammary carcinoma, no specific type, extending to the deep and peripheral edges of the biopsy specimen.    (Old medical records were requested, reviewed, and summarized in the HPI)    Review of Systems      Constitutional: Positive for fatigue.  Musculoskeletal: Positive for arthralgias and back pain, and discomfort/mass sensation at right axilla.  Integumentary: Positive for multiple  nodular lesions over upper inner aspect of reconstructed right breast and right upper flank region.  A comprehensive 14 point review of systems was performed and was otherwise negative except as mentioned.        Past Medical History:   Diagnosis Date   • Anxiety    • Depression    • Encounter for gynecological examination    • Malignant neoplasm of female breast (CMS/HCC)     hx of dcis s/p mastectomy and reconstruction and augmentation      • Primary malignant neoplasm of breast (CMS/HCC)     dcis in rt breast s/p mastectomy,reconstruction      • Ventricular premature beats         Past Surgical History:   Procedure Laterality Date   • AUGMENTATION MAMMAPLASTY     • AXILLARY LYMPH NODE BIOPSY/EXCISION Right 7/10/2017    Procedure: BIOSPY RIGHT AXILLARY MASS AND OR LYMPH;  Surgeon: Sourav Ernst MD;  Location: [facility];  Service:    • BREAST BIOPSY  12/07/2007    Mammotome biopsy of the right side with clip placement   • BREAST LUMPECTOMY     • BREAST RECONSTRUCTION  10/28/2008    Abscence of right breast secondary to mastectomy. Implant exchange for reconstruction of right breast with open para-prosthetic capsulotomy   • BREAST SURGERY  10/01/2009    Left breast ptosis and breast asymmetry secondary to right breast reconstruction for breast cancer. Left breast mastopexy with augmentation.   • CARDIAC CATHETERIZATION  09/18/2008    Normal coronary arteriography. Normal left ventricular angiogram   • EP STUDY     • MASTECTOMY Right    • MASTECTOMY  02/05/2008    Multifocal right breast ductal carcinoma-in-situ. Right total mastectomy   • MASTECTOMY, PARTIAL  01/03/2008    Ductal carcinoma in-situ of the right breast, proven by mammotome biopsy. Right lumpectomy, medial portion of the breast, between 2 o'clock and 4 o'clock, 5 cm from the nipple, with clip placement, radiographic inter. of severo. specimen, & ultrasound   • PAP SMEAR  03/03/2009    Negative   • NC INSJ TUNNELED CVC W/O SUBQ PORT/ AGE 5 YR/>  Right 7/10/2017    Procedure: MEDIPORT     (c-arm#2);  Surgeon: Sourav Ernst MD;  Location: [facility];  Service: General      Family History   Problem Relation Age of Onset   • Anemia Mother    • Multiple sclerosis Mother    • No Known Problems Father    • Diabetes Other    • Multiple sclerosis Other         Social History     Socioeconomic History   • Marital status:      Spouse name: Not on file   • Number of children: Not on file   • Years of education: Not on file   • Highest education level: Not on file   Tobacco Use   • Smoking status: Never Smoker   • Smokeless tobacco: Never Used   Substance and Sexual Activity   • Alcohol use: No   • Drug use: No   • Sexual activity: Defer     The patient is a lifetime non-smoker and nondrinker.    Allergies:  Percocet [oxycodone-acetaminophen]   Prior to Admission medications    Medication Sig Start Date End Date Taking? Authorizing Provider   gabapentin (NEURONTIN) 300 MG capsule Take 1 capsule by mouth 3 (Three) Times a Day. 3/19/21   Ovidio Meadows MD   minocycline (MINOCIN,DYNACIN) 100 MG capsule  21   ProviderMaki MD   sertraline (ZOLOFT) 100 MG tablet  21   ProviderMaki MD   traMADol (ULTRAM) 50 MG tablet Take 1 tablet by mouth Every 8 (Eight) Hours As Needed for Moderate Pain . 21   Ovidio Meadows MD   warfarin (COUMADIN) 5 MG tablet Take 1 tab nightly or AS DIRECTED PER COUMADIN CLINIC 4/15/21   Quang Coe APRN      Pain:(on a scale of 0-10)    Pain Score    21 0952   PainSc: 0-No pain        Kylie Davistrenton PabloGarcía reports a pain score of 0.       Quality of Life:   KPS: 90  ECO    Advanced Care Plan:   Not discussed during this visit    Physical Examination:  Vitals:   Blood pressure 123/77, pulse 82 and respirations 18  Height:    Weight: Weight: 104 kg (229 lb 8 oz) Body mass index is 35.94 kg/m².    Physical Exam  Constitutional: The patient is a well-developed, well-nourished white female in no acute  distress.  Alert and oriented ×3.  HEENT: Atraumatic. Normocephalic. No abnormalities noted.  Lymphatics: No cervical, supraclavicular, left axillary or inguinal lymphadenopathy is palpated.  Fullness palpated at right low axilla.  Patient has a persistently open draining site in the left low axilla.  CV: Regular rate and rhythm.  No murmurs, rubs, or gallops are appreciated.  Respiratory: Lungs clear to auscultation.  Breath sounds equal bilaterally.  Breasts: The left breast is within normal limits, with no suspicious lesions or nodules palpated.  Status post left mastectomy with breast reconstruction.  GI: Abdomen soft, nontender, nondistended, with no hepatosplenomegaly or masses palpated.  Integumentary: The patient has multiple single and confluent raised, mildly erythematous nodular lesions measuring up to 1 cm in size over the left mid and upper flank region, lower axilla extending over the upper inner aspect of the right breast.  These lesions are consistent with chest wall tumor recurrence.  Extremities: No clubbing, cyanosis, or edema.  Neurologic: Cranial nerves II through XII are grossly intact, with no focal neurological deficits noted on exam.  Psychiatric: Alert and oriented x3. Normal affect, with no anxiety or depression noted.    Radiographs : CT scans of chest, abdomen and pelvis (04/07/2021) described above  Pathology: Dermal biopsy (05/11/2021) revealed metastatic mammary carcinoma  Labs:   Lab Results   Component Value Date    GLUCOSE 87 05/07/2021    BUN 15 05/07/2021    CREATININE 0.75 05/07/2021    EGFRIFNONA 81 05/07/2021    BCR 20.0 05/07/2021    K 3.8 05/07/2021    CO2 28.0 05/07/2021    CALCIUM 9.1 05/07/2021    ALBUMIN 4.00 05/07/2021    AST 21 05/07/2021    ALT 23 05/07/2021      WBC   Date Value Ref Range Status   05/07/2021 5.07 3.40 - 10.80 10*3/mm3 Final     Hemoglobin   Date Value Ref Range Status   05/07/2021 12.7 12.0 - 15.9 g/dL Final     Hematocrit   Date Value Ref Range  Status   05/07/2021 38.1 34.0 - 46.6 % Final     Platelets   Date Value Ref Range Status   05/07/2021 239 140 - 450 10*3/mm3 Final    No results found for: PSA No results found for: CEA       Assessment:  Stage IV ductal carcinoma of right breast with regional lymphatic, dermal, osseous and hepatic metastases    Plan:  Dr. Meadows was advised of the dermal biopsy results, and the diagnosis of chest wall tumor recurrence.  I explained to Mrs. García that Dr. Meadows will review chemotherapy options.  Radiotherapy does not appear indicated at this time.  Patient will return for a recheck by our service in 3 months or sooner as needed.    Sincerely,      Electronically signed by Octavio Petit MD 5/17/2021  11:16 CDT

## 2021-05-21 ENCOUNTER — HOSPITAL ENCOUNTER (OUTPATIENT)
Dept: PET IMAGING | Facility: HOSPITAL | Age: 51
Discharge: HOME | End: 2021-05-21
Admitting: INTERNAL MEDICINE

## 2021-05-21 DIAGNOSIS — C79.2 METASTASIS TO SKIN (HCC): ICD-10-CM

## 2021-05-21 DIAGNOSIS — C50.911 MALIGNANT NEOPLASM OF RIGHT BREAST IN FEMALE, ESTROGEN RECEPTOR POSITIVE, UNSPECIFIED SITE OF BREAST (HCC): ICD-10-CM

## 2021-05-21 DIAGNOSIS — Z17.0 MALIGNANT NEOPLASM OF RIGHT BREAST IN FEMALE, ESTROGEN RECEPTOR POSITIVE, UNSPECIFIED SITE OF BREAST (HCC): ICD-10-CM

## 2021-05-21 DIAGNOSIS — C79.51 BONE METASTASIS: ICD-10-CM

## 2021-05-21 PROCEDURE — 78815 PET IMAGE W/CT SKULL-THIGH: CPT

## 2021-05-21 PROCEDURE — A9552 F18 FDG: HCPCS | Performed by: INTERNAL MEDICINE

## 2021-05-21 PROCEDURE — 0 FLUDEOXYGLUCOSE F18 SOLUTION: Performed by: INTERNAL MEDICINE

## 2021-05-21 RX ADMIN — FLUDEOXYGLUCOSE F18 1 DOSE: 300 INJECTION INTRAVENOUS at 08:14

## 2021-05-25 ENCOUNTER — OFFICE VISIT (OUTPATIENT)
Dept: ONCOLOGY | Facility: CLINIC | Age: 51
End: 2021-05-25

## 2021-05-25 VITALS
HEART RATE: 68 BPM | HEIGHT: 67 IN | RESPIRATION RATE: 20 BRPM | WEIGHT: 229.4 LBS | TEMPERATURE: 98.2 F | SYSTOLIC BLOOD PRESSURE: 139 MMHG | DIASTOLIC BLOOD PRESSURE: 74 MMHG | BODY MASS INDEX: 36 KG/M2

## 2021-05-25 DIAGNOSIS — C50.911 MALIGNANT NEOPLASM OF RIGHT BREAST IN FEMALE, ESTROGEN RECEPTOR POSITIVE, UNSPECIFIED SITE OF BREAST (HCC): Primary | Chronic | ICD-10-CM

## 2021-05-25 DIAGNOSIS — Z79.01 LONG TERM CURRENT USE OF ANTICOAGULANT THERAPY: Chronic | ICD-10-CM

## 2021-05-25 DIAGNOSIS — Z17.0 MALIGNANT NEOPLASM OF RIGHT BREAST IN FEMALE, ESTROGEN RECEPTOR POSITIVE, UNSPECIFIED SITE OF BREAST (HCC): Primary | Chronic | ICD-10-CM

## 2021-05-25 DIAGNOSIS — Z85.820 HISTORY OF MALIGNANT MELANOMA OF SKIN: Chronic | ICD-10-CM

## 2021-05-25 DIAGNOSIS — Z86.000 HISTORY OF DUCTAL CARCINOMA IN SITU (DCIS) OF BREAST: Chronic | ICD-10-CM

## 2021-05-25 DIAGNOSIS — C78.7 LIVER METASTASIS: Chronic | ICD-10-CM

## 2021-05-25 DIAGNOSIS — C79.51 BONE METASTASIS: Chronic | ICD-10-CM

## 2021-05-25 DIAGNOSIS — C79.31 METASTASIS TO BRAIN (HCC): Chronic | ICD-10-CM

## 2021-05-25 DIAGNOSIS — Z51.11 ENCOUNTER FOR ANTINEOPLASTIC CHEMOTHERAPY: ICD-10-CM

## 2021-05-25 DIAGNOSIS — C77.3 SECONDARY MALIGNANT NEOPLASM OF AXILLARY LYMPH NODES (HCC): ICD-10-CM

## 2021-05-25 DIAGNOSIS — G62.0 NEUROPATHY DUE TO CHEMOTHERAPEUTIC DRUG (HCC): Chronic | ICD-10-CM

## 2021-05-25 DIAGNOSIS — T45.1X5A NEUROPATHY DUE TO CHEMOTHERAPEUTIC DRUG (HCC): Chronic | ICD-10-CM

## 2021-05-25 DIAGNOSIS — R79.89 ELEVATED LIVER FUNCTION TESTS: Chronic | ICD-10-CM

## 2021-05-25 PROCEDURE — 99215 OFFICE O/P EST HI 40 MIN: CPT | Performed by: INTERNAL MEDICINE

## 2021-05-25 PROCEDURE — G0463 HOSPITAL OUTPT CLINIC VISIT: HCPCS | Performed by: INTERNAL MEDICINE

## 2021-05-25 RX ORDER — ONDANSETRON 4 MG/1
4 TABLET, FILM COATED ORAL 4 TIMES DAILY PRN
Qty: 40 TABLET | Refills: 3 | Status: SHIPPED | OUTPATIENT
Start: 2021-05-25

## 2021-05-25 RX ORDER — SCOLOPAMINE TRANSDERMAL SYSTEM 1 MG/1
1 PATCH, EXTENDED RELEASE TRANSDERMAL
Qty: 10 PATCH | Refills: 1 | Status: SHIPPED | OUTPATIENT
Start: 2021-05-25 | End: 2022-01-01 | Stop reason: SDUPTHER

## 2021-05-25 RX ORDER — DEXAMETHASONE 4 MG/1
TABLET ORAL
Qty: 6 TABLET | Refills: 3 | Status: SHIPPED | OUTPATIENT
Start: 2021-05-25 | End: 2022-01-01 | Stop reason: ALTCHOICE

## 2021-05-25 NOTE — PROGRESS NOTES
DATE OF VISIT: 5/25/2021      REASON FOR VISIT: Metastatic breast cancer with bone, liver metastases and skin metastases on Faslodex, neuropathy from chemotherapy, cancer related pain, DVT on anticoagulation with Coumadin      HISTORY OF PRESENT ILLNESS:   50-year-old female with medical problem consisting of ductal carcinoma in situ of right breast diagnosed in 2007, anxiety/depression, metastatic breast cancer with bone, liver metastasis currently on treatment with Faslodex is here for follow-up appointment today.  Patient was last seen here at clinic on May 7, 2021 and patient was complaining of nodular skin lesion around right axilla for which he underwent skin biopsy on May 12, 2021 that confirmed mammary carcinoma.  Subsequently patient had a PET/CT done on May 21, 2021, she is here to discuss results and further recommendation.  Denies any bleeding.  Denies any recent worsening of neuropathy.  Denies any bowel or bladder incontinence.  Complains of chronic back pain and hip pain.  Denies any recent fevers.        Oncology history:    1.  Metastatic right breast cancer with bone and liver metastases:  -Patient was initially diagnosed in July 2017 with axillary biopsy on right side  -S/p 6 cycles of chemotherapy with Taxotere and Cytoxan between July 26, 2017 and December 6, 2017  -Patient was evaluated by  at [facility] in [place] for second opinion.  -Patient received 20 cycles of chemotherapy with palbociclib and letrozole between July 30, 2018 and October 2, 2020.  -Due to enlarging mass in the right axilla patient's treatment was changed to Faslodex on October 16, 2020  -Due to continuous enlargement of axillary mass without any other progression patient received radiation treatment to axilla that was completed on March 4, 2021  -Patient has received 8 cycles of Faslodex between October 16, 2020 and May 7, 2021  -PET/CT done on May 21, 2021 shows progression of disease with axillary adenopathy,  "skin involvement as well as new onset of right-sided liver metastases.  -Recommend starting chemotherapy with Adriamycin and Cytoxan starting next week.          Past Medical History, Past Surgical History, Social History, Family History have been reviewed and are without significant changes except as mentioned.    Review of Systems   Constitutional: Positive for fatigue.   Musculoskeletal: Positive for arthralgias and back pain.   Skin:        Positive for skin nodules on right axillary area   Hematological: Negative for adenopathy.      A comprehensive 14 point review of systems was performed and was negative except as mentioned.    Medications:  The current medication list was reviewed in the EMR    ALLERGIES:    Allergies   Allergen Reactions   • Percocet [Oxycodone-Acetaminophen] Nausea And Vomiting       Objective      Vitals:    05/25/21 1115   BP: 139/74   Pulse: 68   Resp: 20   Temp: 98.2 °F (36.8 °C)   TempSrc: Temporal   Weight: 104 kg (229 lb 6.4 oz)   Height: 170.2 cm (67.01\")   PainSc: 0-No pain     Current Status 5/25/2021   ECOG score 0       Physical Exam  Cardiovascular:      Rate and Rhythm: Normal rate and regular rhythm.      Comments: Port-A-Cath present  Pulmonary:      Breath sounds: Normal breath sounds.   Musculoskeletal:      Comments: Decreased range of motion   Neurological:      Mental Status: She is alert and oriented to person, place, and time.           RECENT LABS:  Glucose   Date Value Ref Range Status   05/07/2021 87 65 - 99 mg/dL Final     Sodium   Date Value Ref Range Status   05/07/2021 137 136 - 145 mmol/L Final     Potassium   Date Value Ref Range Status   05/07/2021 3.8 3.5 - 5.2 mmol/L Final     CO2   Date Value Ref Range Status   05/07/2021 28.0 22.0 - 29.0 mmol/L Final     Chloride   Date Value Ref Range Status   05/07/2021 104 98 - 107 mmol/L Final     Anion Gap   Date Value Ref Range Status   05/07/2021 5.0 5.0 - 15.0 mmol/L Final     Creatinine   Date Value Ref Range " Status   05/07/2021 0.75 0.57 - 1.00 mg/dL Final     BUN   Date Value Ref Range Status   05/07/2021 15 6 - 20 mg/dL Final     BUN/Creatinine Ratio   Date Value Ref Range Status   05/07/2021 20.0 7.0 - 25.0 Final     Calcium   Date Value Ref Range Status   05/07/2021 9.1 8.6 - 10.5 mg/dL Final     eGFR Non  Amer   Date Value Ref Range Status   05/07/2021 81 >60 mL/min/1.73 Final     Alkaline Phosphatase   Date Value Ref Range Status   05/07/2021 87 39 - 117 U/L Final     Total Protein   Date Value Ref Range Status   05/07/2021 6.5 6.0 - 8.5 g/dL Final     ALT (SGPT)   Date Value Ref Range Status   05/07/2021 23 1 - 33 U/L Final     AST (SGOT)   Date Value Ref Range Status   05/07/2021 21 1 - 32 U/L Final     Total Bilirubin   Date Value Ref Range Status   05/07/2021 0.2 0.0 - 1.2 mg/dL Final     Albumin   Date Value Ref Range Status   05/07/2021 4.00 3.50 - 5.20 g/dL Final     Globulin   Date Value Ref Range Status   05/07/2021 2.5 gm/dL Final     Lab Results   Component Value Date    WBC 5.07 05/07/2021    HGB 12.7 05/07/2021    HCT 38.1 05/07/2021    MCV 87.2 05/07/2021     05/07/2021     Lab Results   Component Value Date    NEUTROABS 2.96 05/07/2021     Lab Results   Component Value Date     40.3 (H) 05/07/2021    LABCA2 45.0 (H) 05/07/2021         PATHOLOGY:  Pathology report from May 12, 2021 showed:          * Cannot find OR log *         RADIOLOGY DATA :  NM Pet Skull Base To Mid Thigh    Result Date: 5/21/2021  1.  Pathologic uptake in right-sided axillary lymph nodes. These are slightly less numerous in comparison to prior exam July 2017. Hilar and mediastinal metastases have resolved since prior PET/CT. 2.Osseous metastases. Significant improvement in comparison with prior PET/CT. 3. New right lobe Liver metastases. Calcified lesion in the left lobe of liver, sequela of prior therapy, TACE Electronically signed by:  David Faith MD  5/21/2021 7:32 PM CDT Workstation:  [id number]          Assessment/Plan     1.  Metastatic right breast cancer with skin, bone and liver metastases  -Review oncology history for prior treatment details  -Patient received 8 cycles of Faslodex between October 16, 2020 and 8/7/2021  -Recent biopsy of skin nodule shows invasive mammary carcinoma consistent with progression of disease  -PET/CT done on May 21, 2021 was reviewed with radiologist, there is increased uptake in axillary lymph node along with new onset of liver metastases involving the right lobe of liver with involvement of skin on PET/CT consistent with progression of disease.  Results of PET/CT were discussed with patient and her family  -Due to progression of disease recommend changing treatment to Adriamycin and Cytoxan.  -We will get 2D echo done later this week to evaluate ejection fraction  -Side effects of Adriamycin and Cytoxan  Including but not limited to risk of low blood counts, risk of infection, need for blood transfusion,risk of bleeding, risk of secondary blood cancer like leukemia, risk of heart failure, risk of kidney failure, diarrhea, nausea,vomitting ,risk of neuropathy and risk of allergic reaction which can be life-threatening were discussed with patient and family. We will also provide them some information today to read about the chemotherapy.  -Patient does understand her cancer is progressing which is potentially life-threatening without any treatment  -Plan is to do restaging PET/CT after cycle 3 of chemotherapy.    2.  Brain metastases:  -Patient was diagnosed with calvarial metastases without any evidence of parenchymal mets.  Patient was evaluated by Dr. Pulido and no radiation therapy was recommended    3.  Right internal jugular vein DVT:  -Remains on Coumadin    4.  Bone metastases:  -Remains on monthly Zometa since October 23, 2017  -We will continue with monthly Zometa    5.  Elevated liver function test:  -Secondary to liver metastases plus or minus  medication.  Will monitor with CMP    6.  History of melanoma in situ status post surgery by Dr. Linn    7.  History of DCIS of right breast diagnosed in 2007  -Had a mastectomy followed by tamoxifen for 5 years and was completed in 2013    8.  Chemotherapy-induced neuropathy  -Remains on gabapentin    9.  Cancer related pain:  -Remains on Ultram as needed    10.  Health maintenance: Patient does not smoke    11. Advance Care Planning: For now patient remains full code and is able to make decisions.  Patient has health care surrogate mentioned on chart.    12.  Prescription for scopolamine patch, Zofran and dexamethasone has been sent to her pharmacy today             PHQ-9 Total Score: 0   -Patient is not homicidal or suicidal.  No acute intervention required.    Kylie García reports a pain score of 0.  Given her pain assessment as noted, treatment options were discussed and the following options were decided upon as a follow-up plan to address the patient's pain: continuation of current treatment plan for pain.         Ovidio Meadows MD  5/25/2021  18:19 CDT        Part of this note may be an electronic transcription/translation of spoken language to printed text using the Dragon Dictation System.          CC:

## 2021-05-25 NOTE — PATIENT INSTRUCTIONS
Doxorubicin injection  What is this medicine?  DOXORUBICIN (dox oh JESSICA bi sin) is a chemotherapy drug. It is used to treat many kinds of cancer like leukemia, lymphoma, neuroblastoma, sarcoma, and Wilms' tumor. It is also used to treat bladder cancer, breast cancer, lung cancer, ovarian cancer, stomach cancer, and thyroid cancer.  This medicine may be used for other purposes; ask your health care provider or pharmacist if you have questions.  COMMON BRAND NAME(S): Adriamycin, Adriamycin PFS, Adriamycin RDF, Rubex  What should I tell my health care provider before I take this medicine?  They need to know if you have any of these conditions:  · heart disease  · history of low blood counts caused by a medicine  · liver disease  · recent or ongoing radiation therapy  · an unusual or allergic reaction to doxorubicin, other chemotherapy agents, other medicines, foods, dyes, or preservatives  · pregnant or trying to get pregnant  · breast-feeding  How should I use this medicine?  This drug is given as an infusion into a vein. It is administered in a hospital or clinic by a specially trained health care professional. If you have pain, swelling, burning or any unusual feeling around the site of your injection, tell your health care professional right away.  Talk to your pediatrician regarding the use of this medicine in children. Special care may be needed.  Overdosage: If you think you have taken too much of this medicine contact a poison control center or emergency room at once.  NOTE: This medicine is only for you. Do not share this medicine with others.  What if I miss a dose?  It is important not to miss your dose. Call your doctor or health care professional if you are unable to keep an appointment.  What may interact with this medicine?  This medicine may interact with the following medications:  · 6-mercaptopurine  · paclitaxel  · phenytoin  · Brittney's Wort  · trastuzumab  · verapamil  This list may not describe  all possible interactions. Give your health care provider a list of all the medicines, herbs, non-prescription drugs, or dietary supplements you use. Also tell them if you smoke, drink alcohol, or use illegal drugs. Some items may interact with your medicine.  What should I watch for while using this medicine?  This drug may make you feel generally unwell. This is not uncommon, as chemotherapy can affect healthy cells as well as cancer cells. Report any side effects. Continue your course of treatment even though you feel ill unless your doctor tells you to stop.  There is a maximum amount of this medicine you should receive throughout your life. The amount depends on the medical condition being treated and your overall health. Your doctor will watch how much of this medicine you receive in your lifetime. Tell your doctor if you have taken this medicine before.  You may need blood work done while you are taking this medicine.  Your urine may turn red for a few days after your dose. This is not blood. If your urine is dark or brown, call your doctor.  In some cases, you may be given additional medicines to help with side effects. Follow all directions for their use.  Call your doctor or health care professional for advice if you get a fever, chills or sore throat, or other symptoms of a cold or flu. Do not treat yourself. This drug decreases your body's ability to fight infections. Try to avoid being around people who are sick.  This medicine may increase your risk to bruise or bleed. Call your doctor or health care professional if you notice any unusual bleeding.  Talk to your doctor about your risk of cancer. You may be more at risk for certain types of cancers if you take this medicine.  Do not become pregnant while taking this medicine or for 6 months after stopping it. Women should inform their doctor if they wish to become pregnant or think they might be pregnant. Men should not father a child while taking this  medicine and for 6 months after stopping it. There is a potential for serious side effects to an unborn child. Talk to your health care professional or pharmacist for more information. Do not breast-feed an infant while taking this medicine.  This medicine has caused ovarian failure in some women and reduced sperm counts in some men This medicine may interfere with the ability to have a child. Talk with your doctor or health care professional if you are concerned about your fertility.  This medicine may cause a decrease in Co-Enzyme Q-10. You should make sure that you get enough Co-Enzyme Q-10 while you are taking this medicine. Discuss the foods you eat and the vitamins you take with your health care professional.  What side effects may I notice from receiving this medicine?  Side effects that you should report to your doctor or health care professional as soon as possible:  · allergic reactions like skin rash, itching or hives, swelling of the face, lips, or tongue  · breathing problems  · chest pain  · fast or irregular heartbeat  · low blood counts - this medicine may decrease the number of white blood cells, red blood cells and platelets. You may be at increased risk for infections and bleeding.  · pain, redness, or irritation at site where injected  · signs of infection - fever or chills, cough, sore throat, pain or difficulty passing urine  · signs of decreased platelets or bleeding - bruising, pinpoint red spots on the skin, black, tarry stools, blood in the urine  · swelling of the ankles, feet, hands  · tiredness  · weakness  Side effects that usually do not require medical attention (report to your doctor or health care professional if they continue or are bothersome):  · diarrhea  · hair loss  · mouth sores  · nail discoloration or damage  · nausea  · red colored urine  · vomiting  This list may not describe all possible side effects. Call your doctor for medical advice about side effects. You may report  side effects to FDA at 5-345-FDA-5332.  Where should I keep my medicine?  This drug is given in a hospital or clinic and will not be stored at home.  NOTE: This sheet is a summary. It may not cover all possible information. If you have questions about this medicine, talk to your doctor, pharmacist, or health care provider.  © 2021 [employer] (2018-08-01 11:01:26)  Cyclophosphamide Injection  What is this medicine?  CYCLOPHOSPHAMIDE (sye kloe KENNY fa mide) is a chemotherapy drug. It slows the growth of cancer cells. This medicine is used to treat many types of cancer like lymphoma, myeloma, leukemia, breast cancer, and ovarian cancer, to name a few.  This medicine may be used for other purposes; ask your health care provider or pharmacist if you have questions.  COMMON BRAND NAME(S): Cytoxan, Neosar  What should I tell my health care provider before I take this medicine?  They need to know if you have any of these conditions:  · heart disease  · history of irregular heartbeat  · infection  · kidney disease  · liver disease  · low blood counts, like white cells, platelets, or red blood cells  · on hemodialysis  · recent or ongoing radiation therapy  · scarring or thickening of the lungs  · trouble passing urine  · an unusual or allergic reaction to cyclophosphamide, other medicines, foods, dyes, or preservatives  · pregnant or trying to get pregnant  · breast-feeding  How should I use this medicine?  This drug is usually given as an injection into a vein or muscle or by infusion into a vein. It is administered in a hospital or clinic by a specially trained health care professional.  Talk to your pediatrician regarding the use of this medicine in children. Special care may be needed.  Overdosage: If you think you have taken too much of this medicine contact a poison control center or emergency room at once.  NOTE: This medicine is only for you. Do not share this medicine with others.  What if I miss a  dose?  It is important not to miss your dose. Call your doctor or health care professional if you are unable to keep an appointment.  What may interact with this medicine?  · amphotericin B  · azathioprine  · certain antivirals for HIV or hepatitis  · certain medicines for blood pressure, heart disease, irregular heart beat  · certain medicines that treat or prevent blood clots like warfarin  · certain other medicines for cancer  · cyclosporine  · etanercept  · indomethacin  · medicines that relax muscles for surgery  · medicines to increase blood counts  · metronidazole  This list may not describe all possible interactions. Give your health care provider a list of all the medicines, herbs, non-prescription drugs, or dietary supplements you use. Also tell them if you smoke, drink alcohol, or use illegal drugs. Some items may interact with your medicine.  What should I watch for while using this medicine?  Your condition will be monitored carefully while you are receiving this medicine.  You may need blood work done while you are taking this medicine.  Drink water or other fluids as directed. Urinate often, even at night.  Some products may contain alcohol. Ask your health care professional if this medicine contains alcohol. Be sure to tell all health care professionals you are taking this medicine. Certain medicines, like metronidazole and disulfiram, can cause an unpleasant reaction when taken with alcohol. The reaction includes flushing, headache, nausea, vomiting, sweating, and increased thirst. The reaction can last from 30 minutes to several hours.  Do not become pregnant while taking this medicine or for 1 year after stopping it. Women should inform their health care professional if they wish to become pregnant or think they might be pregnant. Men should not father a child while taking this medicine and for 4 months after stopping it. There is potential for serious side effects to an unborn child. Talk to your  health care professional for more information.  Do not breast-feed an infant while taking this medicine or for 1 week after stopping it.  This medicine has caused ovarian failure in some women. This medicine may make it more difficult to get pregnant. Talk to your health care professional if you are concerned about your fertility.  This medicine has caused decreased sperm counts in some men. This may make it more difficult to father a child. Talk to your health care professional if you are concerned about your fertility.  Call your health care professional for advice if you get a fever, chills, or sore throat, or other symptoms of a cold or flu. Do not treat yourself. This medicine decreases your body's ability to fight infections. Try to avoid being around people who are sick.  Avoid taking medicines that contain aspirin, acetaminophen, ibuprofen, naproxen, or ketoprofen unless instructed by your health care professional. These medicines may hide a fever.  Talk to your health care professional about your risk of cancer. You may be more at risk for certain types of cancer if you take this medicine.  If you are going to need surgery or other procedure, tell your health care professional that you are using this medicine.  Be careful brushing or flossing your teeth or using a toothpick because you may get an infection or bleed more easily. If you have any dental work done, tell your dentist you are receiving this medicine.  What side effects may I notice from receiving this medicine?  Side effects that you should report to your doctor or health care professional as soon as possible:  · allergic reactions like skin rash, itching or hives, swelling of the face, lips, or tongue  · breathing problems  · nausea, vomiting  · signs and symptoms of bleeding such as bloody or black, tarry stools; red or dark brown urine; spitting up blood or brown material that looks like coffee grounds; red spots on the skin; unusual bruising  or bleeding from the eyes, gums, or nose  · signs and symptoms of heart failure like fast, irregular heartbeat, sudden weight gain; swelling of the ankles, feet, hands  · signs and symptoms of infection like fever; chills; cough; sore throat; pain or trouble passing urine  · signs and symptoms of kidney injury like trouble passing urine or change in the amount of urine  · signs and symptoms of liver injury like dark yellow or brown urine; general ill feeling or flu-like symptoms; light-colored stools; loss of appetite; nausea; right upper belly pain; unusually weak or tired; yellowing of the eyes or skin  Side effects that usually do not require medical attention (report to your doctor or health care professional if they continue or are bothersome):  · confusion  · decreased hearing  · diarrhea  · facial flushing  · hair loss  · headache  · loss of appetite  · missed menstrual periods  · signs and symptoms of low red blood cells or anemia such as unusually weak or tired; feeling faint or lightheaded; falls  · skin discoloration  This list may not describe all possible side effects. Call your doctor for medical advice about side effects. You may report side effects to FDA at 5-764-FDA-0645.  Where should I keep my medicine?  This drug is given in a hospital or clinic and will not be stored at home.  NOTE: This sheet is a summary. It may not cover all possible information. If you have questions about this medicine, talk to your doctor, pharmacist, or health care provider.  © 2021 [employer] (2020-09-21 09:53:29)

## 2021-05-26 ENCOUNTER — TELEPHONE (OUTPATIENT)
Dept: ONCOLOGY | Facility: HOSPITAL | Age: 51
End: 2021-05-26

## 2021-05-26 NOTE — TELEPHONE ENCOUNTER
----- Message from Ovidio Meadows MD sent at 5/26/2021  1:10 PM CDT -----  Please let patient know, her 2D echo was normal.  She is okay to start chemotherapy next week with Adriamycin.  Thank you

## 2021-05-28 ENCOUNTER — TELEPHONE (OUTPATIENT)
Dept: RADIATION ONCOLOGY | Facility: HOSPITAL | Age: 51
End: 2021-05-28

## 2021-05-28 DIAGNOSIS — G62.0 NEUROPATHY DUE TO CHEMOTHERAPEUTIC DRUG (HCC): ICD-10-CM

## 2021-05-28 DIAGNOSIS — T45.1X5A NEUROPATHY DUE TO CHEMOTHERAPEUTIC DRUG (HCC): ICD-10-CM

## 2021-05-28 RX ORDER — GABAPENTIN 300 MG/1
300 CAPSULE ORAL 3 TIMES DAILY
Qty: 90 CAPSULE | Refills: 0 | Status: SHIPPED | OUTPATIENT
Start: 2021-05-28 | End: 2021-07-29 | Stop reason: SDUPTHER

## 2021-05-28 NOTE — TELEPHONE ENCOUNTER
Called and spoke with pt regarding question, will consult with Dr. Meadows regarding second opinion.  V/u obtained.

## 2021-06-01 DIAGNOSIS — C50.911 MALIGNANT NEOPLASM OF RIGHT BREAST IN FEMALE, ESTROGEN RECEPTOR POSITIVE, UNSPECIFIED SITE OF BREAST (HCC): Primary | ICD-10-CM

## 2021-06-01 DIAGNOSIS — Z17.0 MALIGNANT NEOPLASM OF RIGHT BREAST IN FEMALE, ESTROGEN RECEPTOR POSITIVE, UNSPECIFIED SITE OF BREAST (HCC): Primary | ICD-10-CM

## 2021-06-03 ENCOUNTER — ANTICOAGULATION VISIT (OUTPATIENT)
Dept: CARDIAC SURGERY | Facility: CLINIC | Age: 51
End: 2021-06-03

## 2021-06-03 ENCOUNTER — INFUSION (OUTPATIENT)
Dept: ONCOLOGY | Facility: HOSPITAL | Age: 51
End: 2021-06-03

## 2021-06-03 VITALS
SYSTOLIC BLOOD PRESSURE: 112 MMHG | TEMPERATURE: 96.8 F | DIASTOLIC BLOOD PRESSURE: 67 MMHG | HEART RATE: 78 BPM | RESPIRATION RATE: 18 BRPM

## 2021-06-03 VITALS — HEART RATE: 85 BPM | OXYGEN SATURATION: 98 %

## 2021-06-03 DIAGNOSIS — C50.911 MALIGNANT NEOPLASM OF RIGHT BREAST IN FEMALE, ESTROGEN RECEPTOR POSITIVE, UNSPECIFIED SITE OF BREAST (HCC): ICD-10-CM

## 2021-06-03 DIAGNOSIS — C78.7 LIVER METASTASIS: Primary | ICD-10-CM

## 2021-06-03 DIAGNOSIS — Z51.81 ENCOUNTER FOR THERAPEUTIC DRUG MONITORING: ICD-10-CM

## 2021-06-03 DIAGNOSIS — C79.51 BONE METASTASIS: ICD-10-CM

## 2021-06-03 DIAGNOSIS — Z17.0 MALIGNANT NEOPLASM OF RIGHT BREAST IN FEMALE, ESTROGEN RECEPTOR POSITIVE, UNSPECIFIED SITE OF BREAST (HCC): ICD-10-CM

## 2021-06-03 DIAGNOSIS — Z45.2 ENCOUNTER FOR VENOUS ACCESS DEVICE CARE: ICD-10-CM

## 2021-06-03 DIAGNOSIS — Z79.01 LONG TERM CURRENT USE OF ANTICOAGULANT THERAPY: Primary | ICD-10-CM

## 2021-06-03 DIAGNOSIS — Z45.2 ENCOUNTER FOR ADJUSTMENT OR MANAGEMENT OF VASCULAR ACCESS DEVICE: ICD-10-CM

## 2021-06-03 DIAGNOSIS — C78.7 LIVER METASTASIS: ICD-10-CM

## 2021-06-03 LAB
ALBUMIN SERPL-MCNC: 3.9 G/DL (ref 3.5–5.2)
ALBUMIN/GLOB SERPL: 1 G/DL
ALP SERPL-CCNC: 79 U/L (ref 39–117)
ALT SERPL W P-5'-P-CCNC: 25 U/L (ref 1–33)
ANION GAP SERPL CALCULATED.3IONS-SCNC: 8 MMOL/L (ref 5–15)
AST SERPL-CCNC: 24 U/L (ref 1–32)
BASOPHILS # BLD AUTO: 0.04 10*3/MM3 (ref 0–0.2)
BASOPHILS NFR BLD AUTO: 0.7 % (ref 0–1.5)
BILIRUB SERPL-MCNC: 0.3 MG/DL (ref 0–1.2)
BUN SERPL-MCNC: 11 MG/DL (ref 6–20)
BUN/CREAT SERPL: 13.8 (ref 7–25)
CALCIUM SPEC-SCNC: 9.3 MG/DL (ref 8.6–10.5)
CHLORIDE SERPL-SCNC: 107 MMOL/L (ref 98–107)
CO2 SERPL-SCNC: 27 MMOL/L (ref 22–29)
CREAT SERPL-MCNC: 0.8 MG/DL (ref 0.57–1)
DEPRECATED RDW RBC AUTO: 40.5 FL (ref 37–54)
EOSINOPHIL # BLD AUTO: 0.2 10*3/MM3 (ref 0–0.4)
EOSINOPHIL NFR BLD AUTO: 3.4 % (ref 0.3–6.2)
ERYTHROCYTE [DISTWIDTH] IN BLOOD BY AUTOMATED COUNT: 13 % (ref 12.3–15.4)
GFR SERPL CREATININE-BSD FRML MDRD: 75 ML/MIN/1.73
GLOBULIN UR ELPH-MCNC: 4 GM/DL
GLUCOSE SERPL-MCNC: 110 MG/DL (ref 65–99)
HCG INTACT+B SERPL-ACNC: 1.16 MIU/ML
HCT VFR BLD AUTO: 36.5 % (ref 34–46.6)
HGB BLD-MCNC: 12.4 G/DL (ref 12–15.9)
IMM GRANULOCYTES # BLD AUTO: 0.01 10*3/MM3 (ref 0–0.05)
IMM GRANULOCYTES NFR BLD AUTO: 0.2 % (ref 0–0.5)
INR PPP: 2.9 (ref 0.9–1.1)
LYMPHOCYTES # BLD AUTO: 1.14 10*3/MM3 (ref 0.7–3.1)
LYMPHOCYTES NFR BLD AUTO: 19.4 % (ref 19.6–45.3)
MCH RBC QN AUTO: 29.4 PG (ref 26.6–33)
MCHC RBC AUTO-ENTMCNC: 34 G/DL (ref 31.5–35.7)
MCV RBC AUTO: 86.5 FL (ref 79–97)
MONOCYTES # BLD AUTO: 0.66 10*3/MM3 (ref 0.1–0.9)
MONOCYTES NFR BLD AUTO: 11.2 % (ref 5–12)
NEUTROPHILS NFR BLD AUTO: 3.82 10*3/MM3 (ref 1.7–7)
NEUTROPHILS NFR BLD AUTO: 65.1 % (ref 42.7–76)
NRBC BLD AUTO-RTO: 0 /100 WBC (ref 0–0.2)
PLATELET # BLD AUTO: 251 10*3/MM3 (ref 140–450)
PMV BLD AUTO: 10 FL (ref 6–12)
POTASSIUM SERPL-SCNC: 3.9 MMOL/L (ref 3.5–5.2)
PROT SERPL-MCNC: 7.9 G/DL (ref 6–8.5)
RBC # BLD AUTO: 4.22 10*6/MM3 (ref 3.77–5.28)
SODIUM SERPL-SCNC: 142 MMOL/L (ref 136–145)
WBC # BLD AUTO: 5.87 10*3/MM3 (ref 3.4–10.8)

## 2021-06-03 PROCEDURE — 25010000002 FOSAPREPITANT PER 1 MG: Performed by: INTERNAL MEDICINE

## 2021-06-03 PROCEDURE — 25010000002 PALONOSETRON PER 25 MCG: Performed by: INTERNAL MEDICINE

## 2021-06-03 PROCEDURE — 93793 ANTICOAG MGMT PT WARFARIN: CPT | Performed by: NURSE PRACTITIONER

## 2021-06-03 PROCEDURE — 25010000002 DEXAMETHASONE SODIUM PHOSPHATE 100 MG/10ML SOLUTION: Performed by: INTERNAL MEDICINE

## 2021-06-03 PROCEDURE — 80053 COMPREHEN METABOLIC PANEL: CPT

## 2021-06-03 PROCEDURE — 25010000002 HEPARIN LOCK FLUSH PER 10 UNITS: Performed by: INTERNAL MEDICINE

## 2021-06-03 PROCEDURE — 96413 CHEMO IV INFUSION 1 HR: CPT | Performed by: INTERNAL MEDICINE

## 2021-06-03 PROCEDURE — 96367 TX/PROPH/DG ADDL SEQ IV INF: CPT | Performed by: INTERNAL MEDICINE

## 2021-06-03 PROCEDURE — 25010000002 DOXORUBICIN PER 10 MG: Performed by: INTERNAL MEDICINE

## 2021-06-03 PROCEDURE — 25010000002 CYCLOPHOSPHAMIDE 1 GM/5ML SOLUTION 5 ML VIAL: Performed by: INTERNAL MEDICINE

## 2021-06-03 PROCEDURE — 85610 PROTHROMBIN TIME: CPT | Performed by: NURSE PRACTITIONER

## 2021-06-03 PROCEDURE — 85025 COMPLETE CBC W/AUTO DIFF WBC: CPT

## 2021-06-03 PROCEDURE — 84702 CHORIONIC GONADOTROPIN TEST: CPT

## 2021-06-03 PROCEDURE — 96411 CHEMO IV PUSH ADDL DRUG: CPT | Performed by: INTERNAL MEDICINE

## 2021-06-03 PROCEDURE — 36416 COLLJ CAPILLARY BLOOD SPEC: CPT | Performed by: NURSE PRACTITIONER

## 2021-06-03 PROCEDURE — 96375 TX/PRO/DX INJ NEW DRUG ADDON: CPT | Performed by: INTERNAL MEDICINE

## 2021-06-03 RX ORDER — DOXORUBICIN HYDROCHLORIDE 2 MG/ML
60 INJECTION, SOLUTION INTRAVENOUS ONCE
Status: CANCELLED | OUTPATIENT
Start: 2021-06-03

## 2021-06-03 RX ORDER — SODIUM CHLORIDE 0.9 % (FLUSH) 0.9 %
10 SYRINGE (ML) INJECTION AS NEEDED
Status: DISCONTINUED | OUTPATIENT
Start: 2021-06-03 | End: 2021-06-03 | Stop reason: HOSPADM

## 2021-06-03 RX ORDER — SODIUM CHLORIDE 0.9 % (FLUSH) 0.9 %
10 SYRINGE (ML) INJECTION AS NEEDED
Status: CANCELLED | OUTPATIENT
Start: 2021-06-07

## 2021-06-03 RX ORDER — HEPARIN SODIUM (PORCINE) LOCK FLUSH IV SOLN 100 UNIT/ML 100 UNIT/ML
500 SOLUTION INTRAVENOUS AS NEEDED
Status: DISCONTINUED | OUTPATIENT
Start: 2021-06-03 | End: 2021-06-03 | Stop reason: HOSPADM

## 2021-06-03 RX ORDER — SODIUM CHLORIDE 9 MG/ML
250 INJECTION, SOLUTION INTRAVENOUS ONCE
Status: CANCELLED | OUTPATIENT
Start: 2021-06-03

## 2021-06-03 RX ORDER — SODIUM CHLORIDE 9 MG/ML
250 INJECTION, SOLUTION INTRAVENOUS ONCE
Status: COMPLETED | OUTPATIENT
Start: 2021-06-03 | End: 2021-06-03

## 2021-06-03 RX ORDER — DOXORUBICIN HYDROCHLORIDE 2 MG/ML
60 INJECTION, SOLUTION INTRAVENOUS ONCE
Status: COMPLETED | OUTPATIENT
Start: 2021-06-03 | End: 2021-06-03

## 2021-06-03 RX ORDER — HEPARIN SODIUM (PORCINE) LOCK FLUSH IV SOLN 100 UNIT/ML 100 UNIT/ML
500 SOLUTION INTRAVENOUS AS NEEDED
Status: CANCELLED | OUTPATIENT
Start: 2021-06-07

## 2021-06-03 RX ORDER — SODIUM CHLORIDE 0.9 % (FLUSH) 0.9 %
20 SYRINGE (ML) INJECTION AS NEEDED
Status: CANCELLED | OUTPATIENT
Start: 2021-06-07

## 2021-06-03 RX ORDER — PALONOSETRON 0.05 MG/ML
0.25 INJECTION, SOLUTION INTRAVENOUS ONCE
Status: COMPLETED | OUTPATIENT
Start: 2021-06-03 | End: 2021-06-03

## 2021-06-03 RX ORDER — PALONOSETRON 0.05 MG/ML
0.25 INJECTION, SOLUTION INTRAVENOUS ONCE
Status: CANCELLED | OUTPATIENT
Start: 2021-06-03

## 2021-06-03 RX ADMIN — SODIUM CHLORIDE, PRESERVATIVE FREE 10 ML: 5 INJECTION INTRAVENOUS at 13:49

## 2021-06-03 RX ADMIN — PALONOSETRON HYDROCHLORIDE 0.25 MG: 0.25 INJECTION, SOLUTION INTRAVENOUS at 10:55

## 2021-06-03 RX ADMIN — Medication 100 ML: at 11:19

## 2021-06-03 RX ADMIN — DOXORUBICIN HYDROCHLORIDE 128 MG: 2 INJECTION, SOLUTION INTRAVENOUS at 12:31

## 2021-06-03 RX ADMIN — CYCLOPHOSPHAMIDE 1280 MG: 200 INJECTION, SOLUTION INTRAVENOUS at 12:57

## 2021-06-03 RX ADMIN — DEXAMETHASONE SODIUM PHOSPHATE 12 MG: 10 INJECTION, SOLUTION INTRAMUSCULAR; INTRAVENOUS at 12:02

## 2021-06-03 RX ADMIN — SODIUM CHLORIDE 250 ML: 9 INJECTION, SOLUTION INTRAVENOUS at 10:54

## 2021-06-03 RX ADMIN — HEPARIN 500 UNITS: 100 SYRINGE at 13:49

## 2021-06-03 NOTE — PATIENT INSTRUCTIONS
Chemotherapy  Chemotherapy is a cancer treatment. It uses medicines to slow down or stop the growth of cancer. You may have chemotherapy to:  · Cure your cancer.  · Prevent the cancer from growing or spreading (metastasizing).  · Ease symptoms and improve your quality of life (palliative care).  · Improve the effects of radiation treatment.  · Shrink a tumor before surgery.  · Rid the body of cancer cells that remain after having a tumor surgically removed.  The length of chemotherapy treatment depends on many factors, including:  · The type and stage of your cancer.  · How you respond to the chemotherapy.  · Your side effects.  What are the risks?  Generally, this is a safe treatment. However, problems may occur, including:  · Infection.  · Bleeding at the IV site.  · Allergic reactions to medicines.  You may have side effects from chemotherapy. What side effects you have depends on a variety of factors, including:  · The type of chemotherapy medicine used.  · Your dosage.  · How long the medicine is used for.  · Your overall health.  What happens before treatment?  · You will meet with your cancer care team to discuss:  ? How your chemotherapy medicine will be given.  ? Common side effects and how to manage them.  ? Your treatment schedule.  · You may have blood tests.  · You may be given medicine to help prevent common side effects.  What happens during treatment?  Chemotherapy may be given continuously over time, or it may be given in cycles. Some common ways chemotherapy may be given include:  · As a pill or capsule.  · As an injection.  · As a skin (topical) cream.  · As a special wafer that is put in your body where the cancer is.  · As an injection into the cerebrospinal fluid (CSF) in the brain or spinal cord (intraventricular or intrathecal chemotherapy).  · Through a small, thin tube (catheter). There are different kinds of catheters. You might have one that:  ? Goes into a vein (intravenous  catheter).  ? Connects to a device (port) that is inserted under the skin of your chest (port catheter). A port catheter connects the port to a large vein in your chest or upper arm. The port may stay in place for many weeks or months.  ? Goes into a vein near your elbow (PICC line). This may be used for weeks or months.  ? Goes into a vein in your neck that leads to your heart (non-tunneled catheter). This catheter has a risk of infection, so it is used for only a short time.  ? Goes through the skin of your chest and into a large vein that leads to your heart (tunneled catheter). This catheter can stay in your body for months or years.  While you are receiving your medicine, your cancer care team will monitor your blood pressure, heart rate, breathing rate, and blood oxygen level (vital signs) and watch for any problems.  Some types of chemotherapy medicine are given only one time. Others are given for months, years, or for life.  What can I expect after treatment?  After chemotherapy, you may have side effects, such as:  · Nausea and vomiting.  · Appetite loss.  · Constipation or diarrhea.  · Fatigue.  · Increased risk of infections, bruising, or bleeding.  · Hair loss.  · Mouth or throat sores.  · Tingling, pain, or numbness in the hands and feet.  · Dry, sensitive, itchy, or sore skin.  · Memory changes.  Follow these instructions at home:  General instructions    · If you get chemotherapy through an IV, PICC line, or port, check the site every day for signs of infection. Check for redness, swelling, pain, fluid, or warmth.  · Wash your hands frequently with soap and water. If soap and water are not available, use hand . Have other members of your household wash their hands often.  · Chemotherapy medicines leave the body through urine or stool (feces), but they can also be present in other body fluids including vomit, tears, vaginal fluids, and semen. You must carefully follow some safety precautions  to prevent harm to others while you are taking these medicines:  ? Wash any clothes, towels, and linens that may have your bodily fluids on them twice in a washing machine.  ? Use a condom during vaginal, anal, and oral sex while you are taking chemotherapy medicines and for 48 hours after your last dose.  ? Practice good bathroom hygiene:  § Always sit when using the toilet. Close the toilet seat lid before you flush.  § Wash your hands thoroughly with soap and water after each time you use the toilet.  · Keep all follow-up visits as told by your cancer care team. This is important.  Eating and drinking  · Talk with a dietitian about what you should eat and drink during cancer treatment.  · Always wash fresh fruits and vegetables well before eating them.  · Drink enough fluid to keep urine pale yellow.  Medicines  · Take over-the-counter and prescription medicines only as told by your health care provider.  · Talk with your health care provider before taking vitamins and supplements. Some can interfere with chemotherapy.  Activity  · Get plenty of rest.  · Get regular exercise such as walking, gentle yoga, or gemma chi.  · Return to your normal activities as told by your health care provider. Ask your health care provider what activities are safe for you.  Contact a health care provider if:  · You have:  ? A skin rash that does not go away.  ? A headache.  ? A stiff neck.  ? A cough.  ? Cold or flu symptoms.  ? A burning feeling when urinating.  ? Urine that smells bad.  ? Diarrhea.  ? Nausea.  ? Vomiting.  ? Blood in your urine or stool.  · You bleed or bruise often.  · You urinate more frequently than usual.  · You cannot eat because of mouth or throat pain.  Get help right away if:  · You have:  ? A fever.  ? Redness, swelling, pain, fluid, or warmth near your IV site.  ? Bleeding that does not stop.  ? A seizure.  ? Chest pain.  ? Difficulty breathing.  · You cannot swallow.  Summary  · Chemotherapy is a way to  treat cancer. It uses medicines to slow down or stop the growth of cancer.  · Before treatment, you and your cancer care team will discuss common side effects and how to manage them.  · The way that you will get chemotherapy medicines depends on your condition.  · Take over-the-counter and prescription medicines only as told by your health care provider.  This information is not intended to replace advice given to you by your health care provider. Make sure you discuss any questions you have with your health care provider.  Document Revised: 04/08/2020 Document Reviewed: 10/04/2018  Elsevier Patient Education © 2021 [employer]    Managing Chemotherapy Side Effects, Adult  Chemotherapy is a treatment that uses medicine to kill cancer cells. Chemotherapy causes side effects. The specific side effects depend on the specific medicines used.  Most of the side effects of chemotherapy go away once treatment is finished. Until then, work closely with your health care providers and take an active role in managing your side effects.  What are common side effects?  · Tiredness (fatigue).  · Increased risk of infections, bruising, or bleeding.  · Nausea and vomiting.  · Constipation or diarrhea.  · Appetite loss.  · Hair loss.  · Mouth or throat sores.  · Tingling, pain, or numbness in the hands and feet.  · Dry, sensitive, itchy, or sore skin.  · Confusion, anxiety, or mood swings.  · Memory changes.  How can I help manage my side effects?  Medicines  · Take over-the-counter and prescription medicines only as told by your health care provider.  · Talk with your health care provider before taking vitamins, supplements, and over-the-counter medicines. Some of these can interfere with chemotherapy.  Activity    · Get plenty of rest.  · Get regular exercise by doing activities such as walking, gentle yoga, or gemma chi.  · Return to your normal activities as told by your health care provider. Ask your health care provider what  activities are safe for you.  Eating and drinking  · Talk to a dietitian about what you should eat and drink during cancer treatment.  · Drink enough fluid to keep your urine pale yellow.  · If you have side effects that affect eating, these tips may help:  ? Eat smaller meals and snacks often.  ? Drink high-nutrition and high-calorie shakes or supplements.  ? Eat bland and soft foods that are easy to eat.  ? Do not eat foods that are hot, spicy, or hard to swallow.  · Do not eat raw or undercooked meat, eggs, or seafood.  · Always wash fresh fruits and vegetables well before eating them.  General instructions  · Learn as much as you can about your condition.  · Keep all follow-up visits as told by your health care provider. This is important.  · Use mouth rinse only as told by your health care provider.  · Protect your skin from the sun by using sunscreen or wearing protective clothing and a hat.  · If you have sore or itchy skin:  ? Wear soft, comfortable clothing.  ? Apply creams and ointments to your skin as told by your health care provider.  · If you lose your hair, wear a wig, hat, or scarf to cover your head. You may want to have someone shave your head as you start to lose hair.  · Meet with a hair and  for makeup and skin care tips.  How can I prevent infection and bleeding?  Chemotherapy may lower your blood counts and put you at risk for infection and bleeding. Here are some ways to help prevent problems.  Vaccines  · Talk to your health care provider about vaccines. You should not get any live vaccines, such as the polio, MMR, chicken pox, and shingles vaccines. Do not be around people who have had live vaccines.  · Make sure you get a yearly flu shot. People who will be near you should also get a yearly flu shot.  Social activity  · Stay away from crowded places where you could be exposed to germs.  · Do not be around people who may be sick.  · Do not share food or utensils with  other people.  · Wear a mask when outside the home if your blood counts are low.  Cleanliness    · Wash your hands often. Also make sure that other members of your household wash their hands often.  · Brush your teeth daily using a soft toothbrush.  General tips  · Take your temperature regularly, especially if you have chills or feel warm.  · Check with your health care provider before you:  ? Travel.  ? Have a dental procedure.  · If you get chemotherapy through an IV or port, check the site every day for signs of infection. Check for redness, swelling, pain, fluid, and warmth.  · Avoid activities that put you at risk for injury.  · Use an electric razor to shave instead of a blade.  Questions to ask your health care provider  · What are the most common side effects of my treatment?  · How will they affect my daily life?  · What can I do to manage them?  · When can I expect them to end?  · What are some possible long-term side effects?  · What are possible complications?  · What support services are available?  · When should I contact my cancer care provider?  · What number can I call with questions or concerns?  Where to find support  Cancer affects the entire family. Find out what family support resources are available from your cancer treatment center. For more support, turn to:  · Your cancer care team.  · Friends and family.  · Your Jewish community.  · Other people with cancer.  · Online support groups.  Where to find more information  · National Cancer [place]: www.cancer.gov  · American Cancer Society: www.cancer.org  Contact a health care provider if:  · You bleed or bruise often.  · You have:  ? A skin rash or dry or itchy skin.  ? A headache or stiff neck.  ? Cold or flu symptoms.  ? A cough.  ? Nausea or vomiting.  ? Diarrhea.  ? Frequent urination, burning when passing urine, or foul-smelling urine.  ? Blood in your urine or stool.  · You cannot eat because of mouth or throat pain.  · You are  sad, confused, anxious, or depressed.  Get help right away if:  · You have:  ? A fever.  ? Redness, swelling, pain, fluid, or warmth near an IV site.  ? Bleeding that you cannot stop.  ? A seizure.  · You cannot swallow.  · You have chest pain.  · You have trouble breathing.  Summary  · Chemotherapy is a treatment that uses medicine to kill cancer cells. Chemotherapy causes side effects. The specific side effects depend on the specific medicines used.  · Learn as much as you can about your condition. Ask about side effects to watch for and how to treat them.  · Seek out support and resources from others. Find out what family support resources are available from your cancer treatment center.  · Let your health care provider know if you notice any new or unusual symptoms.  This information is not intended to replace advice given to you by your health care provider. Make sure you discuss any questions you have with your health care provider.  Document Revised: 03/14/2019 Document Reviewed: 03/14/2019  [employer] Patient Education © 2020 [employer]    Managing Low Blood Counts During Cancer Treatment  Cancer treatments such as chemotherapy and radiation can sometimes cause a drop in the supply of blood cells in the body, including red blood cells, white blood cells, and platelets. These blood cells are produced in the body and are released into the blood to perform specific functions:  · Red blood cells carry gases such as oxygen and carbon dioxide to and from your lungs.  · White blood cells help protect you from infection.  · Platelets help your body form blood clots to prevent and control bleeding.  When cancer treatments cause a drop in blood cell counts, your body may not have enough cells to keep up its normal functions. Symptoms or problems that may result will vary depending on which type of blood cells the treatment is affecting. If your blood counts are low, you can take steps to help manage any  problems.  How can low blood counts affect me?  Low blood counts have various effects depending on the type of blood cells involved:  · If you have a low number of red blood cells, you have a condition called anemia. This can cause symptoms such as:  ? Feeling tired and weak.  ? Feeling light-headed.  ? Being short of breath.  · If you have a low number of white blood cells, you may be at higher risk for infections.  · If you have a low number of platelets, you may bleed more easily, or your body may have trouble stopping any bleeding. You may also have more bruising.  How to manage symptoms or prevent problems from a low blood count  If you have a low blood count, you can take steps to manage symptoms or prevent problems that may develop. The steps to take will depend on which type of blood cell is low.  Low red blood cells  Take these steps to help manage the symptoms of anemia:  · Go for a walk or do some light exercise each day.  · Take short naps during the day.  · Eat foods that contain a lot of iron and protein. These include leafy green vegetables, meat and fish, beans, sweet potatoes, and dried fruit such as prunes, raisins, and apricots.  · Ask for help with errands and with work that needs to be done around the house. You need to save your energy.  · Take vitamins or supplements--such as iron, vitamin B12, or folic acid--as told by your health care provider.  · Practice relaxation techniques, such as yoga or meditation.    Low white blood cells  Take these steps to help prevent infections:  · Keep your body clean. Make sure you:  ? Wash your hands often with warm, soapy water.  ? If you get a scrape or cut, clean it thoroughly right away.  ? Take care when cleaning yourself after using the bathroom. Tell your health care provider if you have any rectal sores or bleeding.  ? Avoid contact with pet waste. Wash your hands after handling pets.  ? Do not swim or beata in lakes, ponds, rivers, water walker, or  hot tubs.  · Avoid crowds of people and any person who has the flu or a fever. To protect yourself, you may be instructed to wear a mask whenever you are around other people.  · Avoid dental work. Check your mouth each day for sores or signs of infection.  · Do not share utensils.  · Avoid fresh flowers and plants or dried flowers.  · Follow food safety guidelines. Cook meat thoroughly and wash all raw fruits and vegetables.    Low platelets  Take these steps to help prevent or control bleeding and bruising:  · Use an electric razor for shaving instead of a blade.  · Use a soft toothbrush and be careful when cleaning your mouth and teeth. Talk with your cancer care team about whether you should avoid flossing. If your mouth is bleeding, rinse it with ice water.  · Avoid activities that could cause injury, such as contact sports.  · Talk with your health care provider about using stool softeners to avoid constipation and straining during bowel movements.  · Do not use medicines such as ibuprofen, aspirin, or naproxen unless your health care provider tells you to.  · Limit alcohol use. If you drink alcohol:  ? Limit how much you use to:  § 0-1 drink a day for women.  § 0-2 drinks a day for men.  ? Be aware of how much alcohol is in your drink. In the [place] one drink equals one 12 oz bottle of beer (355 mL), one 5 oz glass of wine (148 mL), or one 1½ oz glass of hard liquor (44 mL).  · Monitor any bleeding closely. If you start bleeding, hold pressure on the area for 5 minutes to stop the bleeding. Bleeding that does not stop is considered an emergency.  What treatments can help increase a low blood count?  If needed, your health care provider may recommend treatment for a low blood count. Treatment will depend on the type of blood cell that is low and the severity of your condition. Treatment options may include:  · Taking medicines to help stimulate the growth of blood cells. This is an option for treating a low red  blood cell count. Your health care provider may also recommend that you take iron, folic acid, or vitamin B12 supplements.  · Making dietary changes. Including more iron and protein in your diet can help stimulate the growth of red blood cells.  · Adjusting your current medicines to help raise blood counts.  · Making changes to your treatment plan.  · Having a blood transfusion. This may be done if your blood count is very low.  Where to find more information  American Cancer Society: cancer.org  Contact a health care provider if you:  · Feel extremely tired and weak.  · Have more bruising or bleeding.  · Feel ill or develop a cough.  · Have swelling or redness.  · Have mouth sores or a sore throat.  · Have painful urination or have blood in your urine or stool.  · Have abdominal pain or diarrhea.  · Are thinking of taking any new supplements or vitamins or making dietary changes.  Get help right away if you:  · Are short of breath, have chest pain, or feel dizzy.  · Have a fever or chills.  · Have bleeding that will not stop.  Summary  · Cancer treatments such as chemotherapy and radiation can sometimes cause a drop in the supply of blood cells in the body, including red blood cells, white blood cells, and platelets.  · If you have a low blood count, you can take steps to manage symptoms or prevent problems that may develop.  · Depending on which type of blood cell is low, you may need to take steps to prevent infection, prevent bleeding, or manage symptoms that may develop.  · Any treatment will depend on the type of blood cell that is low and the severity of your condition.  · Contact a health care provider if you feel extremely tired and weak.  This information is not intended to replace advice given to you by your health care provider. Make sure you discuss any questions you have with your health care provider.  Document Revised: 09/09/2020 Document Reviewed: 09/04/2020  Elsevier Patient Education © 2021  [employer]  Doxorubicin injection  What is this medicine?  DOXORUBICIN (dox oh JESSICA bi sin) is a chemotherapy drug. It is used to treat many kinds of cancer like leukemia, lymphoma, neuroblastoma, sarcoma, and Wilms' tumor. It is also used to treat bladder cancer, breast cancer, lung cancer, ovarian cancer, stomach cancer, and thyroid cancer.  This medicine may be used for other purposes; ask your health care provider or pharmacist if you have questions.  COMMON BRAND NAME(S): Adriamycin, Adriamycin PFS, Adriamycin RDF, Rubex  What should I tell my health care provider before I take this medicine?  They need to know if you have any of these conditions:  · heart disease  · history of low blood counts caused by a medicine  · liver disease  · recent or ongoing radiation therapy  · an unusual or allergic reaction to doxorubicin, other chemotherapy agents, other medicines, foods, dyes, or preservatives  · pregnant or trying to get pregnant  · breast-feeding  How should I use this medicine?  This drug is given as an infusion into a vein. It is administered in a hospital or clinic by a specially trained health care professional. If you have pain, swelling, burning or any unusual feeling around the site of your injection, tell your health care professional right away.  Talk to your pediatrician regarding the use of this medicine in children. Special care may be needed.  Overdosage: If you think you have taken too much of this medicine contact a poison control center or emergency room at once.  NOTE: This medicine is only for you. Do not share this medicine with others.  What if I miss a dose?  It is important not to miss your dose. Call your doctor or health care professional if you are unable to keep an appointment.  What may interact with this medicine?  This medicine may interact with the following medications:  · 6-mercaptopurine  · paclitaxel  · phenytoin  · Brittney's Wort  · trastuzumab  · verapamil  This list may  not describe all possible interactions. Give your health care provider a list of all the medicines, herbs, non-prescription drugs, or dietary supplements you use. Also tell them if you smoke, drink alcohol, or use illegal drugs. Some items may interact with your medicine.  What should I watch for while using this medicine?  This drug may make you feel generally unwell. This is not uncommon, as chemotherapy can affect healthy cells as well as cancer cells. Report any side effects. Continue your course of treatment even though you feel ill unless your doctor tells you to stop.  There is a maximum amount of this medicine you should receive throughout your life. The amount depends on the medical condition being treated and your overall health. Your doctor will watch how much of this medicine you receive in your lifetime. Tell your doctor if you have taken this medicine before.  You may need blood work done while you are taking this medicine.  Your urine may turn red for a few days after your dose. This is not blood. If your urine is dark or brown, call your doctor.  In some cases, you may be given additional medicines to help with side effects. Follow all directions for their use.  Call your doctor or health care professional for advice if you get a fever, chills or sore throat, or other symptoms of a cold or flu. Do not treat yourself. This drug decreases your body's ability to fight infections. Try to avoid being around people who are sick.  This medicine may increase your risk to bruise or bleed. Call your doctor or health care professional if you notice any unusual bleeding.  Talk to your doctor about your risk of cancer. You may be more at risk for certain types of cancers if you take this medicine.  Do not become pregnant while taking this medicine or for 6 months after stopping it. Women should inform their doctor if they wish to become pregnant or think they might be pregnant. Men should not father a child while  taking this medicine and for 6 months after stopping it. There is a potential for serious side effects to an unborn child. Talk to your health care professional or pharmacist for more information. Do not breast-feed an infant while taking this medicine.  This medicine has caused ovarian failure in some women and reduced sperm counts in some men This medicine may interfere with the ability to have a child. Talk with your doctor or health care professional if you are concerned about your fertility.  This medicine may cause a decrease in Co-Enzyme Q-10. You should make sure that you get enough Co-Enzyme Q-10 while you are taking this medicine. Discuss the foods you eat and the vitamins you take with your health care professional.  What side effects may I notice from receiving this medicine?  Side effects that you should report to your doctor or health care professional as soon as possible:  · allergic reactions like skin rash, itching or hives, swelling of the face, lips, or tongue  · breathing problems  · chest pain  · fast or irregular heartbeat  · low blood counts - this medicine may decrease the number of white blood cells, red blood cells and platelets. You may be at increased risk for infections and bleeding.  · pain, redness, or irritation at site where injected  · signs of infection - fever or chills, cough, sore throat, pain or difficulty passing urine  · signs of decreased platelets or bleeding - bruising, pinpoint red spots on the skin, black, tarry stools, blood in the urine  · swelling of the ankles, feet, hands  · tiredness  · weakness  Side effects that usually do not require medical attention (report to your doctor or health care professional if they continue or are bothersome):  · diarrhea  · hair loss  · mouth sores  · nail discoloration or damage  · nausea  · red colored urine  · vomiting  This list may not describe all possible side effects. Call your doctor for medical advice about side effects.  You may report side effects to FDA at 2-702-FDA-2156.  Where should I keep my medicine?  This drug is given in a hospital or clinic and will not be stored at home.  NOTE: This sheet is a summary. It may not cover all possible information. If you have questions about this medicine, talk to your doctor, pharmacist, or health care provider.  © 2021 [employer]vier/Gold Standard (2018-08-01 11:01:26)  Cyclophosphamide Injection  What is this medicine?  CYCLOPHOSPHAMIDE (sye kloe KENNY fa mide) is a chemotherapy drug. It slows the growth of cancer cells. This medicine is used to treat many types of cancer like lymphoma, myeloma, leukemia, breast cancer, and ovarian cancer, to name a few.  This medicine may be used for other purposes; ask your health care provider or pharmacist if you have questions.  COMMON BRAND NAME(S): Cytoxan, Neosar  What should I tell my health care provider before I take this medicine?  They need to know if you have any of these conditions:  · heart disease  · history of irregular heartbeat  · infection  · kidney disease  · liver disease  · low blood counts, like white cells, platelets, or red blood cells  · on hemodialysis  · recent or ongoing radiation therapy  · scarring or thickening of the lungs  · trouble passing urine  · an unusual or allergic reaction to cyclophosphamide, other medicines, foods, dyes, or preservatives  · pregnant or trying to get pregnant  · breast-feeding  How should I use this medicine?  This drug is usually given as an injection into a vein or muscle or by infusion into a vein. It is administered in a hospital or clinic by a specially trained health care professional.  Talk to your pediatrician regarding the use of this medicine in children. Special care may be needed.  Overdosage: If you think you have taken too much of this medicine contact a poison control center or emergency room at once.  NOTE: This medicine is only for you. Do not share this medicine with others.  What if  I miss a dose?  It is important not to miss your dose. Call your doctor or health care professional if you are unable to keep an appointment.  What may interact with this medicine?  · amphotericin B  · azathioprine  · certain antivirals for HIV or hepatitis  · certain medicines for blood pressure, heart disease, irregular heart beat  · certain medicines that treat or prevent blood clots like warfarin  · certain other medicines for cancer  · cyclosporine  · etanercept  · indomethacin  · medicines that relax muscles for surgery  · medicines to increase blood counts  · metronidazole  This list may not describe all possible interactions. Give your health care provider a list of all the medicines, herbs, non-prescription drugs, or dietary supplements you use. Also tell them if you smoke, drink alcohol, or use illegal drugs. Some items may interact with your medicine.  What should I watch for while using this medicine?  Your condition will be monitored carefully while you are receiving this medicine.  You may need blood work done while you are taking this medicine.  Drink water or other fluids as directed. Urinate often, even at night.  Some products may contain alcohol. Ask your health care professional if this medicine contains alcohol. Be sure to tell all health care professionals you are taking this medicine. Certain medicines, like metronidazole and disulfiram, can cause an unpleasant reaction when taken with alcohol. The reaction includes flushing, headache, nausea, vomiting, sweating, and increased thirst. The reaction can last from 30 minutes to several hours.  Do not become pregnant while taking this medicine or for 1 year after stopping it. Women should inform their health care professional if they wish to become pregnant or think they might be pregnant. Men should not father a child while taking this medicine and for 4 months after stopping it. There is potential for serious side effects to an unborn child.  Talk to your health care professional for more information.  Do not breast-feed an infant while taking this medicine or for 1 week after stopping it.  This medicine has caused ovarian failure in some women. This medicine may make it more difficult to get pregnant. Talk to your health care professional if you are concerned about your fertility.  This medicine has caused decreased sperm counts in some men. This may make it more difficult to father a child. Talk to your health care professional if you are concerned about your fertility.  Call your health care professional for advice if you get a fever, chills, or sore throat, or other symptoms of a cold or flu. Do not treat yourself. This medicine decreases your body's ability to fight infections. Try to avoid being around people who are sick.  Avoid taking medicines that contain aspirin, acetaminophen, ibuprofen, naproxen, or ketoprofen unless instructed by your health care professional. These medicines may hide a fever.  Talk to your health care professional about your risk of cancer. You may be more at risk for certain types of cancer if you take this medicine.  If you are going to need surgery or other procedure, tell your health care professional that you are using this medicine.  Be careful brushing or flossing your teeth or using a toothpick because you may get an infection or bleed more easily. If you have any dental work done, tell your dentist you are receiving this medicine.  What side effects may I notice from receiving this medicine?  Side effects that you should report to your doctor or health care professional as soon as possible:  · allergic reactions like skin rash, itching or hives, swelling of the face, lips, or tongue  · breathing problems  · nausea, vomiting  · signs and symptoms of bleeding such as bloody or black, tarry stools; red or dark brown urine; spitting up blood or brown material that looks like coffee grounds; red spots on the skin;  unusual bruising or bleeding from the eyes, gums, or nose  · signs and symptoms of heart failure like fast, irregular heartbeat, sudden weight gain; swelling of the ankles, feet, hands  · signs and symptoms of infection like fever; chills; cough; sore throat; pain or trouble passing urine  · signs and symptoms of kidney injury like trouble passing urine or change in the amount of urine  · signs and symptoms of liver injury like dark yellow or brown urine; general ill feeling or flu-like symptoms; light-colored stools; loss of appetite; nausea; right upper belly pain; unusually weak or tired; yellowing of the eyes or skin  Side effects that usually do not require medical attention (report to your doctor or health care professional if they continue or are bothersome):  · confusion  · decreased hearing  · diarrhea  · facial flushing  · hair loss  · headache  · loss of appetite  · missed menstrual periods  · signs and symptoms of low red blood cells or anemia such as unusually weak or tired; feeling faint or lightheaded; falls  · skin discoloration  This list may not describe all possible side effects. Call your doctor for medical advice about side effects. You may report side effects to FDA at 1-523-FDA-2129.  Where should I keep my medicine?  This drug is given in a hospital or clinic and will not be stored at home.  NOTE: This sheet is a summary. It may not cover all possible information. If you have questions about this medicine, talk to your doctor, pharmacist, or health care provider.  © 2021 [employer]/Gold Standard (2020-09-21 09:53:29)

## 2021-06-03 NOTE — PROGRESS NOTES
Pt is starting a new chemo regimine today of cytoxan and adriamycin. Pt will take 3 days of Decadron as well. Treatments will be every 3 weeks except the next one will be in 4 weeks due to pt vacation. Pt denies bleeding problems.  Dose adjusted and pt instructed to have a serving of green veggies today; pt verbalized. Patient instructed regarding medication; results given and questions answered. Nutritional counseling given.  Dietary factors affecting therapy addressed.  Patient instructed to monitor for excessive bruising or bleeding. Will recheck in 4 days. Findings reported by Tata Nunez RN.    Pt has been taking her 2.5mg doses on Mondays and Wednesdays.  Today's INR is   Lab Results - Last 18 Months   Lab Units 06/03/21  0000   INR  2.90*

## 2021-06-04 ENCOUNTER — APPOINTMENT (OUTPATIENT)
Dept: ONCOLOGY | Facility: HOSPITAL | Age: 51
End: 2021-06-04

## 2021-06-04 ENCOUNTER — APPOINTMENT (OUTPATIENT)
Dept: ONCOLOGY | Facility: CLINIC | Age: 51
End: 2021-06-04

## 2021-06-07 ENCOUNTER — APPOINTMENT (OUTPATIENT)
Dept: ONCOLOGY | Facility: HOSPITAL | Age: 51
End: 2021-06-07

## 2021-06-09 NOTE — TELEPHONE ENCOUNTER
Take benadryl PRN every 8 hours Double O-Z Plasty Text: The defect edges were debeveled with a #15 scalpel blade.  Given the location of the defect, shape of the defect and the proximity to free margins a Double O-Z plasty (double transposition flap) was deemed most appropriate.  Using a sterile surgical marker, the appropriate transposition flaps were drawn incorporating the defect and placing the expected incisions within the relaxed skin tension lines where possible. The area thus outlined was incised deep to adipose tissue with a #15 scalpel blade.  The skin margins were undermined to an appropriate distance in all directions utilizing iris scissors.  Hemostasis was achieved with electrocautery.  The flaps were then transposed into place, one clockwise and the other counterclockwise, and anchored with interrupted buried subcutaneous sutures.

## 2021-06-15 ENCOUNTER — INFUSION (OUTPATIENT)
Dept: ONCOLOGY | Facility: HOSPITAL | Age: 51
End: 2021-06-15

## 2021-06-15 ENCOUNTER — ANTICOAGULATION VISIT (OUTPATIENT)
Dept: CARDIAC SURGERY | Facility: CLINIC | Age: 51
End: 2021-06-15

## 2021-06-15 VITALS — OXYGEN SATURATION: 98 % | HEART RATE: 82 BPM

## 2021-06-15 VITALS
DIASTOLIC BLOOD PRESSURE: 75 MMHG | HEART RATE: 74 BPM | RESPIRATION RATE: 18 BRPM | TEMPERATURE: 96.7 F | SYSTOLIC BLOOD PRESSURE: 139 MMHG

## 2021-06-15 DIAGNOSIS — Z51.81 ENCOUNTER FOR THERAPEUTIC DRUG MONITORING: ICD-10-CM

## 2021-06-15 DIAGNOSIS — Z17.0 MALIGNANT NEOPLASM OF RIGHT BREAST IN FEMALE, ESTROGEN RECEPTOR POSITIVE, UNSPECIFIED SITE OF BREAST (HCC): Primary | ICD-10-CM

## 2021-06-15 DIAGNOSIS — Z45.2 ENCOUNTER FOR VENOUS ACCESS DEVICE CARE: ICD-10-CM

## 2021-06-15 DIAGNOSIS — C50.911 MALIGNANT NEOPLASM OF RIGHT BREAST IN FEMALE, ESTROGEN RECEPTOR POSITIVE, UNSPECIFIED SITE OF BREAST (HCC): Primary | ICD-10-CM

## 2021-06-15 DIAGNOSIS — Z79.01 LONG TERM CURRENT USE OF ANTICOAGULANT THERAPY: Primary | ICD-10-CM

## 2021-06-15 LAB — INR PPP: 1.3 (ref 0.9–1.1)

## 2021-06-15 PROCEDURE — 96374 THER/PROPH/DIAG INJ IV PUSH: CPT | Performed by: INTERNAL MEDICINE

## 2021-06-15 PROCEDURE — 36416 COLLJ CAPILLARY BLOOD SPEC: CPT | Performed by: NURSE PRACTITIONER

## 2021-06-15 PROCEDURE — 25010000002 ZOLEDRONIC ACID PER 1 MG: Performed by: INTERNAL MEDICINE

## 2021-06-15 PROCEDURE — 85610 PROTHROMBIN TIME: CPT | Performed by: NURSE PRACTITIONER

## 2021-06-15 PROCEDURE — 93793 ANTICOAG MGMT PT WARFARIN: CPT | Performed by: NURSE PRACTITIONER

## 2021-06-15 PROCEDURE — 25010000002 HEPARIN LOCK FLUSH PER 10 UNITS: Performed by: INTERNAL MEDICINE

## 2021-06-15 RX ORDER — SODIUM CHLORIDE 0.9 % (FLUSH) 0.9 %
10 SYRINGE (ML) INJECTION AS NEEDED
Status: CANCELLED | OUTPATIENT
Start: 2021-06-15

## 2021-06-15 RX ORDER — SODIUM CHLORIDE 0.9 % (FLUSH) 0.9 %
20 SYRINGE (ML) INJECTION AS NEEDED
Status: DISCONTINUED | OUTPATIENT
Start: 2021-06-15 | End: 2021-06-15 | Stop reason: HOSPADM

## 2021-06-15 RX ORDER — SODIUM CHLORIDE 0.9 % (FLUSH) 0.9 %
20 SYRINGE (ML) INJECTION AS NEEDED
Status: CANCELLED | OUTPATIENT
Start: 2021-06-15

## 2021-06-15 RX ORDER — SODIUM CHLORIDE 0.9 % (FLUSH) 0.9 %
10 SYRINGE (ML) INJECTION AS NEEDED
Status: DISCONTINUED | OUTPATIENT
Start: 2021-06-15 | End: 2021-06-15 | Stop reason: HOSPADM

## 2021-06-15 RX ORDER — HEPARIN SODIUM (PORCINE) LOCK FLUSH IV SOLN 100 UNIT/ML 100 UNIT/ML
500 SOLUTION INTRAVENOUS AS NEEDED
Status: CANCELLED | OUTPATIENT
Start: 2021-06-15

## 2021-06-15 RX ORDER — HEPARIN SODIUM (PORCINE) LOCK FLUSH IV SOLN 100 UNIT/ML 100 UNIT/ML
500 SOLUTION INTRAVENOUS AS NEEDED
Status: DISCONTINUED | OUTPATIENT
Start: 2021-06-15 | End: 2021-06-15 | Stop reason: HOSPADM

## 2021-06-15 RX ORDER — SODIUM CHLORIDE 9 MG/ML
250 INJECTION, SOLUTION INTRAVENOUS ONCE
Status: COMPLETED | OUTPATIENT
Start: 2021-06-15 | End: 2021-06-15

## 2021-06-15 RX ADMIN — SODIUM CHLORIDE 250 ML: 9 INJECTION, SOLUTION INTRAVENOUS at 09:40

## 2021-06-15 RX ADMIN — HEPARIN 500 UNITS: 100 SYRINGE at 10:11

## 2021-06-15 RX ADMIN — SODIUM CHLORIDE, PRESERVATIVE FREE 10 ML: 5 INJECTION INTRAVENOUS at 10:10

## 2021-06-15 RX ADMIN — ZOLEDRONIC ACID 4 MG: 4 INJECTION, SOLUTION, CONCENTRATE INTRAVENOUS at 09:40

## 2021-06-15 NOTE — PROGRESS NOTES
Pt had chemo on 6/3 and was not able to return to [facility] until today. Pt states she missed at least 5 nights of coumadin and restarted it on 6/10. Pt states she was not able to take any of her medications due to vomiting. Pt was instructed on dosing and to hold green veggies until Saturday or Sunday; pt verbalized. Patient instructed regarding medication; results given and questions answered. Nutritional counseling given.  Dietary factors affecting therapy addressed.  Patient instructed to monitor for excessive bruising or bleeding. Will recheck next week before pt goes out of town. Next chemo tx will be on 7/8. Findings reported by Tata Nunez RN.   Today's INR is   Lab Results - Last 18 Months   Lab Units 06/15/21  0000   INR  1.30*

## 2021-06-21 DIAGNOSIS — Z79.01 LONG TERM (CURRENT) USE OF ANTICOAGULANTS: ICD-10-CM

## 2021-06-21 RX ORDER — WARFARIN SODIUM 5 MG/1
TABLET ORAL
Qty: 30 TABLET | Refills: 2 | Status: SHIPPED | OUTPATIENT
Start: 2021-06-21 | End: 2021-10-18 | Stop reason: SDUPTHER

## 2021-06-22 ENCOUNTER — ANTICOAGULATION VISIT (OUTPATIENT)
Dept: CARDIAC SURGERY | Facility: CLINIC | Age: 51
End: 2021-06-22

## 2021-06-22 VITALS — OXYGEN SATURATION: 98 % | HEART RATE: 96 BPM

## 2021-06-22 DIAGNOSIS — Z79.01 LONG TERM CURRENT USE OF ANTICOAGULANT THERAPY: Primary | ICD-10-CM

## 2021-06-22 DIAGNOSIS — Z51.81 ENCOUNTER FOR THERAPEUTIC DRUG MONITORING: ICD-10-CM

## 2021-06-22 LAB — INR PPP: 2.4 (ref 0.9–1.1)

## 2021-06-22 PROCEDURE — 85610 PROTHROMBIN TIME: CPT | Performed by: NURSE PRACTITIONER

## 2021-06-22 PROCEDURE — 93793 ANTICOAG MGMT PT WARFARIN: CPT | Performed by: NURSE PRACTITIONER

## 2021-06-22 PROCEDURE — 36416 COLLJ CAPILLARY BLOOD SPEC: CPT | Performed by: NURSE PRACTITIONER

## 2021-06-22 NOTE — PROGRESS NOTES
Pt denies med changes or bleeding problems. Pt placed back on usual dose and appt made for 2 weeks when pt returns from vacation. Pt verbalized dosing instructions. Patient instructed regarding medication; results given and questions answered. Nutritional counseling given.  Dietary factors affecting therapy addressed.  Patient instructed to monitor for excessive bruising or bleeding. Findings reported by Tata Nunez RN.    Today's INR is   Lab Results - Last 18 Months   Lab Units 06/22/21  0000   INR  2.40*

## 2021-07-08 ENCOUNTER — ANTICOAGULATION VISIT (OUTPATIENT)
Dept: CARDIAC SURGERY | Facility: CLINIC | Age: 51
End: 2021-07-08

## 2021-07-08 ENCOUNTER — INFUSION (OUTPATIENT)
Dept: ONCOLOGY | Facility: HOSPITAL | Age: 51
End: 2021-07-08

## 2021-07-08 ENCOUNTER — OFFICE VISIT (OUTPATIENT)
Dept: ONCOLOGY | Facility: CLINIC | Age: 51
End: 2021-07-08

## 2021-07-08 VITALS — HEART RATE: 91 BPM | OXYGEN SATURATION: 99 %

## 2021-07-08 VITALS
HEIGHT: 67 IN | WEIGHT: 229 LBS | BODY MASS INDEX: 35.94 KG/M2 | SYSTOLIC BLOOD PRESSURE: 126 MMHG | HEART RATE: 84 BPM | DIASTOLIC BLOOD PRESSURE: 74 MMHG | RESPIRATION RATE: 20 BRPM | TEMPERATURE: 98.1 F

## 2021-07-08 DIAGNOSIS — C78.7 LIVER METASTASIS: ICD-10-CM

## 2021-07-08 DIAGNOSIS — Z17.0 MALIGNANT NEOPLASM OF RIGHT BREAST IN FEMALE, ESTROGEN RECEPTOR POSITIVE, UNSPECIFIED SITE OF BREAST (HCC): ICD-10-CM

## 2021-07-08 DIAGNOSIS — Z17.0 MALIGNANT NEOPLASM OF RIGHT BREAST IN FEMALE, ESTROGEN RECEPTOR POSITIVE, UNSPECIFIED SITE OF BREAST (HCC): Chronic | ICD-10-CM

## 2021-07-08 DIAGNOSIS — Z51.81 ENCOUNTER FOR THERAPEUTIC DRUG MONITORING: ICD-10-CM

## 2021-07-08 DIAGNOSIS — C50.911 MALIGNANT NEOPLASM OF RIGHT BREAST IN FEMALE, ESTROGEN RECEPTOR POSITIVE, UNSPECIFIED SITE OF BREAST (HCC): Chronic | ICD-10-CM

## 2021-07-08 DIAGNOSIS — C50.911 MALIGNANT NEOPLASM OF RIGHT BREAST IN FEMALE, ESTROGEN RECEPTOR POSITIVE, UNSPECIFIED SITE OF BREAST (HCC): ICD-10-CM

## 2021-07-08 DIAGNOSIS — Z45.2 ENCOUNTER FOR VENOUS ACCESS DEVICE CARE: ICD-10-CM

## 2021-07-08 DIAGNOSIS — C79.51 BONE METASTASIS: ICD-10-CM

## 2021-07-08 DIAGNOSIS — C79.51 BONE METASTASIS: Chronic | ICD-10-CM

## 2021-07-08 DIAGNOSIS — T45.1X5A NEUROPATHY DUE TO CHEMOTHERAPEUTIC DRUG (HCC): Primary | Chronic | ICD-10-CM

## 2021-07-08 DIAGNOSIS — G62.0 NEUROPATHY DUE TO CHEMOTHERAPEUTIC DRUG (HCC): Primary | Chronic | ICD-10-CM

## 2021-07-08 DIAGNOSIS — C78.7 LIVER METASTASIS: Chronic | ICD-10-CM

## 2021-07-08 DIAGNOSIS — Z79.01 LONG TERM CURRENT USE OF ANTICOAGULANT THERAPY: Primary | ICD-10-CM

## 2021-07-08 DIAGNOSIS — C78.7 LIVER METASTASIS: Primary | ICD-10-CM

## 2021-07-08 LAB
ALBUMIN SERPL-MCNC: 4.2 G/DL (ref 3.5–5.2)
ALBUMIN/GLOB SERPL: 1.6 G/DL
ALP SERPL-CCNC: 91 U/L (ref 39–117)
ALT SERPL W P-5'-P-CCNC: 28 U/L (ref 1–33)
ANION GAP SERPL CALCULATED.3IONS-SCNC: 9 MMOL/L (ref 5–15)
AST SERPL-CCNC: 25 U/L (ref 1–32)
BASOPHILS # BLD AUTO: 0.04 10*3/MM3 (ref 0–0.2)
BASOPHILS NFR BLD AUTO: 0.6 % (ref 0–1.5)
BILIRUB SERPL-MCNC: 0.2 MG/DL (ref 0–1.2)
BUN SERPL-MCNC: 7 MG/DL (ref 6–20)
BUN/CREAT SERPL: 8.2 (ref 7–25)
CALCIUM SPEC-SCNC: 9.9 MG/DL (ref 8.6–10.5)
CHLORIDE SERPL-SCNC: 103 MMOL/L (ref 98–107)
CO2 SERPL-SCNC: 28 MMOL/L (ref 22–29)
CREAT SERPL-MCNC: 0.85 MG/DL (ref 0.57–1)
DEPRECATED RDW RBC AUTO: 45.7 FL (ref 37–54)
EOSINOPHIL # BLD AUTO: 0.79 10*3/MM3 (ref 0–0.4)
EOSINOPHIL NFR BLD AUTO: 12.2 % (ref 0.3–6.2)
ERYTHROCYTE [DISTWIDTH] IN BLOOD BY AUTOMATED COUNT: 14.4 % (ref 12.3–15.4)
GFR SERPL CREATININE-BSD FRML MDRD: 70 ML/MIN/1.73
GLOBULIN UR ELPH-MCNC: 2.7 GM/DL
GLUCOSE SERPL-MCNC: 110 MG/DL (ref 65–99)
HCT VFR BLD AUTO: 37.1 % (ref 34–46.6)
HGB BLD-MCNC: 12.7 G/DL (ref 12–15.9)
HOLD SPECIMEN: NORMAL
IMM GRANULOCYTES # BLD AUTO: 0.03 10*3/MM3 (ref 0–0.05)
IMM GRANULOCYTES NFR BLD AUTO: 0.5 % (ref 0–0.5)
INR PPP: 1.9 (ref 0.9–1.1)
LYMPHOCYTES # BLD AUTO: 1.01 10*3/MM3 (ref 0.7–3.1)
LYMPHOCYTES NFR BLD AUTO: 15.7 % (ref 19.6–45.3)
MCH RBC QN AUTO: 30.5 PG (ref 26.6–33)
MCHC RBC AUTO-ENTMCNC: 34.2 G/DL (ref 31.5–35.7)
MCV RBC AUTO: 89.2 FL (ref 79–97)
MONOCYTES # BLD AUTO: 0.79 10*3/MM3 (ref 0.1–0.9)
MONOCYTES NFR BLD AUTO: 12.2 % (ref 5–12)
NEUTROPHILS NFR BLD AUTO: 3.79 10*3/MM3 (ref 1.7–7)
NEUTROPHILS NFR BLD AUTO: 58.8 % (ref 42.7–76)
NRBC BLD AUTO-RTO: 0 /100 WBC (ref 0–0.2)
PLATELET # BLD AUTO: 192 10*3/MM3 (ref 140–450)
PMV BLD AUTO: 9.9 FL (ref 6–12)
POTASSIUM SERPL-SCNC: 4 MMOL/L (ref 3.5–5.2)
PROT SERPL-MCNC: 6.9 G/DL (ref 6–8.5)
RBC # BLD AUTO: 4.16 10*6/MM3 (ref 3.77–5.28)
SODIUM SERPL-SCNC: 140 MMOL/L (ref 136–145)
WBC # BLD AUTO: 6.45 10*3/MM3 (ref 3.4–10.8)

## 2021-07-08 PROCEDURE — 96367 TX/PROPH/DG ADDL SEQ IV INF: CPT | Performed by: INTERNAL MEDICINE

## 2021-07-08 PROCEDURE — 25010000002 DOXORUBICIN PER 10 MG: Performed by: INTERNAL MEDICINE

## 2021-07-08 PROCEDURE — 96411 CHEMO IV PUSH ADDL DRUG: CPT | Performed by: INTERNAL MEDICINE

## 2021-07-08 PROCEDURE — 85025 COMPLETE CBC W/AUTO DIFF WBC: CPT

## 2021-07-08 PROCEDURE — 1126F AMNT PAIN NOTED NONE PRSNT: CPT | Performed by: INTERNAL MEDICINE

## 2021-07-08 PROCEDURE — 25010000002 HEPARIN LOCK FLUSH PER 10 UNITS: Performed by: INTERNAL MEDICINE

## 2021-07-08 PROCEDURE — 36415 COLL VENOUS BLD VENIPUNCTURE: CPT

## 2021-07-08 PROCEDURE — 80053 COMPREHEN METABOLIC PANEL: CPT

## 2021-07-08 PROCEDURE — 1157F ADVNC CARE PLAN IN RCRD: CPT | Performed by: INTERNAL MEDICINE

## 2021-07-08 PROCEDURE — 96413 CHEMO IV INFUSION 1 HR: CPT | Performed by: INTERNAL MEDICINE

## 2021-07-08 PROCEDURE — 25010000002 FOSAPREPITANT PER 1 MG: Performed by: INTERNAL MEDICINE

## 2021-07-08 PROCEDURE — 25010000002 CYCLOPHOSPHAMIDE 1 GM/5ML SOLUTION 5 ML VIAL: Performed by: INTERNAL MEDICINE

## 2021-07-08 PROCEDURE — 85610 PROTHROMBIN TIME: CPT | Performed by: NURSE PRACTITIONER

## 2021-07-08 PROCEDURE — 96375 TX/PRO/DX INJ NEW DRUG ADDON: CPT | Performed by: INTERNAL MEDICINE

## 2021-07-08 PROCEDURE — G9903 PT SCRN TBCO ID AS NON USER: HCPCS | Performed by: INTERNAL MEDICINE

## 2021-07-08 PROCEDURE — 25010000002 PALONOSETRON PER 25 MCG: Performed by: INTERNAL MEDICINE

## 2021-07-08 PROCEDURE — 36416 COLLJ CAPILLARY BLOOD SPEC: CPT | Performed by: NURSE PRACTITIONER

## 2021-07-08 PROCEDURE — 93793 ANTICOAG MGMT PT WARFARIN: CPT | Performed by: NURSE PRACTITIONER

## 2021-07-08 PROCEDURE — 25010000002 DEXAMETHASONE SODIUM PHOSPHATE 100 MG/10ML SOLUTION: Performed by: INTERNAL MEDICINE

## 2021-07-08 PROCEDURE — 99214 OFFICE O/P EST MOD 30 MIN: CPT | Performed by: INTERNAL MEDICINE

## 2021-07-08 RX ORDER — DOXORUBICIN HYDROCHLORIDE 2 MG/ML
60 INJECTION, SOLUTION INTRAVENOUS ONCE
Status: CANCELLED | OUTPATIENT
Start: 2021-07-08

## 2021-07-08 RX ORDER — HEPARIN SODIUM (PORCINE) LOCK FLUSH IV SOLN 100 UNIT/ML 100 UNIT/ML
500 SOLUTION INTRAVENOUS AS NEEDED
Status: CANCELLED | OUTPATIENT
Start: 2021-07-13

## 2021-07-08 RX ORDER — DOXORUBICIN HYDROCHLORIDE 2 MG/ML
60 INJECTION, SOLUTION INTRAVENOUS ONCE
Status: COMPLETED | OUTPATIENT
Start: 2021-07-08 | End: 2021-07-08

## 2021-07-08 RX ORDER — SODIUM CHLORIDE 0.9 % (FLUSH) 0.9 %
10 SYRINGE (ML) INJECTION AS NEEDED
Status: CANCELLED | OUTPATIENT
Start: 2021-07-13

## 2021-07-08 RX ORDER — SODIUM CHLORIDE 0.9 % (FLUSH) 0.9 %
20 SYRINGE (ML) INJECTION AS NEEDED
Status: DISCONTINUED | OUTPATIENT
Start: 2021-07-08 | End: 2021-07-08 | Stop reason: HOSPADM

## 2021-07-08 RX ORDER — SODIUM CHLORIDE 9 MG/ML
250 INJECTION, SOLUTION INTRAVENOUS ONCE
Status: CANCELLED | OUTPATIENT
Start: 2021-07-08

## 2021-07-08 RX ORDER — PALONOSETRON 0.05 MG/ML
0.25 INJECTION, SOLUTION INTRAVENOUS ONCE
Status: CANCELLED | OUTPATIENT
Start: 2021-07-08

## 2021-07-08 RX ORDER — SODIUM CHLORIDE 0.9 % (FLUSH) 0.9 %
20 SYRINGE (ML) INJECTION AS NEEDED
Status: CANCELLED | OUTPATIENT
Start: 2021-07-13

## 2021-07-08 RX ORDER — HEPARIN SODIUM (PORCINE) LOCK FLUSH IV SOLN 100 UNIT/ML 100 UNIT/ML
500 SOLUTION INTRAVENOUS AS NEEDED
Status: DISCONTINUED | OUTPATIENT
Start: 2021-07-08 | End: 2021-07-08 | Stop reason: HOSPADM

## 2021-07-08 RX ORDER — SODIUM CHLORIDE 9 MG/ML
250 INJECTION, SOLUTION INTRAVENOUS ONCE
Status: COMPLETED | OUTPATIENT
Start: 2021-07-08 | End: 2021-07-08

## 2021-07-08 RX ORDER — SODIUM CHLORIDE 0.9 % (FLUSH) 0.9 %
10 SYRINGE (ML) INJECTION AS NEEDED
Status: DISCONTINUED | OUTPATIENT
Start: 2021-07-08 | End: 2021-07-08 | Stop reason: HOSPADM

## 2021-07-08 RX ORDER — PALONOSETRON 0.05 MG/ML
0.25 INJECTION, SOLUTION INTRAVENOUS ONCE
Status: COMPLETED | OUTPATIENT
Start: 2021-07-08 | End: 2021-07-08

## 2021-07-08 RX ADMIN — PALONOSETRON HYDROCHLORIDE 0.25 MG: 0.25 INJECTION, SOLUTION INTRAVENOUS at 10:07

## 2021-07-08 RX ADMIN — SODIUM CHLORIDE, PRESERVATIVE FREE 10 ML: 5 INJECTION INTRAVENOUS at 12:45

## 2021-07-08 RX ADMIN — DEXAMETHASONE SODIUM PHOSPHATE 12 MG: 10 INJECTION, SOLUTION INTRAMUSCULAR; INTRAVENOUS at 10:26

## 2021-07-08 RX ADMIN — DOXORUBICIN HYDROCHLORIDE 128 MG: 2 INJECTION, SOLUTION INTRAVENOUS at 11:37

## 2021-07-08 RX ADMIN — FOSAPREPITANT 100 ML: 150 INJECTION, POWDER, LYOPHILIZED, FOR SOLUTION INTRAVENOUS at 10:52

## 2021-07-08 RX ADMIN — HEPARIN 500 UNITS: 100 SYRINGE at 12:46

## 2021-07-08 RX ADMIN — SODIUM CHLORIDE 250 ML: 9 INJECTION, SOLUTION INTRAVENOUS at 10:07

## 2021-07-08 RX ADMIN — CYCLOPHOSPHAMIDE 1280 MG: 200 INJECTION, SOLUTION INTRAVENOUS at 12:04

## 2021-07-08 NOTE — PROGRESS NOTES
DATE OF VISIT: 7/8/2021      REASON FOR VISIT: Metastatic breast cancer with bone, liver metastases and skin metastases on Adriamycin and Cytoxan, cancer related pain, DVT on anticoagulation with Coumadin, neuropathy from chemotherapy      HISTORY OF PRESENT ILLNESS:   51-year-old female with medical problem consisting of DCIS of right breast diagnosed in 2007, anxiety/depression, metastatic breast cancer with bone, liver and skin metastases for which patient is currently on treatment on Adriamycin and Cytoxan that was started on Lupe 3, 2021. Patient is here to get cycle 2 of Adriamycin and Cytoxan today. Cycle 2 has been delayed by 2 weeks secondary to patient being out of town for vacation.  Complains of right-sided flank pain that started about 2 weeks ago after she was making back.  States pain is improved over the past 2 weeks.  Denies any bleeding. Denies any new lymph node enlargement. Complains of back pain and hip pain. Denies any bowel or bladder incontinence.        Oncology history:    1.  Metastatic right breast cancer with bone and liver metastases:  -Patient was initially diagnosed in July 2017 with axillary biopsy on right side  -S/p 6 cycles of chemotherapy with Taxotere and Cytoxan between July 26, 2017 and December 6, 2017  -Patient was evaluated by  at [facility] in [place] for second opinion.  -Patient received 20 cycles of chemotherapy with palbociclib and letrozole between July 30, 2018 and October 2, 2020.  -Due to enlarging mass in the right axilla patient's treatment was changed to Faslodex on October 16, 2020  -Due to continuous enlargement of axillary mass without any other progression patient received radiation treatment to axilla that was completed on March 4, 2021  -Patient has received 8 cycles of Faslodex between October 16, 2020 and May 7, 2021  -PET/CT done on May 21, 2021 shows progression of disease with axillary adenopathy, skin involvement as well as new onset of  "right-sided liver metastases.  -She was started on cycle 1 of Adriamycin and Cytoxan on Lupe 3, 2021.        Past Medical History, Past Surgical History, Social History, Family History have been reviewed and are without significant changes except as mentioned.    Review of Systems   Constitutional: Positive for fatigue.   Gastrointestinal: Negative for blood in stool.   Musculoskeletal: Positive for arthralgias and back pain.   Skin:        Positive for skin nodule in right axillary area   Hematological: Negative for adenopathy.      A comprehensive 14 point review of systems was performed and was negative except as mentioned.    Medications:  The current medication list was reviewed in the EMR    ALLERGIES:    Allergies   Allergen Reactions   • Percocet [Oxycodone-Acetaminophen] Nausea And Vomiting       Objective      Vitals:    07/08/21 0926   BP: 126/74   Pulse: 84   Resp: 20   Temp: 98.1 °F (36.7 °C)   TempSrc: Temporal   Weight: 104 kg (229 lb)   Height: 170.2 cm (67.01\")   PainSc: 0-No pain     Current Status 7/8/2021   ECOG score 0       Physical Exam  Cardiovascular:      Rate and Rhythm: Normal rate and regular rhythm.   Pulmonary:      Breath sounds: Normal breath sounds.   Neurological:      Mental Status: She is alert and oriented to person, place, and time.           RECENT LABS:  Glucose   Date Value Ref Range Status   06/03/2021 110 (H) 65 - 99 mg/dL Final     Sodium   Date Value Ref Range Status   06/03/2021 142 136 - 145 mmol/L Final     Potassium   Date Value Ref Range Status   06/03/2021 3.9 3.5 - 5.2 mmol/L Final     CO2   Date Value Ref Range Status   06/03/2021 27.0 22.0 - 29.0 mmol/L Final     Chloride   Date Value Ref Range Status   06/03/2021 107 98 - 107 mmol/L Final     Anion Gap   Date Value Ref Range Status   06/03/2021 8.0 5.0 - 15.0 mmol/L Final     Creatinine   Date Value Ref Range Status   06/03/2021 0.80 0.57 - 1.00 mg/dL Final     BUN   Date Value Ref Range Status   06/03/2021 11 " 6 - 20 mg/dL Final     BUN/Creatinine Ratio   Date Value Ref Range Status   06/03/2021 13.8 7.0 - 25.0 Final     Calcium   Date Value Ref Range Status   06/03/2021 9.3 8.6 - 10.5 mg/dL Final     eGFR Non  Amer   Date Value Ref Range Status   06/03/2021 75 >60 mL/min/1.73 Final     Alkaline Phosphatase   Date Value Ref Range Status   06/03/2021 79 39 - 117 U/L Final     Total Protein   Date Value Ref Range Status   06/03/2021 7.9 6.0 - 8.5 g/dL Final     ALT (SGPT)   Date Value Ref Range Status   06/03/2021 25 1 - 33 U/L Final     AST (SGOT)   Date Value Ref Range Status   06/03/2021 24 1 - 32 U/L Final     Total Bilirubin   Date Value Ref Range Status   06/03/2021 0.3 0.0 - 1.2 mg/dL Final     Albumin   Date Value Ref Range Status   06/03/2021 3.90 3.50 - 5.20 g/dL Final     Globulin   Date Value Ref Range Status   06/03/2021 4.0 gm/dL Final     Lab Results   Component Value Date    WBC 6.45 07/08/2021    HGB 12.7 07/08/2021    HCT 37.1 07/08/2021    MCV 89.2 07/08/2021     07/08/2021     Lab Results   Component Value Date    NEUTROABS 3.79 07/08/2021     Lab Results   Component Value Date     40.3 (H) 05/07/2021    LABCA2 45.0 (H) 05/07/2021    HCGQUANT 1.16 06/03/2021         PATHOLOGY:  * Cannot find OR log *         RADIOLOGY DATA :  No radiology results for the last 7 days        Assessment/Plan     1. Metastatic right breast cancer with skin bone, liver metastases stage IV:  -Review oncology history for prior treatment details  -2D echo done on May 26, 2021 showed normal ejection fraction at 61%  -Was started on cycle 1 of Adriamycin and Cytoxan on Lupe 3, 2021.  -We will proceed with cycle 2 of chemotherapy today  -Plan is to do restaging PET/CT after cycle 3 of chemotherapy which was discussed with patient and her family    2. Brain metastases:  -Patient was diagnosed with calvarial metastases without any evidence of parenchymal mets. Patient was evaluated by Dr. Pulido and no radiation  therapy was recommended    3. Right internal jugular vein DVT:  -Remains on Coumadin    4. Bone metastases:  -Has been on monthly Zometa since October 23, 2017  -We will continue with monthly Zometa for now    5. Elevated liver function test:  -Secondary to liver metastasis plus medication. Will monitor with CMP    6. History of DCIS of right breast diagnosed in 2007  -A mastectomy followed by tamoxifen for 5 years that completed in 2013    7. History of melanoma in situ s/p surgery by Dr. Edwards    8. Chemotherapy-induced neuropathy:  -Remains on gabapentin    9. Cancer related pain:  -Remains on Ultram as needed    10. Health maintenance: Patient does not smoke    11. Advance Care Planning   ACP discussion was held with the patient during this visit. Patient has an advance directive in EMR which is still valid.                  PHQ-9 Total Score: 0   -Patient is not homicidal or suicidal.  No acute intervention required.    Kylie García reports a pain score of 0.  Given her pain assessment as noted, treatment options were discussed and the following options were decided upon as a follow-up plan to address the patient's pain: continuation of current treatment plan for pain.         Ovidio Meadows MD  7/8/2021  09:40 CDT        Part of this note may be an electronic transcription/translation of spoken language to printed text using the Dragon Dictation System.          CC:

## 2021-07-26 DIAGNOSIS — C50.911 MALIGNANT NEOPLASM OF RIGHT BREAST IN FEMALE, ESTROGEN RECEPTOR POSITIVE, UNSPECIFIED SITE OF BREAST (HCC): Primary | ICD-10-CM

## 2021-07-26 DIAGNOSIS — Z17.0 MALIGNANT NEOPLASM OF RIGHT BREAST IN FEMALE, ESTROGEN RECEPTOR POSITIVE, UNSPECIFIED SITE OF BREAST (HCC): Primary | ICD-10-CM

## 2021-07-29 ENCOUNTER — OFFICE VISIT (OUTPATIENT)
Dept: ONCOLOGY | Facility: CLINIC | Age: 51
End: 2021-07-29

## 2021-07-29 ENCOUNTER — ANTICOAGULATION VISIT (OUTPATIENT)
Dept: CARDIAC SURGERY | Facility: CLINIC | Age: 51
End: 2021-07-29

## 2021-07-29 ENCOUNTER — INFUSION (OUTPATIENT)
Dept: ONCOLOGY | Facility: HOSPITAL | Age: 51
End: 2021-07-29

## 2021-07-29 VITALS
HEART RATE: 92 BPM | DIASTOLIC BLOOD PRESSURE: 73 MMHG | OXYGEN SATURATION: 92 % | WEIGHT: 230.6 LBS | BODY MASS INDEX: 36.11 KG/M2 | SYSTOLIC BLOOD PRESSURE: 135 MMHG | TEMPERATURE: 97.9 F

## 2021-07-29 VITALS — HEART RATE: 107 BPM | OXYGEN SATURATION: 99 %

## 2021-07-29 DIAGNOSIS — Z17.0 MALIGNANT NEOPLASM OF RIGHT BREAST IN FEMALE, ESTROGEN RECEPTOR POSITIVE, UNSPECIFIED SITE OF BREAST (HCC): Chronic | ICD-10-CM

## 2021-07-29 DIAGNOSIS — Z17.0 MALIGNANT NEOPLASM OF RIGHT BREAST IN FEMALE, ESTROGEN RECEPTOR POSITIVE, UNSPECIFIED SITE OF BREAST (HCC): Primary | ICD-10-CM

## 2021-07-29 DIAGNOSIS — Z17.0 MALIGNANT NEOPLASM OF RIGHT BREAST IN FEMALE, ESTROGEN RECEPTOR POSITIVE, UNSPECIFIED SITE OF BREAST (HCC): ICD-10-CM

## 2021-07-29 DIAGNOSIS — C79.51 BONE METASTASIS: ICD-10-CM

## 2021-07-29 DIAGNOSIS — Z51.81 ENCOUNTER FOR THERAPEUTIC DRUG MONITORING: ICD-10-CM

## 2021-07-29 DIAGNOSIS — T45.1X5A NEUROPATHY DUE TO CHEMOTHERAPEUTIC DRUG (HCC): ICD-10-CM

## 2021-07-29 DIAGNOSIS — C50.911 MALIGNANT NEOPLASM OF RIGHT BREAST IN FEMALE, ESTROGEN RECEPTOR POSITIVE, UNSPECIFIED SITE OF BREAST (HCC): ICD-10-CM

## 2021-07-29 DIAGNOSIS — C50.911 MALIGNANT NEOPLASM OF RIGHT BREAST IN FEMALE, ESTROGEN RECEPTOR POSITIVE, UNSPECIFIED SITE OF BREAST (HCC): Primary | ICD-10-CM

## 2021-07-29 DIAGNOSIS — C78.7 LIVER METASTASIS: Primary | ICD-10-CM

## 2021-07-29 DIAGNOSIS — Z79.01 LONG TERM CURRENT USE OF ANTICOAGULANT THERAPY: Primary | ICD-10-CM

## 2021-07-29 DIAGNOSIS — C50.911 MALIGNANT NEOPLASM OF RIGHT BREAST IN FEMALE, ESTROGEN RECEPTOR POSITIVE, UNSPECIFIED SITE OF BREAST (HCC): Chronic | ICD-10-CM

## 2021-07-29 DIAGNOSIS — G62.0 NEUROPATHY DUE TO CHEMOTHERAPEUTIC DRUG (HCC): ICD-10-CM

## 2021-07-29 DIAGNOSIS — Z45.2 ENCOUNTER FOR VENOUS ACCESS DEVICE CARE: ICD-10-CM

## 2021-07-29 DIAGNOSIS — C79.51 BONE METASTASIS: Primary | Chronic | ICD-10-CM

## 2021-07-29 LAB
ALBUMIN SERPL-MCNC: 4.1 G/DL (ref 3.5–5.2)
ALBUMIN/GLOB SERPL: 1.5 G/DL
ALP SERPL-CCNC: 93 U/L (ref 39–117)
ALT SERPL W P-5'-P-CCNC: 35 U/L (ref 1–33)
ANION GAP SERPL CALCULATED.3IONS-SCNC: 16 MMOL/L (ref 5–15)
AST SERPL-CCNC: 28 U/L (ref 1–32)
BASOPHILS # BLD AUTO: 0.05 10*3/MM3 (ref 0–0.2)
BASOPHILS NFR BLD AUTO: 0.7 % (ref 0–1.5)
BILIRUB SERPL-MCNC: <0.2 MG/DL (ref 0–1.2)
BUN SERPL-MCNC: 12 MG/DL (ref 6–20)
BUN/CREAT SERPL: 15.2 (ref 7–25)
CALCIUM SPEC-SCNC: 8.9 MG/DL (ref 8.6–10.5)
CANCER AG15-3 SERPL-ACNC: 80.4 U/ML
CHLORIDE SERPL-SCNC: 104 MMOL/L (ref 98–107)
CO2 SERPL-SCNC: 19 MMOL/L (ref 22–29)
CREAT SERPL-MCNC: 0.79 MG/DL (ref 0.57–1)
DEPRECATED RDW RBC AUTO: 44.9 FL (ref 37–54)
EOSINOPHIL # BLD AUTO: 0 10*3/MM3 (ref 0–0.4)
EOSINOPHIL NFR BLD AUTO: 0 % (ref 0.3–6.2)
ERYTHROCYTE [DISTWIDTH] IN BLOOD BY AUTOMATED COUNT: 14.3 % (ref 12.3–15.4)
GFR SERPL CREATININE-BSD FRML MDRD: 76 ML/MIN/1.73
GLOBULIN UR ELPH-MCNC: 2.8 GM/DL
GLUCOSE SERPL-MCNC: 239 MG/DL (ref 65–99)
HCT VFR BLD AUTO: 35.5 % (ref 34–46.6)
HGB BLD-MCNC: 12 G/DL (ref 12–15.9)
IMM GRANULOCYTES # BLD AUTO: 0.18 10*3/MM3 (ref 0–0.05)
IMM GRANULOCYTES NFR BLD AUTO: 2.4 % (ref 0–0.5)
INR PPP: 1.9 (ref 0.9–1.1)
LYMPHOCYTES # BLD AUTO: 0.52 10*3/MM3 (ref 0.7–3.1)
LYMPHOCYTES NFR BLD AUTO: 6.9 % (ref 19.6–45.3)
MAGNESIUM SERPL-MCNC: 1.6 MG/DL (ref 1.6–2.6)
MCH RBC QN AUTO: 29.9 PG (ref 26.6–33)
MCHC RBC AUTO-ENTMCNC: 33.8 G/DL (ref 31.5–35.7)
MCV RBC AUTO: 88.5 FL (ref 79–97)
MONOCYTES # BLD AUTO: 0.12 10*3/MM3 (ref 0.1–0.9)
MONOCYTES NFR BLD AUTO: 1.6 % (ref 5–12)
NEUTROPHILS NFR BLD AUTO: 6.62 10*3/MM3 (ref 1.7–7)
NEUTROPHILS NFR BLD AUTO: 88.4 % (ref 42.7–76)
NRBC BLD AUTO-RTO: 0 /100 WBC (ref 0–0.2)
PHOSPHATE SERPL-MCNC: 2.7 MG/DL (ref 2.5–4.5)
PLATELET # BLD AUTO: 389 10*3/MM3 (ref 140–450)
PMV BLD AUTO: 9.6 FL (ref 6–12)
POTASSIUM SERPL-SCNC: 3.8 MMOL/L (ref 3.5–5.2)
PROT SERPL-MCNC: 6.9 G/DL (ref 6–8.5)
RBC # BLD AUTO: 4.01 10*6/MM3 (ref 3.77–5.28)
SODIUM SERPL-SCNC: 139 MMOL/L (ref 136–145)
WBC # BLD AUTO: 7.49 10*3/MM3 (ref 3.4–10.8)

## 2021-07-29 PROCEDURE — 36416 COLLJ CAPILLARY BLOOD SPEC: CPT | Performed by: NURSE PRACTITIONER

## 2021-07-29 PROCEDURE — 25010000002 PALONOSETRON PER 25 MCG: Performed by: NURSE PRACTITIONER

## 2021-07-29 PROCEDURE — 25010000002 ZOLEDRONIC ACID PER 1 MG: Performed by: NURSE PRACTITIONER

## 2021-07-29 PROCEDURE — 96413 CHEMO IV INFUSION 1 HR: CPT | Performed by: NURSE PRACTITIONER

## 2021-07-29 PROCEDURE — 25010000002 DOXORUBICIN PER 10 MG: Performed by: NURSE PRACTITIONER

## 2021-07-29 PROCEDURE — 96375 TX/PRO/DX INJ NEW DRUG ADDON: CPT | Performed by: NURSE PRACTITIONER

## 2021-07-29 PROCEDURE — 96411 CHEMO IV PUSH ADDL DRUG: CPT | Performed by: NURSE PRACTITIONER

## 2021-07-29 PROCEDURE — 83735 ASSAY OF MAGNESIUM: CPT

## 2021-07-29 PROCEDURE — 85025 COMPLETE CBC W/AUTO DIFF WBC: CPT

## 2021-07-29 PROCEDURE — 96367 TX/PROPH/DG ADDL SEQ IV INF: CPT | Performed by: NURSE PRACTITIONER

## 2021-07-29 PROCEDURE — 85610 PROTHROMBIN TIME: CPT | Performed by: NURSE PRACTITIONER

## 2021-07-29 PROCEDURE — 25010000002 FOSAPREPITANT PER 1 MG: Performed by: NURSE PRACTITIONER

## 2021-07-29 PROCEDURE — 25010000002 CYCLOPHOSPHAMIDE 1 GM/5ML SOLUTION 5 ML VIAL: Performed by: NURSE PRACTITIONER

## 2021-07-29 PROCEDURE — 93793 ANTICOAG MGMT PT WARFARIN: CPT | Performed by: NURSE PRACTITIONER

## 2021-07-29 PROCEDURE — 99213 OFFICE O/P EST LOW 20 MIN: CPT | Performed by: NURSE PRACTITIONER

## 2021-07-29 PROCEDURE — 86300 IMMUNOASSAY TUMOR CA 15-3: CPT

## 2021-07-29 PROCEDURE — 25010000002 HEPARIN LOCK FLUSH PER 10 UNITS: Performed by: INTERNAL MEDICINE

## 2021-07-29 PROCEDURE — 25010000002 DEXAMETHASONE SODIUM PHOSPHATE 100 MG/10ML SOLUTION: Performed by: NURSE PRACTITIONER

## 2021-07-29 PROCEDURE — 84100 ASSAY OF PHOSPHORUS: CPT

## 2021-07-29 PROCEDURE — 80053 COMPREHEN METABOLIC PANEL: CPT

## 2021-07-29 RX ORDER — SODIUM CHLORIDE 9 MG/ML
250 INJECTION, SOLUTION INTRAVENOUS ONCE
Status: COMPLETED | OUTPATIENT
Start: 2021-07-29 | End: 2021-07-29

## 2021-07-29 RX ORDER — PALONOSETRON 0.05 MG/ML
0.25 INJECTION, SOLUTION INTRAVENOUS ONCE
Status: COMPLETED | OUTPATIENT
Start: 2021-07-29 | End: 2021-07-29

## 2021-07-29 RX ORDER — SODIUM CHLORIDE 0.9 % (FLUSH) 0.9 %
10 SYRINGE (ML) INJECTION AS NEEDED
Status: CANCELLED | OUTPATIENT
Start: 2021-07-29

## 2021-07-29 RX ORDER — SODIUM CHLORIDE 9 MG/ML
250 INJECTION, SOLUTION INTRAVENOUS ONCE
Status: CANCELLED | OUTPATIENT
Start: 2021-07-29

## 2021-07-29 RX ORDER — GABAPENTIN 300 MG/1
300 CAPSULE ORAL 3 TIMES DAILY
Qty: 90 CAPSULE | Refills: 0 | Status: SHIPPED | OUTPATIENT
Start: 2021-07-29 | End: 2021-01-01 | Stop reason: SDUPTHER

## 2021-07-29 RX ORDER — HEPARIN SODIUM (PORCINE) LOCK FLUSH IV SOLN 100 UNIT/ML 100 UNIT/ML
500 SOLUTION INTRAVENOUS AS NEEDED
Status: DISCONTINUED | OUTPATIENT
Start: 2021-07-29 | End: 2021-07-29 | Stop reason: HOSPADM

## 2021-07-29 RX ORDER — DOXORUBICIN HYDROCHLORIDE 2 MG/ML
60 INJECTION, SOLUTION INTRAVENOUS ONCE
Status: COMPLETED | OUTPATIENT
Start: 2021-07-29 | End: 2021-07-29

## 2021-07-29 RX ORDER — SODIUM CHLORIDE 0.9 % (FLUSH) 0.9 %
10 SYRINGE (ML) INJECTION AS NEEDED
Status: DISCONTINUED | OUTPATIENT
Start: 2021-07-29 | End: 2021-07-29 | Stop reason: HOSPADM

## 2021-07-29 RX ORDER — HEPARIN SODIUM (PORCINE) LOCK FLUSH IV SOLN 100 UNIT/ML 100 UNIT/ML
500 SOLUTION INTRAVENOUS AS NEEDED
Status: CANCELLED | OUTPATIENT
Start: 2021-07-29

## 2021-07-29 RX ORDER — PALONOSETRON 0.05 MG/ML
0.25 INJECTION, SOLUTION INTRAVENOUS ONCE
Status: CANCELLED | OUTPATIENT
Start: 2021-07-29

## 2021-07-29 RX ORDER — PROMETHAZINE HYDROCHLORIDE 12.5 MG/1
12.5 TABLET ORAL EVERY 6 HOURS PRN
Qty: 30 TABLET | Refills: 1 | Status: SHIPPED | OUTPATIENT
Start: 2021-07-29

## 2021-07-29 RX ORDER — SODIUM CHLORIDE 0.9 % (FLUSH) 0.9 %
20 SYRINGE (ML) INJECTION AS NEEDED
Status: CANCELLED | OUTPATIENT
Start: 2021-07-29

## 2021-07-29 RX ORDER — DOXORUBICIN HYDROCHLORIDE 2 MG/ML
60 INJECTION, SOLUTION INTRAVENOUS ONCE
Status: CANCELLED | OUTPATIENT
Start: 2021-07-29

## 2021-07-29 RX ADMIN — ZOLEDRONIC ACID 4 MG: 4 INJECTION, SOLUTION, CONCENTRATE INTRAVENOUS at 12:19

## 2021-07-29 RX ADMIN — CYCLOPHOSPHAMIDE 1280 MG: 200 INJECTION, SOLUTION INTRAVENOUS at 11:31

## 2021-07-29 RX ADMIN — PALONOSETRON 0.25 MG: 0.05 INJECTION, SOLUTION INTRAVENOUS at 09:58

## 2021-07-29 RX ADMIN — DEXAMETHASONE SODIUM PHOSPHATE 12 MG: 10 INJECTION, SOLUTION INTRAMUSCULAR; INTRAVENOUS at 10:44

## 2021-07-29 RX ADMIN — DOXORUBICIN HYDROCHLORIDE 128 MG: 2 INJECTION, SOLUTION INTRAVENOUS at 11:14

## 2021-07-29 RX ADMIN — HEPARIN 500 UNITS: 100 SYRINGE at 12:45

## 2021-07-29 RX ADMIN — SODIUM CHLORIDE, PRESERVATIVE FREE 10 ML: 5 INJECTION INTRAVENOUS at 12:45

## 2021-07-29 RX ADMIN — FOSAPREPITANT 100 ML: 150 INJECTION, POWDER, LYOPHILIZED, FOR SOLUTION INTRAVENOUS at 10:05

## 2021-07-29 RX ADMIN — SODIUM CHLORIDE 250 ML: 9 INJECTION, SOLUTION INTRAVENOUS at 09:57

## 2021-07-29 NOTE — PROGRESS NOTES
Pt denies med changes or bleeding problems. Pt had nausea and vomiting for about 10 days post chemo and states she was not able to take her coumadin again. Pt feels that this will be the case each time and prefers to be seen every 3 weeks in [facility] when she has chemo. Pt has been back on her coumadin about 8 days. Pt was advised if she were able to start her coumadin back more than 1 week before next appt to call [facility] to be seen sooner; pt verbalized. Dose slightly increased due to last two INR's being slightly low; pt verbalized dosing instructions. Patient instructed regarding medication; results given and questions answered. Nutritional counseling given.  Dietary factors affecting therapy addressed.  Patient instructed to monitor for excessive bruising or bleeding. Findings reported by Tata Nunez RN.    Today's INR is   Lab Results - Last 18 Months   Lab Units 07/29/21  0000   INR  1.90*

## 2021-07-29 NOTE — PROGRESS NOTES
DATE OF VISIT: 7/29/2021      REASON FOR VISIT:  Metastatic breast cancer with bone, liver metastases and skin metastases on Adriamycin and Cytoxan, cancer related pain, DVT on anticoagulation with Coumadin, neuropathy from chemotherapy        HISTORY OF PRESENT ILLNESS:   51-year-old female with medical problem consisting of DCIS of right breast diagnosed in 2007, anxiety/depression, metastatic breast cancer with bone, liver and skin metastases for which patient is currently on treatment on Adriamycin and Cytoxan that was started on Lupe 3, 2021. Patient is here to get cycle 3 of Adriamycin and Cytoxan today. Cycle 2 has been delayed by 2 weeks secondary to patient being out of town for vacation.   Denies any bleeding. Denies any new lymph node enlargement. Complains of back pain and hip pain. Denies any bowel or bladder incontinence.             Oncology history:     1.  Metastatic right breast cancer with bone and liver metastases:  -Patient was initially diagnosed in July 2017 with axillary biopsy on right side  -S/p 6 cycles of chemotherapy with Taxotere and Cytoxan between July 26, 2017 and December 6, 2017  -Patient was evaluated by  at [facility] in [place] for second opinion.  -Patient received 20 cycles of chemotherapy with palbociclib and letrozole between July 30, 2018 and October 2, 2020.  -Due to enlarging mass in the right axilla patient's treatment was changed to Faslodex on October 16, 2020  -Due to continuous enlargement of axillary mass without any other progression patient received radiation treatment to axilla that was completed on March 4, 2021  -Patient has received 8 cycles of Faslodex between October 16, 2020 and May 7, 2021  -PET/CT done on May 21, 2021 shows progression of disease with axillary adenopathy, skin involvement as well as new onset of right-sided liver metastases.  -She was started on cycle 1 of Adriamycin and Cytoxan on Lupe 3, 2021.                 Past Medical  History, Past Surgical History, Social History, Family History have been reviewed and are without significant changes except as mentioned.    Review of Systems   A comprehensive 14 point review of systems was performed and was negative except as mentioned.  +significant nausea and emesis not really helped with Zofran use.  +fatigue  Medications:  The current medication list was reviewed in the EMR    ALLERGIES:    Allergies   Allergen Reactions   • Percocet [Oxycodone-Acetaminophen] Nausea And Vomiting       Objective      Vitals:    07/29/21 0929   BP: 135/73   Pulse: 92   Temp: 97.9 °F (36.6 °C)   SpO2: 92%   Weight: 105 kg (230 lb 9.6 oz)   PainSc: 0-No pain     Current Status 7/29/2021   ECOG score 0       Physical Exam  Cardiovascular:      Rate and Rhythm: Normal rate and regular rhythm.   Pulmonary:      Breath sounds: Normal breath sounds.   Neurological:      Mental Status: She is alert and oriented to person, place, and time.         RECENT LABS:  Glucose   Date Value Ref Range Status   07/29/2021 239 (H) 65 - 99 mg/dL Final     Sodium   Date Value Ref Range Status   07/29/2021 139 136 - 145 mmol/L Final     Potassium   Date Value Ref Range Status   07/29/2021 3.8 3.5 - 5.2 mmol/L Final     CO2   Date Value Ref Range Status   07/29/2021 19.0 (L) 22.0 - 29.0 mmol/L Final     Chloride   Date Value Ref Range Status   07/29/2021 104 98 - 107 mmol/L Final     Anion Gap   Date Value Ref Range Status   07/29/2021 16.0 (H) 5.0 - 15.0 mmol/L Final     Creatinine   Date Value Ref Range Status   07/29/2021 0.79 0.57 - 1.00 mg/dL Final     BUN   Date Value Ref Range Status   07/29/2021 12 6 - 20 mg/dL Final     BUN/Creatinine Ratio   Date Value Ref Range Status   07/29/2021 15.2 7.0 - 25.0 Final     Calcium   Date Value Ref Range Status   07/29/2021 8.9 8.6 - 10.5 mg/dL Final     eGFR Non  Amer   Date Value Ref Range Status   07/29/2021 76 >60 mL/min/1.73 Final     Alkaline Phosphatase   Date Value Ref Range  Status   07/29/2021 93 39 - 117 U/L Final     Total Protein   Date Value Ref Range Status   07/29/2021 6.9 6.0 - 8.5 g/dL Final     ALT (SGPT)   Date Value Ref Range Status   07/29/2021 35 (H) 1 - 33 U/L Final     AST (SGOT)   Date Value Ref Range Status   07/29/2021 28 1 - 32 U/L Final     Total Bilirubin   Date Value Ref Range Status   07/29/2021 <0.2 0.0 - 1.2 mg/dL Final     Albumin   Date Value Ref Range Status   07/29/2021 4.10 3.50 - 5.20 g/dL Final     Globulin   Date Value Ref Range Status   07/29/2021 2.8 gm/dL Final     Lab Results   Component Value Date    WBC 7.49 07/29/2021    HGB 12.0 07/29/2021    HCT 35.5 07/29/2021    MCV 88.5 07/29/2021     07/29/2021     Lab Results   Component Value Date    NEUTROABS 6.62 07/29/2021     Lab Results   Component Value Date     40.3 (H) 05/07/2021    LABCA2 45.0 (H) 05/07/2021    HCGQUANT 1.16 06/03/2021         PATHOLOGY:  * Cannot find OR log *         RADIOLOGY DATA :  No radiology results for the last 7 days        Assessment/Plan       1. Metastatic right breast cancer with skin bone, liver metastases stage IV:  -Review oncology history for prior treatment details  -2D echo done on May 26, 2021 showed normal ejection fraction at 61%  -Was started on cycle 1 of Adriamycin and Cytoxan on Lupe 3, 2021.  -We will proceed with cycle 3 of chemotherapy today  -Plan is to do restaging PET/CT after cycle 3 of chemotherapy which was discussed with patient and her family; order placed today for PET/CT to be done before next RTC appointment.     2. Brain metastases:  -Patient was diagnosed with calvarial metastases without any evidence of parenchymal mets. Patient was evaluated by Dr. Pulido and no radiation therapy was recommended     3. Right internal jugular vein DVT:  -Remains on Coumadin     4. Bone metastases:  -Has been on monthly Zometa since October 23, 2017  -We will continue with monthly Zometa for now.  -Labs reviewed today      5. Elevated liver  function test:  -Secondary to liver metastasis plus medication. Will monitor with CMP     6. History of DCIS of right breast diagnosed in 2007  -A mastectomy followed by tamoxifen for 5 years that completed in 2013     7. History of melanoma in situ s/p surgery by Dr. Edwards     8. Chemotherapy-induced neuropathy:  -Remains on gabapentin, new prescription sent to pharmacy today     9. Cancer related pain:  -Remains on Ultram as needed     10. Health maintenance: Patient does not smoke    11. Chemotherapy induced nausea with emesis; pt states not really helped with ZOfran use; will send prescription to pharmacy for Phenergan 12.5 mg PO every 6 hrs prn.        Advance Care Planning     ACP discussion was held with the patient during this visit. Patient has an advance directive in EMR which is still valid.                 PHQ-9 Total Score: 4 pt is not suicidal or homicidal; currently on Zoloft    Kylie Yoon García reports a pain score of 0.  Given her pain assessment as noted, treatment options were discussed and the following options were decided upon as a follow-up plan to address the patient's pain: continuation of current treatment plan for pain.         April Greco, APRN  7/29/2021  12:15 CDT        Part of this note may be an electronic transcription/translation of spoken language to printed text using the Dragon Dictation System.          CC:

## 2021-07-30 LAB — CANCER AG27-29 SERPL-ACNC: 118.1 U/ML (ref 0–38.6)

## 2021-08-13 ENCOUNTER — HOSPITAL ENCOUNTER (OUTPATIENT)
Dept: PET IMAGING | Facility: HOSPITAL | Age: 51
Discharge: HOME | End: 2021-08-13
Admitting: NURSE PRACTITIONER

## 2021-08-13 DIAGNOSIS — C50.911 MALIGNANT NEOPLASM OF RIGHT BREAST IN FEMALE, ESTROGEN RECEPTOR POSITIVE, UNSPECIFIED SITE OF BREAST (HCC): ICD-10-CM

## 2021-08-13 DIAGNOSIS — Z17.0 MALIGNANT NEOPLASM OF RIGHT BREAST IN FEMALE, ESTROGEN RECEPTOR POSITIVE, UNSPECIFIED SITE OF BREAST (HCC): ICD-10-CM

## 2021-08-13 DIAGNOSIS — C79.51 BONE METASTASIS: ICD-10-CM

## 2021-08-13 PROCEDURE — A9552 F18 FDG: HCPCS | Performed by: NURSE PRACTITIONER

## 2021-08-13 PROCEDURE — 78815 PET IMAGE W/CT SKULL-THIGH: CPT

## 2021-08-13 PROCEDURE — 0 FLUDEOXYGLUCOSE F18 SOLUTION: Performed by: NURSE PRACTITIONER

## 2021-08-13 RX ADMIN — FLUDEOXYGLUCOSE F18 1 DOSE: 300 INJECTION INTRAVENOUS at 07:36

## 2021-08-16 ENCOUNTER — APPOINTMENT (OUTPATIENT)
Dept: RADIATION ONCOLOGY | Facility: HOSPITAL | Age: 51
End: 2021-08-16

## 2021-08-18 DIAGNOSIS — C78.7 LIVER METASTASIS: Primary | ICD-10-CM

## 2021-08-18 DIAGNOSIS — Z17.0 MALIGNANT NEOPLASM OF RIGHT BREAST IN FEMALE, ESTROGEN RECEPTOR POSITIVE, UNSPECIFIED SITE OF BREAST (HCC): ICD-10-CM

## 2021-08-18 DIAGNOSIS — C50.911 MALIGNANT NEOPLASM OF RIGHT BREAST IN FEMALE, ESTROGEN RECEPTOR POSITIVE, UNSPECIFIED SITE OF BREAST (HCC): ICD-10-CM

## 2021-08-18 DIAGNOSIS — C79.51 BONE METASTASIS: ICD-10-CM

## 2021-08-19 ENCOUNTER — OFFICE VISIT (OUTPATIENT)
Dept: ONCOLOGY | Facility: CLINIC | Age: 51
End: 2021-08-19

## 2021-08-19 ENCOUNTER — INFUSION (OUTPATIENT)
Dept: ONCOLOGY | Facility: HOSPITAL | Age: 51
End: 2021-08-19

## 2021-08-19 ENCOUNTER — APPOINTMENT (OUTPATIENT)
Dept: ONCOLOGY | Facility: HOSPITAL | Age: 51
End: 2021-08-19

## 2021-08-19 ENCOUNTER — ANTICOAGULATION VISIT (OUTPATIENT)
Dept: CARDIAC SURGERY | Facility: CLINIC | Age: 51
End: 2021-08-19

## 2021-08-19 VITALS
DIASTOLIC BLOOD PRESSURE: 78 MMHG | RESPIRATION RATE: 18 BRPM | SYSTOLIC BLOOD PRESSURE: 131 MMHG | HEART RATE: 89 BPM | TEMPERATURE: 99.9 F | BODY MASS INDEX: 35.56 KG/M2 | WEIGHT: 227.1 LBS | OXYGEN SATURATION: 96 %

## 2021-08-19 DIAGNOSIS — R79.89 ELEVATED LIVER FUNCTION TESTS: Chronic | ICD-10-CM

## 2021-08-19 DIAGNOSIS — Z17.0 MALIGNANT NEOPLASM OF RIGHT BREAST IN FEMALE, ESTROGEN RECEPTOR POSITIVE, UNSPECIFIED SITE OF BREAST (HCC): Primary | Chronic | ICD-10-CM

## 2021-08-19 DIAGNOSIS — Z79.01 LONG TERM CURRENT USE OF ANTICOAGULANT THERAPY: Chronic | ICD-10-CM

## 2021-08-19 DIAGNOSIS — G62.0 NEUROPATHY DUE TO CHEMOTHERAPEUTIC DRUG (HCC): Chronic | ICD-10-CM

## 2021-08-19 DIAGNOSIS — Z17.0 MALIGNANT NEOPLASM OF RIGHT BREAST IN FEMALE, ESTROGEN RECEPTOR POSITIVE, UNSPECIFIED SITE OF BREAST (HCC): ICD-10-CM

## 2021-08-19 DIAGNOSIS — C79.31 METASTASIS TO BRAIN (HCC): Chronic | ICD-10-CM

## 2021-08-19 DIAGNOSIS — C78.7 LIVER METASTASIS: ICD-10-CM

## 2021-08-19 DIAGNOSIS — C50.911 MALIGNANT NEOPLASM OF RIGHT BREAST IN FEMALE, ESTROGEN RECEPTOR POSITIVE, UNSPECIFIED SITE OF BREAST (HCC): ICD-10-CM

## 2021-08-19 DIAGNOSIS — Z86.000 HISTORY OF DUCTAL CARCINOMA IN SITU (DCIS) OF BREAST: Chronic | ICD-10-CM

## 2021-08-19 DIAGNOSIS — Z79.01 LONG TERM CURRENT USE OF ANTICOAGULANT THERAPY: Primary | ICD-10-CM

## 2021-08-19 DIAGNOSIS — Z85.820 HISTORY OF MALIGNANT MELANOMA OF SKIN: Chronic | ICD-10-CM

## 2021-08-19 DIAGNOSIS — C79.51 BONE METASTASIS: Primary | ICD-10-CM

## 2021-08-19 DIAGNOSIS — C79.51 BONE METASTASIS: Chronic | ICD-10-CM

## 2021-08-19 DIAGNOSIS — C50.911 MALIGNANT NEOPLASM OF RIGHT BREAST IN FEMALE, ESTROGEN RECEPTOR POSITIVE, UNSPECIFIED SITE OF BREAST (HCC): Primary | Chronic | ICD-10-CM

## 2021-08-19 DIAGNOSIS — C78.7 LIVER METASTASIS: Chronic | ICD-10-CM

## 2021-08-19 DIAGNOSIS — Z51.81 ENCOUNTER FOR THERAPEUTIC DRUG MONITORING: ICD-10-CM

## 2021-08-19 DIAGNOSIS — Z45.2 ENCOUNTER FOR VENOUS ACCESS DEVICE CARE: ICD-10-CM

## 2021-08-19 DIAGNOSIS — T45.1X5A NEUROPATHY DUE TO CHEMOTHERAPEUTIC DRUG (HCC): Chronic | ICD-10-CM

## 2021-08-19 LAB
ALBUMIN SERPL-MCNC: 4.2 G/DL (ref 3.5–5.2)
ALBUMIN/GLOB SERPL: 1.6 G/DL
ALP SERPL-CCNC: 80 U/L (ref 39–117)
ALT SERPL W P-5'-P-CCNC: 26 U/L (ref 1–33)
ANION GAP SERPL CALCULATED.3IONS-SCNC: 12 MMOL/L (ref 5–15)
AST SERPL-CCNC: 18 U/L (ref 1–32)
BASOPHILS # BLD AUTO: 0.05 10*3/MM3 (ref 0–0.2)
BASOPHILS NFR BLD AUTO: 0.5 % (ref 0–1.5)
BILIRUB SERPL-MCNC: 0.2 MG/DL (ref 0–1.2)
BUN SERPL-MCNC: 11 MG/DL (ref 6–20)
BUN/CREAT SERPL: 16.2 (ref 7–25)
CALCIUM SPEC-SCNC: 10 MG/DL (ref 8.6–10.5)
CHLORIDE SERPL-SCNC: 104 MMOL/L (ref 98–107)
CO2 SERPL-SCNC: 24 MMOL/L (ref 22–29)
CREAT SERPL-MCNC: 0.68 MG/DL (ref 0.57–1)
DEPRECATED RDW RBC AUTO: 47.6 FL (ref 37–54)
EOSINOPHIL # BLD AUTO: 0 10*3/MM3 (ref 0–0.4)
EOSINOPHIL NFR BLD AUTO: 0 % (ref 0.3–6.2)
ERYTHROCYTE [DISTWIDTH] IN BLOOD BY AUTOMATED COUNT: 15 % (ref 12.3–15.4)
GFR SERPL CREATININE-BSD FRML MDRD: 91 ML/MIN/1.73
GLOBULIN UR ELPH-MCNC: 2.7 GM/DL
GLUCOSE SERPL-MCNC: 81 MG/DL (ref 65–99)
HCT VFR BLD AUTO: 35.8 % (ref 34–46.6)
HGB BLD-MCNC: 11.8 G/DL (ref 12–15.9)
HOLD SPECIMEN: NORMAL
IMM GRANULOCYTES # BLD AUTO: 0.21 10*3/MM3 (ref 0–0.05)
IMM GRANULOCYTES NFR BLD AUTO: 2 % (ref 0–0.5)
INR PPP: 2.1 (ref 0.9–1.1)
LYMPHOCYTES # BLD AUTO: 0.99 10*3/MM3 (ref 0.7–3.1)
LYMPHOCYTES NFR BLD AUTO: 9.5 % (ref 19.6–45.3)
MCH RBC QN AUTO: 29.1 PG (ref 26.6–33)
MCHC RBC AUTO-ENTMCNC: 33 G/DL (ref 31.5–35.7)
MCV RBC AUTO: 88.2 FL (ref 79–97)
MONOCYTES # BLD AUTO: 1.74 10*3/MM3 (ref 0.1–0.9)
MONOCYTES NFR BLD AUTO: 16.6 % (ref 5–12)
NEUTROPHILS NFR BLD AUTO: 7.48 10*3/MM3 (ref 1.7–7)
NEUTROPHILS NFR BLD AUTO: 71.4 % (ref 42.7–76)
NRBC BLD AUTO-RTO: 0.2 /100 WBC (ref 0–0.2)
PLATELET # BLD AUTO: 476 10*3/MM3 (ref 140–450)
PMV BLD AUTO: 9.8 FL (ref 6–12)
POTASSIUM SERPL-SCNC: 3.7 MMOL/L (ref 3.5–5.2)
PROT SERPL-MCNC: 6.9 G/DL (ref 6–8.5)
RBC # BLD AUTO: 4.06 10*6/MM3 (ref 3.77–5.28)
SODIUM SERPL-SCNC: 140 MMOL/L (ref 136–145)
WBC # BLD AUTO: 10.47 10*3/MM3 (ref 3.4–10.8)

## 2021-08-19 PROCEDURE — 85025 COMPLETE CBC W/AUTO DIFF WBC: CPT

## 2021-08-19 PROCEDURE — 36415 COLL VENOUS BLD VENIPUNCTURE: CPT

## 2021-08-19 PROCEDURE — 36591 DRAW BLOOD OFF VENOUS DEVICE: CPT | Performed by: INTERNAL MEDICINE

## 2021-08-19 PROCEDURE — 85610 PROTHROMBIN TIME: CPT | Performed by: NURSE PRACTITIONER

## 2021-08-19 PROCEDURE — 93793 ANTICOAG MGMT PT WARFARIN: CPT | Performed by: NURSE PRACTITIONER

## 2021-08-19 PROCEDURE — 25010000002 HEPARIN LOCK FLUSH PER 10 UNITS: Performed by: INTERNAL MEDICINE

## 2021-08-19 PROCEDURE — 36416 COLLJ CAPILLARY BLOOD SPEC: CPT | Performed by: NURSE PRACTITIONER

## 2021-08-19 PROCEDURE — 80053 COMPREHEN METABOLIC PANEL: CPT

## 2021-08-19 PROCEDURE — 99215 OFFICE O/P EST HI 40 MIN: CPT | Performed by: INTERNAL MEDICINE

## 2021-08-19 RX ORDER — HEPARIN SODIUM (PORCINE) LOCK FLUSH IV SOLN 100 UNIT/ML 100 UNIT/ML
500 SOLUTION INTRAVENOUS AS NEEDED
Status: DISCONTINUED | OUTPATIENT
Start: 2021-08-19 | End: 2021-08-19 | Stop reason: HOSPADM

## 2021-08-19 RX ORDER — ONDANSETRON 4 MG/1
4 TABLET, FILM COATED ORAL 4 TIMES DAILY PRN
Qty: 40 TABLET | Refills: 3 | Status: SHIPPED | OUTPATIENT
Start: 2021-08-19 | End: 2021-09-23

## 2021-08-19 RX ORDER — SODIUM CHLORIDE 0.9 % (FLUSH) 0.9 %
10 SYRINGE (ML) INJECTION AS NEEDED
Status: DISCONTINUED | OUTPATIENT
Start: 2021-08-19 | End: 2021-08-19 | Stop reason: HOSPADM

## 2021-08-19 RX ORDER — EVEROLIMUS 10 MG/1
10 TABLET ORAL DAILY
Qty: 30 TABLET | Refills: 1 | Status: SHIPPED | OUTPATIENT
Start: 2021-08-19 | End: 2021-09-23

## 2021-08-19 RX ORDER — SERTRALINE HYDROCHLORIDE 100 MG/1
TABLET, FILM COATED ORAL
COMMUNITY
End: 2021-09-23

## 2021-08-19 RX ORDER — HEPARIN SODIUM (PORCINE) LOCK FLUSH IV SOLN 100 UNIT/ML 100 UNIT/ML
500 SOLUTION INTRAVENOUS AS NEEDED
Status: CANCELLED | OUTPATIENT
Start: 2021-08-26

## 2021-08-19 RX ORDER — DEXAMETHASONE 0.5 MG/5ML
0.5 SOLUTION ORAL 4 TIMES DAILY
Qty: 500 ML | Refills: 3 | Status: SHIPPED | OUTPATIENT
Start: 2021-08-19 | End: 2021-09-23

## 2021-08-19 RX ORDER — SODIUM CHLORIDE 0.9 % (FLUSH) 0.9 %
10 SYRINGE (ML) INJECTION AS NEEDED
Status: CANCELLED | OUTPATIENT
Start: 2021-08-26

## 2021-08-19 RX ORDER — SODIUM CHLORIDE 0.9 % (FLUSH) 0.9 %
20 SYRINGE (ML) INJECTION AS NEEDED
Status: CANCELLED | OUTPATIENT
Start: 2021-08-26

## 2021-08-19 RX ORDER — EXEMESTANE 25 MG/1
25 TABLET ORAL DAILY
Qty: 30 TABLET | Refills: 11 | Status: SHIPPED | OUTPATIENT
Start: 2021-08-19 | End: 2021-09-23

## 2021-08-19 RX ADMIN — HEPARIN 500 UNITS: 100 SYRINGE at 10:20

## 2021-08-19 NOTE — PATIENT INSTRUCTIONS
Everolimus tablets  What is this medicine?  EVEROLIMUS (isaiah JAKE li mus) decreases the activity of your immune system. Afinitor is used to treat certain types of cancer. Zortress is used for kidney and liver transplant rejection prophylaxis.  This medicine may be used for other purposes; ask your health care provider or pharmacist if you have questions.  COMMON BRAND NAME(S): Afinitor, Zortress  What should I tell my health care provider before I take this medicine?  They need to know if you have any of these conditions:  · diabetes  · heart disease  · high cholesterol  · immune system problems  · infection (especially a virus infection such as chickenpox, cold sores, or herpes)  · kidney disease  · liver disease  · low blood counts, like low white cell, platelet, or red cell counts  · rare hereditary problems of galactose intolerance, the Ambreen lactase deficiency, or glucose-galactose malabsorption  · an unusual or allergic reaction to everolimus, other medicines, foods, dyes, or preservatives  · pregnant or trying to get pregnant  · breast-feeding  How should I use this medicine?  Take this medicine by mouth with a full glass of water. Follow the directions on the prescription label. You can take this medicine with or without food, but always take Zortress the same way. Do not cut, crush, or chew this medicine. Do not take with grapefruit juice. Take your medicine at regular intervals. Do not take it more often than directed. Do not stop taking except on your doctor's advice.  A special MedGuide will be given to you by the pharmacist with each prescription and refill. Be sure to read this information carefully each time.  Talk to your pediatrician regarding the use of this medicine in children. While this drug may be prescribed for children as young as 1 year for selected conditions, precautions do apply.  Overdosage: If you think you have taken too much of this medicine contact a poison control center or emergency  room at once.  NOTE: This medicine is only for you. Do not share this medicine with others.  What if I miss a dose?  If you miss a dose, take it as soon as you can. If it is almost time for your next dose, take only that dose. Do not take double or extra doses.  What may interact with this medicine?  Do not take this medicine with any of the following medications:  · live virus vaccines  This medicine may interact with the following medications:  · antiviral medicines for HIV or AIDS  · aprepitant  · certain antibiotics like erythromycin or clarithromycin  · certain medicines for cholesterol like simvastatin  · certain medicines for seizures like carbamazepine, phenobarbital, phenytoin  · cyclosporine  · dexamethasone  · certain medicines for blood pressure, heart disease, irregular heart beat like diltiazem, nicardipine, and verapamil  · certain medicines for fungal infections like fluconazole, itraconazole, ketoconazole, and voriconazole  · grapefruit juice  · nefazodone  · rifabutin  · rifampin  · telithromycin  This list may not describe all possible interactions. Give your health care provider a list of all the medicines, herbs, non-prescription drugs, or dietary supplements you use. Also tell them if you smoke, drink alcohol, or use illegal drugs. Some items may interact with your medicine.  What should I watch for while using this medicine?  This drug may make you feel generally unwell. This is not uncommon, as chemotherapy can affect healthy cells as well as cancer cells. Report any side effects. Continue your course of treatment even though you feel ill unless your doctor tells you to stop. Visit your doctor or health care professional for regular check-ups. You may need regular tests to monitor possible side effects of the drug.  This medicine may increase blood sugar. Ask your healthcare provider if changes in diet or medicines are needed if you have diabetes.  Before having surgery, talk to your health  care provider to make sure it is ok. This drug can increase the risk of poor healing of your surgical site or wound. You will need to stop using Afinitor for 1 week before surgery. After surgery, wait at least 2 weeks before restarting this drug. Make sure the surgical site or wound is healed enough before restarting this drug. Talk to your health care provider if questions.  Do not become pregnant while taking this medicine or for 8 weeks after stopping it. Women should inform their doctor if they wish to become pregnant or think they might be pregnant. Men should not father a child while taking this medicine and for 4 weeks after stopping it. There is a potential for serious side effects to an unborn child. Talk to your health care professional or pharmacist for more information. Do not breast-feed an infant while taking this medicine or for 2 weeks after stopping it.  This medicine has caused ovarian failure in some women and reduced sperm counts in some men. This medicine may interfere with the ability to have a child. You should talk with your doctor or health care professional if you are concerned about your fertility.  This medicine has caused reduced sperm counts in some men. This may interfere with the ability to father a child. You should talk to your doctor or health care professional if you are concerned about your fertility.  Call your doctor or health care professional for advice if you get a fever, chills or sore throat, or other symptoms of a cold or flu. Do not treat yourself. This drug decreases your body's ability to fight infections. Try to avoid being around people who are sick.  This medicine may increase your risk to bruise or bleed. Call your doctor or health care professional if you notice any unusual bleeding.  If you have had a kidney transplant, immediately tell your doctor if your incision site is red, warm, or painful. Also, tell your doctor if your incision site opens up or swells or  if contains blood, fluid, or pus.  Keep out of the sun. If you cannot avoid being in the sun, wear protective clothing and use sunscreen. Do not use sun lamps or tanning beds/booths.  What side effects may I notice from receiving this medicine?  Side effects that you should report to your doctor or health care professional as soon as possible:  · allergic reactions like skin rash, itching or hives, swelling of the face, lips, or tongue  · breathing problems  · chest pain  · cough  · dark urine  · fever or chills, sore throat  · nausea, vomiting  · signs and symptoms of high blood sugar such as being more thirsty or hungry or having to urinate more than normal. You may also feel very tired or have blurry vision.  · stomach pain  · swelling of ankles, feet, hands  · trouble passing urine  · unusual bleeding or bruising  · unusually weak  Side effects that usually do not require medical attention (report to your doctor or health care professional if they continue or are bothersome):  · constipation  · diarrhea  · dizziness  · dry mouth or mouth sores  · headache  · nausea, vomiting  This list may not describe all possible side effects. Call your doctor for medical advice about side effects. You may report side effects to FDA at 1-499-FDA-0435.  Where should I keep my medicine?  Keep out of the reach of children.  Store at room temperature between 15 and 30 degrees C (59 and 86 degrees F). Throw away any unused medicine after the expiration date.  NOTE: This sheet is a summary. It may not cover all possible information. If you have questions about this medicine, talk to your doctor, pharmacist, or health care provider.  © 2021 [employer] (2020-11-17 14:52:29)  Exemestane tablets  What is this medicine?  EXEMESTANE (ex e MES tane) blocks the production of the hormone estrogen. Some types of breast cancer depend on estrogen to grow, and this medicine can stop tumor growth by blocking estrogen production. This  medicine is for the treatment of breast cancer in postmenopausal women only.  This medicine may be used for other purposes; ask your health care provider or pharmacist if you have questions.  COMMON BRAND NAME(S): Aromasin  What should I tell my health care provider before I take this medicine?  They need to know if you have any of these conditions:  · an unusual or allergic reaction to exemestane, other medicines, foods, dyes, or preservatives  · pregnant or trying to get pregnant  · breast-feeding  How should I use this medicine?  Take this medicine by mouth with a glass of water. Follow the directions on the prescription label. Take your doses at regular intervals after a meal. Do not take your medicine more often than directed. Do not stop taking except on the advice of your doctor or health care professional.  Contact your pediatrician regarding the use of this medicine in children. Special care may be needed.  Overdosage: If you think you have taken too much of this medicine contact a poison control center or emergency room at once.  NOTE: This medicine is only for you. Do not share this medicine with others.  What if I miss a dose?  If you miss a dose, take the next dose as usual. Do not try to make up the missed dose. Do not take double or extra doses.  What may interact with this medicine?  · certain medicines for seizures like carbamazepine, phenobarbital, phenytoin  · rifampin  · Brittney's Wort  This list may not describe all possible interactions. Give your health care provider a list of all the medicines, herbs, non-prescription drugs, or dietary supplements you use. Also tell them if you smoke, drink alcohol, or use illegal drugs. Some items may interact with your medicine.  What should I watch for while using this medicine?  Visit your doctor or health care professional for regular checks on your progress.  If you experience hot flashes or sweating while taking this medicine, avoid alcohol, smoking  and drinks with caffeine. This may help to decrease these side effects.  Do not become pregnant while taking this medicine or for 1 month after stopping it. Women should inform their doctor if they wish to become pregnant or think they might be pregnant. Women of child-bearing potential will need to have a negative pregnancy test before starting this medicine. There is a potential for serious side effects to an unborn child. Do not breast-feed an infant while taking this medicine or for 1 month after stopping it. Talk to your health care professional or pharmacist for more information.  What side effects may I notice from receiving this medicine?  Side effects that you should report to your doctor or health care professional as soon as possible:  · any new or unusual symptoms  · changes in vision  · fever  · leg or arm swelling  · pain in bones, joints, or muscles  · pain in hips, back, ribs, arms, shoulders, or legs  Side effects that usually do not require medical attention (report to your doctor or health care professional if they continue or are bothersome):  · difficulty sleeping  · headache  · hot flashes  · sweating  · unusually weak or tired  This list may not describe all possible side effects. Call your doctor for medical advice about side effects. You may report side effects to FDA at 3-131-FDA-4246.  Where should I keep my medicine?  Keep out of the reach of children.  Store at room temperature between 15 and 30 degrees C (59 and 86 degrees F). Throw away any unused medicine after the expiration date.  NOTE: This sheet is a summary. It may not cover all possible information. If you have questions about this medicine, talk to your doctor, pharmacist, or health care provider.  © 2021 [employer] Standard (2018-06-06 08:39:27)

## 2021-08-19 NOTE — PROGRESS NOTES
DATE OF VISIT: 8/19/2021      REASON FOR VISIT: Metastatic breast cancer with bone and liver metastases and skin metastases on Adriamycin and Cytoxan, cancer related pain, DVT on anticoagulation with Coumadin, neuropathy from chemotherapy      HISTORY OF PRESENT ILLNESS:   51-year-old female with medical problem consisting of DCIS of right breast diagnosed in 2007, anxiety/depression, metastatic breast cancer with bone, liver and skin metastases for which patient is currently on treatment with Adriamycin and Cytoxan started on Lupe 3, 2021.  Patient is here to get cycle 4 of Adriamycin and Cytoxan today.  Patient had a PET/CT done last week, she is here to discuss the results and further recommendation.  Complains of nausea without vomiting that last for 7 to 10 days after chemotherapy.  Denies any bleeding.  Denies any new lymph node enlargement.  Complains of chronic back pain and hip pain.  Denies any new bowel or bladder incontinence.        Oncology history:    1.  Metastatic right breast cancer with bone and liver metastases:  -Patient was initially diagnosed in July 2017 with axillary biopsy on right side  -S/p 6 cycles of chemotherapy with Taxotere and Cytoxan between July 26, 2017 and December 6, 2017  -Patient was evaluated by  at [facility] in [place] for second opinion.  -Patient received 20 cycles of chemotherapy with palbociclib and letrozole between July 30, 2018 and October 2, 2020.  -Due to enlarging mass in the right axilla patient's treatment was changed to Faslodex on October 16, 2020  -Due to continuous enlargement of axillary mass without any other progression patient received radiation treatment to axilla that was completed on March 4, 2021  -Patient has received 8 cycles of Faslodex between October 16, 2020 and May 7, 2021  -PET/CT done on May 21, 2021 shows progression of disease with axillary adenopathy, skin involvement as well as new onset of right-sided liver metastases.  -She  was started on cycle 1 of Adriamycin and Cytoxan on Lupe 3, 2021.              Past Medical History, Past Surgical History, Social History, Family History have been reviewed and are without significant changes except as mentioned.    Review of Systems   Constitutional: Positive for fatigue.   Musculoskeletal: Positive for arthralgias and back pain.   Skin:        Positive for skin nodule and axillary area   Neurological: Positive for numbness.   Hematological: Negative for adenopathy.      A comprehensive 14 point review of systems was performed and was negative except as mentioned.    Medications:  The current medication list was reviewed in the EMR    ALLERGIES:    Allergies   Allergen Reactions   • Percocet [Oxycodone-Acetaminophen] Nausea And Vomiting       Objective      Vitals:    08/19/21 0859   BP: 131/78   Pulse: 89   Resp: 18   Temp: 99.9 °F (37.7 °C)   SpO2: 96%   Weight: 103 kg (227 lb 1.6 oz)   PainSc: 0-No pain     Current Status 7/29/2021   ECOG score 0       Physical Exam  Cardiovascular:      Rate and Rhythm: Normal rate and regular rhythm.      Comments: Port-A-Cath present  Pulmonary:      Breath sounds: Normal breath sounds.   Chest:      Comments: Breast exam was performed in presence of registered nurse Solange.  Skin nodule involving the right axillary area have increased in number and size especially on the posterior aspect of right axillary line as compared to previous exam.  Neurological:      Mental Status: She is alert and oriented to person, place, and time.           RECENT LABS:  Glucose   Date Value Ref Range Status   08/19/2021 81 65 - 99 mg/dL Final     Sodium   Date Value Ref Range Status   08/19/2021 140 136 - 145 mmol/L Final     Potassium   Date Value Ref Range Status   08/19/2021 3.7 3.5 - 5.2 mmol/L Final     CO2   Date Value Ref Range Status   08/19/2021 24.0 22.0 - 29.0 mmol/L Final     Chloride   Date Value Ref Range Status   08/19/2021 104 98 - 107 mmol/L Final     Anion  Gap   Date Value Ref Range Status   08/19/2021 12.0 5.0 - 15.0 mmol/L Final     Creatinine   Date Value Ref Range Status   08/19/2021 0.68 0.57 - 1.00 mg/dL Final     BUN   Date Value Ref Range Status   08/19/2021 11 6 - 20 mg/dL Final     BUN/Creatinine Ratio   Date Value Ref Range Status   08/19/2021 16.2 7.0 - 25.0 Final     Calcium   Date Value Ref Range Status   08/19/2021 10.0 8.6 - 10.5 mg/dL Final     eGFR Non  Amer   Date Value Ref Range Status   08/19/2021 91 >60 mL/min/1.73 Final     Alkaline Phosphatase   Date Value Ref Range Status   08/19/2021 80 39 - 117 U/L Final     Total Protein   Date Value Ref Range Status   08/19/2021 6.9 6.0 - 8.5 g/dL Final     ALT (SGPT)   Date Value Ref Range Status   08/19/2021 26 1 - 33 U/L Final     AST (SGOT)   Date Value Ref Range Status   08/19/2021 18 1 - 32 U/L Final     Total Bilirubin   Date Value Ref Range Status   08/19/2021 0.2 0.0 - 1.2 mg/dL Final     Albumin   Date Value Ref Range Status   08/19/2021 4.20 3.50 - 5.20 g/dL Final     Globulin   Date Value Ref Range Status   08/19/2021 2.7 gm/dL Final     Lab Results   Component Value Date    WBC 10.47 08/19/2021    HGB 11.8 (L) 08/19/2021    HCT 35.8 08/19/2021    MCV 88.2 08/19/2021     (H) 08/19/2021     Lab Results   Component Value Date    NEUTROABS 7.48 (H) 08/19/2021     Lab Results   Component Value Date     80.4 (H) 07/29/2021    LABCA2 118.1 (H) 07/29/2021    HCGQUANT 1.16 06/03/2021         PATHOLOGY:  * Cannot find OR log *         RADIOLOGY DATA :  NM Pet Skull Base To Mid Thigh    Result Date: 8/13/2021  CONCLUSION: Stable areas of metastatic disease noted in the right side of the chest, liver, and osseous structures as described above. Multiple additional sclerotic foci throughout the osseous structures do not exhibit significant hypermetabolic activity which represents a favorable change compared to the prior exam. Electronically signed by:  Enmanuel Nevarez MD  8/13/2021 4:51 PM  CDT Workstation: [id number]          Assessment/Plan     1.  Metastatic right breast cancer with skin, bone and liver metastases, stage IV  -Review oncology history for prior treatment details  -Patient was started on cycle 1 of Adriamycin and Cytoxan on Lupe 3, 2021  -PET/CT done on August 13, 2021 after cycle 3 of Adriamycin and Cytoxan was reviewed with radiologist, showed stable axillary involvement skin involvement and liver metastasis.  Sclerotic focus with increased uptake involving lumbar spine and hip bone has improved significantly.  There was no evidence of any new focus of metastasis.  Results of CT scan were discussed with patient today.  -However on physical exam there is evidence of worsening skin metastases consistent with clinical progression of disease which was discussed with patient and her family.  Due to progression of skin metastases which can be potentially life-threatening without treatment change recommend changing treatment.  -Option of Arimidex versus Afinitor with Aromasin were discussed with patient  -Recommend starting Afinitor with Aromasin.  Side effect of Afinitor and Aromasin were discussed with patient will provide her information to read about the  -Prescription for dexamethasone and Aromasin has been sent to her pharmacy.  Prescription for Afinitor has been provided.  -We will ask patient to return to clinic in 5 weeks with repeat CBC, CMP, CA 15-3, CA 27-29 to be done prior to that    2.  Brain metastases  -Patient was diagnosed with calvarial metastases without any parenchymal mets.  Radiation therapy was not recommended by Dr. Pulido at that point    3.  Right internal jugular vein DVT remains on Coumadin    4.  Bone metastasis:  -Has been on monthly Zometa since October 23, 2027.  -We will continue with dose of Zometa next week.      5.  Elevated liver function test secondary to chemotherapy plus liver metastases    6.  History of DCIS of right breast diagnosed in  2007  -S/p mastectomy followed by tamoxifen for 5 years that completed in 2013    7.  History of melanoma in situ s/p surgery    8.  Chemotherapy-induced neuropathy  -Remains on gabapentin    9.  Cancer related pain:  -Remains on Ultram    10.  Health maintenance: Patient does not smoke    11. Advance Care Planning   ACP discussion was held with the patient during this visit. Patient has an advance directive in EMR which is still valid.                  PHQ-9 Total Score: 0   -Patient is not homicidal or suicidal.  No acute intervention required.    Kylie García reports a pain score of 0.  Given her pain assessment as noted, treatment options were discussed and the following options were decided upon as a follow-up plan to address the patient's pain: continuation of current treatment plan for pain.         Ovidio Meadows MD  8/19/2021  17:40 CDT        Part of this note may be an electronic transcription/translation of spoken language to printed text using the Dragon Dictation System.          CC:

## 2021-08-19 NOTE — PROGRESS NOTES
Pt states she resumed coumadin about 10 days ago following a period of not being able to eat from side effects of chemo. Pt denies med changes or bleeding problems. Pt was instructed to continue current dose and aptp made for 3 weeks when she returns for another round of chemo; pt verbalized. Patient instructed regarding medication; results given and questions answered. Nutritional counseling given.  Dietary factors affecting therapy addressed.  Patient instructed to monitor for excessive bruising or bleeding. Findings reported by Tata Nunez RN.    Today's INR is   Lab Results - Last 18 Months   Lab Units 08/19/21  0000   INR  2.10*

## 2021-08-20 ENCOUNTER — DOCUMENTATION (OUTPATIENT)
Dept: ONCOLOGY | Facility: HOSPITAL | Age: 51
End: 2021-08-20

## 2021-08-25 ENCOUNTER — TELEPHONE (OUTPATIENT)
Dept: RADIATION ONCOLOGY | Facility: HOSPITAL | Age: 51
End: 2021-08-25

## 2021-08-25 NOTE — TELEPHONE ENCOUNTER
Patient called and wants a call back she has questions regarding her afinitor 10 mg call [phone]

## 2021-08-26 ENCOUNTER — INFUSION (OUTPATIENT)
Dept: ONCOLOGY | Facility: HOSPITAL | Age: 51
End: 2021-08-26

## 2021-08-26 VITALS
SYSTOLIC BLOOD PRESSURE: 104 MMHG | DIASTOLIC BLOOD PRESSURE: 56 MMHG | HEART RATE: 84 BPM | TEMPERATURE: 95.5 F | RESPIRATION RATE: 16 BRPM

## 2021-08-26 DIAGNOSIS — Z45.2 ENCOUNTER FOR ADJUSTMENT OR MANAGEMENT OF VASCULAR ACCESS DEVICE: ICD-10-CM

## 2021-08-26 DIAGNOSIS — Z45.2 ENCOUNTER FOR VENOUS ACCESS DEVICE CARE: ICD-10-CM

## 2021-08-26 DIAGNOSIS — C50.911 MALIGNANT NEOPLASM OF RIGHT BREAST IN FEMALE, ESTROGEN RECEPTOR POSITIVE, UNSPECIFIED SITE OF BREAST (HCC): Primary | ICD-10-CM

## 2021-08-26 DIAGNOSIS — Z17.0 MALIGNANT NEOPLASM OF RIGHT BREAST IN FEMALE, ESTROGEN RECEPTOR POSITIVE, UNSPECIFIED SITE OF BREAST (HCC): Primary | ICD-10-CM

## 2021-08-26 PROCEDURE — 25010000002 HEPARIN LOCK FLUSH PER 10 UNITS: Performed by: INTERNAL MEDICINE

## 2021-08-26 PROCEDURE — 25010000002 ZOLEDRONIC ACID PER 1 MG: Performed by: NURSE PRACTITIONER

## 2021-08-26 PROCEDURE — 96374 THER/PROPH/DIAG INJ IV PUSH: CPT | Performed by: INTERNAL MEDICINE

## 2021-08-26 RX ORDER — SODIUM CHLORIDE 0.9 % (FLUSH) 0.9 %
20 SYRINGE (ML) INJECTION AS NEEDED
Status: DISCONTINUED | OUTPATIENT
Start: 2021-08-26 | End: 2021-08-26 | Stop reason: HOSPADM

## 2021-08-26 RX ORDER — SODIUM CHLORIDE 9 MG/ML
250 INJECTION, SOLUTION INTRAVENOUS ONCE
Status: COMPLETED | OUTPATIENT
Start: 2021-08-26 | End: 2021-08-26

## 2021-08-26 RX ORDER — SODIUM CHLORIDE 0.9 % (FLUSH) 0.9 %
10 SYRINGE (ML) INJECTION AS NEEDED
Status: CANCELLED | OUTPATIENT
Start: 2021-08-26

## 2021-08-26 RX ORDER — SODIUM CHLORIDE 0.9 % (FLUSH) 0.9 %
20 SYRINGE (ML) INJECTION AS NEEDED
Status: CANCELLED | OUTPATIENT
Start: 2021-08-26

## 2021-08-26 RX ORDER — HEPARIN SODIUM (PORCINE) LOCK FLUSH IV SOLN 100 UNIT/ML 100 UNIT/ML
500 SOLUTION INTRAVENOUS AS NEEDED
Status: DISCONTINUED | OUTPATIENT
Start: 2021-08-26 | End: 2021-08-26 | Stop reason: HOSPADM

## 2021-08-26 RX ORDER — SODIUM CHLORIDE 0.9 % (FLUSH) 0.9 %
10 SYRINGE (ML) INJECTION AS NEEDED
Status: DISCONTINUED | OUTPATIENT
Start: 2021-08-26 | End: 2021-08-26 | Stop reason: HOSPADM

## 2021-08-26 RX ORDER — HEPARIN SODIUM (PORCINE) LOCK FLUSH IV SOLN 100 UNIT/ML 100 UNIT/ML
500 SOLUTION INTRAVENOUS AS NEEDED
Status: CANCELLED | OUTPATIENT
Start: 2021-08-26

## 2021-08-26 RX ADMIN — SODIUM CHLORIDE 250 ML: 9 INJECTION, SOLUTION INTRAVENOUS at 15:01

## 2021-08-26 RX ADMIN — HEPARIN 500 UNITS: 100 SYRINGE at 15:34

## 2021-08-26 RX ADMIN — ZOLEDRONIC ACID 4 MG: 4 INJECTION, SOLUTION, CONCENTRATE INTRAVENOUS at 15:01

## 2021-08-26 RX ADMIN — SODIUM CHLORIDE, PRESERVATIVE FREE 10 ML: 5 INJECTION INTRAVENOUS at 15:34

## 2021-08-31 ENCOUNTER — OFFICE VISIT (OUTPATIENT)
Dept: RADIATION ONCOLOGY | Facility: HOSPITAL | Age: 51
End: 2021-08-31

## 2021-08-31 ENCOUNTER — HOSPITAL ENCOUNTER (OUTPATIENT)
Dept: RADIATION ONCOLOGY | Facility: HOSPITAL | Age: 51
Setting detail: RADIATION/ONCOLOGY SERIES
End: 2021-08-31

## 2021-08-31 VITALS
SYSTOLIC BLOOD PRESSURE: 125 MMHG | DIASTOLIC BLOOD PRESSURE: 62 MMHG | HEART RATE: 74 BPM | BODY MASS INDEX: 36.06 KG/M2 | TEMPERATURE: 96.4 F | RESPIRATION RATE: 18 BRPM | WEIGHT: 230.3 LBS

## 2021-08-31 DIAGNOSIS — Z86.000 HISTORY OF DUCTAL CARCINOMA IN SITU (DCIS) OF BREAST: Chronic | ICD-10-CM

## 2021-08-31 DIAGNOSIS — C79.51 BONE METASTASIS: Chronic | ICD-10-CM

## 2021-08-31 PROCEDURE — 99213 OFFICE O/P EST LOW 20 MIN: CPT | Performed by: RADIOLOGY

## 2021-08-31 PROCEDURE — G0463 HOSPITAL OUTPT CLINIC VISIT: HCPCS | Performed by: RADIOLOGY

## 2021-08-31 NOTE — PROGRESS NOTES
Established Patient Visit      Patient: Kylie García   YOB: 1970   Medical Record Number: [id number]   Date of Visit: August 31, 2021   Primary Diagnosis: Cancer Staging  Stage IV adenocarcinoma of the right breast with metastases to right axillary lymph nodes, liver, bone, and subcutaneous soft tissues.                                            History of Present Illness:   Mrs. Kylie García is a 51-year-old lady with a history of ductal carcinoma of the right breast diagnosed in 2017.  The patient is status post right mastectomy with breast reconstruction; 6 cycles of Taxotere and Cytoxan; and 20 cycles of palbociclib with letrozole completed in October 2020.  The patient was then receiving Faslodex.  Mrs. García was noted to have an enlarging right axillary mass in mid October 2020.  The patient completed a 4256 cGy hypofractionated regimen to the right axilla in mid February 2021.  Unfortunately, the patient was noted to have tumor recurrence at the right axilla and cutaneous metastases over the right upper posterior lateral chest wall.  Biopsies (05/11/2021) revealed invasive mammary carcinoma.    The patient initiated Adriamycin and Cytoxan on 06/03/2021.  A surveillance PET CT scan (08/13/2021) showed tumor progression.  At least 3 hypermetabolic foci are noted in the right axillary region, numerous hypermetabolic foci in the subcutaneous tissues of the right chest, hepatic metastases, and in the L5 and T10 vertebrae.  The hepatic and osseous lesions are stable when compared to prior PET CT scan of 05/ 2021.    Adriamycin and Cytoxan was discontinued.  The patient is currently receiving Afinitor.  Mrs. García was seen today in follow-up by Dr. Meadows to review imaging results.  Patient was seen in follow-up by our service.    Review of Systems      Constitutional: Positive for fatigue.  Integumentary: Positive for multiple soft tissue lesions consistent with cutaneous metastases involving  the right axilla and upper right posterior lateral chest wall.  All other systems reviewed and are negative.    Past Medical History:   Diagnosis Date   • Anxiety    • Depression    • Encounter for gynecological examination    • Malignant neoplasm of female breast (CMS/HCC)     hx of dcis s/p mastectomy and reconstruction and augmentation      • Primary malignant neoplasm of breast (CMS/HCC)     dcis in rt breast s/p mastectomy,reconstruction      • Ventricular premature beats         Past Surgical History:   Procedure Laterality Date   • AUGMENTATION MAMMAPLASTY     • AXILLARY LYMPH NODE BIOPSY/EXCISION Right 7/10/2017    Procedure: BIOSPY RIGHT AXILLARY MASS AND OR LYMPH;  Surgeon: Sourav Ernst MD;  Location: [facility] OR;  Service:    • BREAST BIOPSY  12/07/2007    Mammotome biopsy of the right side with clip placement   • BREAST LUMPECTOMY     • BREAST RECONSTRUCTION  10/28/2008    Abscence of right breast secondary to mastectomy. Implant exchange for reconstruction of right breast with open para-prosthetic capsulotomy   • BREAST SURGERY  10/01/2009    Left breast ptosis and breast asymmetry secondary to right breast reconstruction for breast cancer. Left breast mastopexy with augmentation.   • CARDIAC CATHETERIZATION  09/18/2008    Normal coronary arteriography. Normal left ventricular angiogram   • EP STUDY     • MASTECTOMY Right    • MASTECTOMY  02/05/2008    Multifocal right breast ductal carcinoma-in-situ. Right total mastectomy   • MASTECTOMY, PARTIAL  01/03/2008    Ductal carcinoma in-situ of the right breast, proven by mammotome biopsy. Right lumpectomy, medial portion of the breast, between 2 o'clock and 4 o'clock, 5 cm from the nipple, with clip placement, radiographic inter. of severo. specimen, & ultrasound   • PAP SMEAR  03/03/2009    Negative   • SD INSJ TUNNELED CVC W/O SUBQ PORT/ AGE 5 YR/> Right 7/10/2017    Procedure: MEDIPORT     (c-arm#2);  Surgeon: Sourav Ernst MD;  Location: [facility]  OR;  Service: General      Family History   Problem Relation Age of Onset   • Anemia Mother    • Multiple sclerosis Mother    • No Known Problems Father    • Diabetes Other    • Multiple sclerosis Other         Social History     Socioeconomic History   • Marital status:      Spouse name: Not on file   • Number of children: Not on file   • Years of education: Not on file   • Highest education level: Not on file   Tobacco Use   • Smoking status: Never Smoker   • Smokeless tobacco: Never Used   Substance and Sexual Activity   • Alcohol use: No   • Drug use: No   • Sexual activity: Defer        Allergies:  Percocet [oxycodone-acetaminophen]   Prior to Admission medications    Medication Sig Start Date End Date Taking? Authorizing Provider   dexamethasone (DECADRON) 4 MG tablet Take 2 tablets in the morning daily on Days 2, 3 & 4.  Take with food. 5/25/21  Yes Ovidio Meadows MD   dexamethasone 0.5 MG/5ML solution Take 5 mL by mouth 4 (Four) Times a Day. Swish for 2 minutes 4 times per day, do not eat or drink for 1 hour after. 8/19/21  Yes Ovidio Meadows MD   everolimus (AFINITOR) 10 MG tablet Take 1 tablet by mouth Daily. 8/19/21  Yes Ovidio Meadows MD   exemestane (AROMASIN) 25 MG chemo tablet Take 1 tablet by mouth Daily. 8/19/21  Yes Ovidio Meadows MD   gabapentin (NEURONTIN) 300 MG capsule Take 1 capsule by mouth 3 (Three) Times a Day. 7/29/21  Yes April Greco APRN   ondansetron (ZOFRAN) 4 MG tablet Take 1 tablet by mouth 4 (Four) Times a Day As Needed for Nausea or Vomiting. 5/25/21  Yes Ovidio Meadows MD   ondansetron (ZOFRAN) 4 MG tablet Take 1 tablet by mouth 4 (Four) Times a Day As Needed for Nausea or Vomiting. 8/19/21  Yes Ovidio Meadows MD   promethazine (PHENERGAN) 12.5 MG tablet Take 1 tablet by mouth Every 6 (Six) Hours As Needed for Nausea or Vomiting. 7/29/21  Yes April Greco APRN   Scopolamine (Transderm-Scop, 1.5 MG,) 1.5 MG/3DAYS patch Place 1 patch on the skin as directed by provider  Every 72 (Seventy-Two) Hours. 21  Yes Ovidio Meadows MD   sertraline (ZOLOFT) 100 MG tablet  21  Yes Maki Willett MD   sertraline (ZOLOFT) 100 MG tablet TAKE 1 TABLET BY MOUTH DAILY   Yes ProviderMaki MD   traMADol (ULTRAM) 50 MG tablet Take 1 tablet by mouth Every 8 (Eight) Hours As Needed for Moderate Pain . 21  Yes Ovidio Meadows MD   warfarin (COUMADIN) 5 MG tablet TAKE 1 TABLET BY MOUTH NIGHTLY OR AS DIRECTED PER COUMADIN CLINIC 21  Yes Quang Coe APRN      Pain:(on a scale of 0-10)    Pain Score    21 1006   PainSc: 0-No pain        Kylie García reports a pain score of 0.       Quality of Life:   KPS: 90  ECO    Advanced Care Plan:  Not discussed during this visit.  Physical Examination:  Vitals:   Blood pressure 125/62, pulse 70 and respirations 18.  Height:    Weight: Weight: 104 kg (230 lb 4.8 oz) Body mass index is 36.06 kg/m².    Physical Exam  Constitutional: The patient is a well-developed, well-nourished middle-aged, white female in no acute distress.  Alert and oriented ×3.  HEENT: Atraumatic. Normocephalic. No abnormalities noted.  Lymphatics: A firm fixed draining mass is palpated at the right axilla.  Fullness noted over the right supraclavicular fossa..  CV: Regular rate and rhythm.  No murmurs, rubs, or gallops are appreciated.  Respiratory: Lungs clear to auscultation.  Breath sounds equal bilaterally.  Breasts: Status post right breast reconstruction.  Cutaneous nodular lesions are noted over the lateral aspect of the right breast and right upper anterolateral chest wall.  GI: Abdomen soft, nontender, nondistended, with no hepatosplenomegaly or masses palpated.  Extremities: No clubbing, cyanosis, or edema.  Neurologic: Cranial nerves II through XII are grossly intact, with no focal neurological deficits noted on exam.  Psychiatric: Alert and oriented x3. Normal affect, with no anxiety or depression noted.    Radiographs : See above  for PET/CT report of 08/13/2021    Labs:   Lab Results   Component Value Date    GLUCOSE 81 08/19/2021    BUN 11 08/19/2021    CREATININE 0.68 08/19/2021    EGFRIFNONA 91 08/19/2021    BCR 16.2 08/19/2021    K 3.7 08/19/2021    CO2 24.0 08/19/2021    CALCIUM 10.0 08/19/2021    ALBUMIN 4.20 08/19/2021    AST 18 08/19/2021    ALT 26 08/19/2021      WBC   Date Value Ref Range Status   08/19/2021 10.47 3.40 - 10.80 10*3/mm3 Final     Hemoglobin   Date Value Ref Range Status   08/19/2021 11.8 (L) 12.0 - 15.9 g/dL Final     Hematocrit   Date Value Ref Range Status   08/19/2021 35.8 34.0 - 46.6 % Final     Platelets   Date Value Ref Range Status   08/19/2021 476 (H) 140 - 450 10*3/mm3 Final    No results found for: PSA No results found for: CEA     Assessment:  Stage IV ductal carcinoma of right breast with regional lymphatic, dermal, osseous and hepatic metastases.    Recommendations:  The patient recently initiated Afinitor (Everolimus) therapy.  Patient has no side effects to report from prior radiotherapy to the right axilla.  I explained to Mrs. García that there is no role for radiotherapy at this time.  The patient will be discharged back to Dr. Meadows's care.  We would be pleased to see Mrs. García again should the need arise.      Electronically signed by Octavio Petit MD 8/31/2021  10:26 CDT

## 2021-09-23 ENCOUNTER — APPOINTMENT (OUTPATIENT)
Dept: ONCOLOGY | Facility: HOSPITAL | Age: 51
End: 2021-09-23

## 2021-09-23 ENCOUNTER — INFUSION (OUTPATIENT)
Dept: ONCOLOGY | Facility: HOSPITAL | Age: 51
End: 2021-09-23

## 2021-09-23 ENCOUNTER — ANTICOAGULATION VISIT (OUTPATIENT)
Dept: CARDIAC SURGERY | Facility: CLINIC | Age: 51
End: 2021-09-23

## 2021-09-23 ENCOUNTER — OFFICE VISIT (OUTPATIENT)
Dept: ONCOLOGY | Facility: CLINIC | Age: 51
End: 2021-09-23

## 2021-09-23 VITALS
DIASTOLIC BLOOD PRESSURE: 85 MMHG | BODY MASS INDEX: 36.73 KG/M2 | HEART RATE: 95 BPM | OXYGEN SATURATION: 94 % | SYSTOLIC BLOOD PRESSURE: 140 MMHG | WEIGHT: 234.6 LBS | TEMPERATURE: 97.6 F | RESPIRATION RATE: 18 BRPM

## 2021-09-23 DIAGNOSIS — Z17.0 MALIGNANT NEOPLASM OF RIGHT BREAST IN FEMALE, ESTROGEN RECEPTOR POSITIVE, UNSPECIFIED SITE OF BREAST (HCC): ICD-10-CM

## 2021-09-23 DIAGNOSIS — Z51.81 ENCOUNTER FOR THERAPEUTIC DRUG MONITORING: ICD-10-CM

## 2021-09-23 DIAGNOSIS — Z79.01 LONG TERM CURRENT USE OF ANTICOAGULANT THERAPY: Primary | ICD-10-CM

## 2021-09-23 DIAGNOSIS — C50.911 MALIGNANT NEOPLASM OF RIGHT BREAST IN FEMALE, ESTROGEN RECEPTOR POSITIVE, UNSPECIFIED SITE OF BREAST (HCC): ICD-10-CM

## 2021-09-23 DIAGNOSIS — C50.911 MALIGNANT NEOPLASM OF RIGHT BREAST IN FEMALE, ESTROGEN RECEPTOR POSITIVE, UNSPECIFIED SITE OF BREAST (HCC): Primary | ICD-10-CM

## 2021-09-23 DIAGNOSIS — Z17.0 MALIGNANT NEOPLASM OF RIGHT BREAST IN FEMALE, ESTROGEN RECEPTOR POSITIVE, UNSPECIFIED SITE OF BREAST (HCC): Primary | ICD-10-CM

## 2021-09-23 DIAGNOSIS — Z85.820 HISTORY OF MALIGNANT MELANOMA OF SKIN: Primary | ICD-10-CM

## 2021-09-23 DIAGNOSIS — Z45.2 ENCOUNTER FOR VENOUS ACCESS DEVICE CARE: ICD-10-CM

## 2021-09-23 LAB
ALBUMIN SERPL-MCNC: 4.1 G/DL (ref 3.5–5.2)
ALBUMIN/GLOB SERPL: 1.5 G/DL
ALP SERPL-CCNC: 90 U/L (ref 39–117)
ALT SERPL W P-5'-P-CCNC: 48 U/L (ref 1–33)
ANION GAP SERPL CALCULATED.3IONS-SCNC: 10 MMOL/L (ref 5–15)
ANISOCYTOSIS BLD QL: ABNORMAL
AST SERPL-CCNC: 35 U/L (ref 1–32)
BASOPHILS # BLD MANUAL: 0.04 10*3/MM3 (ref 0–0.2)
BASOPHILS NFR BLD AUTO: 1 % (ref 0–1.5)
BILIRUB SERPL-MCNC: 0.2 MG/DL (ref 0–1.2)
BUN SERPL-MCNC: 12 MG/DL (ref 6–20)
BUN/CREAT SERPL: 16.7 (ref 7–25)
CALCIUM SPEC-SCNC: 8.9 MG/DL (ref 8.6–10.5)
CANCER AG15-3 SERPL-ACNC: 67.2 U/ML
CHLORIDE SERPL-SCNC: 102 MMOL/L (ref 98–107)
CO2 SERPL-SCNC: 25 MMOL/L (ref 22–29)
CREAT SERPL-MCNC: 0.72 MG/DL (ref 0.57–1)
DEPRECATED RDW RBC AUTO: 43.6 FL (ref 37–54)
EOSINOPHIL # BLD MANUAL: 0.19 10*3/MM3 (ref 0–0.4)
EOSINOPHIL NFR BLD MANUAL: 5 % (ref 0.3–6.2)
ERYTHROCYTE [DISTWIDTH] IN BLOOD BY AUTOMATED COUNT: 13.5 % (ref 12.3–15.4)
GFR SERPL CREATININE-BSD FRML MDRD: 85 ML/MIN/1.73
GLOBULIN UR ELPH-MCNC: 2.7 GM/DL
GLUCOSE SERPL-MCNC: 60 MG/DL (ref 65–99)
HCT VFR BLD AUTO: 37.2 % (ref 34–46.6)
HGB BLD-MCNC: 12.6 G/DL (ref 12–15.9)
HOLD SPECIMEN: NORMAL
INR PPP: 4.5 (ref 0.9–1.1)
LYMPHOCYTES # BLD MANUAL: 0.82 10*3/MM3 (ref 0.7–3.1)
LYMPHOCYTES NFR BLD MANUAL: 20 % (ref 5–12)
LYMPHOCYTES NFR BLD MANUAL: 22 % (ref 19.6–45.3)
MAGNESIUM SERPL-MCNC: 1.9 MG/DL (ref 1.6–2.6)
MCH RBC QN AUTO: 29.9 PG (ref 26.6–33)
MCHC RBC AUTO-ENTMCNC: 33.9 G/DL (ref 31.5–35.7)
MCV RBC AUTO: 88.4 FL (ref 79–97)
MONOCYTES # BLD AUTO: 0.75 10*3/MM3 (ref 0.1–0.9)
NEUTROPHILS # BLD AUTO: 1.94 10*3/MM3 (ref 1.7–7)
NEUTROPHILS NFR BLD MANUAL: 42 % (ref 42.7–76)
NEUTS BAND NFR BLD MANUAL: 10 % (ref 0–5)
PHOSPHATE SERPL-MCNC: 3.2 MG/DL (ref 2.5–4.5)
PLATELET # BLD AUTO: 228 10*3/MM3 (ref 140–450)
PMV BLD AUTO: 9.4 FL (ref 6–12)
POTASSIUM SERPL-SCNC: 3.6 MMOL/L (ref 3.5–5.2)
PROT SERPL-MCNC: 6.8 G/DL (ref 6–8.5)
RBC # BLD AUTO: 4.21 10*6/MM3 (ref 3.77–5.28)
SMALL PLATELETS BLD QL SMEAR: ADEQUATE
SODIUM SERPL-SCNC: 137 MMOL/L (ref 136–145)
WBC # BLD AUTO: 3.74 10*3/MM3 (ref 3.4–10.8)
WBC MORPH BLD: NORMAL

## 2021-09-23 PROCEDURE — 99213 OFFICE O/P EST LOW 20 MIN: CPT | Performed by: NURSE PRACTITIONER

## 2021-09-23 PROCEDURE — 86300 IMMUNOASSAY TUMOR CA 15-3: CPT

## 2021-09-23 PROCEDURE — 85610 PROTHROMBIN TIME: CPT | Performed by: NURSE PRACTITIONER

## 2021-09-23 PROCEDURE — 85025 COMPLETE CBC W/AUTO DIFF WBC: CPT

## 2021-09-23 PROCEDURE — 83735 ASSAY OF MAGNESIUM: CPT

## 2021-09-23 PROCEDURE — 25010000002 HEPARIN LOCK FLUSH PER 10 UNITS: Performed by: NURSE PRACTITIONER

## 2021-09-23 PROCEDURE — 96374 THER/PROPH/DIAG INJ IV PUSH: CPT | Performed by: NURSE PRACTITIONER

## 2021-09-23 PROCEDURE — 36416 COLLJ CAPILLARY BLOOD SPEC: CPT | Performed by: NURSE PRACTITIONER

## 2021-09-23 PROCEDURE — 85007 BL SMEAR W/DIFF WBC COUNT: CPT

## 2021-09-23 PROCEDURE — 36415 COLL VENOUS BLD VENIPUNCTURE: CPT

## 2021-09-23 PROCEDURE — 25010000002 ZOLEDRONIC ACID PER 1 MG: Performed by: NURSE PRACTITIONER

## 2021-09-23 PROCEDURE — 84100 ASSAY OF PHOSPHORUS: CPT

## 2021-09-23 PROCEDURE — 80053 COMPREHEN METABOLIC PANEL: CPT

## 2021-09-23 RX ORDER — SODIUM CHLORIDE 9 MG/ML
250 INJECTION, SOLUTION INTRAVENOUS ONCE
Status: COMPLETED | OUTPATIENT
Start: 2021-09-23 | End: 2021-09-23

## 2021-09-23 RX ORDER — HEPARIN SODIUM (PORCINE) LOCK FLUSH IV SOLN 100 UNIT/ML 100 UNIT/ML
500 SOLUTION INTRAVENOUS AS NEEDED
Status: CANCELLED | OUTPATIENT
Start: 2021-09-23

## 2021-09-23 RX ORDER — SERTRALINE HYDROCHLORIDE 100 MG/1
TABLET, FILM COATED ORAL EVERY 24 HOURS
COMMUNITY
End: 2021-09-23

## 2021-09-23 RX ORDER — SODIUM CHLORIDE 0.9 % (FLUSH) 0.9 %
10 SYRINGE (ML) INJECTION AS NEEDED
Status: CANCELLED | OUTPATIENT
Start: 2021-09-23

## 2021-09-23 RX ORDER — SODIUM CHLORIDE 0.9 % (FLUSH) 0.9 %
10 SYRINGE (ML) INJECTION AS NEEDED
Status: DISCONTINUED | OUTPATIENT
Start: 2021-09-23 | End: 2021-09-23 | Stop reason: HOSPADM

## 2021-09-23 RX ORDER — SODIUM CHLORIDE 0.9 % (FLUSH) 0.9 %
20 SYRINGE (ML) INJECTION AS NEEDED
Status: CANCELLED | OUTPATIENT
Start: 2021-09-23

## 2021-09-23 RX ORDER — SODIUM CHLORIDE 0.9 % (FLUSH) 0.9 %
20 SYRINGE (ML) INJECTION AS NEEDED
Status: DISCONTINUED | OUTPATIENT
Start: 2021-09-23 | End: 2021-09-23 | Stop reason: HOSPADM

## 2021-09-23 RX ORDER — SODIUM CHLORIDE 9 MG/ML
250 INJECTION, SOLUTION INTRAVENOUS ONCE
Status: CANCELLED | OUTPATIENT
Start: 2021-09-23

## 2021-09-23 RX ORDER — HEPARIN SODIUM (PORCINE) LOCK FLUSH IV SOLN 100 UNIT/ML 100 UNIT/ML
500 SOLUTION INTRAVENOUS AS NEEDED
Status: DISCONTINUED | OUTPATIENT
Start: 2021-09-23 | End: 2021-09-23 | Stop reason: HOSPADM

## 2021-09-23 RX ADMIN — SODIUM CHLORIDE 250 ML: 9 INJECTION, SOLUTION INTRAVENOUS at 10:20

## 2021-09-23 RX ADMIN — HEPARIN 500 UNITS: 100 SYRINGE at 10:51

## 2021-09-23 RX ADMIN — SODIUM CHLORIDE, PRESERVATIVE FREE 10 ML: 5 INJECTION INTRAVENOUS at 10:51

## 2021-09-23 RX ADMIN — ZOLEDRONIC ACID 4 MG: 4 INJECTION, SOLUTION, CONCENTRATE INTRAVENOUS at 10:20

## 2021-09-23 NOTE — PROGRESS NOTES
Pt states she is now taking Afinitor (no interactions shown but her specialty pharmacist told her it may bother INR) and Aromasin (no interaction). Pt also states she is on day 3 of 5 of Minocycline and has been using a mouthwash with steroids but it is swish and spit. Pt also reports taking 5mg nightly on her warfarin dose. Pt denies bleeding problems. Dose adjusted and pt instructed to have a serving of spinach today; pt verbalized. Patient instructed regarding medication; results given and questions answered. Nutritional counseling given.  Dietary factors affecting therapy addressed.  Patient instructed to monitor for excessive bruising or bleeding. Will recheck next week. Notify provider if you experience excessive bleeding from the nose, cuts, gums, rectum, urinary tract, or vagina. Reddish or brown urine or stool. Vomiting of blood or hemorrhoidal bleeding. If major injury occurs present to the Emergency Department. Findings reported by Tata Nunez RN.    Today's INR is   Lab Results - Last 18 Months   Lab Units 09/23/21  0000   INR  4.50*

## 2021-09-23 NOTE — PROGRESS NOTES
DATE OF VISIT: 9/23/2021      REASON FOR VISIT:  Metastatic breast cancer with bone and liver metastases and skin metastases on Adriamycin and Cytoxan, cancer related pain, DVT on anticoagulation with Coumadin, neuropathy from chemotherapy        HISTORY OF PRESENT ILLNESS:   51-year-old female with medical problem consisting of DCIS of right breast diagnosed in 2007, anxiety/depression, metastatic breast cancer with bone, liver and skin metastases for which patient is currently on treatment with Adriamycin and Cytoxan started on Lupe 3, 2021.  She had PET CT scan last month that showed stable disease but she had progression of skin nodules; she was cousnled about switching therapies and was started on Affinitor and Arimidex.  She has been on new therapy for approximately 5 weeks. States she is tolerating very well. She states she thinks skin nodules have improved; states she definitely has noticed improvement in her pain in that area.         Oncology history:     1.  Metastatic right breast cancer with bone and liver metastases:  -Patient was initially diagnosed in July 2017 with axillary biopsy on right side  -S/p 6 cycles of chemotherapy with Taxotere and Cytoxan between July 26, 2017 and December 6, 2017  -Patient was evaluated by  at [facility] in [place] for second opinion.  -Patient received 20 cycles of chemotherapy with palbociclib and letrozole between July 30, 2018 and October 2, 2020.  -Due to enlarging mass in the right axilla patient's treatment was changed to Faslodex on October 16, 2020  -Due to continuous enlargement of axillary mass without any other progression patient received radiation treatment to axilla that was completed on March 4, 2021  -Patient has received 8 cycles of Faslodex between October 16, 2020 and May 7, 2021  -PET/CT done on May 21, 2021 shows progression of disease with axillary adenopathy, skin involvement as well as new onset of right-sided liver metastases.  -She  was started on cycle 1 of Adriamycin and Cytoxan on Lupe 3, 2021.  -Recent CT scan and physical examination suggested progressive disease so patient was switched to Affinitor and Arimidex on 8/26/2021             Past Medical History, Past Surgical History, Social History, Family History have been reviewed and are without significant changes except as mentioned.    Review of Systems   A comprehensive 14 point review of systems was performed and was negative except as mentioned.  Constitutional: Positive for fatigue.   Musculoskeletal: Positive for arthralgias and back pain.   Skin:        Positive for skin nodule and axillary area   Neurological: Positive for numbness.   Hematological: Negative for adenopathy.   Medications:  The current medication list was reviewed in the EMR    ALLERGIES:    Allergies   Allergen Reactions   • Percocet [Oxycodone-Acetaminophen] Nausea And Vomiting       Objective      Vitals:    09/23/21 0901   BP: 140/85   Pulse: 95   Resp: 18   Temp: 97.6 °F (36.4 °C)   SpO2: 94%   Weight: 106 kg (234 lb 9.6 oz)   PainSc: 0-No pain     Current Status 8/31/2021   ECOG score 0       Physical Exam  Cardiovascular:      Rate and Rhythm: Normal rate and regular rhythm.      Comments: Port-A-Cath present  Pulmonary:      Breath sounds: Normal breath sounds.   Chest:      Comments: Skin nodule involving the right axillary area present; appear to be healing and no drainage seen.  Neurological:      Mental Status: She is alert and oriented to person, place, and time.         RECENT LABS:  Glucose   Date Value Ref Range Status   09/23/2021 60 (L) 65 - 99 mg/dL Final     Sodium   Date Value Ref Range Status   09/23/2021 137 136 - 145 mmol/L Final     Potassium   Date Value Ref Range Status   09/23/2021 3.6 3.5 - 5.2 mmol/L Final     CO2   Date Value Ref Range Status   09/23/2021 25.0 22.0 - 29.0 mmol/L Final     Chloride   Date Value Ref Range Status   09/23/2021 102 98 - 107 mmol/L Final     Anion Gap    Date Value Ref Range Status   09/23/2021 10.0 5.0 - 15.0 mmol/L Final     Creatinine   Date Value Ref Range Status   09/23/2021 0.72 0.57 - 1.00 mg/dL Final     BUN   Date Value Ref Range Status   09/23/2021 12 6 - 20 mg/dL Final     BUN/Creatinine Ratio   Date Value Ref Range Status   09/23/2021 16.7 7.0 - 25.0 Final     Calcium   Date Value Ref Range Status   09/23/2021 8.9 8.6 - 10.5 mg/dL Final     eGFR Non  Amer   Date Value Ref Range Status   09/23/2021 85 >60 mL/min/1.73 Final     Alkaline Phosphatase   Date Value Ref Range Status   09/23/2021 90 39 - 117 U/L Final     Total Protein   Date Value Ref Range Status   09/23/2021 6.8 6.0 - 8.5 g/dL Final     ALT (SGPT)   Date Value Ref Range Status   09/23/2021 48 (H) 1 - 33 U/L Final     AST (SGOT)   Date Value Ref Range Status   09/23/2021 35 (H) 1 - 32 U/L Final     Total Bilirubin   Date Value Ref Range Status   09/23/2021 0.2 0.0 - 1.2 mg/dL Final     Albumin   Date Value Ref Range Status   09/23/2021 4.10 3.50 - 5.20 g/dL Final     Globulin   Date Value Ref Range Status   09/23/2021 2.7 gm/dL Final     Lab Results   Component Value Date    WBC 3.74 09/23/2021    HGB 12.6 09/23/2021    HCT 37.2 09/23/2021    MCV 88.4 09/23/2021     09/23/2021     Lab Results   Component Value Date    NEUTROABS 1.94 09/23/2021     Lab Results   Component Value Date     80.4 (H) 07/29/2021    LABCA2 118.1 (H) 07/29/2021    HCGQUANT 1.16 06/03/2021           RADIOLOGY DATA :  No radiology results for the last 7 days        Assessment/Plan     1 .  Metastatic right breast cancer with skin, bone and liver metastases, stage IV  -Review oncology history for prior treatment details  -Patient was started on cycle 1 of Adriamycin and Cytoxan on Lupe 3, 2021  -PET/CT done on August 13, 2021 after cycle 3 of Adriamycin and Cytoxan was reviewed with radiologist, showed stable axillary involvement skin involvement and liver metastasis.  Sclerotic focus with increased  uptake involving lumbar spine and hip bone has improved significantly.  There was no evidence of any new focus of metastasis.  Results of CT scan were discussed with patient today.  -However on physical exam there is evidence of worsening skin metastases consistent with clinical progression of disease which was discussed with patient and her family.  Due to progression of skin metastases which can be potentially life-threatening without treatment change recommend changing treatment to Arimidex and Afinitor.  -patient has been on new treatment for apx 5 weeks; pt states area in right axilla has improved.  -Will bring her back in 4 weeks and obtain CT C/A/P before that appointment.     2.  Brain metastases  -Patient was diagnosed with calvarial metastases without any parenchymal mets.  Radiation therapy was not recommended by Dr. Pulido at that point     3.  Right internal jugular vein DVT remains on Coumadin     4.  Bone metastasis:  -Has been on monthly Zometa since October 23, 2027.  -We will continue with dose of Zometa today; labs reviewed.        5.  Elevated liver function test secondary to chemotherapy plus liver metastases     6.  History of DCIS of right breast diagnosed in 2007  -S/p mastectomy followed by tamoxifen for 5 years that completed in 2013     7.  History of melanoma in situ s/p surgery     8.  Chemotherapy-induced neuropathy  -Remains on gabapentin     9.  Cancer related pain:  -Remains on Ultram     10.  Health maintenance: Patient does not smoke               PHQ-9 Total Score: 0     Kylie Yoon García reports a pain score of 0.  Given her pain assessment as noted, treatment options were discussed and the following options were decided upon as a follow-up plan to address the patient's pain: continuation of current treatment plan for pain.         April Greco, APRN  9/23/2021  11:41 CDT        Part of this note may be an electronic transcription/translation of spoken language to printed text  using the Dragon Dictation System.          CC:

## 2021-09-24 LAB — CANCER AG27-29 SERPL-ACNC: 92.2 U/ML (ref 0–38.6)

## 2021-09-30 ENCOUNTER — ANTICOAGULATION VISIT (OUTPATIENT)
Dept: CARDIAC SURGERY | Facility: CLINIC | Age: 51
End: 2021-09-30

## 2021-09-30 VITALS — HEART RATE: 81 BPM | OXYGEN SATURATION: 98 %

## 2021-09-30 DIAGNOSIS — Z51.81 ENCOUNTER FOR THERAPEUTIC DRUG MONITORING: ICD-10-CM

## 2021-09-30 DIAGNOSIS — Z79.01 LONG TERM CURRENT USE OF ANTICOAGULANT THERAPY: Primary | ICD-10-CM

## 2021-09-30 LAB — INR PPP: 3.6 (ref 0.9–1.1)

## 2021-09-30 PROCEDURE — 85610 PROTHROMBIN TIME: CPT | Performed by: NURSE PRACTITIONER

## 2021-09-30 PROCEDURE — 36416 COLLJ CAPILLARY BLOOD SPEC: CPT | Performed by: NURSE PRACTITIONER

## 2021-09-30 PROCEDURE — 93793 ANTICOAG MGMT PT WARFARIN: CPT | Performed by: NURSE PRACTITIONER

## 2021-09-30 NOTE — PROGRESS NOTES
Pt states she continued the Minocycline but finished today. Pt denies other med changes or bleeding problems. Dose adjusted today only and pt instructed to eat a serving of green veggies today; pt verbalized. Patient instructed regarding medication; results given and questions answered. Nutritional counseling given.  Dietary factors affecting therapy addressed.  Patient instructed to monitor for excessive bruising or bleeding. Will recheck in 2 weeks. Findings reported by Tata Nunez RN.    Today's INR is   Lab Results - Last 18 Months   Lab Units 09/30/21  0000   INR  3.60*

## 2021-10-04 RX ORDER — EVEROLIMUS 10 MG/1
TABLET ORAL
Qty: 14 TABLET | Refills: 2 | Status: SHIPPED | OUTPATIENT
Start: 2021-10-04 | End: 2021-11-16

## 2021-10-04 NOTE — TELEPHONE ENCOUNTER
Rx Refill Note  Requested Prescriptions     Pending Prescriptions Disp Refills   • everolimus (Afinitor) 10 MG tablet [Pharmacy Med Name: AFINITOR 10MG]  0     Sig: TAKE 1 TABLET BY MOUTH DAILY AT THE SAME TIME. MAY TAKE WITH OR WITHOUT FOOD WITH A FULL GLASS OF WATER. SWALLOW WHOLE. AVOID GRAPEFRUIT PRODUCTS      Last office visit with prescribing clinician: 8/19/2021      Next office visit with prescribing clinician: 10/21/2021            Solange García RN  10/04/21, 09:11 CDT

## 2021-10-14 ENCOUNTER — ANTICOAGULATION VISIT (OUTPATIENT)
Dept: CARDIAC SURGERY | Facility: CLINIC | Age: 51
End: 2021-10-14

## 2021-10-14 ENCOUNTER — HOSPITAL ENCOUNTER (OUTPATIENT)
Dept: CT IMAGING | Facility: HOSPITAL | Age: 51
Discharge: HOME | End: 2021-10-14
Admitting: NURSE PRACTITIONER

## 2021-10-14 DIAGNOSIS — Z79.01 LONG TERM CURRENT USE OF ANTICOAGULANT THERAPY: Primary | ICD-10-CM

## 2021-10-14 DIAGNOSIS — Z51.81 ENCOUNTER FOR THERAPEUTIC DRUG MONITORING: ICD-10-CM

## 2021-10-14 DIAGNOSIS — C50.911 MALIGNANT NEOPLASM OF RIGHT BREAST IN FEMALE, ESTROGEN RECEPTOR POSITIVE, UNSPECIFIED SITE OF BREAST (HCC): ICD-10-CM

## 2021-10-14 DIAGNOSIS — Z17.0 MALIGNANT NEOPLASM OF RIGHT BREAST IN FEMALE, ESTROGEN RECEPTOR POSITIVE, UNSPECIFIED SITE OF BREAST (HCC): ICD-10-CM

## 2021-10-14 LAB — INR PPP: 3.1 (ref 0.9–1.1)

## 2021-10-14 PROCEDURE — 85610 PROTHROMBIN TIME: CPT | Performed by: NURSE PRACTITIONER

## 2021-10-14 PROCEDURE — 74177 CT ABD & PELVIS W/CONTRAST: CPT

## 2021-10-14 PROCEDURE — 25010000002 IOPAMIDOL 61 % SOLUTION: Performed by: NURSE PRACTITIONER

## 2021-10-14 PROCEDURE — 93793 ANTICOAG MGMT PT WARFARIN: CPT | Performed by: NURSE PRACTITIONER

## 2021-10-14 PROCEDURE — 71260 CT THORAX DX C+: CPT

## 2021-10-14 PROCEDURE — 36416 COLLJ CAPILLARY BLOOD SPEC: CPT | Performed by: NURSE PRACTITIONER

## 2021-10-14 RX ORDER — HEPARIN SODIUM (PORCINE) LOCK FLUSH IV SOLN 100 UNIT/ML 100 UNIT/ML
SOLUTION INTRAVENOUS
Status: DISPENSED
Start: 2021-10-14 | End: 2021-10-14

## 2021-10-14 RX ADMIN — IOPAMIDOL 90 ML: 612 INJECTION, SOLUTION INTRAVENOUS at 10:03

## 2021-10-14 NOTE — PROGRESS NOTES
Patient states no med changes or bleeding problems or unexplained bruising. Patient instructed to continue current dosing schedule and eat a serving of green veggies today. Verbalizes understanding. Will recheck next week. Patient instructed regarding medication; results given and questions answered. Nutritional counseling given.  Dietary factors affecting therapy addressed.  Patient instructed to monitor for excessive bruising or bleeding. Findings reported by Tata Nunez RN.  Findings reported by Tata Nunez RN.    Today's INR is Lab Results - Last 18 Months   Lab Units 10/14/21  0000   INR  3.10*

## 2021-10-18 DIAGNOSIS — Z79.01 LONG TERM (CURRENT) USE OF ANTICOAGULANTS: ICD-10-CM

## 2021-10-18 RX ORDER — WARFARIN SODIUM 5 MG/1
TABLET ORAL
Qty: 30 TABLET | Refills: 2 | Status: SHIPPED | OUTPATIENT
Start: 2021-10-18 | End: 2022-01-01 | Stop reason: SDUPTHER

## 2021-10-20 DIAGNOSIS — C50.911 MALIGNANT NEOPLASM OF RIGHT BREAST IN FEMALE, ESTROGEN RECEPTOR POSITIVE, UNSPECIFIED SITE OF BREAST (HCC): Primary | ICD-10-CM

## 2021-10-20 DIAGNOSIS — Z17.0 MALIGNANT NEOPLASM OF RIGHT BREAST IN FEMALE, ESTROGEN RECEPTOR POSITIVE, UNSPECIFIED SITE OF BREAST (HCC): Primary | ICD-10-CM

## 2021-10-21 ENCOUNTER — OFFICE VISIT (OUTPATIENT)
Dept: ONCOLOGY | Facility: CLINIC | Age: 51
End: 2021-10-21

## 2021-10-21 ENCOUNTER — ANTICOAGULATION VISIT (OUTPATIENT)
Dept: CARDIAC SURGERY | Facility: CLINIC | Age: 51
End: 2021-10-21

## 2021-10-21 ENCOUNTER — INFUSION (OUTPATIENT)
Dept: ONCOLOGY | Facility: HOSPITAL | Age: 51
End: 2021-10-21

## 2021-10-21 VITALS
OXYGEN SATURATION: 95 % | DIASTOLIC BLOOD PRESSURE: 78 MMHG | BODY MASS INDEX: 36.8 KG/M2 | SYSTOLIC BLOOD PRESSURE: 134 MMHG | HEART RATE: 86 BPM | WEIGHT: 235 LBS | TEMPERATURE: 97.9 F

## 2021-10-21 VITALS — OXYGEN SATURATION: 99 % | HEART RATE: 90 BPM

## 2021-10-21 DIAGNOSIS — R79.89 ELEVATED LIVER FUNCTION TESTS: Chronic | ICD-10-CM

## 2021-10-21 DIAGNOSIS — Z17.0 MALIGNANT NEOPLASM OF RIGHT BREAST IN FEMALE, ESTROGEN RECEPTOR POSITIVE, UNSPECIFIED SITE OF BREAST (HCC): Chronic | ICD-10-CM

## 2021-10-21 DIAGNOSIS — Z85.820 HISTORY OF MALIGNANT MELANOMA OF SKIN: Chronic | ICD-10-CM

## 2021-10-21 DIAGNOSIS — C50.911 MALIGNANT NEOPLASM OF RIGHT BREAST IN FEMALE, ESTROGEN RECEPTOR POSITIVE, UNSPECIFIED SITE OF BREAST (HCC): Chronic | ICD-10-CM

## 2021-10-21 DIAGNOSIS — C50.911 MALIGNANT NEOPLASM OF RIGHT BREAST IN FEMALE, ESTROGEN RECEPTOR POSITIVE, UNSPECIFIED SITE OF BREAST (HCC): Primary | ICD-10-CM

## 2021-10-21 DIAGNOSIS — Z86.000 HISTORY OF DUCTAL CARCINOMA IN SITU (DCIS) OF BREAST: Chronic | ICD-10-CM

## 2021-10-21 DIAGNOSIS — G62.0 NEUROPATHY DUE TO CHEMOTHERAPEUTIC DRUG (HCC): Chronic | ICD-10-CM

## 2021-10-21 DIAGNOSIS — Z17.0 MALIGNANT NEOPLASM OF RIGHT BREAST IN FEMALE, ESTROGEN RECEPTOR POSITIVE, UNSPECIFIED SITE OF BREAST (HCC): Primary | ICD-10-CM

## 2021-10-21 DIAGNOSIS — C78.7 LIVER METASTASIS: Chronic | ICD-10-CM

## 2021-10-21 DIAGNOSIS — Z23 FLU VACCINE NEED: ICD-10-CM

## 2021-10-21 DIAGNOSIS — Z17.0 MALIGNANT NEOPLASM OF RIGHT BREAST IN FEMALE, ESTROGEN RECEPTOR POSITIVE, UNSPECIFIED SITE OF BREAST (HCC): ICD-10-CM

## 2021-10-21 DIAGNOSIS — Z51.81 ENCOUNTER FOR THERAPEUTIC DRUG MONITORING: ICD-10-CM

## 2021-10-21 DIAGNOSIS — Z79.01 LONG TERM CURRENT USE OF ANTICOAGULANT THERAPY: Chronic | ICD-10-CM

## 2021-10-21 DIAGNOSIS — C79.31 METASTASIS TO BRAIN (HCC): Chronic | ICD-10-CM

## 2021-10-21 DIAGNOSIS — R11.2 NAUSEA AND VOMITING, INTRACTABILITY OF VOMITING NOT SPECIFIED, UNSPECIFIED VOMITING TYPE: ICD-10-CM

## 2021-10-21 DIAGNOSIS — C50.911 MALIGNANT NEOPLASM OF RIGHT BREAST IN FEMALE, ESTROGEN RECEPTOR POSITIVE, UNSPECIFIED SITE OF BREAST (HCC): ICD-10-CM

## 2021-10-21 DIAGNOSIS — C79.51 BONE METASTASIS: Primary | Chronic | ICD-10-CM

## 2021-10-21 DIAGNOSIS — T45.1X5A NEUROPATHY DUE TO CHEMOTHERAPEUTIC DRUG (HCC): Chronic | ICD-10-CM

## 2021-10-21 DIAGNOSIS — Z45.2 ENCOUNTER FOR VENOUS ACCESS DEVICE CARE: ICD-10-CM

## 2021-10-21 DIAGNOSIS — Z79.01 LONG TERM CURRENT USE OF ANTICOAGULANT THERAPY: Primary | ICD-10-CM

## 2021-10-21 LAB
ALBUMIN SERPL-MCNC: 4.3 G/DL (ref 3.5–5.2)
ALBUMIN/GLOB SERPL: 1.4 G/DL
ALP SERPL-CCNC: 82 U/L (ref 39–117)
ALT SERPL W P-5'-P-CCNC: 63 U/L (ref 1–33)
ANION GAP SERPL CALCULATED.3IONS-SCNC: 9 MMOL/L (ref 5–15)
AST SERPL-CCNC: 43 U/L (ref 1–32)
BASOPHILS # BLD AUTO: 0.03 10*3/MM3 (ref 0–0.2)
BASOPHILS NFR BLD AUTO: 0.8 % (ref 0–1.5)
BILIRUB SERPL-MCNC: 0.3 MG/DL (ref 0–1.2)
BUN SERPL-MCNC: 13 MG/DL (ref 6–20)
BUN/CREAT SERPL: 17.1 (ref 7–25)
CALCIUM SPEC-SCNC: 9.1 MG/DL (ref 8.6–10.5)
CANCER AG15-3 SERPL-ACNC: 54.8 U/ML
CHLORIDE SERPL-SCNC: 101 MMOL/L (ref 98–107)
CO2 SERPL-SCNC: 24 MMOL/L (ref 22–29)
CREAT SERPL-MCNC: 0.76 MG/DL (ref 0.57–1)
DEPRECATED RDW RBC AUTO: 39.6 FL (ref 37–54)
EOSINOPHIL # BLD AUTO: 0.22 10*3/MM3 (ref 0–0.4)
EOSINOPHIL NFR BLD AUTO: 5.8 % (ref 0.3–6.2)
ERYTHROCYTE [DISTWIDTH] IN BLOOD BY AUTOMATED COUNT: 13 % (ref 12.3–15.4)
GFR SERPL CREATININE-BSD FRML MDRD: 80 ML/MIN/1.73
GLOBULIN UR ELPH-MCNC: 3.1 GM/DL
GLUCOSE SERPL-MCNC: 124 MG/DL (ref 65–99)
HCT VFR BLD AUTO: 36.7 % (ref 34–46.6)
HGB BLD-MCNC: 12.7 G/DL (ref 12–15.9)
IMM GRANULOCYTES # BLD AUTO: 0.01 10*3/MM3 (ref 0–0.05)
IMM GRANULOCYTES NFR BLD AUTO: 0.3 % (ref 0–0.5)
INR PPP: 3.2 (ref 0.9–1.1)
LYMPHOCYTES # BLD AUTO: 0.91 10*3/MM3 (ref 0.7–3.1)
LYMPHOCYTES NFR BLD AUTO: 23.9 % (ref 19.6–45.3)
MAGNESIUM SERPL-MCNC: 1.8 MG/DL (ref 1.6–2.6)
MCH RBC QN AUTO: 29 PG (ref 26.6–33)
MCHC RBC AUTO-ENTMCNC: 34.6 G/DL (ref 31.5–35.7)
MCV RBC AUTO: 83.8 FL (ref 79–97)
MONOCYTES # BLD AUTO: 0.52 10*3/MM3 (ref 0.1–0.9)
MONOCYTES NFR BLD AUTO: 13.7 % (ref 5–12)
NEUTROPHILS NFR BLD AUTO: 2.11 10*3/MM3 (ref 1.7–7)
NEUTROPHILS NFR BLD AUTO: 55.5 % (ref 42.7–76)
NRBC BLD AUTO-RTO: 0 /100 WBC (ref 0–0.2)
PHOSPHATE SERPL-MCNC: 3.1 MG/DL (ref 2.5–4.5)
PLATELET # BLD AUTO: 202 10*3/MM3 (ref 140–450)
PMV BLD AUTO: 9.5 FL (ref 6–12)
POTASSIUM SERPL-SCNC: 3.5 MMOL/L (ref 3.5–5.2)
PROT SERPL-MCNC: 7.4 G/DL (ref 6–8.5)
RBC # BLD AUTO: 4.38 10*6/MM3 (ref 3.77–5.28)
SODIUM SERPL-SCNC: 134 MMOL/L (ref 136–145)
WBC # BLD AUTO: 3.8 10*3/MM3 (ref 3.4–10.8)

## 2021-10-21 PROCEDURE — 84100 ASSAY OF PHOSPHORUS: CPT

## 2021-10-21 PROCEDURE — 25010000002 HEPARIN LOCK FLUSH PER 10 UNITS: Performed by: NURSE PRACTITIONER

## 2021-10-21 PROCEDURE — 90686 IIV4 VACC NO PRSV 0.5 ML IM: CPT | Performed by: INTERNAL MEDICINE

## 2021-10-21 PROCEDURE — 80053 COMPREHEN METABOLIC PANEL: CPT

## 2021-10-21 PROCEDURE — 25010000002 INFLUENZA VAC SPLIT QUAD 0.5 ML SUSPENSION PREFILLED SYRINGE: Performed by: INTERNAL MEDICINE

## 2021-10-21 PROCEDURE — 83735 ASSAY OF MAGNESIUM: CPT

## 2021-10-21 PROCEDURE — 86300 IMMUNOASSAY TUMOR CA 15-3: CPT

## 2021-10-21 PROCEDURE — 99214 OFFICE O/P EST MOD 30 MIN: CPT | Performed by: INTERNAL MEDICINE

## 2021-10-21 PROCEDURE — 85025 COMPLETE CBC W/AUTO DIFF WBC: CPT

## 2021-10-21 PROCEDURE — 93793 ANTICOAG MGMT PT WARFARIN: CPT | Performed by: NURSE PRACTITIONER

## 2021-10-21 PROCEDURE — 36416 COLLJ CAPILLARY BLOOD SPEC: CPT | Performed by: NURSE PRACTITIONER

## 2021-10-21 PROCEDURE — 96374 THER/PROPH/DIAG INJ IV PUSH: CPT | Performed by: INTERNAL MEDICINE

## 2021-10-21 PROCEDURE — 85610 PROTHROMBIN TIME: CPT | Performed by: NURSE PRACTITIONER

## 2021-10-21 PROCEDURE — 25010000002 ZOLEDRONIC ACID PER 1 MG: Performed by: INTERNAL MEDICINE

## 2021-10-21 PROCEDURE — G0008 ADMIN INFLUENZA VIRUS VAC: HCPCS | Performed by: INTERNAL MEDICINE

## 2021-10-21 RX ORDER — SODIUM CHLORIDE 0.9 % (FLUSH) 0.9 %
10 SYRINGE (ML) INJECTION AS NEEDED
Status: CANCELLED | OUTPATIENT
Start: 2021-10-21

## 2021-10-21 RX ORDER — SODIUM CHLORIDE 0.9 % (FLUSH) 0.9 %
10 SYRINGE (ML) INJECTION AS NEEDED
Status: DISCONTINUED | OUTPATIENT
Start: 2021-10-21 | End: 2021-10-21 | Stop reason: HOSPADM

## 2021-10-21 RX ORDER — SODIUM CHLORIDE 9 MG/ML
250 INJECTION, SOLUTION INTRAVENOUS ONCE
Status: COMPLETED | OUTPATIENT
Start: 2021-10-21 | End: 2021-10-21

## 2021-10-21 RX ORDER — SODIUM CHLORIDE 0.9 % (FLUSH) 0.9 %
20 SYRINGE (ML) INJECTION AS NEEDED
Status: CANCELLED | OUTPATIENT
Start: 2021-10-21

## 2021-10-21 RX ORDER — HEPARIN SODIUM (PORCINE) LOCK FLUSH IV SOLN 100 UNIT/ML 100 UNIT/ML
500 SOLUTION INTRAVENOUS AS NEEDED
Status: CANCELLED | OUTPATIENT
Start: 2021-10-21

## 2021-10-21 RX ORDER — EXEMESTANE 25 MG/1
TABLET ORAL
COMMUNITY
Start: 2021-10-04 | End: 2022-01-01 | Stop reason: ALTCHOICE

## 2021-10-21 RX ORDER — SERTRALINE HYDROCHLORIDE 100 MG/1
1 TABLET, FILM COATED ORAL DAILY
COMMUNITY

## 2021-10-21 RX ORDER — SODIUM CHLORIDE 9 MG/ML
250 INJECTION, SOLUTION INTRAVENOUS ONCE
Status: CANCELLED | OUTPATIENT
Start: 2021-10-21

## 2021-10-21 RX ORDER — HEPARIN SODIUM (PORCINE) LOCK FLUSH IV SOLN 100 UNIT/ML 100 UNIT/ML
500 SOLUTION INTRAVENOUS AS NEEDED
Status: DISCONTINUED | OUTPATIENT
Start: 2021-10-21 | End: 2021-10-21 | Stop reason: HOSPADM

## 2021-10-21 RX ADMIN — INFLUENZA VIRUS VACCINE 0.5 ML: 15; 15; 15; 15 SUSPENSION INTRAMUSCULAR at 11:21

## 2021-10-21 RX ADMIN — HEPARIN 500 UNITS: 100 SYRINGE at 11:21

## 2021-10-21 RX ADMIN — SODIUM CHLORIDE, PRESERVATIVE FREE 10 ML: 5 INJECTION INTRAVENOUS at 11:21

## 2021-10-21 RX ADMIN — SODIUM CHLORIDE 250 ML: 9 INJECTION, SOLUTION INTRAVENOUS at 10:48

## 2021-10-21 RX ADMIN — ZOLEDRONIC ACID 4 MG: 4 INJECTION, SOLUTION, CONCENTRATE INTRAVENOUS at 10:48

## 2021-10-21 NOTE — PROGRESS NOTES
DATE OF VISIT: 10/21/2021      REASON FOR VISIT: Metastatic breast cancer with skin, bone and liver metastases on Afinitor and Arimidex, cancer related pain, DVT on anticoagulation with Coumadin, neuropathy from chemotherapy      HISTORY OF PRESENT ILLNESS:   51-year-old female with medical problem consisting of DCIS of right breast diagnosed in 2007, anxiety/depression, metastatic breast cancer with bone, liver and skin metastasis for which patient is currently on treatment with Afinitor and Aromasin since August 26, 2021.  Patient is here for follow-up appointment today.  Patient had a CT scan done for restaging purpose, she is here to discuss the results and further recommendation.  Complains of 3-4 loose bowel movement for last 2 to 3 days without any watery diarrhea.  Denies any significant mucositis.  Denies any excessive nausea or vomiting .  Denies any new lymph node enlargement.  Denies any bleeding.  Complains of chronic back pain and hip pain.  Denies any worsening neuropathy affecting a 4 out of 4 extremity.  Denies any bowel or bladder incontinence.        Oncology history:    1.  Metastatic right breast cancer with bone and liver metastases:  -Patient was initially diagnosed in July 2017 with axillary biopsy on right side  -S/p 6 cycles of chemotherapy with Taxotere and Cytoxan between July 26, 2017 and December 6, 2017  -Patient was evaluated by  at [facility] in [place] for second opinion.  -Patient received 20 cycles of chemotherapy with palbociclib and letrozole between July 30, 2018 and October 2, 2020.  -Due to enlarging mass in the right axilla patient's treatment was changed to Faslodex on October 16, 2020  -Due to continuous enlargement of axillary mass without any other progression patient received radiation treatment to axilla that was completed on March 4, 2021  -Patient has received 8 cycles of Faslodex between October 16, 2020 and May 7, 2021  -PET/CT done on May 21, 2021 shows  progression of disease with axillary adenopathy, skin involvement as well as new onset of right-sided liver metastases.  -Patient received 3 cycles of Adriamycin and Cytoxan between Lupe 3, 2021 in July 29, 2021.  Due to worsening skin involvement from malignancy, treatment was changed to Afinitor with Aromasin on August 26, 2021          Past Medical History, Past Surgical History, Social History, Family History have been reviewed and are without significant changes except as mentioned.    Review of Systems   Constitutional: Positive for fatigue.   Musculoskeletal: Positive for arthralgias and back pain.   Skin:        Positive for skin nodule around axillary area and chest wall on right side.   Neurological: Positive for numbness.   Hematological: Negative for adenopathy.      A comprehensive 14 point review of systems was performed and was negative except as mentioned.    Medications:  The current medication list was reviewed in the EMR    ALLERGIES:    Allergies   Allergen Reactions   • Percocet [Oxycodone-Acetaminophen] Nausea And Vomiting       Objective      Vitals:    10/21/21 0946   BP: 134/78   Pulse: 86   Temp: 97.9 °F (36.6 °C)   SpO2: 95%   Weight: 107 kg (235 lb)     Current Status 8/31/2021   ECOG score 0       Physical Exam  Cardiovascular:      Comments: Port-A-Cath present  Pulmonary:      Breath sounds: Normal breath sounds.   Chest:      Comments: Breast exam was performed in presence of registered nurse Solange. On right chest wall, right axillary area skin lesion appears to be improved. In the mid axillary line skin lesion is about the same before starting. Anterior and posterior axillary line skin lesion is significantly improved. There is no evidence of any new skin nodule.  Neurological:      Mental Status: She is alert and oriented to person, place, and time.           RECENT LABS:  Glucose   Date Value Ref Range Status   10/21/2021 124 (H) 65 - 99 mg/dL Final     Sodium   Date Value Ref  Range Status   10/21/2021 134 (L) 136 - 145 mmol/L Final     Potassium   Date Value Ref Range Status   10/21/2021 3.5 3.5 - 5.2 mmol/L Final     CO2   Date Value Ref Range Status   10/21/2021 24.0 22.0 - 29.0 mmol/L Final     Chloride   Date Value Ref Range Status   10/21/2021 101 98 - 107 mmol/L Final     Anion Gap   Date Value Ref Range Status   10/21/2021 9.0 5.0 - 15.0 mmol/L Final     Creatinine   Date Value Ref Range Status   10/21/2021 0.76 0.57 - 1.00 mg/dL Final     BUN   Date Value Ref Range Status   10/21/2021 13 6 - 20 mg/dL Final     BUN/Creatinine Ratio   Date Value Ref Range Status   10/21/2021 17.1 7.0 - 25.0 Final     Calcium   Date Value Ref Range Status   10/21/2021 9.1 8.6 - 10.5 mg/dL Final     eGFR Non  Amer   Date Value Ref Range Status   10/21/2021 80 >60 mL/min/1.73 Final     Alkaline Phosphatase   Date Value Ref Range Status   10/21/2021 82 39 - 117 U/L Final     Total Protein   Date Value Ref Range Status   10/21/2021 7.4 6.0 - 8.5 g/dL Final     ALT (SGPT)   Date Value Ref Range Status   10/21/2021 63 (H) 1 - 33 U/L Final     AST (SGOT)   Date Value Ref Range Status   10/21/2021 43 (H) 1 - 32 U/L Final     Total Bilirubin   Date Value Ref Range Status   10/21/2021 0.3 0.0 - 1.2 mg/dL Final     Albumin   Date Value Ref Range Status   10/21/2021 4.30 3.50 - 5.20 g/dL Final     Globulin   Date Value Ref Range Status   10/21/2021 3.1 gm/dL Final     Lab Results   Component Value Date    WBC 3.80 10/21/2021    HGB 12.7 10/21/2021    HCT 36.7 10/21/2021    MCV 83.8 10/21/2021     10/21/2021     Lab Results   Component Value Date    NEUTROABS 2.11 10/21/2021     Lab Results   Component Value Date     67.2 (H) 09/23/2021    LABCA2 92.2 (H) 09/23/2021    HCGQUANT 1.16 06/03/2021         PATHOLOGY:  * Cannot find OR log *         RADIOLOGY DATA :  CT Chest With Contrast Diagnostic    Result Date: 10/14/2021  1.Postsurgical changes consistent with prior right mastectomy with  tissue expander in place. Left breast implant in place. Improvement in right mastectomy side superior lateral skin thickening which on prior PET scan had abnormal FDG activity. Very minimal residual skin thickening seen in this area. Right lateral breast persistent skin thickening and nodularity unchanged since prior study. On prior study, this region had abnormal FDG activity. Improving right far lateral chest wall skin thickening which has abnormal FDG activity on prior PET scan. Small amount of residual skin thickening in this region. These regions of abnormality would be suspicious for residual neoplastic disease and/or metastatic disease which appears to be partially responding to therapy. 2.Stable right middle lobe small circular groundglass opacity which measures 8 mm in greatest diameter. This most likely represents benign inflammatory changes versus less likely early metastatic involvement. 3.Stable dense calcification involving the superior lateral segment left lobe of liver consistent with patient's history of known tumor therapy, TACE. 4.Very mild decrease in size of medial segment left lobe of liver, anterior segment right lobe of liver, and posterior segment right lobe of liver lesions consistent with some response to therapy of metastatic disease in this area. 5.Extensive skeletal osteoblastic metastatic disease. Stable L4 pathologic compression fracture. 6. Remainder of exam is unremarkable as described above. Electronically signed by:  Oscar Valentine MD  10/14/2021 1:39 PM CDT Workstation: [id number]    CT Abdomen Pelvis With Contrast    Result Date: 10/14/2021  1.Postsurgical changes consistent with prior right mastectomy with tissue expander in place. Left breast implant in place. Improvement in right mastectomy side superior lateral skin thickening which on prior PET scan had abnormal FDG activity. Very minimal residual skin thickening seen in this area. Right lateral breast persistent skin  thickening and nodularity unchanged since prior study. On prior study, this region had abnormal FDG activity. Improving right far lateral chest wall skin thickening which has abnormal FDG activity on prior PET scan. Small amount of residual skin thickening in this region. These regions of abnormality would be suspicious for residual neoplastic disease and/or metastatic disease which appears to be partially responding to therapy. 2.Stable right middle lobe small circular groundglass opacity which measures 8 mm in greatest diameter. This most likely represents benign inflammatory changes versus less likely early metastatic involvement. 3.Stable dense calcification involving the superior lateral segment left lobe of liver consistent with patient's history of known tumor therapy, TACE. 4.Very mild decrease in size of medial segment left lobe of liver, anterior segment right lobe of liver, and posterior segment right lobe of liver lesions consistent with some response to therapy of metastatic disease in this area. 5.Extensive skeletal osteoblastic metastatic disease. Stable L4 pathologic compression fracture. 6. Remainder of exam is unremarkable as described above. Electronically signed by:  Oscar Valentine MD  10/14/2021 1:39 PM CDT Workstation: [id number]          Assessment/Plan     1.  Metastatic right breast cancer with skin, bone and liver metastases, stage IV:  -Review oncology history for prior treatment details  -Patient has been started on Afinitor with Aromasin on August 26, 2021.  -CT of chest, abdomen and pelvis with contrast done on October 14, 2021 does not show any evidence of worsening metastatic disease which was discussed with patient and her family.  On physical exam skin nodules have improved since starting Afinitor with Aromasin.  -Recommend continuing with Afinitor with Aromasin.  -We will ask patient to return to clinic in 4 weeks with repeat CBC, CMP, magnesium, phosphorus CA 15-3, CA 27-29 to be  done on that day.  -We will repeat CT of chest, abdomen and pelvis with contrast in January 2022.      2.  Brain metastasis:  -Patient was found to have calvarial metastases without any parenchymal mass.  Radiation therapy was not recommended by Dr. Pulido at that point    3.  Bone metastases:  -Has been on monthly Zometa since October 23, 2017.  We will continue with monthly Zometa for now    4.  Right internal jugular vein DVT on Coumadin.    5.  Elevated liver function test:  -Secondary to chemotherapy plus liver metastasis.  -We will monitor with CMP for now    6.  History of DCIS of right breast diagnosed in 2007  -S/p mastectomy followed by tamoxifen for 5 years that completed in 2013    7.  History of melanoma in situ s/p surgery    8.  Chemotherapy-induced neuropathy:  -Remains on gabapentin    9.  Cancer related pain:  -Remains on Ultram    10.  Health maintenance: Patient does not smoke    11. Advance Care Planning   ACP discussion was held with the patient during this visit. Patient has an advance directive in EMR which is still valid.                PHQ-9 Total Score: 0   -Patient is not homicidal or suicidal.  No acute intervention required.    Kylie García reports a pain score of 0 .  Given her pain assessment as noted, treatment options were discussed and the following options were decided upon as a follow-up plan to address the patient's pain: continuation of current treatment plan for pain.         Ovidio Meadows MD  10/21/2021  10:21 CDT        Part of this note may be an electronic transcription/translation of spoken language to printed text using the Dragon Dictation System.          CC:

## 2021-10-21 NOTE — PROGRESS NOTES
"Pt denies med changes or bleeding problems. Pt states she has been eating some green veggies but they are \"hit and miss\". Pt states she would prefer to lower coumadin dose rather than increase green veggies. Dose adjusted and appt made for 2 weeks; pt verbalized. Patient instructed regarding medication; results given and questions answered. Nutritional counseling given.  Dietary factors affecting therapy addressed.  Patient instructed to monitor for excessive bruising or bleeding. Findings reported by Tata Nunez RN.    Today's INR is Lab Results - Last 18 Months   Lab Units 10/21/21  0000   INR  3.20*          "

## 2021-10-22 LAB — CANCER AG27-29 SERPL-ACNC: 82.2 U/ML (ref 0–38.6)

## 2021-11-04 ENCOUNTER — ANTICOAGULATION VISIT (OUTPATIENT)
Dept: CARDIAC SURGERY | Facility: CLINIC | Age: 51
End: 2021-11-04

## 2021-11-04 VITALS — HEART RATE: 94 BPM | OXYGEN SATURATION: 99 %

## 2021-11-04 DIAGNOSIS — Z51.81 ENCOUNTER FOR THERAPEUTIC DRUG MONITORING: ICD-10-CM

## 2021-11-04 DIAGNOSIS — Z79.01 LONG TERM CURRENT USE OF ANTICOAGULANT THERAPY: Primary | ICD-10-CM

## 2021-11-04 LAB — INR PPP: 2.9 (ref 0.9–1.1)

## 2021-11-04 PROCEDURE — 36416 COLLJ CAPILLARY BLOOD SPEC: CPT | Performed by: NURSE PRACTITIONER

## 2021-11-04 PROCEDURE — 93793 ANTICOAG MGMT PT WARFARIN: CPT | Performed by: NURSE PRACTITIONER

## 2021-11-04 PROCEDURE — 85610 PROTHROMBIN TIME: CPT | Performed by: NURSE PRACTITIONER

## 2021-11-04 NOTE — PROGRESS NOTES
Patient states no med changes or bleeding problems or unexplained bruising. Patient instructed to continue current dosing schedule. Verbalizes understanding. Will recheck next month. Patient instructed regarding medication; results given and questions answered. Nutritional counseling given.  Dietary factors affecting therapy addressed.  Patient instructed to monitor for excessive bruising or bleeding. Findings reported by Tata Nunez RN.    Today's INR is Lab Results - Last 18 Months   Lab Units 11/04/21  0000   INR  2.90*

## 2021-11-10 ENCOUNTER — SPECIALTY PHARMACY (OUTPATIENT)
Dept: ONCOLOGY | Facility: CLINIC | Age: 51
End: 2021-11-10

## 2021-11-16 RX ORDER — EVEROLIMUS 10 MG/1
TABLET ORAL
Qty: 30 TABLET | Refills: 1 | Status: SHIPPED | OUTPATIENT
Start: 2021-11-16 | End: 2021-01-01

## 2021-11-16 NOTE — TELEPHONE ENCOUNTER
Rx Refill Note  Requested Prescriptions     Pending Prescriptions Disp Refills   • everolimus (Afinitor) 10 MG tablet [Pharmacy Med Name: AFINITOR 10MG]  1     Sig: TAKE 1 TABLET BY MOUTH DAILY AT THE SAME TIME. MAY TAKE WITH OR WITHOUT FOOD WITH A FULL GLASS OF WATER. SWALLOW WHOLE. AVOID GRAPEFRUIT PRODUCTS      Last office visit with prescribing clinician: 10/21/2021      Next office visit with prescribing clinician: Visit date not found            Elvia Allen RN  11/16/21, 07:53 CST

## 2021-12-07 NOTE — PROGRESS NOTES
DATE OF VISIT: 11/18/2021      REASON FOR VISIT:   Metastatic breast cancer with skin, bone and liver metastases on Afinitor and Arimidex, cancer related pain, DVT on anticoagulation with Coumadin, neuropathy from chemotherapy        HISTORY OF PRESENT ILLNESS:   51-year-old female with medical problem consisting of DCIS of right breast diagnosed in 2007, anxiety/depression, metastatic breast cancer with bone, liver and skin metastasis for which patient is currently on treatment with Afinitor and Aromasin since August 26, 2021.  Patient is here for follow-up appointment today.  She recently had CT scan that did not show any worsening metastatic disease and skin lesions responding. Continues on Armoasin and Afintior and planning to repeat CT scan in January 2022.           Oncology history:     1.  Metastatic right breast cancer with bone and liver metastases:  -Patient was initially diagnosed in July 2017 with axillary biopsy on right side  -S/p 6 cycles of chemotherapy with Taxotere and Cytoxan between July 26, 2017 and December 6, 2017  -Patient was evaluated by  at [facility] in [place] for second opinion.  -Patient received 20 cycles of chemotherapy with palbociclib and letrozole between July 30, 2018 and October 2, 2020.  -Due to enlarging mass in the right axilla patient's treatment was changed to Faslodex on October 16, 2020  -Due to continuous enlargement of axillary mass without any other progression patient received radiation treatment to axilla that was completed on March 4, 2021  -Patient has received 8 cycles of Faslodex between October 16, 2020 and May 7, 2021  -PET/CT done on May 21, 2021 shows progression of disease with axillary adenopathy, skin involvement as well as new onset of right-sided liver metastases.  -Patient received 3 cycles of Adriamycin and Cytoxan between Lupe 3, 2021 in July 29, 2021.  Due to worsening skin involvement from malignancy, treatment was changed to Afinitor  with Aromasin on August 26, 2021               Past Medical History, Past Surgical History, Social History, Family History have been reviewed and are without significant changes except as mentioned.    Review of Systems   A comprehensive 14 point review of systems was performed and was negative except as mentioned.  +fatigue  +arhtralgias; back pain  Per pt skin nodules on breast are continue to improve.  +numbness  Medications:  The current medication list was reviewed in the EMR    ALLERGIES:    Allergies   Allergen Reactions   • Percocet [Oxycodone-Acetaminophen] Nausea And Vomiting       Objective      Vitals:    11/18/21 0956   BP: 137/71   Pulse: 86   Temp: 98.1 °F (36.7 °C)   SpO2: 97%   Weight: 107 kg (236 lb 4.8 oz)   PainSc: 0-No pain     Current Status 8/31/2021   ECOG score 0       Physical Exam  Cardiovascular:      Comments: Port-A-Cath present  Pulmonary:      Breath sounds: Normal breath sounds.   Neurological:      Mental Status: She is alert and oriented to person, place, and time.     I have reexamined the patient and the results are consistent with the previously documented exam. EVELYN Xiong     RECENT LABS:  Glucose   Date Value Ref Range Status   11/29/2021 86 65 - 99 mg/dL Final     Sodium   Date Value Ref Range Status   11/29/2021 138 136 - 145 mmol/L Final     Potassium   Date Value Ref Range Status   11/29/2021 3.5 3.5 - 5.2 mmol/L Final     CO2   Date Value Ref Range Status   11/29/2021 28.0 22.0 - 29.0 mmol/L Final     Chloride   Date Value Ref Range Status   11/29/2021 100 98 - 107 mmol/L Final     Anion Gap   Date Value Ref Range Status   11/29/2021 10.0 5.0 - 15.0 mmol/L Final     Creatinine   Date Value Ref Range Status   11/29/2021 0.85 0.57 - 1.00 mg/dL Final     BUN   Date Value Ref Range Status   11/29/2021 9 6 - 20 mg/dL Final     BUN/Creatinine Ratio   Date Value Ref Range Status   11/29/2021 10.6 7.0 - 25.0 Final     Calcium   Date Value Ref Range Status   11/29/2021  9.8 8.6 - 10.5 mg/dL Final     eGFR Non  Amer   Date Value Ref Range Status   11/29/2021 70 >60 mL/min/1.73 Final     Alkaline Phosphatase   Date Value Ref Range Status   11/29/2021 72 39 - 117 U/L Final     Total Protein   Date Value Ref Range Status   11/29/2021 6.9 6.0 - 8.5 g/dL Final     ALT (SGPT)   Date Value Ref Range Status   11/29/2021 60 (H) 1 - 33 U/L Final     AST (SGOT)   Date Value Ref Range Status   11/29/2021 39 (H) 1 - 32 U/L Final     Total Bilirubin   Date Value Ref Range Status   11/29/2021 0.2 0.0 - 1.2 mg/dL Final     Albumin   Date Value Ref Range Status   11/29/2021 4.10 3.50 - 5.20 g/dL Final     Globulin   Date Value Ref Range Status   11/29/2021 2.8 gm/dL Final     Lab Results   Component Value Date    WBC 4.06 11/18/2021    HGB 12.7 11/18/2021    HCT 37.9 11/18/2021    MCV 82.2 11/18/2021     11/18/2021     Lab Results   Component Value Date    NEUTROABS 2.29 11/18/2021     Lab Results   Component Value Date     55.8 (H) 11/18/2021    LABCA2 81.9 (H) 11/18/2021    HCGQUANT 1.16 06/03/2021       RADIOLOGY DATA :  No radiology results for the last 7 days        Assessment/Plan       1.  Metastatic right breast cancer with skin, bone and liver metastases, stage IV:  -Review oncology history for prior treatment details  -Patient has been started on Afinitor with Aromasin on August 26, 2021.  -CT of chest, abdomen and pelvis with contrast done on October 14, 2021 does not show any evidence of worsening metastatic disease which was discussed with patient and her family.  On physical exam skin nodules continued to show improvement since starting Afinitor with Aromasin.  -Recommend continuing with Afinitor with Aromasin.  -We will ask patient to return to clinic in 4 weeks with repeat CBC, CMP, magnesium, phosphorus CA 15-3, CA 27-29 to be done on that day.  -We will repeat CT of chest, abdomen and pelvis with contrast in January 2022.        2.  Brain metastasis:  -Patient  was found to have calvarial metastases without any parenchymal mass.  Radiation therapy was not recommended by Dr. Pulido at that point     3.  Bone metastases:  -Has been on monthly Zometa since October 23, 2017.    -labs reviewed today and will receive monthly Zometa.     4.  Right internal jugular vein DVT on Coumadin.     5.  Elevated liver function test:  -Secondary to chemotherapy plus liver metastasis.  -We will monitor with CMP for now     6.  History of DCIS of right breast diagnosed in 2007  -S/p mastectomy followed by tamoxifen for 5 years that completed in 2013     7.  Chemotherapy-induced neuropathy:  -Remains on gabapentin; refill sent to pharmacy     8.  Cancer related pain:  -Remains on Ultram                   PHQ-9 Total Score: 0     Kylie García reports a pain score of 0.  Given her pain assessment as noted, treatment options were discussed and the following options were decided upon as a follow-up plan to address the patient's pain: continuation of current treatment plan for pain.         April Greco, APRN  12/7/2021  12:39 CST        Part of this note may be an electronic transcription/translation of spoken language to printed text using the Dragon Dictation System.          CC:

## 2021-12-16 NOTE — PROGRESS NOTES
Patient states no med changes or bleeding problems or unexplained bruising. Patient instructed to continue current dosing schedule. Verbalizes understanding. Will recheck 1 month.  Patient instructed regarding medication; results given and questions answered. Nutritional counseling given.  Dietary factors affecting therapy addressed.  Patient instructed to monitor for excessive bruising or bleeding. Findings reported by Tata Nunez RN.    Today's INR is Lab Results - Last 18 Months   Lab Units 12/16/21  0000   INR  2.80*

## 2021-12-16 NOTE — PROGRESS NOTES
DATE OF VISIT: 12/16/2021      REASON FOR VISIT: Metastatic breast cancer with skin, bone and liver metastasis on Afinitor and Arimidex, cancer related pain, DVT on anticoagulation with Coumadin, neuropathy from chemotherapy, elevated liver function test      HISTORY OF PRESENT ILLNESS:   51-year-old female with medical problem consisting of DCIS of right breast diagnosed in 2007, anxiety/depression, metastatic breast cancer with bone, liver and skin metastases for which patient is currently on treatment with Afinitor and Aromasin since August 26, 2021.  Due to worsening liver function test dose of Afinitor has been decreased to 5 mg p.o. daily since November 29, 2021.  Patient is here for follow-up appointment today.  Denies any new lymph node enlargement.  Complains of skin rash affecting right axillary region without any worsening.  Denies any significant mucositis.  Denies any excessive nausea or vomiting.  Denies any worsening of neuropathy affecting upper or lower extremity.  Denies any bowel or bladder incontinence.        Oncology history:    1.  Metastatic right breast cancer with bone and liver metastases:  -Patient was initially diagnosed in July 2017 with axillary biopsy on right side  -S/p 6 cycles of chemotherapy with Taxotere and Cytoxan between July 26, 2017 and December 6, 2017  -Patient was evaluated by  at [facility] in [place] for second opinion.  -Patient received 20 cycles of chemotherapy with palbociclib and letrozole between July 30, 2018 and October 2, 2020.  -Due to enlarging mass in the right axilla patient's treatment was changed to Faslodex on October 16, 2020  -Due to continuous enlargement of axillary mass without any other progression patient received radiation treatment to axilla that was completed on March 4, 2021  -Patient has received 8 cycles of Faslodex between October 16, 2020 and May 7, 2021  -PET/CT done on May 21, 2021 shows progression of disease with axillary  adenopathy, skin involvement as well as new onset of right-sided liver metastases.  -Patient received 3 cycles of Adriamycin and Cytoxan between Lupe 3, 2021 in July 29, 2021.  Due to worsening skin involvement from malignancy, treatment was changed to Afinitor with Aromasin on August 26, 2021  -Due to worsening liver function test, dose of phenytoin has been decreased to 5 mg p.o. daily since November 30, 2021.      Past Medical History, Past Surgical History, Social History, Family History have been reviewed and are without significant changes except as mentioned.    Review of Systems   Constitutional: Positive for fatigue.   Musculoskeletal: Positive for arthralgias and back pain.   Skin:        Skin lesion affecting the right axillary area.   Neurological: Positive for numbness.   Hematological: Negative for adenopathy.      A comprehensive 14 point review of systems was performed and was negative except as mentioned.    Medications:  The current medication list was reviewed in the EMR    ALLERGIES:    Allergies   Allergen Reactions   • Percocet [Oxycodone-Acetaminophen] Nausea And Vomiting       Objective      Vitals:    12/16/21 0858   BP: 124/77   Pulse: 88   SpO2: 95%   Weight: 106 kg (233 lb 11.2 oz)   PainSc: 0-No pain     Current Status 8/31/2021   ECOG score 0       Physical Exam  Pulmonary:      Breath sounds: Normal breath sounds.   Neurological:      Mental Status: She is alert and oriented to person, place, and time.           RECENT LABS:  Glucose   Date Value Ref Range Status   12/16/2021 87 65 - 99 mg/dL Final     Sodium   Date Value Ref Range Status   12/16/2021 143 136 - 145 mmol/L Final     Potassium   Date Value Ref Range Status   12/16/2021 3.9 3.5 - 5.2 mmol/L Final     CO2   Date Value Ref Range Status   12/16/2021 24.0 22.0 - 29.0 mmol/L Final     Chloride   Date Value Ref Range Status   12/16/2021 108 (H) 98 - 107 mmol/L Final     Anion Gap   Date Value Ref Range Status   12/16/2021 11.0  5.0 - 15.0 mmol/L Final     Creatinine   Date Value Ref Range Status   12/16/2021 0.89 0.57 - 1.00 mg/dL Final     BUN   Date Value Ref Range Status   12/16/2021 11 6 - 20 mg/dL Final     BUN/Creatinine Ratio   Date Value Ref Range Status   12/16/2021 12.4 7.0 - 25.0 Final     Calcium   Date Value Ref Range Status   12/16/2021 9.2 8.6 - 10.5 mg/dL Final     eGFR Non  Amer   Date Value Ref Range Status   12/16/2021 66 >60 mL/min/1.73 Final     Alkaline Phosphatase   Date Value Ref Range Status   12/16/2021 93 39 - 117 U/L Final     Total Protein   Date Value Ref Range Status   12/16/2021 7.0 6.0 - 8.5 g/dL Final     ALT (SGPT)   Date Value Ref Range Status   12/16/2021 44 (H) 1 - 33 U/L Final     AST (SGOT)   Date Value Ref Range Status   12/16/2021 37 (H) 1 - 32 U/L Final     Total Bilirubin   Date Value Ref Range Status   12/16/2021 0.3 0.0 - 1.2 mg/dL Final     Albumin   Date Value Ref Range Status   12/16/2021 3.80 3.50 - 5.20 g/dL Final     Globulin   Date Value Ref Range Status   12/16/2021 3.2 gm/dL Final     Lab Results   Component Value Date    WBC 5.13 12/16/2021    HGB 12.2 12/16/2021    HCT 36.8 12/16/2021    MCV 82.7 12/16/2021     12/16/2021     Lab Results   Component Value Date    NEUTROABS 3.22 12/16/2021     Lab Results   Component Value Date     55.8 (H) 11/18/2021    LABCA2 81.9 (H) 11/18/2021    HCGQUANT 1.16 06/03/2021         PATHOLOGY:  * Cannot find OR log *         RADIOLOGY DATA :  No radiology results for the last 7 days        Assessment/Plan     1.  Metastatic right breast cancer with skin, bone, liver metastases stage IV:  -Review oncology history for prior treatment details.  -Patient was started on Afinitor and with Aromasin on August 26, 2021  -Due to worsening liver function test Afinitor dose has been decreased to 5 mg p.o. daily on November 30, 2021.  -Recommend continue with Afinitor and Aromasin for now  -We will have patient return to clinic in 4 weeks with  repeat CBC, CMP, magnesium, phosphorus, CA 15-3 CA 27-29 to be done on that day.  -We will get repeat CT of chest, abdomen and pelvis with contrast prior to next clinic visit 4 weeks which was discussed with patient    2.  Brain metastasis:  -Patient was found to have calvarial metastasis without any parenchymal mass.  Radiation therapy was not recommended by Dr. Pulido at that point    3.  Bone metastasis:  -Remains on monthly Zometa since October 23, 2017.  We will continue with monthly Zometa for now.    4.  Right internal jugular vein DVT on Coumadin    5.  Elevated liver function test  -Secondary to chemotherapy plus liver metastasis  -Dose of Afinitor has been decreased to 5 mg p.o. daily on November 30, 2021    6.  History of DCIS of right breast diagnosed in 2007  -S/p mastectomy followed by tamoxifen for 5 years that completed in 2013    7.  History of melanoma in situ s/p surgery    8.  Chemotherapy-induced neuropathy  -Remains on gabapentin    9.  Cancer related pain:  -Remains on Ultram    10.  Health maintenance: Patient does not smoke    11. Advance Care Planning   ACP discussion was held with the patient during this visit. Patient has an advance directive in EMR which is still valid.                    PHQ-9 Total Score: 0   -Patient is not homicidal or suicidal.  No acute intervention required.    Kylie García reports a pain score of 0.  Given her pain assessment as noted, treatment options were discussed and the following options were decided upon as a follow-up plan to address the patient's pain: continuation of current treatment plan for pain.         Ovidio Meadows MD  12/16/2021  16:46 CST        Part of this note may be an electronic transcription/translation of spoken language to printed text using the Dragon Dictation System.          CC:

## 2022-01-01 ENCOUNTER — ANTICOAGULATION VISIT (OUTPATIENT)
Dept: CARDIAC SURGERY | Facility: CLINIC | Age: 52
End: 2022-01-01

## 2022-01-01 ENCOUNTER — DOCUMENTATION (OUTPATIENT)
Dept: CARDIAC SURGERY | Facility: CLINIC | Age: 52
End: 2022-01-01

## 2022-01-01 ENCOUNTER — OFFICE VISIT (OUTPATIENT)
Dept: ONCOLOGY | Facility: CLINIC | Age: 52
End: 2022-01-01

## 2022-01-01 ENCOUNTER — SPECIALTY PHARMACY (OUTPATIENT)
Dept: ONCOLOGY | Facility: CLINIC | Age: 52
End: 2022-01-01

## 2022-01-01 ENCOUNTER — READMISSION MANAGEMENT (OUTPATIENT)
Dept: CALL CENTER | Facility: HOSPITAL | Age: 52
End: 2022-01-01

## 2022-01-01 ENCOUNTER — TELEPHONE (OUTPATIENT)
Dept: ONCOLOGY | Facility: CLINIC | Age: 52
End: 2022-01-01

## 2022-01-01 ENCOUNTER — TELEPHONE (OUTPATIENT)
Dept: ONCOLOGY | Facility: HOSPITAL | Age: 52
End: 2022-01-01

## 2022-01-01 ENCOUNTER — INFUSION (OUTPATIENT)
Dept: ONCOLOGY | Facility: HOSPITAL | Age: 52
End: 2022-01-01

## 2022-01-01 ENCOUNTER — HOSPITAL ENCOUNTER (OUTPATIENT)
Dept: CT IMAGING | Facility: HOSPITAL | Age: 52
Discharge: HOME | End: 2022-06-20
Admitting: NURSE PRACTITIONER

## 2022-01-01 ENCOUNTER — DOCUMENTATION (OUTPATIENT)
Dept: NUTRITION | Facility: HOSPITAL | Age: 52
End: 2022-01-01

## 2022-01-01 ENCOUNTER — APPOINTMENT (OUTPATIENT)
Dept: ULTRASOUND IMAGING | Facility: HOSPITAL | Age: 52
End: 2022-01-01

## 2022-01-01 ENCOUNTER — LAB (OUTPATIENT)
Dept: LAB | Facility: HOSPITAL | Age: 52
End: 2022-01-01

## 2022-01-01 ENCOUNTER — APPOINTMENT (OUTPATIENT)
Dept: ONCOLOGY | Facility: HOSPITAL | Age: 52
End: 2022-01-01

## 2022-01-01 ENCOUNTER — HOSPITAL ENCOUNTER (OUTPATIENT)
Dept: CT IMAGING | Facility: HOSPITAL | Age: 52
Discharge: HOME | End: 2022-01-12

## 2022-01-01 ENCOUNTER — HOSPITAL ENCOUNTER (OUTPATIENT)
Facility: HOSPITAL | Age: 52
Setting detail: OBSERVATION
Discharge: HOME | End: 2022-03-26
Attending: FAMILY MEDICINE | Admitting: INTERNAL MEDICINE

## 2022-01-01 ENCOUNTER — APPOINTMENT (OUTPATIENT)
Dept: CT IMAGING | Facility: HOSPITAL | Age: 52
End: 2022-01-01

## 2022-01-01 ENCOUNTER — APPOINTMENT (OUTPATIENT)
Dept: ONCOLOGY | Facility: CLINIC | Age: 52
End: 2022-01-01

## 2022-01-01 ENCOUNTER — APPOINTMENT (OUTPATIENT)
Dept: NUCLEAR MEDICINE | Facility: HOSPITAL | Age: 52
End: 2022-01-01

## 2022-01-01 ENCOUNTER — HOSPITAL ENCOUNTER (OUTPATIENT)
Dept: CT IMAGING | Facility: HOSPITAL | Age: 52
Discharge: HOME | End: 2022-09-13
Admitting: INTERNAL MEDICINE

## 2022-01-01 ENCOUNTER — HOSPITAL ENCOUNTER (INPATIENT)
Facility: HOSPITAL | Age: 52
LOS: 2 days | Discharge: HOME | End: 2022-03-16
Attending: FAMILY MEDICINE | Admitting: FAMILY MEDICINE

## 2022-01-01 ENCOUNTER — SPECIALTY PHARMACY (OUTPATIENT)
Dept: ENDOCRINOLOGY | Facility: CLINIC | Age: 52
End: 2022-01-01

## 2022-01-01 VITALS — HEART RATE: 85 BPM | OXYGEN SATURATION: 98 %

## 2022-01-01 VITALS
TEMPERATURE: 96.3 F | RESPIRATION RATE: 18 BRPM | WEIGHT: 218 LBS | OXYGEN SATURATION: 96 % | BODY MASS INDEX: 34.21 KG/M2 | SYSTOLIC BLOOD PRESSURE: 125 MMHG | DIASTOLIC BLOOD PRESSURE: 68 MMHG | HEART RATE: 83 BPM | HEIGHT: 67 IN

## 2022-01-01 VITALS — OXYGEN SATURATION: 98 % | HEART RATE: 79 BPM

## 2022-01-01 VITALS
OXYGEN SATURATION: 99 % | HEART RATE: 75 BPM | BODY MASS INDEX: 32.8 KG/M2 | SYSTOLIC BLOOD PRESSURE: 110 MMHG | TEMPERATURE: 97.1 F | DIASTOLIC BLOOD PRESSURE: 69 MMHG | WEIGHT: 209.4 LBS

## 2022-01-01 VITALS
WEIGHT: 218.6 LBS | OXYGEN SATURATION: 95 % | SYSTOLIC BLOOD PRESSURE: 124 MMHG | HEART RATE: 100 BPM | TEMPERATURE: 98.2 F | DIASTOLIC BLOOD PRESSURE: 80 MMHG | BODY MASS INDEX: 34.24 KG/M2

## 2022-01-01 VITALS — HEART RATE: 75 BPM | OXYGEN SATURATION: 98 %

## 2022-01-01 VITALS
BODY MASS INDEX: 36.3 KG/M2 | DIASTOLIC BLOOD PRESSURE: 70 MMHG | HEART RATE: 85 BPM | HEART RATE: 81 BPM | TEMPERATURE: 97.2 F | SYSTOLIC BLOOD PRESSURE: 124 MMHG | WEIGHT: 231.8 LBS | OXYGEN SATURATION: 96 % | RESPIRATION RATE: 18 BRPM | OXYGEN SATURATION: 98 %

## 2022-01-01 VITALS
OXYGEN SATURATION: 96 % | RESPIRATION RATE: 18 BRPM | TEMPERATURE: 98.3 F | SYSTOLIC BLOOD PRESSURE: 110 MMHG | DIASTOLIC BLOOD PRESSURE: 69 MMHG | HEART RATE: 80 BPM

## 2022-01-01 VITALS
TEMPERATURE: 97.6 F | BODY MASS INDEX: 32.83 KG/M2 | HEART RATE: 92 BPM | SYSTOLIC BLOOD PRESSURE: 108 MMHG | WEIGHT: 209.6 LBS | RESPIRATION RATE: 18 BRPM | OXYGEN SATURATION: 96 % | DIASTOLIC BLOOD PRESSURE: 69 MMHG

## 2022-01-01 VITALS — OXYGEN SATURATION: 98 % | OXYGEN SATURATION: 98 % | HEART RATE: 93 BPM | HEART RATE: 106 BPM

## 2022-01-01 VITALS
TEMPERATURE: 97.6 F | RESPIRATION RATE: 18 BRPM | HEART RATE: 100 BPM | DIASTOLIC BLOOD PRESSURE: 72 MMHG | SYSTOLIC BLOOD PRESSURE: 120 MMHG

## 2022-01-01 VITALS
HEART RATE: 76 BPM | SYSTOLIC BLOOD PRESSURE: 103 MMHG | TEMPERATURE: 97.4 F | DIASTOLIC BLOOD PRESSURE: 62 MMHG | RESPIRATION RATE: 18 BRPM

## 2022-01-01 VITALS
WEIGHT: 203.3 LBS | OXYGEN SATURATION: 95 % | TEMPERATURE: 97.4 F | DIASTOLIC BLOOD PRESSURE: 72 MMHG | BODY MASS INDEX: 31.84 KG/M2 | SYSTOLIC BLOOD PRESSURE: 116 MMHG | HEART RATE: 81 BPM

## 2022-01-01 VITALS — OXYGEN SATURATION: 95 % | HEART RATE: 124 BPM

## 2022-01-01 VITALS
DIASTOLIC BLOOD PRESSURE: 70 MMHG | RESPIRATION RATE: 18 BRPM | SYSTOLIC BLOOD PRESSURE: 124 MMHG | TEMPERATURE: 96.9 F | HEART RATE: 100 BPM

## 2022-01-01 VITALS
DIASTOLIC BLOOD PRESSURE: 72 MMHG | TEMPERATURE: 97.2 F | HEART RATE: 87 BPM | SYSTOLIC BLOOD PRESSURE: 126 MMHG | RESPIRATION RATE: 16 BRPM

## 2022-01-01 VITALS
HEART RATE: 74 BPM | WEIGHT: 203 LBS | DIASTOLIC BLOOD PRESSURE: 70 MMHG | OXYGEN SATURATION: 97 % | BODY MASS INDEX: 31.79 KG/M2 | SYSTOLIC BLOOD PRESSURE: 118 MMHG | TEMPERATURE: 98.1 F

## 2022-01-01 VITALS
SYSTOLIC BLOOD PRESSURE: 130 MMHG | HEART RATE: 98 BPM | BODY MASS INDEX: 33.75 KG/M2 | BODY MASS INDEX: 35.47 KG/M2 | TEMPERATURE: 98 F | WEIGHT: 215.5 LBS | WEIGHT: 226.5 LBS | DIASTOLIC BLOOD PRESSURE: 72 MMHG | SYSTOLIC BLOOD PRESSURE: 140 MMHG | OXYGEN SATURATION: 95 % | OXYGEN SATURATION: 95 % | TEMPERATURE: 97.3 F | HEART RATE: 76 BPM | DIASTOLIC BLOOD PRESSURE: 67 MMHG

## 2022-01-01 VITALS
HEART RATE: 80 BPM | OXYGEN SATURATION: 96 % | TEMPERATURE: 98.9 F | SYSTOLIC BLOOD PRESSURE: 102 MMHG | RESPIRATION RATE: 18 BRPM | DIASTOLIC BLOOD PRESSURE: 67 MMHG

## 2022-01-01 VITALS
HEART RATE: 123 BPM | TEMPERATURE: 96.7 F | DIASTOLIC BLOOD PRESSURE: 77 MMHG | SYSTOLIC BLOOD PRESSURE: 140 MMHG | RESPIRATION RATE: 16 BRPM

## 2022-01-01 VITALS — OXYGEN SATURATION: 99 % | HEART RATE: 77 BPM

## 2022-01-01 VITALS — OXYGEN SATURATION: 95 % | HEART RATE: 98 BPM

## 2022-01-01 VITALS — OXYGEN SATURATION: 97 % | HEART RATE: 95 BPM

## 2022-01-01 VITALS — SYSTOLIC BLOOD PRESSURE: 118 MMHG | DIASTOLIC BLOOD PRESSURE: 69 MMHG | TEMPERATURE: 97 F | HEART RATE: 86 BPM

## 2022-01-01 VITALS — HEART RATE: 102 BPM | OXYGEN SATURATION: 97 %

## 2022-01-01 VITALS
BODY MASS INDEX: 31.17 KG/M2 | WEIGHT: 199 LBS | OXYGEN SATURATION: 97 % | SYSTOLIC BLOOD PRESSURE: 136 MMHG | DIASTOLIC BLOOD PRESSURE: 77 MMHG | TEMPERATURE: 98 F | HEART RATE: 83 BPM

## 2022-01-01 VITALS
DIASTOLIC BLOOD PRESSURE: 82 MMHG | RESPIRATION RATE: 18 BRPM | BODY MASS INDEX: 35.86 KG/M2 | TEMPERATURE: 98.1 F | OXYGEN SATURATION: 95 % | SYSTOLIC BLOOD PRESSURE: 134 MMHG | DIASTOLIC BLOOD PRESSURE: 77 MMHG | SYSTOLIC BLOOD PRESSURE: 130 MMHG | WEIGHT: 229 LBS | TEMPERATURE: 97.8 F | HEART RATE: 103 BPM | HEART RATE: 117 BPM

## 2022-01-01 VITALS
TEMPERATURE: 97.8 F | SYSTOLIC BLOOD PRESSURE: 98 MMHG | HEART RATE: 87 BPM | WEIGHT: 217.2 LBS | DIASTOLIC BLOOD PRESSURE: 61 MMHG | OXYGEN SATURATION: 96 % | BODY MASS INDEX: 34.02 KG/M2

## 2022-01-01 VITALS
BODY MASS INDEX: 31.53 KG/M2 | OXYGEN SATURATION: 91 % | DIASTOLIC BLOOD PRESSURE: 75 MMHG | TEMPERATURE: 98.1 F | HEART RATE: 83 BPM | WEIGHT: 201.3 LBS | SYSTOLIC BLOOD PRESSURE: 106 MMHG

## 2022-01-01 VITALS — OXYGEN SATURATION: 96 % | HEART RATE: 77 BPM

## 2022-01-01 VITALS
DIASTOLIC BLOOD PRESSURE: 73 MMHG | HEART RATE: 77 BPM | RESPIRATION RATE: 18 BRPM | TEMPERATURE: 96.7 F | SYSTOLIC BLOOD PRESSURE: 114 MMHG

## 2022-01-01 VITALS — OXYGEN SATURATION: 98 % | HEART RATE: 87 BPM

## 2022-01-01 VITALS — OXYGEN SATURATION: 99 % | HEART RATE: 76 BPM

## 2022-01-01 VITALS — OXYGEN SATURATION: 98 % | HEART RATE: 84 BPM

## 2022-01-01 VITALS — HEART RATE: 98 BPM | OXYGEN SATURATION: 100 %

## 2022-01-01 VITALS
HEART RATE: 121 BPM | TEMPERATURE: 98.4 F | RESPIRATION RATE: 20 BRPM | SYSTOLIC BLOOD PRESSURE: 136 MMHG | DIASTOLIC BLOOD PRESSURE: 74 MMHG

## 2022-01-01 VITALS — OXYGEN SATURATION: 97 % | HEART RATE: 122 BPM

## 2022-01-01 VITALS — OXYGEN SATURATION: 99 % | HEART RATE: 100 BPM

## 2022-01-01 VITALS
WEIGHT: 219.2 LBS | DIASTOLIC BLOOD PRESSURE: 85 MMHG | HEART RATE: 120 BPM | BODY MASS INDEX: 34.33 KG/M2 | OXYGEN SATURATION: 95 % | TEMPERATURE: 97.6 F | SYSTOLIC BLOOD PRESSURE: 133 MMHG

## 2022-01-01 VITALS — OXYGEN SATURATION: 99 % | HEART RATE: 92 BPM

## 2022-01-01 VITALS
RESPIRATION RATE: 18 BRPM | HEART RATE: 87 BPM | SYSTOLIC BLOOD PRESSURE: 134 MMHG | TEMPERATURE: 97.1 F | HEIGHT: 67 IN | OXYGEN SATURATION: 94 % | WEIGHT: 222 LBS | BODY MASS INDEX: 34.84 KG/M2 | DIASTOLIC BLOOD PRESSURE: 71 MMHG

## 2022-01-01 VITALS — OXYGEN SATURATION: 94 % | HEART RATE: 86 BPM

## 2022-01-01 VITALS — HEART RATE: 99 BPM | OXYGEN SATURATION: 99 %

## 2022-01-01 VITALS — OXYGEN SATURATION: 96 % | HEART RATE: 107 BPM

## 2022-01-01 VITALS — OXYGEN SATURATION: 99 % | HEART RATE: 84 BPM

## 2022-01-01 VITALS — OXYGEN SATURATION: 97 % | HEART RATE: 77 BPM

## 2022-01-01 DIAGNOSIS — C78.7 LIVER METASTASIS: Chronic | ICD-10-CM

## 2022-01-01 DIAGNOSIS — Z45.2 ENCOUNTER FOR VENOUS ACCESS DEVICE CARE: ICD-10-CM

## 2022-01-01 DIAGNOSIS — C79.51 BONE METASTASIS: Chronic | ICD-10-CM

## 2022-01-01 DIAGNOSIS — C50.911 MALIGNANT NEOPLASM OF RIGHT BREAST IN FEMALE, ESTROGEN RECEPTOR POSITIVE, UNSPECIFIED SITE OF BREAST: Chronic | ICD-10-CM

## 2022-01-01 DIAGNOSIS — C79.51 BONE METASTASIS: Primary | ICD-10-CM

## 2022-01-01 DIAGNOSIS — Z17.0 MALIGNANT NEOPLASM OF RIGHT BREAST IN FEMALE, ESTROGEN RECEPTOR POSITIVE, UNSPECIFIED SITE OF BREAST: Primary | Chronic | ICD-10-CM

## 2022-01-01 DIAGNOSIS — T45.1X5A NEUROPATHY DUE TO CHEMOTHERAPEUTIC DRUG: Chronic | ICD-10-CM

## 2022-01-01 DIAGNOSIS — Z17.0 MALIGNANT NEOPLASM OF RIGHT BREAST IN FEMALE, ESTROGEN RECEPTOR POSITIVE, UNSPECIFIED SITE OF BREAST: ICD-10-CM

## 2022-01-01 DIAGNOSIS — Z85.820 HISTORY OF MALIGNANT MELANOMA OF SKIN: Chronic | ICD-10-CM

## 2022-01-01 DIAGNOSIS — Z79.01 LONG TERM CURRENT USE OF ANTICOAGULANT THERAPY: ICD-10-CM

## 2022-01-01 DIAGNOSIS — Z51.81 ENCOUNTER FOR THERAPEUTIC DRUG MONITORING: ICD-10-CM

## 2022-01-01 DIAGNOSIS — Z17.0 MALIGNANT NEOPLASM OF RIGHT BREAST IN FEMALE, ESTROGEN RECEPTOR POSITIVE, UNSPECIFIED SITE OF BREAST: Primary | ICD-10-CM

## 2022-01-01 DIAGNOSIS — Z79.01 LONG TERM CURRENT USE OF ANTICOAGULANT THERAPY: Primary | ICD-10-CM

## 2022-01-01 DIAGNOSIS — Z86.000 HISTORY OF DUCTAL CARCINOMA IN SITU (DCIS) OF BREAST: Chronic | ICD-10-CM

## 2022-01-01 DIAGNOSIS — Z86.718 HISTORY OF THROMBOSIS OF INTERNAL JUGULAR VEIN: ICD-10-CM

## 2022-01-01 DIAGNOSIS — C79.31 METASTASIS TO BRAIN: Chronic | ICD-10-CM

## 2022-01-01 DIAGNOSIS — D70.1 CHEMOTHERAPY-INDUCED NEUTROPENIA: Primary | ICD-10-CM

## 2022-01-01 DIAGNOSIS — E87.6 HYPOKALEMIA: ICD-10-CM

## 2022-01-01 DIAGNOSIS — T45.1X5A ANEMIA DUE TO ANTINEOPLASTIC CHEMOTHERAPY: ICD-10-CM

## 2022-01-01 DIAGNOSIS — C50.911 MALIGNANT NEOPLASM OF RIGHT BREAST IN FEMALE, ESTROGEN RECEPTOR POSITIVE, UNSPECIFIED SITE OF BREAST: Primary | Chronic | ICD-10-CM

## 2022-01-01 DIAGNOSIS — C79.51 BONE METASTASIS: ICD-10-CM

## 2022-01-01 DIAGNOSIS — G62.0 NEUROPATHY DUE TO CHEMOTHERAPEUTIC DRUG: Chronic | ICD-10-CM

## 2022-01-01 DIAGNOSIS — C50.911 MALIGNANT NEOPLASM OF RIGHT BREAST IN FEMALE, ESTROGEN RECEPTOR POSITIVE, UNSPECIFIED SITE OF BREAST: ICD-10-CM

## 2022-01-01 DIAGNOSIS — C78.7 LIVER METASTASIS: ICD-10-CM

## 2022-01-01 DIAGNOSIS — C78.7 LIVER METASTASIS: Primary | ICD-10-CM

## 2022-01-01 DIAGNOSIS — C50.911 MALIGNANT NEOPLASM OF RIGHT BREAST IN FEMALE, ESTROGEN RECEPTOR POSITIVE, UNSPECIFIED SITE OF BREAST: Primary | ICD-10-CM

## 2022-01-01 DIAGNOSIS — C50.919 METASTATIC BREAST CANCER: Chronic | ICD-10-CM

## 2022-01-01 DIAGNOSIS — G62.0 NEUROPATHY DUE TO CHEMOTHERAPEUTIC DRUG: ICD-10-CM

## 2022-01-01 DIAGNOSIS — R79.89 ELEVATED LIVER FUNCTION TESTS: Chronic | ICD-10-CM

## 2022-01-01 DIAGNOSIS — R19.7 DIARRHEA, UNSPECIFIED TYPE: Primary | ICD-10-CM

## 2022-01-01 DIAGNOSIS — T45.1X5A CHEMOTHERAPY-INDUCED NEUTROPENIA: Primary | ICD-10-CM

## 2022-01-01 DIAGNOSIS — D64.81 ANEMIA DUE TO ANTINEOPLASTIC CHEMOTHERAPY: ICD-10-CM

## 2022-01-01 DIAGNOSIS — Z17.0 MALIGNANT NEOPLASM OF RIGHT BREAST IN FEMALE, ESTROGEN RECEPTOR POSITIVE, UNSPECIFIED SITE OF BREAST: Chronic | ICD-10-CM

## 2022-01-01 DIAGNOSIS — Z79.01 LONG TERM CURRENT USE OF ANTICOAGULANT THERAPY: Chronic | ICD-10-CM

## 2022-01-01 DIAGNOSIS — T45.1X5A NEUROPATHY DUE TO CHEMOTHERAPEUTIC DRUG: ICD-10-CM

## 2022-01-01 DIAGNOSIS — Z79.01 LONG TERM (CURRENT) USE OF ANTICOAGULANTS: ICD-10-CM

## 2022-01-01 DIAGNOSIS — Z86.000 HISTORY OF DUCTAL CARCINOMA IN SITU (DCIS) OF BREAST: ICD-10-CM

## 2022-01-01 DIAGNOSIS — Z86.000 HISTORY OF DUCTAL CARCINOMA IN SITU (DCIS) OF BREAST: Primary | Chronic | ICD-10-CM

## 2022-01-01 DIAGNOSIS — C79.31 METASTASIS TO BRAIN: ICD-10-CM

## 2022-01-01 DIAGNOSIS — Z45.2 ENCOUNTER FOR VENOUS ACCESS DEVICE CARE: Primary | ICD-10-CM

## 2022-01-01 DIAGNOSIS — C79.51 BONE METASTASIS: Primary | Chronic | ICD-10-CM

## 2022-01-01 DIAGNOSIS — D64.9 ANEMIA, UNSPECIFIED TYPE: ICD-10-CM

## 2022-01-01 DIAGNOSIS — Z85.820 HISTORY OF MALIGNANT MELANOMA OF SKIN: ICD-10-CM

## 2022-01-01 DIAGNOSIS — R79.89 ELEVATED LIVER FUNCTION TESTS: ICD-10-CM

## 2022-01-01 DIAGNOSIS — R50.9 FEVER, UNSPECIFIED FEVER CAUSE: ICD-10-CM

## 2022-01-01 DIAGNOSIS — R11.2 NAUSEA AND VOMITING, UNSPECIFIED VOMITING TYPE: Primary | ICD-10-CM

## 2022-01-01 DIAGNOSIS — D72.829 LEUKOCYTOSIS, UNSPECIFIED TYPE: ICD-10-CM

## 2022-01-01 DIAGNOSIS — E87.6 HYPOKALEMIA: Primary | ICD-10-CM

## 2022-01-01 DIAGNOSIS — K81.0 CHOLECYSTITIS, ACUTE: Primary | ICD-10-CM

## 2022-01-01 DIAGNOSIS — R11.2 NAUSEA AND VOMITING: ICD-10-CM

## 2022-01-01 LAB
ADV 40+41 DNA STL QL NAA+NON-PROBE: NOT DETECTED
ALBUMIN SERPL-MCNC: 3 G/DL (ref 3.5–5.2)
ALBUMIN SERPL-MCNC: 3.4 G/DL (ref 3.5–5.2)
ALBUMIN SERPL-MCNC: 3.4 G/DL (ref 3.5–5.2)
ALBUMIN SERPL-MCNC: 3.5 G/DL (ref 3.5–5.2)
ALBUMIN SERPL-MCNC: 3.6 G/DL (ref 3.5–5.2)
ALBUMIN SERPL-MCNC: 3.7 G/DL (ref 3.5–5.2)
ALBUMIN SERPL-MCNC: 3.8 G/DL (ref 3.5–5.2)
ALBUMIN SERPL-MCNC: 3.9 G/DL (ref 3.5–5.2)
ALBUMIN SERPL-MCNC: 4 G/DL (ref 3.5–5.2)
ALBUMIN SERPL-MCNC: 4.1 G/DL (ref 3.5–5.2)
ALBUMIN SERPL-MCNC: 4.1 G/DL (ref 3.5–5.2)
ALBUMIN SERPL-MCNC: 4.6 G/DL (ref 3.5–5.2)
ALBUMIN/GLOB SERPL: 1 G/DL
ALBUMIN/GLOB SERPL: 1.2 G/DL
ALBUMIN/GLOB SERPL: 1.3 G/DL
ALBUMIN/GLOB SERPL: 1.4 G/DL
ALBUMIN/GLOB SERPL: 1.5 G/DL
ALBUMIN/GLOB SERPL: 1.6 G/DL
ALP SERPL-CCNC: 101 U/L (ref 39–117)
ALP SERPL-CCNC: 102 U/L (ref 39–117)
ALP SERPL-CCNC: 104 U/L (ref 39–117)
ALP SERPL-CCNC: 105 U/L (ref 39–117)
ALP SERPL-CCNC: 106 U/L (ref 39–117)
ALP SERPL-CCNC: 111 U/L (ref 39–117)
ALP SERPL-CCNC: 116 U/L (ref 39–117)
ALP SERPL-CCNC: 117 U/L (ref 39–117)
ALP SERPL-CCNC: 127 U/L (ref 39–117)
ALP SERPL-CCNC: 143 U/L (ref 39–117)
ALP SERPL-CCNC: 147 U/L (ref 39–117)
ALP SERPL-CCNC: 150 U/L (ref 39–117)
ALP SERPL-CCNC: 157 U/L (ref 39–117)
ALP SERPL-CCNC: 157 U/L (ref 39–117)
ALP SERPL-CCNC: 159 U/L (ref 39–117)
ALP SERPL-CCNC: 167 U/L (ref 39–117)
ALP SERPL-CCNC: 184 U/L (ref 39–117)
ALP SERPL-CCNC: 194 U/L (ref 39–117)
ALP SERPL-CCNC: 218 U/L (ref 39–117)
ALP SERPL-CCNC: 230 U/L (ref 39–117)
ALP SERPL-CCNC: 271 U/L (ref 39–117)
ALP SERPL-CCNC: 321 U/L (ref 39–117)
ALP SERPL-CCNC: 332 U/L (ref 39–117)
ALP SERPL-CCNC: 91 U/L (ref 39–117)
ALT SERPL W P-5'-P-CCNC: 100 U/L (ref 1–33)
ALT SERPL W P-5'-P-CCNC: 114 U/L (ref 1–33)
ALT SERPL W P-5'-P-CCNC: 24 U/L (ref 1–33)
ALT SERPL W P-5'-P-CCNC: 28 U/L (ref 1–33)
ALT SERPL W P-5'-P-CCNC: 30 U/L (ref 1–33)
ALT SERPL W P-5'-P-CCNC: 32 U/L (ref 1–33)
ALT SERPL W P-5'-P-CCNC: 33 U/L (ref 1–33)
ALT SERPL W P-5'-P-CCNC: 36 U/L (ref 1–33)
ALT SERPL W P-5'-P-CCNC: 37 U/L (ref 1–33)
ALT SERPL W P-5'-P-CCNC: 37 U/L (ref 1–33)
ALT SERPL W P-5'-P-CCNC: 38 U/L (ref 1–33)
ALT SERPL W P-5'-P-CCNC: 40 U/L (ref 1–33)
ALT SERPL W P-5'-P-CCNC: 45 U/L (ref 1–33)
ALT SERPL W P-5'-P-CCNC: 46 U/L (ref 1–33)
ALT SERPL W P-5'-P-CCNC: 53 U/L (ref 1–33)
ALT SERPL W P-5'-P-CCNC: 62 U/L (ref 1–33)
ALT SERPL W P-5'-P-CCNC: 68 U/L (ref 1–33)
ALT SERPL W P-5'-P-CCNC: 71 U/L (ref 1–33)
ALT SERPL W P-5'-P-CCNC: 72 U/L (ref 1–33)
ALT SERPL W P-5'-P-CCNC: 92 U/L (ref 1–33)
ALT SERPL W P-5'-P-CCNC: 95 U/L (ref 1–33)
ALT SERPL W P-5'-P-CCNC: 99 U/L (ref 1–33)
ANION GAP SERPL CALCULATED.3IONS-SCNC: 10 MMOL/L (ref 5–15)
ANION GAP SERPL CALCULATED.3IONS-SCNC: 11 MMOL/L (ref 5–15)
ANION GAP SERPL CALCULATED.3IONS-SCNC: 12 MMOL/L (ref 5–15)
ANION GAP SERPL CALCULATED.3IONS-SCNC: 12 MMOL/L (ref 5–15)
ANION GAP SERPL CALCULATED.3IONS-SCNC: 13 MMOL/L (ref 5–15)
ANION GAP SERPL CALCULATED.3IONS-SCNC: 14 MMOL/L (ref 5–15)
ANION GAP SERPL CALCULATED.3IONS-SCNC: 7 MMOL/L (ref 5–15)
ANION GAP SERPL CALCULATED.3IONS-SCNC: 8 MMOL/L (ref 5–15)
ANION GAP SERPL CALCULATED.3IONS-SCNC: 9 MMOL/L (ref 5–15)
ANISOCYTOSIS BLD QL: ABNORMAL
ANISOCYTOSIS BLD QL: NORMAL
AST SERPL-CCNC: 100 U/L (ref 1–32)
AST SERPL-CCNC: 105 U/L (ref 1–32)
AST SERPL-CCNC: 117 U/L (ref 1–32)
AST SERPL-CCNC: 136 U/L (ref 1–32)
AST SERPL-CCNC: 32 U/L (ref 1–32)
AST SERPL-CCNC: 35 U/L (ref 1–32)
AST SERPL-CCNC: 37 U/L (ref 1–32)
AST SERPL-CCNC: 38 U/L (ref 1–32)
AST SERPL-CCNC: 38 U/L (ref 1–32)
AST SERPL-CCNC: 39 U/L (ref 1–32)
AST SERPL-CCNC: 40 U/L (ref 1–32)
AST SERPL-CCNC: 43 U/L (ref 1–32)
AST SERPL-CCNC: 43 U/L (ref 1–32)
AST SERPL-CCNC: 45 U/L (ref 1–32)
AST SERPL-CCNC: 47 U/L (ref 1–32)
AST SERPL-CCNC: 48 U/L (ref 1–32)
AST SERPL-CCNC: 48 U/L (ref 1–32)
AST SERPL-CCNC: 51 U/L (ref 1–32)
AST SERPL-CCNC: 56 U/L (ref 1–32)
AST SERPL-CCNC: 61 U/L (ref 1–32)
AST SERPL-CCNC: 68 U/L (ref 1–32)
AST SERPL-CCNC: 80 U/L (ref 1–32)
AST SERPL-CCNC: 81 U/L (ref 1–32)
AST SERPL-CCNC: 82 U/L (ref 1–32)
ASTRO TYP 1-8 RNA STL QL NAA+NON-PROBE: NOT DETECTED
B-HCG UR QL: NEGATIVE
BACTERIA SPEC AEROBE CULT: NO GROWTH
BACTERIA SPEC AEROBE CULT: NORMAL
BACTERIA UR QL AUTO: ABNORMAL /HPF
BACTERIA UR QL AUTO: ABNORMAL /HPF
BASOPHILS # BLD AUTO: 0.02 10*3/MM3 (ref 0–0.2)
BASOPHILS # BLD AUTO: 0.03 10*3/MM3 (ref 0–0.2)
BASOPHILS # BLD AUTO: 0.04 10*3/MM3 (ref 0–0.2)
BASOPHILS # BLD AUTO: 0.05 10*3/MM3 (ref 0–0.2)
BASOPHILS # BLD AUTO: 0.05 10*3/MM3 (ref 0–0.2)
BASOPHILS # BLD AUTO: 0.06 10*3/MM3 (ref 0–0.2)
BASOPHILS # BLD AUTO: 0.07 10*3/MM3 (ref 0–0.2)
BASOPHILS # BLD AUTO: 0.09 10*3/MM3 (ref 0–0.2)
BASOPHILS # BLD MANUAL: 0.01 10*3/MM3 (ref 0–0.2)
BASOPHILS # BLD MANUAL: 0.03 10*3/MM3 (ref 0–0.2)
BASOPHILS # BLD MANUAL: 0.1 10*3/MM3 (ref 0–0.2)
BASOPHILS NFR BLD AUTO: 0.3 % (ref 0–1.5)
BASOPHILS NFR BLD AUTO: 0.4 % (ref 0–1.5)
BASOPHILS NFR BLD AUTO: 0.5 % (ref 0–1.5)
BASOPHILS NFR BLD AUTO: 0.6 % (ref 0–1.5)
BASOPHILS NFR BLD AUTO: 0.7 % (ref 0–1.5)
BASOPHILS NFR BLD AUTO: 0.7 % (ref 0–1.5)
BASOPHILS NFR BLD AUTO: 0.8 % (ref 0–1.5)
BASOPHILS NFR BLD AUTO: 1 % (ref 0–1.5)
BASOPHILS NFR BLD AUTO: 1.2 % (ref 0–1.5)
BASOPHILS NFR BLD AUTO: 1.4 % (ref 0–1.5)
BASOPHILS NFR BLD MANUAL: 1 % (ref 0–1.5)
BASOPHILS NFR BLD MANUAL: 1 % (ref 0–1.5)
BASOPHILS NFR BLD MANUAL: 4 % (ref 0–1.5)
BILIRUB CONJ SERPL-MCNC: 0.2 MG/DL (ref 0–0.3)
BILIRUB CONJ SERPL-MCNC: 0.3 MG/DL (ref 0–0.3)
BILIRUB CONJ SERPL-MCNC: 0.5 MG/DL (ref 0–0.3)
BILIRUB CONJ SERPL-MCNC: <0.2 MG/DL (ref 0–0.3)
BILIRUB INDIRECT SERPL-MCNC: 0.3 MG/DL
BILIRUB INDIRECT SERPL-MCNC: 0.5 MG/DL
BILIRUB INDIRECT SERPL-MCNC: 0.5 MG/DL
BILIRUB INDIRECT SERPL-MCNC: ABNORMAL MG/DL
BILIRUB SERPL-MCNC: 0.2 MG/DL (ref 0–1.2)
BILIRUB SERPL-MCNC: 0.3 MG/DL (ref 0–1.2)
BILIRUB SERPL-MCNC: 0.4 MG/DL (ref 0–1.2)
BILIRUB SERPL-MCNC: 0.5 MG/DL (ref 0–1.2)
BILIRUB SERPL-MCNC: 0.5 MG/DL (ref 0–1.2)
BILIRUB SERPL-MCNC: 0.6 MG/DL (ref 0–1.2)
BILIRUB SERPL-MCNC: 0.7 MG/DL (ref 0–1.2)
BILIRUB SERPL-MCNC: 1 MG/DL (ref 0–1.2)
BILIRUB SERPL-MCNC: 1.1 MG/DL (ref 0–1.2)
BILIRUB SERPL-MCNC: 2.5 MG/DL (ref 0–1.2)
BILIRUB UR QL STRIP: NEGATIVE
BILIRUB UR QL STRIP: NEGATIVE
BUN SERPL-MCNC: 10 MG/DL (ref 6–20)
BUN SERPL-MCNC: 11 MG/DL (ref 6–20)
BUN SERPL-MCNC: 11 MG/DL (ref 6–20)
BUN SERPL-MCNC: 12 MG/DL (ref 6–20)
BUN SERPL-MCNC: 13 MG/DL (ref 6–20)
BUN SERPL-MCNC: 14 MG/DL (ref 6–20)
BUN SERPL-MCNC: 14 MG/DL (ref 6–20)
BUN SERPL-MCNC: 15 MG/DL (ref 6–20)
BUN SERPL-MCNC: 16 MG/DL (ref 6–20)
BUN SERPL-MCNC: 4 MG/DL (ref 6–20)
BUN SERPL-MCNC: 6 MG/DL (ref 6–20)
BUN SERPL-MCNC: 7 MG/DL (ref 6–20)
BUN SERPL-MCNC: 8 MG/DL (ref 6–20)
BUN SERPL-MCNC: 9 MG/DL (ref 6–20)
BUN/CREAT SERPL: 10 (ref 7–25)
BUN/CREAT SERPL: 10.6 (ref 7–25)
BUN/CREAT SERPL: 11.1 (ref 7–25)
BUN/CREAT SERPL: 11.5 (ref 7–25)
BUN/CREAT SERPL: 12 (ref 7–25)
BUN/CREAT SERPL: 12.2 (ref 7–25)
BUN/CREAT SERPL: 13.9 (ref 7–25)
BUN/CREAT SERPL: 14.3 (ref 7–25)
BUN/CREAT SERPL: 14.9 (ref 7–25)
BUN/CREAT SERPL: 15 (ref 7–25)
BUN/CREAT SERPL: 15.9 (ref 7–25)
BUN/CREAT SERPL: 16.2 (ref 7–25)
BUN/CREAT SERPL: 16.3 (ref 7–25)
BUN/CREAT SERPL: 16.7 (ref 7–25)
BUN/CREAT SERPL: 16.9 (ref 7–25)
BUN/CREAT SERPL: 17.4 (ref 7–25)
BUN/CREAT SERPL: 18.8 (ref 7–25)
BUN/CREAT SERPL: 19 (ref 7–25)
BUN/CREAT SERPL: 19.7 (ref 7–25)
BUN/CREAT SERPL: 20.3 (ref 7–25)
BUN/CREAT SERPL: 21.2 (ref 7–25)
BUN/CREAT SERPL: 22 (ref 7–25)
BUN/CREAT SERPL: 22.9 (ref 7–25)
BUN/CREAT SERPL: 23.4 (ref 7–25)
BUN/CREAT SERPL: 7 (ref 7–25)
BUN/CREAT SERPL: 7.4 (ref 7–25)
BUN/CREAT SERPL: 8.6 (ref 7–25)
BUN/CREAT SERPL: 9.6 (ref 7–25)
C CAYETANENSIS DNA STL QL NAA+NON-PROBE: NOT DETECTED
C COLI+JEJ+UPSA DNA STL QL NAA+NON-PROBE: NOT DETECTED
C DIFF TOX GENS STL QL NAA+PROBE: NEGATIVE
CALCIUM SPEC-SCNC: 10.1 MG/DL (ref 8.6–10.5)
CALCIUM SPEC-SCNC: 10.1 MG/DL (ref 8.6–10.5)
CALCIUM SPEC-SCNC: 10.5 MG/DL (ref 8.6–10.5)
CALCIUM SPEC-SCNC: 8.1 MG/DL (ref 8.6–10.5)
CALCIUM SPEC-SCNC: 8.4 MG/DL (ref 8.6–10.5)
CALCIUM SPEC-SCNC: 8.4 MG/DL (ref 8.6–10.5)
CALCIUM SPEC-SCNC: 8.5 MG/DL (ref 8.6–10.5)
CALCIUM SPEC-SCNC: 8.6 MG/DL (ref 8.6–10.5)
CALCIUM SPEC-SCNC: 8.7 MG/DL (ref 8.6–10.5)
CALCIUM SPEC-SCNC: 8.8 MG/DL (ref 8.6–10.5)
CALCIUM SPEC-SCNC: 8.8 MG/DL (ref 8.6–10.5)
CALCIUM SPEC-SCNC: 8.9 MG/DL (ref 8.6–10.5)
CALCIUM SPEC-SCNC: 9.1 MG/DL (ref 8.6–10.5)
CALCIUM SPEC-SCNC: 9.2 MG/DL (ref 8.6–10.5)
CALCIUM SPEC-SCNC: 9.4 MG/DL (ref 8.6–10.5)
CALCIUM SPEC-SCNC: 9.5 MG/DL (ref 8.6–10.5)
CALCIUM SPEC-SCNC: 9.5 MG/DL (ref 8.6–10.5)
CALCIUM SPEC-SCNC: 9.6 MG/DL (ref 8.6–10.5)
CALCIUM SPEC-SCNC: 9.8 MG/DL (ref 8.6–10.5)
CALCIUM SPEC-SCNC: 9.9 MG/DL (ref 8.6–10.5)
CANCER AG15-3 SERPL-ACNC: 131.3 U/ML
CANCER AG15-3 SERPL-ACNC: 141 U/ML
CANCER AG15-3 SERPL-ACNC: 146 U/ML
CANCER AG15-3 SERPL-ACNC: 178.6 U/ML
CANCER AG15-3 SERPL-ACNC: 233 U/ML
CANCER AG15-3 SERPL-ACNC: 362 U/ML
CANCER AG15-3 SERPL-ACNC: 368 U/ML
CANCER AG15-3 SERPL-ACNC: 501 U/ML
CANCER AG27-29 SERPL-ACNC: 200.1 U/ML (ref 0–38.6)
CANCER AG27-29 SERPL-ACNC: 262.9 U/ML (ref 0–38.6)
CANCER AG27-29 SERPL-ACNC: 270.6 U/ML (ref 0–38.6)
CANCER AG27-29 SERPL-ACNC: 315.4 U/ML (ref 0–38.6)
CANCER AG27-29 SERPL-ACNC: 416 U/ML (ref 0–38.6)
CANCER AG27-29 SERPL-ACNC: 487.2 U/ML (ref 0–38.6)
CANCER AG27-29 SERPL-ACNC: 678.7 U/ML (ref 0–38.6)
CANCER AG27-29 SERPL-ACNC: 801.6 U/ML (ref 0–38.6)
CHLORIDE SERPL-SCNC: 100 MMOL/L (ref 98–107)
CHLORIDE SERPL-SCNC: 103 MMOL/L (ref 98–107)
CHLORIDE SERPL-SCNC: 103 MMOL/L (ref 98–107)
CHLORIDE SERPL-SCNC: 104 MMOL/L (ref 98–107)
CHLORIDE SERPL-SCNC: 105 MMOL/L (ref 98–107)
CHLORIDE SERPL-SCNC: 106 MMOL/L (ref 98–107)
CHLORIDE SERPL-SCNC: 107 MMOL/L (ref 98–107)
CHLORIDE SERPL-SCNC: 108 MMOL/L (ref 98–107)
CHLORIDE SERPL-SCNC: 109 MMOL/L (ref 98–107)
CHLORIDE SERPL-SCNC: 111 MMOL/L (ref 98–107)
CK SERPL-CCNC: 51 U/L (ref 20–180)
CK SERPL-CCNC: 61 U/L (ref 20–180)
CLARITY UR: ABNORMAL
CLARITY UR: CLEAR
CO2 SERPL-SCNC: 20 MMOL/L (ref 22–29)
CO2 SERPL-SCNC: 21 MMOL/L (ref 22–29)
CO2 SERPL-SCNC: 21 MMOL/L (ref 22–29)
CO2 SERPL-SCNC: 22 MMOL/L (ref 22–29)
CO2 SERPL-SCNC: 22 MMOL/L (ref 22–29)
CO2 SERPL-SCNC: 23 MMOL/L (ref 22–29)
CO2 SERPL-SCNC: 23 MMOL/L (ref 22–29)
CO2 SERPL-SCNC: 24 MMOL/L (ref 22–29)
CO2 SERPL-SCNC: 25 MMOL/L (ref 22–29)
CO2 SERPL-SCNC: 26 MMOL/L (ref 22–29)
CO2 SERPL-SCNC: 26 MMOL/L (ref 22–29)
CO2 SERPL-SCNC: 27 MMOL/L (ref 22–29)
CO2 SERPL-SCNC: 28 MMOL/L (ref 22–29)
CO2 SERPL-SCNC: 28 MMOL/L (ref 22–29)
CO2 SERPL-SCNC: 29 MMOL/L (ref 22–29)
COLOR UR: ABNORMAL
COLOR UR: YELLOW
CREAT SERPL-MCNC: 0.54 MG/DL (ref 0.57–1)
CREAT SERPL-MCNC: 0.59 MG/DL (ref 0.57–1)
CREAT SERPL-MCNC: 0.59 MG/DL (ref 0.57–1)
CREAT SERPL-MCNC: 0.6 MG/DL (ref 0.57–1)
CREAT SERPL-MCNC: 0.63 MG/DL (ref 0.57–1)
CREAT SERPL-MCNC: 0.63 MG/DL (ref 0.57–1)
CREAT SERPL-MCNC: 0.64 MG/DL (ref 0.57–1)
CREAT SERPL-MCNC: 0.64 MG/DL (ref 0.57–1)
CREAT SERPL-MCNC: 0.66 MG/DL (ref 0.57–1)
CREAT SERPL-MCNC: 0.67 MG/DL (ref 0.57–1)
CREAT SERPL-MCNC: 0.69 MG/DL (ref 0.57–1)
CREAT SERPL-MCNC: 0.7 MG/DL (ref 0.57–1)
CREAT SERPL-MCNC: 0.7 MG/DL (ref 0.57–1)
CREAT SERPL-MCNC: 0.72 MG/DL (ref 0.57–1)
CREAT SERPL-MCNC: 0.74 MG/DL (ref 0.57–1)
CREAT SERPL-MCNC: 0.75 MG/DL (ref 0.57–1)
CREAT SERPL-MCNC: 0.77 MG/DL (ref 0.57–1)
CREAT SERPL-MCNC: 0.78 MG/DL (ref 0.57–1)
CREAT SERPL-MCNC: 0.79 MG/DL (ref 0.57–1)
CREAT SERPL-MCNC: 0.8 MG/DL (ref 0.57–1)
CREAT SERPL-MCNC: 0.81 MG/DL (ref 0.57–1)
CREAT SERPL-MCNC: 0.82 MG/DL (ref 0.57–1)
CREAT SERPL-MCNC: 0.83 MG/DL (ref 0.57–1)
CREAT SERPL-MCNC: 0.86 MG/DL (ref 0.57–1)
CREAT SERPL-MCNC: 0.87 MG/DL (ref 0.57–1)
CREAT SERPL-MCNC: 0.94 MG/DL (ref 0.57–1)
CRYPTOSP DNA STL QL NAA+NON-PROBE: NOT DETECTED
D-LACTATE SERPL-SCNC: 1 MMOL/L (ref 0.5–2)
D-LACTATE SERPL-SCNC: 1.3 MMOL/L (ref 0.5–2)
D-LACTATE SERPL-SCNC: 1.8 MMOL/L (ref 0.5–2)
D-LACTATE SERPL-SCNC: 2.9 MMOL/L (ref 0.5–2)
DACRYOCYTES BLD QL SMEAR: ABNORMAL
DEPRECATED RDW RBC AUTO: 40.9 FL (ref 37–54)
DEPRECATED RDW RBC AUTO: 45.6 FL (ref 37–54)
DEPRECATED RDW RBC AUTO: 48.3 FL (ref 37–54)
DEPRECATED RDW RBC AUTO: 49.5 FL (ref 37–54)
DEPRECATED RDW RBC AUTO: 49.6 FL (ref 37–54)
DEPRECATED RDW RBC AUTO: 51 FL (ref 37–54)
DEPRECATED RDW RBC AUTO: 51.3 FL (ref 37–54)
DEPRECATED RDW RBC AUTO: 51.7 FL (ref 37–54)
DEPRECATED RDW RBC AUTO: 52.9 FL (ref 37–54)
DEPRECATED RDW RBC AUTO: 53.5 FL (ref 37–54)
DEPRECATED RDW RBC AUTO: 53.9 FL (ref 37–54)
DEPRECATED RDW RBC AUTO: 55.4 FL (ref 37–54)
DEPRECATED RDW RBC AUTO: 59.1 FL (ref 37–54)
DEPRECATED RDW RBC AUTO: 60 FL (ref 37–54)
DEPRECATED RDW RBC AUTO: 60.5 FL (ref 37–54)
DEPRECATED RDW RBC AUTO: 61.5 FL (ref 37–54)
DEPRECATED RDW RBC AUTO: 62.4 FL (ref 37–54)
DEPRECATED RDW RBC AUTO: 62.5 FL (ref 37–54)
DEPRECATED RDW RBC AUTO: 63.8 FL (ref 37–54)
DEPRECATED RDW RBC AUTO: 64.5 FL (ref 37–54)
DEPRECATED RDW RBC AUTO: 64.5 FL (ref 37–54)
DEPRECATED RDW RBC AUTO: 65.5 FL (ref 37–54)
DEPRECATED RDW RBC AUTO: 66 FL (ref 37–54)
DEPRECATED RDW RBC AUTO: 66.3 FL (ref 37–54)
DEPRECATED RDW RBC AUTO: 66.3 FL (ref 37–54)
DEPRECATED RDW RBC AUTO: 66.4 FL (ref 37–54)
DEPRECATED RDW RBC AUTO: 66.9 FL (ref 37–54)
DEPRECATED RDW RBC AUTO: 80.1 FL (ref 37–54)
E HISTOLYT DNA STL QL NAA+NON-PROBE: NOT DETECTED
EAEC PAA PLAS AGGR+AATA ST NAA+NON-PRB: NOT DETECTED
EC STX1+STX2 GENES STL QL NAA+NON-PROBE: NOT DETECTED
EGFRCR SERPLBLD CKD-EPI 2021: 102.9 ML/MIN/1.73
EGFRCR SERPLBLD CKD-EPI 2021: 103.6 ML/MIN/1.73
EGFRCR SERPLBLD CKD-EPI 2021: 103.9 ML/MIN/1.73
EGFRCR SERPLBLD CKD-EPI 2021: 104 ML/MIN/1.73
EGFRCR SERPLBLD CKD-EPI 2021: 104.4 ML/MIN/1.73
EGFRCR SERPLBLD CKD-EPI 2021: 104.4 ML/MIN/1.73
EGFRCR SERPLBLD CKD-EPI 2021: 105 ML/MIN/1.73
EGFRCR SERPLBLD CKD-EPI 2021: 105.8 ML/MIN/1.73
EGFRCR SERPLBLD CKD-EPI 2021: 105.8 ML/MIN/1.73
EGFRCR SERPLBLD CKD-EPI 2021: 106.2 ML/MIN/1.73
EGFRCR SERPLBLD CKD-EPI 2021: 106.2 ML/MIN/1.73
EGFRCR SERPLBLD CKD-EPI 2021: 107.3 ML/MIN/1.73
EGFRCR SERPLBLD CKD-EPI 2021: 107.3 ML/MIN/1.73
EGFRCR SERPLBLD CKD-EPI 2021: 107.5 ML/MIN/1.73
EGFRCR SERPLBLD CKD-EPI 2021: 110.2 ML/MIN/1.73
EGFRCR SERPLBLD CKD-EPI 2021: 79.8 ML/MIN/1.73
EGFRCR SERPLBLD CKD-EPI 2021: 80.9 ML/MIN/1.73
EGFRCR SERPLBLD CKD-EPI 2021: 84.4 ML/MIN/1.73
EGFRCR SERPLBLD CKD-EPI 2021: 85.7 ML/MIN/1.73
EGFRCR SERPLBLD CKD-EPI 2021: 86.9 ML/MIN/1.73
EGFRCR SERPLBLD CKD-EPI 2021: 88.2 ML/MIN/1.73
EGFRCR SERPLBLD CKD-EPI 2021: 89.6 ML/MIN/1.73
EGFRCR SERPLBLD CKD-EPI 2021: 91 ML/MIN/1.73
EGFRCR SERPLBLD CKD-EPI 2021: 92.4 ML/MIN/1.73
EGFRCR SERPLBLD CKD-EPI 2021: 96.3 ML/MIN/1.73
EGFRCR SERPLBLD CKD-EPI 2021: 99.5 ML/MIN/1.73
EOSINOPHIL # BLD AUTO: 0 10*3/MM3 (ref 0–0.4)
EOSINOPHIL # BLD AUTO: 0.01 10*3/MM3 (ref 0–0.4)
EOSINOPHIL # BLD AUTO: 0.03 10*3/MM3 (ref 0–0.4)
EOSINOPHIL # BLD AUTO: 0.04 10*3/MM3 (ref 0–0.4)
EOSINOPHIL # BLD AUTO: 0.04 10*3/MM3 (ref 0–0.4)
EOSINOPHIL # BLD AUTO: 0.05 10*3/MM3 (ref 0–0.4)
EOSINOPHIL # BLD AUTO: 0.06 10*3/MM3 (ref 0–0.4)
EOSINOPHIL # BLD AUTO: 0.07 10*3/MM3 (ref 0–0.4)
EOSINOPHIL # BLD AUTO: 0.08 10*3/MM3 (ref 0–0.4)
EOSINOPHIL # BLD AUTO: 0.09 10*3/MM3 (ref 0–0.4)
EOSINOPHIL # BLD AUTO: 0.1 10*3/MM3 (ref 0–0.4)
EOSINOPHIL # BLD AUTO: 0.11 10*3/MM3 (ref 0–0.4)
EOSINOPHIL # BLD AUTO: 0.13 10*3/MM3 (ref 0–0.4)
EOSINOPHIL # BLD AUTO: 0.16 10*3/MM3 (ref 0–0.4)
EOSINOPHIL # BLD AUTO: 0.16 10*3/MM3 (ref 0–0.4)
EOSINOPHIL # BLD AUTO: 0.17 10*3/MM3 (ref 0–0.4)
EOSINOPHIL # BLD AUTO: 0.18 10*3/MM3 (ref 0–0.4)
EOSINOPHIL # BLD AUTO: 0.2 10*3/MM3 (ref 0–0.4)
EOSINOPHIL # BLD AUTO: 0.2 10*3/MM3 (ref 0–0.4)
EOSINOPHIL # BLD AUTO: 0.24 10*3/MM3 (ref 0–0.4)
EOSINOPHIL # BLD AUTO: 0.31 10*3/MM3 (ref 0–0.4)
EOSINOPHIL # BLD MANUAL: 0.01 10*3/MM3 (ref 0–0.4)
EOSINOPHIL # BLD MANUAL: 0.06 10*3/MM3 (ref 0–0.4)
EOSINOPHIL # BLD MANUAL: 0.07 10*3/MM3 (ref 0–0.4)
EOSINOPHIL NFR BLD AUTO: 0 % (ref 0.3–6.2)
EOSINOPHIL NFR BLD AUTO: 0.2 % (ref 0.3–6.2)
EOSINOPHIL NFR BLD AUTO: 0.5 % (ref 0.3–6.2)
EOSINOPHIL NFR BLD AUTO: 0.6 % (ref 0.3–6.2)
EOSINOPHIL NFR BLD AUTO: 0.6 % (ref 0.3–6.2)
EOSINOPHIL NFR BLD AUTO: 0.7 % (ref 0.3–6.2)
EOSINOPHIL NFR BLD AUTO: 0.7 % (ref 0.3–6.2)
EOSINOPHIL NFR BLD AUTO: 0.9 % (ref 0.3–6.2)
EOSINOPHIL NFR BLD AUTO: 1.2 % (ref 0.3–6.2)
EOSINOPHIL NFR BLD AUTO: 1.4 % (ref 0.3–6.2)
EOSINOPHIL NFR BLD AUTO: 2 % (ref 0.3–6.2)
EOSINOPHIL NFR BLD AUTO: 2.3 % (ref 0.3–6.2)
EOSINOPHIL NFR BLD AUTO: 2.3 % (ref 0.3–6.2)
EOSINOPHIL NFR BLD AUTO: 2.5 % (ref 0.3–6.2)
EOSINOPHIL NFR BLD AUTO: 2.5 % (ref 0.3–6.2)
EOSINOPHIL NFR BLD AUTO: 2.6 % (ref 0.3–6.2)
EOSINOPHIL NFR BLD AUTO: 2.6 % (ref 0.3–6.2)
EOSINOPHIL NFR BLD AUTO: 2.8 % (ref 0.3–6.2)
EOSINOPHIL NFR BLD AUTO: 3.1 % (ref 0.3–6.2)
EOSINOPHIL NFR BLD MANUAL: 1 % (ref 0.3–6.2)
EOSINOPHIL NFR BLD MANUAL: 2 % (ref 0.3–6.2)
EOSINOPHIL NFR BLD MANUAL: 2 % (ref 0.3–6.2)
EPEC EAE GENE STL QL NAA+NON-PROBE: NOT DETECTED
ERYTHROCYTE [DISTWIDTH] IN BLOOD BY AUTOMATED COUNT: 13.5 % (ref 12.3–15.4)
ERYTHROCYTE [DISTWIDTH] IN BLOOD BY AUTOMATED COUNT: 15.6 % (ref 12.3–15.4)
ERYTHROCYTE [DISTWIDTH] IN BLOOD BY AUTOMATED COUNT: 16.2 % (ref 12.3–15.4)
ERYTHROCYTE [DISTWIDTH] IN BLOOD BY AUTOMATED COUNT: 16.5 % (ref 12.3–15.4)
ERYTHROCYTE [DISTWIDTH] IN BLOOD BY AUTOMATED COUNT: 16.5 % (ref 12.3–15.4)
ERYTHROCYTE [DISTWIDTH] IN BLOOD BY AUTOMATED COUNT: 16.6 % (ref 12.3–15.4)
ERYTHROCYTE [DISTWIDTH] IN BLOOD BY AUTOMATED COUNT: 16.8 % (ref 12.3–15.4)
ERYTHROCYTE [DISTWIDTH] IN BLOOD BY AUTOMATED COUNT: 16.9 % (ref 12.3–15.4)
ERYTHROCYTE [DISTWIDTH] IN BLOOD BY AUTOMATED COUNT: 17.1 % (ref 12.3–15.4)
ERYTHROCYTE [DISTWIDTH] IN BLOOD BY AUTOMATED COUNT: 17.3 % (ref 12.3–15.4)
ERYTHROCYTE [DISTWIDTH] IN BLOOD BY AUTOMATED COUNT: 18.3 % (ref 12.3–15.4)
ERYTHROCYTE [DISTWIDTH] IN BLOOD BY AUTOMATED COUNT: 18.5 % (ref 12.3–15.4)
ERYTHROCYTE [DISTWIDTH] IN BLOOD BY AUTOMATED COUNT: 18.6 % (ref 12.3–15.4)
ERYTHROCYTE [DISTWIDTH] IN BLOOD BY AUTOMATED COUNT: 18.6 % (ref 12.3–15.4)
ERYTHROCYTE [DISTWIDTH] IN BLOOD BY AUTOMATED COUNT: 19.2 % (ref 12.3–15.4)
ERYTHROCYTE [DISTWIDTH] IN BLOOD BY AUTOMATED COUNT: 19.5 % (ref 12.3–15.4)
ERYTHROCYTE [DISTWIDTH] IN BLOOD BY AUTOMATED COUNT: 20 % (ref 12.3–15.4)
ERYTHROCYTE [DISTWIDTH] IN BLOOD BY AUTOMATED COUNT: 20.2 % (ref 12.3–15.4)
ERYTHROCYTE [DISTWIDTH] IN BLOOD BY AUTOMATED COUNT: 20.4 % (ref 12.3–15.4)
ERYTHROCYTE [DISTWIDTH] IN BLOOD BY AUTOMATED COUNT: 20.6 % (ref 12.3–15.4)
ERYTHROCYTE [DISTWIDTH] IN BLOOD BY AUTOMATED COUNT: 20.7 % (ref 12.3–15.4)
ERYTHROCYTE [DISTWIDTH] IN BLOOD BY AUTOMATED COUNT: 20.8 % (ref 12.3–15.4)
ERYTHROCYTE [DISTWIDTH] IN BLOOD BY AUTOMATED COUNT: 20.8 % (ref 12.3–15.4)
ERYTHROCYTE [DISTWIDTH] IN BLOOD BY AUTOMATED COUNT: 20.9 % (ref 12.3–15.4)
ERYTHROCYTE [DISTWIDTH] IN BLOOD BY AUTOMATED COUNT: 21.1 % (ref 12.3–15.4)
ERYTHROCYTE [DISTWIDTH] IN BLOOD BY AUTOMATED COUNT: 21.1 % (ref 12.3–15.4)
ERYTHROCYTE [DISTWIDTH] IN BLOOD BY AUTOMATED COUNT: 21.3 % (ref 12.3–15.4)
ERYTHROCYTE [DISTWIDTH] IN BLOOD BY AUTOMATED COUNT: 23.7 % (ref 12.3–15.4)
ETEC LTA+ST1A+ST1B TOX ST NAA+NON-PROBE: NOT DETECTED
FERRITIN SERPL-MCNC: 533.9 NG/ML (ref 13–150)
FLUAV RNA RESP QL NAA+PROBE: NOT DETECTED
FLUAV SUBTYP SPEC NAA+PROBE: NOT DETECTED
FLUBV RNA ISLT QL NAA+PROBE: NOT DETECTED
FLUBV RNA RESP QL NAA+PROBE: NOT DETECTED
FOLATE SERPL-MCNC: 10 NG/ML (ref 4.78–24.2)
G LAMBLIA DNA STL QL NAA+NON-PROBE: NOT DETECTED
GFR SERPL CREATININE-BSD FRML MDRD: 62 ML/MIN/1.73
GFR SERPL CREATININE-BSD FRML MDRD: 81 ML/MIN/1.73
GIANT PLATELETS: ABNORMAL
GLOBULIN UR ELPH-MCNC: 2.6 GM/DL
GLOBULIN UR ELPH-MCNC: 2.7 GM/DL
GLOBULIN UR ELPH-MCNC: 2.8 GM/DL
GLOBULIN UR ELPH-MCNC: 2.9 GM/DL
GLOBULIN UR ELPH-MCNC: 2.9 GM/DL
GLOBULIN UR ELPH-MCNC: 3 GM/DL
GLOBULIN UR ELPH-MCNC: 3 GM/DL
GLOBULIN UR ELPH-MCNC: 3.1 GM/DL
GLOBULIN UR ELPH-MCNC: 3.3 GM/DL
GLOBULIN UR ELPH-MCNC: 3.4 GM/DL
GLOBULIN UR ELPH-MCNC: 3.5 GM/DL
GLUCOSE SERPL-MCNC: 103 MG/DL (ref 65–99)
GLUCOSE SERPL-MCNC: 105 MG/DL (ref 65–99)
GLUCOSE SERPL-MCNC: 109 MG/DL (ref 65–99)
GLUCOSE SERPL-MCNC: 110 MG/DL (ref 65–99)
GLUCOSE SERPL-MCNC: 111 MG/DL (ref 65–99)
GLUCOSE SERPL-MCNC: 122 MG/DL (ref 65–99)
GLUCOSE SERPL-MCNC: 124 MG/DL (ref 65–99)
GLUCOSE SERPL-MCNC: 129 MG/DL (ref 65–99)
GLUCOSE SERPL-MCNC: 132 MG/DL (ref 65–99)
GLUCOSE SERPL-MCNC: 133 MG/DL (ref 65–99)
GLUCOSE SERPL-MCNC: 164 MG/DL (ref 65–99)
GLUCOSE SERPL-MCNC: 168 MG/DL (ref 65–99)
GLUCOSE SERPL-MCNC: 58 MG/DL (ref 65–99)
GLUCOSE SERPL-MCNC: 75 MG/DL (ref 65–99)
GLUCOSE SERPL-MCNC: 75 MG/DL (ref 65–99)
GLUCOSE SERPL-MCNC: 79 MG/DL (ref 65–99)
GLUCOSE SERPL-MCNC: 83 MG/DL (ref 65–99)
GLUCOSE SERPL-MCNC: 84 MG/DL (ref 65–99)
GLUCOSE SERPL-MCNC: 84 MG/DL (ref 65–99)
GLUCOSE SERPL-MCNC: 85 MG/DL (ref 65–99)
GLUCOSE SERPL-MCNC: 87 MG/DL (ref 65–99)
GLUCOSE SERPL-MCNC: 93 MG/DL (ref 65–99)
GLUCOSE SERPL-MCNC: 94 MG/DL (ref 65–99)
GLUCOSE SERPL-MCNC: 96 MG/DL (ref 65–99)
GLUCOSE UR STRIP-MCNC: NEGATIVE MG/DL
GLUCOSE UR STRIP-MCNC: NEGATIVE MG/DL
HCT VFR BLD AUTO: 29.4 % (ref 34–46.6)
HCT VFR BLD AUTO: 29.9 % (ref 34–46.6)
HCT VFR BLD AUTO: 31.6 % (ref 34–46.6)
HCT VFR BLD AUTO: 32 % (ref 34–46.6)
HCT VFR BLD AUTO: 33.7 % (ref 34–46.6)
HCT VFR BLD AUTO: 34 % (ref 34–46.6)
HCT VFR BLD AUTO: 34.3 % (ref 34–46.6)
HCT VFR BLD AUTO: 34.4 % (ref 34–46.6)
HCT VFR BLD AUTO: 34.5 % (ref 34–46.6)
HCT VFR BLD AUTO: 34.8 % (ref 34–46.6)
HCT VFR BLD AUTO: 35.2 % (ref 34–46.6)
HCT VFR BLD AUTO: 35.3 % (ref 34–46.6)
HCT VFR BLD AUTO: 35.3 % (ref 34–46.6)
HCT VFR BLD AUTO: 35.4 % (ref 34–46.6)
HCT VFR BLD AUTO: 35.4 % (ref 34–46.6)
HCT VFR BLD AUTO: 35.6 % (ref 34–46.6)
HCT VFR BLD AUTO: 35.9 % (ref 34–46.6)
HCT VFR BLD AUTO: 35.9 % (ref 34–46.6)
HCT VFR BLD AUTO: 36 % (ref 34–46.6)
HCT VFR BLD AUTO: 36.1 % (ref 34–46.6)
HCT VFR BLD AUTO: 36.1 % (ref 34–46.6)
HCT VFR BLD AUTO: 36.2 % (ref 34–46.6)
HCT VFR BLD AUTO: 36.6 % (ref 34–46.6)
HCT VFR BLD AUTO: 37.1 % (ref 34–46.6)
HCT VFR BLD AUTO: 38.1 % (ref 34–46.6)
HCT VFR BLD AUTO: 38.6 % (ref 34–46.6)
HCT VFR BLD AUTO: 39.3 % (ref 34–46.6)
HCT VFR BLD AUTO: 40.7 % (ref 34–46.6)
HGB BLD-MCNC: 10.4 G/DL (ref 12–15.9)
HGB BLD-MCNC: 10.9 G/DL (ref 12–15.9)
HGB BLD-MCNC: 11.1 G/DL (ref 12–15.9)
HGB BLD-MCNC: 11.5 G/DL (ref 12–15.9)
HGB BLD-MCNC: 11.6 G/DL (ref 12–15.9)
HGB BLD-MCNC: 11.7 G/DL (ref 12–15.9)
HGB BLD-MCNC: 11.8 G/DL (ref 12–15.9)
HGB BLD-MCNC: 11.8 G/DL (ref 12–15.9)
HGB BLD-MCNC: 12 G/DL (ref 12–15.9)
HGB BLD-MCNC: 12.1 G/DL (ref 12–15.9)
HGB BLD-MCNC: 12.2 G/DL (ref 12–15.9)
HGB BLD-MCNC: 12.4 G/DL (ref 12–15.9)
HGB BLD-MCNC: 12.8 G/DL (ref 12–15.9)
HGB BLD-MCNC: 12.9 G/DL (ref 12–15.9)
HGB BLD-MCNC: 12.9 G/DL (ref 12–15.9)
HGB BLD-MCNC: 14.4 G/DL (ref 12–15.9)
HGB BLD-MCNC: 9.6 G/DL (ref 12–15.9)
HGB UR QL STRIP.AUTO: NEGATIVE
HGB UR QL STRIP.AUTO: NEGATIVE
HOLD SPECIMEN: NORMAL
HYALINE CASTS UR QL AUTO: ABNORMAL /LPF
HYALINE CASTS UR QL AUTO: ABNORMAL /LPF
HYPOCHROMIA BLD QL: ABNORMAL
IMM GRANULOCYTES # BLD AUTO: 0.01 10*3/MM3 (ref 0–0.05)
IMM GRANULOCYTES # BLD AUTO: 0.02 10*3/MM3 (ref 0–0.05)
IMM GRANULOCYTES # BLD AUTO: 0.03 10*3/MM3 (ref 0–0.05)
IMM GRANULOCYTES # BLD AUTO: 0.04 10*3/MM3 (ref 0–0.05)
IMM GRANULOCYTES # BLD AUTO: 0.05 10*3/MM3 (ref 0–0.05)
IMM GRANULOCYTES # BLD AUTO: 0.06 10*3/MM3 (ref 0–0.05)
IMM GRANULOCYTES # BLD AUTO: 0.1 10*3/MM3 (ref 0–0.05)
IMM GRANULOCYTES # BLD AUTO: 0.14 10*3/MM3 (ref 0–0.05)
IMM GRANULOCYTES # BLD AUTO: 0.15 10*3/MM3 (ref 0–0.05)
IMM GRANULOCYTES NFR BLD AUTO: 0.3 % (ref 0–0.5)
IMM GRANULOCYTES NFR BLD AUTO: 0.4 % (ref 0–0.5)
IMM GRANULOCYTES NFR BLD AUTO: 0.5 % (ref 0–0.5)
IMM GRANULOCYTES NFR BLD AUTO: 0.6 % (ref 0–0.5)
IMM GRANULOCYTES NFR BLD AUTO: 0.8 % (ref 0–0.5)
IMM GRANULOCYTES NFR BLD AUTO: 1.2 % (ref 0–0.5)
IMM GRANULOCYTES NFR BLD AUTO: 1.2 % (ref 0–0.5)
INR PPP: 1.2 (ref 0.9–1.1)
INR PPP: 1.3
INR PPP: 1.3 (ref 0.9–1.1)
INR PPP: 1.4 (ref 0.9–1.1)
INR PPP: 1.5
INR PPP: 1.67 (ref 0.8–1.2)
INR PPP: 1.7 (ref 0.9–1.1)
INR PPP: 1.79 (ref 0.8–1.2)
INR PPP: 1.8 (ref 0.9–1.1)
INR PPP: 1.8 (ref 0.9–1.1)
INR PPP: 1.9 (ref 0.9–1.1)
INR PPP: 1.9 (ref 0.9–1.1)
INR PPP: 1.98 (ref 0.8–1.2)
INR PPP: 2 (ref 0.9–1.1)
INR PPP: 2 (ref 0.9–1.1)
INR PPP: 2.05 (ref 0.8–1.2)
INR PPP: 2.2 (ref 0.9–1.1)
INR PPP: 2.22 (ref 0.8–1.2)
INR PPP: 2.3 (ref 0.8–1.2)
INR PPP: 2.3 (ref 0.9–1.1)
INR PPP: 2.49 (ref 0.8–1.2)
INR PPP: 2.5 (ref 0.9–1.1)
INR PPP: 2.63 (ref 0.8–1.2)
INR PPP: 2.8 (ref 0.9–1.1)
INR PPP: 2.9 (ref 0.9–1.1)
INR PPP: 3.1 (ref 0.9–1.1)
INR PPP: 3.5 (ref 0.9–1.1)
INR PPP: 4 (ref 0.9–1.1)
INR PPP: 4.1 (ref 0.9–1.1)
INR PPP: 4.2 (ref 0.9–1.1)
INR PPP: 4.4 (ref 0.9–1.1)
INR PPP: 4.7 (ref 0.9–1.1)
INR PPP: 5.8 (ref 0.9–1.1)
INR PPP: 7.1 (ref 0.9–1.1)
IRON 24H UR-MRATE: 71 MCG/DL (ref 37–145)
IRON SATN MFR SERPL: 18 % (ref 20–50)
KETONES UR QL STRIP: NEGATIVE
KETONES UR QL STRIP: NEGATIVE
LEUKOCYTE ESTERASE UR QL STRIP.AUTO: ABNORMAL
LEUKOCYTE ESTERASE UR QL STRIP.AUTO: ABNORMAL
LIPASE SERPL-CCNC: 18 U/L (ref 13–60)
LIPASE SERPL-CCNC: 8 U/L (ref 13–60)
LYMPHOCYTES # BLD AUTO: 0.7 10*3/MM3 (ref 0.7–3.1)
LYMPHOCYTES # BLD AUTO: 0.77 10*3/MM3 (ref 0.7–3.1)
LYMPHOCYTES # BLD AUTO: 0.79 10*3/MM3 (ref 0.7–3.1)
LYMPHOCYTES # BLD AUTO: 0.86 10*3/MM3 (ref 0.7–3.1)
LYMPHOCYTES # BLD AUTO: 0.86 10*3/MM3 (ref 0.7–3.1)
LYMPHOCYTES # BLD AUTO: 0.88 10*3/MM3 (ref 0.7–3.1)
LYMPHOCYTES # BLD AUTO: 0.91 10*3/MM3 (ref 0.7–3.1)
LYMPHOCYTES # BLD AUTO: 0.97 10*3/MM3 (ref 0.7–3.1)
LYMPHOCYTES # BLD AUTO: 1 10*3/MM3 (ref 0.7–3.1)
LYMPHOCYTES # BLD AUTO: 1.02 10*3/MM3 (ref 0.7–3.1)
LYMPHOCYTES # BLD AUTO: 1.03 10*3/MM3 (ref 0.7–3.1)
LYMPHOCYTES # BLD AUTO: 1.06 10*3/MM3 (ref 0.7–3.1)
LYMPHOCYTES # BLD AUTO: 1.13 10*3/MM3 (ref 0.7–3.1)
LYMPHOCYTES # BLD AUTO: 1.14 10*3/MM3 (ref 0.7–3.1)
LYMPHOCYTES # BLD AUTO: 1.31 10*3/MM3 (ref 0.7–3.1)
LYMPHOCYTES # BLD AUTO: 1.33 10*3/MM3 (ref 0.7–3.1)
LYMPHOCYTES # BLD AUTO: 1.34 10*3/MM3 (ref 0.7–3.1)
LYMPHOCYTES # BLD AUTO: 1.43 10*3/MM3 (ref 0.7–3.1)
LYMPHOCYTES # BLD AUTO: 1.54 10*3/MM3 (ref 0.7–3.1)
LYMPHOCYTES # BLD MANUAL: 0.63 10*3/MM3 (ref 0.7–3.1)
LYMPHOCYTES # BLD MANUAL: 0.74 10*3/MM3 (ref 0.7–3.1)
LYMPHOCYTES # BLD MANUAL: 0.92 10*3/MM3 (ref 0.7–3.1)
LYMPHOCYTES # BLD MANUAL: 0.94 10*3/MM3 (ref 0.7–3.1)
LYMPHOCYTES # BLD MANUAL: 1.27 10*3/MM3 (ref 0.7–3.1)
LYMPHOCYTES # BLD MANUAL: 1.37 10*3/MM3 (ref 0.7–3.1)
LYMPHOCYTES # BLD MANUAL: 1.95 10*3/MM3 (ref 0.7–3.1)
LYMPHOCYTES # BLD MANUAL: 2.91 10*3/MM3 (ref 0.7–3.1)
LYMPHOCYTES NFR BLD AUTO: 10.7 % (ref 19.6–45.3)
LYMPHOCYTES NFR BLD AUTO: 11.6 % (ref 19.6–45.3)
LYMPHOCYTES NFR BLD AUTO: 13.2 % (ref 19.6–45.3)
LYMPHOCYTES NFR BLD AUTO: 14.4 % (ref 19.6–45.3)
LYMPHOCYTES NFR BLD AUTO: 16.1 % (ref 19.6–45.3)
LYMPHOCYTES NFR BLD AUTO: 16.7 % (ref 19.6–45.3)
LYMPHOCYTES NFR BLD AUTO: 17.2 % (ref 19.6–45.3)
LYMPHOCYTES NFR BLD AUTO: 17.4 % (ref 19.6–45.3)
LYMPHOCYTES NFR BLD AUTO: 18.1 % (ref 19.6–45.3)
LYMPHOCYTES NFR BLD AUTO: 18.2 % (ref 19.6–45.3)
LYMPHOCYTES NFR BLD AUTO: 19.2 % (ref 19.6–45.3)
LYMPHOCYTES NFR BLD AUTO: 19.7 % (ref 19.6–45.3)
LYMPHOCYTES NFR BLD AUTO: 21.6 % (ref 19.6–45.3)
LYMPHOCYTES NFR BLD AUTO: 22.1 % (ref 19.6–45.3)
LYMPHOCYTES NFR BLD AUTO: 25.3 % (ref 19.6–45.3)
LYMPHOCYTES NFR BLD AUTO: 36.9 % (ref 19.6–45.3)
LYMPHOCYTES NFR BLD AUTO: 8.6 % (ref 19.6–45.3)
LYMPHOCYTES NFR BLD AUTO: 9.1 % (ref 19.6–45.3)
LYMPHOCYTES NFR BLD AUTO: 9.4 % (ref 19.6–45.3)
LYMPHOCYTES NFR BLD MANUAL: 10 % (ref 5–12)
LYMPHOCYTES NFR BLD MANUAL: 12.1 % (ref 5–12)
LYMPHOCYTES NFR BLD MANUAL: 13 % (ref 5–12)
LYMPHOCYTES NFR BLD MANUAL: 17 % (ref 5–12)
LYMPHOCYTES NFR BLD MANUAL: 19 % (ref 5–12)
LYMPHOCYTES NFR BLD MANUAL: 19 % (ref 5–12)
LYMPHOCYTES NFR BLD MANUAL: 29 % (ref 5–12)
LYMPHOCYTES NFR BLD MANUAL: 7 % (ref 5–12)
MAGNESIUM SERPL-MCNC: 1.6 MG/DL (ref 1.6–2.6)
MAGNESIUM SERPL-MCNC: 1.7 MG/DL (ref 1.6–2.6)
MAGNESIUM SERPL-MCNC: 1.8 MG/DL (ref 1.6–2.6)
MAGNESIUM SERPL-MCNC: 1.8 MG/DL (ref 1.6–2.6)
MAGNESIUM SERPL-MCNC: 1.9 MG/DL (ref 1.6–2.6)
MAGNESIUM SERPL-MCNC: 1.9 MG/DL (ref 1.6–2.6)
MAGNESIUM SERPL-MCNC: 2 MG/DL (ref 1.6–2.6)
MAGNESIUM SERPL-MCNC: 2.1 MG/DL (ref 1.6–2.6)
MAGNESIUM SERPL-MCNC: 2.1 MG/DL (ref 1.6–2.6)
MAGNESIUM SERPL-MCNC: 2.2 MG/DL (ref 1.6–2.6)
MAGNESIUM SERPL-MCNC: 2.2 MG/DL (ref 1.6–2.6)
MAGNESIUM SERPL-MCNC: 2.3 MG/DL (ref 1.6–2.6)
MAGNESIUM SERPL-MCNC: 2.4 MG/DL (ref 1.6–2.6)
MAGNESIUM SERPL-MCNC: 2.4 MG/DL (ref 1.6–2.6)
MCH RBC QN AUTO: 27 PG (ref 26.6–33)
MCH RBC QN AUTO: 28 PG (ref 26.6–33)
MCH RBC QN AUTO: 28.2 PG (ref 26.6–33)
MCH RBC QN AUTO: 28.3 PG (ref 26.6–33)
MCH RBC QN AUTO: 28.4 PG (ref 26.6–33)
MCH RBC QN AUTO: 28.5 PG (ref 26.6–33)
MCH RBC QN AUTO: 28.6 PG (ref 26.6–33)
MCH RBC QN AUTO: 28.6 PG (ref 26.6–33)
MCH RBC QN AUTO: 28.7 PG (ref 26.6–33)
MCH RBC QN AUTO: 28.7 PG (ref 26.6–33)
MCH RBC QN AUTO: 29.1 PG (ref 26.6–33)
MCH RBC QN AUTO: 29.2 PG (ref 26.6–33)
MCH RBC QN AUTO: 29.2 PG (ref 26.6–33)
MCH RBC QN AUTO: 29.5 PG (ref 26.6–33)
MCH RBC QN AUTO: 29.5 PG (ref 26.6–33)
MCH RBC QN AUTO: 29.6 PG (ref 26.6–33)
MCH RBC QN AUTO: 29.7 PG (ref 26.6–33)
MCH RBC QN AUTO: 29.7 PG (ref 26.6–33)
MCH RBC QN AUTO: 29.8 PG (ref 26.6–33)
MCH RBC QN AUTO: 29.9 PG (ref 26.6–33)
MCH RBC QN AUTO: 29.9 PG (ref 26.6–33)
MCH RBC QN AUTO: 30.2 PG (ref 26.6–33)
MCH RBC QN AUTO: 30.2 PG (ref 26.6–33)
MCH RBC QN AUTO: 30.5 PG (ref 26.6–33)
MCH RBC QN AUTO: 30.6 PG (ref 26.6–33)
MCHC RBC AUTO-ENTMCNC: 32.6 G/DL (ref 31.5–35.7)
MCHC RBC AUTO-ENTMCNC: 32.7 G/DL (ref 31.5–35.7)
MCHC RBC AUTO-ENTMCNC: 32.9 G/DL (ref 31.5–35.7)
MCHC RBC AUTO-ENTMCNC: 33.1 G/DL (ref 31.5–35.7)
MCHC RBC AUTO-ENTMCNC: 33.1 G/DL (ref 31.5–35.7)
MCHC RBC AUTO-ENTMCNC: 33.3 G/DL (ref 31.5–35.7)
MCHC RBC AUTO-ENTMCNC: 33.4 G/DL (ref 31.5–35.7)
MCHC RBC AUTO-ENTMCNC: 33.6 G/DL (ref 31.5–35.7)
MCHC RBC AUTO-ENTMCNC: 33.6 G/DL (ref 31.5–35.7)
MCHC RBC AUTO-ENTMCNC: 33.8 G/DL (ref 31.5–35.7)
MCHC RBC AUTO-ENTMCNC: 33.9 G/DL (ref 31.5–35.7)
MCHC RBC AUTO-ENTMCNC: 34 G/DL (ref 31.5–35.7)
MCHC RBC AUTO-ENTMCNC: 34 G/DL (ref 31.5–35.7)
MCHC RBC AUTO-ENTMCNC: 34.1 G/DL (ref 31.5–35.7)
MCHC RBC AUTO-ENTMCNC: 34.1 G/DL (ref 31.5–35.7)
MCHC RBC AUTO-ENTMCNC: 34.5 G/DL (ref 31.5–35.7)
MCHC RBC AUTO-ENTMCNC: 34.7 G/DL (ref 31.5–35.7)
MCHC RBC AUTO-ENTMCNC: 34.8 G/DL (ref 31.5–35.7)
MCHC RBC AUTO-ENTMCNC: 34.9 G/DL (ref 31.5–35.7)
MCHC RBC AUTO-ENTMCNC: 35.4 G/DL (ref 31.5–35.7)
MCV RBC AUTO: 80.8 FL (ref 79–97)
MCV RBC AUTO: 82.9 FL (ref 79–97)
MCV RBC AUTO: 82.9 FL (ref 79–97)
MCV RBC AUTO: 83.5 FL (ref 79–97)
MCV RBC AUTO: 83.8 FL (ref 79–97)
MCV RBC AUTO: 83.9 FL (ref 79–97)
MCV RBC AUTO: 84.7 FL (ref 79–97)
MCV RBC AUTO: 84.9 FL (ref 79–97)
MCV RBC AUTO: 85.4 FL (ref 79–97)
MCV RBC AUTO: 85.6 FL (ref 79–97)
MCV RBC AUTO: 85.6 FL (ref 79–97)
MCV RBC AUTO: 86.1 FL (ref 79–97)
MCV RBC AUTO: 86.4 FL (ref 79–97)
MCV RBC AUTO: 86.6 FL (ref 79–97)
MCV RBC AUTO: 87.6 FL (ref 79–97)
MCV RBC AUTO: 87.8 FL (ref 79–97)
MCV RBC AUTO: 87.8 FL (ref 79–97)
MCV RBC AUTO: 87.9 FL (ref 79–97)
MCV RBC AUTO: 88.3 FL (ref 79–97)
MCV RBC AUTO: 88.3 FL (ref 79–97)
MCV RBC AUTO: 88.7 FL (ref 79–97)
MCV RBC AUTO: 88.8 FL (ref 79–97)
MCV RBC AUTO: 88.9 FL (ref 79–97)
MCV RBC AUTO: 89.1 FL (ref 79–97)
MCV RBC AUTO: 89.2 FL (ref 79–97)
MCV RBC AUTO: 89.4 FL (ref 79–97)
MCV RBC AUTO: 89.4 FL (ref 79–97)
MCV RBC AUTO: 93.3 FL (ref 79–97)
METAMYELOCYTES NFR BLD MANUAL: 4 % (ref 0–0)
MONOCYTES # BLD AUTO: 0.18 10*3/MM3 (ref 0.1–0.9)
MONOCYTES # BLD AUTO: 0.23 10*3/MM3 (ref 0.1–0.9)
MONOCYTES # BLD AUTO: 0.33 10*3/MM3 (ref 0.1–0.9)
MONOCYTES # BLD AUTO: 0.44 10*3/MM3 (ref 0.1–0.9)
MONOCYTES # BLD AUTO: 0.5 10*3/MM3 (ref 0.1–0.9)
MONOCYTES # BLD AUTO: 0.71 10*3/MM3 (ref 0.1–0.9)
MONOCYTES # BLD AUTO: 0.72 10*3/MM3 (ref 0.1–0.9)
MONOCYTES # BLD AUTO: 0.77 10*3/MM3 (ref 0.1–0.9)
MONOCYTES # BLD AUTO: 0.82 10*3/MM3 (ref 0.1–0.9)
MONOCYTES # BLD AUTO: 0.82 10*3/MM3 (ref 0.1–0.9)
MONOCYTES # BLD AUTO: 0.84 10*3/MM3 (ref 0.1–0.9)
MONOCYTES # BLD AUTO: 0.95 10*3/MM3 (ref 0.1–0.9)
MONOCYTES # BLD AUTO: 0.97 10*3/MM3 (ref 0.1–0.9)
MONOCYTES # BLD AUTO: 1 10*3/MM3 (ref 0.1–0.9)
MONOCYTES # BLD AUTO: 1.04 10*3/MM3 (ref 0.1–0.9)
MONOCYTES # BLD AUTO: 1.09 10*3/MM3 (ref 0.1–0.9)
MONOCYTES # BLD AUTO: 1.09 10*3/MM3 (ref 0.1–0.9)
MONOCYTES # BLD AUTO: 1.11 10*3/MM3 (ref 0.1–0.9)
MONOCYTES # BLD AUTO: 1.32 10*3/MM3 (ref 0.1–0.9)
MONOCYTES # BLD AUTO: 1.32 10*3/MM3 (ref 0.1–0.9)
MONOCYTES # BLD AUTO: 1.46 10*3/MM3 (ref 0.1–0.9)
MONOCYTES # BLD: 0.15 10*3/MM3 (ref 0.1–0.9)
MONOCYTES # BLD: 0.26 10*3/MM3 (ref 0.1–0.9)
MONOCYTES # BLD: 0.38 10*3/MM3 (ref 0.1–0.9)
MONOCYTES # BLD: 0.68 10*3/MM3 (ref 0.1–0.9)
MONOCYTES # BLD: 0.75 10*3/MM3 (ref 0.1–0.9)
MONOCYTES # BLD: 0.97 10*3/MM3 (ref 0.1–0.9)
MONOCYTES # BLD: 1.4 10*3/MM3 (ref 0.1–0.9)
MONOCYTES # BLD: 3.26 10*3/MM3 (ref 0.1–0.9)
MONOCYTES NFR BLD AUTO: 10.7 % (ref 5–12)
MONOCYTES NFR BLD AUTO: 10.8 % (ref 5–12)
MONOCYTES NFR BLD AUTO: 11 % (ref 5–12)
MONOCYTES NFR BLD AUTO: 11.3 % (ref 5–12)
MONOCYTES NFR BLD AUTO: 11.5 % (ref 5–12)
MONOCYTES NFR BLD AUTO: 12.6 % (ref 5–12)
MONOCYTES NFR BLD AUTO: 13.2 % (ref 5–12)
MONOCYTES NFR BLD AUTO: 13.2 % (ref 5–12)
MONOCYTES NFR BLD AUTO: 13.5 % (ref 5–12)
MONOCYTES NFR BLD AUTO: 13.7 % (ref 5–12)
MONOCYTES NFR BLD AUTO: 14.2 % (ref 5–12)
MONOCYTES NFR BLD AUTO: 14.8 % (ref 5–12)
MONOCYTES NFR BLD AUTO: 19.1 % (ref 5–12)
MONOCYTES NFR BLD AUTO: 19.4 % (ref 5–12)
MONOCYTES NFR BLD AUTO: 22.8 % (ref 5–12)
MONOCYTES NFR BLD AUTO: 4.7 % (ref 5–12)
MONOCYTES NFR BLD AUTO: 7.1 % (ref 5–12)
MONOCYTES NFR BLD AUTO: 8.3 % (ref 5–12)
MONOCYTES NFR BLD AUTO: 8.4 % (ref 5–12)
MONOCYTES NFR BLD AUTO: 8.7 % (ref 5–12)
MONOCYTES NFR BLD AUTO: 9.9 % (ref 5–12)
MYELOCYTES NFR BLD MANUAL: 1 % (ref 0–0)
NEUTROPHILS # BLD AUTO: 0.08 10*3/MM3 (ref 1.7–7)
NEUTROPHILS # BLD AUTO: 0.47 10*3/MM3 (ref 1.7–7)
NEUTROPHILS # BLD AUTO: 1.74 10*3/MM3 (ref 1.7–7)
NEUTROPHILS # BLD AUTO: 1.98 10*3/MM3 (ref 1.7–7)
NEUTROPHILS # BLD AUTO: 1.99 10*3/MM3 (ref 1.7–7)
NEUTROPHILS # BLD AUTO: 27.36 10*3/MM3 (ref 1.7–7)
NEUTROPHILS # BLD AUTO: 3.23 10*3/MM3 (ref 1.7–7)
NEUTROPHILS # BLD AUTO: 6.65 10*3/MM3 (ref 1.7–7)
NEUTROPHILS NFR BLD AUTO: 1.07 10*3/MM3 (ref 1.7–7)
NEUTROPHILS NFR BLD AUTO: 1.75 10*3/MM3 (ref 1.7–7)
NEUTROPHILS NFR BLD AUTO: 13.41 10*3/MM3 (ref 1.7–7)
NEUTROPHILS NFR BLD AUTO: 2.62 10*3/MM3 (ref 1.7–7)
NEUTROPHILS NFR BLD AUTO: 2.95 10*3/MM3 (ref 1.7–7)
NEUTROPHILS NFR BLD AUTO: 3.25 10*3/MM3 (ref 1.7–7)
NEUTROPHILS NFR BLD AUTO: 3.36 10*3/MM3 (ref 1.7–7)
NEUTROPHILS NFR BLD AUTO: 3.43 10*3/MM3 (ref 1.7–7)
NEUTROPHILS NFR BLD AUTO: 3.45 10*3/MM3 (ref 1.7–7)
NEUTROPHILS NFR BLD AUTO: 3.63 10*3/MM3 (ref 1.7–7)
NEUTROPHILS NFR BLD AUTO: 4.16 10*3/MM3 (ref 1.7–7)
NEUTROPHILS NFR BLD AUTO: 4.28 10*3/MM3 (ref 1.7–7)
NEUTROPHILS NFR BLD AUTO: 4.87 10*3/MM3 (ref 1.7–7)
NEUTROPHILS NFR BLD AUTO: 48.5 % (ref 42.7–76)
NEUTROPHILS NFR BLD AUTO: 5.12 10*3/MM3 (ref 1.7–7)
NEUTROPHILS NFR BLD AUTO: 5.3 10*3/MM3 (ref 1.7–7)
NEUTROPHILS NFR BLD AUTO: 5.58 10*3/MM3 (ref 1.7–7)
NEUTROPHILS NFR BLD AUTO: 50 % (ref 42.7–76)
NEUTROPHILS NFR BLD AUTO: 58.7 % (ref 42.7–76)
NEUTROPHILS NFR BLD AUTO: 6.07 10*3/MM3 (ref 1.7–7)
NEUTROPHILS NFR BLD AUTO: 6.13 10*3/MM3 (ref 1.7–7)
NEUTROPHILS NFR BLD AUTO: 6.66 10*3/MM3 (ref 1.7–7)
NEUTROPHILS NFR BLD AUTO: 60.5 % (ref 42.7–76)
NEUTROPHILS NFR BLD AUTO: 64.5 % (ref 42.7–76)
NEUTROPHILS NFR BLD AUTO: 65.2 % (ref 42.7–76)
NEUTROPHILS NFR BLD AUTO: 65.5 % (ref 42.7–76)
NEUTROPHILS NFR BLD AUTO: 66.6 % (ref 42.7–76)
NEUTROPHILS NFR BLD AUTO: 66.8 % (ref 42.7–76)
NEUTROPHILS NFR BLD AUTO: 67.8 % (ref 42.7–76)
NEUTROPHILS NFR BLD AUTO: 67.9 % (ref 42.7–76)
NEUTROPHILS NFR BLD AUTO: 69.1 % (ref 42.7–76)
NEUTROPHILS NFR BLD AUTO: 7.31 10*3/MM3 (ref 1.7–7)
NEUTROPHILS NFR BLD AUTO: 70.8 % (ref 42.7–76)
NEUTROPHILS NFR BLD AUTO: 70.9 % (ref 42.7–76)
NEUTROPHILS NFR BLD AUTO: 71.6 % (ref 42.7–76)
NEUTROPHILS NFR BLD AUTO: 71.7 % (ref 42.7–76)
NEUTROPHILS NFR BLD AUTO: 74 % (ref 42.7–76)
NEUTROPHILS NFR BLD AUTO: 74.7 % (ref 42.7–76)
NEUTROPHILS NFR BLD AUTO: 76.4 % (ref 42.7–76)
NEUTROPHILS NFR BLD AUTO: 78.1 % (ref 42.7–76)
NEUTROPHILS NFR BLD AUTO: 79.7 % (ref 42.7–76)
NEUTROPHILS NFR BLD AUTO: 9.5 10*3/MM3 (ref 1.7–7)
NEUTROPHILS NFR BLD MANUAL: 17 % (ref 42.7–76)
NEUTROPHILS NFR BLD MANUAL: 39.4 % (ref 42.7–76)
NEUTROPHILS NFR BLD MANUAL: 51 % (ref 42.7–76)
NEUTROPHILS NFR BLD MANUAL: 52 % (ref 42.7–76)
NEUTROPHILS NFR BLD MANUAL: 56 % (ref 42.7–76)
NEUTROPHILS NFR BLD MANUAL: 62 % (ref 42.7–76)
NEUTROPHILS NFR BLD MANUAL: 7 % (ref 42.7–76)
NEUTROPHILS NFR BLD MANUAL: 80 % (ref 42.7–76)
NEUTS BAND NFR BLD MANUAL: 1 % (ref 0–5)
NEUTS BAND NFR BLD MANUAL: 1 % (ref 0–5)
NEUTS BAND NFR BLD MANUAL: 18.2 % (ref 0–5)
NEUTS BAND NFR BLD MANUAL: 2 % (ref 0–5)
NEUTS BAND NFR BLD MANUAL: 3 % (ref 0–5)
NEUTS BAND NFR BLD MANUAL: 4 % (ref 0–5)
NITRITE UR QL STRIP: NEGATIVE
NITRITE UR QL STRIP: NEGATIVE
NOROVIRUS GI+II RNA STL QL NAA+NON-PROBE: NOT DETECTED
NRBC BLD AUTO-RTO: 0 /100 WBC (ref 0–0.2)
OVALOCYTES BLD QL SMEAR: ABNORMAL
P SHIGELLOIDES DNA STL QL NAA+NON-PROBE: NOT DETECTED
PH UR STRIP.AUTO: 6 [PH] (ref 5–9)
PH UR STRIP.AUTO: 6.5 [PH] (ref 5–9)
PHOSPHATE SERPL-MCNC: 2.7 MG/DL (ref 2.5–4.5)
PHOSPHATE SERPL-MCNC: 2.8 MG/DL (ref 2.5–4.5)
PHOSPHATE SERPL-MCNC: 3.2 MG/DL (ref 2.5–4.5)
PHOSPHATE SERPL-MCNC: 3.4 MG/DL (ref 2.5–4.5)
PHOSPHATE SERPL-MCNC: 3.9 MG/DL (ref 2.5–4.5)
PHOSPHATE SERPL-MCNC: 3.9 MG/DL (ref 2.5–4.5)
PHOSPHATE SERPL-MCNC: 4.3 MG/DL (ref 2.5–4.5)
PHOSPHATE SERPL-MCNC: 4.4 MG/DL (ref 2.5–4.5)
PLATELET # BLD AUTO: 123 10*3/MM3 (ref 140–450)
PLATELET # BLD AUTO: 175 10*3/MM3 (ref 140–450)
PLATELET # BLD AUTO: 189 10*3/MM3 (ref 140–450)
PLATELET # BLD AUTO: 208 10*3/MM3 (ref 140–450)
PLATELET # BLD AUTO: 215 10*3/MM3 (ref 140–450)
PLATELET # BLD AUTO: 217 10*3/MM3 (ref 140–450)
PLATELET # BLD AUTO: 220 10*3/MM3 (ref 140–450)
PLATELET # BLD AUTO: 223 10*3/MM3 (ref 140–450)
PLATELET # BLD AUTO: 224 10*3/MM3 (ref 140–450)
PLATELET # BLD AUTO: 231 10*3/MM3 (ref 140–450)
PLATELET # BLD AUTO: 239 10*3/MM3 (ref 140–450)
PLATELET # BLD AUTO: 243 10*3/MM3 (ref 140–450)
PLATELET # BLD AUTO: 250 10*3/MM3 (ref 140–450)
PLATELET # BLD AUTO: 253 10*3/MM3 (ref 140–450)
PLATELET # BLD AUTO: 256 10*3/MM3 (ref 140–450)
PLATELET # BLD AUTO: 263 10*3/MM3 (ref 140–450)
PLATELET # BLD AUTO: 267 10*3/MM3 (ref 140–450)
PLATELET # BLD AUTO: 272 10*3/MM3 (ref 140–450)
PLATELET # BLD AUTO: 273 10*3/MM3 (ref 140–450)
PLATELET # BLD AUTO: 274 10*3/MM3 (ref 140–450)
PLATELET # BLD AUTO: 284 10*3/MM3 (ref 140–450)
PLATELET # BLD AUTO: 287 10*3/MM3 (ref 140–450)
PLATELET # BLD AUTO: 289 10*3/MM3 (ref 140–450)
PLATELET # BLD AUTO: 291 10*3/MM3 (ref 140–450)
PLATELET # BLD AUTO: 314 10*3/MM3 (ref 140–450)
PLATELET # BLD AUTO: 315 10*3/MM3 (ref 140–450)
PLATELET # BLD AUTO: 329 10*3/MM3 (ref 140–450)
PLATELET # BLD AUTO: 384 10*3/MM3 (ref 140–450)
PMV BLD AUTO: 10 FL (ref 6–12)
PMV BLD AUTO: 10 FL (ref 6–12)
PMV BLD AUTO: 10.1 FL (ref 6–12)
PMV BLD AUTO: 10.3 FL (ref 6–12)
PMV BLD AUTO: 10.4 FL (ref 6–12)
PMV BLD AUTO: 10.6 FL (ref 6–12)
PMV BLD AUTO: 11.3 FL (ref 6–12)
PMV BLD AUTO: 9.1 FL (ref 6–12)
PMV BLD AUTO: 9.1 FL (ref 6–12)
PMV BLD AUTO: 9.3 FL (ref 6–12)
PMV BLD AUTO: 9.3 FL (ref 6–12)
PMV BLD AUTO: 9.4 FL (ref 6–12)
PMV BLD AUTO: 9.5 FL (ref 6–12)
PMV BLD AUTO: 9.6 FL (ref 6–12)
PMV BLD AUTO: 9.7 FL (ref 6–12)
PMV BLD AUTO: 9.8 FL (ref 6–12)
PMV BLD AUTO: 9.8 FL (ref 6–12)
PMV BLD AUTO: 9.9 FL (ref 6–12)
POLYCHROMASIA BLD QL SMEAR: ABNORMAL
POLYCHROMASIA BLD QL SMEAR: ABNORMAL
POTASSIUM SERPL-SCNC: 3 MMOL/L (ref 3.5–5.2)
POTASSIUM SERPL-SCNC: 3.2 MMOL/L (ref 3.5–5.2)
POTASSIUM SERPL-SCNC: 3.3 MMOL/L (ref 3.5–5.2)
POTASSIUM SERPL-SCNC: 3.6 MMOL/L (ref 3.5–5.2)
POTASSIUM SERPL-SCNC: 3.7 MMOL/L (ref 3.5–5.2)
POTASSIUM SERPL-SCNC: 3.8 MMOL/L (ref 3.5–5.2)
POTASSIUM SERPL-SCNC: 3.9 MMOL/L (ref 3.5–5.2)
POTASSIUM SERPL-SCNC: 4 MMOL/L (ref 3.5–5.2)
POTASSIUM SERPL-SCNC: 4 MMOL/L (ref 3.5–5.2)
POTASSIUM SERPL-SCNC: 4.1 MMOL/L (ref 3.5–5.2)
POTASSIUM SERPL-SCNC: 4.2 MMOL/L (ref 3.5–5.2)
POTASSIUM SERPL-SCNC: 4.2 MMOL/L (ref 3.5–5.2)
POTASSIUM SERPL-SCNC: 4.3 MMOL/L (ref 3.5–5.2)
POTASSIUM SERPL-SCNC: 4.5 MMOL/L (ref 3.5–5.2)
PROT SERPL-MCNC: 5.8 G/DL (ref 6–8.5)
PROT SERPL-MCNC: 6.1 G/DL (ref 6–8.5)
PROT SERPL-MCNC: 6.2 G/DL (ref 6–8.5)
PROT SERPL-MCNC: 6.3 G/DL (ref 6–8.5)
PROT SERPL-MCNC: 6.4 G/DL (ref 6–8.5)
PROT SERPL-MCNC: 6.5 G/DL (ref 6–8.5)
PROT SERPL-MCNC: 6.5 G/DL (ref 6–8.5)
PROT SERPL-MCNC: 6.6 G/DL (ref 6–8.5)
PROT SERPL-MCNC: 6.7 G/DL (ref 6–8.5)
PROT SERPL-MCNC: 6.7 G/DL (ref 6–8.5)
PROT SERPL-MCNC: 6.8 G/DL (ref 6–8.5)
PROT SERPL-MCNC: 6.8 G/DL (ref 6–8.5)
PROT SERPL-MCNC: 6.9 G/DL (ref 6–8.5)
PROT SERPL-MCNC: 7 G/DL (ref 6–8.5)
PROT SERPL-MCNC: 7.3 G/DL (ref 6–8.5)
PROT SERPL-MCNC: 7.4 G/DL (ref 6–8.5)
PROT SERPL-MCNC: 7.5 G/DL (ref 6–8.5)
PROT UR QL STRIP: ABNORMAL
PROT UR QL STRIP: NEGATIVE
PROTHROMBIN TIME: 19.4 SECONDS (ref 11.1–15.3)
PROTHROMBIN TIME: 20.4 SECONDS (ref 11.1–15.3)
PROTHROMBIN TIME: 22.1 SECONDS (ref 11.1–15.3)
PROTHROMBIN TIME: 22.7 SECONDS (ref 11.1–15.3)
PROTHROMBIN TIME: 24.1 SECONDS (ref 11.1–15.3)
PROTHROMBIN TIME: 24.7 SECONDS (ref 11.1–15.3)
PROTHROMBIN TIME: 26.3 SECONDS (ref 11.1–15.3)
PROTHROMBIN TIME: 27.4 SECONDS (ref 11.1–15.3)
RBC # BLD AUTO: 3.15 10*6/MM3 (ref 3.77–5.28)
RBC # BLD AUTO: 3.5 10*6/MM3 (ref 3.77–5.28)
RBC # BLD AUTO: 3.74 10*6/MM3 (ref 3.77–5.28)
RBC # BLD AUTO: 3.81 10*6/MM3 (ref 3.77–5.28)
RBC # BLD AUTO: 3.89 10*6/MM3 (ref 3.77–5.28)
RBC # BLD AUTO: 3.94 10*6/MM3 (ref 3.77–5.28)
RBC # BLD AUTO: 3.96 10*6/MM3 (ref 3.77–5.28)
RBC # BLD AUTO: 3.96 10*6/MM3 (ref 3.77–5.28)
RBC # BLD AUTO: 4.01 10*6/MM3 (ref 3.77–5.28)
RBC # BLD AUTO: 4.01 10*6/MM3 (ref 3.77–5.28)
RBC # BLD AUTO: 4.02 10*6/MM3 (ref 3.77–5.28)
RBC # BLD AUTO: 4.03 10*6/MM3 (ref 3.77–5.28)
RBC # BLD AUTO: 4.04 10*6/MM3 (ref 3.77–5.28)
RBC # BLD AUTO: 4.06 10*6/MM3 (ref 3.77–5.28)
RBC # BLD AUTO: 4.07 10*6/MM3 (ref 3.77–5.28)
RBC # BLD AUTO: 4.09 10*6/MM3 (ref 3.77–5.28)
RBC # BLD AUTO: 4.09 10*6/MM3 (ref 3.77–5.28)
RBC # BLD AUTO: 4.11 10*6/MM3 (ref 3.77–5.28)
RBC # BLD AUTO: 4.12 10*6/MM3 (ref 3.77–5.28)
RBC # BLD AUTO: 4.2 10*6/MM3 (ref 3.77–5.28)
RBC # BLD AUTO: 4.21 10*6/MM3 (ref 3.77–5.28)
RBC # BLD AUTO: 4.25 10*6/MM3 (ref 3.77–5.28)
RBC # BLD AUTO: 4.25 10*6/MM3 (ref 3.77–5.28)
RBC # BLD AUTO: 4.26 10*6/MM3 (ref 3.77–5.28)
RBC # BLD AUTO: 4.27 10*6/MM3 (ref 3.77–5.28)
RBC # BLD AUTO: 4.51 10*6/MM3 (ref 3.77–5.28)
RBC # BLD AUTO: 4.71 10*6/MM3 (ref 3.77–5.28)
RBC # BLD AUTO: 4.74 10*6/MM3 (ref 3.77–5.28)
RBC # UR STRIP: ABNORMAL /HPF
RBC # UR STRIP: ABNORMAL /HPF
REF LAB TEST METHOD: ABNORMAL
REF LAB TEST METHOD: ABNORMAL
RVA RNA STL QL NAA+NON-PROBE: NOT DETECTED
S ENT+BONG DNA STL QL NAA+NON-PROBE: NOT DETECTED
SAPO I+II+IV+V RNA STL QL NAA+NON-PROBE: NOT DETECTED
SARS-COV-2 RNA PNL SPEC NAA+PROBE: NOT DETECTED
SARS-COV-2 RNA RESP QL NAA+PROBE: NOT DETECTED
SHIGELLA SP+EIEC IPAH ST NAA+NON-PROBE: NOT DETECTED
SMALL PLATELETS BLD QL SMEAR: ADEQUATE
SODIUM SERPL-SCNC: 133 MMOL/L (ref 136–145)
SODIUM SERPL-SCNC: 133 MMOL/L (ref 136–145)
SODIUM SERPL-SCNC: 135 MMOL/L (ref 136–145)
SODIUM SERPL-SCNC: 136 MMOL/L (ref 136–145)
SODIUM SERPL-SCNC: 136 MMOL/L (ref 136–145)
SODIUM SERPL-SCNC: 137 MMOL/L (ref 136–145)
SODIUM SERPL-SCNC: 138 MMOL/L (ref 136–145)
SODIUM SERPL-SCNC: 139 MMOL/L (ref 136–145)
SODIUM SERPL-SCNC: 139 MMOL/L (ref 136–145)
SODIUM SERPL-SCNC: 140 MMOL/L (ref 136–145)
SODIUM SERPL-SCNC: 141 MMOL/L (ref 136–145)
SODIUM SERPL-SCNC: 142 MMOL/L (ref 136–145)
SODIUM SERPL-SCNC: 142 MMOL/L (ref 136–145)
SP GR UR STRIP: 1.02 (ref 1–1.03)
SP GR UR STRIP: 1.02 (ref 1–1.03)
SQUAMOUS #/AREA URNS HPF: ABNORMAL /HPF
SQUAMOUS #/AREA URNS HPF: ABNORMAL /HPF
TIBC SERPL-MCNC: 404 MCG/DL (ref 298–536)
TRANSFERRIN SERPL-MCNC: 271 MG/DL (ref 200–360)
UROBILINOGEN UR QL STRIP: ABNORMAL
UROBILINOGEN UR QL STRIP: ABNORMAL
V CHOL+PARA+VUL DNA STL QL NAA+NON-PROBE: NOT DETECTED
V CHOLERAE DNA STL QL NAA+NON-PROBE: NOT DETECTED
VARIANT LYMPHS NFR BLD MANUAL: 1 % (ref 0–5)
VARIANT LYMPHS NFR BLD MANUAL: 18 % (ref 19.6–45.3)
VARIANT LYMPHS NFR BLD MANUAL: 2 % (ref 0–5)
VARIANT LYMPHS NFR BLD MANUAL: 22.2 % (ref 19.6–45.3)
VARIANT LYMPHS NFR BLD MANUAL: 23 % (ref 19.6–45.3)
VARIANT LYMPHS NFR BLD MANUAL: 26 % (ref 19.6–45.3)
VARIANT LYMPHS NFR BLD MANUAL: 3 % (ref 0–5)
VARIANT LYMPHS NFR BLD MANUAL: 36 % (ref 19.6–45.3)
VARIANT LYMPHS NFR BLD MANUAL: 47 % (ref 19.6–45.3)
VARIANT LYMPHS NFR BLD MANUAL: 6 % (ref 19.6–45.3)
VARIANT LYMPHS NFR BLD MANUAL: 70 % (ref 19.6–45.3)
VIT B12 BLD-MCNC: >2000 PG/ML (ref 211–946)
WBC # UR STRIP: ABNORMAL /HPF
WBC # UR STRIP: ABNORMAL /HPF
WBC MORPH BLD: NORMAL
WBC NRBC COR # BLD: 0.88 10*3/MM3 (ref 3.4–10.8)
WBC NRBC COR # BLD: 11.55 10*3/MM3 (ref 3.4–10.8)
WBC NRBC COR # BLD: 12.18 10*3/MM3 (ref 3.4–10.8)
WBC NRBC COR # BLD: 16.84 10*3/MM3 (ref 3.4–10.8)
WBC NRBC COR # BLD: 2.14 10*3/MM3 (ref 3.4–10.8)
WBC NRBC COR # BLD: 2.59 10*3/MM3 (ref 3.4–10.8)
WBC NRBC COR # BLD: 2.95 10*3/MM3 (ref 3.4–10.8)
WBC NRBC COR # BLD: 3.6 10*3/MM3 (ref 3.4–10.8)
WBC NRBC COR # BLD: 3.69 10*3/MM3 (ref 3.4–10.8)
WBC NRBC COR # BLD: 3.86 10*3/MM3 (ref 3.4–10.8)
WBC NRBC COR # BLD: 3.99 10*3/MM3 (ref 3.4–10.8)
WBC NRBC COR # BLD: 32.57 10*3/MM3 (ref 3.4–10.8)
WBC NRBC COR # BLD: 4.99 10*3/MM3 (ref 3.4–10.8)
WBC NRBC COR # BLD: 5.05 10*3/MM3 (ref 3.4–10.8)
WBC NRBC COR # BLD: 5.12 10*3/MM3 (ref 3.4–10.8)
WBC NRBC COR # BLD: 5.63 10*3/MM3 (ref 3.4–10.8)
WBC NRBC COR # BLD: 5.7 10*3/MM3 (ref 3.4–10.8)
WBC NRBC COR # BLD: 5.73 10*3/MM3 (ref 3.4–10.8)
WBC NRBC COR # BLD: 6.2 10*3/MM3 (ref 3.4–10.8)
WBC NRBC COR # BLD: 6.22 10*3/MM3 (ref 3.4–10.8)
WBC NRBC COR # BLD: 7.31 10*3/MM3 (ref 3.4–10.8)
WBC NRBC COR # BLD: 7.41 10*3/MM3 (ref 3.4–10.8)
WBC NRBC COR # BLD: 7.55 10*3/MM3 (ref 3.4–10.8)
WBC NRBC COR # BLD: 7.87 10*3/MM3 (ref 3.4–10.8)
WBC NRBC COR # BLD: 8.55 10*3/MM3 (ref 3.4–10.8)
WBC NRBC COR # BLD: 8.57 10*3/MM3 (ref 3.4–10.8)
WBC NRBC COR # BLD: 8.91 10*3/MM3 (ref 3.4–10.8)
WBC NRBC COR # BLD: 9.89 10*3/MM3 (ref 3.4–10.8)
WHOLE BLOOD HOLD SPECIMEN: NORMAL
Y ENTEROCOL DNA STL QL NAA+NON-PROBE: NOT DETECTED

## 2022-01-01 PROCEDURE — G0378 HOSPITAL OBSERVATION PER HR: HCPCS

## 2022-01-01 PROCEDURE — 36416 COLLJ CAPILLARY BLOOD SPEC: CPT | Performed by: NURSE PRACTITIONER

## 2022-01-01 PROCEDURE — 84132 ASSAY OF SERUM POTASSIUM: CPT | Performed by: INTERNAL MEDICINE

## 2022-01-01 PROCEDURE — 96361 HYDRATE IV INFUSION ADD-ON: CPT | Performed by: INTERNAL MEDICINE

## 2022-01-01 PROCEDURE — G0463 HOSPITAL OUTPT CLINIC VISIT: HCPCS | Performed by: NURSE PRACTITIONER

## 2022-01-01 PROCEDURE — 80048 BASIC METABOLIC PNL TOTAL CA: CPT

## 2022-01-01 PROCEDURE — 96366 THER/PROPH/DIAG IV INF ADDON: CPT

## 2022-01-01 PROCEDURE — 96367 TX/PROPH/DG ADDL SEQ IV INF: CPT | Performed by: INTERNAL MEDICINE

## 2022-01-01 PROCEDURE — 36415 COLL VENOUS BLD VENIPUNCTURE: CPT

## 2022-01-01 PROCEDURE — 25010000002 ERIBULIN 1 MG/2ML SOLUTION: Performed by: INTERNAL MEDICINE

## 2022-01-01 PROCEDURE — 93793 ANTICOAG MGMT PT WARFARIN: CPT | Performed by: NURSE PRACTITIONER

## 2022-01-01 PROCEDURE — 85025 COMPLETE CBC W/AUTO DIFF WBC: CPT | Performed by: INTERNAL MEDICINE

## 2022-01-01 PROCEDURE — 86300 IMMUNOASSAY TUMOR CA 15-3: CPT

## 2022-01-01 PROCEDURE — 80053 COMPREHEN METABOLIC PANEL: CPT | Performed by: FAMILY MEDICINE

## 2022-01-01 PROCEDURE — 85025 COMPLETE CBC W/AUTO DIFF WBC: CPT

## 2022-01-01 PROCEDURE — 83690 ASSAY OF LIPASE: CPT | Performed by: FAMILY MEDICINE

## 2022-01-01 PROCEDURE — 87493 C DIFF AMPLIFIED PROBE: CPT | Performed by: INTERNAL MEDICINE

## 2022-01-01 PROCEDURE — 85025 COMPLETE CBC W/AUTO DIFF WBC: CPT | Performed by: FAMILY MEDICINE

## 2022-01-01 PROCEDURE — 96409 CHEMO IV PUSH SNGL DRUG: CPT | Performed by: INTERNAL MEDICINE

## 2022-01-01 PROCEDURE — 96375 TX/PRO/DX INJ NEW DRUG ADDON: CPT | Performed by: INTERNAL MEDICINE

## 2022-01-01 PROCEDURE — 85610 PROTHROMBIN TIME: CPT | Performed by: NURSE PRACTITIONER

## 2022-01-01 PROCEDURE — 99214 OFFICE O/P EST MOD 30 MIN: CPT | Performed by: INTERNAL MEDICINE

## 2022-01-01 PROCEDURE — 96365 THER/PROPH/DIAG IV INF INIT: CPT | Performed by: INTERNAL MEDICINE

## 2022-01-01 PROCEDURE — 83735 ASSAY OF MAGNESIUM: CPT

## 2022-01-01 PROCEDURE — 96417 CHEMO IV INFUS EACH ADDL SEQ: CPT | Performed by: INTERNAL MEDICINE

## 2022-01-01 PROCEDURE — 0097U HC BIOFIRE FILMARRAY GI PANEL: CPT | Performed by: INTERNAL MEDICINE

## 2022-01-01 PROCEDURE — 25010000002 DEXAMETHASONE SODIUM PHOSPHATE 100 MG/10ML SOLUTION: Performed by: INTERNAL MEDICINE

## 2022-01-01 PROCEDURE — 99284 EMERGENCY DEPT VISIT MOD MDM: CPT

## 2022-01-01 PROCEDURE — 25010000002 HEPARIN LOCK FLUSH PER 10 UNITS: Performed by: NURSE PRACTITIONER

## 2022-01-01 PROCEDURE — 25010000002 HEPARIN LOCK FLUSH PER 10 UNITS

## 2022-01-01 PROCEDURE — 85007 BL SMEAR W/DIFF WBC COUNT: CPT

## 2022-01-01 PROCEDURE — 99215 OFFICE O/P EST HI 40 MIN: CPT | Performed by: INTERNAL MEDICINE

## 2022-01-01 PROCEDURE — G0463 HOSPITAL OUTPT CLINIC VISIT: HCPCS | Performed by: INTERNAL MEDICINE

## 2022-01-01 PROCEDURE — 82728 ASSAY OF FERRITIN: CPT | Performed by: INTERNAL MEDICINE

## 2022-01-01 PROCEDURE — C9803 HOPD COVID-19 SPEC COLLECT: HCPCS

## 2022-01-01 PROCEDURE — 80053 COMPREHEN METABOLIC PANEL: CPT

## 2022-01-01 PROCEDURE — 96374 THER/PROPH/DIAG INJ IV PUSH: CPT | Performed by: INTERNAL MEDICINE

## 2022-01-01 PROCEDURE — 80053 COMPREHEN METABOLIC PANEL: CPT | Performed by: INTERNAL MEDICINE

## 2022-01-01 PROCEDURE — 25010000002 ONDANSETRON PER 1 MG: Performed by: FAMILY MEDICINE

## 2022-01-01 PROCEDURE — 84100 ASSAY OF PHOSPHORUS: CPT

## 2022-01-01 PROCEDURE — 25010000002 PIPERACILLIN SOD-TAZOBACTAM PER 1 G: Performed by: FAMILY MEDICINE

## 2022-01-01 PROCEDURE — 25010000002 GEMCITABINE 200 MG/5.26ML SOLUTION 5.26 ML VIAL: Performed by: INTERNAL MEDICINE

## 2022-01-01 PROCEDURE — 87086 URINE CULTURE/COLONY COUNT: CPT | Performed by: FAMILY MEDICINE

## 2022-01-01 PROCEDURE — 71260 CT THORAX DX C+: CPT

## 2022-01-01 PROCEDURE — 85610 PROTHROMBIN TIME: CPT | Performed by: INTERNAL MEDICINE

## 2022-01-01 PROCEDURE — 25010000002 PALONOSETRON PER 25 MCG: Performed by: INTERNAL MEDICINE

## 2022-01-01 PROCEDURE — 25010000002 MORPHINE PER 10 MG: Performed by: FAMILY MEDICINE

## 2022-01-01 PROCEDURE — 25010000002 ONDANSETRON PER 1 MG: Performed by: INTERNAL MEDICINE

## 2022-01-01 PROCEDURE — 25010000002 MORPHINE PER 10 MG: Performed by: INTERNAL MEDICINE

## 2022-01-01 PROCEDURE — 25010000002 CARBOPLATIN PER 50 MG: Performed by: INTERNAL MEDICINE

## 2022-01-01 PROCEDURE — 25010000002 ZOLEDRONIC ACID PER 1 MG: Performed by: INTERNAL MEDICINE

## 2022-01-01 PROCEDURE — 85610 PROTHROMBIN TIME: CPT | Performed by: FAMILY MEDICINE

## 2022-01-01 PROCEDURE — 25010000002 FILGRASTIM 480 MCG/0.8ML SOLUTION PREFILLED SYRINGE: Performed by: INTERNAL MEDICINE

## 2022-01-01 PROCEDURE — 99214 OFFICE O/P EST MOD 30 MIN: CPT | Performed by: NURSE PRACTITIONER

## 2022-01-01 PROCEDURE — 25010000002 ERIBULIN 1 MG/2ML SOLUTION: Performed by: NURSE PRACTITIONER

## 2022-01-01 PROCEDURE — 96413 CHEMO IV INFUSION 1 HR: CPT | Performed by: INTERNAL MEDICINE

## 2022-01-01 PROCEDURE — 87040 BLOOD CULTURE FOR BACTERIA: CPT | Performed by: FAMILY MEDICINE

## 2022-01-01 PROCEDURE — 25010000002 PIPERACILLIN SOD-TAZOBACTAM PER 1 G: Performed by: INTERNAL MEDICINE

## 2022-01-01 PROCEDURE — 80048 BASIC METABOLIC PNL TOTAL CA: CPT | Performed by: FAMILY MEDICINE

## 2022-01-01 PROCEDURE — 85007 BL SMEAR W/DIFF WBC COUNT: CPT | Performed by: FAMILY MEDICINE

## 2022-01-01 PROCEDURE — 78226 HEPATOBILIARY SYSTEM IMAGING: CPT

## 2022-01-01 PROCEDURE — 80048 BASIC METABOLIC PNL TOTAL CA: CPT | Performed by: INTERNAL MEDICINE

## 2022-01-01 PROCEDURE — 99232 SBSQ HOSP IP/OBS MODERATE 35: CPT | Performed by: INTERNAL MEDICINE

## 2022-01-01 PROCEDURE — 96409 CHEMO IV PUSH SNGL DRUG: CPT | Performed by: NURSE PRACTITIONER

## 2022-01-01 PROCEDURE — 83735 ASSAY OF MAGNESIUM: CPT | Performed by: INTERNAL MEDICINE

## 2022-01-01 PROCEDURE — 96372 THER/PROPH/DIAG INJ SC/IM: CPT | Performed by: INTERNAL MEDICINE

## 2022-01-01 PROCEDURE — 25810000003 SODIUM CHLORIDE 0.9 % WITH KCL 40 MEQ/L 40-0.9 MEQ/L-% SOLUTION: Performed by: INTERNAL MEDICINE

## 2022-01-01 PROCEDURE — 84466 ASSAY OF TRANSFERRIN: CPT | Performed by: INTERNAL MEDICINE

## 2022-01-01 PROCEDURE — 81001 URINALYSIS AUTO W/SCOPE: CPT | Performed by: FAMILY MEDICINE

## 2022-01-01 PROCEDURE — 25010000002 IOPAMIDOL 61 % SOLUTION: Performed by: INTERNAL MEDICINE

## 2022-01-01 PROCEDURE — 36415 COLL VENOUS BLD VENIPUNCTURE: CPT | Performed by: FAMILY MEDICINE

## 2022-01-01 PROCEDURE — 36591 DRAW BLOOD OFF VENOUS DEVICE: CPT | Performed by: INTERNAL MEDICINE

## 2022-01-01 PROCEDURE — 76705 ECHO EXAM OF ABDOMEN: CPT

## 2022-01-01 PROCEDURE — 80076 HEPATIC FUNCTION PANEL: CPT

## 2022-01-01 PROCEDURE — 96366 THER/PROPH/DIAG IV INF ADDON: CPT | Performed by: INTERNAL MEDICINE

## 2022-01-01 PROCEDURE — 74177 CT ABD & PELVIS W/CONTRAST: CPT

## 2022-01-01 PROCEDURE — 83540 ASSAY OF IRON: CPT | Performed by: INTERNAL MEDICINE

## 2022-01-01 PROCEDURE — 0 IOPAMIDOL PER 1 ML: Performed by: INTERNAL MEDICINE

## 2022-01-01 PROCEDURE — 96375 TX/PRO/DX INJ NEW DRUG ADDON: CPT | Performed by: NURSE PRACTITIONER

## 2022-01-01 PROCEDURE — 83605 ASSAY OF LACTIC ACID: CPT | Performed by: FAMILY MEDICINE

## 2022-01-01 PROCEDURE — 82607 VITAMIN B-12: CPT | Performed by: INTERNAL MEDICINE

## 2022-01-01 PROCEDURE — 80076 HEPATIC FUNCTION PANEL: CPT | Performed by: SURGERY

## 2022-01-01 PROCEDURE — 83735 ASSAY OF MAGNESIUM: CPT | Performed by: FAMILY MEDICINE

## 2022-01-01 PROCEDURE — 25010000002 GEMCITABINE 1 GM/26.3ML SOLUTION 26.3 ML VIAL: Performed by: INTERNAL MEDICINE

## 2022-01-01 PROCEDURE — 82550 ASSAY OF CK (CPK): CPT | Performed by: FAMILY MEDICINE

## 2022-01-01 PROCEDURE — A9537 TC99M MEBROFENIN: HCPCS | Performed by: FAMILY MEDICINE

## 2022-01-01 PROCEDURE — 87636 SARSCOV2 & INF A&B AMP PRB: CPT | Performed by: FAMILY MEDICINE

## 2022-01-01 PROCEDURE — 96365 THER/PROPH/DIAG IV INF INIT: CPT

## 2022-01-01 PROCEDURE — 83605 ASSAY OF LACTIC ACID: CPT | Performed by: INTERNAL MEDICINE

## 2022-01-01 PROCEDURE — 25010000002 DEXAMETHASONE SODIUM PHOSPHATE 100 MG/10ML SOLUTION: Performed by: NURSE PRACTITIONER

## 2022-01-01 PROCEDURE — 25010000002 HEPARIN (PORCINE) PER 1000 UNITS: Performed by: NURSE PRACTITIONER

## 2022-01-01 PROCEDURE — 25010000002 HEPARIN LOCK FLUSH PER 10 UNITS: Performed by: HOSPITALIST

## 2022-01-01 PROCEDURE — 25010000002 HEPARIN LOCK FLUSH PER 10 UNITS: Performed by: INTERNAL MEDICINE

## 2022-01-01 PROCEDURE — 82746 ASSAY OF FOLIC ACID SERUM: CPT | Performed by: INTERNAL MEDICINE

## 2022-01-01 PROCEDURE — 99213 OFFICE O/P EST LOW 20 MIN: CPT | Performed by: NURSE PRACTITIONER

## 2022-01-01 PROCEDURE — 0 POTASSIUM CHLORIDE 10 MEQ/100ML SOLUTION: Performed by: INTERNAL MEDICINE

## 2022-01-01 PROCEDURE — 81025 URINE PREGNANCY TEST: CPT

## 2022-01-01 PROCEDURE — 78227 HEPATOBIL SYST IMAGE W/DRUG: CPT

## 2022-01-01 PROCEDURE — 25010000002 IOPAMIDOL 61 % SOLUTION: Performed by: FAMILY MEDICINE

## 2022-01-01 PROCEDURE — 25010000002 ZOLEDRONIC ACID PER 1 MG: Performed by: NURSE PRACTITIONER

## 2022-01-01 PROCEDURE — 25010000002 DEXAMETHASONE SODIUM PHOSPHATE 100 MG/10ML SOLUTION 10 ML VIAL: Performed by: INTERNAL MEDICINE

## 2022-01-01 PROCEDURE — 25010000002 DEXAMETHASONE PER 1 MG: Performed by: INTERNAL MEDICINE

## 2022-01-01 PROCEDURE — 25010000002 IOPAMIDOL 61 % SOLUTION: Performed by: NURSE PRACTITIONER

## 2022-01-01 PROCEDURE — 0 TECHNETIUM TC 99M MEBROFENIN KIT: Performed by: FAMILY MEDICINE

## 2022-01-01 RX ORDER — CAPECITABINE 500 MG/1
1000 TABLET, FILM COATED ORAL 2 TIMES DAILY
Qty: 112 TABLET | Refills: 1 | Status: SHIPPED | OUTPATIENT
Start: 2022-01-01 | End: 2022-01-01

## 2022-01-01 RX ORDER — HEPARIN SODIUM (PORCINE) LOCK FLUSH IV SOLN 100 UNIT/ML 100 UNIT/ML
SOLUTION INTRAVENOUS
Status: COMPLETED
Start: 2022-01-01 | End: 2022-01-01

## 2022-01-01 RX ORDER — SODIUM CHLORIDE 9 MG/ML
250 INJECTION, SOLUTION INTRAVENOUS ONCE
Status: COMPLETED | OUTPATIENT
Start: 2022-01-01 | End: 2022-01-01

## 2022-01-01 RX ORDER — HEPARIN SODIUM (PORCINE) LOCK FLUSH IV SOLN 100 UNIT/ML 100 UNIT/ML
500 SOLUTION INTRAVENOUS AS NEEDED
Status: CANCELLED | OUTPATIENT
Start: 2022-01-01

## 2022-01-01 RX ORDER — SODIUM CHLORIDE 0.9 % (FLUSH) 0.9 %
10 SYRINGE (ML) INJECTION AS NEEDED
Status: DISCONTINUED | OUTPATIENT
Start: 2022-01-01 | End: 2022-01-01 | Stop reason: HOSPADM

## 2022-01-01 RX ORDER — SODIUM CHLORIDE 0.9 % (FLUSH) 0.9 %
20 SYRINGE (ML) INJECTION AS NEEDED
Status: CANCELLED | OUTPATIENT
Start: 2022-01-01

## 2022-01-01 RX ORDER — KIT FOR THE PREPARATION OF TECHNETIUM TC 99M MEBROFENIN 45 MG/10ML
1 INJECTION, POWDER, LYOPHILIZED, FOR SOLUTION INTRAVENOUS
Status: COMPLETED | OUTPATIENT
Start: 2022-01-01 | End: 2022-01-01

## 2022-01-01 RX ORDER — POLYETHYLENE GLYCOL 3350 17 G/17G
17 POWDER, FOR SOLUTION ORAL DAILY PRN
Status: DISCONTINUED | OUTPATIENT
Start: 2022-01-01 | End: 2022-01-01 | Stop reason: HOSPADM

## 2022-01-01 RX ORDER — SODIUM CHLORIDE 0.9 % (FLUSH) 0.9 %
10 SYRINGE (ML) INJECTION AS NEEDED
Status: CANCELLED | OUTPATIENT
Start: 2022-01-01

## 2022-01-01 RX ORDER — GABAPENTIN 300 MG/1
CAPSULE ORAL
Qty: 90 CAPSULE | Refills: 0 | Status: SHIPPED | OUTPATIENT
Start: 2022-01-01

## 2022-01-01 RX ORDER — HEPARIN SODIUM (PORCINE) LOCK FLUSH IV SOLN 100 UNIT/ML 100 UNIT/ML
5 SOLUTION INTRAVENOUS AS NEEDED
Status: DISCONTINUED | OUTPATIENT
Start: 2022-01-01 | End: 2022-01-01 | Stop reason: HOSPADM

## 2022-01-01 RX ORDER — DIPHENHYDRAMINE HYDROCHLORIDE 50 MG/ML
50 INJECTION INTRAMUSCULAR; INTRAVENOUS AS NEEDED
Status: CANCELLED | OUTPATIENT
Start: 2022-01-01

## 2022-01-01 RX ORDER — FAMOTIDINE 10 MG/ML
20 INJECTION, SOLUTION INTRAVENOUS AS NEEDED
Status: DISCONTINUED | OUTPATIENT
Start: 2022-01-01 | End: 2022-01-01 | Stop reason: HOSPADM

## 2022-01-01 RX ORDER — POTASSIUM CHLORIDE 750 MG/1
40 CAPSULE, EXTENDED RELEASE ORAL ONCE
Status: COMPLETED | OUTPATIENT
Start: 2022-01-01 | End: 2022-01-01

## 2022-01-01 RX ORDER — HEPARIN SODIUM (PORCINE) LOCK FLUSH IV SOLN 100 UNIT/ML 100 UNIT/ML
500 SOLUTION INTRAVENOUS AS NEEDED
Status: DISCONTINUED | OUTPATIENT
Start: 2022-01-01 | End: 2022-01-01 | Stop reason: HOSPADM

## 2022-01-01 RX ORDER — FLUCONAZOLE 100 MG/1
TABLET ORAL
Qty: 7 TABLET | Refills: 1 | Status: SHIPPED | OUTPATIENT
Start: 2022-01-01 | End: 2022-01-01

## 2022-01-01 RX ORDER — PALONOSETRON 0.05 MG/ML
0.25 INJECTION, SOLUTION INTRAVENOUS ONCE
Status: COMPLETED | OUTPATIENT
Start: 2022-01-01 | End: 2022-01-01

## 2022-01-01 RX ORDER — DIPHENHYDRAMINE HYDROCHLORIDE 50 MG/ML
50 INJECTION INTRAMUSCULAR; INTRAVENOUS AS NEEDED
Status: DISCONTINUED | OUTPATIENT
Start: 2022-01-01 | End: 2022-01-01 | Stop reason: HOSPADM

## 2022-01-01 RX ORDER — SODIUM CHLORIDE 9 MG/ML
250 INJECTION, SOLUTION INTRAVENOUS ONCE
Status: CANCELLED | OUTPATIENT
Start: 2022-01-01

## 2022-01-01 RX ORDER — GABAPENTIN 300 MG/1
300 CAPSULE ORAL 3 TIMES DAILY
Status: DISCONTINUED | OUTPATIENT
Start: 2022-01-01 | End: 2022-01-01 | Stop reason: HOSPADM

## 2022-01-01 RX ORDER — PALONOSETRON 0.05 MG/ML
0.25 INJECTION, SOLUTION INTRAVENOUS ONCE
Status: CANCELLED | OUTPATIENT
Start: 2022-01-01

## 2022-01-01 RX ORDER — LACTULOSE 20 G/30ML
20 SOLUTION ORAL DAILY
Qty: 500 ML | Refills: 1 | Status: SHIPPED | OUTPATIENT
Start: 2022-01-01

## 2022-01-01 RX ORDER — WARFARIN SODIUM 2.5 MG/1
2.5 TABLET ORAL
Status: DISCONTINUED | OUTPATIENT
Start: 2022-01-01 | End: 2022-01-01

## 2022-01-01 RX ORDER — FAMOTIDINE 10 MG/ML
20 INJECTION, SOLUTION INTRAVENOUS AS NEEDED
Status: CANCELLED | OUTPATIENT
Start: 2022-01-01

## 2022-01-01 RX ORDER — SODIUM CHLORIDE 9 MG/ML
75 INJECTION, SOLUTION INTRAVENOUS CONTINUOUS
Status: DISCONTINUED | OUTPATIENT
Start: 2022-01-01 | End: 2022-01-01 | Stop reason: HOSPADM

## 2022-01-01 RX ORDER — SODIUM CHLORIDE 9 MG/ML
1000 INJECTION, SOLUTION INTRAVENOUS ONCE
Status: CANCELLED | OUTPATIENT
Start: 2022-01-01

## 2022-01-01 RX ORDER — SCOLOPAMINE TRANSDERMAL SYSTEM 1 MG/1
1 PATCH, EXTENDED RELEASE TRANSDERMAL
Status: DISCONTINUED | OUTPATIENT
Start: 2022-01-01 | End: 2022-01-01 | Stop reason: HOSPADM

## 2022-01-01 RX ORDER — POTASSIUM CHLORIDE 7.45 MG/ML
10 INJECTION INTRAVENOUS
Status: COMPLETED | OUTPATIENT
Start: 2022-01-01 | End: 2022-01-01

## 2022-01-01 RX ORDER — HEPARIN SODIUM (PORCINE) LOCK FLUSH IV SOLN 100 UNIT/ML 100 UNIT/ML
300 SOLUTION INTRAVENOUS AS NEEDED
Status: DISCONTINUED | OUTPATIENT
Start: 2022-01-01 | End: 2022-01-01 | Stop reason: HOSPADM

## 2022-01-01 RX ORDER — MORPHINE SULFATE 2 MG/ML
2 INJECTION, SOLUTION INTRAMUSCULAR; INTRAVENOUS EVERY 4 HOURS PRN
Status: DISCONTINUED | OUTPATIENT
Start: 2022-01-01 | End: 2022-01-01 | Stop reason: HOSPADM

## 2022-01-01 RX ORDER — SODIUM CHLORIDE AND POTASSIUM CHLORIDE 300; 900 MG/100ML; MG/100ML
250 INJECTION, SOLUTION INTRAVENOUS ONCE
Status: COMPLETED | OUTPATIENT
Start: 2022-01-01 | End: 2022-01-01

## 2022-01-01 RX ORDER — SODIUM CHLORIDE 9 MG/ML
250 INJECTION, SOLUTION INTRAVENOUS ONCE
Status: DISCONTINUED | OUTPATIENT
Start: 2022-01-01 | End: 2022-01-01 | Stop reason: HOSPADM

## 2022-01-01 RX ORDER — GABAPENTIN 300 MG/1
300 CAPSULE ORAL 3 TIMES DAILY
Qty: 90 CAPSULE | Refills: 0 | Status: SHIPPED | OUTPATIENT
Start: 2022-01-01 | End: 2022-01-01 | Stop reason: SDUPTHER

## 2022-01-01 RX ORDER — SODIUM CHLORIDE 0.9 % (FLUSH) 0.9 %
20 SYRINGE (ML) INJECTION AS NEEDED
Status: DISCONTINUED | OUTPATIENT
Start: 2022-01-01 | End: 2022-01-01 | Stop reason: HOSPADM

## 2022-01-01 RX ORDER — OXYCODONE HYDROCHLORIDE 5 MG/1
5 TABLET ORAL EVERY 4 HOURS PRN
Status: DISCONTINUED | OUTPATIENT
Start: 2022-01-01 | End: 2022-01-01 | Stop reason: HOSPADM

## 2022-01-01 RX ORDER — LOPERAMIDE HYDROCHLORIDE 2 MG/1
2 CAPSULE ORAL 4 TIMES DAILY PRN
Status: DISCONTINUED | OUTPATIENT
Start: 2022-01-01 | End: 2022-01-01 | Stop reason: HOSPADM

## 2022-01-01 RX ORDER — MORPHINE SULFATE 2 MG/ML
1 INJECTION, SOLUTION INTRAMUSCULAR; INTRAVENOUS EVERY 4 HOURS PRN
Status: DISCONTINUED | OUTPATIENT
Start: 2022-01-01 | End: 2022-01-01

## 2022-01-01 RX ORDER — WARFARIN SODIUM 5 MG/1
TABLET ORAL
Qty: 30 TABLET | Refills: 1 | Status: SHIPPED | OUTPATIENT
Start: 2022-01-01 | End: 2022-01-01 | Stop reason: SDUPTHER

## 2022-01-01 RX ORDER — FENTANYL 12 UG/H
1 PATCH TRANSDERMAL
Qty: 10 EACH | Refills: 0 | Status: SHIPPED | OUTPATIENT
Start: 2022-01-01 | End: 2022-01-01 | Stop reason: SDUPTHER

## 2022-01-01 RX ORDER — MORPHINE SULFATE 2 MG/ML
2 INJECTION, SOLUTION INTRAMUSCULAR; INTRAVENOUS
Status: DISCONTINUED | OUTPATIENT
Start: 2022-01-01 | End: 2022-01-01 | Stop reason: HOSPADM

## 2022-01-01 RX ORDER — FENTANYL 12 UG/H
1 PATCH TRANSDERMAL
Qty: 10 EACH | Refills: 0 | Status: SHIPPED | OUTPATIENT
Start: 2022-01-01

## 2022-01-01 RX ORDER — WARFARIN SODIUM 5 MG/1
TABLET ORAL
Qty: 30 TABLET | Refills: 2 | Status: SHIPPED | OUTPATIENT
Start: 2022-01-01 | End: 2022-01-01

## 2022-01-01 RX ORDER — SODIUM CHLORIDE 9 MG/ML
125 INJECTION, SOLUTION INTRAVENOUS CONTINUOUS
Status: DISCONTINUED | OUTPATIENT
Start: 2022-01-01 | End: 2022-01-01

## 2022-01-01 RX ORDER — SODIUM CHLORIDE 9 MG/ML
1000 INJECTION, SOLUTION INTRAVENOUS ONCE
Status: COMPLETED | OUTPATIENT
Start: 2022-01-01 | End: 2022-01-01

## 2022-01-01 RX ORDER — GABAPENTIN 300 MG/1
300 CAPSULE ORAL 3 TIMES DAILY
Qty: 90 CAPSULE | Refills: 0 | Status: SHIPPED | OUTPATIENT
Start: 2022-01-01 | End: 2022-01-01

## 2022-01-01 RX ORDER — FENTANYL 12 UG/H
1 PATCH TRANSDERMAL
Qty: 10 PATCH | Refills: 0 | Status: SHIPPED | OUTPATIENT
Start: 2022-01-01 | End: 2022-01-01

## 2022-01-01 RX ORDER — SODIUM CHLORIDE 0.9 % (FLUSH) 0.9 %
10 SYRINGE (ML) INJECTION EVERY 12 HOURS SCHEDULED
Status: DISCONTINUED | OUTPATIENT
Start: 2022-01-01 | End: 2022-01-01 | Stop reason: HOSPADM

## 2022-01-01 RX ORDER — OXYCODONE HYDROCHLORIDE 5 MG/1
5 TABLET ORAL EVERY 4 HOURS PRN
Qty: 90 TABLET | Refills: 0 | Status: SHIPPED | OUTPATIENT
Start: 2022-01-01 | End: 2022-01-01

## 2022-01-01 RX ORDER — DIPHENHYDRAMINE HYDROCHLORIDE AND LIDOCAINE HYDROCHLORIDE AND ALUMINUM HYDROXIDE AND MAGNESIUM HYDRO
5 KIT EVERY 4 HOURS PRN
Qty: 237 ML | Refills: 1 | Status: SHIPPED | OUTPATIENT
Start: 2022-01-01

## 2022-01-01 RX ORDER — POTASSIUM CHLORIDE 7.45 MG/ML
10 INJECTION INTRAVENOUS
Status: DISCONTINUED | OUTPATIENT
Start: 2022-01-01 | End: 2022-01-01 | Stop reason: SDUPTHER

## 2022-01-01 RX ORDER — ONDANSETRON 2 MG/ML
4 INJECTION INTRAMUSCULAR; INTRAVENOUS EVERY 6 HOURS PRN
Status: DISCONTINUED | OUTPATIENT
Start: 2022-01-01 | End: 2022-01-01 | Stop reason: HOSPADM

## 2022-01-01 RX ORDER — EVEROLIMUS 5 MG/1
TABLET ORAL
Refills: 0 | OUTPATIENT
Start: 2022-01-01

## 2022-01-01 RX ORDER — SODIUM CHLORIDE, SODIUM LACTATE, POTASSIUM CHLORIDE, CALCIUM CHLORIDE 600; 310; 30; 20 MG/100ML; MG/100ML; MG/100ML; MG/100ML
100 INJECTION, SOLUTION INTRAVENOUS CONTINUOUS
Status: DISCONTINUED | OUTPATIENT
Start: 2022-01-01 | End: 2022-01-01 | Stop reason: HOSPADM

## 2022-01-01 RX ORDER — WARFARIN SODIUM 5 MG/1
5 TABLET ORAL
Status: DISCONTINUED | OUTPATIENT
Start: 2022-01-01 | End: 2022-01-01

## 2022-01-01 RX ORDER — POTASSIUM CHLORIDE 750 MG/1
40 CAPSULE, EXTENDED RELEASE ORAL ONCE
Status: CANCELLED
Start: 2022-01-01 | End: 2022-01-01

## 2022-01-01 RX ORDER — ONDANSETRON HYDROCHLORIDE 8 MG/1
4 TABLET, FILM COATED ORAL 4 TIMES DAILY PRN
Qty: 40 TABLET | Refills: 3 | Status: ON HOLD | OUTPATIENT
Start: 2022-01-01 | End: 2022-01-01

## 2022-01-01 RX ORDER — TRAMADOL HYDROCHLORIDE 50 MG/1
50 TABLET ORAL EVERY 8 HOURS PRN
Qty: 90 TABLET | Refills: 0 | Status: SHIPPED | OUTPATIENT
Start: 2022-01-01 | End: 2022-01-01

## 2022-01-01 RX ORDER — POTASSIUM CHLORIDE 750 MG/1
40 CAPSULE, EXTENDED RELEASE ORAL AS NEEDED
Status: DISCONTINUED | OUTPATIENT
Start: 2022-01-01 | End: 2022-01-01 | Stop reason: HOSPADM

## 2022-01-01 RX ORDER — SODIUM CHLORIDE AND POTASSIUM CHLORIDE 300; 900 MG/100ML; MG/100ML
250 INJECTION, SOLUTION INTRAVENOUS ONCE
Status: CANCELLED
Start: 2022-01-01 | End: 2022-01-01

## 2022-01-01 RX ORDER — FOLIC ACID 1 MG/1
1 TABLET ORAL DAILY
Qty: 90 TABLET | Refills: 1 | Status: SHIPPED | OUTPATIENT
Start: 2022-01-01

## 2022-01-01 RX ORDER — POTASSIUM CHLORIDE 7.45 MG/ML
10 INJECTION INTRAVENOUS ONCE
Status: CANCELLED
Start: 2022-01-01 | End: 2022-01-01

## 2022-01-01 RX ORDER — NALOXONE HCL 0.4 MG/ML
0.4 VIAL (ML) INJECTION
Status: DISCONTINUED | OUTPATIENT
Start: 2022-01-01 | End: 2022-01-01 | Stop reason: HOSPADM

## 2022-01-01 RX ORDER — POTASSIUM CHLORIDE 7.45 MG/ML
10 INJECTION INTRAVENOUS
Status: DISCONTINUED | OUTPATIENT
Start: 2022-01-01 | End: 2022-01-01 | Stop reason: HOSPADM

## 2022-01-01 RX ORDER — SODIUM CHLORIDE 9 MG/ML
10 INJECTION INTRAVENOUS ONCE
Status: COMPLETED | OUTPATIENT
Start: 2022-01-01 | End: 2022-01-01

## 2022-01-01 RX ORDER — WARFARIN SODIUM 2.5 MG/1
2.5 TABLET ORAL
Status: DISCONTINUED | OUTPATIENT
Start: 2022-01-01 | End: 2022-01-01 | Stop reason: HOSPADM

## 2022-01-01 RX ORDER — POTASSIUM CHLORIDE 1.5 G/1.77G
40 POWDER, FOR SOLUTION ORAL AS NEEDED
Status: DISCONTINUED | OUTPATIENT
Start: 2022-01-01 | End: 2022-01-01 | Stop reason: HOSPADM

## 2022-01-01 RX ORDER — CAPECITABINE 500 MG/1
TABLET, FILM COATED ORAL
Qty: 112 TABLET | Refills: 1 | Status: ON HOLD | OUTPATIENT
Start: 2022-01-01 | End: 2022-01-01

## 2022-01-01 RX ORDER — ONDANSETRON HYDROCHLORIDE 8 MG/1
4 TABLET, FILM COATED ORAL 4 TIMES DAILY PRN
Qty: 40 TABLET | Refills: 5 | Status: SHIPPED | OUTPATIENT
Start: 2022-01-01

## 2022-01-01 RX ORDER — CLINDAMYCIN PHOSPHATE 10 MG/G
1 GEL TOPICAL 2 TIMES DAILY
Qty: 60 G | Refills: 1 | Status: SHIPPED | OUTPATIENT
Start: 2022-01-01 | End: 2022-01-01

## 2022-01-01 RX ORDER — SCOLOPAMINE TRANSDERMAL SYSTEM 1 MG/1
1 PATCH, EXTENDED RELEASE TRANSDERMAL
Qty: 10 PATCH | Refills: 1 | Status: SHIPPED | OUTPATIENT
Start: 2022-01-01 | End: 2022-01-01 | Stop reason: SDUPTHER

## 2022-01-01 RX ORDER — HEPARIN SODIUM (PORCINE) LOCK FLUSH IV SOLN 100 UNIT/ML 100 UNIT/ML
5 SOLUTION INTRAVENOUS ONCE AS NEEDED
Status: COMPLETED | OUTPATIENT
Start: 2022-01-01 | End: 2022-01-01

## 2022-01-01 RX ORDER — LACTULOSE 10 G/15ML
SOLUTION ORAL; RECTAL
COMMUNITY
Start: 2022-01-01

## 2022-01-01 RX ORDER — CLINDAMYCIN PHOSPHATE 10 MG/G
GEL TOPICAL
Qty: 60 G | Refills: 0 | Status: SHIPPED | OUTPATIENT
Start: 2022-01-01 | End: 2022-01-01

## 2022-01-01 RX ORDER — FENTANYL 12 UG/H
PATCH TRANSDERMAL
Qty: 10 EACH | Refills: 0 | Status: SHIPPED | OUTPATIENT
Start: 2022-01-01 | End: 2022-01-01 | Stop reason: SDUPTHER

## 2022-01-01 RX ORDER — GABAPENTIN 300 MG/1
CAPSULE ORAL
Qty: 90 CAPSULE | Refills: 0 | Status: SHIPPED | OUTPATIENT
Start: 2022-01-01 | End: 2022-01-01 | Stop reason: SDUPTHER

## 2022-01-01 RX ORDER — SCOLOPAMINE TRANSDERMAL SYSTEM 1 MG/1
1 PATCH, EXTENDED RELEASE TRANSDERMAL
Qty: 10 PATCH | Refills: 1 | Status: SHIPPED | OUTPATIENT
Start: 2022-01-01

## 2022-01-01 RX ORDER — ONDANSETRON HCL IN 0.9 % NACL 8 MG/50 ML
8 INTRAVENOUS SOLUTION, PIGGYBACK (ML) INTRAVENOUS EVERY 6 HOURS PRN
Status: DISCONTINUED | OUTPATIENT
Start: 2022-01-01 | End: 2022-01-01 | Stop reason: HOSPADM

## 2022-01-01 RX ORDER — HEPARIN SODIUM (PORCINE) LOCK FLUSH IV SOLN 100 UNIT/ML 100 UNIT/ML
SOLUTION INTRAVENOUS
Status: DISCONTINUED | OUTPATIENT
Start: 2022-01-01 | End: 2022-01-01 | Stop reason: HOSPADM

## 2022-01-01 RX ORDER — NALOXONE HCL 0.4 MG/ML
0.4 VIAL (ML) INJECTION
Status: DISCONTINUED | OUTPATIENT
Start: 2022-01-01 | End: 2022-01-01

## 2022-01-01 RX ORDER — CLINDAMYCIN PHOSPHATE 10 MG/G
1 GEL TOPICAL 2 TIMES DAILY
Qty: 60 G | Refills: 1 | Status: SHIPPED | OUTPATIENT
Start: 2022-01-01 | End: 2022-01-01 | Stop reason: HOSPADM

## 2022-01-01 RX ORDER — ONDANSETRON 2 MG/ML
4 INJECTION INTRAMUSCULAR; INTRAVENOUS ONCE
Status: COMPLETED | OUTPATIENT
Start: 2022-01-01 | End: 2022-01-01

## 2022-01-01 RX ORDER — HEPARIN SODIUM (PORCINE) LOCK FLUSH IV SOLN 100 UNIT/ML 100 UNIT/ML
500 SOLUTION INTRAVENOUS ONCE
Status: COMPLETED | OUTPATIENT
Start: 2022-01-01 | End: 2022-01-01

## 2022-01-01 RX ORDER — FLUCONAZOLE 100 MG/1
TABLET ORAL
Qty: 7 TABLET | Refills: 0 | Status: SHIPPED | OUTPATIENT
Start: 2022-01-01

## 2022-01-01 RX ORDER — PREDNISONE 20 MG/1
20 TABLET ORAL DAILY
Qty: 7 TABLET | Refills: 0 | Status: SHIPPED | OUTPATIENT
Start: 2022-01-01 | End: 2022-01-01

## 2022-01-01 RX ORDER — CLINDAMYCIN PHOSPHATE 10 MG/G
GEL TOPICAL
Qty: 60 G | Refills: 0 | Status: CANCELLED | OUTPATIENT
Start: 2022-01-01

## 2022-01-01 RX ORDER — HEPARIN SODIUM (PORCINE) LOCK FLUSH IV SOLN 100 UNIT/ML 100 UNIT/ML
300 SOLUTION INTRAVENOUS AS NEEDED
Status: CANCELLED | OUTPATIENT
Start: 2022-01-01

## 2022-01-01 RX ORDER — HEPARIN SODIUM (PORCINE) LOCK FLUSH IV SOLN 100 UNIT/ML 100 UNIT/ML
SOLUTION INTRAVENOUS
Status: DISCONTINUED
Start: 2022-01-01 | End: 2022-01-01 | Stop reason: HOSPADM

## 2022-01-01 RX ORDER — FENTANYL 12 UG/H
1 PATCH TRANSDERMAL
Qty: 10 PATCH | Refills: 0 | Status: CANCELLED | OUTPATIENT
Start: 2022-01-01

## 2022-01-01 RX ORDER — WARFARIN SODIUM 5 MG/1
TABLET ORAL
Qty: 30 TABLET | Refills: 2 | Status: SHIPPED | OUTPATIENT
Start: 2022-01-01

## 2022-01-01 RX ORDER — CLINDAMYCIN PHOSPHATE 10 MG/G
GEL TOPICAL
Qty: 60 G | Refills: 0 | Status: SHIPPED | OUTPATIENT
Start: 2022-01-01

## 2022-01-01 RX ORDER — FENTANYL 12 UG/H
1 PATCH TRANSDERMAL
Qty: 10 PATCH | Refills: 0 | Status: SHIPPED | OUTPATIENT
Start: 2022-01-01 | End: 2022-01-01 | Stop reason: SDUPTHER

## 2022-01-01 RX ADMIN — ONDANSETRON 4 MG: 2 INJECTION INTRAMUSCULAR; INTRAVENOUS at 03:49

## 2022-01-01 RX ADMIN — HEPARIN SODIUM 300 UNITS: 5000 INJECTION, SOLUTION INTRAVENOUS; SUBCUTANEOUS at 08:37

## 2022-01-01 RX ADMIN — PIPERACILLIN AND TAZOBACTAM 3.38 G: 3; .375 INJECTION, POWDER, FOR SOLUTION INTRAVENOUS at 09:18

## 2022-01-01 RX ADMIN — HEPARIN SODIUM (PORCINE) LOCK FLUSH IV SOLN 100 UNIT/ML 300 UNITS: 100 SOLUTION at 14:58

## 2022-01-01 RX ADMIN — PIPERACILLIN AND TAZOBACTAM 3.38 G: 3; .375 INJECTION, POWDER, FOR SOLUTION INTRAVENOUS at 08:21

## 2022-01-01 RX ADMIN — GABAPENTIN 300 MG: 300 CAPSULE ORAL at 09:02

## 2022-01-01 RX ADMIN — HEPARIN 500 UNITS: 100 SYRINGE at 11:29

## 2022-01-01 RX ADMIN — Medication 10 ML: at 13:36

## 2022-01-01 RX ADMIN — SODIUM CHLORIDE, POTASSIUM CHLORIDE, SODIUM LACTATE AND CALCIUM CHLORIDE 100 ML/HR: 600; 310; 30; 20 INJECTION, SOLUTION INTRAVENOUS at 04:20

## 2022-01-01 RX ADMIN — POTASSIUM CHLORIDE 40 MEQ: 750 CAPSULE, EXTENDED RELEASE ORAL at 18:55

## 2022-01-01 RX ADMIN — Medication 10 ML: at 09:18

## 2022-01-01 RX ADMIN — SODIUM CHLORIDE 250 ML: 9 INJECTION, SOLUTION INTRAVENOUS at 09:42

## 2022-01-01 RX ADMIN — SODIUM CHLORIDE 250 ML: 9 INJECTION, SOLUTION INTRAVENOUS at 10:58

## 2022-01-01 RX ADMIN — Medication 10 ML: at 08:21

## 2022-01-01 RX ADMIN — CARBOPLATIN 300 MG: 10 INJECTION, SOLUTION INTRAVENOUS at 12:50

## 2022-01-01 RX ADMIN — SODIUM CHLORIDE 250 ML: 9 INJECTION, SOLUTION INTRAVENOUS at 09:10

## 2022-01-01 RX ADMIN — Medication 10 ML: at 20:05

## 2022-01-01 RX ADMIN — PALONOSETRON 0.25 MG: 0.05 INJECTION, SOLUTION INTRAVENOUS at 11:20

## 2022-01-01 RX ADMIN — PIPERACILLIN AND TAZOBACTAM 3.38 G: 3; .375 INJECTION, POWDER, FOR SOLUTION INTRAVENOUS at 23:12

## 2022-01-01 RX ADMIN — SERTRALINE 100 MG: 50 TABLET, FILM COATED ORAL at 09:02

## 2022-01-01 RX ADMIN — SODIUM CHLORIDE 250 ML: 9 INJECTION, SOLUTION INTRAVENOUS at 10:55

## 2022-01-01 RX ADMIN — SODIUM CHLORIDE 75 ML/HR: 9 INJECTION, SOLUTION INTRAVENOUS at 23:18

## 2022-01-01 RX ADMIN — CARBOPLATIN 300 MG: 10 INJECTION, SOLUTION INTRAVENOUS at 10:08

## 2022-01-01 RX ADMIN — HEPARIN 500 UNITS: 100 SYRINGE at 17:05

## 2022-01-01 RX ADMIN — WARFARIN SODIUM 2.5 MG: 2.5 TABLET ORAL at 18:22

## 2022-01-01 RX ADMIN — HEPARIN SODIUM (PORCINE) LOCK FLUSH IV SOLN 100 UNIT/ML 500 UNITS: 100 SOLUTION at 15:26

## 2022-01-01 RX ADMIN — PIPERACILLIN AND TAZOBACTAM 3.38 G: 3; .375 INJECTION, POWDER, FOR SOLUTION INTRAVENOUS at 15:27

## 2022-01-01 RX ADMIN — ZOLEDRONIC ACID 4 MG: 4 INJECTION, SOLUTION, CONCENTRATE INTRAVENOUS at 10:09

## 2022-01-01 RX ADMIN — ZOLEDRONIC ACID 4 MG: 4 INJECTION, SOLUTION, CONCENTRATE INTRAVENOUS at 11:03

## 2022-01-01 RX ADMIN — PIPERACILLIN AND TAZOBACTAM 3.38 G: 3; .375 INJECTION, POWDER, FOR SOLUTION INTRAVENOUS at 01:04

## 2022-01-01 RX ADMIN — MORPHINE SULFATE 1 MG: 2 INJECTION, SOLUTION INTRAMUSCULAR; INTRAVENOUS at 05:37

## 2022-01-01 RX ADMIN — FILGRASTIM 480 MCG: 480 INJECTION, SOLUTION INTRAVENOUS; SUBCUTANEOUS at 10:25

## 2022-01-01 RX ADMIN — Medication 10 ML: at 11:17

## 2022-01-01 RX ADMIN — WARFARIN SODIUM 2.5 MG: 2.5 TABLET ORAL at 17:30

## 2022-01-01 RX ADMIN — PIPERACILLIN AND TAZOBACTAM 3.38 G: 3; .375 INJECTION, POWDER, FOR SOLUTION INTRAVENOUS at 08:32

## 2022-01-01 RX ADMIN — Medication 10 ML: at 12:51

## 2022-01-01 RX ADMIN — PIPERACILLIN AND TAZOBACTAM 3.38 G: 3; .375 INJECTION, POWDER, FOR SOLUTION INTRAVENOUS at 09:01

## 2022-01-01 RX ADMIN — CARBOPLATIN 300 MG: 10 INJECTION INTRAVENOUS at 11:31

## 2022-01-01 RX ADMIN — Medication 1.25 MG: at 17:53

## 2022-01-01 RX ADMIN — PIPERACILLIN AND TAZOBACTAM 3.38 G: 3; .375 INJECTION, POWDER, FOR SOLUTION INTRAVENOUS at 09:02

## 2022-01-01 RX ADMIN — PALONOSETRON 0.25 MG: 0.05 INJECTION, SOLUTION INTRAVENOUS at 09:43

## 2022-01-01 RX ADMIN — HEPARIN 500 UNITS: 100 SYRINGE at 13:36

## 2022-01-01 RX ADMIN — PIPERACILLIN AND TAZOBACTAM 3.38 G: 3; .375 INJECTION, POWDER, FOR SOLUTION INTRAVENOUS at 15:14

## 2022-01-01 RX ADMIN — CARBOPLATIN 300 MG: 10 INJECTION, SOLUTION INTRAVENOUS at 12:06

## 2022-01-01 RX ADMIN — DEXAMETHASONE SODIUM PHOSPHATE 12 MG: 10 INJECTION, SOLUTION INTRAMUSCULAR; INTRAVENOUS at 11:50

## 2022-01-01 RX ADMIN — DEXAMETHASONE SODIUM PHOSPHATE 12 MG: 10 INJECTION, SOLUTION INTRAMUSCULAR; INTRAVENOUS at 09:51

## 2022-01-01 RX ADMIN — SODIUM CHLORIDE 250 ML: 9 INJECTION, SOLUTION INTRAVENOUS at 11:50

## 2022-01-01 RX ADMIN — PIPERACILLIN AND TAZOBACTAM 3.38 G: 3; .375 INJECTION, POWDER, FOR SOLUTION INTRAVENOUS at 16:43

## 2022-01-01 RX ADMIN — PIPERACILLIN AND TAZOBACTAM 3.38 G: 3; .375 INJECTION, POWDER, FOR SOLUTION INTRAVENOUS at 23:50

## 2022-01-01 RX ADMIN — SODIUM CHLORIDE 250 ML: 9 INJECTION, SOLUTION INTRAVENOUS at 10:06

## 2022-01-01 RX ADMIN — ERIBULIN MESYLATE 2.95 MG: 0.5 INJECTION INTRAVENOUS at 15:37

## 2022-01-01 RX ADMIN — HEPARIN 500 UNITS: 100 SYRINGE at 12:45

## 2022-01-01 RX ADMIN — HEPARIN 500 UNITS: 100 SYRINGE at 10:30

## 2022-01-01 RX ADMIN — GABAPENTIN 300 MG: 300 CAPSULE ORAL at 16:02

## 2022-01-01 RX ADMIN — GABAPENTIN 300 MG: 300 CAPSULE ORAL at 20:31

## 2022-01-01 RX ADMIN — WARFARIN SODIUM 2.5 MG: 2.5 TABLET ORAL at 18:56

## 2022-01-01 RX ADMIN — Medication 10 ML: at 15:07

## 2022-01-01 RX ADMIN — HEPARIN 500 UNITS: 100 SYRINGE at 11:43

## 2022-01-01 RX ADMIN — SODIUM CHLORIDE 250 ML: 9 INJECTION, SOLUTION INTRAVENOUS at 08:57

## 2022-01-01 RX ADMIN — GABAPENTIN 300 MG: 300 CAPSULE ORAL at 08:20

## 2022-01-01 RX ADMIN — HEPARIN 500 UNITS: 100 SYRINGE at 15:07

## 2022-01-01 RX ADMIN — PALONOSETRON 0.25 MG: 0.05 INJECTION, SOLUTION INTRAVENOUS at 09:37

## 2022-01-01 RX ADMIN — PALONOSETRON 0.25 MG: 0.05 INJECTION, SOLUTION INTRAVENOUS at 10:25

## 2022-01-01 RX ADMIN — Medication 10 ML: at 10:48

## 2022-01-01 RX ADMIN — SODIUM CHLORIDE 250 ML: 9 INJECTION, SOLUTION INTRAVENOUS at 10:25

## 2022-01-01 RX ADMIN — IOPAMIDOL 90 ML: 612 INJECTION, SOLUTION INTRAVENOUS at 14:23

## 2022-01-01 RX ADMIN — Medication 10 ML: at 12:34

## 2022-01-01 RX ADMIN — PALONOSETRON 0.25 MG: 0.05 INJECTION, SOLUTION INTRAVENOUS at 08:57

## 2022-01-01 RX ADMIN — CARBOPLATIN 300 MG: 10 INJECTION, SOLUTION INTRAVENOUS at 11:50

## 2022-01-01 RX ADMIN — GABAPENTIN 300 MG: 300 CAPSULE ORAL at 20:28

## 2022-01-01 RX ADMIN — POTASSIUM CHLORIDE 40 MEQ: 750 CAPSULE, EXTENDED RELEASE ORAL at 09:00

## 2022-01-01 RX ADMIN — DEXAMETHASONE SODIUM PHOSPHATE 12 MG: 10 INJECTION, SOLUTION INTRAMUSCULAR; INTRAVENOUS at 10:26

## 2022-01-01 RX ADMIN — POTASSIUM CHLORIDE 40 MEQ: 750 CAPSULE, EXTENDED RELEASE ORAL at 15:29

## 2022-01-01 RX ADMIN — ERIBULIN MESYLATE 2.25 MG: 0.5 INJECTION INTRAVENOUS at 11:06

## 2022-01-01 RX ADMIN — HEPARIN 500 UNITS: 100 SYRINGE at 10:48

## 2022-01-01 RX ADMIN — GEMCITABINE 2000 MG: 38 INJECTION, SOLUTION INTRAVENOUS at 11:19

## 2022-01-01 RX ADMIN — DEXAMETHASONE SODIUM PHOSPHATE 12 MG: 10 INJECTION, SOLUTION INTRAMUSCULAR; INTRAVENOUS at 15:00

## 2022-01-01 RX ADMIN — GABAPENTIN 300 MG: 300 CAPSULE ORAL at 16:30

## 2022-01-01 RX ADMIN — PIPERACILLIN AND TAZOBACTAM 3.38 G: 3; .375 INJECTION, POWDER, FOR SOLUTION INTRAVENOUS at 17:53

## 2022-01-01 RX ADMIN — HEPARIN 500 UNITS: 100 SYRINGE at 10:45

## 2022-01-01 RX ADMIN — DEXAMETHASONE SODIUM PHOSPHATE 12 MG: 10 INJECTION, SOLUTION INTRAMUSCULAR; INTRAVENOUS at 09:42

## 2022-01-01 RX ADMIN — Medication 10 ML: at 08:20

## 2022-01-01 RX ADMIN — ZOLEDRONIC ACID 4 MG: 4 INJECTION, SOLUTION, CONCENTRATE INTRAVENOUS at 10:58

## 2022-01-01 RX ADMIN — SODIUM CHLORIDE, POTASSIUM CHLORIDE, SODIUM LACTATE AND CALCIUM CHLORIDE 100 ML/HR: 600; 310; 30; 20 INJECTION, SOLUTION INTRAVENOUS at 22:02

## 2022-01-01 RX ADMIN — HEPARIN 500 UNITS: 100 SYRINGE at 12:51

## 2022-01-01 RX ADMIN — Medication 10 ML: at 10:03

## 2022-01-01 RX ADMIN — SERTRALINE 100 MG: 50 TABLET, FILM COATED ORAL at 16:30

## 2022-01-01 RX ADMIN — HEPARIN 500 UNITS: 100 SYRINGE at 13:38

## 2022-01-01 RX ADMIN — Medication 10 ML: at 20:31

## 2022-01-01 RX ADMIN — ZOLEDRONIC ACID 4 MG: 4 INJECTION, SOLUTION, CONCENTRATE INTRAVENOUS at 09:35

## 2022-01-01 RX ADMIN — POTASSIUM CHLORIDE 10 MEQ: 7.46 INJECTION, SOLUTION INTRAVENOUS at 12:50

## 2022-01-01 RX ADMIN — MORPHINE SULFATE 2 MG: 2 INJECTION, SOLUTION INTRAMUSCULAR; INTRAVENOUS at 18:05

## 2022-01-01 RX ADMIN — MORPHINE SULFATE 2 MG: 2 INJECTION, SOLUTION INTRAMUSCULAR; INTRAVENOUS at 14:00

## 2022-01-01 RX ADMIN — Medication 10 ML: at 22:01

## 2022-01-01 RX ADMIN — SODIUM CHLORIDE 250 ML: 9 INJECTION, SOLUTION INTRAVENOUS at 11:20

## 2022-01-01 RX ADMIN — PIPERACILLIN SODIUM AND TAZOBACTAM SODIUM 3.38 G: 3; .375 INJECTION, POWDER, LYOPHILIZED, FOR SOLUTION INTRAVENOUS at 01:07

## 2022-01-01 RX ADMIN — GEMCITABINE 2000 MG: 38 INJECTION, SOLUTION INTRAVENOUS at 09:33

## 2022-01-01 RX ADMIN — SODIUM CHLORIDE 250 ML: 9 INJECTION, SOLUTION INTRAVENOUS at 09:57

## 2022-01-01 RX ADMIN — GABAPENTIN 300 MG: 300 CAPSULE ORAL at 20:05

## 2022-01-01 RX ADMIN — HEPARIN 500 UNITS: 100 SYRINGE at 15:52

## 2022-01-01 RX ADMIN — GABAPENTIN 300 MG: 300 CAPSULE ORAL at 09:01

## 2022-01-01 RX ADMIN — SODIUM CHLORIDE, POTASSIUM CHLORIDE, SODIUM LACTATE AND CALCIUM CHLORIDE 100 ML/HR: 600; 310; 30; 20 INJECTION, SOLUTION INTRAVENOUS at 21:13

## 2022-01-01 RX ADMIN — POTASSIUM CHLORIDE 10 MEQ: 7.46 INJECTION, SOLUTION INTRAVENOUS at 11:57

## 2022-01-01 RX ADMIN — HEPARIN 500 UNITS: 100 SYRINGE at 15:26

## 2022-01-01 RX ADMIN — DEXAMETHASONE SODIUM PHOSPHATE 12 MG: 10 INJECTION, SOLUTION INTRAMUSCULAR; INTRAVENOUS at 11:27

## 2022-01-01 RX ADMIN — DEXAMETHASONE SODIUM PHOSPHATE 12 MG: 10 INJECTION, SOLUTION INTRAMUSCULAR; INTRAVENOUS at 11:23

## 2022-01-01 RX ADMIN — HEPARIN 500 UNITS: 100 SYRINGE at 12:46

## 2022-01-01 RX ADMIN — PIPERACILLIN AND TAZOBACTAM 3.38 G: 3; .375 INJECTION, POWDER, FOR SOLUTION INTRAVENOUS at 00:24

## 2022-01-01 RX ADMIN — MORPHINE SULFATE 1 MG: 2 INJECTION, SOLUTION INTRAMUSCULAR; INTRAVENOUS at 01:29

## 2022-01-01 RX ADMIN — SODIUM CHLORIDE 250 ML: 9 INJECTION, SOLUTION INTRAVENOUS at 10:29

## 2022-01-01 RX ADMIN — HEPARIN 300 UNITS: 100 SYRINGE at 14:58

## 2022-01-01 RX ADMIN — DEXAMETHASONE SODIUM PHOSPHATE 12 MG: 10 INJECTION, SOLUTION INTRAMUSCULAR; INTRAVENOUS at 09:02

## 2022-01-01 RX ADMIN — PIPERACILLIN AND TAZOBACTAM 3.38 G: 3; .375 INJECTION, POWDER, FOR SOLUTION INTRAVENOUS at 01:29

## 2022-01-01 RX ADMIN — SODIUM CHLORIDE 500 ML: 9 INJECTION, SOLUTION INTRAVENOUS at 22:17

## 2022-01-01 RX ADMIN — Medication 10 ML: at 09:01

## 2022-01-01 RX ADMIN — PIPERACILLIN AND TAZOBACTAM 3.38 G: 3; .375 INJECTION, POWDER, FOR SOLUTION INTRAVENOUS at 18:22

## 2022-01-01 RX ADMIN — SERTRALINE 100 MG: 50 TABLET, FILM COATED ORAL at 09:00

## 2022-01-01 RX ADMIN — SODIUM CHLORIDE, POTASSIUM CHLORIDE, SODIUM LACTATE AND CALCIUM CHLORIDE 100 ML/HR: 600; 310; 30; 20 INJECTION, SOLUTION INTRAVENOUS at 17:30

## 2022-01-01 RX ADMIN — DEXAMETHASONE SODIUM PHOSPHATE 12 MG: 10 INJECTION, SOLUTION INTRAMUSCULAR; INTRAVENOUS at 09:32

## 2022-01-01 RX ADMIN — MORPHINE SULFATE 4 MG: 4 INJECTION INTRAVENOUS at 22:18

## 2022-01-01 RX ADMIN — SODIUM CHLORIDE 250 ML: 9 INJECTION, SOLUTION INTRAVENOUS at 09:41

## 2022-01-01 RX ADMIN — ONDANSETRON 4 MG: 2 INJECTION INTRAMUSCULAR; INTRAVENOUS at 01:29

## 2022-01-01 RX ADMIN — ZOLEDRONIC ACID 4 MG: 4 INJECTION, SOLUTION, CONCENTRATE INTRAVENOUS at 10:44

## 2022-01-01 RX ADMIN — HEPARIN 500 UNITS: 100 SYRINGE at 10:42

## 2022-01-01 RX ADMIN — HEPARIN 500 UNITS: 100 SYRINGE at 11:17

## 2022-01-01 RX ADMIN — Medication 20 ML: at 15:52

## 2022-01-01 RX ADMIN — Medication 10 ML: at 12:44

## 2022-01-01 RX ADMIN — MORPHINE SULFATE 1 MG: 2 INJECTION, SOLUTION INTRAMUSCULAR; INTRAVENOUS at 09:41

## 2022-01-01 RX ADMIN — PIPERACILLIN AND TAZOBACTAM 3.38 G: 3; .375 INJECTION, POWDER, FOR SOLUTION INTRAVENOUS at 18:58

## 2022-01-01 RX ADMIN — DEXAMETHASONE SODIUM PHOSPHATE: 4 INJECTION, SOLUTION INTRAMUSCULAR; INTRAVENOUS at 13:37

## 2022-01-01 RX ADMIN — Medication 10 ML: at 10:30

## 2022-01-01 RX ADMIN — ZOLEDRONIC ACID 4 MG: 4 INJECTION, SOLUTION, CONCENTRATE INTRAVENOUS at 09:57

## 2022-01-01 RX ADMIN — SODIUM CHLORIDE 1000 ML: 9 INJECTION, SOLUTION INTRAVENOUS at 12:45

## 2022-01-01 RX ADMIN — PALONOSETRON 0.25 MG: 0.05 INJECTION, SOLUTION INTRAVENOUS at 09:10

## 2022-01-01 RX ADMIN — ZOLEDRONIC ACID 4 MG: 4 INJECTION, SOLUTION, CONCENTRATE INTRAVENOUS at 11:51

## 2022-01-01 RX ADMIN — SODIUM CHLORIDE 250 ML: 9 INJECTION, SOLUTION INTRAVENOUS at 09:37

## 2022-01-01 RX ADMIN — ERIBULIN MESYLATE 2.25 MG: 0.5 INJECTION INTRAVENOUS at 10:03

## 2022-01-01 RX ADMIN — HEPARIN 500 UNITS: 100 SYRINGE at 12:18

## 2022-01-01 RX ADMIN — IOPAMIDOL 90 ML: 612 INJECTION, SOLUTION INTRAVENOUS at 13:40

## 2022-01-01 RX ADMIN — PIPERACILLIN AND TAZOBACTAM 3.38 G: 3; .375 INJECTION, POWDER, FOR SOLUTION INTRAVENOUS at 09:41

## 2022-01-01 RX ADMIN — ONDANSETRON 4 MG: 2 INJECTION INTRAMUSCULAR; INTRAVENOUS at 22:18

## 2022-01-01 RX ADMIN — Medication 10 ML: at 12:23

## 2022-01-01 RX ADMIN — Medication 10 ML: at 10:45

## 2022-01-01 RX ADMIN — SODIUM CHLORIDE, POTASSIUM CHLORIDE, SODIUM LACTATE AND CALCIUM CHLORIDE 100 ML/HR: 600; 310; 30; 20 INJECTION, SOLUTION INTRAVENOUS at 07:25

## 2022-01-01 RX ADMIN — POTASSIUM CHLORIDE 40 MEQ: 750 CAPSULE, EXTENDED RELEASE ORAL at 23:23

## 2022-01-01 RX ADMIN — Medication 10 ML: at 12:18

## 2022-01-01 RX ADMIN — IOPAMIDOL 90 ML: 612 INJECTION, SOLUTION INTRAVENOUS at 17:01

## 2022-01-01 RX ADMIN — HEPARIN 300 UNITS: 100 SYRINGE at 10:41

## 2022-01-01 RX ADMIN — GEMCITABINE HYDROCHLORIDE 2000 MG: 1 INJECTION, SOLUTION INTRAVENOUS at 10:23

## 2022-01-01 RX ADMIN — HEPARIN 500 UNITS: 100 SYRINGE at 10:03

## 2022-01-01 RX ADMIN — IOPAMIDOL 78 ML: 755 INJECTION, SOLUTION INTRAVENOUS at 15:18

## 2022-01-01 RX ADMIN — POTASSIUM CHLORIDE 40 MEQ: 750 CAPSULE, EXTENDED RELEASE ORAL at 20:05

## 2022-01-01 RX ADMIN — POTASSIUM CHLORIDE 10 MEQ: 7.46 INJECTION, SOLUTION INTRAVENOUS at 10:55

## 2022-01-01 RX ADMIN — POTASSIUM CHLORIDE 10 MEQ: 7.46 INJECTION, SOLUTION INTRAVENOUS at 13:55

## 2022-01-01 RX ADMIN — GEMCITABINE 2000 MG: 38 INJECTION, SOLUTION INTRAVENOUS at 10:40

## 2022-01-01 RX ADMIN — DEXAMETHASONE SODIUM PHOSPHATE 12 MG: 10 INJECTION, SOLUTION INTRAMUSCULAR; INTRAVENOUS at 10:41

## 2022-01-01 RX ADMIN — SODIUM CHLORIDE 250 ML: 9 INJECTION, SOLUTION INTRAVENOUS at 09:32

## 2022-01-01 RX ADMIN — ZOLEDRONIC ACID 4 MG: 4 INJECTION, SOLUTION, CONCENTRATE INTRAVENOUS at 10:23

## 2022-01-01 RX ADMIN — DEXAMETHASONE SODIUM PHOSPHATE 12 MG: 10 INJECTION, SOLUTION INTRAMUSCULAR; INTRAVENOUS at 10:30

## 2022-01-01 RX ADMIN — DEXAMETHASONE SODIUM PHOSPHATE 12 MG: 10 INJECTION, SOLUTION INTRAMUSCULAR; INTRAVENOUS at 09:48

## 2022-01-01 RX ADMIN — ERIBULIN MESYLATE 2.25 MG: 0.5 INJECTION INTRAVENOUS at 12:25

## 2022-01-01 RX ADMIN — GEMCITABINE 2000 MG: 38 INJECTION, SOLUTION INTRAVENOUS at 11:12

## 2022-01-01 RX ADMIN — MORPHINE SULFATE 2 MG: 2 INJECTION, SOLUTION INTRAMUSCULAR; INTRAVENOUS at 22:00

## 2022-01-01 RX ADMIN — SODIUM CHLORIDE 250 ML: 9 INJECTION, SOLUTION INTRAVENOUS at 09:29

## 2022-01-01 RX ADMIN — SODIUM CHLORIDE 75 ML/HR: 9 INJECTION, SOLUTION INTRAVENOUS at 01:03

## 2022-01-01 RX ADMIN — HEPARIN 500 UNITS: 100 SYRINGE at 12:34

## 2022-01-01 RX ADMIN — SODIUM CHLORIDE, PRESERVATIVE FREE 10 ML: 5 INJECTION INTRAVENOUS at 10:40

## 2022-01-01 RX ADMIN — POTASSIUM CHLORIDE AND SODIUM CHLORIDE 250 ML/HR: 900; 300 INJECTION, SOLUTION INTRAVENOUS at 10:53

## 2022-01-01 RX ADMIN — PIPERACILLIN SODIUM AND TAZOBACTAM SODIUM 3.38 G: 3; .375 INJECTION, POWDER, LYOPHILIZED, FOR SOLUTION INTRAVENOUS at 12:02

## 2022-01-01 RX ADMIN — FILGRASTIM 480 MCG: 480 INJECTION, SOLUTION INTRAVENOUS; SUBCUTANEOUS at 10:11

## 2022-01-01 RX ADMIN — MORPHINE SULFATE 2 MG: 2 INJECTION, SOLUTION INTRAMUSCULAR; INTRAVENOUS at 03:49

## 2022-01-01 RX ADMIN — Medication 10 ML: at 12:46

## 2022-01-01 RX ADMIN — GEMCITABINE HYDROCHLORIDE 2050 MG: 1 INJECTION, SOLUTION INTRAVENOUS at 12:12

## 2022-01-01 RX ADMIN — POTASSIUM CHLORIDE 40 MEQ: 750 CAPSULE, EXTENDED RELEASE ORAL at 04:31

## 2022-01-01 RX ADMIN — SODIUM CHLORIDE 250 ML: 9 INJECTION, SOLUTION INTRAVENOUS at 10:40

## 2022-01-01 RX ADMIN — MEBROFENIN 1 DOSE: 45 INJECTION, POWDER, LYOPHILIZED, FOR SOLUTION INTRAVENOUS at 09:53

## 2022-01-01 RX ADMIN — MORPHINE SULFATE 2 MG: 2 INJECTION, SOLUTION INTRAMUSCULAR; INTRAVENOUS at 13:53

## 2022-01-01 RX ADMIN — POTASSIUM CHLORIDE 40 MEQ: 750 CAPSULE, EXTENDED RELEASE ORAL at 16:05

## 2022-01-01 RX ADMIN — SODIUM CHLORIDE 75 ML/HR: 9 INJECTION, SOLUTION INTRAVENOUS at 16:32

## 2022-01-01 RX ADMIN — SODIUM CHLORIDE 250 ML: 9 INJECTION, SOLUTION INTRAVENOUS at 10:50

## 2022-01-01 RX ADMIN — GABAPENTIN 300 MG: 300 CAPSULE ORAL at 16:06

## 2022-01-01 RX ADMIN — ERIBULIN MESYLATE 2.25 MG: 0.5 INJECTION INTRAVENOUS at 10:18

## 2022-01-01 RX ADMIN — CARBOPLATIN 300 MG: 10 INJECTION, SOLUTION INTRAVENOUS at 11:08

## 2022-01-01 RX ADMIN — ERIBULIN MESYLATE 2.95 MG: 0.5 INJECTION INTRAVENOUS at 12:02

## 2022-01-01 RX ADMIN — SODIUM CHLORIDE, POTASSIUM CHLORIDE, SODIUM LACTATE AND CALCIUM CHLORIDE 100 ML/HR: 600; 310; 30; 20 INJECTION, SOLUTION INTRAVENOUS at 03:57

## 2022-01-01 RX ADMIN — SODIUM CHLORIDE 10 ML: 9 INJECTION, SOLUTION INTRAMUSCULAR; INTRAVENOUS; SUBCUTANEOUS at 15:25

## 2022-01-01 RX ADMIN — SODIUM CHLORIDE 125 ML/HR: 9 INJECTION, SOLUTION INTRAVENOUS at 22:18

## 2022-01-01 RX ADMIN — SODIUM CHLORIDE 1000 ML: 9 INJECTION, SOLUTION INTRAVENOUS at 09:13

## 2022-01-01 RX ADMIN — SERTRALINE 100 MG: 50 TABLET, FILM COATED ORAL at 08:20

## 2022-01-01 RX ADMIN — HEPARIN 500 UNITS: 100 SYRINGE at 12:23

## 2022-01-01 RX ADMIN — PIPERACILLIN AND TAZOBACTAM 3.38 G: 3; .375 INJECTION, POWDER, FOR SOLUTION INTRAVENOUS at 02:07

## 2022-01-13 PROBLEM — Z86.718: Status: ACTIVE | Noted: 2022-01-01

## 2022-01-13 NOTE — PATIENT INSTRUCTIONS
"Capecitabine tablets  What is this medicine?  CAPECITABINE (ka pe SITE a been) is a chemotherapy drug. It treats certain types of cancer like breast cancer, colon cancer, and rectal cancer.  This medicine may be used for other purposes; ask your health care provider or pharmacist if you have questions.  COMMON BRAND NAME(S): Xeloda  What should I tell my health care provider before I take this medicine?  They need to know if you have any of these conditions:  · dehydration  · have been told that you lack the enzyme DPD (dihydropyrimidine dehydrogenase)  · heart disease  · kidney disease  · liver disease  · low blood counts (white cells or platelets)  · take medicines that treat or prevent blood clots  · an unusual or allergic reaction to capecitabine, fluorouracil, other medicines, foods, dyes, or preservatives  · pregnant or trying to get pregnant  · breast-feeding  How should I use this medicine?  Take this medicine by mouth with water. Take it as directed on the prescription label. Do not cut, crush or chew this medicine. Swallow the tablets whole. Take it with food within 30 minutes after finishing a meal. Keep taking it unless your health care provider tells you to stop.  This medicine is taken in \"cycles\". There will be days you do not take it. Talk to your health care provider if you have questions about when to take your medicine. It is very important to follow the exact schedule. Taking it more often than directed can cause serious side effects.  Handling this medicine may be harmful. Wear gloves while touching the medicine or bottle. Talk to your health care provider about how to handle this medicine. Special instructions may apply.  Talk to your health care provider about the use of this medicine in children. Special care may be needed.  Patients over 65 years of age may have a stronger reaction.  Overdosage: If you think you have taken too much of this medicine contact a poison control center or " emergency room at once.  NOTE: This medicine is only for you. Do not share this medicine with others.  What if I miss a dose?  If you miss a dose, skip it. Take your next dose at the normal time. Do not take extra or 2 doses at the same time to make up for the missed dose.  What may interact with this medicine?  This medicine may interact with the following medications:  · allopurinol  · leucovorin  · phenytoin  · warfarin  This list may not describe all possible interactions. Give your health care provider a list of all the medicines, herbs, non-prescription drugs, or dietary supplements you use. Also tell them if you smoke, drink alcohol, or use illegal drugs. Some items may interact with your medicine.  What should I watch for while using this medicine?  This medicine may make you feel generally unwell. This is not uncommon as chemotherapy can affect healthy cells as well as cancer cells. Report any side effects. Continue your course of treatment even though you feel ill unless your health care provider tells you to stop.  You may need blood work while you are taking this medicine.  This medicine may increase your risk of getting an infection. Call your health care provider for advice if you get a fever, chills, sore throat, or other symptoms of a cold or flu. Do not treat yourself. Try to avoid being around people who are sick.  This medicine may increase your risk to bruise or bleed. Call your doctor or health care professional if you notice any unusual bleeding.  Be careful brushing and flossing your teeth or using a toothpick because you may get an infection or bleed more easily. If you have any dental work done, tell your dentist you are receiving this medicine.  Avoid taking medicines that contain aspirin, acetaminophen, ibuprofen, naproxen, or ketoprofen unless instructed by your health care provider. These medicines may hide a fever.  Do not become pregnant while taking this medicine or for 6 months after  stopping it. Women should inform their health care provider if they wish to become pregnant or think they might be pregnant. Men should not father a child while taking this medicine and for 3 months after stopping it. There is potential for serious harm to an unborn child. Talk to your health care provider for more information. Do not breast-feed an infant while taking this medicine or for 2 weeks after stopping it.  This medicine may make it more difficult to get pregnant or father a child. Talk to your health care provider if you are concerned about your fertility.  Check with your health care provider if you have severe diarrhea, nausea, and vomiting, or if you sweat a lot. The loss of too much body fluid may make it dangerous for you to take this medicine.  This medicine may cause serious skin reactions. They can happen weeks to months after starting the medicine. Contact your health care provider right away if you notice fevers or flu-like symptoms with a rash. The rash may be red or purple and then turn into blisters or peeling of the skin. Or, you might notice a red rash with swelling of the face, lips or lymph nodes in your neck or under your arms.  What side effects may I notice from receiving this medicine?  Side effects that you should report to your doctor or health care professional as soon as possible:  · allergic reactions (skin rash, itching or hives; swelling of the face, lips, or tongue)  · diarrhea  · dizziness  · infection (fever, chills, cough, sore throat, pain or trouble passing urine)  · low red blood cell counts (trouble breathing; feeling faint; lightheaded, falls; unusually weak or tired)  · kidney injury (trouble passing urine or change in the amount of urine)  · light-colored stool  · liver injury (dark yellow or brown urine; general ill feeling or flu-like symptoms; loss of appetite, right upper belly pain; unusually weak or tired, yellowing of the eyes or skin)  · mouth sores  · nausea  or vomiting  · pain or tightness in the chest, neck, back, or arms  · redness, blistering, peeling, bleeding, or swelling of the skin on the palms of your hands or soles of your feet  · redness, blistering, peeling, or loosening of the skin, including inside the mouth  · unusual bruising or bleeding  Side effects that usually do not require medical attention (report to your doctor or health care professional if they continue or are bothersome):  · changes in vision  · constipation  · loss of appetite  · mouth sores  · pain, tingling, numbness in the hands or feet  · stomach pain  This list may not describe all possible side effects. Call your doctor for medical advice about side effects. You may report side effects to FDA at 3-581-MTY-5200.  Where should I keep my medicine?  Keep out of the reach of children and pets.  Store at room temperature between 20 and 25 degrees C (68 and 77 degrees F). Keep the container tightly closed. Get rid of any unused medicine after the expiration date.  To get rid of medicines that are no longer needed or have :  · Take the medicine to a medicine take-back program. Check with your pharmacy or law enforcement to find a location.  · If you cannot return the medicine, ask your pharmacist or health care provider how to get rid of this medicine safely.  NOTE: This sheet is a summary. It may not cover all possible information. If you have questions about this medicine, talk to your doctor, pharmacist, or health care provider.  ©  [employer] (2021 10:43:03)

## 2022-01-13 NOTE — PROGRESS NOTES
DATE OF VISIT: 1/13/2022      REASON FOR VISIT: Metastatic breast cancer with skin, bone and liver metastasis on Afinitor and Aromasin,, cancer related pain, DVT on anticoagulation with Coumadin, neuropathy from chemotherapy, elevated liver function test      HISTORY OF PRESENT ILLNESS:   51-year-old female with medical problem consisting of DCIS of right breast diagnosed in 2007, anxiety/depression, metastatic breast cancer with bone, liver and skin metastases for which patient is currently on treatment with Afinitor and Aromasin since August 26, 2021 is here for follow-up appointment today.  Patient had a restaging CT scan done recently, she is here to discuss the results.  Complains of worsening skin rash affecting the right axillary area.  Denies any significant leukocytosis.  Denies any excessive nausea or vomiting.  Denies any bleeding.  Denies any recent worsening neuropathy affecting upper or lower extremity.  Denies any bowel or bladder incontinence.  Complains of chronic back pain and hip pain.        Oncology history:    1.  Metastatic right breast cancer with bone and liver metastases:  -Patient was initially diagnosed in July 2017 with axillary biopsy on right side  -S/p 6 cycles of chemotherapy with Taxotere and Cytoxan between July 26, 2017 and December 6, 2017  -Patient was evaluated by  at [facility] in [place] for second opinion.  -Patient received 20 cycles of chemotherapy with palbociclib and letrozole between July 30, 2018 and October 2, 2020.  -Due to enlarging mass in the right axilla patient's treatment was changed to Faslodex on October 16, 2020  -Due to continuous enlargement of axillary mass without any other progression patient received radiation treatment to axilla that was completed on March 4, 2021  -Patient has received 8 cycles of Faslodex between October 16, 2020 and May 7, 2021  -PET/CT done on May 21, 2021 shows progression of disease with axillary adenopathy, skin  involvement as well as new onset of right-sided liver metastases.  -Patient received 3 cycles of Adriamycin and Cytoxan between Lupe 3, 2021 in July 29, 2021.  Due to worsening skin involvement from malignancy, treatment was changed to Afinitor with Aromasin on August 26, 2021  -Due to worsening liver function test, dose of Afinitor has been decreased to 5 mg p.o. daily since November 30, 2021.  -CT of chest, abdomen and pelvis with contrast done on January 12, 2022 shows progression of disease with worsening liver metastases as well as worsening skin metastases.  - Recommend changing treatment to Xeloda      Past Medical History, Past Surgical History, Social History, Family History have been reviewed and are without significant changes except as mentioned.    Review of Systems   Constitutional: Positive for fatigue.   Musculoskeletal: Positive for arthralgias and back pain.   Skin:        Erythematous skin lesion affecting axillary area   Neurological: Positive for numbness.   Hematological: Negative for adenopathy.      A comprehensive 14 point review of systems was performed and was negative except as mentioned.    Medications:  The current medication list was reviewed in the EMR    ALLERGIES:    Allergies   Allergen Reactions   • Percocet [Oxycodone-Acetaminophen] Nausea And Vomiting       Objective      Vitals:    01/13/22 0930   BP: 124/70   Pulse: 81   Resp: 18   Temp: 97.2 °F (36.2 °C)   SpO2: 96%   Weight: 105 kg (231 lb 12.8 oz)   PainSc: 0-No pain     Current Status 8/31/2021   ECOG score 0       Physical Exam  Cardiovascular:      Comments: Port-A-Cath present  Pulmonary:      Breath sounds: Normal breath sounds.   Chest:      Comments: Breast exam was performed in presence of registered nurse oSlange.  There is increasing number and size of cutaneous metastasis involving right axillary area both an anterior axillary line as well as posterior axillary line.  Lymphadenopathy:      Cervical: No cervical  adenopathy.   Neurological:      Mental Status: She is alert and oriented to person, place, and time.           RECENT LABS:  Glucose   Date Value Ref Range Status   01/12/2022 110 (H) 65 - 99 mg/dL Final     Sodium   Date Value Ref Range Status   01/12/2022 140 136 - 145 mmol/L Final     Potassium   Date Value Ref Range Status   01/12/2022 4.1 3.5 - 5.2 mmol/L Final     CO2   Date Value Ref Range Status   01/12/2022 27.0 22.0 - 29.0 mmol/L Final     Chloride   Date Value Ref Range Status   01/12/2022 104 98 - 107 mmol/L Final     Anion Gap   Date Value Ref Range Status   01/12/2022 9.0 5.0 - 15.0 mmol/L Final     Creatinine   Date Value Ref Range Status   01/12/2022 0.75 0.57 - 1.00 mg/dL Final     BUN   Date Value Ref Range Status   01/12/2022 9 6 - 20 mg/dL Final     BUN/Creatinine Ratio   Date Value Ref Range Status   01/12/2022 12.0 7.0 - 25.0 Final     Calcium   Date Value Ref Range Status   01/12/2022 9.1 8.6 - 10.5 mg/dL Final     eGFR Non  Amer   Date Value Ref Range Status   01/12/2022 81 >60 mL/min/1.73 Final     Alkaline Phosphatase   Date Value Ref Range Status   01/12/2022 102 39 - 117 U/L Final     Total Protein   Date Value Ref Range Status   01/12/2022 7.4 6.0 - 8.5 g/dL Final     ALT (SGPT)   Date Value Ref Range Status   01/12/2022 53 (H) 1 - 33 U/L Final     AST (SGOT)   Date Value Ref Range Status   01/12/2022 43 (H) 1 - 32 U/L Final     Total Bilirubin   Date Value Ref Range Status   01/12/2022 0.2 0.0 - 1.2 mg/dL Final     Albumin   Date Value Ref Range Status   01/12/2022 4.60 3.50 - 5.20 g/dL Final     Globulin   Date Value Ref Range Status   01/12/2022 2.8 gm/dL Final     Lab Results   Component Value Date    WBC 7.31 01/12/2022    HGB 12.8 01/12/2022    HCT 39.3 01/12/2022    MCV 82.9 01/12/2022     01/12/2022     Lab Results   Component Value Date    NEUTROABS 4.87 01/12/2022     Lab Results   Component Value Date     131.3 (H) 01/12/2022    LABCA2 200.1 (H) 01/12/2022     HCGQUANT 1.16 06/03/2021         PATHOLOGY:  * Cannot find OR log *         RADIOLOGY DATA :  CT Chest With Contrast Diagnostic    Result Date: 1/12/2022  1.  Stable postoperative changes from right mastectomy with tissue expander placement and associated skin thickening along the superior lateral postoperative bed. 2.  Interval worsening of metastatic disease in the lung parenchyma and liver as outlined. 3.  Prior therapeutic changes from chemoembolization of the left hepatic lobe metastasis 4.  No lymphadenopathy in the thorax, abdomen, or pelvis 5.  No change in multifocal osteoblastic metastases with T12 and L4 vertebral compression fractures ADDITIONAL SUMMARY ITEM(S): 1.  Hepatic steatosis 2.  Colonic diverticulosis Electronically signed by:  Chito Sosa MD  1/12/2022 10:05 PM CST Workstation: [id number]    CT Abdomen Pelvis With Contrast    Result Date: 1/12/2022  1.  Stable postoperative changes from right mastectomy with tissue expander placement and associated skin thickening along the superior lateral postoperative bed. 2.  Interval worsening of metastatic disease in the lung parenchyma and liver as outlined. 3.  Prior therapeutic changes from chemoembolization of the left hepatic lobe metastasis 4.  No lymphadenopathy in the thorax, abdomen, or pelvis 5.  No change in multifocal osteoblastic metastases with T12 and L4 vertebral compression fractures ADDITIONAL SUMMARY ITEM(S): 1.  Hepatic steatosis 2.  Colonic diverticulosis Electronically signed by:  Chito Sosa MD  1/12/2022 10:05 PM CST Workstation: [id number]          Assessment/Plan     1.  Metastatic right breast cancer with skin, bone, liver and lung metastasis stage IV:  - Review oncology history for prior treatment details  - Patient was started on Afinitor with Aromasin on August 26, 2021  - Due to worsening liver function test, dose of Afinitor has been decreased to 5 mg p.o. daily on November 30, 2021  - CT of chest, abdomen and pelvis  with contrast done on January 12, 2022 was reviewed with radiologist, discussed with patient.  CT scan is showing progression of disease with worsening pulmonary and liver metastasis.  On physical examination there is worsening cutaneous metastasis consistent with progression of disease which is potentially life-threatening without any further treatment.  - Recommend changing treatment to Xeloda 2 weeks on 1 week off.  - Chemotherapy side effects of Xeloda including but not limited to risk of low blood counts, risk of infection, need for blood transfusion, risk of bleeding, risk of kidney failure, diarrhea, nausea, vomiting, risk of neuropathy, risk of hand-foot syndrome and risk of allergic reaction which can be life-threatening were discussed with patient and family. We will also provide them some information today to read about the chemotherapy.  -Recommend discontinuing if any doubt from today.  Recommend continue with Aromasin until she gets shipment for Xeloda.  - We will have patient return to clinic in 4 weeks with repeat CBC, CMP, magnesium, phosphorus, CA 15-3 and CA 27-29 to be done on that day.  - Plan is to get repeat CT of chest, abdomen and pelvis with contrast after cycle 3 of Xeloda which was discussed with patient today.      2.  Brain metastasis:  - Patient was found to have calvarial metastasis without any parenchymal mets.  Radiation therapy was not recommended by Dr. Pulido at that point.    3.  Bone metastases  - Remains on monthly Zometa since October 23, 2017.  We will continue with Zometa every month for now.    4.  Right internal jugular vein DVT on Coumadin    5.  Elevated liver function test:  - Secondary to liver metastasis plus chemotherapy    6.  History of DCIS of right breast diagnosed in 2007  - S/p mastectomy followed by tamoxifen for 5 years completed in 2013    7.  History of melanoma in situ s/p surgery    8.  Chemotherapy-induced neuropathy  -Remains on gabapentin    9.   Cancer related pain:  - Remains on Ultram    10.  Health maintenance: Patient does not smoke    11. Advance Care Planning   ACP discussion was held with the patient during this visit. Patient has an advance directive in EMR which is still valid.     11.  Prescriptions: Prescription for gabapentin has been sent to her pharmacy today             PHQ-9 Total Score: 0   -Patient is not homicidal or suicidal.  No acute intervention required.    Kylie García reports a pain score of 0 .  Given her pain assessment as noted, treatment options were discussed and the following options were decided upon as a follow-up plan to address the patient's pain: continuation of current treatment plan for pain.         Ovidio Meadows MD  1/13/2022  13:15 CST        Part of this note may be an electronic transcription/translation of spoken language to printed text using the Dragon Dictation System.          CC:

## 2022-01-13 NOTE — PATIENT INSTRUCTIONS
"Capecitabine tablets  What is this medicine?  CAPECITABINE (ka pe SITE a been) is a chemotherapy drug. It treats certain types of cancer like breast cancer, colon cancer, and rectal cancer.  This medicine may be used for other purposes; ask your health care provider or pharmacist if you have questions.  COMMON BRAND NAME(S): Xeloda  What should I tell my health care provider before I take this medicine?  They need to know if you have any of these conditions:  · dehydration  · have been told that you lack the enzyme DPD (dihydropyrimidine dehydrogenase)  · heart disease  · kidney disease  · liver disease  · low blood counts (white cells or platelets)  · take medicines that treat or prevent blood clots  · an unusual or allergic reaction to capecitabine, fluorouracil, other medicines, foods, dyes, or preservatives  · pregnant or trying to get pregnant  · breast-feeding  How should I use this medicine?  Take this medicine by mouth with water. Take it as directed on the prescription label. Do not cut, crush or chew this medicine. Swallow the tablets whole. Take it with food within 30 minutes after finishing a meal. Keep taking it unless your health care provider tells you to stop.  This medicine is taken in \"cycles\". There will be days you do not take it. Talk to your health care provider if you have questions about when to take your medicine. It is very important to follow the exact schedule. Taking it more often than directed can cause serious side effects.  Handling this medicine may be harmful. Wear gloves while touching the medicine or bottle. Talk to your health care provider about how to handle this medicine. Special instructions may apply.  Talk to your health care provider about the use of this medicine in children. Special care may be needed.  Patients over 65 years of age may have a stronger reaction.  Overdosage: If you think you have taken too much of this medicine contact a poison control center or " emergency room at once.  NOTE: This medicine is only for you. Do not share this medicine with others.  What if I miss a dose?  If you miss a dose, skip it. Take your next dose at the normal time. Do not take extra or 2 doses at the same time to make up for the missed dose.  What may interact with this medicine?  This medicine may interact with the following medications:  · allopurinol  · leucovorin  · phenytoin  · warfarin  This list may not describe all possible interactions. Give your health care provider a list of all the medicines, herbs, non-prescription drugs, or dietary supplements you use. Also tell them if you smoke, drink alcohol, or use illegal drugs. Some items may interact with your medicine.  What should I watch for while using this medicine?  This medicine may make you feel generally unwell. This is not uncommon as chemotherapy can affect healthy cells as well as cancer cells. Report any side effects. Continue your course of treatment even though you feel ill unless your health care provider tells you to stop.  You may need blood work while you are taking this medicine.  This medicine may increase your risk of getting an infection. Call your health care provider for advice if you get a fever, chills, sore throat, or other symptoms of a cold or flu. Do not treat yourself. Try to avoid being around people who are sick.  This medicine may increase your risk to bruise or bleed. Call your doctor or health care professional if you notice any unusual bleeding.  Be careful brushing and flossing your teeth or using a toothpick because you may get an infection or bleed more easily. If you have any dental work done, tell your dentist you are receiving this medicine.  Avoid taking medicines that contain aspirin, acetaminophen, ibuprofen, naproxen, or ketoprofen unless instructed by your health care provider. These medicines may hide a fever.  Do not become pregnant while taking this medicine or for 6 months after  stopping it. Women should inform their health care provider if they wish to become pregnant or think they might be pregnant. Men should not father a child while taking this medicine and for 3 months after stopping it. There is potential for serious harm to an unborn child. Talk to your health care provider for more information. Do not breast-feed an infant while taking this medicine or for 2 weeks after stopping it.  This medicine may make it more difficult to get pregnant or father a child. Talk to your health care provider if you are concerned about your fertility.  Check with your health care provider if you have severe diarrhea, nausea, and vomiting, or if you sweat a lot. The loss of too much body fluid may make it dangerous for you to take this medicine.  This medicine may cause serious skin reactions. They can happen weeks to months after starting the medicine. Contact your health care provider right away if you notice fevers or flu-like symptoms with a rash. The rash may be red or purple and then turn into blisters or peeling of the skin. Or, you might notice a red rash with swelling of the face, lips or lymph nodes in your neck or under your arms.  What side effects may I notice from receiving this medicine?  Side effects that you should report to your doctor or health care professional as soon as possible:  · allergic reactions (skin rash, itching or hives; swelling of the face, lips, or tongue)  · diarrhea  · dizziness  · infection (fever, chills, cough, sore throat, pain or trouble passing urine)  · low red blood cell counts (trouble breathing; feeling faint; lightheaded, falls; unusually weak or tired)  · kidney injury (trouble passing urine or change in the amount of urine)  · light-colored stool  · liver injury (dark yellow or brown urine; general ill feeling or flu-like symptoms; loss of appetite, right upper belly pain; unusually weak or tired, yellowing of the eyes or skin)  · mouth sores  · nausea  or vomiting  · pain or tightness in the chest, neck, back, or arms  · redness, blistering, peeling, bleeding, or swelling of the skin on the palms of your hands or soles of your feet  · redness, blistering, peeling, or loosening of the skin, including inside the mouth  · unusual bruising or bleeding  Side effects that usually do not require medical attention (report to your doctor or health care professional if they continue or are bothersome):  · changes in vision  · constipation  · loss of appetite  · mouth sores  · pain, tingling, numbness in the hands or feet  · stomach pain  This list may not describe all possible side effects. Call your doctor for medical advice about side effects. You may report side effects to FDA at 9-453-FOK-4445.  Where should I keep my medicine?  Keep out of the reach of children and pets.  Store at room temperature between 20 and 25 degrees C (68 and 77 degrees F). Keep the container tightly closed. Get rid of any unused medicine after the expiration date.  To get rid of medicines that are no longer needed or have :  · Take the medicine to a medicine take-back program. Check with your pharmacy or law enforcement to find a location.  · If you cannot return the medicine, ask your pharmacist or health care provider how to get rid of this medicine safely.  NOTE: This sheet is a summary. It may not cover all possible information. If you have questions about this medicine, talk to your doctor, pharmacist, or health care provider.  ©  [employer]/Gold Standard (2021 10:43:03)

## 2022-01-13 NOTE — TELEPHONE ENCOUNTER
Rx Refill Note  Requested Prescriptions     Pending Prescriptions Disp Refills   • everolimus (Afinitor) 5 MG tablet [Pharmacy Med Name: AFINITOR 5MG]  0     Sig: TAKE 1 TABLET BY MOUTH DAILY AT THE SAME TIME. MAY TAKE WITH OR WITHOUT FOOD WITH A FULL GLASS OF WATER. SWALLOW WHOLE. AVOID GRAPEFRUIT PRODUCTS      Last office visit with prescribing clinician: 12/16/2021      Next office visit with prescribing clinician: 1/13/2022            Elvia Allen RN  01/13/22, 08:03 CST

## 2022-01-13 NOTE — PROGRESS NOTES
Pt denies med changes or bleeding problems. Unable to determine cause for elevation. Pt instructed to hold coumadin tonight and eat a can of spinach today; pt verbalized. Patient instructed regarding medication; results given and questions answered. Nutritional counseling given.  Dietary factors affecting therapy addressed.  Patient instructed to monitor for excessive bruising or bleeding. Notify provider if you experience excessive bleeding from the nose, cuts, gums, rectum, urinary tract, or vagina. Reddish or brown urine or stool. Vomiting of blood or hemorrhoidal bleeding. If major injury occurs present to the Emergency Department. Will recheck on Monday. Findings reported by Tata Nunez RN.    Today's INR is Lab Results - Last 18 Months   Lab Units 01/13/22  0000   INR  4.70*

## 2022-01-17 NOTE — PROGRESS NOTES
Pt here today for recheck of elevated INR of unknown cause.  Pt denies med changes or bleeding issues.  Instructed pt on weekly coumadin dose and will recheck INR next wk.  Pt verbalizes.  Patient instructed regarding medication; results given and questions answered. Nutritional counseling given.  Dietary factors affecting therapy addressed.  Patient instructed to monitor for excessive bruising or bleeding.  Findings reported by Aissatou Arellano RN.   Today's INR is Lab Results - Last 18 Months   Lab Units 01/17/22  0000   INR  2.90*

## 2022-01-27 NOTE — PROGRESS NOTES
Pt started Xeloda yesterday for 14 days then off 7. Pt denies bleeding problems. Dose cut back due to major interaction with Xeloda. Pt instructed to have a serving of green veggies today and Sunday; pt verbalized. Patient instructed regarding medication; results given and questions answered. Nutritional counseling given.  Dietary factors affecting therapy addressed.  Patient instructed to monitor for excessive bruising or bleeding. Will recheck next week. Findings reported by Tata Nunez RN.    Today's INR is Lab Results - Last 18 Months   Lab Units 01/27/22  0000   INR  3.50*

## 2022-02-01 NOTE — PROGRESS NOTES
Pt will stop Xeloda on February 8th and will be off for 7 days and repeat 14 day Xeloda regimen, off for 7.  Pt states she had spinach twice this past week.  I adjusted coumadin dose and instructed pt to limit green until this Saturday.  Recheck INR next Monday.  Pt verbalizes.  Patient instructed regarding medication; results given and questions answered. Nutritional counseling given.  Dietary factors affecting therapy addressed.  Patient instructed to monitor for excessive bruising or bleeding.  Findings reported by Aissatou Arellano RN.   Today's INR is Lab Results - Last 18 Months   Lab Units 02/01/22  0000   INR  1.70*           Yes

## 2022-02-07 NOTE — PROGRESS NOTES
Pt called and states her car is in the shop and she needs to reschedule to Wednesday. Pt states she forgot to eat green veggies Saturday. Pt was instructed to eat green veggies today, take 2.3mg of coumadin tonight, 5mg tomorrow, and appt made for Wednesday; she repeated back correctly. Findings reported by Tata Nunez RN.

## 2022-02-09 NOTE — PROGRESS NOTES
Pt finished her first two weeks of Xeloda yesterday. Pt will restart Xeloda next Wednesday (2 weeks on 1 week off). Pt instructed on dosing and to have a serving of green veggies one day this weekend; pt verbalized. Patient instructed regarding medication; results given and questions answered. Nutritional counseling given.  Dietary factors affecting therapy addressed.  Patient instructed to monitor for excessive bruising or bleeding. Will recheck next week. Findings reported by Tata Nunez RN.    Today's INR is Lab Results - Last 18 Months   Lab Units 02/09/22  0000   INR  1.90*

## 2022-02-10 NOTE — TELEPHONE ENCOUNTER
Rx Refill Note  Requested Prescriptions     Pending Prescriptions Disp Refills   • capecitabine (XELODA) 500 MG chemo tablet [Pharmacy Med Name: CAPECITABINE 500MG]  0     Sig: TAKE 4 TABLETS BY MOUTH IN THE MORNING AND 4 TABLETS IN THE EVENING ON DAYS 1 TO 14 , THEN 7 DAYS OFF.      Last office visit with prescribing clinician: 1/13/2022      Next office visit with prescribing clinician: 2/16/2022            Elvia Allen RN  02/10/22, 08:09 CST

## 2022-02-16 NOTE — PROGRESS NOTES
Pt denies med changes or bleeding problems. Pt will be starting her two week regimen of Xeloda today. Pt states she has eaten green veggies twice and drank two premier protein shakes since last visit. Pt does not wish to go to lab for venipuncture for verification of INR. Pt was instructed to hold coumadin and eat a can of spinach today; pt verbalized. Patient instructed regarding medication; results given and questions answered. Nutritional counseling given.  Dietary factors affecting therapy addressed.  Patient instructed to monitor for excessive bruising or bleeding. Notify provider if you experience excessive bleeding from the nose, cuts, gums, rectum, urinary tract, or vagina. Reddish or brown urine or stool. Vomiting of blood or hemorrhoidal bleeding. If major injury occurs present to the Emergency Department. Will recheck tomorrow. Findings reported by Tata Nunez RN.    Today's INR is Lab Results - Last 18 Months   Lab Units 02/16/22  0000   INR  5.80*

## 2022-02-16 NOTE — PROGRESS NOTES
DATE OF VISIT: 2/16/2022      REASON FOR VISIT: Metastatic breast cancer with skin, bone and liver metastases on Xeloda, cancer related pain, DVT on anticoagulation with Coumadin, neuropathy from chemotherapy, elevated liver function test        HISTORY OF PRESENT ILLNESS:   51-year-old female with medical problem consisting of DCIS of right breast diagnosed in 2007, anxiety/depression, metastatic breast cancer with bone, liver and skin metastasis for which patient is currently on treatment with Xeloda since January 26, 2022.  Patient is here for follow-up appointment today.  Complains of worsening skin lesion affecting axillary area.  Complains of axillary discomfort secondary to skin lesion.  States she has been requiring to take more tramadol secondary to pain recently.  Denies any new lymph node enlargement.  Denies any bleeding.  Denies any excessive nausea vomiting or diarrhea with Xeloda.  Denies any symptoms suggestive of hand-foot syndrome.  Denies any neuropathy affecting upper or lower extremity.  Denies any bowel or bladder incontinence.  Complains of chronic back pain and hip pain.        Oncology history:    1.  Metastatic right breast cancer with bone and liver metastases:  -Patient was initially diagnosed in July 2017 with axillary biopsy on right side  -S/p 6 cycles of chemotherapy with Taxotere and Cytoxan between July 26, 2017 and December 6, 2017  -Patient was evaluated by  at [facility] in [place] for second opinion.  -Patient received 20 cycles of chemotherapy with palbociclib and letrozole between July 30, 2018 and October 2, 2020.  -Due to enlarging mass in the right axilla patient's treatment was changed to Faslodex on October 16, 2020  -Due to continuous enlargement of axillary mass without any other progression patient received radiation treatment to axilla that was completed on March 4, 2021  -Patient has received 8 cycles of Faslodex between October 16, 2020 and May 7,  2021  -PET/CT done on May 21, 2021 shows progression of disease with axillary adenopathy, skin involvement as well as new onset of right-sided liver metastases.  -Patient received 3 cycles of Adriamycin and Cytoxan between Lupe 3, 2021 in July 29, 2021.  Due to worsening skin involvement from malignancy, treatment was changed to Afinitor with Aromasin on August 26, 2021  -Due to worsening liver function test, dose of Afinitor has been decreased to 5 mg p.o. daily since November 30, 2021.  -CT of chest, abdomen and pelvis with contrast done on January 12, 2022 shows progression of disease with worsening liver metastases as well as worsening skin metastases.  -Patient was started on cycle 1 of Xeloda on January 26, 2022.      Past Medical History, Past Surgical History, Social History, Family History have been reviewed and are without significant changes except as mentioned.    Review of Systems   A comprehensive 14 point review of systems was performed and was negative except as mentioned in HPI.    Medications:  The current medication list was reviewed in the EMR    ALLERGIES:    Allergies   Allergen Reactions   • Percocet [Oxycodone-Acetaminophen] Nausea And Vomiting       Objective      Vitals:    02/16/22 0903   BP: 134/77   Pulse: 103   Temp: 97.8 °F (36.6 °C)   TempSrc: Temporal   SpO2: 95%   Weight: 104 kg (229 lb)   PainSc:   4   PainLoc: Shoulder  Comment: RT AXILLA     Current Status 8/31/2021   ECOG score 0       Physical Exam  Pulmonary:      Breath sounds: Normal breath sounds.   Chest:      Comments: Breast exam was performed in presence of registered nurse Solange.  Patient does have a worsening skin lesion as well as development of skin ulceration from previous nodular skin lesion on physical exam today.  There is no evidence of any foul-smelling discharge.  Neurological:      Mental Status: She is alert and oriented to person, place, and time.           RECENT LABS:  Glucose   Date Value Ref Range  Status   02/16/2022 124 (H) 65 - 99 mg/dL Final     Sodium   Date Value Ref Range Status   02/16/2022 141 136 - 145 mmol/L Final     Potassium   Date Value Ref Range Status   02/16/2022 3.6 3.5 - 5.2 mmol/L Final     CO2   Date Value Ref Range Status   02/16/2022 24.0 22.0 - 29.0 mmol/L Final     Chloride   Date Value Ref Range Status   02/16/2022 104 98 - 107 mmol/L Final     Anion Gap   Date Value Ref Range Status   02/16/2022 13.0 5.0 - 15.0 mmol/L Final     Creatinine   Date Value Ref Range Status   02/16/2022 0.94 0.57 - 1.00 mg/dL Final     BUN   Date Value Ref Range Status   02/16/2022 9 6 - 20 mg/dL Final     BUN/Creatinine Ratio   Date Value Ref Range Status   02/16/2022 9.6 7.0 - 25.0 Final     Calcium   Date Value Ref Range Status   02/16/2022 9.4 8.6 - 10.5 mg/dL Final     eGFR Non  Amer   Date Value Ref Range Status   02/16/2022 62 >60 mL/min/1.73 Final     Alkaline Phosphatase   Date Value Ref Range Status   02/16/2022 127 (H) 39 - 117 U/L Final     Total Protein   Date Value Ref Range Status   02/16/2022 7.0 6.0 - 8.5 g/dL Final     ALT (SGPT)   Date Value Ref Range Status   02/16/2022 32 1 - 33 U/L Final     AST (SGOT)   Date Value Ref Range Status   02/16/2022 35 (H) 1 - 32 U/L Final     Total Bilirubin   Date Value Ref Range Status   02/16/2022 0.3 0.0 - 1.2 mg/dL Final     Albumin   Date Value Ref Range Status   02/16/2022 4.00 3.50 - 5.20 g/dL Final     Globulin   Date Value Ref Range Status   02/16/2022 3.0 gm/dL Final     Lab Results   Component Value Date    WBC 8.55 02/16/2022    HGB 12.9 02/16/2022    HCT 38.6 02/16/2022    MCV 85.6 02/16/2022     02/16/2022     Lab Results   Component Value Date    NEUTROABS 6.13 02/16/2022     Lab Results   Component Value Date     131.3 (H) 01/12/2022    LABCA2 200.1 (H) 01/12/2022    HCGQUANT 1.16 06/03/2021         PATHOLOGY:  * Cannot find OR log *         RADIOLOGY DATA :  No radiology results for the last 7  days        Assessment/Plan     1.  Metastatic right breast cancer with skin, bone, liver and lung metastases stage IV:  -Review oncology history for prior treatment details  -Patient is currently on Xeloda 2 weeks on 1 week off since January 26, 2022.  Patient tolerated first cycle well.  -Patient tolerated first cycle well without any excessive side effect.  -On physical exam skin lesion appears worse as compared to last clinic visit.  Discussed with patient she has only received 1 cycle of Xeloda that is 2 weeks of treatment.  Recommend continuing with Xeloda at present.  -We will prescribe topical clindamycin to prevent any infection and skin ulceration.  -We will proceed with cycle 2 of Xeloda today.  We will follow-up on result of CA 15-3 and CA 27-29  -Patient will return to clinic in 3 weeks with repeat CBC, CMP on that day.  -We will continue with monthly tumor marker with her Zometa.  -Plan is to do restaging CT of chest, abdomen and pelvis with contrast after cycle 3 of Xeloda which has been discussed with patient.    2.  Brain metastasis  -Patient was found to have calvarial metastasis without any apparent pain management.  Radiation therapy was not recommended by radiation oncologist Dr. Pulido at that point    3.  Bone metastasis:  -Remains on monthly Zometa since October 23, 2017.  We will continue with monthly Zometa for now    4.  Internal jugular vein DVT on right side  -Remains on Coumadin    5.  Elevated liver function test:  -Secondary to liver metastasis plus chemotherapy    6.  History of DCIS of right breast diagnosed in 2007  -S/p mastectomy followed by tamoxifen for 5 years completed in 2013    7.  History of melanoma in situ s/p surgery    8.  Chemotherapy induced neuropathy  -Remains on gabapentin    9.  Cancer related pain:  -Remains on Ultram    10.  Health maintenance: Patient does not smoke    11. Advance Care Planning   ACP discussion was held with the patient during this visit.  Patient has an advance directive in EMR which is still valid.     12.  Prescriptions: Prescription for Ultram, gabapentin and topical clindamycin has been sent to her pharmacy today             PHQ-9 Total Score: 2   -Patient is not homicidal or suicidal.  No acute intervention required.    Kylie García reports a pain score of 4.  Given her pain assessment as noted, treatment options were discussed and the following options were decided upon as a follow-up plan to address the patient's pain: continuation of current treatment plan for pain.         Ovidio Meadows MD  2/16/2022  18:27 CST        Part of this note may be an electronic transcription/translation of spoken language to printed text using the Dragon Dictation System.          CC:

## 2022-02-17 NOTE — PROGRESS NOTES
Pt continues Xeloda. Pt states she held coumadin and ate 1/2 can of spinach yesterday. Pt instructed on dosing, to eat the other 1/2 can of spinach today, and appt made for next week; pt verbalized.. Patient instructed regarding medication; results given and questions answered. Nutritional counseling given.  Dietary factors affecting therapy addressed.  Patient instructed to monitor for excessive bruising or bleeding. Notify provider if you experience excessive bleeding from the nose, cuts, gums, rectum, urinary tract, or vagina. Reddish or brown urine or stool. Vomiting of blood or hemorrhoidal bleeding. If major injury occurs present to the Emergency Department. Findings reported by Tata Nunez RN.    Today's INR is Lab Results - Last 18 Months   Lab Units 02/17/22  0000   INR  4.20*

## 2022-02-24 NOTE — PROGRESS NOTES
Pt is on week 2 of Xeloda; pt will have a week off beginning next Wednesday before going back on it 2 weeks again. Pt reports taking Tramadol bid consistently. Pt instructed on dosing and to have a serving of green veggies today; pt verbalized. Patient instructed regarding medication; results given and questions answered. Nutritional counseling given.  Dietary factors affecting therapy addressed.  Patient instructed to monitor for excessive bruising or bleeding. Will recheck next week. Findings reported by Tata Nunez RN.    Today's INR is Lab Results - Last 18 Months   Lab Units 02/24/22  0000   INR  4.40*

## 2022-03-03 NOTE — PROGRESS NOTES
Pt states she followed dosing instructions and ate green veggies once since last visit. Pt is off Xeloda until next Wednesday when she will restart it for 2 weeks. Pt states she is planning to increase Tramadol to tid from bid. Pt denies bleeding problems. Dose not adjusted but pt instructed to have a serving of green veggies today and Sunday; pt verbalized. Patient instructed regarding medication; results given and questions answered. Nutritional counseling given.  Dietary factors affecting therapy addressed.  Patient instructed to monitor for excessive bruising or bleeding. Findings reported by Tata Nunez RN.    Today's INR is Lab Results - Last 18 Months   Lab Units 03/03/22  0000   INR  4.00*

## 2022-03-09 NOTE — PROGRESS NOTES
Pt states she will take Zeloda for two weeks starting today and then will stop. Pt states she is going to see Dr Meadows after 4/4 to discuss going back on IV chemo. Dose adjusted and pt instructed to have a serving of green veggies today and Sunday; pt verbalized. Patient instructed regarding medication; results given and questions answered. Nutritional counseling given.  Dietary factors affecting therapy addressed.  Patient instructed to monitor for excessive bruising or bleeding.Will recheck next week. Findings reported by Tata Nunez RN.    Today's INR is Lab Results - Last 18 Months   Lab Units 03/09/22  0000   INR  3.10*

## 2022-03-09 NOTE — PROGRESS NOTES
DATE OF VISIT: 3/9/2022      REASON FOR VISIT: Metastatic breast cancer with skin, bone and liver metastases on Xeloda, cancer related pain, DVT on anticoagulation with Coumadin, neuropathy from chemotherapy, elevated liver function test      HISTORY OF PRESENT ILLNESS:   51-year-old female with medical problem consisting of DCIS of right breast diagnosed in 2007, anxiety/depression, metastatic breast cancer with with bone, liver and skin metastasis for which patient is currently on treatment with Xeloda since January 26, 2022.  Patient is here for follow-up appointment today.  Complains of worsening skin lesion.  Denies any new lymph node enlargement.  Denies any bleeding.  Denies any excessive nausea vomiting or diarrhea with Xeloda.  Denies any symptoms suggestive of hand-foot syndrome.  Denies any worsening neuropathy affecting upper or lower extremity.  Denies any bowel related incontinence.  Continues stable chronic back pain and hip pain.        Oncology history:    1.  Metastatic right breast cancer with bone and liver metastases:  -Patient was initially diagnosed in July 2017 with axillary biopsy on right side  -S/p 6 cycles of chemotherapy with Taxotere and Cytoxan between July 26, 2017 and December 6, 2017  -Patient was evaluated by  at [facility] in [place] for second opinion.  -Patient received 20 cycles of chemotherapy with palbociclib and letrozole between July 30, 2018 and October 2, 2020.  -Due to enlarging mass in the right axilla patient's treatment was changed to Faslodex on October 16, 2020  -Due to continuous enlargement of axillary mass without any other progression patient received radiation treatment to axilla that was completed on March 4, 2021  -Patient has received 8 cycles of Faslodex between October 16, 2020 and May 7, 2021  -PET/CT done on May 21, 2021 shows progression of disease with axillary adenopathy, skin involvement as well as new onset of right-sided liver  metastases.  -Patient received 3 cycles of Adriamycin and Cytoxan between Lupe 3, 2021 in July 29, 2021.  Due to worsening skin involvement from malignancy, treatment was changed to Afinitor with Aromasin on August 26, 2021  -Due to worsening liver function test, dose of Afinitor has been decreased to 5 mg p.o. daily since November 30, 2021.  -CT of chest, abdomen and pelvis with contrast done on January 12, 2022 shows progression of disease with worsening liver metastases as well as worsening skin metastases.  -Patient was started on cycle 1 of Xeloda on January 26, 2022.               Past Medical History, Past Surgical History, Social History, Family History have been reviewed and are without significant changes except as mentioned.    Review of Systems   A comprehensive 14 point review of systems was performed and was negative except as mentioned in HPI.    Medications:  The current medication list was reviewed in the EMR    ALLERGIES:    Allergies   Allergen Reactions   • Percocet [Oxycodone-Acetaminophen] Nausea And Vomiting       Objective      Vitals:    03/09/22 0849   BP: 140/72   Pulse: 98   Temp: 97.3 °F (36.3 °C)   TempSrc: Temporal   SpO2: 95%   Weight: 103 kg (226 lb 8 oz)   PainSc:   4   PainLoc: Arm  Comment: RT AXILLARY PAIN     Current Status 8/31/2021   ECOG score 0       Physical Exam  Pulmonary:      Breath sounds: Normal breath sounds.   Neurological:      Mental Status: She is alert and oriented to person, place, and time.           RECENT LABS:  Glucose   Date Value Ref Range Status   03/09/2022 111 (H) 65 - 99 mg/dL Final     Sodium   Date Value Ref Range Status   03/09/2022 140 136 - 145 mmol/L Final     Potassium   Date Value Ref Range Status   03/09/2022 3.7 3.5 - 5.2 mmol/L Final     CO2   Date Value Ref Range Status   03/09/2022 22.0 22.0 - 29.0 mmol/L Final     Chloride   Date Value Ref Range Status   03/09/2022 107 98 - 107 mmol/L Final     Anion Gap   Date Value Ref Range Status    03/09/2022 11.0 5.0 - 15.0 mmol/L Final     Creatinine   Date Value Ref Range Status   03/09/2022 0.87 0.57 - 1.00 mg/dL Final     BUN   Date Value Ref Range Status   03/09/2022 10 6 - 20 mg/dL Final     BUN/Creatinine Ratio   Date Value Ref Range Status   03/09/2022 11.5 7.0 - 25.0 Final     Calcium   Date Value Ref Range Status   03/09/2022 8.8 8.6 - 10.5 mg/dL Final     eGFR Non  Amer   Date Value Ref Range Status   02/16/2022 62 >60 mL/min/1.73 Final     Alkaline Phosphatase   Date Value Ref Range Status   03/09/2022 147 (H) 39 - 117 U/L Final     Total Protein   Date Value Ref Range Status   03/09/2022 6.5 6.0 - 8.5 g/dL Final     ALT (SGPT)   Date Value Ref Range Status   03/09/2022 46 (H) 1 - 33 U/L Final     AST (SGOT)   Date Value Ref Range Status   03/09/2022 51 (H) 1 - 32 U/L Final     Total Bilirubin   Date Value Ref Range Status   03/09/2022 0.4 0.0 - 1.2 mg/dL Final     Albumin   Date Value Ref Range Status   03/09/2022 3.90 3.50 - 5.20 g/dL Final     Globulin   Date Value Ref Range Status   03/09/2022 2.6 gm/dL Final     Lab Results   Component Value Date    WBC 5.05 03/09/2022    HGB 12.9 03/09/2022    HCT 38.1 03/09/2022    MCV 89.2 03/09/2022     03/09/2022     Lab Results   Component Value Date    NEUTROABS 3.43 03/09/2022     Lab Results   Component Value Date     178.6 (H) 02/16/2022    LABCA2 315.4 (H) 02/16/2022    HCGQUANT 1.16 06/03/2021         PATHOLOGY:  * Cannot find OR log *         RADIOLOGY DATA :  No radiology results for the last 7 days        Assessment/Plan     1.  Metastatic  right breast cancer with skin, bone, liver and lung metastases stage IV:  -Review oncology history for prior treatment details  -Patient is currently on Xeloda 2 weeks on 1 week off since January 26, 2022.  -Skin lesion is continuing to get worse consistent with progression of disease.  Option of intravenous chemo was discussed with patient today.  Patient's daughter is getting   in 3 weeks from now when she does not want to start intravenous chemotherapy until marriage is done.  For now recommend continuing with Xeloda.  -We will proceed with cycle 3 of Xeloda today.  -We will have patient return to clinic in 4 weeks with repeat CBC, CMP, on that day.  -We will get CT of chest abdomen and pelvis with contrast prior to next clinic visit in 4 weeks.  -We will check CA 15-3 and CA 27-29 with Zometa next week.    2.  Brain metastases  -Patient was found to have calvarial metastasis without any apparent brain parenchymal lesion.  Radiation therapy was not recommended by radiation oncologist Dr. Pulido at that point.    3.  Bone metastasis:  -Remains on monthly Zometa since October 23, 2017.  We will continue with monthly Zometa for now.    4.  Internal jugular vein DVT on right side  -Remains on Coumadin    5.  Elevated liver function test:  -Secondary to liver metastasis plus chemotherapy    6.  History of DCIS of right breast diagnosed in 2007  S/p mastectomy followed by tamoxifen for 5 years that completed in 2013    7.  History of melanoma in situ s/p surgery    8.  Chemotherapy-induced neuropathy  -Remains on gabapentin    9.  Cancer related pain:  -Remains on Ultram    10.  Health maintenance: Patient does not smoke    11.  Advance care planning: Patient has a living will on chart.                 PHQ-9 Total Score: 0   -Patient is not homicidal or suicidal.  No acute intervention required.    Kylie García reports a pain score of 4.  Given her pain assessment as noted, treatment options were discussed and the following options were decided upon as a follow-up plan to address the patient's pain: continuation of current treatment plan for pain.         Ovidio Meadows MD  3/9/2022  17:17 CST        Part of this note may be an electronic transcription/translation of spoken language to printed text using the Dragon Dictation System.          CC:

## 2022-03-10 NOTE — TELEPHONE ENCOUNTER
----- Message from Ovidio Meadows MD sent at 3/9/2022  5:19 PM CST -----  Regarding: labs  Please let patient know, her liver enzymes were elevated as compared to last clinic visit which could be combination of chemotherapy plus metastasis involving liver.  Recommend continue with Xeloda for now.  Thank you

## 2022-03-13 PROBLEM — K81.0 CHOLECYSTITIS, ACUTE: Status: ACTIVE | Noted: 2022-01-01

## 2022-03-13 NOTE — H&P
[facility] Admission      Date of Admission: 3/12/2022      Primary Care Physician: Teresa Lyn MD      Chief Complaint: Abdominal pain    HPI:    This is a 51-year-old female with concurrent medical history of metastatic breast cancer presenting to the hospital with abdominal pain.  The pain started this morning and was moderate in intensity mostly right upper quadrant pain.  She was also having associated nausea.  She came to the ER and was found to have cholecystitis on further imaging.  She is also on chemotherapy for breast cancer and she also takes Coumadin for jugular port clot.  Her last chemotherapy dose was today.    Past Medical History:  has a past medical history of Anxiety, Depression, Encounter for gynecological examination, Malignant neoplasm of female breast (HCC), Primary malignant neoplasm of breast (HCC), and Ventricular premature beats.    Past Surgical History:  has a past surgical history that includes Mastectomy (Right); Breast reconstruction (10/28/2008); Breast surgery (10/01/2009); Cardiac catheterization (09/18/2008); Mastectomy (02/05/2008); Mastectomy, partial (01/03/2008); Pap Smear (03/03/2009); Breast biopsy (12/07/2007); Breast lumpectomy; Augmentation mammaplasty; EP Study; pr insj tunneled cvc w/o subq port/ age 3 yr/> (Right, 7/10/2017); and Axillary Lymph Node Biopsy/Excision (Right, 7/10/2017).    Family History: family history includes Anemia in her mother; Diabetes in an other family member; Multiple sclerosis in her mother and another family member; No Known Problems in her father.    Social History:  reports that she has never smoked. She has never used smokeless tobacco. She reports that she does not drink alcohol and does not use drugs.    Allergies:   Allergies   Allergen Reactions   • Percocet [Oxycodone-Acetaminophen] Nausea And Vomiting       Medications: Scheduled Meds:piperacillin-tazobactam, 3.375 g,  Intravenous, Once      Continuous Infusions:sodium chloride, 125 mL/hr, Last Rate: 125 mL/hr (03/12/22 7674)  sodium chloride, 125 mL/hr      PRN Meds:.  No current facility-administered medications on file prior to encounter.     Current Outpatient Medications on File Prior to Encounter   Medication Sig Dispense Refill   • capecitabine (XELODA) 500 MG chemo tablet TAKE 4 TABLETS BY MOUTH IN THE MORNING AND 4 TABLETS IN THE EVENING ON DAYS 1 TO 14 , THEN 7 DAYS OFF. 112 tablet 1   • clindamycin (Clindagel) 1 % gel Apply 1 application topically to the appropriate area as directed 2 (Two) Times a Day. 60 g 1   • gabapentin (NEURONTIN) 300 MG capsule Take 1 capsule by mouth 3 (Three) Times a Day. 90 capsule 0   • minocycline (MINOCIN,DYNACIN) 100 MG capsule Take 100 mg by mouth Daily.     • ondansetron (ZOFRAN) 4 MG tablet Take 1 tablet by mouth 4 (Four) Times a Day As Needed for Nausea or Vomiting. 40 tablet 3   • ondansetron (ZOFRAN) 8 MG tablet Take 0.5 tablets by mouth 4 (Four) Times a Day As Needed for Nausea or Vomiting. 40 tablet 3   • promethazine (PHENERGAN) 12.5 MG tablet Take 1 tablet by mouth Every 6 (Six) Hours As Needed for Nausea or Vomiting. 30 tablet 1   • Scopolamine (Transderm-Scop, 1.5 MG,) 1.5 MG/3DAYS patch Place 1 patch on the skin as directed by provider Every 72 (Seventy-Two) Hours. (Patient taking differently: Place 1 patch on the skin as directed by provider As Needed.) 10 patch 1   • sertraline (ZOLOFT) 100 MG tablet Take 1 tablet by mouth Daily.     • traMADol (ULTRAM) 50 MG tablet Take 1 tablet by mouth Every 8 (Eight) Hours As Needed for Moderate Pain . 90 tablet 0   • warfarin (COUMADIN) 5 MG tablet TAKE 1 TABLET BY MOUTH NIGHTLY OR AS DIRECTED PER COUMADIN CLINIC 30 tablet 2       Review of Systems:  Review of Systems   Respiratory: Negative for shortness of breath.    Cardiovascular: Negative for chest pain.      Otherwise complete ROS is negative except as mentioned above.    Physical  Exam:   Temp:  [98.8 °F (37.1 °C)] 98.8 °F (37.1 °C)  Heart Rate:  [105-120] 105  Resp:  [19-22] 19  BP: (118-142)/(56-78) 118/56  Physical Exam  Vitals and nursing note reviewed.   Constitutional:       General: She is not in acute distress.     Appearance: She is well-developed. She is not diaphoretic.   HENT:      Head: Normocephalic and atraumatic.   Cardiovascular:      Rate and Rhythm: Normal rate.   Pulmonary:      Effort: Pulmonary effort is normal. No respiratory distress.      Breath sounds: No wheezing.   Abdominal:      General: There is no distension.      Palpations: Abdomen is soft.      Tenderness: There is abdominal tenderness.   Musculoskeletal:         General: Normal range of motion.   Skin:     General: Skin is warm and dry.   Neurological:      Mental Status: She is alert.      Cranial Nerves: No cranial nerve deficit.   Psychiatric:         Behavior: Behavior normal.         Thought Content: Thought content normal.         Judgment: Judgment normal.           Results Reviewed:  I have personally reviewed current lab, radiology, and data and agree with results.  Lab Results (last 24 hours)     Procedure Component Value Units Date/Time    Lactic Acid, Plasma [[id number]]  (Normal) Collected: 03/12/22 2222    Specimen: Blood Updated: 03/13/22 0040     Lactate 1.0 mmol/L     Comprehensive Metabolic Panel [[id number]]  (Abnormal) Collected: 03/12/22 2217    Specimen: Blood Updated: 03/12/22 2248     Glucose 103 mg/dL      BUN 14 mg/dL      Creatinine 0.83 mg/dL      Sodium 133 mmol/L      Potassium 3.8 mmol/L      Chloride 100 mmol/L      CO2 21.0 mmol/L      Calcium 8.5 mg/dL      Total Protein 6.8 g/dL      Albumin 3.90 g/dL      ALT (SGPT) 45 U/L      AST (SGOT) 43 U/L      Alkaline Phosphatase 143 U/L      Total Bilirubin 0.6 mg/dL      Globulin 2.9 gm/dL      A/G Ratio 1.3 g/dL      BUN/Creatinine Ratio 16.9     Anion Gap 12.0 mmol/L      eGFR 84.4 mL/min/1.73      Comment: National Kidney  Foundation and American Society of Nephrology (ASN) Task Force recommended calculation based on the Chronic Kidney Disease Epidemiology Collaboration (CKD-EPI) equation refit without adjustment for race.       Narrative:      GFR Normal >60  Chronic Kidney Disease <60  Kidney Failure <15      Lipase [[id number]]  (Normal) Collected: 03/12/22 2217    Specimen: Blood Updated: 03/12/22 2248     Lipase 18 U/L     CK [[id number]]  (Normal) Collected: 03/12/22 2217    Specimen: Blood Updated: 03/12/22 2248     Creatine Kinase 61 U/L     Magnesium [[id number]]  (Normal) Collected: 03/12/22 2217    Specimen: Blood Updated: 03/12/22 2248     Magnesium 2.0 mg/dL     Protime-INR [[id number]]  (Abnormal) Collected: 03/12/22 2217    Specimen: Blood Updated: 03/12/22 2243     Protime 22.1 Seconds      INR 1.98    Narrative:      Therapeutic range for most indications is 2.0-3.0 INR,  or 2.5-3.5 for mechanical heart valves.    CBC & Differential [[id number]]  (Abnormal) Collected: 03/12/22 2217    Specimen: Blood Updated: 03/12/22 2232    Narrative:      The following orders were created for panel order CBC & Differential.  Procedure                               Abnormality         Status                     ---------                               -----------         ------                     CBC Auto Differential[[id number]]        Abnormal            Final result                 Please view results for these tests on the individual orders.    CBC Auto Differential [[id number]]  (Abnormal) Collected: 03/12/22 2217    Specimen: Blood Updated: 03/12/22 2232     WBC 12.18 10*3/mm3      RBC 4.20 10*6/mm3      Hemoglobin 12.4 g/dL      Hematocrit 37.1 %      MCV 88.3 fL      MCH 29.5 pg      MCHC 33.4 g/dL      RDW 20.8 %      RDW-SD 64.5 fl      MPV 9.7 fL      Platelets 256 10*3/mm3      Neutrophil % 78.1 %      Lymphocyte % 9.4 %      Monocyte % 10.8 %      Eosinophil % 0.2 %      Basophil % 0.3 %      Immature Grans % 1.2 %       Neutrophils, Absolute 9.50 10*3/mm3      Lymphocytes, Absolute 1.14 10*3/mm3      Monocytes, Absolute 1.32 10*3/mm3      Eosinophils, Absolute 0.03 10*3/mm3      Basophils, Absolute 0.04 10*3/mm3      Immature Grans, Absolute 0.15 10*3/mm3      nRBC 0.0 /100 WBC     Extra Tubes [[id number]] Collected: 03/12/22 2222    Specimen: Blood Updated: 03/12/22 2222    Narrative:      The following orders were created for panel order Extra Tubes.  Procedure                               Abnormality         Status                     ---------                               -----------         ------                     [place] Top[[id number]]                                         In process                   Please view results for these tests on the individual orders.    Bar[place]osa Top [[id number]] Collected: 03/12/22 2222    Specimen: Blood Updated: 03/12/22 2222    Urinalysis, Microscopic Only - Urine, Clean Catch [[id number]]  (Abnormal) Collected: 03/12/22 2146    Specimen: Urine, Clean Catch Updated: 03/12/22 2159     RBC, UA 0-2 /HPF      WBC, UA 6-12 /HPF      Bacteria, UA None Seen /HPF      Squamous Epithelial Cells, UA 0-2 /HPF      Hyaline Casts, UA None Seen /LPF      Methodology Automated Microscopy    Urine Culture - Urine, Urine, Clean Catch [[id number]] Collected: 03/12/22 2146    Specimen: Urine, Clean Catch Updated: 03/12/22 2159    Urinalysis With Culture If Indicated - Urine, Clean Catch [[id number]]  (Abnormal) Collected: 03/12/22 2146    Specimen: Urine, Clean Catch Updated: 03/12/22 2158     Color, UA Yellow     Appearance, UA Clear     pH, UA 6.0     Specific Gravity, UA 1.021     Glucose, UA Negative     Ketones, UA Negative     Bilirubin, UA Negative     Blood, UA Negative     Protein, UA Negative     Leuk Esterase, UA Small (1+)     Nitrite, UA Negative     Urobilinogen, UA 0.2 E.U./dL        Imaging Results (Last 24 Hours)     Procedure Component Value Units Date/Time     Gallbladder [[id number]] Collected:  03/12/22 2148     Updated: 03/12/22 2234    Narrative:      EXAM DESCRIPTION:  US GALLBLADDER  RadLex: US GALLBLADDER   Technique:  Multiple sonographic images of the right upper  quadrant were obtained.     CLINICAL HISTORY:  RUQ pain.    COMPARISON:  CT abdomen 1/12/2022.    FINDINGS:  The liver demonstrates multiple masses, the largest measuring  approximately 4.4 x 5.4 x 4.6 cm in the left lobe and 4.1 x 5.1 x  3.9 cm in the right lobe. These were better seen on the CT exam.  The gallbladder appears contracted with underlying shadowing  stones. Gallbladder wall thickened at 4.9 mm. Positive  sonographic Mir's sign was elicited. The common bile duct  measures 10.5 mm. The visualized pancreas appears unremarkable.  There is no evidence for hydronephrosis in the right kidney.  Right kidney measures 11.5 cm in length. The abdominal aorta and  inferior vena cava are not well visualized.      Impression:      1.  Gallstones with gallbladder wall thickening and positive  sonographic Mir's sign. Follow-up HIDA scan could be performed  if indicated. CBD dilated at 10.5 mm.  2.  Liver masses, the largest measuring approximately 5.4 cm.  These were better visualized on previous CT.    Electronically signed by:  Jame Matta DO  3/12/2022 10:32 PM CST  Workstation: [id number]            Assessment:    Active Hospital Problems    Diagnosis    • Cholecystitis, acute          Acute cholecystitis-IV antibiotics have been started and blood cultures have been ordered.  Will start on IV fluids.  General surgery has also been consulted will see the patient in the morning    Jugular port thrombus history-patient is on anticoagulation with Coumadin, will continue to monitor.  Will consult Dr. Meadows in the morning    Breast cancer-metastatic-Dr. Meadows is seeing the patient as an outpatient, will call in the morning    DVT prophylaxis-SCD    Patient is full code    I confirmed that the patient's Advance Care Plan is present, code  status is documented, or surrogate decision maker is listed in the patient's medical record.           Jerald Muhammad MD  03/13/22  00:51 CST

## 2022-03-13 NOTE — CONSULTS
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  Patient was admitted to [facility] on March 12, 2022 with complaint of new onset of right upper quadrant pain that started yesterday morning.  States her home pain medication consisting of tramadol that she takes for her hip pain and bone pain was not helping her.  Denies any fever.  States current pain medication with morphine is only partially related pain.  Patient had a ultrasound of gallbladder done in emergency room that was consistent with cholecystitis with dilated CBD.  Hematology oncology has been consulted for recommendation regarding chemotherapy that patient is currently on for metastatic breast cancer along with anticoagulation recommendation.  Complains of chronic tingling and numbness affecting upper and lower extremity.  Denies any bowel or bladder incontinence.  Complains of chronic back pain and hip pain.  Denies any new chest pain.  States she is not able to take deep breaths secondary to severe pain that she has when she takes deep breath.  Denies any new headache or dizziness.            PAST MEDICAL HISTORY:    Past Medical History:   Diagnosis Date   • Anxiety    • Depression    • Encounter for gynecological examination    • Malignant neoplasm of female breast (HCC)     hx of dcis s/p mastectomy and reconstruction and augmentation      • Primary malignant neoplasm of breast (HCC)     dcis in rt breast s/p mastectomy,reconstruction       • Ventricular premature beats        PAST SURGICAL HISTORY:  Past Surgical History:   Procedure Laterality Date   • AUGMENTATION MAMMAPLASTY     • AXILLARY LYMPH NODE BIOPSY/EXCISION Right 7/10/2017    Procedure: BIOSPY RIGHT AXILLARY MASS AND OR LYMPH;  Surgeon: Sourav Ernst MD;  Location: [facility];  Service:    • BREAST BIOPSY  12/07/2007    Mammotome biopsy of the right side with clip placement   • BREAST LUMPECTOMY     • BREAST RECONSTRUCTION  10/28/2008    Abscence of right breast secondary to mastectomy. Implant exchange for reconstruction of right breast with open para-prosthetic capsulotomy   • BREAST SURGERY  10/01/2009    Left breast ptosis and breast asymmetry secondary to right breast reconstruction for breast cancer. Left breast mastopexy with augmentation.   • CARDIAC CATHETERIZATION  09/18/2008    Normal coronary arteriography. Normal left ventricular angiogram   • EP STUDY     • MASTECTOMY Right    • MASTECTOMY  02/05/2008    Multifocal right breast ductal carcinoma-in-situ. Right total mastectomy   • MASTECTOMY, PARTIAL  01/03/2008    Ductal carcinoma in-situ of the right breast, proven by mammotome biopsy. Right lumpectomy, medial portion of the breast, between 2 o'clock and 4 o'clock, 5 cm from the nipple, with clip placement, radiographic inter. of severo. specimen, & ultrasound   • PAP SMEAR  03/03/2009    Negative   • [place] INSJ TUNNELED CVC W/O SUBQ PORT/ AGE 5 YR/> Right 7/10/2017    Procedure: MEDIPORT     (c-arm#2);  Surgeon: Sourav Ernst MD;  Location: [facility];  Service: General       ALLERGIES:    Allergies   Allergen Reactions   • Percocet [Oxycodone-Acetaminophen] Nausea And Vomiting       SOCIAL HISTORY:   Social History     Tobacco Use   • Smoking status: Never Smoker   • Smokeless tobacco: Never Used   Substance Use Topics   • Alcohol use: No   • Drug use: No       CURRENT MEDICATIONS:    Current Facility-Administered Medications   Medication Dose Route Frequency Provider  Last Rate Last Admin   • lactated ringers infusion  100 mL/hr Intravenous Continuous Jerald Muhammad  mL/hr at 03/13/22 0357 100 mL/hr at 03/13/22 0357   • morphine injection 2 mg  2 mg Intravenous Q4H PRN Ovidio Meadows MD        And   • naloxone (NARCAN) injection 0.4 mg  0.4 mg Intravenous Q5 Min PRN Ovidio Meadows MD       • ondansetron (ZOFRAN) injection 4 mg  4 mg Intravenous Q6H PRN Jerald Muhammad MD   4 mg at 03/13/22 0129   • piperacillin-tazobactam (ZOSYN) 3.375 g/100 mL 0.9% NS IVPB (mbp)  3.375 g Intravenous Q8H Jerald Muhammad MD   3.375 g at 03/13/22 0941   • sodium chloride 0.9 % flush 10 mL  10 mL Intravenous Q12H Jerald Muhammad MD       • sodium chloride 0.9 % flush 10 mL  10 mL Intravenous PRN Jerald Muhammad MD            HOME MEDICATIONS:   No current facility-administered medications on file prior to encounter.     Current Outpatient Medications on File Prior to Encounter   Medication Sig Dispense Refill   • capecitabine (XELODA) 500 MG chemo tablet TAKE 4 TABLETS BY MOUTH IN THE MORNING AND 4 TABLETS IN THE EVENING ON DAYS 1 TO 14 , THEN 7 DAYS OFF. 112 tablet 1   • clindamycin (Clindagel) 1 % gel Apply 1 application topically to the appropriate area as directed 2 (Two) Times a Day. (Patient taking differently: Apply 1 application topically to the appropriate area as directed 2 (Two) Times a Day. Apply under arm twice a day) 60 g 1   • ondansetron (ZOFRAN) 4 MG tablet Take 1 tablet by mouth 4 (Four) Times a Day As Needed for Nausea or Vomiting. 40 tablet 3   • ondansetron (ZOFRAN) 8 MG tablet Take 0.5 tablets by mouth 4 (Four) Times a Day As Needed for Nausea or Vomiting. 40 tablet 3   • sertraline (ZOLOFT) 100 MG tablet Take 1 tablet by mouth Daily.     • traMADol (ULTRAM) 50 MG tablet Take 1 tablet by mouth Every 8 (Eight) Hours As Needed for Moderate Pain . 90 tablet 0   • gabapentin (NEURONTIN) 300 MG capsule Take 1 capsule by mouth 3 (Three) Times a Day. (Patient  taking differently: Take 300 mg by mouth 3 (Three) Times a Day As Needed.) 90 capsule 0   • promethazine (PHENERGAN) 12.5 MG tablet Take 1 tablet by mouth Every 6 (Six) Hours As Needed for Nausea or Vomiting. 30 tablet 1   • Scopolamine (Transderm-Scop, 1.5 MG,) 1.5 MG/3DAYS patch Place 1 patch on the skin as directed by provider Every 72 (Seventy-Two) Hours. (Patient taking differently: Place 1 patch on the skin as directed by provider As Needed.) 10 patch 1   • warfarin (COUMADIN) 5 MG tablet TAKE 1 TABLET BY MOUTH NIGHTLY OR AS DIRECTED PER COUMADIN CLINIC (Patient taking differently: TAKE 2.5 mg tablet 5 times a week and 1.25 mg tablet 2 days a week) 30 tablet 2   • [DISCONTINUED] minocycline (MINOCIN,DYNACIN) 100 MG capsule Take 100 mg by mouth Daily.         FAMILY HISTORY:    Family History   Problem Relation Age of Onset   • Anemia Mother    • Multiple sclerosis Mother    • No Known Problems Father    • Diabetes Other    • Multiple sclerosis Other        REVIEW OF SYSTEMS:        CONSTITUTIONAL:  Complains of fatigue. Denies any fever, chills .     EYES: No visual disturbances. No discharge. No new lesion.    ENMT:  No epistaxis, mouth sores or difficulty swallowing.    RESPIRATORY: States she is not able to take deep breaths secondary to abdominal pain. No new cough or hemoptysis.    CARDIOVASCULAR:  No chest pain or palpitations.    GASTROINTESTINAL: Complains of severe right upper quadrant pain that started yesterday.  Positive for nausea without vomiting.  No blood in stool.      GENITOURINARY: No dysuria or hematuria.    MUSCULOSKELETAL: Positive for chronic back pain and hip pain.    LYMPHATICS: Positive for right axillary lymph node involvement from metastatic breast cancer    NEUROLOGICAL : Positive for chronic neuropathy affecting upper and lower extremity. No headache or dizziness. No seizures or balance problems.    SKIN: Positive for skin metastases involving right axillary area.    ENDOCRINE :  "No new hot or cold intolerance. No new polyuria. No polydipsia.        PHYSICAL EXAMINATION:      VITAL SIGNS:  /64 (BP Location: Left arm, Patient Position: Lying)   Pulse 79   Temp 98.1 °F (36.7 °C) (Temporal)   Resp 16   Ht 170.2 cm (67\")   Wt 102 kg (225 lb 3.2 oz)   LMP  (LMP Unknown)   SpO2 92%   BMI 35.27 kg/m²       03/12/22 2051 03/13/22  0353   Weight: 102 kg (225 lb) 102 kg (225 lb 3.2 oz)           CONSTITUTIONAL: Appears uncomfortable due to pain    EYES: Mild conjunctival pallor. No Icterus. No pterygium. Extraocular movements intact. No ptosis.    ENMT:  Normocephalic, atraumatic. No facial asymmetry noted.    NECK:  No adenopathy. Trachea midline. No JVD.    RESPIRATORY: Decreased air entry at bilateral bases. No rhonchi or wheezing. Fair respiratory effort.    CARDIOVASCULAR:  S1, S2. Regular rate and rhythm. No murmur or gallop appreciated.    ABDOMEN:  Soft, obese, tenderness to palpation in right upper quadrant. Bowel sounds present in all four quadrants.  No hepatosplenomegaly appreciated.    MUSCULOSKELETAL:  No edema. No calf tenderness.  Decreased range of motion.    NEUROLOGIC:    No motor deficit appreciated. Cranial nerves II-XII grossly intact.    SKIN : No new skin lesion identified. Skin is warm and dry to touch.    LYMPHATICS: No new enlarged lymph nodes in neck or supraclavicular area.    PSYCHIATRY: Alert, awake and oriented ×3. Normal affect.  Normal judgment.  Makes good eye contact.          DIAGNOSTIC DATA:    WBC   Date Value Ref Range Status   03/13/2022 7.87 3.40 - 10.80 10*3/mm3 Final     RBC   Date Value Ref Range Status   03/13/2022 3.96 3.77 - 5.28 10*6/mm3 Final     Hemoglobin   Date Value Ref Range Status   03/13/2022 11.8 (L) 12.0 - 15.9 g/dL Final     Hematocrit   Date Value Ref Range Status   03/13/2022 35.4 34.0 - 46.6 % Final     MCV   Date Value Ref Range Status   03/13/2022 89.4 79.0 - 97.0 fL Final     MCH   Date Value Ref Range Status   03/13/2022 " 29.8 26.6 - 33.0 pg Final     MCHC   Date Value Ref Range Status   03/13/2022 33.3 31.5 - 35.7 g/dL Final     RDW   Date Value Ref Range Status   03/13/2022 20.8 (H) 12.3 - 15.4 % Final     RDW-SD   Date Value Ref Range Status   03/13/2022 66.3 (H) 37.0 - 54.0 fl Final     MPV   Date Value Ref Range Status   03/13/2022 9.4 6.0 - 12.0 fL Final     Platelets   Date Value Ref Range Status   03/13/2022 208 140 - 450 10*3/mm3 Final     Neutrophil %   Date Value Ref Range Status   03/13/2022 70.9 42.7 - 76.0 % Final     Lymphocyte %   Date Value Ref Range Status   03/13/2022 14.4 (L) 19.6 - 45.3 % Final     Monocyte %   Date Value Ref Range Status   03/13/2022 13.2 (H) 5.0 - 12.0 % Final     Eosinophil %   Date Value Ref Range Status   03/13/2022 0.6 0.3 - 6.2 % Final     Basophil %   Date Value Ref Range Status   03/13/2022 0.4 0.0 - 1.5 % Final     Immature Grans %   Date Value Ref Range Status   03/13/2022 0.5 0.0 - 0.5 % Final     Neutrophils, Absolute   Date Value Ref Range Status   03/13/2022 5.58 1.70 - 7.00 10*3/mm3 Final     Lymphocytes, Absolute   Date Value Ref Range Status   03/13/2022 1.13 0.70 - 3.10 10*3/mm3 Final     Monocytes, Absolute   Date Value Ref Range Status   03/13/2022 1.04 (H) 0.10 - 0.90 10*3/mm3 Final     Eosinophils, Absolute   Date Value Ref Range Status   03/13/2022 0.05 0.00 - 0.40 10*3/mm3 Final     Basophils, Absolute   Date Value Ref Range Status   03/13/2022 0.03 0.00 - 0.20 10*3/mm3 Final     Immature Grans, Absolute   Date Value Ref Range Status   03/13/2022 0.04 0.00 - 0.05 10*3/mm3 Final     nRBC   Date Value Ref Range Status   03/13/2022 0.0 0.0 - 0.2 /100 WBC Final     Glucose   Date Value Ref Range Status   03/13/2022 94 65 - 99 mg/dL Final     Sodium   Date Value Ref Range Status   03/13/2022 142 136 - 145 mmol/L Final     Potassium   Date Value Ref Range Status   03/13/2022 4.0 3.5 - 5.2 mmol/L Final     CO2   Date Value Ref Range Status   03/13/2022 25.0 22.0 - 29.0 mmol/L  Final     Chloride   Date Value Ref Range Status   03/13/2022 109 (H) 98 - 107 mmol/L Final     Anion Gap   Date Value Ref Range Status   03/13/2022 8.0 5.0 - 15.0 mmol/L Final     Creatinine   Date Value Ref Range Status   03/13/2022 0.78 0.57 - 1.00 mg/dL Final     BUN   Date Value Ref Range Status   03/13/2022 13 6 - 20 mg/dL Final     BUN/Creatinine Ratio   Date Value Ref Range Status   03/13/2022 16.7 7.0 - 25.0 Final     Calcium   Date Value Ref Range Status   03/13/2022 8.4 (L) 8.6 - 10.5 mg/dL Final     Alkaline Phosphatase   Date Value Ref Range Status   03/12/2022 143 (H) 39 - 117 U/L Final     Total Protein   Date Value Ref Range Status   03/12/2022 6.8 6.0 - 8.5 g/dL Final     ALT (SGPT)   Date Value Ref Range Status   03/12/2022 45 (H) 1 - 33 U/L Final     AST (SGOT)   Date Value Ref Range Status   03/12/2022 43 (H) 1 - 32 U/L Final     Total Bilirubin   Date Value Ref Range Status   03/12/2022 0.6 0.0 - 1.2 mg/dL Final     Albumin   Date Value Ref Range Status   03/12/2022 3.90 3.50 - 5.20 g/dL Final     Globulin   Date Value Ref Range Status   03/12/2022 2.9 gm/dL Final     No results found for: IRON, TIBC, LABIRON, FERRITIN, QEFRKVII27, FOLATE  Lab Results   Component Value Date     178.6 (H) 02/16/2022    LABCA2 315.4 (H) 02/16/2022    HCGQUANT 1.16 06/03/2021         Radiology Data :  US Gallbladder    Result Date: 3/12/2022  1.  Gallstones with gallbladder wall thickening and positive sonographic Mir's sign. Follow-up HIDA scan could be performed if indicated. CBD dilated at 10.5 mm. 2.  Liver masses, the largest measuring approximately 5.4 cm. These were better visualized on previous CT. Electronically signed by:  Jame Matta DO  3/12/2022 10:32 PM [facility], [place] Workstation: [id number]      Pathology :  * Cannot find OR log *    ASSESSMENT AND PLAN:      1.  Abdominal pain:  -Secondary to cholecystitis based on ultrasound of abdomen  -Surgery team has been consulted for recommendation regarding  cholecystitis.  -As patient may need surgical intervention recommend continue holding Xeloda and Coumadin for now.  -Recommendation were discuss with patient and her .  -Blood culture has been done.  Continue with antibiotic.  -Patient is currently on morphine 1 mg every 4 hours IV as needed.  -Patient is still complaining of severe abdominal pain despite of morphine.  Will increase dose of morphine to 2 mg every 4 hours as needed for knee pain  -Currently patient is n.p.o.  Recommend starting MiraLAX once patient is allowed something p.o. to prevent any constipation from narcotics.    2.  Metastatic breast cancer with liver, bone and skin metastasis.  -Patient was initially diagnosed in July 2017.  -Currently on Xeloda.  -We will hold Xeloda for now as patient may need surgical intervention for cholecystitis.    3.  Right internal jugular vein DVT:  -Remains on Coumadin.  -INR is 1.98.  Recommend holding Coumadin for now.    4.  Bone metastasis:  -On monthly Zometa as outpatient    5.  Struve DCIS of right breast    6.  History of melanoma in situ s/p surgery                Thank you for this consultation.    Ovidio Meadows MD  3/13/2022  10:55 CDT        Part of this note may be an electronic transcription/translation of spoken language to printed text using the Dragon Dictation System.

## 2022-03-13 NOTE — ED PROVIDER NOTES
Subjective   RUQ pain began at 1 pm today. Pt has breast CA with mets to liver and bone, treated by Dr. Meadows.       Abdominal Pain  Pain location:  RUQ  Pain quality: sharp    Pain radiates to:  Does not radiate  Pain severity:  Moderate  Duration:  8 hours  Timing:  Constant  Chronicity:  New  Relieved by:  Nothing  Worsened by:  Movement, deep breathing and eating  Associated symptoms: no chest pain, no chills, no cough, no diarrhea, no dysuria, no fatigue, no fever, no nausea, no shortness of breath, no sore throat and no vomiting        Review of Systems   Constitutional: Positive for activity change and appetite change. Negative for chills, diaphoresis, fatigue and fever.   HENT: Negative for congestion, ear discharge, ear pain, nosebleeds, rhinorrhea, sinus pressure, sore throat and trouble swallowing.    Eyes: Negative for discharge and redness.   Respiratory: Negative for apnea, cough, chest tightness, shortness of breath and wheezing.    Cardiovascular: Negative for chest pain.   Gastrointestinal: Positive for abdominal pain. Negative for diarrhea, nausea and vomiting.   Endocrine: Negative for polyuria.   Genitourinary: Negative for dysuria, frequency and urgency.   Musculoskeletal: Negative for myalgias and neck pain.   Skin: Negative for color change and rash.   Allergic/Immunologic: Negative for immunocompromised state.   Neurological: Negative for dizziness, seizures, syncope, weakness, light-headedness and headaches.   Hematological: Negative for adenopathy. Does not bruise/bleed easily.   Psychiatric/Behavioral: Negative for behavioral problems and confusion.   All other systems reviewed and are negative.      Past Medical History:   Diagnosis Date   • Anxiety    • Depression    • Encounter for gynecological examination    • Malignant neoplasm of female breast (HCC)     hx of dcis s/p mastectomy and reconstruction and augmentation      • Primary malignant neoplasm of breast (HCC)     dcis in rt  breast s/p mastectomy,reconstruction      • Ventricular premature beats        Allergies   Allergen Reactions   • Percocet [Oxycodone-Acetaminophen] Nausea And Vomiting       Past Surgical History:   Procedure Laterality Date   • AUGMENTATION MAMMAPLASTY     • AXILLARY LYMPH NODE BIOPSY/EXCISION Right 7/10/2017    Procedure: BIOSPY RIGHT AXILLARY MASS AND OR LYMPH;  Surgeon: Sourav Ernst MD;  Location: [facility];  Service:    • BREAST BIOPSY  12/07/2007    Mammotome biopsy of the right side with clip placement   • BREAST LUMPECTOMY     • BREAST RECONSTRUCTION  10/28/2008    Abscence of right breast secondary to mastectomy. Implant exchange for reconstruction of right breast with open para-prosthetic capsulotomy   • BREAST SURGERY  10/01/2009    Left breast ptosis and breast asymmetry secondary to right breast reconstruction for breast cancer. Left breast mastopexy with augmentation.   • CARDIAC CATHETERIZATION  09/18/2008    Normal coronary arteriography. Normal left ventricular angiogram   • EP STUDY     • MASTECTOMY Right    • MASTECTOMY  02/05/2008    Multifocal right breast ductal carcinoma-in-situ. Right total mastectomy   • MASTECTOMY, PARTIAL  01/03/2008    Ductal carcinoma in-situ of the right breast, proven by mammotome biopsy. Right lumpectomy, medial portion of the breast, between 2 o'clock and 4 o'clock, 5 cm from the nipple, with clip placement, radiographic inter. of severo. specimen, & ultrasound   • PAP SMEAR  03/03/2009    Negative   • [place] INSJ TUNNELED CVC W/O SUBQ PORT/ AGE 3 YR/> Right 7/10/2017    Procedure: MEDIPORT     (c-arm#2);  Surgeon: Sourav Ernst MD;  Location: [facility];  Service: General       Family History   Problem Relation Age of Onset   • Anemia Mother    • Multiple sclerosis Mother    • No Known Problems Father    • Diabetes Other    • Multiple sclerosis Other        Social History     Socioeconomic History   • Marital status:    Tobacco Use   • Smoking status:  Never Smoker   • Smokeless tobacco: Never Used   Substance and Sexual Activity   • Alcohol use: No   • Drug use: No   • Sexual activity: Defer           Objective   Physical Exam  Vitals and nursing note reviewed.   Constitutional:       Appearance: She is well-developed.   HENT:      Head: Normocephalic and atraumatic.      Nose: Nose normal.   Eyes:      General: No scleral icterus.        Right eye: No discharge.         Left eye: No discharge.      Conjunctiva/sclera: Conjunctivae normal.      Pupils: Pupils are equal, round, and reactive to light.   Neck:      Trachea: No tracheal deviation.   Cardiovascular:      Rate and Rhythm: Normal rate and regular rhythm.      Heart sounds: Normal heart sounds. No murmur heard.  Pulmonary:      Effort: Pulmonary effort is normal. No respiratory distress.      Breath sounds: Normal breath sounds. No stridor. No wheezing or rales.   Abdominal:      General: Bowel sounds are normal. There is no distension.      Palpations: Abdomen is soft. There is no mass.      Tenderness: There is abdominal tenderness in the right upper quadrant. There is no guarding or rebound.   Musculoskeletal:      Cervical back: Normal range of motion and neck supple.   Skin:     General: Skin is warm and dry.      Findings: No erythema or rash.   Neurological:      Mental Status: She is alert and oriented to person, place, and time.      Coordination: Coordination normal.   Psychiatric:         Behavior: Behavior normal.         Thought Content: Thought content normal.         Procedures           ED Course                   Labs Reviewed   COMPREHENSIVE METABOLIC PANEL - Abnormal; Notable for the following components:       Result Value    Glucose 103 (*)     Sodium 133 (*)     CO2 21.0 (*)     Calcium 8.5 (*)     ALT (SGPT) 45 (*)     AST (SGOT) 43 (*)     Alkaline Phosphatase 143 (*)     All other components within normal limits    Narrative:     GFR Normal >60  Chronic Kidney Disease <60  Kidney  Failure <15     URINALYSIS W/ CULTURE IF INDICATED - Abnormal; Notable for the following components:    Leuk Esterase, UA Small (1+) (*)     All other components within normal limits   PROTIME-INR - Abnormal; Notable for the following components:    Protime 22.1 (*)     INR 1.98 (*)     All other components within normal limits    Narrative:     Therapeutic range for most indications is 2.0-3.0 INR,  or 2.5-3.5 for mechanical heart valves.   CBC WITH AUTO DIFFERENTIAL - Abnormal; Notable for the following components:    WBC 12.18 (*)     RDW 20.8 (*)     RDW-SD 64.5 (*)     Neutrophil % 78.1 (*)     Lymphocyte % 9.4 (*)     Eosinophil % 0.2 (*)     Immature Grans % 1.2 (*)     Neutrophils, Absolute 9.50 (*)     Monocytes, Absolute 1.32 (*)     Immature Grans, Absolute 0.15 (*)     All other components within normal limits   URINALYSIS, MICROSCOPIC ONLY - Abnormal; Notable for the following components:    RBC, UA 0-2 (*)     WBC, UA 6-12 (*)     All other components within normal limits   LIPASE - Normal   CK - Normal   MAGNESIUM - Normal   URINE CULTURE   BLOOD CULTURE   BLOOD CULTURE   LACTIC ACID, PLASMA   POCT PROTIME - INR   CBC AND DIFFERENTIAL    Narrative:     The following orders were created for panel order CBC & Differential.  Procedure                               Abnormality         Status                     ---------                               -----------         ------                     CBC Auto Differential[[id number]]        Abnormal            Final result                 Please view results for these tests on the individual orders.   EXTRA TUBES    Narrative:     The following orders were created for panel order Extra Tubes.  Procedure                               Abnormality         Status                     ---------                               -----------         ------                     [place] Top[[id number]]                                         In process                   Please  view results for these tests on the individual orders.   GRAY TOP       US Gallbladder   Final Result   1.  Gallstones with gallbladder wall thickening and positive   sonographic Mir's sign. Follow-up HIDA scan could be performed   if indicated. CBD dilated at 10.5 mm.   2.  Liver masses, the largest measuring approximately 5.4 cm.   These were better visualized on previous CT.      Electronically signed by:  Jame Matta DO  3/12/2022 10:32 PM [facility]   Workstation: [id number]                                             [facility]    Final diagnoses:   Cholecystitis, acute       ED Disposition  ED Disposition     ED Disposition   Decision to Admit    Condition   --    Comment   --             No follow-up provider specified.       Medication List      No changes were made to your prescriptions during this visit.          Babak Buckner MD  03/13/22 0034

## 2022-03-13 NOTE — MEDICAL STUDENT
[facility]   Consult Note    Patient Name: Kylie García  : 1970  MRN: [id number]  Primary Care Physician:  Teresa Lyn MD  Referring Physician: Noe Strong DO  Date of admission: 3/12/2022    Consults  Subjective   Subjective     Reason for Consult/ Chief Complaint: RUQ pain concerning for cholecystitis    Abdominal Pain  Pertinent negatives include no diarrhea, dysuria, fever, frequency, nausea or vomiting.     Kylie García is a 51 y.o. 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 Patient notes never having this feeling before, said it came on quickly yesterday at 1 o'clock and she decided to wait and take home gabapentin and tramadol for relief. Dr Meadows was contacted and instructed her this is abnormal pain and should be evaluated in the ED. Patient notes no n/v/d or fevers/chills but reports breathing shallow to avoid pain. CT in the ED revealed gallstones and thickening of the wall at 4.9 mm. Positive sonographic spencer's sign elicited. Common bile duct measured 10.5 mm. Lipase on admission was 18.    Patient's current medical history include diagnosis of DCIS in  that was treated with mastectomy and breast augmentation. In 2017 recurrence of the cancer occurred and metastasis was noted on the liver. Patient is following with heme/onc and proceeds with chemotherapy via a right chest port and current regimen is Xeloda. Patient reports previous port thrombosis in 2017 and takes coumadin and last known INR was 3.1.     Review of Systems   Constitutional: Negative for chills and fever.   HENT: Negative.    Eyes: Negative.    Respiratory: Negative for cough, choking, wheezing and stridor.         Shallow breathing   Cardiovascular: Negative.    Gastrointestinal: Positive for abdominal distention  and abdominal pain. Negative for diarrhea, nausea and vomiting.   Endocrine: Negative for cold intolerance and heat intolerance.   Genitourinary: Negative for difficulty urinating, dysuria and frequency.   Musculoskeletal: Negative.    Skin: Negative.    Allergic/Immunologic: Negative.    Neurological: Negative.    Hematological: Negative.    Psychiatric/Behavioral: Negative.         Personal History     Past Medical History:   Diagnosis Date   • Anxiety    • Depression    • Encounter for gynecological examination    • Malignant neoplasm of female breast (HCC)     hx of dcis s/p mastectomy and reconstruction and augmentation      • Primary malignant neoplasm of breast (HCC)     dcis in rt breast s/p mastectomy,reconstruction      • Ventricular premature beats        Past Surgical History:   Procedure Laterality Date   • AUGMENTATION MAMMAPLASTY     • AXILLARY LYMPH NODE BIOPSY/EXCISION Right 7/10/2017    Procedure: BIOSPY RIGHT AXILLARY MASS AND OR LYMPH;  Surgeon: Sourav Ernst MD;  Location: [facility];  Service:    • BREAST BIOPSY  12/07/2007    Mammotome biopsy of the right side with clip placement   • BREAST LUMPECTOMY     • BREAST RECONSTRUCTION  10/28/2008    Abscence of right breast secondary to mastectomy. Implant exchange for reconstruction of right breast with open para-prosthetic capsulotomy   • BREAST SURGERY  10/01/2009    Left breast ptosis and breast asymmetry secondary to right breast reconstruction for breast cancer. Left breast mastopexy with augmentation.   • CARDIAC CATHETERIZATION  09/18/2008    Normal coronary arteriography. Normal left ventricular angiogram   • EP STUDY     • MASTECTOMY Right    • MASTECTOMY  02/05/2008    Multifocal right breast ductal carcinoma-in-situ. Right total mastectomy   • MASTECTOMY, PARTIAL  01/03/2008    Ductal carcinoma in-situ of the right breast, proven by mammotome biopsy. Right lumpectomy, medial portion of the breast, between 2 o'clock and 4 o'clock, 5 cm  from the nipple, with clip placement, radiographic inter. of severo. specimen, & ultrasound   • PAP SMEAR  03/03/2009    Negative   • SD INSJ TUNNELED CVC W/O SUBQ PORT/ AGE 5 YR/> Right 7/10/2017    Procedure: MEDIPORT     (c-arm#2);  Surgeon: Sourav Ernst MD;  Location: [facility];  Service: General       Family History: Her family history includes Anemia in her mother; Diabetes in an other family member; Multiple sclerosis in her mother and another family member; No Known Problems in her father.     Social History: She  reports that she has never smoked. She has never used smokeless tobacco. She reports that she does not drink alcohol and does not use drugs.    Home Medications:  Scopolamine, capecitabine, clindamycin, gabapentin, ondansetron, promethazine, sertraline, traMADol, and warfarin    Allergies:  She is allergic to percocet [oxycodone-acetaminophen].    Objective    Objective     Vitals:    Temp:  [97.4 °F (36.3 °C)-98.8 °F (37.1 °C)] 98.1 °F (36.7 °C)  Heart Rate:  [] 79  Resp:  [16-22] 16  BP: (114-142)/(56-82) 114/64    Physical Exam  Constitutional:       Appearance: Normal appearance. She is normal weight.   HENT:      Head: Normocephalic.      Nose: Nose normal.      Mouth/Throat:      Mouth: Mucous membranes are moist.      Pharynx: Oropharynx is clear.   Eyes:      Extraocular Movements: Extraocular movements intact.      Conjunctiva/sclera: Conjunctivae normal.      Pupils: Pupils are equal, round, and reactive to light.   Cardiovascular:      Rate and Rhythm: Normal rate and regular rhythm.      Pulses: Normal pulses.      Heart sounds: Normal heart sounds.   Pulmonary:      Effort: Pulmonary effort is normal.      Breath sounds: Normal breath sounds.   Abdominal:      General: Bowel sounds are normal.   Musculoskeletal:         General: Normal range of motion.      Cervical back: Normal range of motion and neck supple.   Skin:     General: Skin is warm.   Neurological:      General:  No focal deficit present.      Mental Status: She is alert and oriented to person, place, and time. Mental status is at baseline.   Psychiatric:         Mood and Affect: Mood normal.         Behavior: Behavior normal.         Thought Content: Thought content normal.         Judgment: Judgment normal.     Mir's sign is +  There is notable abdominal obesity. Skin and soft tissue was otherwise unremarkable. Liver edge and/or abdominal masses were difficult to appreciate on physical exam. Tenderness to palpation is noted in the upper abdomen more on the Rt than the left.     Result Review    Result Review:  I have personally reviewed the results from the time of this admission to 3/13/2022 10:06 CDT and agree with these findings:  [x]  Laboratory  [x]  Microbiology  [x]  Radiology  [x]  EKG/Telemetry   [x]  Cardiology/Vascular   [x]  Pathology  [x]  Old records  [x]  Other:  Most notable findings include: Lipase yesterday was 18, WBS downtrending from 12.4 to 11.8. Notable bacteria and pyuria on UA. INR is 1.98 and last reported from her was 3.1 CK 61.  Assessment/Plan   Assessment / Plan     Brief Patient Summary:  Kylie García is a 51 y.o. 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    Active Hospital Problems:  Active Hospital Problems    Diagnosis    • Cholecystitis, acute        Plan:   Keep patient NPO and encourage bowel rest, start antibiotics for possible cholecystectomy, Schedule for a HIDA scan to have more encouraging evidence of cholecystis. Will need to normalize INR before surgery due to risk of bleed. Will consider PCC if cholecystectomy is indicated.     Og Bone, Medical Student    Surgical Consultant's note:  I saw the  patient and evaluated her earlier today at the bedside today along with the medical student.  She was seen on the medical floor after she was admitted from the emergency department.  I reviewed the documentation above entered by the medical student, edited its content and agree with the history and physical documentation above with the following clarification relating to the impression and plan:  Mrs. García is a pleasant 51-year-old female presenting with acute onset right upper quadrant pain and tenderness on physical examination in the right upper quadrant with peritoneal irritation consistent with a Mir sign.  It was difficult to appreciate the liver edge because the patient was uncomfortable to examination in that area.  Her white blood cell count was normal this morning.  Her liver enzymes were only mildly elevated and basically unremarkable, especially considering the metastatic disease load in the liver.  She had an ultrasound of the abdomen performed that reported the presence of gallstones within the gallbladder with some wall thickening and a positive Mir sign sonographically.  They did mention a dilated common bile duct of 10.5 mm without detection of any choledocholithiasis.  Her bilirubin is unremarkable.  I reviewed her CT imaging from January of this year and it also tended to show a gallbladder with a notable gallbladder wall.  The patient may, therefore, very well have acute cholecystitis on the background of chronic changes to the gallbladder wall.  Therapeutic options were discussed with the patient including operative and nonoperative approaches to treating this first gallbladder attack.  Her INR is elevated at the moment and no immediate surgery is recommended.  Nevertheless, we agreed to optimize preoperative readiness by continuing to hold her Coumadin and allowing her INR to normalize.  In the meantime we will treat her medically with IV antibiotics, bowel rest, and analgesics and  follow her course in hospital.  In addition a HIDA scan will be requested to further confirm the diagnosis and track bile flow into the duodenum.  Liver enzymes can be trended and consideration can be given to an MRCP during this admission to further clarify why the common bile duct is dilated.  The presence of hepatic metastatic neoplasia may be contributing to this common bile duct size in this patient.  I explained the findings of my evaluation and recommendation to the patient in the presence of her  and informed her that I am the locum surgeon on for the weekend today and that any follow-up surgical care will be coordinated between her attending hospitalitis physician and one of the local surgeons here tomorrow.  She expressed understanding and agreement with the therapeutic plan.    LINDSAY Cisneros MD, FACS

## 2022-03-13 NOTE — PROGRESS NOTES
MEDICINE DAILY PROGRESS NOTE  NAME: Kylie García  : 1970  MRN: [id number]     LOS: 0 days     PROVIDER OF SERVICE: Shaheen Segura MD    Chief Complaint: RUQ pain    Subjective:   HPI: 51-year-old female with concurrent medical history of metastatic breast cancer presenting to the hospital with abdominal pain.  The pain started this morning and was moderate in intensity mostly right upper quadrant pain.  She was also having associated nausea.  She came to the ER and was found to have cholecystitis on further imaging.  She is also on chemotherapy for breast cancer and she also takes Coumadin for jugular port clot.     Interval History:  History taken from: patient chart RN  The patient is resting comfortably. She does endorse some RUQ pain.    Review of Systems:   Review of Systems   Constitutional: Positive for activity change, appetite change and fatigue.   Gastrointestinal: Positive for abdominal pain.   Skin: Negative for color change and rash.   Psychiatric/Behavioral: Negative for agitation and confusion.       Objective:     Vital Signs  Vitals:    22 0445 22 0722 22 0804 22 1120   BP:   114/64 130/59   BP Location:   Left arm Right arm   Patient Position:   Lying Lying   Pulse: 72 89 79 98   Resp:   16 16   Temp:   98.1 °F (36.7 °C)    TempSrc:   Temporal    SpO2:   92% 94%   Weight:       Height:           Physical Exam  Physical Exam  Vitals and nursing note reviewed.   Constitutional:       Appearance: Normal appearance.   Cardiovascular:      Rate and Rhythm: Normal rate and regular rhythm.      Pulses: Normal pulses.      Heart sounds: Normal heart sounds.   Pulmonary:      Breath sounds: Normal breath sounds.   Abdominal:      Palpations: Abdomen is soft.      Tenderness: There is abdominal tenderness.   Neurological:      General: No focal deficit present.      Mental Status: She is alert and oriented to person, place, and time.   Psychiatric:         Mood and Affect:  Mood normal.         Behavior: Behavior normal.         Medication Review    Current Facility-Administered Medications:   •  lactated ringers infusion, 100 mL/hr, Intravenous, Continuous, Jerald Muhammad MD, Last Rate: 100 mL/hr at 03/13/22 0357, 100 mL/hr at 03/13/22 0357  •  morphine injection 2 mg, 2 mg, Intravenous, Q4H PRN **AND** naloxone (NARCAN) injection 0.4 mg, 0.4 mg, Intravenous, Q5 Min PRN, Ovidio Meadows MD  •  ondansetron (ZOFRAN) injection 4 mg, 4 mg, Intravenous, Q6H PRN, Jerald Muhammad MD, 4 mg at 03/13/22 0129  •  piperacillin-tazobactam (ZOSYN) 3.375 g/100 mL 0.9% NS IVPB (mbp), 3.375 g, Intravenous, Q8H, Jerald Mhuammad MD, 3.375 g at 03/13/22 0941  •  sodium chloride 0.9 % flush 10 mL, 10 mL, Intravenous, Q12H, Jerald Muhammad MD  •  sodium chloride 0.9 % flush 10 mL, 10 mL, Intravenous, PRN, Jerald Muhammad MD     Diagnostic Data    Lab Results (last 24 hours)     Procedure Component Value Units Date/Time    Basic Metabolic Panel [[id number]]  (Abnormal) Collected: 03/13/22 0731    Specimen: Blood Updated: 03/13/22 0801     Glucose 94 mg/dL      BUN 13 mg/dL      Creatinine 0.78 mg/dL      Sodium 142 mmol/L      Potassium 4.0 mmol/L      Chloride 109 mmol/L      CO2 25.0 mmol/L      Calcium 8.4 mg/dL      BUN/Creatinine Ratio 16.7     Anion Gap 8.0 mmol/L      eGFR 91.0 mL/min/1.73      Comment: National Kidney Foundation and American Society of Nephrology (ASN) Task Force recommended calculation based on the Chronic Kidney Disease Epidemiology Collaboration (CKD-EPI) equation refit without adjustment for race.       Narrative:      GFR Normal >60  Chronic Kidney Disease <60  Kidney Failure <15      Magnesium [[id number]]  (Normal) Collected: 03/13/22 0731    Specimen: Blood Updated: 03/13/22 0801     Magnesium 2.2 mg/dL     CBC & Differential [[id number]]  (Abnormal) Collected: 03/13/22 0731    Specimen: Blood Updated: 03/13/22 0746    Narrative:      The following orders were  created for panel order CBC & Differential.  Procedure                               Abnormality         Status                     ---------                               -----------         ------                     CBC Auto Differential[[id number]]        Abnormal            Final result                 Please view results for these tests on the individual orders.    CBC Auto Differential [[id number]]  (Abnormal) Collected: 03/13/22 0731    Specimen: Blood Updated: 03/13/22 0746     WBC 7.87 10*3/mm3      RBC 3.96 10*6/mm3      Hemoglobin 11.8 g/dL      Hematocrit 35.4 %      MCV 89.4 fL      MCH 29.8 pg      MCHC 33.3 g/dL      RDW 20.8 %      RDW-SD 66.3 fl      MPV 9.4 fL      Platelets 208 10*3/mm3      Neutrophil % 70.9 %      Lymphocyte % 14.4 %      Monocyte % 13.2 %      Eosinophil % 0.6 %      Basophil % 0.4 %      Immature Grans % 0.5 %      Neutrophils, Absolute 5.58 10*3/mm3      Lymphocytes, Absolute 1.13 10*3/mm3      Monocytes, Absolute 1.04 10*3/mm3      Eosinophils, Absolute 0.05 10*3/mm3      Basophils, Absolute 0.03 10*3/mm3      Immature Grans, Absolute 0.04 10*3/mm3      nRBC 0.0 /100 WBC     COVID-19 and FLU A/B PCR - Swab, Nasopharynx [[id number]]  (Normal) Collected: 03/13/22 0053    Specimen: Swab from Nasopharynx Updated: 03/13/22 0333     COVID19 Not Detected     Influenza A PCR Not Detected     Influenza B PCR Not Detected    Narrative:      Fact sheet for providers: https://www.fda.gov/media/401414/download    Fact sheet for patients: https://www.fda.gov/media/031538/download    Test performed by PCR.    Extra Tubes [[id number]] Collected: 03/12/22 2222    Specimen: Blood Updated: 03/13/22 0332    Narrative:      The following orders were created for panel order Extra Tubes.  Procedure                               Abnormality         Status                     ---------                               -----------         ------                     Barbosa Top[[id number]]                                          Final result                 Please view results for these tests on the individual orders.    Barbosa Top [[id number]] Collected: 03/12/22 2222    Specimen: Blood Updated: 03/13/22 0332     Extra Tube Hold for add-ons.     Comment: Auto resulted.       Blood Culture - Blood, Arm, Left [[id number]] Collected: 03/13/22 0053    Specimen: Blood from Arm, Left Updated: 03/13/22 0102    Blood Culture - Blood, Arm, Left [[id number]] Collected: 03/13/22 0053    Specimen: Blood from Arm, Left Updated: 03/13/22 0102    Lactic Acid, Plasma [[id number]]  (Normal) Collected: 03/12/22 2222    Specimen: Blood Updated: 03/13/22 0040     Lactate 1.0 mmol/L     Comprehensive Metabolic Panel [[id number]]  (Abnormal) Collected: 03/12/22 2217    Specimen: Blood Updated: 03/12/22 2248     Glucose 103 mg/dL      BUN 14 mg/dL      Creatinine 0.83 mg/dL      Sodium 133 mmol/L      Potassium 3.8 mmol/L      Chloride 100 mmol/L      CO2 21.0 mmol/L      Calcium 8.5 mg/dL      Total Protein 6.8 g/dL      Albumin 3.90 g/dL      ALT (SGPT) 45 U/L      AST (SGOT) 43 U/L      Alkaline Phosphatase 143 U/L      Total Bilirubin 0.6 mg/dL      Globulin 2.9 gm/dL      A/G Ratio 1.3 g/dL      BUN/Creatinine Ratio 16.9     Anion Gap 12.0 mmol/L      eGFR 84.4 mL/min/1.73      Comment: National Kidney Foundation and American Society of Nephrology (ASN) Task Force recommended calculation based on the Chronic Kidney Disease Epidemiology Collaboration (CKD-EPI) equation refit without adjustment for race.       Narrative:      GFR Normal >60  Chronic Kidney Disease <60  Kidney Failure <15      Lipase [[id number]]  (Normal) Collected: 03/12/22 2217    Specimen: Blood Updated: 03/12/22 2248     Lipase 18 U/L     CK [[id number]]  (Normal) Collected: 03/12/22 2217    Specimen: Blood Updated: 03/12/22 2248     Creatine Kinase 61 U/L     Magnesium [[id number]]  (Normal) Collected: 03/12/22 2217    Specimen: Blood Updated: 03/12/22 2248     Magnesium 2.0  mg/dL     Protime-INR [[id number]]  (Abnormal) Collected: 03/12/22 2217    Specimen: Blood Updated: 03/12/22 2243     Protime 22.1 Seconds      INR 1.98    Narrative:      Therapeutic range for most indications is 2.0-3.0 INR,  or 2.5-3.5 for mechanical heart valves.    CBC & Differential [[id number]]  (Abnormal) Collected: 03/12/22 2217    Specimen: Blood Updated: 03/12/22 2232    Narrative:      The following orders were created for panel order CBC & Differential.  Procedure                               Abnormality         Status                     ---------                               -----------         ------                     CBC Auto Differential[[id number]]        Abnormal            Final result                 Please view results for these tests on the individual orders.    CBC Auto Differential [[id number]]  (Abnormal) Collected: 03/12/22 2217    Specimen: Blood Updated: 03/12/22 2232     WBC 12.18 10*3/mm3      RBC 4.20 10*6/mm3      Hemoglobin 12.4 g/dL      Hematocrit 37.1 %      MCV 88.3 fL      MCH 29.5 pg      MCHC 33.4 g/dL      RDW 20.8 %      RDW-SD 64.5 fl      MPV 9.7 fL      Platelets 256 10*3/mm3      Neutrophil % 78.1 %      Lymphocyte % 9.4 %      Monocyte % 10.8 %      Eosinophil % 0.2 %      Basophil % 0.3 %      Immature Grans % 1.2 %      Neutrophils, Absolute 9.50 10*3/mm3      Lymphocytes, Absolute 1.14 10*3/mm3      Monocytes, Absolute 1.32 10*3/mm3      Eosinophils, Absolute 0.03 10*3/mm3      Basophils, Absolute 0.04 10*3/mm3      Immature Grans, Absolute 0.15 10*3/mm3      nRBC 0.0 /100 WBC     Urinalysis, Microscopic Only - Urine, Clean Catch [[id number]]  (Abnormal) Collected: 03/12/22 2146    Specimen: Urine, Clean Catch Updated: 03/12/22 2159     RBC, UA 0-2 /HPF      WBC, UA 6-12 /HPF      Bacteria, UA None Seen /HPF      Squamous Epithelial Cells, UA 0-2 /HPF      Hyaline Casts, UA None Seen /LPF      Methodology Automated Microscopy    Urine Culture - Urine, Urine, Clean  Catch [[id number]] Collected: 03/12/22 2146    Specimen: Urine, Clean Catch Updated: 03/12/22 2159    Urinalysis With Culture If Indicated - Urine, Clean Catch [[id number]]  (Abnormal) Collected: 03/12/22 2146    Specimen: Urine, Clean Catch Updated: 03/12/22 2158     Color, UA Yellow     Appearance, UA Clear     pH, UA 6.0     Specific Gravity, UA 1.021     Glucose, UA Negative     Ketones, UA Negative     Bilirubin, UA Negative     Blood, UA Negative     Protein, UA Negative     Leuk Esterase, UA Small (1+)     Nitrite, UA Negative     Urobilinogen, UA 0.2 E.U./dL          I reviewed the patient's new clinical results.    Assessment/Plan:     Active Hospital Problems    Diagnosis  POA   • Cholecystitis, acute [K81.0]  Yes       #. Acute cholecystitis - NPO. General surgery consulted.  #. Metastatic breast cancer - heme/onc following  #. Right jugular DVT. Coumadin OP. Currently on hold.    Will monitor patient's hospital course and adjust treatment as hospital course dictates.    DVT prophylaxis: Coumadin on hold  Code status is   Code Status and Medical Interventions:   Ordered at: 03/13/22 1155     Level Of Support Discussed With:    Patient     Code Status (Patient has no pulse and is not breathing):    CPR (Attempt to Resuscitate)     Medical Interventions (Patient has pulse or is breathing):    Full Support       Plan for disposition:Where: home and When:  2-4 days      Time:           This document has been electronically signed by Shaheen Segura MD on March 13, 2022 11:53 CDT

## 2022-03-14 PROBLEM — R10.9 INTRACTABLE ABDOMINAL PAIN: Status: ACTIVE | Noted: 2022-01-01

## 2022-03-14 NOTE — PROGRESS NOTES
HISTORY OF PRESENT ILLNESS:    No acute overnight events.  States her abdominal pain is somewhat improved.  Had a HIDA scan earlier today.  Awaiting final recommendation from surgery team.  No fever.  States intravenous morphine at 2 mg is helping her with pain.  Has not required any more morphine since 4 AM.  Still having abdominal pain with deep breaths      PAST MEDICAL HISTORY:    Past Medical History:   Diagnosis Date   • Anxiety    • Depression    • Encounter for gynecological examination    • Malignant neoplasm of female breast (HCC)     hx of dcis s/p mastectomy and reconstruction and augmentation      • Primary malignant neoplasm of breast (HCC)     dcis in rt breast s/p mastectomy,reconstruction      • Ventricular premature beats        SOCIAL HISTORY:    Social History     Tobacco Use   • Smoking status: Never Smoker   • Smokeless tobacco: Never Used   Substance Use Topics   • Alcohol use: No   • Drug use: No       Surgical History :  Past Surgical History:   Procedure Laterality Date   • AUGMENTATION MAMMAPLASTY     • AXILLARY LYMPH NODE BIOPSY/EXCISION Right 7/10/2017    Procedure: BIOSPY RIGHT AXILLARY MASS AND OR LYMPH;  Surgeon: Sourav Ernst MD;  Location: [facility];  Service:    • BREAST BIOPSY  12/07/2007    Mammotome biopsy of the right side with clip placement   • BREAST LUMPECTOMY     • BREAST RECONSTRUCTION  10/28/2008    Abscence of right breast secondary to mastectomy. Implant exchange for reconstruction of right breast with open para-prosthetic capsulotomy   • BREAST SURGERY  10/01/2009    Left breast ptosis and breast asymmetry secondary to right breast reconstruction for breast cancer. Left breast mastopexy with augmentation.   • CARDIAC CATHETERIZATION  09/18/2008    Normal coronary arteriography. Normal left ventricular angiogram   • EP STUDY     • MASTECTOMY Right    • MASTECTOMY  02/05/2008    Multifocal right breast ductal carcinoma-in-situ. Right total mastectomy   •  MASTECTOMY, PARTIAL  01/03/2008    Ductal carcinoma in-situ of the right breast, proven by mammotome biopsy. Right lumpectomy, medial portion of the breast, between 2 o'clock and 4 o'clock, 5 cm from the nipple, with clip placement, radiographic inter. of severo. specimen, & ultrasound   • PAP SMEAR  03/03/2009    Negative   • TN INSJ TUNNELED CVC W/O SUBQ PORT/ AGE 5 YR/> Right 7/10/2017    Procedure: MEDIPORT     (c-arm#2);  Surgeon: Sourav Ernst MD;  Location: [facility] OR;  Service: General       ALLERGIES:    Allergies   Allergen Reactions   • Percocet [Oxycodone-Acetaminophen] Nausea And Vomiting       FAMILY HISTORY:  Family History   Problem Relation Age of Onset   • Anemia Mother    • Multiple sclerosis Mother    • No Known Problems Father    • Diabetes Other    • Multiple sclerosis Other        REVIEW OF SYSTEMS:      CONSTITUTIONAL:  Complains of fatigue.  Positive for weight loss.  Denies any fever, chills .     EYES: No visual disturbances. No discharge. No new lesion.    ENMT: No epistaxis, mouth sores or difficulty swallowing.    RESPIRATORY: Positive for shortness of breath. No new cough or hemoptysis.    CARDIOVASCULAR: No chest pain or palpitations.    GASTROINTESTINAL: Positive for right upper quadrant abdominal pain.  Positive for nausea without vomiting.  No blood in stool.    GENITOURINARY: No dysuria or hematuria.    MUSCULOSKELETAL: Positive for chronic back pain and hip pain.    LYMPHATICS: Positive for right axillary adenopathy.    NEUROLOGICAL: Positive for chronic neuropathy affecting upper and lower extremity. No headache or dizziness. No seizures or balance problems.    SKIN: Positive for skin metastases involving right axillary area    ENDOCRINE: No new hot or cold intolerance. No new polyuria. No polydipsia.      PHYSICAL EXAMINATION:      VITAL SIGNS: /68 (BP Location: Left arm, Patient Position: Sitting)   Pulse 72   Temp 97.3 °F (36.3 °C) (Temporal)   Resp 16   Ht 170.2  "cm (67\")   Wt 101 kg (222 lb)   LMP  (LMP Unknown)   SpO2 91%   BMI 34.77 kg/m²       03/12/22 2051 03/13/22 0353 03/14/22 0342   Weight: 102 kg (225 lb) 102 kg (225 lb 3.2 oz) 101 kg (222 lb)           CONSTITUTIONAL: Appears uncomfortable    EYES: Mild conjunctival pallor. No icterus. No pterygium. Extraocular movements intact. No ptosis.    ENMT: Normocephalic, atraumatic. No facial asymmetry noted.    NECK: No adenopathy. Trachea midline. No JVD.    RESPIRATORY: Decreased air entry at bilateral bases. No rhonchi or wheezing. Fair respiratory effort.    CARDIOVASCULAR: S1, S2. Regular rate and rhythm. No murmur or gallop appreciated.    ABDOMEN: Soft, obese, and upper quadrant tenderness present. Bowel sounds present in all four quadrants.  No hepatosplenomegaly appreciated.    MUSCULOSKELETAL: No edema. No calf tenderness. Decreased range of motion.    NEUROLOGIC: No motor  deficit appreciated. Cranial nerves II-XII grossly intact.    SKIN: No new skin lesion identified. Skin is warm and moist to touch.    LYMPHATICS: No new enlarged lymph nodes in neck or supraclavicular area.    PSYCHIATRY: Alert, awake and oriented ×3. Normal affect.  Normal judgment.  Makes good eye contact.      DIAGNOSTIC DATA:    Glucose   Date Value Ref Range Status   03/14/2022 94 65 - 99 mg/dL Final     Sodium   Date Value Ref Range Status   03/14/2022 141 136 - 145 mmol/L Final     Potassium   Date Value Ref Range Status   03/14/2022 4.5 3.5 - 5.2 mmol/L Final     CO2   Date Value Ref Range Status   03/14/2022 26.0 22.0 - 29.0 mmol/L Final     Chloride   Date Value Ref Range Status   03/14/2022 107 98 - 107 mmol/L Final     Anion Gap   Date Value Ref Range Status   03/14/2022 8.0 5.0 - 15.0 mmol/L Final     Creatinine   Date Value Ref Range Status   03/14/2022 0.77 0.57 - 1.00 mg/dL Final     BUN   Date Value Ref Range Status   03/14/2022 11 6 - 20 mg/dL Final     BUN/Creatinine Ratio   Date Value Ref Range Status   03/14/2022 " 14.3 7.0 - 25.0 Final     Calcium   Date Value Ref Range Status   03/14/2022 8.7 8.6 - 10.5 mg/dL Final     Alkaline Phosphatase   Date Value Ref Range Status   03/14/2022 117 39 - 117 U/L Final     Total Protein   Date Value Ref Range Status   03/14/2022 6.1 6.0 - 8.5 g/dL Final     ALT (SGPT)   Date Value Ref Range Status   03/14/2022 33 1 - 33 U/L Final     AST (SGOT)   Date Value Ref Range Status   03/14/2022 39 (H) 1 - 32 U/L Final     Total Bilirubin   Date Value Ref Range Status   03/14/2022 0.7 0.0 - 1.2 mg/dL Final     Albumin   Date Value Ref Range Status   03/14/2022 3.70 3.50 - 5.20 g/dL Final     Globulin   Date Value Ref Range Status   03/12/2022 2.9 gm/dL Final     Lab Results   Component Value Date    WBC 7.41 03/14/2022    HGB 11.7 (L) 03/14/2022    HCT 35.4 03/14/2022    MCV 89.4 03/14/2022     03/14/2022     Lab Results   Component Value Date    NEUTROABS 5.30 03/14/2022     Lab Results   Component Value Date     178.6 (H) 02/16/2022    LABCA2 315.4 (H) 02/16/2022    HCGQUANT 1.16 06/03/2021     Component      Latest Ref Rng & Units 3/12/2022 3/14/2022              Protime      11.1 - 15.3 Seconds 22.1 (H) 22.7 (H)   INR      0.80 - 1.20 1.98 (H) 2.05 (H)           Blood Culture   Date Value Ref Range Status   03/13/2022 No growth at 24 hours  Preliminary   03/13/2022 No growth at 24 hours  Preliminary     No results found for: BCIDPCR, CXREFLEX, CSFCX, CULTURETIS  No results found for: CULTURES, HSVCX, URCX  No results found for: EYECULTURE, GCCX, HSVCULTURE, LABHSV  No results found for: LEGIONELLA, MRSACX, MUMPSCX, MYCOPLASCX  No results found for: NOCARDIACX, STOOLCX  Urine Culture   Date Value Ref Range Status   03/12/2022 No growth  Final     No results found for: VIRALCULTU, WOUNDCX            PATHOLOGY:  * Cannot find OR log *     RADIOLOGY DATA :  NM HIDA SCAN WITHOUT PHARMACOLOGICAL INTERVENTION    Addendum Date: 3/14/2022     ADDENDUM ADDENDUM #1 Additional imaging was  obtained and demonstrates an ejection fraction of 64% following administration of a fatty meal. This is within normal limits. Electronically signed by:  Taye Holt MD  3/14/2022 2:22 PM CDT Workstation: [id number]     Result Date: 3/14/2022  1. No evidence for common bile duct or cystic duct obstruction. 2. Heterogeneous radiotracer accumulation throughout the liver, corresponding to the known hepatic masses. Electronically signed by:  Taye Holt MD  3/14/2022 11:22 AM CDT Workstation: [id number]    US Gallbladder    Result Date: 3/12/2022  1.  Gallstones with gallbladder wall thickening and positive sonographic Mir's sign. Follow-up HIDA scan could be performed if indicated. CBD dilated at 10.5 mm. 2.  Liver masses, the largest measuring approximately 5.4 cm. These were better visualized on previous CT. Electronically signed by:  Jame Matta DO  3/12/2022 10:32 PM CST Workstation: [id number]      ASSESSMENT AND PLAN:      1.  Right upper quadrant abdominal pain:  -Initial ultrasound done in the emergency room was consistent with cholecystitis.  -Patient has been started on intravenous Zosyn as well as intravenous morphine 2 mg IV every 4 hours as required for pain  -HIDA scan done earlier today was not consistent with cholecystitis.  -Awaiting final recommendation from surgery team.  -Discussed with patient and her  will get a CT of chest abdomen and pelvis with contrast tonight to reevaluate her metastatic disease.  -Currently patient is n.p.o. and remains on intravenous morphine 2 mg every 4 hours.    2.  Metastatic breast cancer with liver, bone and skin metastasis  -Patient was initially diagnosed in July 2017  -Currently on Xeloda.  -We will continue holding Xeloda for now.    3.  Right internal jugular vein DVT  -Remains on Coumadin  -INR is 2.02.  Recommend continue holding Coumadin for now    4.  Bone metastases  -On monthly Zometa as outpatient    5.  History of DCIS s/p surgery    6.   History of melanoma in situ s/p surgery         Ovidio Meadows MD  3/14/2022  16:03 CDT    Part of this note may be an electronic transcription/translation of spoken language to printed text using the Dragon Dictation System.

## 2022-03-14 NOTE — PROGRESS NOTES
[facility] Medicine Services  INPATIENT PROGRESS NOTE    Length of Stay: 1  Date of Admission: 3/12/2022  Primary Care Physician: Teresa Lyn MD    Subjective   Chief Complaint: abdominal pain    HPI: This is a 51-year-old female with concurrent medical history of metastatic breast cancer presenting to the hospital with abdominal pain.  The pain started this morning and was moderate in intensity mostly right upper quadrant pain.  She was also having associated nausea.  She came to the ER and was found to have cholecystitis on further imaging.  She is also on chemotherapy for breast cancer and she also takes Coumadin for jugular port clot.  Her last chemotherapy dose was today.    Review of Systems   Constitutional: Negative.    HENT: Negative.    Eyes: Negative.    Respiratory: Negative.    Cardiovascular: Negative.    Gastrointestinal: Positive for abdominal pain.   Endocrine: Negative.    Genitourinary: Negative.    Musculoskeletal: Negative.    Skin: Negative.    Neurological: Negative.    Hematological: Negative.         All pertinent negatives and positives are as above. All other systems have been reviewed and are negative unless otherwise stated.     Prior to Admission medications    Medication Sig Start Date End Date Taking? Authorizing Provider   capecitabine (XELODA) 500 MG chemo tablet TAKE 4 TABLETS BY MOUTH IN THE MORNING AND 4 TABLETS IN THE EVENING ON DAYS 1 TO 14 , THEN 7 DAYS OFF. 2/10/22  Yes Ovidio Meadows MD   clindamycin (Clindagel) 1 % gel Apply 1 application topically to the appropriate area as directed 2 (Two) Times a Day.  Patient taking differently: Apply 1 application topically to the appropriate area as directed 2 (Two) Times a Day. Apply under arm twice a day 2/16/22  Yes Ovidio Meadows MD   ondansetron (ZOFRAN) 4 MG tablet Take 1 tablet by mouth 4 (Four) Times a Day As Needed for Nausea or Vomiting. 5/25/21  Yes Ovidio Meadows MD    ondansetron (ZOFRAN) 8 MG tablet Take 0.5 tablets by mouth 4 (Four) Times a Day As Needed for Nausea or Vomiting. 1/13/22  Yes Ovidio Meadows MD   sertraline (ZOLOFT) 100 MG tablet Take 1 tablet by mouth Daily.   Yes Provider, MD Maki   traMADol (ULTRAM) 50 MG tablet Take 1 tablet by mouth Every 8 (Eight) Hours As Needed for Moderate Pain . 2/16/22  Yes Ovidio Meadows MD   gabapentin (NEURONTIN) 300 MG capsule Take 1 capsule by mouth 3 (Three) Times a Day.  Patient taking differently: Take 300 mg by mouth 3 (Three) Times a Day As Needed. 2/16/22   Ovidio Meadows MD   promethazine (PHENERGAN) 12.5 MG tablet Take 1 tablet by mouth Every 6 (Six) Hours As Needed for Nausea or Vomiting. 7/29/21   April Greco APRN   Scopolamine (Transderm-Scop, 1.5 MG,) 1.5 MG/3DAYS patch Place 1 patch on the skin as directed by provider Every 72 (Seventy-Two) Hours.  Patient taking differently: Place 1 patch on the skin as directed by provider As Needed. 5/25/21   Ovidio Meadows MD   warfarin (COUMADIN) 5 MG tablet TAKE 1 TABLET BY MOUTH NIGHTLY OR AS DIRECTED PER COUMADIN CLINIC  Patient taking differently: TAKE 2.5 mg tablet 5 times a week and 1.25 mg tablet 2 days a week 1/24/22   Quang Coe APRN       piperacillin-tazobactam, 3.375 g, Intravenous, Q8H  sodium chloride, 10 mL, Intravenous, Q12H      lactated ringers, 100 mL/hr, Last Rate: 100 mL/hr (03/14/22 1618)        Objective    Temp:  [97 °F (36.1 °C)-97.8 °F (36.6 °C)] 97.3 °F (36.3 °C)  Heart Rate:  [69-99] 69  Resp:  [16] 16  BP: (102-125)/(63-68) 102/68    Physical Exam  Constitutional:       Appearance: Normal appearance. She is obese.   HENT:      Head: Atraumatic.      Mouth/Throat:      Mouth: Mucous membranes are moist.   Eyes:      Extraocular Movements: Extraocular movements intact.   Cardiovascular:      Rate and Rhythm: Regular rhythm.      Heart sounds: Normal heart sounds.   Pulmonary:      Effort: Pulmonary effort is normal.      Breath  sounds: Normal breath sounds.   Abdominal:      General: Bowel sounds are normal.      Palpations: Abdomen is soft.      Tenderness: There is abdominal tenderness.   Musculoskeletal:         General: Normal range of motion.      Cervical back: Normal range of motion and neck supple.   Skin:     General: Skin is warm and dry.   Neurological:      General: No focal deficit present.      Mental Status: She is alert. Mental status is at baseline.   Psychiatric:         Mood and Affect: Mood normal.             Results Review:  I have reviewed the labs, radiology results, and diagnostic studies.    Laboratory Data:   Results from last 7 days   Lab Units 03/14/22 0457 03/13/22  0731 03/12/22 2217 03/09/22  0826   SODIUM mmol/L 141 142 133* 140   POTASSIUM mmol/L 4.5 4.0 3.8 3.7   CHLORIDE mmol/L 107 109* 100 107   CO2 mmol/L 26.0 25.0 21.0* 22.0   BUN mg/dL 11 13 14 10   CREATININE mg/dL 0.77 0.78 0.83 0.87   GLUCOSE mg/dL 94 94 103* 111*   CALCIUM mg/dL 8.7 8.4* 8.5* 8.8   BILIRUBIN mg/dL 0.7  --  0.6 0.4   ALK PHOS U/L 117  --  143* 147*   ALT (SGPT) U/L 33  --  45* 46*   AST (SGOT) U/L 39*  --  43* 51*   ANION GAP mmol/L 8.0 8.0 12.0 11.0     Estimated Creatinine Clearance: 103.2 mL/min (by C-G formula based on SCr of 0.77 mg/dL).  Results from last 7 days   Lab Units 03/14/22 0457 03/13/22 0731 03/12/22 2217   MAGNESIUM mg/dL 2.3 2.2 2.0         Results from last 7 days   Lab Units 03/14/22 0457 03/13/22  0731 03/12/22 2217 03/09/22  0826   WBC 10*3/mm3 7.41 7.87 12.18* 5.05   HEMOGLOBIN g/dL 11.7* 11.8* 12.4 12.9   HEMATOCRIT % 35.4 35.4 37.1 38.1   PLATELETS 10*3/mm3 215 208 256 253     Results from last 7 days   Lab Units 03/14/22  0749 03/12/22 2217 03/09/22  0000   INR  2.05* 1.98* 3.10*       Culture Data:   Blood Culture   Date Value Ref Range Status   03/13/2022 No growth at 24 hours  Preliminary   03/13/2022 No growth at 24 hours  Preliminary     Urine Culture   Date Value Ref Range Status    03/12/2022 No growth  Final     No results found for: RESPCX  No results found for: WOUNDCX  No results found for: STOOLCX  No components found for: BODYFLD    Radiology Data:   Imaging Results (Last 24 Hours)     Procedure Component Value Units Date/Time    NM HIDA SCAN WITHOUT PHARMACOLOGICAL INTERVENTION [[id number]] Resulted: 03/14/22 1422     Updated: 03/14/22 1442    Addenda:         ADDENDUM   ADDENDUM #1       Additional imaging was obtained and demonstrates an ejection  fraction of 64% following administration of a fatty meal. This is  within normal limits.    Electronically signed by:  Taye Holt MD  3/14/2022 2:22 PM CDT  Workstation: [id number]    Signed: 03/14/22 1422 by Taye Holt MD    Narrative:      EXAMINATION:  NM HIDA SCAN WITH PHARMACOLOGICAL INTERVENTION    CLINICAL HISTORY:  51 years Female,Abdominal pain, biliary  obstruction suspected (Ped 0-18y), K81.0 Acute cholecystitis    COMPARISON:  Gallbladder ultrasound dated 3/12/2022, CT of the  abdomen and pelvis dated 1/12/2022    FINDINGS:    Sequential planar imaging obtained following intravenous  administration of 6.4 mCi technetium 99m Choletec. There is  heterogeneous activity throughout the liver, corresponding to the  known multifocal hepatic masses that were demonstrated on the  recent CT and ultrasound. On images labeled 30 minutes and 60  minutes, the gallbladder is visualized. Additionally, there is  visualization of the common bile duct as well as of the small  bowel.      Impression:      1. No evidence for common bile duct or cystic duct obstruction.  2. Heterogeneous radiotracer accumulation throughout the liver,  corresponding to the known hepatic masses.    Electronically signed by:  Taye Holt MD  3/14/2022 11:22 AM  CDT Workstation: [id number]    NM HIDA SCAN WITH PHARMACOLOGICAL INTERVENTION [[id number]] Collected: 03/14/22 0953     Updated: 03/14/22 1435          I have reviewed the patient's current medications.      Assessment/Plan     Acute abdominal pain     US consistent with cholecystitis and HIDA scan negative for it     Oncologist schedule a CT abdomen and pelvis with contrast tonight     Continue with pain medication    Metastatic breast cancer     With liver mass; defer to oncologist    Right jugular vein DVT     Follow oncologist recommendations; keep holding coumadin     INR today 2.02    Chronic medical problems     Cholelithiasis     Chronic recurrent depression     Peripheral neuropathy    Discharge Planning: I expect patient to be discharged to home in 2 to 3 days.    Ethan Cruz MD

## 2022-03-14 NOTE — PLAN OF CARE
Problem: Adult Inpatient Plan of Care  Goal: Plan of Care Review  Outcome: Ongoing, Progressing  Goal: Patient-Specific Goal (Individualized)  Outcome: Ongoing, Progressing  Goal: Absence of Hospital-Acquired Illness or Injury  Outcome: Ongoing, Progressing  Intervention: Identify and Manage Fall Risk  Recent Flowsheet Documentation  Taken 3/13/2022 2100 by Ryan Theodore RN  Safety Promotion/Fall Prevention:   safety round/check completed   nonskid shoes/slippers when out of bed   clutter free environment maintained   assistive device/personal items within reach  Taken 3/13/2022 2000 by Ryan Theodore RN  Safety Promotion/Fall Prevention:   safety round/check completed   nonskid shoes/slippers when out of bed   clutter free environment maintained   assistive device/personal items within reach  Taken 3/13/2022 1900 by Ryan Theodore RN  Safety Promotion/Fall Prevention:   safety round/check completed   nonskid shoes/slippers when out of bed   assistive device/personal items within reach   clutter free environment maintained  Intervention: Prevent Skin Injury  Recent Flowsheet Documentation  Taken 3/13/2022 2100 by Ryan Theodore RN  Body Position: position changed independently  Taken 3/13/2022 2000 by Ryan Theodore RN  Body Position: position changed independently  Taken 3/13/2022 1900 by Ryan Theodore RN  Body Position: position changed independently  Intervention: Prevent and Manage VTE (Venous Thromboembolism) Risk  Recent Flowsheet Documentation  Taken 3/13/2022 2100 by Ryan Theodore RN  Activity Management: activity adjusted per tolerance  Taken 3/13/2022 2000 by Ryan Theodore RN  Activity Management: activity adjusted per tolerance  Taken 3/13/2022 1900 by Ryan Theodore RN  Activity Management: activity adjusted per tolerance  Goal: Optimal Comfort and Wellbeing  Outcome: Ongoing, Progressing  Goal: Readiness for Transition of Care  Outcome: Ongoing, Progressing     Problem: Pain Acute  Goal: Acceptable Pain  Control and Functional Ability  Outcome: Ongoing, Progressing   Goal Outcome Evaluation:

## 2022-03-14 NOTE — PLAN OF CARE
Goal Outcome Evaluation:  Plan of Care Reviewed With: patient        Progress: improving   VSS, pain improving, CT of abd/pelvis scheduled for this afternoon.

## 2022-03-15 NOTE — PROGRESS NOTES
"Anticoagulation by Pharmacy - Warfarin    Kylie García is a 51 y.o.female being continued on warfarin for DVT  Home regimen: 1.25 mg daily, except 2.5 mg three days a week  INR Goal: 2-3  Last INR:   Lab Results   Component Value Date    INR 1.79 (H) 03/15/2022       Objective:  [Ht: 170.2 cm (67\"); Wt: 101 kg (222 lb)]  Lab Results   Component Value Date    INR 1.79 (H) 03/15/2022    INR 2.05 (H) 03/14/2022    INR 1.98 (H) 03/12/2022    PROTIME 20.4 (H) 03/15/2022    PROTIME 22.7 (H) 03/14/2022    PROTIME 22.1 (H) 03/12/2022     Lab Results   Component Value Date    HGB 12.0 03/15/2022    HGB 11.7 (L) 03/14/2022    HGB 11.8 (L) 03/13/2022    HCT 35.9 03/15/2022    HCT 35.4 03/14/2022    HCT 35.4 03/13/2022     03/15/2022     03/14/2022     03/13/2022           Assessment  Interacting medications: No significant interactions noted  INR is 1.79, but warfarin has been held since admission on 3/12  H&H WNL    Plan:  1.  Give warfarin 2.5 mg tablet PO @ 1800 tonight  2.  Draw a PT/INR in AM  3.  Pharmacy will continue to follow    Gus Alonso, PharmD  03/15/22 17:19 CDT     "

## 2022-03-15 NOTE — PROGRESS NOTES
[facility] Medicine Services  INPATIENT PROGRESS NOTE    Length of Stay: 2  Date of Admission: 3/12/2022  Primary Care Physician: Teresa Lyn MD    Subjective   Chief Complaint: abdominal pain    HPI: This is a 51-year-old female with concurrent medical history of metastatic breast cancer presenting to the hospital with abdominal pain.  The pain started this morning and was moderate in intensity mostly right upper quadrant pain.  She was also having associated nausea.  She came to the ER and was found to have cholecystitis on further imaging.  She is also on chemotherapy for breast cancer and she also takes Coumadin for jugular port clot.  Her last chemotherapy dose was today.    (S) less abdominal pain    Review of Systems   Constitutional: Negative.    HENT: Negative.    Eyes: Negative.    Respiratory: Negative.    Cardiovascular: Negative.    Gastrointestinal: Positive for abdominal pain.   Endocrine: Negative.    Genitourinary: Negative.    Musculoskeletal: Negative.    Skin: Negative.    Neurological: Negative.    Hematological: Negative.         All pertinent negatives and positives are as above. All other systems have been reviewed and are negative unless otherwise stated.     Prior to Admission medications    Medication Sig Start Date End Date Taking? Authorizing Provider   capecitabine (XELODA) 500 MG chemo tablet TAKE 4 TABLETS BY MOUTH IN THE MORNING AND 4 TABLETS IN THE EVENING ON DAYS 1 TO 14 , THEN 7 DAYS OFF. 2/10/22  Yes Ovidio Meadows MD   clindamycin (Clindagel) 1 % gel Apply 1 application topically to the appropriate area as directed 2 (Two) Times a Day.  Patient taking differently: Apply 1 application topically to the appropriate area as directed 2 (Two) Times a Day. Apply under arm twice a day 2/16/22  Yes Ovidio Meadows MD   ondansetron (ZOFRAN) 4 MG tablet Take 1 tablet by mouth 4 (Four) Times a Day As Needed for Nausea or Vomiting. 5/25/21  Yes  Ovidio Meadows MD   ondansetron (ZOFRAN) 8 MG tablet Take 0.5 tablets by mouth 4 (Four) Times a Day As Needed for Nausea or Vomiting. 1/13/22  Yes Ovidio Meadows MD   sertraline (ZOLOFT) 100 MG tablet Take 1 tablet by mouth Daily.   Yes Provider, MD Maki   traMADol (ULTRAM) 50 MG tablet Take 1 tablet by mouth Every 8 (Eight) Hours As Needed for Moderate Pain . 2/16/22  Yes Ovidio Meadows MD   gabapentin (NEURONTIN) 300 MG capsule Take 1 capsule by mouth 3 (Three) Times a Day.  Patient taking differently: Take 300 mg by mouth 3 (Three) Times a Day As Needed. 2/16/22   Ovidio Meadows MD   promethazine (PHENERGAN) 12.5 MG tablet Take 1 tablet by mouth Every 6 (Six) Hours As Needed for Nausea or Vomiting. 7/29/21   April Greco APRN   Scopolamine (Transderm-Scop, 1.5 MG,) 1.5 MG/3DAYS patch Place 1 patch on the skin as directed by provider Every 72 (Seventy-Two) Hours.  Patient taking differently: Place 1 patch on the skin as directed by provider As Needed. 5/25/21   Ovidio Meadows MD   warfarin (COUMADIN) 5 MG tablet TAKE 1 TABLET BY MOUTH NIGHTLY OR AS DIRECTED PER COUMADIN CLINIC  Patient taking differently: TAKE 2.5 mg tablet 5 times a week and 1.25 mg tablet 2 days a week 1/24/22   Quang Coe APRN       piperacillin-tazobactam, 3.375 g, Intravenous, Q8H  sodium chloride, 10 mL, Intravenous, Q12H      lactated ringers, 100 mL/hr, Last Rate: 100 mL/hr (03/15/22 0725)        Objective    Temp:  [96.6 °F (35.9 °C)-97.5 °F (36.4 °C)] 97.3 °F (36.3 °C)  Heart Rate:  [69-97] 74  Resp:  [16] 16  BP: (102-143)/(63-69) 142/69    Physical Exam  Constitutional:       Appearance: Normal appearance. She is obese.   HENT:      Head: Atraumatic.      Mouth/Throat:      Mouth: Mucous membranes are moist.   Eyes:      Extraocular Movements: Extraocular movements intact.   Cardiovascular:      Rate and Rhythm: Regular rhythm.      Heart sounds: Normal heart sounds.   Pulmonary:      Effort: Pulmonary effort is  normal.      Breath sounds: Normal breath sounds.   Abdominal:      General: Bowel sounds are normal.      Palpations: Abdomen is soft.      Tenderness: There is abdominal tenderness.   Musculoskeletal:         General: Normal range of motion.      Cervical back: Normal range of motion and neck supple.   Skin:     General: Skin is warm and dry.   Neurological:      General: No focal deficit present.      Mental Status: She is alert. Mental status is at baseline.   Psychiatric:         Mood and Affect: Mood normal.             Results Review:  I have reviewed the labs, radiology results, and diagnostic studies.    Laboratory Data:   Results from last 7 days   Lab Units 03/15/22  0558 03/14/22 0457 03/13/22 0731 03/12/22 2217   SODIUM mmol/L 136 141 142 133*   POTASSIUM mmol/L 3.6 4.5 4.0 3.8   CHLORIDE mmol/L 103 107 109* 100   CO2 mmol/L 25.0 26.0 25.0 21.0*   BUN mg/dL 7 11 13 14   CREATININE mg/dL 0.81 0.77 0.78 0.83   GLUCOSE mg/dL 84 94 94 103*   CALCIUM mg/dL 8.6 8.7 8.4* 8.5*   BILIRUBIN mg/dL 0.6 0.7  --  0.6   ALK PHOS U/L 116 117  --  143*   ALT (SGPT) U/L 37* 33  --  45*   AST (SGOT) U/L 47* 39*  --  43*   ANION GAP mmol/L 8.0 8.0 8.0 12.0     Estimated Creatinine Clearance: 98.1 mL/min (by C-G formula based on SCr of 0.81 mg/dL).  Results from last 7 days   Lab Units 03/14/22 0457 03/13/22 0731 03/12/22 2217   MAGNESIUM mg/dL 2.3 2.2 2.0         Results from last 7 days   Lab Units 03/15/22  0558 03/14/22 0457 03/13/22  0731 03/12/22 2217 03/09/22  0826   WBC 10*3/mm3 6.22 7.41 7.87 12.18* 5.05   HEMOGLOBIN g/dL 12.0 11.7* 11.8* 12.4 12.9   HEMATOCRIT % 35.9 35.4 35.4 37.1 38.1   PLATELETS 10*3/mm3 250 215 208 256 253     Results from last 7 days   Lab Units 03/15/22  0558 03/14/22  0749 03/12/22  2217 03/09/22  0000   INR  1.79* 2.05* 1.98* 3.10*       Culture Data:   Blood Culture   Date Value Ref Range Status   03/13/2022 No growth at 2 days  Preliminary   03/13/2022 No growth at 2 days   Preliminary     Urine Culture   Date Value Ref Range Status   03/12/2022 No growth  Final     No results found for: RESPCX  No results found for: WOUNDCX  No results found for: STOOLCX  No components found for: BODYFLD    Radiology Data:   Imaging Results (Last 24 Hours)     Procedure Component Value Units Date/Time    CT Chest With Contrast Diagnostic [[id number]] Collected: 03/14/22 1653     Updated: 03/15/22 0003    Narrative:      CT CHEST WITH IV CONTRAST, CT ABDOMEN PELVIS WITH IV CONTRAST    INDICATION: 51 years Female; Metastatic breast cancer with liver,  bone and skin metastases involving right axillary area, new  worsening right upper quadrant pain and shortness of breath,  restaging, K81.0 Acute cholecystitis    TECHNIQUE:  CT scan of the chest abdomen and pelvis was performed  with IV contrast.  This exam was performed according to our  departmental dose-optimization program, which includes automated  exposure control, adjustment of the mA and/or kV according to  patient size and/or use of iterative reconstruction technique.     Comparison: CT 1/12/2022, ultrasound 3/12/2022, HIDA scan  3/13/2022.    FINDINGS:  Thyroid: Unremarkable.   Heart/Mediastinum: No mediastinal or hilar lymphadenopathy.   Vasculature: No aortic aneurysm or dissection. No evidence of  pulmonary emboli.   Lungs/Pleura: Interval enlargement of the bilateral pulmonary  nodules. The right upper lobe nodule in the measures 1.2 cm  (previously 0.9 cm). The left lower lobe nodule measures 1 cm  (previously 0.8 cm). No pleural effusion or pneumothorax. No  acute airspace opacities.   Liver: Multiple hypodense masses in the liver have increased in  size. For example, the mass in the periphery of the right hepatic  lobe segment 5 now measures 5.9 cm (previously 4.6 cm. The  coarsely calcified mass in the left hepatic lobe measuring 5.6 cm  is unchanged.  Gallbladder/Biliary tree: No gallbladder wall thickening or  pericholecystic  fluid/edema. No biliary dilatation.  Pancreas: Unremarkable.  Spleen: Unremarkable.  Adrenals: Unremarkable.  Genitourinary: No hydronephrosis or nephrolithiasis. No bladder  wall thickening. The uterus and bilateral adnexa are grossly  unremarkable.  Gastrointestinal: No bowel wall thickening or obstruction. Normal  appendix. No free air or free fluid.  Peritoneum: Unremarkable.  Lymph nodes: No enlarged lymph nodes in the abdomen and pelvis.  Bones: Innumerable sclerotic lesions scattered throughout the  visualized bones, including spine, ribs, sternum, clavicles,  scapula, bilateral humeral heads, pelvis, bilateral femurs appear  grossly unchanged. Pathologic fracture at L4 with vertebral plana  deformity is unchanged. Pathologic fracture at T12 is unchanged.  Soft tissues: Status post right mastectomy with reconstruction.  Similar appearance of the right breast tissue expander. No  significant change in multiple subcutaneous and cutaneous nodules  around the right axilla. Similar appearance of an irregular  nodule measuring 2.8 cm in the right axilla extending to the  skin.   Incidental findings: The tip of the right Mediport is at the  cavoatrial junction.       Impression:      1.  No evidence of cholecystitis or biliary dilatation.  2.  Interval progression of disease as indicated by increased  size of the bilateral pulmonary nodules and hepatic metastases.  3.  No significant change in multiple subcutaneous and cutaneous  skin metastases in the right axilla.  4.  No significant change in the innumerable sclerotic metastases  throughout the visualized bones. Pathologic fractures at T12, L4,  are unchanged.    Electronically signed by:  Nayeli Finhc  3/15/2022 12:01 AM CDT  Workstation: [id number]    CT Abdomen Pelvis With Contrast [[id number]] Collected: 03/14/22 1653     Updated: 03/15/22 0003    Narrative:      CT CHEST WITH IV CONTRAST, CT ABDOMEN PELVIS WITH IV CONTRAST    INDICATION: 51 years Female;  Metastatic breast cancer with liver,  bone and skin metastases involving right axillary area, new  worsening right upper quadrant pain and shortness of breath,  restaging, K81.0 Acute cholecystitis    TECHNIQUE:  CT scan of the chest abdomen and pelvis was performed  with IV contrast.  This exam was performed according to our  departmental dose-optimization program, which includes automated  exposure control, adjustment of the mA and/or kV according to  patient size and/or use of iterative reconstruction technique.     Comparison: CT 1/12/2022, ultrasound 3/12/2022, HIDA scan  3/13/2022.    FINDINGS:  Thyroid: Unremarkable.   Heart/Mediastinum: No mediastinal or hilar lymphadenopathy.   Vasculature: No aortic aneurysm or dissection. No evidence of  pulmonary emboli.   Lungs/Pleura: Interval enlargement of the bilateral pulmonary  nodules. The right upper lobe nodule in the measures 1.2 cm  (previously 0.9 cm). The left lower lobe nodule measures 1 cm  (previously 0.8 cm). No pleural effusion or pneumothorax. No  acute airspace opacities.   Liver: Multiple hypodense masses in the liver have increased in  size. For example, the mass in the periphery of the right hepatic  lobe segment 5 now measures 5.9 cm (previously 4.6 cm. The  coarsely calcified mass in the left hepatic lobe measuring 5.6 cm  is unchanged.  Gallbladder/Biliary tree: No gallbladder wall thickening or  pericholecystic fluid/edema. No biliary dilatation.  Pancreas: Unremarkable.  Spleen: Unremarkable.  Adrenals: Unremarkable.  Genitourinary: No hydronephrosis or nephrolithiasis. No bladder  wall thickening. The uterus and bilateral adnexa are grossly  unremarkable.  Gastrointestinal: No bowel wall thickening or obstruction. Normal  appendix. No free air or free fluid.  Peritoneum: Unremarkable.  Lymph nodes: No enlarged lymph nodes in the abdomen and pelvis.  Bones: Innumerable sclerotic lesions scattered throughout the  visualized bones, including  spine, ribs, sternum, clavicles,  scapula, bilateral humeral heads, pelvis, bilateral femurs appear  grossly unchanged. Pathologic fracture at L4 with vertebral plana  deformity is unchanged. Pathologic fracture at T12 is unchanged.  Soft tissues: Status post right mastectomy with reconstruction.  Similar appearance of the right breast tissue expander. No  significant change in multiple subcutaneous and cutaneous nodules  around the right axilla. Similar appearance of an irregular  nodule measuring 2.8 cm in the right axilla extending to the  skin.   Incidental findings: The tip of the right Mediport is at the  cavoatrial junction.       Impression:      1.  No evidence of cholecystitis or biliary dilatation.  2.  Interval progression of disease as indicated by increased  size of the bilateral pulmonary nodules and hepatic metastases.  3.  No significant change in multiple subcutaneous and cutaneous  skin metastases in the right axilla.  4.  No significant change in the innumerable sclerotic metastases  throughout the visualized bones. Pathologic fractures at T12, L4,  are unchanged.    Electronically signed by:  Nayeli Finch  3/15/2022 12:01 AM CDT  Workstation: [id number]    NM HIDA SCAN WITHOUT PHARMACOLOGICAL INTERVENTION [[id number]] Resulted: 03/14/22 1422     Updated: 03/14/22 1442    Addenda:         ADDENDUM   ADDENDUM #1       Additional imaging was obtained and demonstrates an ejection  fraction of 64% following administration of a fatty meal. This is  within normal limits.    Electronically signed by:  Taye Holt MD  3/14/2022 2:22 PM CDT  Workstation: [id number]    Signed: 03/14/22 1422 by Taye Holt MD    Narrative:      EXAMINATION:  NM HIDA SCAN WITH PHARMACOLOGICAL INTERVENTION    CLINICAL HISTORY:  51 years Female,Abdominal pain, biliary  obstruction suspected (Ped 0-18y), K81.0 Acute cholecystitis    COMPARISON:  Gallbladder ultrasound dated 3/12/2022, CT of the  abdomen and pelvis dated  1/12/2022    FINDINGS:    Sequential planar imaging obtained following intravenous  administration of 6.4 mCi technetium 99m Choletec. There is  heterogeneous activity throughout the liver, corresponding to the  known multifocal hepatic masses that were demonstrated on the  recent CT and ultrasound. On images labeled 30 minutes and 60  minutes, the gallbladder is visualized. Additionally, there is  visualization of the common bile duct as well as of the small  bowel.      Impression:      1. No evidence for common bile duct or cystic duct obstruction.  2. Heterogeneous radiotracer accumulation throughout the liver,  corresponding to the known hepatic masses.    Electronically signed by:  Taye Holt MD  3/14/2022 11:22 AM  CDT Workstation: [id number]    NM HIDA SCAN WITH PHARMACOLOGICAL INTERVENTION [[id number]] Collected: 03/14/22 0953     Updated: 03/14/22 6667          I have reviewed the patient's current medications.     Assessment/Plan     Acute abdominal pain     US consistent with cholecystitis and HIDA scan negative for it     new CT did not reveal acute cholecystitis; discussed with surgeon and oncologist today. Plan is advance diet to evaluate if she tolerates. If surgeon after evaluation and discuss with radiologist, decides surgery, he would place patient on NPO after midnight; if that is not the case, and patient tolerates her diet, she can be discharge tomorrow     Continue with pain medication    Metastatic breast cancer     With liver mass; defer to oncologist    Right jugular vein DVT     Follow oncologist recommendations; keep holding coumadin     INR today 2.02    Chronic medical problems     Cholelithiasis     Chronic recurrent depression     Peripheral neuropathy    Discharge Planning: I expect patient to be discharged to home in 1-2 days    Ethan Cruz MD

## 2022-03-15 NOTE — PLAN OF CARE
Goal Outcome Evaluation:  Plan of Care Reviewed With: patient        Progress: improving  Outcome Evaluation: No complaints of abdominal pain. VSS. Awaiting CT results.

## 2022-03-15 NOTE — PROGRESS NOTES
HISTORY OF PRESENT ILLNESS:    Had episode of abdominal pain earlier today requiring intravenous morphine.  Complains of diarrhea.  No bleeding.      PAST MEDICAL HISTORY:    Past Medical History:   Diagnosis Date   • Anxiety    • Depression    • Encounter for gynecological examination    • Malignant neoplasm of female breast (HCC)     hx of dcis s/p mastectomy and reconstruction and augmentation      • Primary malignant neoplasm of breast (HCC)     dcis in rt breast s/p mastectomy,reconstruction      • Ventricular premature beats        SOCIAL HISTORY:    Social History     Tobacco Use   • Smoking status: Never Smoker   • Smokeless tobacco: Never Used   Substance Use Topics   • Alcohol use: No   • Drug use: No       Surgical History :  Past Surgical History:   Procedure Laterality Date   • AUGMENTATION MAMMAPLASTY     • AXILLARY LYMPH NODE BIOPSY/EXCISION Right 7/10/2017    Procedure: BIOSPY RIGHT AXILLARY MASS AND OR LYMPH;  Surgeon: Sourav Ernst MD;  Location: [facility];  Service:    • BREAST BIOPSY  12/07/2007    Mammotome biopsy of the right side with clip placement   • BREAST LUMPECTOMY     • BREAST RECONSTRUCTION  10/28/2008    Abscence of right breast secondary to mastectomy. Implant exchange for reconstruction of right breast with open para-prosthetic capsulotomy   • BREAST SURGERY  10/01/2009    Left breast ptosis and breast asymmetry secondary to right breast reconstruction for breast cancer. Left breast mastopexy with augmentation.   • CARDIAC CATHETERIZATION  09/18/2008    Normal coronary arteriography. Normal left ventricular angiogram   • EP STUDY     • MASTECTOMY Right    • MASTECTOMY  02/05/2008    Multifocal right breast ductal carcinoma-in-situ. Right total mastectomy   • MASTECTOMY, PARTIAL  01/03/2008    Ductal carcinoma in-situ of the right breast, proven by mammotome biopsy. Right lumpectomy, medial portion of the breast, between 2 o'clock and 4 o'clock, 5 cm from the nipple, with  "clip placement, radiographic inter. of severo. specimen, & ultrasound   • PAP SMEAR  03/03/2009    Negative   • DC INSJ TUNNELED CVC W/O SUBQ PORT/ AGE 3 YR/> Right 7/10/2017    Procedure: MEDIPORT     (c-arm#2);  Surgeon: Sourav Ernst MD;  Location: [facility];  Service: General       ALLERGIES:    Allergies   Allergen Reactions   • Percocet [Oxycodone-Acetaminophen] Nausea And Vomiting       FAMILY HISTORY:  Family History   Problem Relation Age of Onset   • Anemia Mother    • Multiple sclerosis Mother    • No Known Problems Father    • Diabetes Other    • Multiple sclerosis Other        REVIEW OF SYSTEMS:      CONSTITUTIONAL:  Complains of fatigue. Denies any fever, chills or weight loss.     EYES: No visual disturbances. No discharge. No new lesion.    ENMT: No epistaxis, mouth sores or difficulty swallowing.    RESPIRATORY: No new shortness of breath. No new cough or hemoptysis.    CARDIOVASCULAR: No chest pain or palpitations.    GASTROINTESTINAL: Positive for abdominal pain.  Positive for diarrhea.  No  nausea, vomiting or blood in the stool.    GENITOURINARY: No dysuria or hematuria.    MUSCULOSKELETAL: Positive for chronic back pain and hip pain    LYMPHATICS: Denies any abnormal swollen glands anywhere in the body.    NEUROLOGICAL: Positive for tingling and numbness affecting bilateral hands. No headache or dizziness. No seizures or balance problems.    SKIN: No new skin lesion.    ENDOCRINE: No new hot or cold intolerance. No new polyuria. No polydipsia.      PHYSICAL EXAMINATION:      VITAL SIGNS: /65 (BP Location: Left arm, Patient Position: Lying)   Pulse 70   Temp 97 °F (36.1 °C) (Temporal)   Resp 16   Ht 170.2 cm (67\")   Wt 101 kg (222 lb)   LMP  (LMP Unknown)   SpO2 93%   BMI 34.77 kg/m²       03/12/22  2051 03/13/22  0353 03/14/22  0342   Weight: 102 kg (225 lb) 102 kg (225 lb 3.2 oz) 101 kg (222 lb)           CONSTITUTIONAL:  Not in any distress.    EYES: Mild conjunctival " pallor. No icterus. No pterygium. Extraocular movements intact. No ptosis.    ENMT: Normocephalic, atraumatic. No facial asymmetry noted.    NECK: No adenopathy. Trachea midline. No JVD.    RESPIRATORY: Fair air entry bilateral. No rhonchi or wheezing. Fair respiratory effort.    CARDIOVASCULAR: S1, S2. Regular rate and rhythm. No murmur or gallop appreciated.    ABDOMEN: Soft, obese, right upper quadrant tenderness present. Bowel sounds present in all four quadrants.  No hepatosplenomegaly appreciated.    MUSCULOSKELETAL: No edema. No calf tenderness.  Decreased range of motion.    NEUROLOGIC: No motor  deficit appreciated. Cranial nerves II-XII grossly intact.    SKIN: No new skin lesion identified. Skin is warm and moist to touch.    LYMPHATICS: No new enlarged lymph nodes in neck or supraclavicular area.    PSYCHIATRY: Alert, awake and oriented ×3. Normal affect.  Normal judgment.  Makes good eye contact.      DIAGNOSTIC DATA:    Glucose   Date Value Ref Range Status   03/15/2022 84 65 - 99 mg/dL Final     Sodium   Date Value Ref Range Status   03/15/2022 136 136 - 145 mmol/L Final     Potassium   Date Value Ref Range Status   03/15/2022 3.6 3.5 - 5.2 mmol/L Final     CO2   Date Value Ref Range Status   03/15/2022 25.0 22.0 - 29.0 mmol/L Final     Chloride   Date Value Ref Range Status   03/15/2022 103 98 - 107 mmol/L Final     Anion Gap   Date Value Ref Range Status   03/15/2022 8.0 5.0 - 15.0 mmol/L Final     Creatinine   Date Value Ref Range Status   03/15/2022 0.81 0.57 - 1.00 mg/dL Final     BUN   Date Value Ref Range Status   03/15/2022 7 6 - 20 mg/dL Final     BUN/Creatinine Ratio   Date Value Ref Range Status   03/15/2022 8.6 7.0 - 25.0 Final     Calcium   Date Value Ref Range Status   03/15/2022 8.6 8.6 - 10.5 mg/dL Final     Alkaline Phosphatase   Date Value Ref Range Status   03/15/2022 116 39 - 117 U/L Final     Total Protein   Date Value Ref Range Status   03/15/2022 6.3 6.0 - 8.5 g/dL Final     ALT  (SGPT)   Date Value Ref Range Status   03/15/2022 37 (H) 1 - 33 U/L Final     AST (SGOT)   Date Value Ref Range Status   03/15/2022 47 (H) 1 - 32 U/L Final     Total Bilirubin   Date Value Ref Range Status   03/15/2022 0.6 0.0 - 1.2 mg/dL Final     Albumin   Date Value Ref Range Status   03/15/2022 3.60 3.50 - 5.20 g/dL Final     Globulin   Date Value Ref Range Status   03/15/2022 2.7 gm/dL Final     Lab Results   Component Value Date    WBC 6.22 03/15/2022    HGB 12.0 03/15/2022    HCT 35.9 03/15/2022    MCV 89.1 03/15/2022     03/15/2022     Lab Results   Component Value Date    NEUTROABS 4.16 03/15/2022     Lab Results   Component Value Date     178.6 (H) 02/16/2022    LABCA2 315.4 (H) 02/16/2022    HCGQUANT 1.16 06/03/2021       PATHOLOGY:  * Cannot find OR log *     RADIOLOGY DATA :  CT Chest With Contrast Diagnostic    Result Date: 3/15/2022  1.  No evidence of cholecystitis or biliary dilatation. 2.  Interval progression of disease as indicated by increased size of the bilateral pulmonary nodules and hepatic metastases. 3.  No significant change in multiple subcutaneous and cutaneous skin metastases in the right axilla. 4.  No significant change in the innumerable sclerotic metastases throughout the visualized bones. Pathologic fractures at T12, L4, are unchanged. Electronically signed by:  Nayeli Finch  3/15/2022 12:01 AM CDT Workstation: [id number]    NM HIDA SCAN WITHOUT PHARMACOLOGICAL INTERVENTION    Addendum Date: 3/14/2022     ADDENDUM ADDENDUM #1 Additional imaging was obtained and demonstrates an ejection fraction of 64% following administration of a fatty meal. This is within normal limits. Electronically signed by:  Taye Holt MD  3/14/2022 2:22 PM CDT Workstation: [id number]     Result Date: 3/14/2022  1. No evidence for common bile duct or cystic duct obstruction. 2. Heterogeneous radiotracer accumulation throughout the liver, corresponding to the known hepatic masses. Electronically  signed by:  Taye Holt MD  3/14/2022 11:22 AM CDT Workstation: [id number]    CT Abdomen Pelvis With Contrast    Result Date: 3/15/2022  1.  No evidence of cholecystitis or biliary dilatation. 2.  Interval progression of disease as indicated by increased size of the bilateral pulmonary nodules and hepatic metastases. 3.  No significant change in multiple subcutaneous and cutaneous skin metastases in the right axilla. 4.  No significant change in the innumerable sclerotic metastases throughout the visualized bones. Pathologic fractures at T12, L4, are unchanged. Electronically signed by:  Nayeli Finch  3/15/2022 12:01 AM CDT Workstation: [id number]    US Gallbladder    Result Date: 3/12/2022  1.  Gallstones with gallbladder wall thickening and positive sonographic Mir's sign. Follow-up HIDA scan could be performed if indicated. CBD dilated at 10.5 mm. 2.  Liver masses, the largest measuring approximately 5.4 cm. These were better visualized on previous CT. Electronically signed by:  Jame Matta DO  3/12/2022 10:32 PM [facility] Workstation: [id number]      ASSESSMENT AND PLAN:      1.  Right upper quadrant abdominal pain  -Initial ultrasound in the emergency room showed changes consistent with cholecystitis.  -HIDA scan and CT scan done yesterday does not show any evidence of cholecystitis.  -Patient is currently on Zosyn.  -Awaiting final recommendation from Dr. Ernst.  -CT of chest, abdomen and pelvis with contrast done yesterday shows progression of disease with enlarging liver lesion as well as lung lesion which were discussed with patient  -We will start patient on oxycodone 5 mg every 4 hours as needed for pain.  Patient was encouraged to start using oral pain medication prior to using intravenous morphine.  -If pain is not controlled may need to start her on long-acting pain medication like morphine or fentanyl which was discussed with patient today  -We will continue with as needed intravenous morphine 2 mg every  4 hours for now    2.  Metastatic breast cancer with liver bone and skin metastasis  -Patient was initially diagnosed in July 2017  -Currently on Xeloda which has been held since hospital admission  -CT scan is consistent with progression of disease  -Option will be to start intravenous chemotherapy either with Abraxane or Halaven.  Discussed with patient all other chemotherapy options that are remaining will come with a neuropathy as a side effect.  -Patient does not want to start any intravenous chemotherapy until her daughter's wedding on April 2, 2022.    3.  Right internal jugular vein DVT  -INR is 1.79  -We will restart Coumadin 5 mg p.o. tonight.    4.  Bone metastases:  -On monthly Zometa as outpatient  -Bone metastasis is stable on recent CT scan           Ovidio Meadows MD  3/15/2022  15:59 CDT    Part of this note may be an electronic transcription/translation of spoken language to printed text using the Dragon Dictation System.

## 2022-03-15 NOTE — PAYOR COMM NOTE
"Lor Sebastián  Case Managment Extender  P) [phone]  (F) [phone]      REF#[id number]  Uri Fiore (51 y.o. Female)             Date of Birth   1970    Social Security Number       Address   [address] [place], [place] [address]    Home Phone   [phone]    MRN   [id number]       Quaker   Non-Episcopal    Marital Status                               Admission Date   3/12/22    Admission Type   Emergency    Admitting Provider   Jerald Muhammad MD    Attending Provider   Jerald Muhammad MD    Department, Room/Bed   [address], 318/1       Discharge Date       Discharge Disposition       Discharge Destination                               Attending Provider: Jerald Muhammad MD    Allergies: Percocet [Oxycodone-acetaminophen]    Isolation: None   Infection: None   Code Status: CPR   Advance Care Planning Activity    Ht: 170.2 cm (67\")   Wt: 101 kg (222 lb)    Admission Cmt: None   Principal Problem: None                Active Insurance as of 3/12/2022     Primary Coverage     Payor Plan Insurance Group Employer/Plan Group    [employer]HEM BLUE CROSS ANTHEM BLUE CROSS BLUE SHIELD PPO [id number]     Payor Plan Address Payor Plan Phone Number Payor Plan Fax Number Effective Dates    [phone]  1/1/2019 - None Entered    [place] [address]       Subscriber Name Subscriber Birth Date Member ID       URI FIORE 1970 [id number]                 Emergency Contacts      (Rel.) Home Phone Work Phone Mobile Phone    Benny Fiore (Spouse) [phone] -- [phone]               History & Physical      Jerald Muhammad MD at 03/13/22 0050                [facility] Medicine Admission      Date of Admission: 3/12/2022      Primary Care Physician: Teresa Lyn MD      Chief Complaint: Abdominal pain    HPI:    This is a 51-year-old female with concurrent medical " history of metastatic breast cancer presenting to the hospital with abdominal pain.  The pain started this morning and was moderate in intensity mostly right upper quadrant pain.  She was also having associated nausea.  She came to the ER and was found to have cholecystitis on further imaging.  She is also on chemotherapy for breast cancer and she also takes Coumadin for jugular port clot.  Her last chemotherapy dose was today.    Past Medical History:  has a past medical history of Anxiety, Depression, Encounter for gynecological examination, Malignant neoplasm of female breast (HCC), Primary malignant neoplasm of breast (HCC), and Ventricular premature beats.    Past Surgical History:  has a past surgical history that includes Mastectomy (Right); Breast reconstruction (10/28/2008); Breast surgery (10/01/2009); Cardiac catheterization (09/18/2008); Mastectomy (02/05/2008); Mastectomy, partial (01/03/2008); Pap Smear (03/03/2009); Breast biopsy (12/07/2007); Breast lumpectomy; Augmentation mammaplasty; EP Study; pr insj tunneled cvc w/o subq port/ age 3 yr/> (Right, 7/10/2017); and Axillary Lymph Node Biopsy/Excision (Right, 7/10/2017).    Family History: family history includes Anemia in her mother; Diabetes in an other family member; Multiple sclerosis in her mother and another family member; No Known Problems in her father.    Social History:  reports that she has never smoked. She has never used smokeless tobacco. She reports that she does not drink alcohol and does not use drugs.    Allergies:   Allergies   Allergen Reactions   • Percocet [Oxycodone-Acetaminophen] Nausea And Vomiting       Medications: Scheduled Meds:piperacillin-tazobactam, 3.375 g, Intravenous, Once      Continuous Infusions:sodium chloride, 125 mL/hr, Last Rate: 125 mL/hr (03/12/22 5208)  sodium chloride, 125 mL/hr      PRN Meds:.  No current facility-administered medications on file prior to encounter.     Current Outpatient Medications  on File Prior to Encounter   Medication Sig Dispense Refill   • capecitabine (XELODA) 500 MG chemo tablet TAKE 4 TABLETS BY MOUTH IN THE MORNING AND 4 TABLETS IN THE EVENING ON DAYS 1 TO 14 , THEN 7 DAYS OFF. 112 tablet 1   • clindamycin (Clindagel) 1 % gel Apply 1 application topically to the appropriate area as directed 2 (Two) Times a Day. 60 g 1   • gabapentin (NEURONTIN) 300 MG capsule Take 1 capsule by mouth 3 (Three) Times a Day. 90 capsule 0   • minocycline (MINOCIN,DYNACIN) 100 MG capsule Take 100 mg by mouth Daily.     • ondansetron (ZOFRAN) 4 MG tablet Take 1 tablet by mouth 4 (Four) Times a Day As Needed for Nausea or Vomiting. 40 tablet 3   • ondansetron (ZOFRAN) 8 MG tablet Take 0.5 tablets by mouth 4 (Four) Times a Day As Needed for Nausea or Vomiting. 40 tablet 3   • promethazine (PHENERGAN) 12.5 MG tablet Take 1 tablet by mouth Every 6 (Six) Hours As Needed for Nausea or Vomiting. 30 tablet 1   • Scopolamine (Transderm-Scop, 1.5 MG,) 1.5 MG/3DAYS patch Place 1 patch on the skin as directed by provider Every 72 (Seventy-Two) Hours. (Patient taking differently: Place 1 patch on the skin as directed by provider As Needed.) 10 patch 1   • sertraline (ZOLOFT) 100 MG tablet Take 1 tablet by mouth Daily.     • traMADol (ULTRAM) 50 MG tablet Take 1 tablet by mouth Every 8 (Eight) Hours As Needed for Moderate Pain . 90 tablet 0   • warfarin (COUMADIN) 5 MG tablet TAKE 1 TABLET BY MOUTH NIGHTLY OR AS DIRECTED PER COUMADIN CLINIC 30 tablet 2       Review of Systems:  Review of Systems   Respiratory: Negative for shortness of breath.    Cardiovascular: Negative for chest pain.      Otherwise complete ROS is negative except as mentioned above.    Physical Exam:   Temp:  [98.8 °F (37.1 °C)] 98.8 °F (37.1 °C)  Heart Rate:  [105-120] 105  Resp:  [19-22] 19  BP: (118-142)/(56-78) 118/56  Physical Exam  Vitals and nursing note reviewed.   Constitutional:       General: She is not in acute distress.     Appearance:  She is well-developed. She is not diaphoretic.   HENT:      Head: Normocephalic and atraumatic.   Cardiovascular:      Rate and Rhythm: Normal rate.   Pulmonary:      Effort: Pulmonary effort is normal. No respiratory distress.      Breath sounds: No wheezing.   Abdominal:      General: There is no distension.      Palpations: Abdomen is soft.      Tenderness: There is abdominal tenderness.   Musculoskeletal:         General: Normal range of motion.   Skin:     General: Skin is warm and dry.   Neurological:      Mental Status: She is alert.      Cranial Nerves: No cranial nerve deficit.   Psychiatric:         Behavior: Behavior normal.         Thought Content: Thought content normal.         Judgment: Judgment normal.           Results Reviewed:  I have personally reviewed current lab, radiology, and data and agree with results.  Lab Results (last 24 hours)     Procedure Component Value Units Date/Time    Lactic Acid, Plasma [[id number]]  (Normal) Collected: 03/12/22 2222    Specimen: Blood Updated: 03/13/22 0040     Lactate 1.0 mmol/L     Comprehensive Metabolic Panel [[id number]]  (Abnormal) Collected: 03/12/22 2217    Specimen: Blood Updated: 03/12/22 2248     Glucose 103 mg/dL      BUN 14 mg/dL      Creatinine 0.83 mg/dL      Sodium 133 mmol/L      Potassium 3.8 mmol/L      Chloride 100 mmol/L      CO2 21.0 mmol/L      Calcium 8.5 mg/dL      Total Protein 6.8 g/dL      Albumin 3.90 g/dL      ALT (SGPT) 45 U/L      AST (SGOT) 43 U/L      Alkaline Phosphatase 143 U/L      Total Bilirubin 0.6 mg/dL      Globulin 2.9 gm/dL      A/G Ratio 1.3 g/dL      BUN/Creatinine Ratio 16.9     Anion Gap 12.0 mmol/L      eGFR 84.4 mL/min/1.73      Comment: National Kidney Foundation and American Society of Nephrology (ASN) Task Force recommended calculation based on the Chronic Kidney Disease Epidemiology Collaboration (CKD-EPI) equation refit without adjustment for race.       Narrative:      GFR Normal >60  Chronic Kidney Disease  <60  Kidney Failure <15      Lipase [[id number]]  (Normal) Collected: 03/12/22 2217    Specimen: Blood Updated: 03/12/22 2248     Lipase 18 U/L     CK [[id number]]  (Normal) Collected: 03/12/22 2217    Specimen: Blood Updated: 03/12/22 2248     Creatine Kinase 61 U/L     Magnesium [[id number]]  (Normal) Collected: 03/12/22 2217    Specimen: Blood Updated: 03/12/22 2248     Magnesium 2.0 mg/dL     Protime-INR [[id number]]  (Abnormal) Collected: 03/12/22 2217    Specimen: Blood Updated: 03/12/22 2243     Protime 22.1 Seconds      INR 1.98    Narrative:      Therapeutic range for most indications is 2.0-3.0 INR,  or 2.5-3.5 for mechanical heart valves.    CBC & Differential [[id number]]  (Abnormal) Collected: 03/12/22 2217    Specimen: Blood Updated: 03/12/22 2232    Narrative:      The following orders were created for panel order CBC & Differential.  Procedure                               Abnormality         Status                     ---------                               -----------         ------                     CBC Auto Differential[[id number]]        Abnormal            Final result                 Please view results for these tests on the individual orders.    CBC Auto Differential [[id number]]  (Abnormal) Collected: 03/12/22 2217    Specimen: Blood Updated: 03/12/22 2232     WBC 12.18 10*3/mm3      RBC 4.20 10*6/mm3      Hemoglobin 12.4 g/dL      Hematocrit 37.1 %      MCV 88.3 fL      MCH 29.5 pg      MCHC 33.4 g/dL      RDW 20.8 %      RDW-SD 64.5 fl      MPV 9.7 fL      Platelets 256 10*3/mm3      Neutrophil % 78.1 %      Lymphocyte % 9.4 %      Monocyte % 10.8 %      Eosinophil % 0.2 %      Basophil % 0.3 %      Immature Grans % 1.2 %      Neutrophils, Absolute 9.50 10*3/mm3      Lymphocytes, Absolute 1.14 10*3/mm3      Monocytes, Absolute 1.32 10*3/mm3      Eosinophils, Absolute 0.03 10*3/mm3      Basophils, Absolute 0.04 10*3/mm3      Immature Grans, Absolute 0.15 10*3/mm3      nRBC 0.0 /100 WBC      Extra Tubes [[id number]] Collected: 03/12/22 2222    Specimen: Blood Updated: 03/12/22 2222    Narrative:      The following orders were created for panel order Extra Tubes.  Procedure                               Abnormality         Status                     ---------                               -----------         ------                     Barbosa Top[[id number]]                                         In process                   Please view results for these tests on the individual orders.    Barbosa Top [[id number]] Collected: 03/12/22 2222    Specimen: Blood Updated: 03/12/22 2222    Urinalysis, Microscopic Only - Urine, Clean Catch [[id number]]  (Abnormal) Collected: 03/12/22 2146    Specimen: Urine, Clean Catch Updated: 03/12/22 2159     RBC, UA 0-2 /HPF      WBC, UA 6-12 /HPF      Bacteria, UA None Seen /HPF      Squamous Epithelial Cells, UA 0-2 /HPF      Hyaline Casts, UA None Seen /LPF      Methodology Automated Microscopy    Urine Culture - Urine, Urine, Clean Catch [[id number]] Collected: 03/12/22 2146    Specimen: Urine, Clean Catch Updated: 03/12/22 2159    Urinalysis With Culture If Indicated - Urine, Clean Catch [[id number]]  (Abnormal) Collected: 03/12/22 2146    Specimen: Urine, Clean Catch Updated: 03/12/22 2158     Color, UA Yellow     Appearance, UA Clear     pH, UA 6.0     Specific Gravity, UA 1.021     Glucose, UA Negative     Ketones, UA Negative     Bilirubin, UA Negative     Blood, UA Negative     Protein, UA Negative     Leuk Esterase, UA Small (1+)     Nitrite, UA Negative     Urobilinogen, UA 0.2 E.U./dL        Imaging Results (Last 24 Hours)     Procedure Component Value Units Date/Time    US Gallbladder [[id number]] Collected: 03/12/22 2148     Updated: 03/12/22 2234    Narrative:      EXAM DESCRIPTION:  US GALLBLADDER  RadLex: US GALLBLADDER   Technique:  Multiple sonographic images of the right upper  quadrant were obtained.     CLINICAL HISTORY:  RUQ pain.    COMPARISON:  CT abdomen  1/12/2022.    FINDINGS:  The liver demonstrates multiple masses, the largest measuring  approximately 4.4 x 5.4 x 4.6 cm in the left lobe and 4.1 x 5.1 x  3.9 cm in the right lobe. These were better seen on the CT exam.  The gallbladder appears contracted with underlying shadowing  stones. Gallbladder wall thickened at 4.9 mm. Positive  sonographic Mir's sign was elicited. The common bile duct  measures 10.5 mm. The visualized pancreas appears unremarkable.  There is no evidence for hydronephrosis in the right kidney.  Right kidney measures 11.5 cm in length. The abdominal aorta and  inferior vena cava are not well visualized.      Impression:      1.  Gallstones with gallbladder wall thickening and positive  sonographic Mir's sign. Follow-up HIDA scan could be performed  if indicated. CBD dilated at 10.5 mm.  2.  Liver masses, the largest measuring approximately 5.4 cm.  These were better visualized on previous CT.    Electronically signed by:  Jame Matta DO  3/12/2022 10:32 PM CST  Workstation: [id number]            Assessment:    Active Hospital Problems    Diagnosis    • Cholecystitis, acute          Acute cholecystitis-IV antibiotics have been started and blood cultures have been ordered.  Will start on IV fluids.  General surgery has also been consulted will see the patient in the morning    Jugular port thrombus history-patient is on anticoagulation with Coumadin, will continue to monitor.  Will consult Dr. Meadows in the morning    Breast cancer-metastatic-Dr. Meadows is seeing the patient as an outpatient, will call in the morning    DVT prophylaxis-SCD    Patient is full code    I confirmed that the patient's Advance Care Plan is present, code status is documented, or surrogate decision maker is listed in the patient's medical record.           Jerald Muhammad MD  03/13/22  00:51 CST                  Electronically signed by Jerald Muhammad MD at 03/13/22 0054          Emergency Department Notes       Babak Buckner MD at 03/12/22 2126          Subjective   RUQ pain began at 1 pm today. Pt has breast CA with mets to liver and bone, treated by Dr. Meadows.       Abdominal Pain  Pain location:  RUQ  Pain quality: sharp    Pain radiates to:  Does not radiate  Pain severity:  Moderate  Duration:  8 hours  Timing:  Constant  Chronicity:  New  Relieved by:  Nothing  Worsened by:  Movement, deep breathing and eating  Associated symptoms: no chest pain, no chills, no cough, no diarrhea, no dysuria, no fatigue, no fever, no nausea, no shortness of breath, no sore throat and no vomiting        Review of Systems   Constitutional: Positive for activity change and appetite change. Negative for chills, diaphoresis, fatigue and fever.   HENT: Negative for congestion, ear discharge, ear pain, nosebleeds, rhinorrhea, sinus pressure, sore throat and trouble swallowing.    Eyes: Negative for discharge and redness.   Respiratory: Negative for apnea, cough, chest tightness, shortness of breath and wheezing.    Cardiovascular: Negative for chest pain.   Gastrointestinal: Positive for abdominal pain. Negative for diarrhea, nausea and vomiting.   Endocrine: Negative for polyuria.   Genitourinary: Negative for dysuria, frequency and urgency.   Musculoskeletal: Negative for myalgias and neck pain.   Skin: Negative for color change and rash.   Allergic/Immunologic: Negative for immunocompromised state.   Neurological: Negative for dizziness, seizures, syncope, weakness, light-headedness and headaches.   Hematological: Negative for adenopathy. Does not bruise/bleed easily.   Psychiatric/Behavioral: Negative for behavioral problems and confusion.   All other systems reviewed and are negative.      Past Medical History:   Diagnosis Date   • Anxiety    • Depression    • Encounter for gynecological examination    • Malignant neoplasm of female breast (HCC)     hx of dcis s/p mastectomy and reconstruction and augmentation      • Primary  malignant neoplasm of breast (HCC)     dcis in rt breast s/p mastectomy,reconstruction      • Ventricular premature beats        Allergies   Allergen Reactions   • Percocet [Oxycodone-Acetaminophen] Nausea And Vomiting       Past Surgical History:   Procedure Laterality Date   • AUGMENTATION MAMMAPLASTY     • AXILLARY LYMPH NODE BIOPSY/EXCISION Right 7/10/2017    Procedure: BIOSPY RIGHT AXILLARY MASS AND OR LYMPH;  Surgeon: Sourav Ernst MD;  Location: [facility];  Service:    • BREAST BIOPSY  12/07/2007    Mammotome biopsy of the right side with clip placement   • BREAST LUMPECTOMY     • BREAST RECONSTRUCTION  10/28/2008    Abscence of right breast secondary to mastectomy. Implant exchange for reconstruction of right breast with open para-prosthetic capsulotomy   • BREAST SURGERY  10/01/2009    Left breast ptosis and breast asymmetry secondary to right breast reconstruction for breast cancer. Left breast mastopexy with augmentation.   • CARDIAC CATHETERIZATION  09/18/2008    Normal coronary arteriography. Normal left ventricular angiogram   • EP STUDY     • MASTECTOMY Right    • MASTECTOMY  02/05/2008    Multifocal right breast ductal carcinoma-in-situ. Right total mastectomy   • MASTECTOMY, PARTIAL  01/03/2008    Ductal carcinoma in-situ of the right breast, proven by mammotome biopsy. Right lumpectomy, medial portion of the breast, between 2 o'clock and 4 o'clock, 5 cm from the nipple, with clip placement, radiographic inter. of severo. specimen, & ultrasound   • PAP SMEAR  03/03/2009    Negative   • HI INSJ TUNNELED CVC W/O SUBQ PORT/ AGE 3 YR/> Right 7/10/2017    Procedure: MEDIPORT     (c-arm#2);  Surgeon: Sourav Ernst MD;  Location: [facility];  Service: General       Family History   Problem Relation Age of Onset   • Anemia Mother    • Multiple sclerosis Mother    • No Known Problems Father    • Diabetes Other    • Multiple sclerosis Other        Social History     Socioeconomic History   • Marital  status:    Tobacco Use   • Smoking status: Never Smoker   • Smokeless tobacco: Never Used   Substance and Sexual Activity   • Alcohol use: No   • Drug use: No   • Sexual activity: Defer           Objective   Physical Exam  Vitals and nursing note reviewed.   Constitutional:       Appearance: She is well-developed.   HENT:      Head: Normocephalic and atraumatic.      Nose: Nose normal.   Eyes:      General: No scleral icterus.        Right eye: No discharge.         Left eye: No discharge.      Conjunctiva/sclera: Conjunctivae normal.      Pupils: Pupils are equal, round, and reactive to light.   Neck:      Trachea: No tracheal deviation.   Cardiovascular:      Rate and Rhythm: Normal rate and regular rhythm.      Heart sounds: Normal heart sounds. No murmur heard.  Pulmonary:      Effort: Pulmonary effort is normal. No respiratory distress.      Breath sounds: Normal breath sounds. No stridor. No wheezing or rales.   Abdominal:      General: Bowel sounds are normal. There is no distension.      Palpations: Abdomen is soft. There is no mass.      Tenderness: There is abdominal tenderness in the right upper quadrant. There is no guarding or rebound.   Musculoskeletal:      Cervical back: Normal range of motion and neck supple.   Skin:     General: Skin is warm and dry.      Findings: No erythema or rash.   Neurological:      Mental Status: She is alert and oriented to person, place, and time.      Coordination: Coordination normal.   Psychiatric:         Behavior: Behavior normal.         Thought Content: Thought content normal.         Procedures          ED Course                   Labs Reviewed   COMPREHENSIVE METABOLIC PANEL - Abnormal; Notable for the following components:       Result Value    Glucose 103 (*)     Sodium 133 (*)     CO2 21.0 (*)     Calcium 8.5 (*)     ALT (SGPT) 45 (*)     AST (SGOT) 43 (*)     Alkaline Phosphatase 143 (*)     All other components within normal limits    Narrative:      GFR Normal >60  Chronic Kidney Disease <60  Kidney Failure <15     URINALYSIS W/ CULTURE IF INDICATED - Abnormal; Notable for the following components:    Leuk Esterase, UA Small (1+) (*)     All other components within normal limits   PROTIME-INR - Abnormal; Notable for the following components:    Protime 22.1 (*)     INR 1.98 (*)     All other components within normal limits    Narrative:     Therapeutic range for most indications is 2.0-3.0 INR,  or 2.5-3.5 for mechanical heart valves.   CBC WITH AUTO DIFFERENTIAL - Abnormal; Notable for the following components:    WBC 12.18 (*)     RDW 20.8 (*)     RDW-SD 64.5 (*)     Neutrophil % 78.1 (*)     Lymphocyte % 9.4 (*)     Eosinophil % 0.2 (*)     Immature Grans % 1.2 (*)     Neutrophils, Absolute 9.50 (*)     Monocytes, Absolute 1.32 (*)     Immature Grans, Absolute 0.15 (*)     All other components within normal limits   URINALYSIS, MICROSCOPIC ONLY - Abnormal; Notable for the following components:    RBC, UA 0-2 (*)     WBC, UA 6-12 (*)     All other components within normal limits   LIPASE - Normal   CK - Normal   MAGNESIUM - Normal   URINE CULTURE   BLOOD CULTURE   BLOOD CULTURE   LACTIC ACID, PLASMA   POCT PROTIME - INR   CBC AND DIFFERENTIAL    Narrative:     The following orders were created for panel order CBC & Differential.  Procedure                               Abnormality         Status                     ---------                               -----------         ------                     CBC Auto Differential[[id number]]        Abnormal            Final result                 Please view results for these tests on the individual orders.   EXTRA TUBES    Narrative:     The following orders were created for panel order Extra Tubes.  Procedure                               Abnormality         Status                     ---------                               -----------         ------                     Barbosa Top[[id number]]                                          In process                   Please view results for these tests on the individual orders.   GRAY TOP       US Gallbladder   Final Result   1.  Gallstones with gallbladder wall thickening and positive   sonographic Mir's sign. Follow-up HIDA scan could be performed   if indicated. CBD dilated at 10.5 mm.   2.  Liver masses, the largest measuring approximately 5.4 cm.   These were better visualized on previous CT.      Electronically signed by:  Jame Matta DO  3/12/2022 10:32 PM [facility]   Workstation: [id number]                                             [facility]    Final diagnoses:   Cholecystitis, acute       ED Disposition  ED Disposition     ED Disposition   Decision to Admit    Condition   --    Comment   --             No follow-up provider specified.       Medication List      No changes were made to your prescriptions during this visit.          Babak Buckner MD  03/13/22 0034      Electronically signed by Babak Buckner MD at 03/13/22 0034     Nevin Kaiser, RN at 03/13/22 0321        Checked on covid lab     Electronically signed by Nevin Kaiser RN at 03/13/22 0321         Lab Results (last 24 hours)     Procedure Component Value Units Date/Time    Comprehensive Metabolic Panel [[id number]]  (Abnormal) Collected: 03/15/22 0558    Specimen: Blood Updated: 03/15/22 0709     Glucose 84 mg/dL      BUN 7 mg/dL      Creatinine 0.81 mg/dL      Sodium 136 mmol/L      Potassium 3.6 mmol/L      Chloride 103 mmol/L      CO2 25.0 mmol/L      Calcium 8.6 mg/dL      Total Protein 6.3 g/dL      Albumin 3.60 g/dL      ALT (SGPT) 37 U/L      AST (SGOT) 47 U/L      Alkaline Phosphatase 116 U/L      Total Bilirubin 0.6 mg/dL      Globulin 2.7 gm/dL      A/G Ratio 1.3 g/dL      BUN/Creatinine Ratio 8.6     Anion Gap 8.0 mmol/L      eGFR 86.9 mL/min/1.73      Comment: National Kidney Foundation and American Society of Nephrology (ASN) Task Force recommended calculation based on the Chronic Kidney Disease  Epidemiology Collaboration (CKD-EPI) equation refit without adjustment for race.       Narrative:      GFR Normal >60  Chronic Kidney Disease <60  Kidney Failure <15      Protime-INR [[id number]]  (Abnormal) Collected: 03/15/22 0558    Specimen: Blood Updated: 03/15/22 0702     Protime 20.4 Seconds      INR 1.79    Narrative:      Therapeutic range for most indications is 2.0-3.0 INR,  or 2.5-3.5 for mechanical heart valves.    CBC & Differential [[id number]]  (Abnormal) Collected: 03/15/22 0558    Specimen: Blood Updated: 03/15/22 0651    Narrative:      The following orders were created for panel order CBC & Differential.  Procedure                               Abnormality         Status                     ---------                               -----------         ------                     CBC Auto Differential[[id number]]        Abnormal            Final result                 Please view results for these tests on the individual orders.    CBC Auto Differential [[id number]]  (Abnormal) Collected: 03/15/22 0558    Specimen: Blood Updated: 03/15/22 0651     WBC 6.22 10*3/mm3      RBC 4.03 10*6/mm3      Hemoglobin 12.0 g/dL      Hematocrit 35.9 %      MCV 89.1 fL      MCH 29.8 pg      MCHC 33.4 g/dL      RDW 20.7 %      RDW-SD 65.5 fl      MPV 9.6 fL      Platelets 250 10*3/mm3      Neutrophil % 66.8 %      Lymphocyte % 16.1 %      Monocyte % 13.2 %      Eosinophil % 2.6 %      Basophil % 1.0 %      Immature Grans % 0.3 %      Neutrophils, Absolute 4.16 10*3/mm3      Lymphocytes, Absolute 1.00 10*3/mm3      Monocytes, Absolute 0.82 10*3/mm3      Eosinophils, Absolute 0.16 10*3/mm3      Basophils, Absolute 0.06 10*3/mm3      Immature Grans, Absolute 0.02 10*3/mm3      nRBC 0.0 /100 WBC     Blood Culture - Blood, Arm, Left [[id number]]  (Normal) Collected: 03/13/22 0053    Specimen: Blood from Arm, Left Updated: 03/15/22 0215     Blood Culture No growth at 2 days    Blood Culture - Blood, Arm, Left [[id number]]   (Normal) Collected: 03/13/22 0053    Specimen: Blood from Arm, Left Updated: 03/15/22 0215     Blood Culture No growth at 2 days        Imaging Results (Last 24 Hours)     Procedure Component Value Units Date/Time    CT Chest With Contrast Diagnostic [[id number]] Collected: 03/14/22 1653     Updated: 03/15/22 0003    Narrative:      CT CHEST WITH IV CONTRAST, CT ABDOMEN PELVIS WITH IV CONTRAST    INDICATION: 51 years Female; Metastatic breast cancer with liver,  bone and skin metastases involving right axillary area, new  worsening right upper quadrant pain and shortness of breath,  restaging, K81.0 Acute cholecystitis    TECHNIQUE:  CT scan of the chest abdomen and pelvis was performed  with IV contrast.  This exam was performed according to our  departmental dose-optimization program, which includes automated  exposure control, adjustment of the mA and/or kV according to  patient size and/or use of iterative reconstruction technique.     Comparison: CT 1/12/2022, ultrasound 3/12/2022, HIDA scan  3/13/2022.    FINDINGS:  Thyroid: Unremarkable.   Heart/Mediastinum: No mediastinal or hilar lymphadenopathy.   Vasculature: No aortic aneurysm or dissection. No evidence of  pulmonary emboli.   Lungs/Pleura: Interval enlargement of the bilateral pulmonary  nodules. The right upper lobe nodule in the measures 1.2 cm  (previously 0.9 cm). The left lower lobe nodule measures 1 cm  (previously 0.8 cm). No pleural effusion or pneumothorax. No  acute airspace opacities.   Liver: Multiple hypodense masses in the liver have increased in  size. For example, the mass in the periphery of the right hepatic  lobe segment 5 now measures 5.9 cm (previously 4.6 cm. The  coarsely calcified mass in the left hepatic lobe measuring 5.6 cm  is unchanged.  Gallbladder/Biliary tree: No gallbladder wall thickening or  pericholecystic fluid/edema. No biliary dilatation.  Pancreas: Unremarkable.  Spleen: Unremarkable.  Adrenals:  Unremarkable.  Genitourinary: No hydronephrosis or nephrolithiasis. No bladder  wall thickening. The uterus and bilateral adnexa are grossly  unremarkable.  Gastrointestinal: No bowel wall thickening or obstruction. Normal  appendix. No free air or free fluid.  Peritoneum: Unremarkable.  Lymph nodes: No enlarged lymph nodes in the abdomen and pelvis.  Bones: Innumerable sclerotic lesions scattered throughout the  visualized bones, including spine, ribs, sternum, clavicles,  scapula, bilateral humeral heads, pelvis, bilateral femurs appear  grossly unchanged. Pathologic fracture at L4 with vertebral plana  deformity is unchanged. Pathologic fracture at T12 is unchanged.  Soft tissues: Status post right mastectomy with reconstruction.  Similar appearance of the right breast tissue expander. No  significant change in multiple subcutaneous and cutaneous nodules  around the right axilla. Similar appearance of an irregular  nodule measuring 2.8 cm in the right axilla extending to the  skin.   Incidental findings: The tip of the right Mediport is at the  cavoatrial junction.       Impression:      1.  No evidence of cholecystitis or biliary dilatation.  2.  Interval progression of disease as indicated by increased  size of the bilateral pulmonary nodules and hepatic metastases.  3.  No significant change in multiple subcutaneous and cutaneous  skin metastases in the right axilla.  4.  No significant change in the innumerable sclerotic metastases  throughout the visualized bones. Pathologic fractures at T12, L4,  are unchanged.    Electronically signed by:  Nayeli Finch  3/15/2022 12:01 AM CDT  Workstation: [id number]    CT Abdomen Pelvis With Contrast [[id number]] Collected: 03/14/22 1653     Updated: 03/15/22 0003    Narrative:      CT CHEST WITH IV CONTRAST, CT ABDOMEN PELVIS WITH IV CONTRAST    INDICATION: 51 years Female; Metastatic breast cancer with liver,  bone and skin metastases involving right axillary area,  new  worsening right upper quadrant pain and shortness of breath,  restaging, K81.0 Acute cholecystitis    TECHNIQUE:  CT scan of the chest abdomen and pelvis was performed  with IV contrast.  This exam was performed according to our  departmental dose-optimization program, which includes automated  exposure control, adjustment of the mA and/or kV according to  patient size and/or use of iterative reconstruction technique.     Comparison: CT 1/12/2022, ultrasound 3/12/2022, HIDA scan  3/13/2022.    FINDINGS:  Thyroid: Unremarkable.   Heart/Mediastinum: No mediastinal or hilar lymphadenopathy.   Vasculature: No aortic aneurysm or dissection. No evidence of  pulmonary emboli.   Lungs/Pleura: Interval enlargement of the bilateral pulmonary  nodules. The right upper lobe nodule in the measures 1.2 cm  (previously 0.9 cm). The left lower lobe nodule measures 1 cm  (previously 0.8 cm). No pleural effusion or pneumothorax. No  acute airspace opacities.   Liver: Multiple hypodense masses in the liver have increased in  size. For example, the mass in the periphery of the right hepatic  lobe segment 5 now measures 5.9 cm (previously 4.6 cm. The  coarsely calcified mass in the left hepatic lobe measuring 5.6 cm  is unchanged.  Gallbladder/Biliary tree: No gallbladder wall thickening or  pericholecystic fluid/edema. No biliary dilatation.  Pancreas: Unremarkable.  Spleen: Unremarkable.  Adrenals: Unremarkable.  Genitourinary: No hydronephrosis or nephrolithiasis. No bladder  wall thickening. The uterus and bilateral adnexa are grossly  unremarkable.  Gastrointestinal: No bowel wall thickening or obstruction. Normal  appendix. No free air or free fluid.  Peritoneum: Unremarkable.  Lymph nodes: No enlarged lymph nodes in the abdomen and pelvis.  Bones: Innumerable sclerotic lesions scattered throughout the  visualized bones, including spine, ribs, sternum, clavicles,  scapula, bilateral humeral heads, pelvis, bilateral femurs  appear  grossly unchanged. Pathologic fracture at L4 with vertebral plana  deformity is unchanged. Pathologic fracture at T12 is unchanged.  Soft tissues: Status post right mastectomy with reconstruction.  Similar appearance of the right breast tissue expander. No  significant change in multiple subcutaneous and cutaneous nodules  around the right axilla. Similar appearance of an irregular  nodule measuring 2.8 cm in the right axilla extending to the  skin.   Incidental findings: The tip of the right Mediport is at the  cavoatrial junction.       Impression:      1.  No evidence of cholecystitis or biliary dilatation.  2.  Interval progression of disease as indicated by increased  size of the bilateral pulmonary nodules and hepatic metastases.  3.  No significant change in multiple subcutaneous and cutaneous  skin metastases in the right axilla.  4.  No significant change in the innumerable sclerotic metastases  throughout the visualized bones. Pathologic fractures at T12, L4,  are unchanged.    Electronically signed by:  Nayeli Finch  3/15/2022 12:01 AM CDT  Workstation: [id number]    NM HIDA SCAN WITHOUT PHARMACOLOGICAL INTERVENTION [[id number]] Resulted: 03/14/22 1422     Updated: 03/14/22 1442    Addenda:         ADDENDUM   ADDENDUM #1       Additional imaging was obtained and demonstrates an ejection  fraction of 64% following administration of a fatty meal. This is  within normal limits.    Electronically signed by:  Taye Holt MD  3/14/2022 2:22 PM CDT  Workstation: [id number]    Signed: 03/14/22 1422 by Taye Holt MD    Narrative:      EXAMINATION:  NM HIDA SCAN WITH PHARMACOLOGICAL INTERVENTION    CLINICAL HISTORY:  51 years Female,Abdominal pain, biliary  obstruction suspected (Ped 0-18y), K81.0 Acute cholecystitis    COMPARISON:  Gallbladder ultrasound dated 3/12/2022, CT of the  abdomen and pelvis dated 1/12/2022    FINDINGS:    Sequential planar imaging obtained following intravenous  administration  of 6.4 mCi technetium 99m Choletec. There is  heterogeneous activity throughout the liver, corresponding to the  known multifocal hepatic masses that were demonstrated on the  recent CT and ultrasound. On images labeled 30 minutes and 60  minutes, the gallbladder is visualized. Additionally, there is  visualization of the common bile duct as well as of the small  bowel.      Impression:      1. No evidence for common bile duct or cystic duct obstruction.  2. Heterogeneous radiotracer accumulation throughout the liver,  corresponding to the known hepatic masses.    Electronically signed by:  Taye Holt MD  3/14/2022 11:22 AM  CDT Workstation: [id number]    NM HIDA SCAN WITH PHARMACOLOGICAL INTERVENTION [[id number]] Collected: 03/14/22 0953     Updated: 03/14/22 1435        ECG/EMG Results (last 24 hours)     Procedure Component Value Units Date/Time    SCANNED - TELEMETRY   [[id number]] Resulted: 03/12/22     Updated: 03/15/22 0756    SCANNED - TELEMETRY   [[id number]] Resulted: 03/12/22     Updated: 03/15/22 0801    SCANNED - TELEMETRY   [[id number]] Resulted: 03/12/22     Updated: 03/15/22 0810    SCANNED - TELEMETRY   [[id number]] Resulted: 03/12/22     Updated: 03/15/22 0826    SCANNED - TELEMETRY   [[id number]] Resulted: 03/12/22     Updated: 03/15/22 0830             Physician Progress Notes (last 24 hours)      Ethan Cruz MD at 03/14/22 1645              [facility] Medicine Services  INPATIENT PROGRESS NOTE    Length of Stay: 1  Date of Admission: 3/12/2022  Primary Care Physician: Teresa Lyn MD    Subjective   Chief Complaint: abdominal pain    HPI: This is a 51-year-old female with concurrent medical history of metastatic breast cancer presenting to the hospital with abdominal pain.  The pain started this morning and was moderate in intensity mostly right upper quadrant pain.  She was also having associated nausea.  She came to the ER and was found to have  cholecystitis on further imaging.  She is also on chemotherapy for breast cancer and she also takes Coumadin for jugular port clot.  Her last chemotherapy dose was today.    Review of Systems   Constitutional: Negative.    HENT: Negative.    Eyes: Negative.    Respiratory: Negative.    Cardiovascular: Negative.    Gastrointestinal: Positive for abdominal pain.   Endocrine: Negative.    Genitourinary: Negative.    Musculoskeletal: Negative.    Skin: Negative.    Neurological: Negative.    Hematological: Negative.         All pertinent negatives and positives are as above. All other systems have been reviewed and are negative unless otherwise stated.     Prior to Admission medications    Medication Sig Start Date End Date Taking? Authorizing Provider   capecitabine (XELODA) 500 MG chemo tablet TAKE 4 TABLETS BY MOUTH IN THE MORNING AND 4 TABLETS IN THE EVENING ON DAYS 1 TO 14 , THEN 7 DAYS OFF. 2/10/22  Yes Ovidio Meadows MD   clindamycin (Clindagel) 1 % gel Apply 1 application topically to the appropriate area as directed 2 (Two) Times a Day.  Patient taking differently: Apply 1 application topically to the appropriate area as directed 2 (Two) Times a Day. Apply under arm twice a day 2/16/22  Yes Ovidio Meadows MD   ondansetron (ZOFRAN) 4 MG tablet Take 1 tablet by mouth 4 (Four) Times a Day As Needed for Nausea or Vomiting. 5/25/21  Yes Ovidio Meadows MD   ondansetron (ZOFRAN) 8 MG tablet Take 0.5 tablets by mouth 4 (Four) Times a Day As Needed for Nausea or Vomiting. 1/13/22  Yes Ovidio Meadows MD   sertraline (ZOLOFT) 100 MG tablet Take 1 tablet by mouth Daily.   Yes Maki Willett MD   traMADol (ULTRAM) 50 MG tablet Take 1 tablet by mouth Every 8 (Eight) Hours As Needed for Moderate Pain . 2/16/22  Yes Ovidio Meadows MD   gabapentin (NEURONTIN) 300 MG capsule Take 1 capsule by mouth 3 (Three) Times a Day.  Patient taking differently: Take 300 mg by mouth 3 (Three) Times a Day As Needed. 2/16/22   Dori  Ovidio COLLIER MD   promethazine (PHENERGAN) 12.5 MG tablet Take 1 tablet by mouth Every 6 (Six) Hours As Needed for Nausea or Vomiting. 7/29/21   April Greco APRN   Scopolamine (Transderm-Scop, 1.5 MG,) 1.5 MG/3DAYS patch Place 1 patch on the skin as directed by provider Every 72 (Seventy-Two) Hours.  Patient taking differently: Place 1 patch on the skin as directed by provider As Needed. 5/25/21   Ovidio Meadows MD   warfarin (COUMADIN) 5 MG tablet TAKE 1 TABLET BY MOUTH NIGHTLY OR AS DIRECTED PER COUMADIN CLINIC  Patient taking differently: TAKE 2.5 mg tablet 5 times a week and 1.25 mg tablet 2 days a week 1/24/22   Quang Coe APRN       piperacillin-tazobactam, 3.375 g, Intravenous, Q8H  sodium chloride, 10 mL, Intravenous, Q12H      lactated ringers, 100 mL/hr, Last Rate: 100 mL/hr (03/14/22 1618)        Objective    Temp:  [97 °F (36.1 °C)-97.8 °F (36.6 °C)] 97.3 °F (36.3 °C)  Heart Rate:  [69-99] 69  Resp:  [16] 16  BP: (102-125)/(63-68) 102/68    Physical Exam  Constitutional:       Appearance: Normal appearance. She is obese.   HENT:      Head: Atraumatic.      Mouth/Throat:      Mouth: Mucous membranes are moist.   Eyes:      Extraocular Movements: Extraocular movements intact.   Cardiovascular:      Rate and Rhythm: Regular rhythm.      Heart sounds: Normal heart sounds.   Pulmonary:      Effort: Pulmonary effort is normal.      Breath sounds: Normal breath sounds.   Abdominal:      General: Bowel sounds are normal.      Palpations: Abdomen is soft.      Tenderness: There is abdominal tenderness.   Musculoskeletal:         General: Normal range of motion.      Cervical back: Normal range of motion and neck supple.   Skin:     General: Skin is warm and dry.   Neurological:      General: No focal deficit present.      Mental Status: She is alert. Mental status is at baseline.   Psychiatric:         Mood and Affect: Mood normal.             Results Review:  I have reviewed the labs, radiology results,  and diagnostic studies.    Laboratory Data:   Results from last 7 days   Lab Units 03/14/22  0457 03/13/22  0731 03/12/22 2217 03/09/22  0826   SODIUM mmol/L 141 142 133* 140   POTASSIUM mmol/L 4.5 4.0 3.8 3.7   CHLORIDE mmol/L 107 109* 100 107   CO2 mmol/L 26.0 25.0 21.0* 22.0   BUN mg/dL 11 13 14 10   CREATININE mg/dL 0.77 0.78 0.83 0.87   GLUCOSE mg/dL 94 94 103* 111*   CALCIUM mg/dL 8.7 8.4* 8.5* 8.8   BILIRUBIN mg/dL 0.7  --  0.6 0.4   ALK PHOS U/L 117  --  143* 147*   ALT (SGPT) U/L 33  --  45* 46*   AST (SGOT) U/L 39*  --  43* 51*   ANION GAP mmol/L 8.0 8.0 12.0 11.0     Estimated Creatinine Clearance: 103.2 mL/min (by C-G formula based on SCr of 0.77 mg/dL).  Results from last 7 days   Lab Units 03/14/22  0457 03/13/22  0731 03/12/22 2217   MAGNESIUM mg/dL 2.3 2.2 2.0         Results from last 7 days   Lab Units 03/14/22  0457 03/13/22  0731 03/12/22 2217 03/09/22  0826   WBC 10*3/mm3 7.41 7.87 12.18* 5.05   HEMOGLOBIN g/dL 11.7* 11.8* 12.4 12.9   HEMATOCRIT % 35.4 35.4 37.1 38.1   PLATELETS 10*3/mm3 215 208 256 253     Results from last 7 days   Lab Units 03/14/22  0749 03/12/22 2217 03/09/22  0000   INR  2.05* 1.98* 3.10*       Culture Data:   Blood Culture   Date Value Ref Range Status   03/13/2022 No growth at 24 hours  Preliminary   03/13/2022 No growth at 24 hours  Preliminary     Urine Culture   Date Value Ref Range Status   03/12/2022 No growth  Final     No results found for: RESPCX  No results found for: WOUNDCX  No results found for: STOOLCX  No components found for: BODYFLD    Radiology Data:   Imaging Results (Last 24 Hours)     Procedure Component Value Units Date/Time    NM HIDA SCAN WITHOUT PHARMACOLOGICAL INTERVENTION [[id number]] Resulted: 03/14/22 1422     Updated: 03/14/22 1442    Addenda:         ADDENDUM   ADDENDUM #1       Additional imaging was obtained and demonstrates an ejection  fraction of 64% following administration of a fatty meal. This is  within normal limits.     Electronically signed by:  Taye Holt MD  3/14/2022 2:22 PM CDT  Workstation: [id number]    Signed: 03/14/22 1422 by Taye Holt MD    Narrative:      EXAMINATION:  NM HIDA SCAN WITH PHARMACOLOGICAL INTERVENTION    CLINICAL HISTORY:  51 years Female,Abdominal pain, biliary  obstruction suspected (Ped 0-18y), K81.0 Acute cholecystitis    COMPARISON:  Gallbladder ultrasound dated 3/12/2022, CT of the  abdomen and pelvis dated 1/12/2022    FINDINGS:    Sequential planar imaging obtained following intravenous  administration of 6.4 mCi technetium 99m Choletec. There is  heterogeneous activity throughout the liver, corresponding to the  known multifocal hepatic masses that were demonstrated on the  recent CT and ultrasound. On images labeled 30 minutes and 60  minutes, the gallbladder is visualized. Additionally, there is  visualization of the common bile duct as well as of the small  bowel.      Impression:      1. No evidence for common bile duct or cystic duct obstruction.  2. Heterogeneous radiotracer accumulation throughout the liver,  corresponding to the known hepatic masses.    Electronically signed by:  Taye Hotl MD  3/14/2022 11:22 AM  CDT Workstation: [id number]    NM HIDA SCAN WITH PHARMACOLOGICAL INTERVENTION [[id number]] Collected: 03/14/22 0953     Updated: 03/14/22 1435          I have reviewed the patient's current medications.     Assessment/Plan     Acute abdominal pain     US consistent with cholecystitis and HIDA scan negative for it     Oncologist schedule a CT abdomen and pelvis with contrast tonight     Continue with pain medication    Metastatic breast cancer     With liver mass; defer to oncologist    Right jugular vein DVT     Follow oncologist recommendations; keep holding coumadin     INR today 2.02    Chronic medical problems     Cholelithiasis     Chronic recurrent depression     Peripheral neuropathy    Discharge Planning: I expect patient to be discharged to home in 2 to 3  days.    Ethan Cruz MD    Electronically signed by Ethan Cruz MD at 03/14/22 1801     Ovidio Meadows MD at 03/14/22 1603                HISTORY OF PRESENT ILLNESS:    No acute overnight events.  States her abdominal pain is somewhat improved.  Had a HIDA scan earlier today.  Awaiting final recommendation from surgery team.  No fever.  States intravenous morphine at 2 mg is helping her with pain.  Has not required any more morphine since 4 AM.  Still having abdominal pain with deep breaths      PAST MEDICAL HISTORY:    Past Medical History:   Diagnosis Date   • Anxiety    • Depression    • Encounter for gynecological examination    • Malignant neoplasm of female breast (HCC)     hx of dcis s/p mastectomy and reconstruction and augmentation      • Primary malignant neoplasm of breast (HCC)     dcis in rt breast s/p mastectomy,reconstruction      • Ventricular premature beats        SOCIAL HISTORY:    Social History     Tobacco Use   • Smoking status: Never Smoker   • Smokeless tobacco: Never Used   Substance Use Topics   • Alcohol use: No   • Drug use: No       Surgical History :  Past Surgical History:   Procedure Laterality Date   • AUGMENTATION MAMMAPLASTY     • AXILLARY LYMPH NODE BIOPSY/EXCISION Right 7/10/2017    Procedure: BIOSPY RIGHT AXILLARY MASS AND OR LYMPH;  Surgeon: Sourav Ernst MD;  Location: [facility];  Service:    • BREAST BIOPSY  12/07/2007    Mammotome biopsy of the right side with clip placement   • BREAST LUMPECTOMY     • BREAST RECONSTRUCTION  10/28/2008    Abscence of right breast secondary to mastectomy. Implant exchange for reconstruction of right breast with open para-prosthetic capsulotomy   • BREAST SURGERY  10/01/2009    Left breast ptosis and breast asymmetry secondary to right breast reconstruction for breast cancer. Left breast mastopexy with augmentation.   • CARDIAC CATHETERIZATION  09/18/2008    Normal coronary arteriography. Normal left ventricular angiogram   •  EP STUDY     • MASTECTOMY Right    • MASTECTOMY  02/05/2008    Multifocal right breast ductal carcinoma-in-situ. Right total mastectomy   • MASTECTOMY, PARTIAL  01/03/2008    Ductal carcinoma in-situ of the right breast, proven by mammotome biopsy. Right lumpectomy, medial portion of the breast, between 2 o'clock and 4 o'clock, 5 cm from the nipple, with clip placement, radiographic inter. of severo. specimen, & ultrasound   • PAP SMEAR  03/03/2009    Negative   • TN INSJ TUNNELED CVC W/O SUBQ PORT/ AGE 5 YR/> Right 7/10/2017    Procedure: MEDIPORT     (c-arm#2);  Surgeon: Sourav Ernst MD;  Location: [facility] OR;  Service: General       ALLERGIES:    Allergies   Allergen Reactions   • Percocet [Oxycodone-Acetaminophen] Nausea And Vomiting       FAMILY HISTORY:  Family History   Problem Relation Age of Onset   • Anemia Mother    • Multiple sclerosis Mother    • No Known Problems Father    • Diabetes Other    • Multiple sclerosis Other        REVIEW OF SYSTEMS:      CONSTITUTIONAL:  Complains of fatigue.  Positive for weight loss.  Denies any fever, chills .     EYES: No visual disturbances. No discharge. No new lesion.    ENMT: No epistaxis, mouth sores or difficulty swallowing.    RESPIRATORY: Positive for shortness of breath. No new cough or hemoptysis.    CARDIOVASCULAR: No chest pain or palpitations.    GASTROINTESTINAL: Positive for right upper quadrant abdominal pain.  Positive for nausea without vomiting.  No blood in stool.    GENITOURINARY: No dysuria or hematuria.    MUSCULOSKELETAL: Positive for chronic back pain and hip pain.    LYMPHATICS: Positive for right axillary adenopathy.    NEUROLOGICAL: Positive for chronic neuropathy affecting upper and lower extremity. No headache or dizziness. No seizures or balance problems.    SKIN: Positive for skin metastases involving right axillary area    ENDOCRINE: No new hot or cold intolerance. No new polyuria. No polydipsia.      PHYSICAL EXAMINATION:      VITAL  "SIGNS: /68 (BP Location: Left arm, Patient Position: Sitting)   Pulse 72   Temp 97.3 °F (36.3 °C) (Temporal)   Resp 16   Ht 170.2 cm (67\")   Wt 101 kg (222 lb)   LMP  (LMP Unknown)   SpO2 91%   BMI 34.77 kg/m²       03/12/22 2051 03/13/22  0353 03/14/22  0342   Weight: 102 kg (225 lb) 102 kg (225 lb 3.2 oz) 101 kg (222 lb)           CONSTITUTIONAL: Appears uncomfortable    EYES: Mild conjunctival pallor. No icterus. No pterygium. Extraocular movements intact. No ptosis.    ENMT: Normocephalic, atraumatic. No facial asymmetry noted.    NECK: No adenopathy. Trachea midline. No JVD.    RESPIRATORY: Decreased air entry at bilateral bases. No rhonchi or wheezing. Fair respiratory effort.    CARDIOVASCULAR: S1, S2. Regular rate and rhythm. No murmur or gallop appreciated.    ABDOMEN: Soft, obese, and upper quadrant tenderness present. Bowel sounds present in all four quadrants.  No hepatosplenomegaly appreciated.    MUSCULOSKELETAL: No edema. No calf tenderness. Decreased range of motion.    NEUROLOGIC: No motor  deficit appreciated. Cranial nerves II-XII grossly intact.    SKIN: No new skin lesion identified. Skin is warm and moist to touch.    LYMPHATICS: No new enlarged lymph nodes in neck or supraclavicular area.    PSYCHIATRY: Alert, awake and oriented ×3. Normal affect.  Normal judgment.  Makes good eye contact.      DIAGNOSTIC DATA:    Glucose   Date Value Ref Range Status   03/14/2022 94 65 - 99 mg/dL Final     Sodium   Date Value Ref Range Status   03/14/2022 141 136 - 145 mmol/L Final     Potassium   Date Value Ref Range Status   03/14/2022 4.5 3.5 - 5.2 mmol/L Final     CO2   Date Value Ref Range Status   03/14/2022 26.0 22.0 - 29.0 mmol/L Final     Chloride   Date Value Ref Range Status   03/14/2022 107 98 - 107 mmol/L Final     Anion Gap   Date Value Ref Range Status   03/14/2022 8.0 5.0 - 15.0 mmol/L Final     Creatinine   Date Value Ref Range Status   03/14/2022 0.77 0.57 - 1.00 mg/dL Final "     BUN   Date Value Ref Range Status   03/14/2022 11 6 - 20 mg/dL Final     BUN/Creatinine Ratio   Date Value Ref Range Status   03/14/2022 14.3 7.0 - 25.0 Final     Calcium   Date Value Ref Range Status   03/14/2022 8.7 8.6 - 10.5 mg/dL Final     Alkaline Phosphatase   Date Value Ref Range Status   03/14/2022 117 39 - 117 U/L Final     Total Protein   Date Value Ref Range Status   03/14/2022 6.1 6.0 - 8.5 g/dL Final     ALT (SGPT)   Date Value Ref Range Status   03/14/2022 33 1 - 33 U/L Final     AST (SGOT)   Date Value Ref Range Status   03/14/2022 39 (H) 1 - 32 U/L Final     Total Bilirubin   Date Value Ref Range Status   03/14/2022 0.7 0.0 - 1.2 mg/dL Final     Albumin   Date Value Ref Range Status   03/14/2022 3.70 3.50 - 5.20 g/dL Final     Globulin   Date Value Ref Range Status   03/12/2022 2.9 gm/dL Final     Lab Results   Component Value Date    WBC 7.41 03/14/2022    HGB 11.7 (L) 03/14/2022    HCT 35.4 03/14/2022    MCV 89.4 03/14/2022     03/14/2022     Lab Results   Component Value Date    NEUTROABS 5.30 03/14/2022     Lab Results   Component Value Date     178.6 (H) 02/16/2022    LABCA2 315.4 (H) 02/16/2022    HCGQUANT 1.16 06/03/2021     Component      Latest Ref Rng & Units 3/12/2022 3/14/2022              Protime      11.1 - 15.3 Seconds 22.1 (H) 22.7 (H)   INR      0.80 - 1.20 1.98 (H) 2.05 (H)           Blood Culture   Date Value Ref Range Status   03/13/2022 No growth at 24 hours  Preliminary   03/13/2022 No growth at 24 hours  Preliminary     No results found for: BCIDPCR, CXREFLEX, CSFCX, CULTURETIS  No results found for: CULTURES, HSVCX, URCX  No results found for: EYECULTURE, GCCX, HSVCULTURE, LABHSV  No results found for: LEGIONELLA, MRSACX, MUMPSCX, MYCOPLASCX  No results found for: NOCARDIACX, STOOLCX  Urine Culture   Date Value Ref Range Status   03/12/2022 No growth  Final     No results found for: VIRALCULTU, WOUNDCX            PATHOLOGY:  * Cannot find OR log *      RADIOLOGY DATA :  NM HIDA SCAN WITHOUT PHARMACOLOGICAL INTERVENTION    Addendum Date: 3/14/2022     ADDENDUM ADDENDUM #1 Additional imaging was obtained and demonstrates an ejection fraction of 64% following administration of a fatty meal. This is within normal limits. Electronically signed by:  Taye Holt MD  3/14/2022 2:22 PM CDT Workstation: [id number]     Result Date: 3/14/2022  1. No evidence for common bile duct or cystic duct obstruction. 2. Heterogeneous radiotracer accumulation throughout the liver, corresponding to the known hepatic masses. Electronically signed by:  Taye Holt MD  3/14/2022 11:22 AM CDT Workstation: [id number]    US Gallbladder    Result Date: 3/12/2022  1.  Gallstones with gallbladder wall thickening and positive sonographic Mir's sign. Follow-up HIDA scan could be performed if indicated. CBD dilated at 10.5 mm. 2.  Liver masses, the largest measuring approximately 5.4 cm. These were better visualized on previous CT. Electronically signed by:  Jame Matta DO  3/12/2022 10:32 PM CST Workstation: [id number]      ASSESSMENT AND PLAN:      1.  Right upper quadrant abdominal pain:  -Initial ultrasound done in the emergency room was consistent with cholecystitis.  -Patient has been started on intravenous Zosyn as well as intravenous morphine 2 mg IV every 4 hours as required for pain  -HIDA scan done earlier today was not consistent with cholecystitis.  -Awaiting final recommendation from surgery team.  -Discussed with patient and her  will get a CT of chest abdomen and pelvis with contrast tonight to reevaluate her metastatic disease.  -Currently patient is n.p.o. and remains on intravenous morphine 2 mg every 4 hours.    2.  Metastatic breast cancer with liver, bone and skin metastasis  -Patient was initially diagnosed in July 2017  -Currently on Xeloda.  -We will continue holding Xeloda for now.    3.  Right internal jugular vein DVT  -Remains on Coumadin  -INR is 2.02.   Recommend continue holding Coumadin for now    4.  Bone metastases  -On monthly Zometa as outpatient    5.  History of DCIS s/p surgery    6.  History of melanoma in situ s/p surgery         Ovidio Meadows MD  3/14/2022  16:03 CDT    Part of this note may be an electronic transcription/translation of spoken language to printed text using the Dragon Dictation System.    Electronically signed by Ovidio Meadows MD at 03/14/22 1612       Medical Student Notes (last 24 hours)  Notes from 03/14/22 0908 through 03/15/22 0908   No notes of this type exist for this encounter.         Consult Notes (last 24 hours)  Notes from 03/14/22 0908 through 03/15/22 0908   No notes of this type exist for this encounter.

## 2022-03-16 NOTE — PROGRESS NOTES
"Anticoagulation by Pharmacy - Warfarin    Kylie García is a 51 y.o.female being continued on warfarin for DVT  Home regimen: 1.25 mg daily, except 2.5 mg three days a week  INR Goal: 2-3  Last INR:   Lab Results   Component Value Date    INR 1.67 (H) 03/16/2022       Objective:  [Ht: 170.2 cm (67\"); Wt: 101 kg (222 lb)]  Lab Results   Component Value Date    INR 1.67 (H) 03/16/2022    INR 1.79 (H) 03/15/2022    INR 2.05 (H) 03/14/2022    PROTIME 19.4 (H) 03/16/2022    PROTIME 20.4 (H) 03/15/2022    PROTIME 22.7 (H) 03/14/2022     Lab Results   Component Value Date    HGB 12.2 03/16/2022    HGB 12.0 03/15/2022    HGB 11.7 (L) 03/14/2022    HCT 35.9 03/16/2022    HCT 35.9 03/15/2022    HCT 35.4 03/14/2022     03/16/2022     03/15/2022     03/14/2022       Recent Warfarin Administrations       The 5 most recent administrations since 03/09/2022 are shown below each listed medication.    Warfarin Sodium         Order Dose Date Given     warfarin (COUMADIN) tablet 2.5 mg 2.5 mg 03/15/2022                    Assessment  Interacting medications: No significant interactions noted  INR is subtherapeutic and decreased slightly from yesterday to 1.67, was held first 3 days of admission  H&H WNL  Continue current regimen, anticipate increase in INR tomorrow    Plan:  1.  Give warfarin 2.5 mg tablet PO @ 1800 tonight  2.  Draw a PT/INR in AM  3.  Pharmacy will continue to follow    Gus Alonso, PharmD  03/16/22 09:14 CDT     "

## 2022-03-16 NOTE — DISCHARGE SUMMARY
PHYSICIAN DISCHARGE SUMMARY                                                                       [facility]    Patient Identification:  Name: Kylie García  Age: 51 y.o.  Sex: female  :  1970  MRN: [id number]  Primary Care Physician: Teresa Lyn MD    Admit date: 3/12/2022  Discharge date and time:3/16/2022  Discharged Condition: good    Discharge Diagnoses:  Active Hospital Problems    Diagnosis  POA   • **Cholecystitis, acute [K81.0]  Yes   • Intractable abdominal pain [R10.9]  Yes   • Liver metastasis (HCC) [C78.7]  Yes   • Malignant neoplasm of right female breast (HCC) [C50.911]  Yes   • Bone metastasis (HCC) [C79.51]  Yes   • History of malignant melanoma of skin [Z85.820]  Not Applicable   • Calculus of gallbladder with chronic cholecystitis without obstruction [K80.10]  Yes   • History of ductal carcinoma in situ (DCIS) of breast [Z86.000]  Yes   • Chronic fatigue [R53.82]  Yes      Resolved Hospital Problems   No resolved problems to display.      Patient Active Problem List   Diagnosis Code   • Bradycardia R00.1   • Palpitation R00.2   • Chronic fatigue R53.82   • Ventricular tachycardia with normal heart( RVOT) I47.2   • 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.000   • Calculus of gallbladder with chronic cholecystitis without obstruction K80.10   • Abdominal pain R10.9   • Bilateral low back pain without sciatica M54.50   • Bone metastasis (HCC) C79.51   • 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.000   • History of malignant melanoma of skin Z85.820   • Elevated liver function tests R79.89   • Malignant neoplasm of right female breast (HCC) C50.911   • Encounter for adjustment or management of vascular access device Z45.2   • Nausea and vomiting R11.2   • Encounter for venous access device care Z45.2   • Other complication due to venous access device T82.898A   • Metastasis  to brain (HCC) C79.31   • Long-term (current) use of anticoagulants Z79.01   • Vertebral compression fracture, initial encounter [id number]   • Chemotherapy-induced neutropenia (HCC) D70.1, T45.1X5A   • Anemia D64.9   • Ventricular premature beats I49.3   • Encounter for gynecological examination Z01.419   • Depression F32.A   • Anxiety F41.9   • Pancytopenia due to antineoplastic chemotherapy (HCC) D61.810, T45.1X5A   • Flu vaccine need Z23   • 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, T45.1X5A   • Liver metastasis (HCC) C78.7   • Encounter for therapeutic drug monitoring Z51.81   • 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   • Morbidly obese (HCC) E66.01   • 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   • Cholecystitis, acute K81.0   • Intractable abdominal pain R10.9       PMHX:   Past Medical History:   Diagnosis Date   • Anxiety    • Depression    • Encounter for gynecological examination    • Malignant neoplasm of female breast (HCC)     hx of dcis s/p mastectomy and reconstruction and augmentation      • Primary malignant neoplasm of breast (HCC)     dcis in rt breast s/p mastectomy,reconstruction      • Ventricular premature beats      PSHX:   Past Surgical History:   Procedure Laterality Date   • AUGMENTATION MAMMAPLASTY     • AXILLARY LYMPH NODE BIOPSY/EXCISION Right 7/10/2017    Procedure: BIOSPY RIGHT AXILLARY MASS AND OR LYMPH;  Surgeon: Sourav Ernst MD;  Location: [facility];  Service:    • BREAST BIOPSY  12/07/2007    Mammotome biopsy of the right side with clip placement   • BREAST LUMPECTOMY     • BREAST RECONSTRUCTION  10/28/2008    Abscence of right breast secondary to mastectomy. Implant exchange for reconstruction of right breast with open para-prosthetic capsulotomy   • BREAST SURGERY  10/01/2009    Left breast ptosis and breast asymmetry secondary to right breast reconstruction for breast cancer. Left breast mastopexy with augmentation.   • CARDIAC  CATHETERIZATION  09/18/2008    Normal coronary arteriography. Normal left ventricular angiogram   • EP STUDY     • MASTECTOMY Right    • MASTECTOMY  02/05/2008    Multifocal right breast ductal carcinoma-in-situ. Right total mastectomy   • MASTECTOMY, PARTIAL  01/03/2008    Ductal carcinoma in-situ of the right breast, proven by mammotome biopsy. Right lumpectomy, medial portion of the breast, between 2 o'clock and 4 o'clock, 5 cm from the nipple, with clip placement, radiographic inter. of severo. specimen, & ultrasound   • PAP SMEAR  03/03/2009    Negative   • MT INSJ TUNNELED CVC W/O SUBQ PORT/ AGE 5 YR/> Right 7/10/2017    Procedure: MEDIPORT     (c-arm#2);  Surgeon: Sourav Ernst MD;  Location: [facility];  Service: General       Hospital Course: Kylie García  is a 51-year-old female with concurrent medical history of metastatic breast cancer presenting to the hospital with abdominal pain. The pain started this morning and was moderate in intensity mostly right upper quadrant pain. She was also having associated nausea. She came to the ER and was found to have cholecystitis on further imaging. She is also on chemotherapy for breast cancer and she also takes Coumadin for jugular port clot.       The patient was admitted to the hospital and seen by oncology and general surgery.  Initially felt to have acute cholecystitis on ultrasound findings with positive Mir sign and gallstones.  Also had gallbladder wall thickness.  Subsequently patient had HIDA scan done which was negative and CT scan of abdomen also was negative.  Patient was also found to have increasing liver metastases.  Surgery did not feel that cholecystectomy was necessary at this time given her other medical conditions.  She was able to tolerate a regular diet and feeling better and looked well enough to go home after being in the hospital for a few days.  She will follow-up with her oncologist for ongoing care and also follow-up with her  primary care.    Consults:     Consults     Date and Time Order Name Status Description    3/13/2022  3:54 AM Hematology & Oncology Inpatient Consult Completed     3/13/2022 12:31 AM Hospitalist (on-call MD unless specified)      3/13/2022 12:23 AM Surgery (on-call MD unless specified)          Results from last 7 days   Lab Units 03/16/22  0601   WBC 10*3/mm3 5.63   HEMOGLOBIN g/dL 12.2   HEMATOCRIT % 35.9   PLATELETS 10*3/mm3 243     Results from last 7 days   Lab Units 03/16/22  0601   SODIUM mmol/L 142   POTASSIUM mmol/L 3.8   CHLORIDE mmol/L 106   CO2 mmol/L 27.0   BUN mg/dL 6   CREATININE mg/dL 0.86   GLUCOSE mg/dL 94   CALCIUM mg/dL 8.9     Significant Diagnostic Studies:   WBC   Date Value Ref Range Status   03/16/2022 5.63 3.40 - 10.80 10*3/mm3 Final     Hemoglobin   Date Value Ref Range Status   03/16/2022 12.2 12.0 - 15.9 g/dL Final     Hematocrit   Date Value Ref Range Status   03/16/2022 35.9 34.0 - 46.6 % Final     Platelets   Date Value Ref Range Status   03/16/2022 243 140 - 450 10*3/mm3 Final     Sodium   Date Value Ref Range Status   03/16/2022 142 136 - 145 mmol/L Final     Potassium   Date Value Ref Range Status   03/16/2022 3.8 3.5 - 5.2 mmol/L Final     Chloride   Date Value Ref Range Status   03/16/2022 106 98 - 107 mmol/L Final     CO2   Date Value Ref Range Status   03/16/2022 27.0 22.0 - 29.0 mmol/L Final     BUN   Date Value Ref Range Status   03/16/2022 6 6 - 20 mg/dL Final     Creatinine   Date Value Ref Range Status   03/16/2022 0.86 0.57 - 1.00 mg/dL Final     Glucose   Date Value Ref Range Status   03/16/2022 94 65 - 99 mg/dL Final     Calcium   Date Value Ref Range Status   03/16/2022 8.9 8.6 - 10.5 mg/dL Final     Magnesium   Date Value Ref Range Status   03/14/2022 2.3 1.6 - 2.6 mg/dL Final     AST (SGOT)   Date Value Ref Range Status   03/16/2022 48 (H) 1 - 32 U/L Final     ALT (SGPT)   Date Value Ref Range Status   03/16/2022 36 (H) 1 - 33 U/L Final     Alkaline Phosphatase    Date Value Ref Range Status   03/16/2022 111 39 - 117 U/L Final     INR   Date Value Ref Range Status   03/16/2022 1.67 (H) 0.80 - 1.20 Final     No results found for: COLORU, CLARITYU, SPECGRAV, PHUR, PROTEINUR, GLUCOSEU, KETONESU, BLOODU, NITRITE, LEUKOCYTESUR, BILIRUBINUR, UROBILINOGEN, RBCUA, WBCUA, BACTERIA, UACOMMENT  No results found for: TROPONINT, TROPONINI, BNP  No components found for: HGBA1C;2  No components found for: TSH;2  Imaging Results (All)     Procedure Component Value Units Date/Time    CT Chest With Contrast Diagnostic [[id number]] Collected: 03/14/22 1653     Updated: 03/15/22 0003    Narrative:      CT CHEST WITH IV CONTRAST, CT ABDOMEN PELVIS WITH IV CONTRAST    INDICATION: 51 years Female; Metastatic breast cancer with liver,  bone and skin metastases involving right axillary area, new  worsening right upper quadrant pain and shortness of breath,  restaging, K81.0 Acute cholecystitis    TECHNIQUE:  CT scan of the chest abdomen and pelvis was performed  with IV contrast.  This exam was performed according to our  departmental dose-optimization program, which includes automated  exposure control, adjustment of the mA and/or kV according to  patient size and/or use of iterative reconstruction technique.     Comparison: CT 1/12/2022, ultrasound 3/12/2022, HIDA scan  3/13/2022.    FINDINGS:  Thyroid: Unremarkable.   Heart/Mediastinum: No mediastinal or hilar lymphadenopathy.   Vasculature: No aortic aneurysm or dissection. No evidence of  pulmonary emboli.   Lungs/Pleura: Interval enlargement of the bilateral pulmonary  nodules. The right upper lobe nodule in the measures 1.2 cm  (previously 0.9 cm). The left lower lobe nodule measures 1 cm  (previously 0.8 cm). No pleural effusion or pneumothorax. No  acute airspace opacities.   Liver: Multiple hypodense masses in the liver have increased in  size. For example, the mass in the periphery of the right hepatic  lobe segment 5 now measures 5.9 cm  (previously 4.6 cm. The  coarsely calcified mass in the left hepatic lobe measuring 5.6 cm  is unchanged.  Gallbladder/Biliary tree: No gallbladder wall thickening or  pericholecystic fluid/edema. No biliary dilatation.  Pancreas: Unremarkable.  Spleen: Unremarkable.  Adrenals: Unremarkable.  Genitourinary: No hydronephrosis or nephrolithiasis. No bladder  wall thickening. The uterus and bilateral adnexa are grossly  unremarkable.  Gastrointestinal: No bowel wall thickening or obstruction. Normal  appendix. No free air or free fluid.  Peritoneum: Unremarkable.  Lymph nodes: No enlarged lymph nodes in the abdomen and pelvis.  Bones: Innumerable sclerotic lesions scattered throughout the  visualized bones, including spine, ribs, sternum, clavicles,  scapula, bilateral humeral heads, pelvis, bilateral femurs appear  grossly unchanged. Pathologic fracture at L4 with vertebral plana  deformity is unchanged. Pathologic fracture at T12 is unchanged.  Soft tissues: Status post right mastectomy with reconstruction.  Similar appearance of the right breast tissue expander. No  significant change in multiple subcutaneous and cutaneous nodules  around the right axilla. Similar appearance of an irregular  nodule measuring 2.8 cm in the right axilla extending to the  skin.   Incidental findings: The tip of the right Mediport is at the  cavoatrial junction.       Impression:      1.  No evidence of cholecystitis or biliary dilatation.  2.  Interval progression of disease as indicated by increased  size of the bilateral pulmonary nodules and hepatic metastases.  3.  No significant change in multiple subcutaneous and cutaneous  skin metastases in the right axilla.  4.  No significant change in the innumerable sclerotic metastases  throughout the visualized bones. Pathologic fractures at T12, L4,  are unchanged.    Electronically signed by:  Nayeli Finch  3/15/2022 12:01 AM CDT  Workstation: [id number]    CT Abdomen Pelvis With Contrast  [[id number]] Collected: 03/14/22 1653     Updated: 03/15/22 0003    Narrative:      CT CHEST WITH IV CONTRAST, CT ABDOMEN PELVIS WITH IV CONTRAST    INDICATION: 51 years Female; Metastatic breast cancer with liver,  bone and skin metastases involving right axillary area, new  worsening right upper quadrant pain and shortness of breath,  restaging, K81.0 Acute cholecystitis    TECHNIQUE:  CT scan of the chest abdomen and pelvis was performed  with IV contrast.  This exam was performed according to our  departmental dose-optimization program, which includes automated  exposure control, adjustment of the mA and/or kV according to  patient size and/or use of iterative reconstruction technique.     Comparison: CT 1/12/2022, ultrasound 3/12/2022, HIDA scan  3/13/2022.    FINDINGS:  Thyroid: Unremarkable.   Heart/Mediastinum: No mediastinal or hilar lymphadenopathy.   Vasculature: No aortic aneurysm or dissection. No evidence of  pulmonary emboli.   Lungs/Pleura: Interval enlargement of the bilateral pulmonary  nodules. The right upper lobe nodule in the measures 1.2 cm  (previously 0.9 cm). The left lower lobe nodule measures 1 cm  (previously 0.8 cm). No pleural effusion or pneumothorax. No  acute airspace opacities.   Liver: Multiple hypodense masses in the liver have increased in  size. For example, the mass in the periphery of the right hepatic  lobe segment 5 now measures 5.9 cm (previously 4.6 cm. The  coarsely calcified mass in the left hepatic lobe measuring 5.6 cm  is unchanged.  Gallbladder/Biliary tree: No gallbladder wall thickening or  pericholecystic fluid/edema. No biliary dilatation.  Pancreas: Unremarkable.  Spleen: Unremarkable.  Adrenals: Unremarkable.  Genitourinary: No hydronephrosis or nephrolithiasis. No bladder  wall thickening. The uterus and bilateral adnexa are grossly  unremarkable.  Gastrointestinal: No bowel wall thickening or obstruction. Normal  appendix. No free air or free  fluid.  Peritoneum: Unremarkable.  Lymph nodes: No enlarged lymph nodes in the abdomen and pelvis.  Bones: Innumerable sclerotic lesions scattered throughout the  visualized bones, including spine, ribs, sternum, clavicles,  scapula, bilateral humeral heads, pelvis, bilateral femurs appear  grossly unchanged. Pathologic fracture at L4 with vertebral plana  deformity is unchanged. Pathologic fracture at T12 is unchanged.  Soft tissues: Status post right mastectomy with reconstruction.  Similar appearance of the right breast tissue expander. No  significant change in multiple subcutaneous and cutaneous nodules  around the right axilla. Similar appearance of an irregular  nodule measuring 2.8 cm in the right axilla extending to the  skin.   Incidental findings: The tip of the right Mediport is at the  cavoatrial junction.       Impression:      1.  No evidence of cholecystitis or biliary dilatation.  2.  Interval progression of disease as indicated by increased  size of the bilateral pulmonary nodules and hepatic metastases.  3.  No significant change in multiple subcutaneous and cutaneous  skin metastases in the right axilla.  4.  No significant change in the innumerable sclerotic metastases  throughout the visualized bones. Pathologic fractures at T12, L4,  are unchanged.    Electronically signed by:  Nayeli Finch  3/15/2022 12:01 AM CDT  Workstation: [id number]    NM HIDA SCAN WITHOUT PHARMACOLOGICAL INTERVENTION [[id number]] Resulted: 03/14/22 1422     Updated: 03/14/22 1442    Addenda:         ADDENDUM   ADDENDUM #1       Additional imaging was obtained and demonstrates an ejection  fraction of 64% following administration of a fatty meal. This is  within normal limits.    Electronically signed by:  Taye Holt MD  3/14/2022 2:22 PM CDT  Workstation: [id number]    Signed: 03/14/22 1422 by Taye Holt MD    Narrative:      EXAMINATION:  NM HIDA SCAN WITH PHARMACOLOGICAL INTERVENTION    CLINICAL HISTORY:  51 years  Female,Abdominal pain, biliary  obstruction suspected (Ped 0-18y), K81.0 Acute cholecystitis    COMPARISON:  Gallbladder ultrasound dated 3/12/2022, CT of the  abdomen and pelvis dated 1/12/2022    FINDINGS:    Sequential planar imaging obtained following intravenous  administration of 6.4 mCi technetium 99m Choletec. There is  heterogeneous activity throughout the liver, corresponding to the  known multifocal hepatic masses that were demonstrated on the  recent CT and ultrasound. On images labeled 30 minutes and 60  minutes, the gallbladder is visualized. Additionally, there is  visualization of the common bile duct as well as of the small  bowel.      Impression:      1. No evidence for common bile duct or cystic duct obstruction.  2. Heterogeneous radiotracer accumulation throughout the liver,  corresponding to the known hepatic masses.    Electronically signed by:  Taye Holt MD  3/14/2022 11:22 AM  CDT Workstation: [id number]    NM HIDA SCAN WITH PHARMACOLOGICAL INTERVENTION [[id number]] Collected: 03/14/22 0953     Updated: 03/14/22 1435    US Gallbladder [[id number]] Collected: 03/12/22 2148     Updated: 03/12/22 2234    Narrative:      EXAM DESCRIPTION:  US GALLBLADDER  RadLex: US GALLBLADDER   Technique:  Multiple sonographic images of the right upper  quadrant were obtained.     CLINICAL HISTORY:  RUQ pain.    COMPARISON:  CT abdomen 1/12/2022.    FINDINGS:  The liver demonstrates multiple masses, the largest measuring  approximately 4.4 x 5.4 x 4.6 cm in the left lobe and 4.1 x 5.1 x  3.9 cm in the right lobe. These were better seen on the CT exam.  The gallbladder appears contracted with underlying shadowing  stones. Gallbladder wall thickened at 4.9 mm. Positive  sonographic Mir's sign was elicited. The common bile duct  measures 10.5 mm. The visualized pancreas appears unremarkable.  There is no evidence for hydronephrosis in the right kidney.  Right kidney measures 11.5 cm in length. The abdominal  aorta and  inferior vena cava are not well visualized.      Impression:      1.  Gallstones with gallbladder wall thickening and positive  sonographic Mir's sign. Follow-up HIDA scan could be performed  if indicated. CBD dilated at 10.5 mm.  2.  Liver masses, the largest measuring approximately 5.4 cm.  These were better visualized on previous CT.    Electronically signed by:  Jame Matta DO  3/12/2022 10:32 PM CST  Workstation: [id number]        Lab Results (last 7 days)     Procedure Component Value Units Date/Time    Comprehensive Metabolic Panel [[id number]]  (Abnormal) Collected: 03/16/22 0601    Specimen: Blood Updated: 03/16/22 0627     Glucose 94 mg/dL      BUN 6 mg/dL      Creatinine 0.86 mg/dL      Sodium 142 mmol/L      Potassium 3.8 mmol/L      Chloride 106 mmol/L      CO2 27.0 mmol/L      Calcium 8.9 mg/dL      Total Protein 6.2 g/dL      Albumin 3.60 g/dL      ALT (SGPT) 36 U/L      AST (SGOT) 48 U/L      Alkaline Phosphatase 111 U/L      Total Bilirubin 0.5 mg/dL      Globulin 2.6 gm/dL      A/G Ratio 1.4 g/dL      BUN/Creatinine Ratio 7.0     Anion Gap 9.0 mmol/L      eGFR 80.9 mL/min/1.73      Comment: National Kidney Foundation and American Society of Nephrology (ASN) Task Force recommended calculation based on the Chronic Kidney Disease Epidemiology Collaboration (CKD-EPI) equation refit without adjustment for race.       Narrative:      GFR Normal >60  Chronic Kidney Disease <60  Kidney Failure <15      Protime-INR [[id number]]  (Abnormal) Collected: 03/16/22 0601    Specimen: Blood Updated: 03/16/22 0620     Protime 19.4 Seconds      INR 1.67    Narrative:      Therapeutic range for most indications is 2.0-3.0 INR,  or 2.5-3.5 for mechanical heart valves.    CBC & Differential [[id number]]  (Abnormal) Collected: 03/16/22 0601    Specimen: Blood Updated: 03/16/22 0608    Narrative:      The following orders were created for panel order CBC & Differential.  Procedure                                Abnormality         Status                     ---------                               -----------         ------                     CBC Auto Differential[[id number]]        Abnormal            Final result                 Please view results for these tests on the individual orders.    CBC Auto Differential [[id number]]  (Abnormal) Collected: 03/16/22 0601    Specimen: Blood Updated: 03/16/22 0608     WBC 5.63 10*3/mm3      RBC 4.04 10*6/mm3      Hemoglobin 12.2 g/dL      Hematocrit 35.9 %      MCV 88.9 fL      MCH 30.2 pg      MCHC 34.0 g/dL      RDW 21.1 %      RDW-SD 66.0 fl      MPV 9.1 fL      Platelets 243 10*3/mm3      Neutrophil % 64.5 %      Lymphocyte % 18.1 %      Monocyte % 13.7 %      Eosinophil % 2.8 %      Basophil % 0.5 %      Immature Grans % 0.4 %      Neutrophils, Absolute 3.63 10*3/mm3      Lymphocytes, Absolute 1.02 10*3/mm3      Monocytes, Absolute 0.77 10*3/mm3      Eosinophils, Absolute 0.16 10*3/mm3      Basophils, Absolute 0.03 10*3/mm3      Immature Grans, Absolute 0.02 10*3/mm3      nRBC 0.0 /100 WBC     Blood Culture - Blood, Arm, Left [[id number]]  (Normal) Collected: 03/13/22 0053    Specimen: Blood from Arm, Left Updated: 03/16/22 0216     Blood Culture No growth at 3 days    Blood Culture - Blood, Arm, Left [[id number]]  (Normal) Collected: 03/13/22 0053    Specimen: Blood from Arm, Left Updated: 03/16/22 0216     Blood Culture No growth at 3 days    Comprehensive Metabolic Panel [[id number]]  (Abnormal) Collected: 03/15/22 0558    Specimen: Blood Updated: 03/15/22 0709     Glucose 84 mg/dL      BUN 7 mg/dL      Creatinine 0.81 mg/dL      Sodium 136 mmol/L      Potassium 3.6 mmol/L      Chloride 103 mmol/L      CO2 25.0 mmol/L      Calcium 8.6 mg/dL      Total Protein 6.3 g/dL      Albumin 3.60 g/dL      ALT (SGPT) 37 U/L      AST (SGOT) 47 U/L      Alkaline Phosphatase 116 U/L      Total Bilirubin 0.6 mg/dL      Globulin 2.7 gm/dL      A/G Ratio 1.3 g/dL      BUN/Creatinine Ratio 8.6      Anion Gap 8.0 mmol/L      eGFR 86.9 mL/min/1.73      Comment: National Kidney Foundation and American Society of Nephrology (ASN) Task Force recommended calculation based on the Chronic Kidney Disease Epidemiology Collaboration (CKD-EPI) equation refit without adjustment for race.       Narrative:      GFR Normal >60  Chronic Kidney Disease <60  Kidney Failure <15      Protime-INR [[id number]]  (Abnormal) Collected: 03/15/22 0558    Specimen: Blood Updated: 03/15/22 0702     Protime 20.4 Seconds      INR 1.79    Narrative:      Therapeutic range for most indications is 2.0-3.0 INR,  or 2.5-3.5 for mechanical heart valves.    CBC & Differential [[id number]]  (Abnormal) Collected: 03/15/22 0558    Specimen: Blood Updated: 03/15/22 0651    Narrative:      The following orders were created for panel order CBC & Differential.  Procedure                               Abnormality         Status                     ---------                               -----------         ------                     CBC Auto Differential[[id number]]        Abnormal            Final result                 Please view results for these tests on the individual orders.    CBC Auto Differential [[id number]]  (Abnormal) Collected: 03/15/22 0558    Specimen: Blood Updated: 03/15/22 0651     WBC 6.22 10*3/mm3      RBC 4.03 10*6/mm3      Hemoglobin 12.0 g/dL      Hematocrit 35.9 %      MCV 89.1 fL      MCH 29.8 pg      MCHC 33.4 g/dL      RDW 20.7 %      RDW-SD 65.5 fl      MPV 9.6 fL      Platelets 250 10*3/mm3      Neutrophil % 66.8 %      Lymphocyte % 16.1 %      Monocyte % 13.2 %      Eosinophil % 2.6 %      Basophil % 1.0 %      Immature Grans % 0.3 %      Neutrophils, Absolute 4.16 10*3/mm3      Lymphocytes, Absolute 1.00 10*3/mm3      Monocytes, Absolute 0.82 10*3/mm3      Eosinophils, Absolute 0.16 10*3/mm3      Basophils, Absolute 0.06 10*3/mm3      Immature Grans, Absolute 0.02 10*3/mm3      nRBC 0.0 /100 WBC     Extra Tubes [[id number]]  Collected: 03/14/22 0750    Specimen: Blood, Venous Line Updated: 03/14/22 0902    Narrative:      The following orders were created for panel order Extra Tubes.  Procedure                               Abnormality         Status                     ---------                               -----------         ------                     Lavender Top[[id number]]                                     Final result               Gold Top - SST[[id number]]                                   Final result                 Please view results for these tests on the individual orders.    Lavender Top [[id number]] Collected: 03/14/22 0750    Specimen: Blood Updated: 03/14/22 0902     Extra Tube hold for add-on     Comment: Auto resulted       Gold Top - SST [[id number]] Collected: 03/14/22 0750    Specimen: Blood Updated: 03/14/22 0902     Extra Tube Hold for add-ons.     Comment: Auto resulted.       Protime-INR [[id number]]  (Abnormal) Collected: 03/14/22 0749    Specimen: Blood Updated: 03/14/22 0815     Protime 22.7 Seconds      INR 2.05    Narrative:      Therapeutic range for most indications is 2.0-3.0 INR,  or 2.5-3.5 for mechanical heart valves.    Basic Metabolic Panel [[id number]]  (Normal) Collected: 03/14/22 0457    Specimen: Blood Updated: 03/14/22 0550     Glucose 94 mg/dL      BUN 11 mg/dL      Creatinine 0.77 mg/dL      Sodium 141 mmol/L      Potassium 4.5 mmol/L      Chloride 107 mmol/L      CO2 26.0 mmol/L      Calcium 8.7 mg/dL      BUN/Creatinine Ratio 14.3     Anion Gap 8.0 mmol/L      eGFR 92.4 mL/min/1.73      Comment: National Kidney Foundation and American Society of Nephrology (ASN) Task Force recommended calculation based on the Chronic Kidney Disease Epidemiology Collaboration (CKD-EPI) equation refit without adjustment for race.       Narrative:      GFR Normal >60  Chronic Kidney Disease <60  Kidney Failure <15      Hepatic Function Panel [[id number]]  (Abnormal) Collected: 03/14/22 0457    Specimen: Blood  Updated: 03/14/22 0550     Total Protein 6.1 g/dL      Albumin 3.70 g/dL      ALT (SGPT) 33 U/L      AST (SGOT) 39 U/L      Alkaline Phosphatase 117 U/L      Total Bilirubin 0.7 mg/dL      Bilirubin, Direct 0.2 mg/dL      Bilirubin, Indirect 0.5 mg/dL     Magnesium [[id number]]  (Normal) Collected: 03/14/22 0457    Specimen: Blood Updated: 03/14/22 0550     Magnesium 2.3 mg/dL     CBC & Differential [[id number]]  (Abnormal) Collected: 03/14/22 0457    Specimen: Blood Updated: 03/14/22 0521    Narrative:      The following orders were created for panel order CBC & Differential.  Procedure                               Abnormality         Status                     ---------                               -----------         ------                     CBC Auto Differential[[id number]]        Abnormal            Final result                 Please view results for these tests on the individual orders.    CBC Auto Differential [[id number]]  (Abnormal) Collected: 03/14/22 0457    Specimen: Blood Updated: 03/14/22 0521     WBC 7.41 10*3/mm3      RBC 3.96 10*6/mm3      Hemoglobin 11.7 g/dL      Hematocrit 35.4 %      MCV 89.4 fL      MCH 29.5 pg      MCHC 33.1 g/dL      RDW 20.9 %      RDW-SD 66.4 fl      MPV 9.5 fL      Platelets 215 10*3/mm3      Neutrophil % 71.6 %      Lymphocyte % 11.6 %      Monocyte % 13.5 %      Eosinophil % 2.3 %      Basophil % 0.5 %      Immature Grans % 0.5 %      Neutrophils, Absolute 5.30 10*3/mm3      Lymphocytes, Absolute 0.86 10*3/mm3      Monocytes, Absolute 1.00 10*3/mm3      Eosinophils, Absolute 0.17 10*3/mm3      Basophils, Absolute 0.04 10*3/mm3      Immature Grans, Absolute 0.04 10*3/mm3      nRBC 0.0 /100 WBC     Urine Culture - Urine, Urine, Clean Catch [[id number]]  (Normal) Collected: 03/12/22 2146    Specimen: Urine, Clean Catch Updated: 03/13/22 2238     Urine Culture No growth    Basic Metabolic Panel [[id number]]  (Abnormal) Collected: 03/13/22 0731    Specimen: Blood Updated:  03/13/22 0801     Glucose 94 mg/dL      BUN 13 mg/dL      Creatinine 0.78 mg/dL      Sodium 142 mmol/L      Potassium 4.0 mmol/L      Chloride 109 mmol/L      CO2 25.0 mmol/L      Calcium 8.4 mg/dL      BUN/Creatinine Ratio 16.7     Anion Gap 8.0 mmol/L      eGFR 91.0 mL/min/1.73      Comment: National Kidney Foundation and American Society of Nephrology (ASN) Task Force recommended calculation based on the Chronic Kidney Disease Epidemiology Collaboration (CKD-EPI) equation refit without adjustment for race.       Narrative:      GFR Normal >60  Chronic Kidney Disease <60  Kidney Failure <15      Magnesium [[id number]]  (Normal) Collected: 03/13/22 0731    Specimen: Blood Updated: 03/13/22 0801     Magnesium 2.2 mg/dL     CBC & Differential [[id number]]  (Abnormal) Collected: 03/13/22 0731    Specimen: Blood Updated: 03/13/22 0746    Narrative:      The following orders were created for panel order CBC & Differential.  Procedure                               Abnormality         Status                     ---------                               -----------         ------                     CBC Auto Differential[[id number]]        Abnormal            Final result                 Please view results for these tests on the individual orders.    CBC Auto Differential [[id number]]  (Abnormal) Collected: 03/13/22 0731    Specimen: Blood Updated: 03/13/22 0746     WBC 7.87 10*3/mm3      RBC 3.96 10*6/mm3      Hemoglobin 11.8 g/dL      Hematocrit 35.4 %      MCV 89.4 fL      MCH 29.8 pg      MCHC 33.3 g/dL      RDW 20.8 %      RDW-SD 66.3 fl      MPV 9.4 fL      Platelets 208 10*3/mm3      Neutrophil % 70.9 %      Lymphocyte % 14.4 %      Monocyte % 13.2 %      Eosinophil % 0.6 %      Basophil % 0.4 %      Immature Grans % 0.5 %      Neutrophils, Absolute 5.58 10*3/mm3      Lymphocytes, Absolute 1.13 10*3/mm3      Monocytes, Absolute 1.04 10*3/mm3      Eosinophils, Absolute 0.05 10*3/mm3      Basophils, Absolute 0.03 10*3/mm3       Immature Grans, Absolute 0.04 10*3/mm3      nRBC 0.0 /100 WBC     COVID-19 and FLU A/B PCR - Swab, Nasopharynx [[id number]]  (Normal) Collected: 03/13/22 0053    Specimen: Swab from Nasopharynx Updated: 03/13/22 0333     COVID19 Not Detected     Influenza A PCR Not Detected     Influenza B PCR Not Detected    Narrative:      Fact sheet for providers: https://www.fda.gov/media/751472/download    Fact sheet for patients: https://www.fda.gov/media/971431/download    Test performed by PCR.    Extra Tubes [[id number]] Collected: 03/12/22 2222    Specimen: Blood Updated: 03/13/22 0332    Narrative:      The following orders were created for panel order Extra Tubes.  Procedure                               Abnormality         Status                     ---------                               -----------         ------                     [place] Top[[id number]]                                         Final result                 Please view results for these tests on the individual orders.    [place] Top [[id number]] Collected: 03/12/22 2222    Specimen: Blood Updated: 03/13/22 0332     Extra Tube Hold for add-ons.     Comment: Auto resulted.       Lactic Acid, Plasma [[id number]]  (Normal) Collected: 03/12/22 2222    Specimen: Blood Updated: 03/13/22 0040     Lactate 1.0 mmol/L     Comprehensive Metabolic Panel [[id number]]  (Abnormal) Collected: 03/12/22 2217    Specimen: Blood Updated: 03/12/22 2248     Glucose 103 mg/dL      BUN 14 mg/dL      Creatinine 0.83 mg/dL      Sodium 133 mmol/L      Potassium 3.8 mmol/L      Chloride 100 mmol/L      CO2 21.0 mmol/L      Calcium 8.5 mg/dL      Total Protein 6.8 g/dL      Albumin 3.90 g/dL      ALT (SGPT) 45 U/L      AST (SGOT) 43 U/L      Alkaline Phosphatase 143 U/L      Total Bilirubin 0.6 mg/dL      Globulin 2.9 gm/dL      A/G Ratio 1.3 g/dL      BUN/Creatinine Ratio 16.9     Anion Gap 12.0 mmol/L      eGFR 84.4 mL/min/1.73      Comment: National Kidney Foundation and American  Society of Nephrology (ASN) Task Force recommended calculation based on the Chronic Kidney Disease Epidemiology Collaboration (CKD-EPI) equation refit without adjustment for race.       Narrative:      GFR Normal >60  Chronic Kidney Disease <60  Kidney Failure <15      Lipase [[id number]]  (Normal) Collected: 03/12/22 2217    Specimen: Blood Updated: 03/12/22 2248     Lipase 18 U/L     CK [[id number]]  (Normal) Collected: 03/12/22 2217    Specimen: Blood Updated: 03/12/22 2248     Creatine Kinase 61 U/L     Magnesium [[id number]]  (Normal) Collected: 03/12/22 2217    Specimen: Blood Updated: 03/12/22 2248     Magnesium 2.0 mg/dL     Protime-INR [[id number]]  (Abnormal) Collected: 03/12/22 2217    Specimen: Blood Updated: 03/12/22 2243     Protime 22.1 Seconds      INR 1.98    Narrative:      Therapeutic range for most indications is 2.0-3.0 INR,  or 2.5-3.5 for mechanical heart valves.    CBC & Differential [[id number]]  (Abnormal) Collected: 03/12/22 2217    Specimen: Blood Updated: 03/12/22 2232    Narrative:      The following orders were created for panel order CBC & Differential.  Procedure                               Abnormality         Status                     ---------                               -----------         ------                     CBC Auto Differential[[id number]]        Abnormal            Final result                 Please view results for these tests on the individual orders.    CBC Auto Differential [[id number]]  (Abnormal) Collected: 03/12/22 2217    Specimen: Blood Updated: 03/12/22 2232     WBC 12.18 10*3/mm3      RBC 4.20 10*6/mm3      Hemoglobin 12.4 g/dL      Hematocrit 37.1 %      MCV 88.3 fL      MCH 29.5 pg      MCHC 33.4 g/dL      RDW 20.8 %      RDW-SD 64.5 fl      MPV 9.7 fL      Platelets 256 10*3/mm3      Neutrophil % 78.1 %      Lymphocyte % 9.4 %      Monocyte % 10.8 %      Eosinophil % 0.2 %      Basophil % 0.3 %      Immature Grans % 1.2 %      Neutrophils, Absolute 9.50  "10*3/mm3      Lymphocytes, Absolute 1.14 10*3/mm3      Monocytes, Absolute 1.32 10*3/mm3      Eosinophils, Absolute 0.03 10*3/mm3      Basophils, Absolute 0.04 10*3/mm3      Immature Grans, Absolute 0.15 10*3/mm3      nRBC 0.0 /100 WBC     Urinalysis, Microscopic Only - Urine, Clean Catch [[id number]]  (Abnormal) Collected: 03/12/22 2146    Specimen: Urine, Clean Catch Updated: 03/12/22 2159     RBC, UA 0-2 /HPF      WBC, UA 6-12 /HPF      Bacteria, UA None Seen /HPF      Squamous Epithelial Cells, UA 0-2 /HPF      Hyaline Casts, UA None Seen /LPF      Methodology Automated Microscopy    Urinalysis With Culture If Indicated - Urine, Clean Catch [[id number]]  (Abnormal) Collected: 03/12/22 2146    Specimen: Urine, Clean Catch Updated: 03/12/22 2158     Color, UA Yellow     Appearance, UA Clear     pH, UA 6.0     Specific Gravity, UA 1.021     Glucose, UA Negative     Ketones, UA Negative     Bilirubin, UA Negative     Blood, UA Negative     Protein, UA Negative     Leuk Esterase, UA Small (1+)     Nitrite, UA Negative     Urobilinogen, UA 0.2 E.U./dL        /70 (BP Location: Right arm, Patient Position: Lying)   Pulse 68   Temp 97.4 °F (36.3 °C) (Oral)   Resp 18   Ht 170.2 cm (67\")   Wt 101 kg (222 lb)   LMP  (LMP Unknown)   SpO2 96%   BMI 34.77 kg/m²     Discharge Exam:  General Appearance:    Alert, cooperative, no distress                          Head:    Normocephalic, without obvious abnormality, atraumatic                          Eyes:                            Throat:   Lips, tongue, gums normal                          Neck:   Supple, symmetrical, trachea midline, no JVD                        Lungs:     Clear to auscultation bilaterally, respirations unlabored                Chest Wall:    No tenderness or deformity                        Heart:    Regular rate and rhythm, S1 and S2 normal, no murmur,no  Rub or gallop                  Abdomen:     Soft, non-tender, bowel sounds active, no " masses, no organomegaly                  Extremities:   Extremities normal, atraumatic, no cyanosis or edema                             Skin:   Skin is warm and dry,  no rashes or palpable lesions                  Neurologic:   no focal deficits noted     Disposition:  Home    Patient Instructions:     Diet and activity as tolerated         Discharge Medications      Changes to Medications      Instructions Start Date   clindamycin 1 % gel  Commonly known as: Clindagel  What changed: additional instructions   1 application, Topical, 2 Times Daily      gabapentin 300 MG capsule  Commonly known as: NEURONTIN  What changed:   · when to take this  · reasons to take this   300 mg, Oral, 3 Times Daily      Scopolamine 1 MG/3DAYS patch  Commonly known as: Transderm-Scop (1.5 MG)  What changed:   · when to take this  · reasons to take this   1 patch, Transdermal, Every 72 Hours      warfarin 5 MG tablet  Commonly known as: COUMADIN  What changed: additional instructions   TAKE 1 TABLET BY MOUTH NIGHTLY OR AS DIRECTED PER COUMADIN CLINIC         Continue These Medications      Instructions Start Date   capecitabine 500 MG chemo tablet  Commonly known as: XELODA   TAKE 4 TABLETS BY MOUTH IN THE MORNING AND 4 TABLETS IN THE EVENING ON DAYS 1 TO 14 , THEN 7 DAYS OFF.      ondansetron 4 MG tablet  Commonly known as: ZOFRAN   4 mg, Oral, 4 Times Daily PRN      ondansetron 8 MG tablet  Commonly known as: ZOFRAN   4 mg, Oral, 4 Times Daily PRN      promethazine 12.5 MG tablet  Commonly known as: PHENERGAN   12.5 mg, Oral, Every 6 Hours PRN      sertraline 100 MG tablet  Commonly known as: ZOLOFT   1 tablet, Oral, Daily      traMADol 50 MG tablet  Commonly known as: ULTRAM   50 mg, Oral, Every 8 Hours PRN           Future Appointments   Date Time Provider Department Center   4/4/2022  2:00 PM [facility] CT 1  [facility]   4/6/2022  9:30 AM NURSE [facility] OPI [facility]   4/6/2022 10:00 AM Ovidio Meadows MD [facility] ONC [facility]      Follow-up  Information     Teresa Lyn MD Follow up in 1 week(s).    Specialty: Family Medicine  Contact information:  [address] [facility] [address] [phone]             Ovidio Meadows MD Follow up.    Specialties: Hematology and Oncology, Hematology, Oncology  Contact information:  [address] [place] [address]  [place] [address] [phone]                       Discharge Order (From admission, onward)     Start     Ordered    03/16/22 1507  Discharge patient  Once        Expected Discharge Date: 03/16/22    Discharge Disposition: Home or Self Care    Physician of Record for Attribution - Please select from Treatment Team: JOEY GONZALES [[id number]]    Review needed by CMO to determine Physician of Record: No       Question Answer Comment   Physician of Record for Attribution - Please select from Treatment Team JOEY GONZALES    Review needed by CMO to determine Physician of Record No        03/16/22 1510                Total time spent discharging patient including evaluation,post hospitalization follow up,  medication and post hospitalization instructions and education total time exceeds 30 minutes.    Signed:  Joey Gonzales MD  3/16/2022  15:12 CDT

## 2022-03-16 NOTE — PROGRESS NOTES
"DAILY PROGRESS NOTE  [facility]    Patient Identification:  Name: Kylie García  Age: 51 y.o.  Sex: female  :  1970  MRN: [id number]         Primary Care Physician: Teresa Lyn MD    Subjective:  Interval History:She feels better and wants to go home.    Objective:    Scheduled Meds:piperacillin-tazobactam, 3.375 g, Intravenous, Q8H  sodium chloride, 10 mL, Intravenous, Q12H  warfarin, 2.5 mg, Oral, Daily      Continuous Infusions:lactated ringers, 100 mL/hr, Last Rate: 100 mL/hr (22 0420)  Pharmacy to dose warfarin,         Vital signs in last 24 hours:  Temp:  [96.9 °F (36.1 °C)-97.6 °F (36.4 °C)] 97.6 °F (36.4 °C)  Heart Rate:  [64-91] 83  Resp:  [16-18] 18  BP: (130-142)/(65-77) 136/77    Intake/Output:    Intake/Output Summary (Last 24 hours) at 3/16/2022 0938  Last data filed at 3/15/2022 1812  Gross per 24 hour   Intake 1020 ml   Output 200 ml   Net 820 ml       Exam:  /77 (BP Location: Right arm, Patient Position: Lying)   Pulse 83   Temp 97.6 °F (36.4 °C) (Oral)   Resp 18   Ht 170.2 cm (67\")   Wt 101 kg (222 lb)   LMP  (LMP Unknown)   SpO2 95%   BMI 34.77 kg/m²     General Appearance:    Alert, cooperative, no distress   Head:    Normocephalic, without obvious abnormality, atraumatic   Eyes:       Throat:   Lips, tongue, gums normal   Neck:   Supple, symmetrical, trachea midline, no JVD   Lungs:     Clear to auscultation bilaterally, respirations unlabored   Chest Wall:    No tenderness or deformity    Heart:    Regular rate and rhythm, S1 and S2 normal, no murmur,no  rub or gallop   Abdomen:     Soft, nontender, bowel sounds active, no masses, no organomegaly    Extremities:   Extremities normal, atraumatic, no cyanosis or edema   Pulses:      Skin:   Skin is warm and dry,  no rashes or palpable lesions   Neurologic:   no focal deficits noted      Lab Results (last 72 hours)     Procedure Component Value Units Date/Time    Comprehensive Metabolic Panel " [[id number]]  (Abnormal) Collected: 03/16/22 0601    Specimen: Blood Updated: 03/16/22 0627     Glucose 94 mg/dL      BUN 6 mg/dL      Creatinine 0.86 mg/dL      Sodium 142 mmol/L      Potassium 3.8 mmol/L      Chloride 106 mmol/L      CO2 27.0 mmol/L      Calcium 8.9 mg/dL      Total Protein 6.2 g/dL      Albumin 3.60 g/dL      ALT (SGPT) 36 U/L      AST (SGOT) 48 U/L      Alkaline Phosphatase 111 U/L      Total Bilirubin 0.5 mg/dL      Globulin 2.6 gm/dL      A/G Ratio 1.4 g/dL      BUN/Creatinine Ratio 7.0     Anion Gap 9.0 mmol/L      eGFR 80.9 mL/min/1.73      Comment: National Kidney Foundation and American Society of Nephrology (ASN) Task Force recommended calculation based on the Chronic Kidney Disease Epidemiology Collaboration (CKD-EPI) equation refit without adjustment for race.       Narrative:      GFR Normal >60  Chronic Kidney Disease <60  Kidney Failure <15      Protime-INR [[id number]]  (Abnormal) Collected: 03/16/22 0601    Specimen: Blood Updated: 03/16/22 0620     Protime 19.4 Seconds      INR 1.67    Narrative:      Therapeutic range for most indications is 2.0-3.0 INR,  or 2.5-3.5 for mechanical heart valves.    CBC & Differential [[id number]]  (Abnormal) Collected: 03/16/22 0601    Specimen: Blood Updated: 03/16/22 0608    Narrative:      The following orders were created for panel order CBC & Differential.  Procedure                               Abnormality         Status                     ---------                               -----------         ------                     CBC Auto Differential[[id number]]        Abnormal            Final result                 Please view results for these tests on the individual orders.    CBC Auto Differential [[id number]]  (Abnormal) Collected: 03/16/22 0601    Specimen: Blood Updated: 03/16/22 0608     WBC 5.63 10*3/mm3      RBC 4.04 10*6/mm3      Hemoglobin 12.2 g/dL      Hematocrit 35.9 %      MCV 88.9 fL      MCH 30.2 pg      MCHC 34.0 g/dL      RDW  21.1 %      RDW-SD 66.0 fl      MPV 9.1 fL      Platelets 243 10*3/mm3      Neutrophil % 64.5 %      Lymphocyte % 18.1 %      Monocyte % 13.7 %      Eosinophil % 2.8 %      Basophil % 0.5 %      Immature Grans % 0.4 %      Neutrophils, Absolute 3.63 10*3/mm3      Lymphocytes, Absolute 1.02 10*3/mm3      Monocytes, Absolute 0.77 10*3/mm3      Eosinophils, Absolute 0.16 10*3/mm3      Basophils, Absolute 0.03 10*3/mm3      Immature Grans, Absolute 0.02 10*3/mm3      nRBC 0.0 /100 WBC     Blood Culture - Blood, Arm, Left [[id number]]  (Normal) Collected: 03/13/22 0053    Specimen: Blood from Arm, Left Updated: 03/16/22 0216     Blood Culture No growth at 3 days    Blood Culture - Blood, Arm, Left [[id number]]  (Normal) Collected: 03/13/22 0053    Specimen: Blood from Arm, Left Updated: 03/16/22 0216     Blood Culture No growth at 3 days    Comprehensive Metabolic Panel [[id number]]  (Abnormal) Collected: 03/15/22 0558    Specimen: Blood Updated: 03/15/22 0709     Glucose 84 mg/dL      BUN 7 mg/dL      Creatinine 0.81 mg/dL      Sodium 136 mmol/L      Potassium 3.6 mmol/L      Chloride 103 mmol/L      CO2 25.0 mmol/L      Calcium 8.6 mg/dL      Total Protein 6.3 g/dL      Albumin 3.60 g/dL      ALT (SGPT) 37 U/L      AST (SGOT) 47 U/L      Alkaline Phosphatase 116 U/L      Total Bilirubin 0.6 mg/dL      Globulin 2.7 gm/dL      A/G Ratio 1.3 g/dL      BUN/Creatinine Ratio 8.6     Anion Gap 8.0 mmol/L      eGFR 86.9 mL/min/1.73      Comment: National Kidney Foundation and American Society of Nephrology (ASN) Task Force recommended calculation based on the Chronic Kidney Disease Epidemiology Collaboration (CKD-EPI) equation refit without adjustment for race.       Narrative:      GFR Normal >60  Chronic Kidney Disease <60  Kidney Failure <15      Protime-INR [[id number]]  (Abnormal) Collected: 03/15/22 0558    Specimen: Blood Updated: 03/15/22 0702     Protime 20.4 Seconds      INR 1.79    Narrative:      Therapeutic range for  most indications is 2.0-3.0 INR,  or 2.5-3.5 for mechanical heart valves.    CBC & Differential [[id number]]  (Abnormal) Collected: 03/15/22 0558    Specimen: Blood Updated: 03/15/22 0651    Narrative:      The following orders were created for panel order CBC & Differential.  Procedure                               Abnormality         Status                     ---------                               -----------         ------                     CBC Auto Differential[[id number]]        Abnormal            Final result                 Please view results for these tests on the individual orders.    CBC Auto Differential [[id number]]  (Abnormal) Collected: 03/15/22 0558    Specimen: Blood Updated: 03/15/22 0651     WBC 6.22 10*3/mm3      RBC 4.03 10*6/mm3      Hemoglobin 12.0 g/dL      Hematocrit 35.9 %      MCV 89.1 fL      MCH 29.8 pg      MCHC 33.4 g/dL      RDW 20.7 %      RDW-SD 65.5 fl      MPV 9.6 fL      Platelets 250 10*3/mm3      Neutrophil % 66.8 %      Lymphocyte % 16.1 %      Monocyte % 13.2 %      Eosinophil % 2.6 %      Basophil % 1.0 %      Immature Grans % 0.3 %      Neutrophils, Absolute 4.16 10*3/mm3      Lymphocytes, Absolute 1.00 10*3/mm3      Monocytes, Absolute 0.82 10*3/mm3      Eosinophils, Absolute 0.16 10*3/mm3      Basophils, Absolute 0.06 10*3/mm3      Immature Grans, Absolute 0.02 10*3/mm3      nRBC 0.0 /100 WBC     Extra Tubes [[id number]] Collected: 03/14/22 0750    Specimen: Blood, Venous Line Updated: 03/14/22 0902    Narrative:      The following orders were created for panel order Extra Tubes.  Procedure                               Abnormality         Status                     ---------                               -----------         ------                     Lavender Top[[id number]]                                     Final result               Gold Top - SST[[id number]]                                   Final result                 Please view results for these tests on the  individual orders.    Lavender Top [[id number]] Collected: 03/14/22 0750    Specimen: Blood Updated: 03/14/22 0902     Extra Tube hold for add-on     Comment: Auto resulted       Gold Top - SST [[id number]] Collected: 03/14/22 0750    Specimen: Blood Updated: 03/14/22 0902     Extra Tube Hold for add-ons.     Comment: Auto resulted.       Protime-INR [[id number]]  (Abnormal) Collected: 03/14/22 0749    Specimen: Blood Updated: 03/14/22 0815     Protime 22.7 Seconds      INR 2.05    Narrative:      Therapeutic range for most indications is 2.0-3.0 INR,  or 2.5-3.5 for mechanical heart valves.    Basic Metabolic Panel [[id number]]  (Normal) Collected: 03/14/22 0457    Specimen: Blood Updated: 03/14/22 0550     Glucose 94 mg/dL      BUN 11 mg/dL      Creatinine 0.77 mg/dL      Sodium 141 mmol/L      Potassium 4.5 mmol/L      Chloride 107 mmol/L      CO2 26.0 mmol/L      Calcium 8.7 mg/dL      BUN/Creatinine Ratio 14.3     Anion Gap 8.0 mmol/L      eGFR 92.4 mL/min/1.73      Comment: National Kidney Foundation and American Society of Nephrology (ASN) Task Force recommended calculation based on the Chronic Kidney Disease Epidemiology Collaboration (CKD-EPI) equation refit without adjustment for race.       Narrative:      GFR Normal >60  Chronic Kidney Disease <60  Kidney Failure <15      Hepatic Function Panel [[id number]]  (Abnormal) Collected: 03/14/22 0457    Specimen: Blood Updated: 03/14/22 0550     Total Protein 6.1 g/dL      Albumin 3.70 g/dL      ALT (SGPT) 33 U/L      AST (SGOT) 39 U/L      Alkaline Phosphatase 117 U/L      Total Bilirubin 0.7 mg/dL      Bilirubin, Direct 0.2 mg/dL      Bilirubin, Indirect 0.5 mg/dL     Magnesium [[id number]]  (Normal) Collected: 03/14/22 0457    Specimen: Blood Updated: 03/14/22 0550     Magnesium 2.3 mg/dL     CBC & Differential [[id number]]  (Abnormal) Collected: 03/14/22 0457    Specimen: Blood Updated: 03/14/22 0521    Narrative:      The following orders were created for panel  order CBC & Differential.  Procedure                               Abnormality         Status                     ---------                               -----------         ------                     CBC Auto Differential[[id number]]        Abnormal            Final result                 Please view results for these tests on the individual orders.    CBC Auto Differential [[id number]]  (Abnormal) Collected: 03/14/22 0457    Specimen: Blood Updated: 03/14/22 0521     WBC 7.41 10*3/mm3      RBC 3.96 10*6/mm3      Hemoglobin 11.7 g/dL      Hematocrit 35.4 %      MCV 89.4 fL      MCH 29.5 pg      MCHC 33.1 g/dL      RDW 20.9 %      RDW-SD 66.4 fl      MPV 9.5 fL      Platelets 215 10*3/mm3      Neutrophil % 71.6 %      Lymphocyte % 11.6 %      Monocyte % 13.5 %      Eosinophil % 2.3 %      Basophil % 0.5 %      Immature Grans % 0.5 %      Neutrophils, Absolute 5.30 10*3/mm3      Lymphocytes, Absolute 0.86 10*3/mm3      Monocytes, Absolute 1.00 10*3/mm3      Eosinophils, Absolute 0.17 10*3/mm3      Basophils, Absolute 0.04 10*3/mm3      Immature Grans, Absolute 0.04 10*3/mm3      nRBC 0.0 /100 WBC     Urine Culture - Urine, Urine, Clean Catch [[id number]]  (Normal) Collected: 03/12/22 2146    Specimen: Urine, Clean Catch Updated: 03/13/22 2238     Urine Culture No growth        Data Review:  Results from last 7 days   Lab Units 03/16/22  0601 03/15/22  0558 03/14/22 0457   SODIUM mmol/L 142 136 141   POTASSIUM mmol/L 3.8 3.6 4.5   CHLORIDE mmol/L 106 103 107   CO2 mmol/L 27.0 25.0 26.0   BUN mg/dL 6 7 11   CREATININE mg/dL 0.86 0.81 0.77   GLUCOSE mg/dL 94 84 94   CALCIUM mg/dL 8.9 8.6 8.7     Results from last 7 days   Lab Units 03/16/22  0601 03/15/22  0558 03/14/22 0457   WBC 10*3/mm3 5.63 6.22 7.41   HEMOGLOBIN g/dL 12.2 12.0 11.7*   HEMATOCRIT % 35.9 35.9 35.4   PLATELETS 10*3/mm3 243 250 215             No results found for: TROPONINT      Results from last 7 days   Lab Units 03/16/22  0601 03/15/22  0558  03/14/22  0457   ALK PHOS U/L 111 116 117   BILIRUBIN mg/dL 0.5 0.6 0.7   BILIRUBIN DIRECT mg/dL  --   --  0.2   ALT (SGPT) U/L 36* 37* 33   AST (SGOT) U/L 48* 47* 39*             No results found for: POCGLU  Results from last 7 days   Lab Units 03/16/22  0601 03/15/22  0558 03/14/22  0749   INR  1.67* 1.79* 2.05*       Patient Active Problem List   Diagnosis Code   • Bradycardia R00.1   • Palpitation R00.2   • Chronic fatigue R53.82   • Ventricular tachycardia with normal heart( RVOT) I47.2   • 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.000   • Calculus of gallbladder with chronic cholecystitis without obstruction K80.10   • Abdominal pain R10.9   • Bilateral low back pain without sciatica M54.50   • Bone metastasis (HCC) C79.51   • 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.000   • History of malignant melanoma of skin Z85.820   • Elevated liver function tests R79.89   • Malignant neoplasm of right female breast (HCC) C50.911   • Encounter for adjustment or management of vascular access device Z45.2   • Nausea and vomiting R11.2   • Encounter for venous access device care Z45.2   • Other complication due to venous access device T82.898A   • Metastasis to brain (HCC) C79.31   • Long-term (current) use of anticoagulants Z79.01   • Vertebral compression fracture, initial encounter [id number]   • Chemotherapy-induced neutropenia (HCC) D70.1, T45.1X5A   • Anemia D64.9   • Ventricular premature beats I49.3   • Encounter for gynecological examination Z01.419   • Depression F32.A   • Anxiety F41.9   • Pancytopenia due to antineoplastic chemotherapy (HCC) D61.810, T45.1X5A   • Flu vaccine need Z23   • 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, 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.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51]  Yes   • History of malignant melanoma of skin [Z85.820]  Not Applicable   • Calculus of gallbladder with chronic cholecystitis without obstruction [K80.10]  Yes   • History of ductal carcinoma in situ (DCIS) of breast [Z86.000]  Yes   • Chronic fatigue [R53.82]  Yes      Resolved Hospital Problems   No resolved problems to display.       Plan:  See how she does with diet. Maybe home latter if feels well enough. Pain control.  Kai Gonzales MD  3/16/2022  09:38 CDT

## 2022-03-16 NOTE — PLAN OF CARE
Goal Outcome Evaluation:  Plan of Care Reviewed With: patient        Progress: improving  Outcome Evaluation: VSS. Progressed to full liquids with no pain.  Will advance farther today to see tolerance.  CT negative for any issues.

## 2022-03-16 NOTE — PROGRESS NOTES
HISTORY OF PRESENT ILLNESS:    No acute overnight events.  Has been able to tolerate diet without any significant abdominal pain.  Has not required any pain medication in last 24 hours.  Denies any bleeding.  Denies any fevers.          PAST MEDICAL HISTORY:    Past Medical History:   Diagnosis Date   • Anxiety    • Depression    • Encounter for gynecological examination    • Malignant neoplasm of female breast (HCC)     hx of dcis s/p mastectomy and reconstruction and augmentation      • Primary malignant neoplasm of breast (HCC)     dcis in rt breast s/p mastectomy,reconstruction      • Ventricular premature beats        SOCIAL HISTORY:    Social History     Tobacco Use   • Smoking status: Never Smoker   • Smokeless tobacco: Never Used   Substance Use Topics   • Alcohol use: No   • Drug use: No       Surgical History :  Past Surgical History:   Procedure Laterality Date   • AUGMENTATION MAMMAPLASTY     • AXILLARY LYMPH NODE BIOPSY/EXCISION Right 7/10/2017    Procedure: BIOSPY RIGHT AXILLARY MASS AND OR LYMPH;  Surgeon: Sourav Ernst MD;  Location: [facility];  Service:    • BREAST BIOPSY  12/07/2007    Mammotome biopsy of the right side with clip placement   • BREAST LUMPECTOMY     • BREAST RECONSTRUCTION  10/28/2008    Abscence of right breast secondary to mastectomy. Implant exchange for reconstruction of right breast with open para-prosthetic capsulotomy   • BREAST SURGERY  10/01/2009    Left breast ptosis and breast asymmetry secondary to right breast reconstruction for breast cancer. Left breast mastopexy with augmentation.   • CARDIAC CATHETERIZATION  09/18/2008    Normal coronary arteriography. Normal left ventricular angiogram   • EP STUDY     • MASTECTOMY Right    • MASTECTOMY  02/05/2008    Multifocal right breast ductal carcinoma-in-situ. Right total mastectomy   • MASTECTOMY, PARTIAL  01/03/2008    Ductal carcinoma in-situ of the right breast, proven by mammotome biopsy. Right lumpectomy,  "medial portion of the breast, between 2 o'clock and 4 o'clock, 5 cm from the nipple, with clip placement, radiographic inter. of severo. specimen, & ultrasound   • PAP SMEAR  03/03/2009    Negative   • VT INSJ TUNNELED CVC W/O SUBQ PORT/ AGE 3 YR/> Right 7/10/2017    Procedure: MEDIPORT     (c-arm#2);  Surgeon: Sourav Ernst MD;  Location: [facility];  Service: General       ALLERGIES:    Allergies   Allergen Reactions   • Percocet [Oxycodone-Acetaminophen] Nausea And Vomiting       FAMILY HISTORY:  Family History   Problem Relation Age of Onset   • Anemia Mother    • Multiple sclerosis Mother    • No Known Problems Father    • Diabetes Other    • Multiple sclerosis Other        REVIEW OF SYSTEMS:      CONSTITUTIONAL:  Complains of fatigue. Denies any fever, chills or weight loss.     EYES: No visual disturbances. No discharge. No new lesion.    ENMT: No epistaxis, mouth sores or difficulty swallowing.    RESPIRATORY: Positive for shortness of breath. No new cough or hemoptysis.    CARDIOVASCULAR: No chest pain or palpitations.    GASTROINTESTINAL: Positive for abdominal pain.  No  nausea, vomiting or blood in the stool.    GENITOURINARY: No dysuria or hematuria.    MUSCULOSKELETAL: Positive for back pain and hip pain.    LYMPHATICS: Denies any abnormal swollen glands anywhere in the body.    NEUROLOGICAL: Positive for chronic neuropathy. No headache or dizziness. No seizures or balance problems.    SKIN: Positive for cutaneous metastases affecting right axillary area    ENDOCRINE: No new hot or cold intolerance. No new polyuria. No polydipsia.      PHYSICAL EXAMINATION:      VITAL SIGNS: /71 (BP Location: Right arm, Patient Position: Lying)   Pulse 68   Temp 97.1 °F (36.2 °C) (Oral)   Resp 18   Ht 170.2 cm (67\")   Wt 101 kg (222 lb)   LMP  (LMP Unknown)   SpO2 94%   BMI 34.77 kg/m²       03/12/22 2051 03/13/22  0353 03/14/22  0342   Weight: 102 kg (225 lb) 102 kg (225 lb 3.2 oz) 101 kg (222 lb) "         CONSTITUTIONAL:  Not in any distress.    EYES: Mild conjunctival pallor. No icterus. No pterygium. Extraocular movements intact. No ptosis.    ENMT: Normocephalic, atraumatic. No facial asymmetry noted.    NECK: No adenopathy. Trachea midline. No JVD.    RESPIRATORY: Fair air entry bilateral. No rhonchi or wheezing. Fair respiratory effort.    CARDIOVASCULAR: S1, S2. Regular rate and rhythm. No murmur or gallop appreciated.    ABDOMEN: Soft, obese, positive for right upper quadrant tenderness. Bowel sounds present in all four quadrants.  No hepatosplenomegaly appreciated.    MUSCULOSKELETAL: No edema. No calf tenderness. Decreased range of motion.    NEUROLOGIC: No motor  deficit appreciated. Cranial nerves II-XII grossly intact.    SKIN: No new skin lesion identified. Skin is warm and moist to touch.    LYMPHATICS: No new enlarged lymph nodes in neck or supraclavicular area.    PSYCHIATRY: Alert, awake and oriented ×3. Normal affect.  Normal judgment.  Makes good eye contact.      DIAGNOSTIC DATA:    Glucose   Date Value Ref Range Status   03/16/2022 94 65 - 99 mg/dL Final     Sodium   Date Value Ref Range Status   03/16/2022 142 136 - 145 mmol/L Final     Potassium   Date Value Ref Range Status   03/16/2022 3.8 3.5 - 5.2 mmol/L Final     CO2   Date Value Ref Range Status   03/16/2022 27.0 22.0 - 29.0 mmol/L Final     Chloride   Date Value Ref Range Status   03/16/2022 106 98 - 107 mmol/L Final     Anion Gap   Date Value Ref Range Status   03/16/2022 9.0 5.0 - 15.0 mmol/L Final     Creatinine   Date Value Ref Range Status   03/16/2022 0.86 0.57 - 1.00 mg/dL Final     BUN   Date Value Ref Range Status   03/16/2022 6 6 - 20 mg/dL Final     BUN/Creatinine Ratio   Date Value Ref Range Status   03/16/2022 7.0 7.0 - 25.0 Final     Calcium   Date Value Ref Range Status   03/16/2022 8.9 8.6 - 10.5 mg/dL Final     Alkaline Phosphatase   Date Value Ref Range Status   03/16/2022 111 39 - 117 U/L Final     Total  Protein   Date Value Ref Range Status   03/16/2022 6.2 6.0 - 8.5 g/dL Final     ALT (SGPT)   Date Value Ref Range Status   03/16/2022 36 (H) 1 - 33 U/L Final     AST (SGOT)   Date Value Ref Range Status   03/16/2022 48 (H) 1 - 32 U/L Final     Total Bilirubin   Date Value Ref Range Status   03/16/2022 0.5 0.0 - 1.2 mg/dL Final     Albumin   Date Value Ref Range Status   03/16/2022 3.60 3.50 - 5.20 g/dL Final     Globulin   Date Value Ref Range Status   03/16/2022 2.6 gm/dL Final     Lab Results   Component Value Date    WBC 5.63 03/16/2022    HGB 12.2 03/16/2022    HCT 35.9 03/16/2022    MCV 88.9 03/16/2022     03/16/2022     Lab Results   Component Value Date    NEUTROABS 3.63 03/16/2022     Lab Results   Component Value Date     178.6 (H) 02/16/2022    LABCA2 315.4 (H) 02/16/2022    HCGQUANT 1.16 06/03/2021       PATHOLOGY:  * Cannot find OR log *     RADIOLOGY DATA :  CT Chest With Contrast Diagnostic    Result Date: 3/15/2022  1.  No evidence of cholecystitis or biliary dilatation. 2.  Interval progression of disease as indicated by increased size of the bilateral pulmonary nodules and hepatic metastases. 3.  No significant change in multiple subcutaneous and cutaneous skin metastases in the right axilla. 4.  No significant change in the innumerable sclerotic metastases throughout the visualized bones. Pathologic fractures at T12, L4, are unchanged. Electronically signed by:  Nayeli Finch  3/15/2022 12:01 AM CDT Workstation: [id number]    NM HIDA SCAN WITHOUT PHARMACOLOGICAL INTERVENTION    Addendum Date: 3/14/2022     ADDENDUM ADDENDUM #1 Additional imaging was obtained and demonstrates an ejection fraction of 64% following administration of a fatty meal. This is within normal limits. Electronically signed by:  Taye Holt MD  3/14/2022 2:22 PM CDT Workstation: [id number]     Result Date: 3/14/2022  1. No evidence for common bile duct or cystic duct obstruction. 2. Heterogeneous radiotracer  accumulation throughout the liver, corresponding to the known hepatic masses. Electronically signed by:  Taye Holt MD  3/14/2022 11:22 AM CDT Workstation: [id number]    CT Abdomen Pelvis With Contrast    Result Date: 3/15/2022  1.  No evidence of cholecystitis or biliary dilatation. 2.  Interval progression of disease as indicated by increased size of the bilateral pulmonary nodules and hepatic metastases. 3.  No significant change in multiple subcutaneous and cutaneous skin metastases in the right axilla. 4.  No significant change in the innumerable sclerotic metastases throughout the visualized bones. Pathologic fractures at T12, L4, are unchanged. Electronically signed by:  Nayeli Finch  3/15/2022 12:01 AM CDT Workstation: [id number]    US Gallbladder    Result Date: 3/12/2022  1.  Gallstones with gallbladder wall thickening and positive sonographic Mir's sign. Follow-up HIDA scan could be performed if indicated. CBD dilated at 10.5 mm. 2.  Liver masses, the largest measuring approximately 5.4 cm. These were better visualized on previous CT. Electronically signed by:  Jame Matta DO  3/12/2022 10:32 PM [facility] Workstation: [id number]      ASSESSMENT AND PLAN:      1.  Metastatic breast cancer with liver, lung, bone and skin metastasis stage IV:  -Recent CT scan during hospital admission is consistent with progression of disease with enlarging lung nodule as well as liver lesion.  -Option of intravenous chemotherapy including side effects were discussed with patient yesterday.  -She does not want to start chemotherapy at present as her daughter is getting  in 2 weeks from now.  -She is agreeable to starting intravenous chemotherapy in first week of April.  -We will get her an appointment to [facility] next week to resume her monthly Zometa as well as getting her chemo education with him chemotherapy eribulin.  -Her chemotherapy will be started in first week of April.    2.  Abdominal  pain:  -Clinically improved.  -Has not required any pain medication in last 24 hours.  -Prescription for oxycodone 5 mg every 4 hours as needed with 90 tablet has been sent to her pharmacy.    3.  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  -Recommend resuming home dose of Coumadin tonight and following up with Coumadin clinic for INR check.    4.  Bone metastasis:  -Has been on Zometa as outpatient.  Will provide her appointment for outpatient Zometa for next week.        Ovidio Meadows MD  3/16/2022  16:37 CDT    Part of this note may be an electronic transcription/translation of spoken language to printed text using the Dragon Dictation System.

## 2022-03-17 NOTE — PAYOR COMM NOTE
"Ami Ashton  [facility]  Case Management Extender  [phone] phone  [phone] fax      Auth# [id number]    Uri Fiore (51 y.o. Female)             Date of Birth   1970    Social Security Number       Address   [address] [place] [address]    Home Phone   [phone]    MRN   [id number]       Denominational   Non-Jehovah's witness    Marital Status                               Admission Date   3/12/22    Admission Type   Emergency    Admitting Provider   Jerald Muhammad MD    Attending Provider       Department, Room/Bed   [facility] [address] [place], 318/1       Discharge Date   3/16/2022    Discharge Disposition   Home or Self Care    Discharge Destination                               Attending Provider: (none)   Allergies: Percocet [Oxycodone-acetaminophen]    Isolation: None   Infection: None   Code Status: Prior   Advance Care Planning Activity    Ht: 170.2 cm (67\")   Wt: 101 kg (222 lb)    Admission Cmt: None   Principal Problem: Cholecystitis, acute [K81.0]                 Active Insurance as of 3/12/2022     Primary Coverage     Payor Plan Insurance Group Employer/Plan Group    [employer]HEM BLUE CROSS ANTHEM BLUE CROSS BLUE SHIELD PPO [id number]     Payor Plan Address Payor Plan Phone Number Payor Plan Fax Number Effective Dates    [address] [phone]  1/1/2019 - None Entered    [place] [address]       Subscriber Name Subscriber Birth Date Member ID       URI FIORE 1970 [id number]                 Emergency Contacts      (Rel.) Home Phone Work Phone Mobile Phone    FioreBenny pérez (Spouse) [phone] -- [phone]               Discharge Summary      Kai Gonzales MD at 03/16/22 1512                                                                             PHYSICIAN DISCHARGE SUMMARY                                                                       [facility] " [place]    Patient Identification:  Name: Kylie García  Age: 51 y.o.  Sex: female  :  1970  MRN: [id number]  Primary Care Physician: Teresa Lyn MD    Admit date: 3/12/2022  Discharge date and time:3/16/2022  Discharged Condition: good    Discharge Diagnoses:  Active Hospital Problems    Diagnosis  POA   • **Cholecystitis, acute [K81.0]  Yes   • Intractable abdominal pain [R10.9]  Yes   • Liver metastasis (HCC) [C78.7]  Yes   • Malignant neoplasm of right female breast (HCC) [C50.911]  Yes   • Bone metastasis (HCC) [C79.51]  Yes   • History of malignant melanoma of skin [Z85.820]  Not Applicable   • Calculus of gallbladder with chronic cholecystitis without obstruction [K80.10]  Yes   • History of ductal carcinoma in situ (DCIS) of breast [Z86.000]  Yes   • Chronic fatigue [R53.82]  Yes      Resolved Hospital Problems   No resolved problems to display.      Patient Active Problem List   Diagnosis Code   • Bradycardia R00.1   • Palpitation R00.2   • Chronic fatigue R53.82   • Ventricular tachycardia with normal heart( RVOT) I47.2   • 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.000   • Calculus of gallbladder with chronic cholecystitis without obstruction K80.10   • Abdominal pain R10.9   • Bilateral low back pain without sciatica M54.50   • Bone metastasis (HCC) C79.51   • 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.000   • History of malignant melanoma of skin Z85.820   • Elevated liver function tests R79.89   • Malignant neoplasm of right female breast (HCC) C50.911   • Encounter for adjustment or management of vascular access device Z45.2   • Nausea and vomiting R11.2   • Encounter for venous access device care Z45.2   • Other complication due to venous access device T82.898A   • Metastasis to brain (HCC) C79.31   • Long-term (current) use of anticoagulants Z79.01   • Vertebral compression fracture, initial encounter [id number]   • Chemotherapy-induced neutropenia (HCC)  D70.1, T45.1X5A   • Anemia D64.9   • Ventricular premature beats I49.3   • Encounter for gynecological examination Z01.419   • Depression F32.A   • Anxiety F41.9   • Pancytopenia due to antineoplastic chemotherapy (HCC) D61.810, T45.1X5A   • Flu vaccine need Z23   • 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, T45.1X5A   • Liver metastasis (HCC) C78.7   • Encounter for therapeutic drug monitoring Z51.81   • 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   • Morbidly obese (HCC) E66.01   • 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   • Cholecystitis, acute K81.0   • Intractable abdominal pain R10.9       PMHX:   Past Medical History:   Diagnosis Date   • Anxiety    • Depression    • Encounter for gynecological examination    • Malignant neoplasm of female breast (HCC)     hx of dcis s/p mastectomy and reconstruction and augmentation      • Primary malignant neoplasm of breast (HCC)     dcis in rt breast s/p mastectomy,reconstruction      • Ventricular premature beats      PSHX:   Past Surgical History:   Procedure Laterality Date   • AUGMENTATION MAMMAPLASTY     • AXILLARY LYMPH NODE BIOPSY/EXCISION Right 7/10/2017    Procedure: BIOSPY RIGHT AXILLARY MASS AND OR LYMPH;  Surgeon: Sourav Ernst MD;  Location: [facility];  Service:    • BREAST BIOPSY  12/07/2007    Mammotome biopsy of the right side with clip placement   • BREAST LUMPECTOMY     • BREAST RECONSTRUCTION  10/28/2008    Abscence of right breast secondary to mastectomy. Implant exchange for reconstruction of right breast with open para-prosthetic capsulotomy   • BREAST SURGERY  10/01/2009    Left breast ptosis and breast asymmetry secondary to right breast reconstruction for breast cancer. Left breast mastopexy with augmentation.   • CARDIAC CATHETERIZATION  09/18/2008    Normal coronary arteriography. Normal left ventricular angiogram   • EP STUDY     • MASTECTOMY Right    • MASTECTOMY  02/05/2008    Multifocal right  breast ductal carcinoma-in-situ. Right total mastectomy   • MASTECTOMY, PARTIAL  01/03/2008    Ductal carcinoma in-situ of the right breast, proven by mammotome biopsy. Right lumpectomy, medial portion of the breast, between 2 o'clock and 4 o'clock, 5 cm from the nipple, with clip placement, radiographic inter. of severo. specimen, & ultrasound   • PAP SMEAR  03/03/2009    Negative   • W[place] INSJ TUNNELED CVC W/O SUBQ PORT/ AGE 5 YR/> Right 7/10/2017    Procedure: MEDIPORT     (c-arm#2);  Surgeon: Sourav Ernst MD;  Location: [facility];  Service: General       Hospital Course: Kylie García  is a 51-year-old female with concurrent medical history of metastatic breast cancer presenting to the hospital with abdominal pain. The pain started this morning and was moderate in intensity mostly right upper quadrant pain. She was also having associated nausea. She came to the ER and was found to have cholecystitis on further imaging. She is also on chemotherapy for breast cancer and she also takes Coumadin for jugular port clot.       The patient was admitted to the hospital and seen by oncology and general surgery.  Initially felt to have acute cholecystitis on ultrasound findings with positive Mir sign and gallstones.  Also had gallbladder wall thickness.  Subsequently patient had HIDA scan done which was negative and CT scan of abdomen also was negative.  Patient was also found to have increasing liver metastases.  Surgery did not feel that cholecystectomy was necessary at this time given her other medical conditions.  She was able to tolerate a regular diet and feeling better and looked well enough to go home after being in the hospital for a few days.  She will follow-up with her oncologist for ongoing care and also follow-up with her primary care.    Consults:     Consults     Date and Time Order Name Status Description    3/13/2022  3:54 AM Hematology & Oncology Inpatient Consult Completed     3/13/2022 12:31  AM Hospitalist (on-call MD unless specified)      3/13/2022 12:23 AM Surgery (on-call MD unless specified)          Results from last 7 days   Lab Units 03/16/22  0601   WBC 10*3/mm3 5.63   HEMOGLOBIN g/dL 12.2   HEMATOCRIT % 35.9   PLATELETS 10*3/mm3 243     Results from last 7 days   Lab Units 03/16/22  0601   SODIUM mmol/L 142   POTASSIUM mmol/L 3.8   CHLORIDE mmol/L 106   CO2 mmol/L 27.0   BUN mg/dL 6   CREATININE mg/dL 0.86   GLUCOSE mg/dL 94   CALCIUM mg/dL 8.9     Significant Diagnostic Studies:   WBC   Date Value Ref Range Status   03/16/2022 5.63 3.40 - 10.80 10*3/mm3 Final     Hemoglobin   Date Value Ref Range Status   03/16/2022 12.2 12.0 - 15.9 g/dL Final     Hematocrit   Date Value Ref Range Status   03/16/2022 35.9 34.0 - 46.6 % Final     Platelets   Date Value Ref Range Status   03/16/2022 243 140 - 450 10*3/mm3 Final     Sodium   Date Value Ref Range Status   03/16/2022 142 136 - 145 mmol/L Final     Potassium   Date Value Ref Range Status   03/16/2022 3.8 3.5 - 5.2 mmol/L Final     Chloride   Date Value Ref Range Status   03/16/2022 106 98 - 107 mmol/L Final     CO2   Date Value Ref Range Status   03/16/2022 27.0 22.0 - 29.0 mmol/L Final     BUN   Date Value Ref Range Status   03/16/2022 6 6 - 20 mg/dL Final     Creatinine   Date Value Ref Range Status   03/16/2022 0.86 0.57 - 1.00 mg/dL Final     Glucose   Date Value Ref Range Status   03/16/2022 94 65 - 99 mg/dL Final     Calcium   Date Value Ref Range Status   03/16/2022 8.9 8.6 - 10.5 mg/dL Final     Magnesium   Date Value Ref Range Status   03/14/2022 2.3 1.6 - 2.6 mg/dL Final     AST (SGOT)   Date Value Ref Range Status   03/16/2022 48 (H) 1 - 32 U/L Final     ALT (SGPT)   Date Value Ref Range Status   03/16/2022 36 (H) 1 - 33 U/L Final     Alkaline Phosphatase   Date Value Ref Range Status   03/16/2022 111 39 - 117 U/L Final     INR   Date Value Ref Range Status   03/16/2022 1.67 (H) 0.80 - 1.20 Final     No results found for: CLEMENT,  CLARITYU, SPECGRAV, PHUR, PROTEINUR, GLUCOSEU, KETONESU, BLOODU, NITRITE, LEUKOCYTESUR, BILIRUBINUR, UROBILINOGEN, RBCUA, WBCUA, BACTERIA, UACOMMENT  No results found for: TROPONINT, TROPONINI, BNP  No components found for: HGBA1C;2  No components found for: TSH;2  Imaging Results (All)     Procedure Component Value Units Date/Time    CT Chest With Contrast Diagnostic [[id number]] Collected: 03/14/22 1653     Updated: 03/15/22 0003    Narrative:      CT CHEST WITH IV CONTRAST, CT ABDOMEN PELVIS WITH IV CONTRAST    INDICATION: 51 years Female; Metastatic breast cancer with liver,  bone and skin metastases involving right axillary area, new  worsening right upper quadrant pain and shortness of breath,  restaging, K81.0 Acute cholecystitis    TECHNIQUE:  CT scan of the chest abdomen and pelvis was performed  with IV contrast.  This exam was performed according to our  departmental dose-optimization program, which includes automated  exposure control, adjustment of the mA and/or kV according to  patient size and/or use of iterative reconstruction technique.     Comparison: CT 1/12/2022, ultrasound 3/12/2022, HIDA scan  3/13/2022.    FINDINGS:  Thyroid: Unremarkable.   Heart/Mediastinum: No mediastinal or hilar lymphadenopathy.   Vasculature: No aortic aneurysm or dissection. No evidence of  pulmonary emboli.   Lungs/Pleura: Interval enlargement of the bilateral pulmonary  nodules. The right upper lobe nodule in the measures 1.2 cm  (previously 0.9 cm). The left lower lobe nodule measures 1 cm  (previously 0.8 cm). No pleural effusion or pneumothorax. No  acute airspace opacities.   Liver: Multiple hypodense masses in the liver have increased in  size. For example, the mass in the periphery of the right hepatic  lobe segment 5 now measures 5.9 cm (previously 4.6 cm. The  coarsely calcified mass in the left hepatic lobe measuring 5.6 cm  is unchanged.  Gallbladder/Biliary tree: No gallbladder wall thickening  or  pericholecystic fluid/edema. No biliary dilatation.  Pancreas: Unremarkable.  Spleen: Unremarkable.  Adrenals: Unremarkable.  Genitourinary: No hydronephrosis or nephrolithiasis. No bladder  wall thickening. The uterus and bilateral adnexa are grossly  unremarkable.  Gastrointestinal: No bowel wall thickening or obstruction. Normal  appendix. No free air or free fluid.  Peritoneum: Unremarkable.  Lymph nodes: No enlarged lymph nodes in the abdomen and pelvis.  Bones: Innumerable sclerotic lesions scattered throughout the  visualized bones, including spine, ribs, sternum, clavicles,  scapula, bilateral humeral heads, pelvis, bilateral femurs appear  grossly unchanged. Pathologic fracture at L4 with vertebral plana  deformity is unchanged. Pathologic fracture at T12 is unchanged.  Soft tissues: Status post right mastectomy with reconstruction.  Similar appearance of the right breast tissue expander. No  significant change in multiple subcutaneous and cutaneous nodules  around the right axilla. Similar appearance of an irregular  nodule measuring 2.8 cm in the right axilla extending to the  skin.   Incidental findings: The tip of the right Mediport is at the  cavoatrial junction.       Impression:      1.  No evidence of cholecystitis or biliary dilatation.  2.  Interval progression of disease as indicated by increased  size of the bilateral pulmonary nodules and hepatic metastases.  3.  No significant change in multiple subcutaneous and cutaneous  skin metastases in the right axilla.  4.  No significant change in the innumerable sclerotic metastases  throughout the visualized bones. Pathologic fractures at T12, L4,  are unchanged.    Electronically signed by:  Nayeli Finch  3/15/2022 12:01 AM CDT  Workstation: [id number]    CT Abdomen Pelvis With Contrast [[id number]] Collected: 03/14/22 1653     Updated: 03/15/22 0003    Narrative:      CT CHEST WITH IV CONTRAST, CT ABDOMEN PELVIS WITH IV CONTRAST    INDICATION: 51  years Female; Metastatic breast cancer with liver,  bone and skin metastases involving right axillary area, new  worsening right upper quadrant pain and shortness of breath,  restaging, K81.0 Acute cholecystitis    TECHNIQUE:  CT scan of the chest abdomen and pelvis was performed  with IV contrast.  This exam was performed according to our  departmental dose-optimization program, which includes automated  exposure control, adjustment of the mA and/or kV according to  patient size and/or use of iterative reconstruction technique.     Comparison: CT 1/12/2022, ultrasound 3/12/2022, HIDA scan  3/13/2022.    FINDINGS:  Thyroid: Unremarkable.   Heart/Mediastinum: No mediastinal or hilar lymphadenopathy.   Vasculature: No aortic aneurysm or dissection. No evidence of  pulmonary emboli.   Lungs/Pleura: Interval enlargement of the bilateral pulmonary  nodules. The right upper lobe nodule in the measures 1.2 cm  (previously 0.9 cm). The left lower lobe nodule measures 1 cm  (previously 0.8 cm). No pleural effusion or pneumothorax. No  acute airspace opacities.   Liver: Multiple hypodense masses in the liver have increased in  size. For example, the mass in the periphery of the right hepatic  lobe segment 5 now measures 5.9 cm (previously 4.6 cm. The  coarsely calcified mass in the left hepatic lobe measuring 5.6 cm  is unchanged.  Gallbladder/Biliary tree: No gallbladder wall thickening or  pericholecystic fluid/edema. No biliary dilatation.  Pancreas: Unremarkable.  Spleen: Unremarkable.  Adrenals: Unremarkable.  Genitourinary: No hydronephrosis or nephrolithiasis. No bladder  wall thickening. The uterus and bilateral adnexa are grossly  unremarkable.  Gastrointestinal: No bowel wall thickening or obstruction. Normal  appendix. No free air or free fluid.  Peritoneum: Unremarkable.  Lymph nodes: No enlarged lymph nodes in the abdomen and pelvis.  Bones: Innumerable sclerotic lesions scattered throughout the  visualized  bones, including spine, ribs, sternum, clavicles,  scapula, bilateral humeral heads, pelvis, bilateral femurs appear  grossly unchanged. Pathologic fracture at L4 with vertebral plana  deformity is unchanged. Pathologic fracture at T12 is unchanged.  Soft tissues: Status post right mastectomy with reconstruction.  Similar appearance of the right breast tissue expander. No  significant change in multiple subcutaneous and cutaneous nodules  around the right axilla. Similar appearance of an irregular  nodule measuring 2.8 cm in the right axilla extending to the  skin.   Incidental findings: The tip of the right Mediport is at the  cavoatrial junction.       Impression:      1.  No evidence of cholecystitis or biliary dilatation.  2.  Interval progression of disease as indicated by increased  size of the bilateral pulmonary nodules and hepatic metastases.  3.  No significant change in multiple subcutaneous and cutaneous  skin metastases in the right axilla.  4.  No significant change in the innumerable sclerotic metastases  throughout the visualized bones. Pathologic fractures at T12, L4,  are unchanged.    Electronically signed by:  Nayeli Finch  3/15/2022 12:01 AM CDT  Workstation: [id number]    NM HIDA SCAN WITHOUT PHARMACOLOGICAL INTERVENTION [[id number]] Resulted: 03/14/22 1422     Updated: 03/14/22 1442    Addenda:         ADDENDUM   ADDENDUM #1       Additional imaging was obtained and demonstrates an ejection  fraction of 64% following administration of a fatty meal. This is  within normal limits.    Electronically signed by:  Taye Holt MD  3/14/2022 2:22 PM CDT  Workstation: [id number]    Signed: 03/14/22 1422 by Taye Holt MD    Narrative:      EXAMINATION:  NM HIDA SCAN WITH PHARMACOLOGICAL INTERVENTION    CLINICAL HISTORY:  51 years Female,Abdominal pain, biliary  obstruction suspected (Ped 0-18y), K81.0 Acute cholecystitis    COMPARISON:  Gallbladder ultrasound dated 3/12/2022, CT of the  abdomen and  pelvis dated 1/12/2022    FINDINGS:    Sequential planar imaging obtained following intravenous  administration of 6.4 mCi technetium 99m Choletec. There is  heterogeneous activity throughout the liver, corresponding to the  known multifocal hepatic masses that were demonstrated on the  recent CT and ultrasound. On images labeled 30 minutes and 60  minutes, the gallbladder is visualized. Additionally, there is  visualization of the common bile duct as well as of the small  bowel.      Impression:      1. No evidence for common bile duct or cystic duct obstruction.  2. Heterogeneous radiotracer accumulation throughout the liver,  corresponding to the known hepatic masses.    Electronically signed by:  Taye Holt MD  3/14/2022 11:22 AM  CDT Workstation: [id number]    NM HIDA SCAN WITH PHARMACOLOGICAL INTERVENTION [[id number]] Collected: 03/14/22 0953     Updated: 03/14/22 1435    US Gallbladder [[id number]] Collected: 03/12/22 2148     Updated: 03/12/22 2234    Narrative:      EXAM DESCRIPTION:  US GALLBLADDER  RadLex: US GALLBLADDER   Technique:  Multiple sonographic images of the right upper  quadrant were obtained.     CLINICAL HISTORY:  RUQ pain.    COMPARISON:  CT abdomen 1/12/2022.    FINDINGS:  The liver demonstrates multiple masses, the largest measuring  approximately 4.4 x 5.4 x 4.6 cm in the left lobe and 4.1 x 5.1 x  3.9 cm in the right lobe. These were better seen on the CT exam.  The gallbladder appears contracted with underlying shadowing  stones. Gallbladder wall thickened at 4.9 mm. Positive  sonographic Mir's sign was elicited. The common bile duct  measures 10.5 mm. The visualized pancreas appears unremarkable.  There is no evidence for hydronephrosis in the right kidney.  Right kidney measures 11.5 cm in length. The abdominal aorta and  inferior vena cava are not well visualized.      Impression:      1.  Gallstones with gallbladder wall thickening and positive  sonographic Mir's sign.  Follow-up HIDA scan could be performed  if indicated. CBD dilated at 10.5 mm.  2.  Liver masses, the largest measuring approximately 5.4 cm.  These were better visualized on previous CT.    Electronically signed by:  Jame Matta DO  3/12/2022 10:32 PM [facility]  Workstation: [id number]        Lab Results (last 7 days)     Procedure Component Value Units Date/Time    Comprehensive Metabolic Panel [[id number]]  (Abnormal) Collected: 03/16/22 0601    Specimen: Blood Updated: 03/16/22 0627     Glucose 94 mg/dL      BUN 6 mg/dL      Creatinine 0.86 mg/dL      Sodium 142 mmol/L      Potassium 3.8 mmol/L      Chloride 106 mmol/L      CO2 27.0 mmol/L      Calcium 8.9 mg/dL      Total Protein 6.2 g/dL      Albumin 3.60 g/dL      ALT (SGPT) 36 U/L      AST (SGOT) 48 U/L      Alkaline Phosphatase 111 U/L      Total Bilirubin 0.5 mg/dL      Globulin 2.6 gm/dL      A/G Ratio 1.4 g/dL      BUN/Creatinine Ratio 7.0     Anion Gap 9.0 mmol/L      eGFR 80.9 mL/min/1.73      Comment: National Kidney Foundation and American Society of Nephrology (ASN) Task Force recommended calculation based on the Chronic Kidney Disease Epidemiology Collaboration (CKD-EPI) equation refit without adjustment for race.       Narrative:      GFR Normal >60  Chronic Kidney Disease <60  Kidney Failure <15      Protime-INR [[id number]]  (Abnormal) Collected: 03/16/22 0601    Specimen: Blood Updated: 03/16/22 0620     Protime 19.4 Seconds      INR 1.67    Narrative:      Therapeutic range for most indications is 2.0-3.0 INR,  or 2.5-3.5 for mechanical heart valves.    CBC & Differential [[id number]]  (Abnormal) Collected: 03/16/22 0601    Specimen: Blood Updated: 03/16/22 0608    Narrative:      The following orders were created for panel order CBC & Differential.  Procedure                               Abnormality         Status                     ---------                               -----------         ------                     CBC Auto Differential[[id number]]         Abnormal            Final result                 Please view results for these tests on the individual orders.    CBC Auto Differential [[id number]]  (Abnormal) Collected: 03/16/22 0601    Specimen: Blood Updated: 03/16/22 0608     WBC 5.63 10*3/mm3      RBC 4.04 10*6/mm3      Hemoglobin 12.2 g/dL      Hematocrit 35.9 %      MCV 88.9 fL      MCH 30.2 pg      MCHC 34.0 g/dL      RDW 21.1 %      RDW-SD 66.0 fl      MPV 9.1 fL      Platelets 243 10*3/mm3      Neutrophil % 64.5 %      Lymphocyte % 18.1 %      Monocyte % 13.7 %      Eosinophil % 2.8 %      Basophil % 0.5 %      Immature Grans % 0.4 %      Neutrophils, Absolute 3.63 10*3/mm3      Lymphocytes, Absolute 1.02 10*3/mm3      Monocytes, Absolute 0.77 10*3/mm3      Eosinophils, Absolute 0.16 10*3/mm3      Basophils, Absolute 0.03 10*3/mm3      Immature Grans, Absolute 0.02 10*3/mm3      nRBC 0.0 /100 WBC     Blood Culture - Blood, Arm, Left [[id number]]  (Normal) Collected: 03/13/22 0053    Specimen: Blood from Arm, Left Updated: 03/16/22 0216     Blood Culture No growth at 3 days    Blood Culture - Blood, Arm, Left [[id number]]  (Normal) Collected: 03/13/22 0053    Specimen: Blood from Arm, Left Updated: 03/16/22 0216     Blood Culture No growth at 3 days    Comprehensive Metabolic Panel [[id number]]  (Abnormal) Collected: 03/15/22 0558    Specimen: Blood Updated: 03/15/22 0709     Glucose 84 mg/dL      BUN 7 mg/dL      Creatinine 0.81 mg/dL      Sodium 136 mmol/L      Potassium 3.6 mmol/L      Chloride 103 mmol/L      CO2 25.0 mmol/L      Calcium 8.6 mg/dL      Total Protein 6.3 g/dL      Albumin 3.60 g/dL      ALT (SGPT) 37 U/L      AST (SGOT) 47 U/L      Alkaline Phosphatase 116 U/L      Total Bilirubin 0.6 mg/dL      Globulin 2.7 gm/dL      A/G Ratio 1.3 g/dL      BUN/Creatinine Ratio 8.6     Anion Gap 8.0 mmol/L      eGFR 86.9 mL/min/1.73      Comment: National Kidney Foundation and American Society of Nephrology (ASN) Task Force recommended calculation  based on the Chronic Kidney Disease Epidemiology Collaboration (CKD-EPI) equation refit without adjustment for race.       Narrative:      GFR Normal >60  Chronic Kidney Disease <60  Kidney Failure <15      Protime-INR [[id number]]  (Abnormal) Collected: 03/15/22 0558    Specimen: Blood Updated: 03/15/22 0702     Protime 20.4 Seconds      INR 1.79    Narrative:      Therapeutic range for most indications is 2.0-3.0 INR,  or 2.5-3.5 for mechanical heart valves.    CBC & Differential [[id number]]  (Abnormal) Collected: 03/15/22 0558    Specimen: Blood Updated: 03/15/22 0651    Narrative:      The following orders were created for panel order CBC & Differential.  Procedure                               Abnormality         Status                     ---------                               -----------         ------                     CBC Auto Differential[[id number]]        Abnormal            Final result                 Please view results for these tests on the individual orders.    CBC Auto Differential [[id number]]  (Abnormal) Collected: 03/15/22 0558    Specimen: Blood Updated: 03/15/22 0651     WBC 6.22 10*3/mm3      RBC 4.03 10*6/mm3      Hemoglobin 12.0 g/dL      Hematocrit 35.9 %      MCV 89.1 fL      MCH 29.8 pg      MCHC 33.4 g/dL      RDW 20.7 %      RDW-SD 65.5 fl      MPV 9.6 fL      Platelets 250 10*3/mm3      Neutrophil % 66.8 %      Lymphocyte % 16.1 %      Monocyte % 13.2 %      Eosinophil % 2.6 %      Basophil % 1.0 %      Immature Grans % 0.3 %      Neutrophils, Absolute 4.16 10*3/mm3      Lymphocytes, Absolute 1.00 10*3/mm3      Monocytes, Absolute 0.82 10*3/mm3      Eosinophils, Absolute 0.16 10*3/mm3      Basophils, Absolute 0.06 10*3/mm3      Immature Grans, Absolute 0.02 10*3/mm3      nRBC 0.0 /100 WBC     Extra Tubes [[id number]] Collected: 03/14/22 0750    Specimen: Blood, Venous Line Updated: 03/14/22 0902    Narrative:      The following orders were created for panel order Extra  Tubes.  Procedure                               Abnormality         Status                     ---------                               -----------         ------                     Lavender Top[[id number]]                                     Final result               Gold Top - SST[[id number]]                                   Final result                 Please view results for these tests on the individual orders.    Lavender Top [[id number]] Collected: 03/14/22 0750    Specimen: Blood Updated: 03/14/22 0902     Extra Tube hold for add-on     Comment: Auto resulted       Gold Top - SST [[id number]] Collected: 03/14/22 0750    Specimen: Blood Updated: 03/14/22 0902     Extra Tube Hold for add-ons.     Comment: Auto resulted.       Protime-INR [[id number]]  (Abnormal) Collected: 03/14/22 0749    Specimen: Blood Updated: 03/14/22 0815     Protime 22.7 Seconds      INR 2.05    Narrative:      Therapeutic range for most indications is 2.0-3.0 INR,  or 2.5-3.5 for mechanical heart valves.    Basic Metabolic Panel [[id number]]  (Normal) Collected: 03/14/22 0457    Specimen: Blood Updated: 03/14/22 0550     Glucose 94 mg/dL      BUN 11 mg/dL      Creatinine 0.77 mg/dL      Sodium 141 mmol/L      Potassium 4.5 mmol/L      Chloride 107 mmol/L      CO2 26.0 mmol/L      Calcium 8.7 mg/dL      BUN/Creatinine Ratio 14.3     Anion Gap 8.0 mmol/L      eGFR 92.4 mL/min/1.73      Comment: National Kidney Foundation and American Society of Nephrology (ASN) Task Force recommended calculation based on the Chronic Kidney Disease Epidemiology Collaboration (CKD-EPI) equation refit without adjustment for race.       Narrative:      GFR Normal >60  Chronic Kidney Disease <60  Kidney Failure <15      Hepatic Function Panel [[id number]]  (Abnormal) Collected: 03/14/22 0457    Specimen: Blood Updated: 03/14/22 0550     Total Protein 6.1 g/dL      Albumin 3.70 g/dL      ALT (SGPT) 33 U/L      AST (SGOT) 39 U/L      Alkaline Phosphatase 117 U/L       Total Bilirubin 0.7 mg/dL      Bilirubin, Direct 0.2 mg/dL      Bilirubin, Indirect 0.5 mg/dL     Magnesium [[id number]]  (Normal) Collected: 03/14/22 0457    Specimen: Blood Updated: 03/14/22 0550     Magnesium 2.3 mg/dL     CBC & Differential [[id number]]  (Abnormal) Collected: 03/14/22 0457    Specimen: Blood Updated: 03/14/22 0521    Narrative:      The following orders were created for panel order CBC & Differential.  Procedure                               Abnormality         Status                     ---------                               -----------         ------                     CBC Auto Differential[[id number]]        Abnormal            Final result                 Please view results for these tests on the individual orders.    CBC Auto Differential [[id number]]  (Abnormal) Collected: 03/14/22 0457    Specimen: Blood Updated: 03/14/22 0521     WBC 7.41 10*3/mm3      RBC 3.96 10*6/mm3      Hemoglobin 11.7 g/dL      Hematocrit 35.4 %      MCV 89.4 fL      MCH 29.5 pg      MCHC 33.1 g/dL      RDW 20.9 %      RDW-SD 66.4 fl      MPV 9.5 fL      Platelets 215 10*3/mm3      Neutrophil % 71.6 %      Lymphocyte % 11.6 %      Monocyte % 13.5 %      Eosinophil % 2.3 %      Basophil % 0.5 %      Immature Grans % 0.5 %      Neutrophils, Absolute 5.30 10*3/mm3      Lymphocytes, Absolute 0.86 10*3/mm3      Monocytes, Absolute 1.00 10*3/mm3      Eosinophils, Absolute 0.17 10*3/mm3      Basophils, Absolute 0.04 10*3/mm3      Immature Grans, Absolute 0.04 10*3/mm3      nRBC 0.0 /100 WBC     Urine Culture - Urine, Urine, Clean Catch [[id number]]  (Normal) Collected: 03/12/22 2146    Specimen: Urine, Clean Catch Updated: 03/13/22 2238     Urine Culture No growth    Basic Metabolic Panel [[id number]]  (Abnormal) Collected: 03/13/22 0731    Specimen: Blood Updated: 03/13/22 0801     Glucose 94 mg/dL      BUN 13 mg/dL      Creatinine 0.78 mg/dL      Sodium 142 mmol/L      Potassium 4.0 mmol/L      Chloride 109 mmol/L       CO2 25.0 mmol/L      Calcium 8.4 mg/dL      BUN/Creatinine Ratio 16.7     Anion Gap 8.0 mmol/L      eGFR 91.0 mL/min/1.73      Comment: National Kidney Foundation and American Society of Nephrology (ASN) Task Force recommended calculation based on the Chronic Kidney Disease Epidemiology Collaboration (CKD-EPI) equation refit without adjustment for race.       Narrative:      GFR Normal >60  Chronic Kidney Disease <60  Kidney Failure <15      Magnesium [[id number]]  (Normal) Collected: 03/13/22 0731    Specimen: Blood Updated: 03/13/22 0801     Magnesium 2.2 mg/dL     CBC & Differential [[id number]]  (Abnormal) Collected: 03/13/22 0731    Specimen: Blood Updated: 03/13/22 0746    Narrative:      The following orders were created for panel order CBC & Differential.  Procedure                               Abnormality         Status                     ---------                               -----------         ------                     CBC Auto Differential[[id number]]        Abnormal            Final result                 Please view results for these tests on the individual orders.    CBC Auto Differential [[id number]]  (Abnormal) Collected: 03/13/22 0731    Specimen: Blood Updated: 03/13/22 0746     WBC 7.87 10*3/mm3      RBC 3.96 10*6/mm3      Hemoglobin 11.8 g/dL      Hematocrit 35.4 %      MCV 89.4 fL      MCH 29.8 pg      MCHC 33.3 g/dL      RDW 20.8 %      RDW-SD 66.3 fl      MPV 9.4 fL      Platelets 208 10*3/mm3      Neutrophil % 70.9 %      Lymphocyte % 14.4 %      Monocyte % 13.2 %      Eosinophil % 0.6 %      Basophil % 0.4 %      Immature Grans % 0.5 %      Neutrophils, Absolute 5.58 10*3/mm3      Lymphocytes, Absolute 1.13 10*3/mm3      Monocytes, Absolute 1.04 10*3/mm3      Eosinophils, Absolute 0.05 10*3/mm3      Basophils, Absolute 0.03 10*3/mm3      Immature Grans, Absolute 0.04 10*3/mm3      nRBC 0.0 /100 WBC     COVID-19 and FLU A/B PCR - Swab, Nasopharynx [[id number]]  (Normal) Collected: 03/13/22  0053    Specimen: Swab from Nasopharynx Updated: 03/13/22 0333     COVID19 Not Detected     Influenza A PCR Not Detected     Influenza B PCR Not Detected    Narrative:      Fact sheet for providers: https://www.fda.gov/media/813427/download    Fact sheet for patients: https://www.fda.gov/media/914019/download    Test performed by PCR.    Extra Tubes [[id number]] Collected: 03/12/22 2222    Specimen: Blood Updated: 03/13/22 0332    Narrative:      The following orders were created for panel order Extra Tubes.  Procedure                               Abnormality         Status                     ---------                               -----------         ------                     [place] Top[[id number]]                                         Final result                 Please view results for these tests on the individual orders.    [place] Top [[id number]] Collected: 03/12/22 2222    Specimen: Blood Updated: 03/13/22 0332     Extra Tube Hold for add-ons.     Comment: Auto resulted.       Lactic Acid, Plasma [[id number]]  (Normal) Collected: 03/12/22 2222    Specimen: Blood Updated: 03/13/22 0040     Lactate 1.0 mmol/L     Comprehensive Metabolic Panel [[id number]]  (Abnormal) Collected: 03/12/22 2217    Specimen: Blood Updated: 03/12/22 2248     Glucose 103 mg/dL      BUN 14 mg/dL      Creatinine 0.83 mg/dL      Sodium 133 mmol/L      Potassium 3.8 mmol/L      Chloride 100 mmol/L      CO2 21.0 mmol/L      Calcium 8.5 mg/dL      Total Protein 6.8 g/dL      Albumin 3.90 g/dL      ALT (SGPT) 45 U/L      AST (SGOT) 43 U/L      Alkaline Phosphatase 143 U/L      Total Bilirubin 0.6 mg/dL      Globulin 2.9 gm/dL      A/G Ratio 1.3 g/dL      BUN/Creatinine Ratio 16.9     Anion Gap 12.0 mmol/L      eGFR 84.4 mL/min/1.73      Comment: National Kidney Foundation and American Society of Nephrology (ASN) Task Force recommended calculation based on the Chronic Kidney Disease Epidemiology Collaboration (CKD-EPI) equation refit without  adjustment for race.       Narrative:      GFR Normal >60  Chronic Kidney Disease <60  Kidney Failure <15      Lipase [[id number]]  (Normal) Collected: 03/12/22 2217    Specimen: Blood Updated: 03/12/22 2248     Lipase 18 U/L     CK [[id number]]  (Normal) Collected: 03/12/22 2217    Specimen: Blood Updated: 03/12/22 2248     Creatine Kinase 61 U/L     Magnesium [[id number]]  (Normal) Collected: 03/12/22 2217    Specimen: Blood Updated: 03/12/22 2248     Magnesium 2.0 mg/dL     Protime-INR [[id number]]  (Abnormal) Collected: 03/12/22 2217    Specimen: Blood Updated: 03/12/22 2243     Protime 22.1 Seconds      INR 1.98    Narrative:      Therapeutic range for most indications is 2.0-3.0 INR,  or 2.5-3.5 for mechanical heart valves.    CBC & Differential [[id number]]  (Abnormal) Collected: 03/12/22 2217    Specimen: Blood Updated: 03/12/22 2232    Narrative:      The following orders were created for panel order CBC & Differential.  Procedure                               Abnormality         Status                     ---------                               -----------         ------                     CBC Auto Differential[[id number]]        Abnormal            Final result                 Please view results for these tests on the individual orders.    CBC Auto Differential [[id number]]  (Abnormal) Collected: 03/12/22 2217    Specimen: Blood Updated: 03/12/22 2232     WBC 12.18 10*3/mm3      RBC 4.20 10*6/mm3      Hemoglobin 12.4 g/dL      Hematocrit 37.1 %      MCV 88.3 fL      MCH 29.5 pg      MCHC 33.4 g/dL      RDW 20.8 %      RDW-SD 64.5 fl      MPV 9.7 fL      Platelets 256 10*3/mm3      Neutrophil % 78.1 %      Lymphocyte % 9.4 %      Monocyte % 10.8 %      Eosinophil % 0.2 %      Basophil % 0.3 %      Immature Grans % 1.2 %      Neutrophils, Absolute 9.50 10*3/mm3      Lymphocytes, Absolute 1.14 10*3/mm3      Monocytes, Absolute 1.32 10*3/mm3      Eosinophils, Absolute 0.03 10*3/mm3      Basophils, Absolute 0.04  "10*3/mm3      Immature Grans, Absolute 0.15 10*3/mm3      nRBC 0.0 /100 WBC     Urinalysis, Microscopic Only - Urine, Clean Catch [[id number]]  (Abnormal) Collected: 03/12/22 2146    Specimen: Urine, Clean Catch Updated: 03/12/22 2159     RBC, UA 0-2 /HPF      WBC, UA 6-12 /HPF      Bacteria, UA None Seen /HPF      Squamous Epithelial Cells, UA 0-2 /HPF      Hyaline Casts, UA None Seen /LPF      Methodology Automated Microscopy    Urinalysis With Culture If Indicated - Urine, Clean Catch [[id number]]  (Abnormal) Collected: 03/12/22 2146    Specimen: Urine, Clean Catch Updated: 03/12/22 2158     Color, UA Yellow     Appearance, UA Clear     pH, UA 6.0     Specific Gravity, UA 1.021     Glucose, UA Negative     Ketones, UA Negative     Bilirubin, UA Negative     Blood, UA Negative     Protein, UA Negative     Leuk Esterase, UA Small (1+)     Nitrite, UA Negative     Urobilinogen, UA 0.2 E.U./dL        /70 (BP Location: Right arm, Patient Position: Lying)   Pulse 68   Temp 97.4 °F (36.3 °C) (Oral)   Resp 18   Ht 170.2 cm (67\")   Wt 101 kg (222 lb)   LMP  (LMP Unknown)   SpO2 96%   BMI 34.77 kg/m²     Discharge Exam:  General Appearance:    Alert, cooperative, no distress                          Head:    Normocephalic, without obvious abnormality, atraumatic                          Eyes:                            Throat:   Lips, tongue, gums normal                          Neck:   Supple, symmetrical, trachea midline, no JVD                        Lungs:     Clear to auscultation bilaterally, respirations unlabored                Chest Wall:    No tenderness or deformity                        Heart:    Regular rate and rhythm, S1 and S2 normal, no murmur,no  Rub or gallop                  Abdomen:     Soft, non-tender, bowel sounds active, no masses, no organomegaly                  Extremities:   Extremities normal, atraumatic, no cyanosis or edema                             Skin:   Skin is warm and " dry,  no rashes or palpable lesions                  Neurologic:   no focal deficits noted     Disposition:  Home    Patient Instructions:     Diet and activity as tolerated         Discharge Medications      Changes to Medications      Instructions Start Date   clindamycin 1 % gel  Commonly known as: Clindagel  What changed: additional instructions   1 application, Topical, 2 Times Daily      gabapentin 300 MG capsule  Commonly known as: NEURONTIN  What changed:   · when to take this  · reasons to take this   300 mg, Oral, 3 Times Daily      Scopolamine 1 MG/3DAYS patch  Commonly known as: Transderm-Scop (1.5 MG)  What changed:   · when to take this  · reasons to take this   1 patch, Transdermal, Every 72 Hours      warfarin 5 MG tablet  Commonly known as: COUMADIN  What changed: additional instructions   TAKE 1 TABLET BY MOUTH NIGHTLY OR AS DIRECTED PER COUMADIN CLINIC         Continue These Medications      Instructions Start Date   capecitabine 500 MG chemo tablet  Commonly known as: XELODA   TAKE 4 TABLETS BY MOUTH IN THE MORNING AND 4 TABLETS IN THE EVENING ON DAYS 1 TO 14 , THEN 7 DAYS OFF.      ondansetron 4 MG tablet  Commonly known as: ZOFRAN   4 mg, Oral, 4 Times Daily PRN      ondansetron 8 MG tablet  Commonly known as: ZOFRAN   4 mg, Oral, 4 Times Daily PRN      promethazine 12.5 MG tablet  Commonly known as: PHENERGAN   12.5 mg, Oral, Every 6 Hours PRN      sertraline 100 MG tablet  Commonly known as: ZOLOFT   1 tablet, Oral, Daily      traMADol 50 MG tablet  Commonly known as: ULTRAM   50 mg, Oral, Every 8 Hours PRN           Future Appointments   Date Time Provider Department Center   4/4/2022  2:00 PM [facility] CT 1  MAD CT [facility]   4/6/2022  9:30 AM NURSE [facility] OPI [facility]   4/6/2022 10:00 AM Ovidio Meadows MD [facility] ONC [facility]      Follow-up Information     Teresa Lyn MD Follow up in 1 week(s).    Specialty: Family Medicine  Contact information:  [address] [facility]  [address]  [phone]             Ovidio Meadows MD Follow up.    Specialties: Hematology and Oncology, Hematology, Oncology  Contact information:  [address] [place] [address] [address] [place] [address] [phone]                       Discharge Order (From admission, onward)     Start     Ordered    03/16/22 1507  Discharge patient  Once        Expected Discharge Date: 03/16/22    Discharge Disposition: Home or Self Care    Physician of Record for Attribution - Please select from Treatment Team: KAI GONZALES [[id number]]    Review needed by CMO to determine Physician of Record: No       Question Answer Comment   Physician of Record for Attribution - Please select from Treatment Team KAI GONZALES    Review needed by CMO to determine Physician of Record No        03/16/22 1510                Total time spent discharging patient including evaluation,post hospitalization follow up,  medication and post hospitalization instructions and education total time exceeds 30 minutes.    Signed:  Kai Gonzales MD  3/16/2022  15:12 CDT      Electronically signed by Kai Gonzales MD at 03/16/22 8286

## 2022-03-17 NOTE — OUTREACH NOTE
Prep Survey    Flowsheet Row Responses   [facility] facility patient discharged from? [facility]   Is LACE score < 7 ? No   Emergency Room discharge w/ pulse ox? No   Eligibility Readm Mgmt   Discharge diagnosis Cholecystitis, acute Intractable abdominal pain Liver metastasis (   Does the patient have one of the following disease processes/diagnoses(primary or secondary)? Other   Does the patient have Home health ordered? No   Is there a DME ordered? No   Prep survey completed? Yes          MICAELA DELGADO - Registered Nurse

## 2022-03-17 NOTE — TELEPHONE ENCOUNTER
----- Message from Ovidio Meadows MD sent at 3/17/2022 11:54 AM CDT -----  She can stop taking Xeloda for now.  Thank you  ----- Message -----  From: Solange García RN  Sent: 3/17/2022  11:47 AM CDT  To: Ovidio Meadows MD      ----- Message -----  From: Amara Gregory MA  Sent: 3/17/2022   9:18 AM CDT  To: Solange García, MALGORZATA    Patient called stating she was in the hospital and recently stopped taking her Chem medication. She would like to know if she needs to start taking them again. Please call back at [phone]

## 2022-03-22 NOTE — OUTREACH NOTE
Medical Week 1 Survey    Flowsheet Row Responses   [facility] facility patient discharged from? [place]   Does the patient have one of the following disease processes/diagnoses(primary or secondary)? Other   Week 1 attempt successful? No   Unsuccessful attempts Attempt 1          MEGA VELAZQUEZ - Registered Nurse

## 2022-03-23 PROBLEM — R19.7 DIARRHEA, UNSPECIFIED TYPE: Status: ACTIVE | Noted: 2022-01-01

## 2022-03-23 PROBLEM — A41.9 SEPSIS (HCC): Status: ACTIVE | Noted: 2022-01-01

## 2022-03-23 NOTE — PLAN OF CARE
Goal Outcome Evaluation:               New pt to 3 west. No nausea or pain. Did have 2 loose stools.

## 2022-03-23 NOTE — PROGRESS NOTES
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 Dose not stable. Mix of 2.5 and 1.25 mg  Today's INR:   Lab Results   Component Value Date    INR 2.22 (H) 03/23/2022          Lab Results   Component Value Date    INR 2.22 (H) 03/23/2022    INR 1.67 (H) 03/16/2022    INR 1.79 (H) 03/15/2022    PROTIME 24.1 (H) 03/23/2022    PROTIME 19.4 (H) 03/16/2022    PROTIME 20.4 (H) 03/15/2022     Lab Results   Component Value Date    HGB 14.4 03/23/2022    HGB 12.2 03/16/2022    HGB 12.0 03/15/2022     Lab Results   Component Value Date    HCT 40.7 03/23/2022    HCT 35.9 03/16/2022    HCT 35.9 03/15/2022     Lab Results   Component Value Date     03/23/2022     03/16/2022     03/15/2022           Assessment/Plan:  Reviewed above labs. INR is 2.22.  INR is at goal. Reviewed medication list. Concurrent medications include sertraline. Will continue current dose of  2.5 mg. No bleeding noted.  Rx will continue to follow and adjust dose accordingly.  Today is day 1 of consult.    Patti Ritter, PharmD  03/23/22 15:04 CDT     "

## 2022-03-23 NOTE — H&P
History and Physical  Edin Sanford MD  Hospitalist    Date of admission: 3/23/2022    Patient Care Team:  Teresa Lyn MD as PCP - General (Family Medicine)  Ovidio Meadows MD as Consulting Physician (Hematology and Oncology)    Chief complaint   Chief Complaint   Patient presents with   • Nausea   • Vomiting   • Diarrhea     Subjective     Patient is a 51 y.o. female admitted for nausea, vomiting and occasional loose stools accompanied by low-grade fever and aggravated by food intake, symptoms present for the last few days.  She has extensive, metastatic breast cancer involving her lungs, liver as well as spine.    History  Past Medical History:   Diagnosis Date   • Anxiety    • Depression    • Liver metastases (HCC)    • Lung metastases (HCC)    • Primary malignant neoplasm of breast (HCC)    • Spine metastasis (HCC)    • Ventricular premature beats      Past Surgical History:   Procedure Laterality Date   • AUGMENTATION MAMMAPLASTY     • AXILLARY LYMPH NODE BIOPSY/EXCISION Right 07/10/2017   • BREAST BIOPSY     • BREAST LUMPECTOMY     • BREAST RECONSTRUCTION     • BREAST SURGERY     • CARDIAC CATHETERIZATION     • EP STUDY     • MASTECTOMY Right    • [place] INSJ TUNNELED CVC W/O SUBQ PORT/ AGE 3 YR/> Right 07/10/2017     Family History   Problem Relation Age of Onset   • Anemia Mother    • Multiple sclerosis Mother    • Heart disease Father      Social History     Tobacco Use   • Smoking status: Never Smoker   • Smokeless tobacco: Never Used   Substance Use Topics   • Alcohol use: No   • Drug use: No     Medications Prior to Admission   Medication Sig Dispense Refill Last Dose   • clindamycin (Clindagel) 1 % gel Apply 1 application topically to the appropriate area as directed 2 (Two) Times a Day. (Patient taking differently: Apply 1 application topically to the appropriate area as directed 2 (Two) Times a Day. Apply under arm twice a day) 60 g 1    • gabapentin (NEURONTIN) 300 MG capsule Take 1 capsule by  mouth 3 (Three) Times a Day. (Patient taking differently: Take 300 mg by mouth 3 (Three) Times a Day As Needed.) 90 capsule 0    • ondansetron (ZOFRAN) 4 MG tablet Take 1 tablet by mouth 4 (Four) Times a Day As Needed for Nausea or Vomiting. 40 tablet 3    • oxyCODONE (ROXICODONE) 5 MG immediate release tablet Take 1 tablet by mouth Every 4 (Four) Hours As Needed for Moderate Pain  or Severe Pain . 90 tablet 0    • promethazine (PHENERGAN) 12.5 MG tablet Take 1 tablet by mouth Every 6 (Six) Hours As Needed for Nausea or Vomiting. 30 tablet 1    • Scopolamine (Transderm-Scop, 1.5 MG,) 1.5 MG/3DAYS patch Place 1 patch on the skin as directed by provider Every 72 (Seventy-Two) Hours. (Patient taking differently: Place 1 patch on the skin as directed by provider As Needed.) 10 patch 1 3/22/2022 at 1030   • sertraline (ZOLOFT) 100 MG tablet Take 1 tablet by mouth Daily.      • traMADol (ULTRAM) 50 MG tablet Take 1 tablet by mouth Every 8 (Eight) Hours As Needed for Moderate Pain . 90 tablet 0    • warfarin (COUMADIN) 5 MG tablet TAKE 1 TABLET BY MOUTH NIGHTLY OR AS DIRECTED PER COUMADIN CLINIC (Patient taking differently: TAKE 2.5 mg tablet 5 times a week and 1.25 mg tablet 2 days a week) 30 tablet 2      Allergies:  Percocet [oxycodone-acetaminophen]    Review of Systems  Review of Systems   Constitutional: Positive for fatigue. Negative for fever.   Respiratory: Positive for cough. Negative for shortness of breath and wheezing.    Cardiovascular: Negative for chest pain, palpitations and leg swelling.   Gastrointestinal: Positive for diarrhea, nausea and vomiting. Negative for abdominal distention, abdominal pain and blood in stool.   Genitourinary: Negative for dysuria, frequency, hematuria and urgency.   Musculoskeletal: Positive for arthralgias. Negative for back pain.   Skin: Negative for color change, pallor and rash.   Neurological: Positive for weakness. Negative for syncope, speech difficulty, light-headedness,  numbness and headaches.   Psychiatric/Behavioral: Negative for agitation, behavioral problems and confusion.   All other systems reviewed and are negative.      Objective     Vital Signs  Temp:  [97 °F (36.1 °C)-98 °F (36.7 °C)] 97 °F (36.1 °C)  Heart Rate:  [] 95  Resp:  [18-24] 18  BP: (113-136)/(65-89) 116/72    Physical Exam:  Physical Exam  Vitals reviewed.   Constitutional:       General: She is not in acute distress.     Appearance: She is obese. She is ill-appearing.   HENT:      Head: Normocephalic and atraumatic.   Eyes:      General: No scleral icterus.     Extraocular Movements: Extraocular movements intact.      Pupils: Pupils are equal, round, and reactive to light.   Cardiovascular:      Rate and Rhythm: Normal rate and regular rhythm.   Pulmonary:      Effort: Pulmonary effort is normal. No respiratory distress.      Breath sounds: Normal breath sounds. No stridor. No wheezing or rales.   Chest:      Chest wall: No tenderness.   Abdominal:      General: Bowel sounds are normal. There is no distension.      Palpations: Abdomen is soft. There is no mass.      Tenderness: There is no abdominal tenderness. There is no right CVA tenderness, left CVA tenderness or guarding.      Hernia: No hernia is present.   Musculoskeletal:         General: No swelling, tenderness or deformity. Normal range of motion.      Cervical back: Normal range of motion and neck supple. No rigidity or tenderness.      Right lower leg: No edema.      Left lower leg: No edema.   Skin:     General: Skin is dry.      Coloration: Skin is not jaundiced or pale.      Findings: No bruising or lesion.   Neurological:      General: No focal deficit present.      Mental Status: She is alert and oriented to person, place, and time. Mental status is at baseline.      Cranial Nerves: No cranial nerve deficit.      Sensory: No sensory deficit.      Motor: No weakness.   Psychiatric:         Mood and Affect: Mood normal.         Behavior:  Behavior normal.         Results Review:   Lab Results (last 24 hours)     Procedure Component Value Units Date/Time    COVID-19 and FLU A/B PCR - Swab, Nasopharynx [[id number]]  (Normal) Collected: 03/23/22 1209    Specimen: Swab from Nasopharynx Updated: 03/23/22 1240     COVID19 Not Detected     Influenza A PCR Not Detected     Influenza B PCR Not Detected    Narrative:      Fact sheet for providers: https://www.fda.gov/media/140938/download    Fact sheet for patients: https://www.fda.gov/media/359472/download    Test performed by PCR.    STAT Lactic Acid, Reflex [[id number]]  (Normal) Collected: 03/23/22 1141    Specimen: Blood Updated: 03/23/22 1158     Lactate 1.8 mmol/L     Urinalysis, Microscopic Only - Urine, Clean Catch [[id number]]  (Abnormal) Collected: 03/23/22 1015    Specimen: Urine, Clean Catch Updated: 03/23/22 1047     RBC, UA None Seen /HPF      WBC, UA 3-5 /HPF      Bacteria, UA Trace /HPF      Squamous Epithelial Cells, UA 0-2 /HPF      Hyaline Casts, UA None Seen /LPF      Methodology Manual Light Microscopy    Urinalysis With Culture If Indicated - Urine, Clean Catch [[id number]]  (Abnormal) Collected: 03/23/22 1015    Specimen: Urine, Clean Catch Updated: 03/23/22 1030     Color, UA Dark Yellow     Appearance, UA Cloudy     pH, UA 6.5     Specific Gravity, UA 1.022     Glucose, UA Negative     Ketones, UA Negative     Bilirubin, UA Negative     Blood, UA Negative     Protein, UA 30 mg/dL (1+)     Leuk Esterase, UA Trace     Nitrite, UA Negative     Urobilinogen, UA 0.2 E.U./dL    [place] Draw [[id number]] Collected: 03/23/22 0852    Specimen: Blood from Arm, Left Updated: 03/23/22 1004    Narrative:      The following orders were created for panel order [place] Draw.  Procedure                               Abnormality         Status                     ---------                               -----------         ------                     Green Top (Gel)[[id number]]                                   Final result               Lavender Top[[id number]]                                     Final result               Gold Top - SST[[id number]]                                   Final result               Light Blue Top[[id number]]                                   Final result                 Please view results for these tests on the individual orders.    Gold Top - SST [[id number]] Collected: 03/23/22 0852    Specimen: Blood from Arm, Left Updated: 03/23/22 1004     Extra Tube Hold for add-ons.     Comment: Auto resulted.       Green Top (Gel) [[id number]] Collected: 03/23/22 0852    Specimen: Blood from Arm, Left Updated: 03/23/22 1004     Extra Tube Hold for add-ons.     Comment: Auto resulted.       Lavender Top [[id number]] Collected: 03/23/22 0852    Specimen: Blood from Arm, Left Updated: 03/23/22 1004     Extra Tube hold for add-on     Comment: Auto resulted       Light Blue Top [[id number]] Collected: 03/23/22 0852    Specimen: Blood from Arm, Left Updated: 03/23/22 1004     Extra Tube hold for add-on     Comment: Auto resulted       Blood Culture - Blood, Arm, Left [[id number]] Collected: 03/23/22 0945    Specimen: Blood from Arm, Left Updated: 03/23/22 0950    Protime-INR [[id number]]  (Abnormal) Collected: 03/23/22 0852    Specimen: Blood from Arm, Left Updated: 03/23/22 0935     Protime 24.1 Seconds      INR 2.22    Narrative:      Therapeutic range for most indications is 2.0-3.0 INR,  or 2.5-3.5 for mechanical heart valves.    CK [[id number]]  (Normal) Collected: 03/23/22 0852    Specimen: Blood from Arm, Left Updated: 03/23/22 0933     Creatine Kinase 51 U/L     Lipase [[id number]]  (Abnormal) Collected: 03/23/22 0852    Specimen: Blood from Arm, Left Updated: 03/23/22 0933     Lipase 8 U/L     Magnesium [[id number]]  (Normal) Collected: 03/23/22 0852    Specimen: Blood from Arm, Left Updated: 03/23/22 0933     Magnesium 2.4 mg/dL     Manual Differential [[id number]]  (Abnormal) Collected: 03/23/22 0852     Specimen: Blood from Arm, Left Updated: 03/23/22 0930     Neutrophil % 80.0 %      Lymphocyte % 6.0 %      Monocyte % 10.0 %      Bands %  4.0 %      Neutrophils Absolute 27.36 10*3/mm3      Lymphocytes Absolute 1.95 10*3/mm3      Monocytes Absolute 3.26 10*3/mm3      Polychromasia Slight/1+     WBC Morphology Normal     Platelet Estimate Adequate     Giant Platelets Slight/1+    Comprehensive Metabolic Panel [[id number]]  (Abnormal) Collected: 03/23/22 0852    Specimen: Blood from Arm, Left Updated: 03/23/22 0922     Glucose 133 mg/dL      BUN 11 mg/dL      Creatinine 0.79 mg/dL      Sodium 135 mmol/L      Potassium 3.3 mmol/L      Chloride 100 mmol/L      CO2 21.0 mmol/L      Calcium 9.5 mg/dL      Total Protein 7.5 g/dL      Albumin 4.10 g/dL      ALT (SGPT) 38 U/L      AST (SGOT) 38 U/L      Alkaline Phosphatase 184 U/L      Total Bilirubin 0.6 mg/dL      Globulin 3.4 gm/dL      A/G Ratio 1.2 g/dL      BUN/Creatinine Ratio 13.9     Anion Gap 14.0 mmol/L      eGFR 89.6 mL/min/1.73      Comment: National Kidney Foundation and American Society of Nephrology (ASN) Task Force recommended calculation based on the Chronic Kidney Disease Epidemiology Collaboration (CKD-EPI) equation refit without adjustment for race.       Narrative:      GFR Normal >60  Chronic Kidney Disease <60  Kidney Failure <15      Lactic Acid, Plasma [[id number]]  (Abnormal) Collected: 03/23/22 0852    Specimen: Blood from Arm, Left Updated: 03/23/22 0918     Lactate 2.9 mmol/L     CBC & Differential [[id number]]  (Abnormal) Collected: 03/23/22 0852    Specimen: Blood from Arm, Left Updated: 03/23/22 0903    Narrative:      The following orders were created for panel order CBC & Differential.  Procedure                               Abnormality         Status                     ---------                               -----------         ------                     CBC Auto Differential[[id number]]        Abnormal            Final result                Scan Slide[[id number]]                                                                    Please view results for these tests on the individual orders.    CBC Auto Differential [[id number]]  (Abnormal) Collected: 03/23/22 0852    Specimen: Blood from Arm, Left Updated: 03/23/22 0903     WBC 32.57 10*3/mm3      RBC 4.71 10*6/mm3      Hemoglobin 14.4 g/dL      Hematocrit 40.7 %      MCV 86.4 fL      MCH 30.6 pg      MCHC 35.4 g/dL      RDW 20.6 %      RDW-SD 62.5 fl      MPV 9.3 fL      Platelets 289 10*3/mm3     Blood Culture - Blood, Arm, Left [[id number]] Collected: 03/23/22 0852    Specimen: Blood from Arm, Left Updated: 03/23/22 0858          Imaging Results (Last 24 Hours)     Procedure Component Value Units Date/Time    US Gallbladder [[id number]] Collected: 03/23/22 1509     Updated: 03/23/22 1708    Narrative:      ULTRASOUND GALLBLADDER    INDICATION: RUQ pain, R19.7 Diarrhea, unspecified R50.9 Fever,  unspecified D72.829 Elevated white blood cell count, unspecified    COMPARISON: Ultrasound gallbladder 3/12/2022    TECHNIQUE: Grayscale and color Doppler sonographic images of the  gallbladder were obtained.    FINDINGS:  Visualized extent of pancreatic head appears unremarkable. IVC is  present. Visualized extent of aorta demonstrates no aneurysm.  There is no intrahepatic biliary ductal dilatation. Multiple  hepatic lesions are visualized. Main portal vein is patent with  hepatopedal flow. There is renal cortical thinning of the right  kidney consistent with chronic medical renal disease. Right renal  perfusion is present. Common bile duct is not dilated, measuring  3 mm. Gallbladder wall is normal in caliber measuring 3 mm. The  gallbladder is nondistended however there are abundant gallstones  within the gallbladder lumen. There is no pericholecystic fluid.      Impression:      1. Cholelithiasis without evidence of acute cholecystitis.  2. Extensive hepatic lesions consistent with patient's  known  metastatic breast cancer.  3. Renal cortical thinning of right kidney consistent with  chronic medical renal disease.    Electronically signed by:  Lesli Jensen MD  3/23/2022 5:06 PM CDT  Workstation: [id number]          Assessment/Plan       Abdominal pain    Diarrhea    Sepsis (HCC)    Metastatic breast cancer (HCC)    Provide her with symptomatic pain and nausea relief, gentle IV hydration, IV antibiotics given the questionable sepsis, follow-up on the blood cultures obtained in the ER, repeat CBC and CMP in AM.    The admission RUQ ultrasound showed extensive hepatic lesions consistent with metastasis and cholelithiasis without evidence of acute cholecystitis.    I have utilized all available immediate resources to obtain, update, or review the patient's current medications.    I confirmed that the patient's Advance Care Plan is present, code status is documented, or surrogate decision maker is listed in the patient's medical record.      03/23/22  19:01 CDT

## 2022-03-23 NOTE — ED PROVIDER NOTES
Subjective   Patient presents to the emergency department with nausea and diarrhea for the past 3 days.  She states that her vomiting began yesterday.  Last night she had a temperature of 100.9.  She has had intermittent shortness of breath since her admission several weeks ago, at which time she was admitted for acute cholecystitis.  During that admission she was treated with antibiotics and did not have a cholecystectomy performed. Patient has a history of breast cancer with metastases to the lung and liver.  Her oncologist is Dr. Meadows.          Nausea  The primary symptoms include fever, abdominal pain, nausea, vomiting and diarrhea. Primary symptoms do not include fatigue, dysuria, myalgias or rash.   The illness does not include chills.   Vomiting  The primary symptoms include fever, abdominal pain, nausea, vomiting and diarrhea. Primary symptoms do not include fatigue, dysuria, myalgias or rash.   The illness does not include chills.   Diarrhea  The primary symptoms include fever, abdominal pain, nausea, vomiting and diarrhea. Primary symptoms do not include fatigue, dysuria, myalgias or rash.   The illness does not include chills.       Review of Systems   Constitutional: Positive for activity change and fever. Negative for appetite change, chills, diaphoresis and fatigue.   HENT: Negative for congestion, ear discharge, ear pain, nosebleeds, rhinorrhea, sinus pressure, sore throat and trouble swallowing.    Eyes: Negative for discharge and redness.   Respiratory: Negative for apnea, cough, chest tightness, shortness of breath and wheezing.    Cardiovascular: Negative for chest pain.   Gastrointestinal: Positive for abdominal pain, diarrhea, nausea and vomiting.   Endocrine: Negative for polyuria.   Genitourinary: Negative for dysuria, frequency and urgency.   Musculoskeletal: Negative for myalgias and neck pain.   Skin: Negative for color change and rash.   Allergic/Immunologic: Negative for immunocompromised  state.   Neurological: Negative for dizziness, seizures, syncope, weakness, light-headedness and headaches.   Hematological: Negative for adenopathy. Does not bruise/bleed easily.   Psychiatric/Behavioral: Negative for behavioral problems and confusion.   All other systems reviewed and are negative.      Past Medical History:   Diagnosis Date   • Anxiety    • Depression    • Primary malignant neoplasm of breast (HCC)    • Ventricular premature beats        Allergies   Allergen Reactions   • Percocet [Oxycodone-Acetaminophen] Nausea And Vomiting       Past Surgical History:   Procedure Laterality Date   • AUGMENTATION MAMMAPLASTY     • AXILLARY LYMPH NODE BIOPSY/EXCISION Right 07/10/2017   • BREAST BIOPSY     • BREAST LUMPECTOMY     • BREAST RECONSTRUCTION     • BREAST SURGERY     • CARDIAC CATHETERIZATION     • EP STUDY     • MASTECTOMY Right    • [place] INSJ TUNNELED CVC W/O SUBQ PORT/ AGE 3 YR/> Right 07/10/2017       Family History   Problem Relation Age of Onset   • Anemia Mother    • Multiple sclerosis Mother    • Heart disease Father        Social History     Socioeconomic History   • Marital status:    Tobacco Use   • Smoking status: Never Smoker   • Smokeless tobacco: Never Used   Substance and Sexual Activity   • Alcohol use: No   • Drug use: No   • Sexual activity: Not Currently           Objective   Physical Exam  Vitals and nursing note reviewed.   Constitutional:       Appearance: She is well-developed.   HENT:      Head: Normocephalic and atraumatic.      Nose: Nose normal.   Eyes:      General: No scleral icterus.        Right eye: No discharge.         Left eye: No discharge.      Conjunctiva/sclera: Conjunctivae normal.      Pupils: Pupils are equal, round, and reactive to light.   Neck:      Trachea: No tracheal deviation.   Cardiovascular:      Rate and Rhythm: Normal rate and regular rhythm.      Heart sounds: Normal heart sounds. No murmur heard.  Pulmonary:      Effort: Pulmonary effort  is normal. No respiratory distress.      Breath sounds: Normal breath sounds. No stridor. No wheezing or rales.   Abdominal:      General: Bowel sounds are normal. There is no distension.      Palpations: Abdomen is soft. There is no mass.      Tenderness: There is generalized abdominal tenderness (mild). There is no guarding or rebound.   Musculoskeletal:      Cervical back: Normal range of motion and neck supple.   Skin:     General: Skin is warm and dry.      Findings: No erythema or rash.   Neurological:      Mental Status: She is alert and oriented to person, place, and time.      Coordination: Coordination normal.   Psychiatric:         Behavior: Behavior normal.         Thought Content: Thought content normal.         Procedures           ED Course             Labs Reviewed   COMPREHENSIVE METABOLIC PANEL - Abnormal; Notable for the following components:       Result Value    Glucose 133 (*)     Sodium 135 (*)     Potassium 3.3 (*)     CO2 21.0 (*)     ALT (SGPT) 38 (*)     AST (SGOT) 38 (*)     Alkaline Phosphatase 184 (*)     All other components within normal limits    Narrative:     GFR Normal >60  Chronic Kidney Disease <60  Kidney Failure <15     LACTIC ACID, PLASMA - Abnormal; Notable for the following components:    Lactate 2.9 (*)     All other components within normal limits   CBC WITH AUTO DIFFERENTIAL - Abnormal; Notable for the following components:    WBC 32.57 (*)     RDW 20.6 (*)     RDW-SD 62.5 (*)     All other components within normal limits   MANUAL DIFFERENTIAL - Abnormal; Notable for the following components:    Neutrophil % 80.0 (*)     Lymphocyte % 6.0 (*)     Neutrophils Absolute 27.36 (*)     Monocytes Absolute 3.26 (*)     All other components within normal limits   LIPASE - Abnormal; Notable for the following components:    Lipase 8 (*)     All other components within normal limits   PROTIME-INR - Abnormal; Notable for the following components:    Protime 24.1 (*)     INR 2.22 (*)      All other components within normal limits    Narrative:     Therapeutic range for most indications is 2.0-3.0 INR,  or 2.5-3.5 for mechanical heart valves.   URINALYSIS W/ CULTURE IF INDICATED - Abnormal; Notable for the following components:    Appearance, UA Cloudy (*)     Protein, UA 30 mg/dL (1+) (*)     Leuk Esterase, UA Trace (*)     All other components within normal limits   URINALYSIS, MICROSCOPIC ONLY - Abnormal; Notable for the following components:    Bacteria, UA Trace (*)     All other components within normal limits   CK - Normal   MAGNESIUM - Normal   LACTIC ACID, REFLEX - Normal   BLOOD CULTURE   BLOOD CULTURE   COVID-19 AND FLU A/B, NP SWAB IN TRANSPORT MEDIA 8-12 HR TAT   RAINBOW DRAW    Narrative:     The following orders were created for panel order [place] Draw.  Procedure                               Abnormality         Status                     ---------                               -----------         ------                     Green Top (Gel)[[id number]]                                  Final result               Lavender Top[[id number]]                                     Final result               Gold Top - SST[[id number]]                                   Final result               Light Blue Top[[id number]]                                   Final result                 Please view results for these tests on the individual orders.   POCT PROTIME - INR   CBC AND DIFFERENTIAL    Narrative:     The following orders were created for panel order CBC & Differential.  Procedure                               Abnormality         Status                     ---------                               -----------         ------                     CBC Auto Differential[[id number]]        Abnormal            Final result               Scan Slide[[id number]]                                                                    Please view results for these tests on the individual orders.   GREEN TOP    LAVENDER TOP   GOLD TOP - SST   LIGHT BLUE TOP       No orders to display                                             MDM    Final diagnoses:   Diarrhea, unspecified type   Fever, unspecified fever cause   Leukocytosis, unspecified type       ED Disposition  ED Disposition     ED Disposition   Decision to Admit    Condition   --    Comment   Level of Care: Med/Surg [1]   Diagnosis: Diarrhea, unspecified type [[id number]]   Admitting Physician: SHIREEN NUÑEZ [[id number]]   Attending Physician: SHIREEN NUÑEZ [[id number]]               No follow-up provider specified.       Medication List      No changes were made to your prescriptions during this visit.          Babak Buckner MD  03/23/22 1213       aBbak Buckner MD  03/23/22 1217

## 2022-03-24 NOTE — CONSULTS
"Adult Nutrition  Assessment    Patient Name:  Kylie García  YOB: 1970  MRN: [id number]  Admit Date:  3/23/2022    Assessment Date:  3/24/2022    Comments:  Pt with abdominal pain, diarrhea, sepsis, metastatic breast cancer.  Pt reports weighing 225 lb a week ago Saturday.  Reports hx nausea, vomiting.  PRN Zofran prescribed.  Continues to have diarrhea.  Reports fair appetite.  Intake 50% - 1x.  Willing to receive yogurt with meals.  K+ is low.  PRN KCl prescribed.  Pt with lesions right underarm.  Will maintain pt on prescribed diet adding yogurt to meals.      Reason for Assessment     Row Name 03/24/22 1218          Reason for Assessment    Reason For Assessment identified at risk by screening criteria     Identified At Risk by Screening Criteria reduced oral intake over the last month                  Anthropometrics     Row Name 03/24/22 1221 03/24/22 0500       Anthropometrics    Weight -- 97.1 kg (214 lb)    Age for Calculations 51 --    Height for Calculation 1.702 m (5' 7.01\") --    Weight for Calculation 70.3 kg (154 lb 15.7 oz) --               Labs/Tests/Procedures/Meds     Row Name 03/24/22 1220          Labs/Procedures/Meds    Lab Results Reviewed reviewed, pertinent            Medications    Pertinent Medications Reviewed reviewed, pertinent                Physical Findings     Row Name 03/24/22 1220          Physical Findings    Overall Physical Appearance lesions right underrarm                Estimated/Assessed Needs - Anthropometrics     Row Name 03/24/22 1221 03/24/22 0500       Anthropometrics    Weight -- 97.1 kg (214 lb)    Age for Calculations 51 --    Height for Calculation 1.702 m (5' 7.01\") --    Weight for Calculation 70.3 kg (154 lb 15.7 oz) --       Estimated/Assessed Needs    Additional Documentation [place]-St. Jeor Equation (Group);Protein Requirements (Group);Fluid Requirements (Group) --       [place]-St. Jeor Equation    RMR ([place]-St. Jeor Equation) " 1340.625 --    [facility] Activity Factors 1.2 --    Activity Factors ([facility]) 1608.75 --       Protein Requirements    Weight Used For Protein Calculations 70.3 kg (154 lb 15.7 oz) --    Est Protein Requirement Amount (gms/kg) 1.0 gm protein --    Estimated Protein Requirements (gms/day) 70.3 --       Fluid Requirements    Fluid Requirements (mL/day) 2110 --    RDA Method (mL) 2110 --               Nutrition Prescription Ordered     Row Name 03/24/22 1224          Nutrition Prescription PO    Current PO Diet Regular                       Electronically signed by:  Erin Bal RD  03/24/22 12:25 CDT

## 2022-03-24 NOTE — NURSING NOTE
Patient has multiple open cancer lesions noted to right axilla area.  Patient and spouse have been caring for lesions at home with prescription cream from Dr. Meadows.  Wound care gave patient some polymem to add to regimen.  POA.

## 2022-03-24 NOTE — PROGRESS NOTES
Progress Note  Edin Sanford MD  Hospitalist    Date of visit: 3/24/2022     LOS: 0 days   Patient Care Team:  Teresa Lyn MD as PCP - General (Family Medicine)  Ovidio Meadows MD as Consulting Physician (Hematology and Oncology)    Chief Complaint: nausea / diarrhea     Subjective     Interval History:     Patient Complaints: nausea / diarrhea - minimally better    History taken from: patient    Medication Review:   Current Facility-Administered Medications   Medication Dose Route Frequency Provider Last Rate Last Admin   • gabapentin (NEURONTIN) capsule 300 mg  300 mg Oral TID Edin Sanford MD   300 mg at 03/24/22 1606   • loperamide (IMODIUM) capsule 2 mg  2 mg Oral 4x Daily PRN Ovidio Meadows MD       • morphine injection 2 mg  2 mg Intravenous Q2H PRN Edin Sanford MD       • ondansetron (ZOFRAN) 8 mg/50 mL NS IVPB  8 mg Intravenous Q6H PRN Edin Sanford MD       • Pharmacy to dose warfarin   Does not apply Continuous Edin Sanford MD       • piperacillin-tazobactam (ZOSYN) 3.375 g/100 mL 0.9% NS IVPB (mbp)  3.375 g Intravenous Q8H Edin Sanford MD   Stopped at 03/24/22 1300   • potassium chloride (MICRO-K) CR capsule 40 mEq  40 mEq Oral PRN Edin Sanford MD   40 mEq at 03/24/22 1605    Or   • potassium chloride (KLOR-CON) packet 40 mEq  40 mEq Oral PRN Edin Sanford MD        Or   • potassium chloride 10 mEq in 100 mL IVPB  10 mEq Intravenous Q1H PRN Edin Sanford MD       • scopolamine patch 1 mg/72 hr  1 patch Transdermal Q72H Edin Sanford MD       • sertraline (ZOLOFT) tablet 100 mg  100 mg Oral Daily Edin Sanford MD   100 mg at 03/24/22 0900   • sodium chloride 0.9 % flush 10 mL  10 mL Intravenous PRN Edin Sanford MD       • sodium chloride 0.9 % flush 10 mL  10 mL Intravenous Q12H Edin Sanford MD   10 mL at 03/24/22 0901   • sodium chloride 0.9 % flush 10 mL  10 mL Intravenous PRN Edin Sanford MD       • sodium chloride 0.9 % infusion  75 mL/hr Intravenous Continuous  Edin Sanford MD 75 mL/hr at 03/24/22 1410 75 mL/hr at 03/24/22 1410   • warfarin (COUMADIN) tablet 2.5 mg  2.5 mg Oral Daily Edin Sanford MD   2.5 mg at 03/23/22 1856       Review of Systems:   Review of Systems   Constitutional: Positive for fatigue. Negative for fever.   Respiratory: Negative for cough, shortness of breath and wheezing.    Cardiovascular: Negative for chest pain, palpitations and leg swelling.   Gastrointestinal: Negative for abdominal distention, abdominal pain, anal bleeding, diarrhea and vomiting.   Genitourinary: Negative for dysuria, enuresis, frequency, hematuria, pelvic pain and urgency.   Musculoskeletal: Positive for arthralgias. Negative for back pain.   Skin: Negative for color change, pallor and rash.   Neurological: Positive for weakness.   Psychiatric/Behavioral: Negative for agitation, behavioral problems and confusion.   All other systems reviewed and are negative.      Objective     Vital Signs  Temp:  [97.1 °F (36.2 °C)-97.9 °F (36.6 °C)] 97.8 °F (36.6 °C)  Heart Rate:  [65-92] 87  Resp:  [18] 18  BP: (110-128)/(57-89) 126/76    Physical Exam:  Physical Exam  Vitals reviewed.   Constitutional:       General: She is not in acute distress.     Appearance: She is obese. She is not ill-appearing.   HENT:      Head: Normocephalic and atraumatic.   Eyes:      General: No scleral icterus.     Extraocular Movements: Extraocular movements intact.      Pupils: Pupils are equal, round, and reactive to light.   Cardiovascular:      Rate and Rhythm: Normal rate and regular rhythm.   Pulmonary:      Effort: No respiratory distress.      Breath sounds: Normal breath sounds. No stridor. No wheezing or rales.   Chest:      Chest wall: No tenderness.   Abdominal:      General: Bowel sounds are normal. There is no distension.      Palpations: Abdomen is soft. There is no mass.      Tenderness: There is no abdominal tenderness. There is no right CVA tenderness, left CVA tenderness or guarding.    Musculoskeletal:         General: No swelling, tenderness or deformity. Normal range of motion.      Cervical back: Normal range of motion and neck supple. No rigidity or tenderness.      Right lower leg: No edema.      Left lower leg: No edema.   Skin:     General: Skin is warm and dry.      Coloration: Skin is not jaundiced or pale.      Findings: No bruising, lesion or rash.   Neurological:      General: No focal deficit present.      Mental Status: She is alert and oriented to person, place, and time. Mental status is at baseline.      Cranial Nerves: No cranial nerve deficit.      Sensory: No sensory deficit.      Motor: No weakness.   Psychiatric:         Mood and Affect: Mood normal.         Behavior: Behavior normal.          Results Review:    Lab Results (last 24 hours)     Procedure Component Value Units Date/Time    Potassium [[id number]]  (Normal) Collected: 03/24/22 1451    Specimen: Blood Updated: 03/24/22 1512     Potassium 3.6 mmol/L     Gastrointestinal Panel, PCR - Stool, Per Rectum [[id number]]  (Normal) Collected: 03/24/22 0939    Specimen: Stool from Per Rectum Updated: 03/24/22 1106     Campylobacter Not Detected     Plesiomonas shigelloides Not Detected     Salmonella Not Detected     Vibrio Not Detected     Vibrio cholerae Not Detected     Yersinia enterocolitica Not Detected     Enteroaggregative E. coli (EAEC) Not Detected     Enteropathogenic E. coli (EPEC) Not Detected     Enterotoxigenic E. coli (ETEC) lt/st Not Detected     Shiga-like toxin-producing E. coli (STEC) stx1/stx2 Not Detected     Shigella/Enteroinvasive E. coli (EIEC) Not Detected     Cryptosporidium Not Detected     Cyclospora cayetanensis Not Detected     Entamoeba histolytica Not Detected     Giardia lamblia Not Detected     Adenovirus F40/41 Not Detected     Astrovirus Not Detected     Norovirus GI/GII Not Detected     Rotavirus A Not Detected     Sapovirus (I, II, IV or V) Not Detected    Narrative:      Testing  performed by multiplex PCR system.    Clostridium Difficile Toxin - Stool, Per Rectum [[id number]]  (Normal) Collected: 03/24/22 0939    Specimen: Stool from Per Rectum Updated: 03/24/22 1035    Narrative:      The following orders were created for panel order Clostridium Difficile Toxin - Stool, Per Rectum.  Procedure                               Abnormality         Status                     ---------                               -----------         ------                     Clostridium Difficile To...[[id number]]  Normal              Final result                 Please view results for these tests on the individual orders.    Clostridium Difficile Toxin, PCR - Stool, Per Rectum [[id number]]  (Normal) Collected: 03/24/22 0939    Specimen: Stool from Per Rectum Updated: 03/24/22 1035     C. Difficile Toxins by PCR Negative    Narrative:      Performed by real-time polymerase chain reaction (qPCR).    Blood Culture - Blood, Arm, Left [[id number]]  (Normal) Collected: 03/23/22 0945    Specimen: Blood from Arm, Left Updated: 03/24/22 1004     Blood Culture No growth at 24 hours    Blood Culture - Blood, Arm, Left [[id number]]  (Normal) Collected: 03/23/22 0852    Specimen: Blood from Arm, Left Updated: 03/24/22 0903     Blood Culture No growth at 24 hours    Magnesium [[id number]]  (Normal) Collected: 03/24/22 0352    Specimen: Blood Updated: 03/24/22 0426     Magnesium 2.4 mg/dL     Protime-INR [[id number]]  (Abnormal) Collected: 03/24/22 0352    Specimen: Blood Updated: 03/24/22 0416     Protime 24.7 Seconds      INR 2.30    Narrative:      Therapeutic range for most indications is 2.0-3.0 INR,  or 2.5-3.5 for mechanical heart valves.    Potassium [[id number]]  (Abnormal) Collected: 03/24/22 0352    Specimen: Blood Updated: 03/24/22 0413     Potassium 3.3 mmol/L     Comprehensive Metabolic Panel [[id number]]  (Abnormal) Collected: 03/24/22 0352    Specimen: Blood Updated: 03/24/22 0413     Glucose 93 mg/dL      BUN 13  mg/dL      Creatinine 0.80 mg/dL      Sodium 137 mmol/L      Potassium 3.3 mmol/L      Chloride 105 mmol/L      CO2 22.0 mmol/L      Calcium 8.4 mg/dL      Total Protein 6.3 g/dL      Albumin 3.50 g/dL      ALT (SGPT) 28 U/L      AST (SGOT) 32 U/L      Alkaline Phosphatase 159 U/L      Total Bilirubin 0.4 mg/dL      Globulin 2.8 gm/dL      A/G Ratio 1.3 g/dL      BUN/Creatinine Ratio 16.3     Anion Gap 10.0 mmol/L      eGFR 88.2 mL/min/1.73      Comment: National Kidney Foundation and American Society of Nephrology (ASN) Task Force recommended calculation based on the Chronic Kidney Disease Epidemiology Collaboration (CKD-EPI) equation refit without adjustment for race.       Narrative:      GFR Normal >60  Chronic Kidney Disease <60  Kidney Failure <15      Lactic Acid, Plasma [[id number]]  (Normal) Collected: 03/24/22 0352    Specimen: Blood Updated: 03/24/22 0409     Lactate 1.3 mmol/L     CBC & Differential [[id number]]  (Abnormal) Collected: 03/24/22 0352    Specimen: Blood Updated: 03/24/22 0407    Narrative:      The following orders were created for panel order CBC & Differential.  Procedure                               Abnormality         Status                     ---------                               -----------         ------                     CBC Auto Differential[[id number]]        Abnormal            Final result                 Please view results for these tests on the individual orders.    CBC Auto Differential [[id number]]  (Abnormal) Collected: 03/24/22 0352    Specimen: Blood Updated: 03/24/22 0407     WBC 16.84 10*3/mm3      RBC 4.02 10*6/mm3      Hemoglobin 12.0 g/dL      Hematocrit 35.3 %      MCV 87.8 fL      MCH 29.9 pg      MCHC 34.0 g/dL      RDW 20.0 %      RDW-SD 64.5 fl      MPV 9.6 fL      Platelets 273 10*3/mm3      Neutrophil % 79.7 %      Lymphocyte % 9.1 %      Monocyte % 8.7 %      Eosinophil % 1.2 %      Basophil % 0.5 %      Immature Grans % 0.8 %      Neutrophils, Absolute  13.41 10*3/mm3      Lymphocytes, Absolute 1.54 10*3/mm3      Monocytes, Absolute 1.46 10*3/mm3      Eosinophils, Absolute 0.20 10*3/mm3      Basophils, Absolute 0.09 10*3/mm3      Immature Grans, Absolute 0.14 10*3/mm3      nRBC 0.0 /100 WBC           Imaging Results (Last 24 Hours)     Procedure Component Value Units Date/Time    US Gallbladder [[id number]] Collected: 03/23/22 1509     Updated: 03/23/22 1708    Narrative:      ULTRASOUND GALLBLADDER    INDICATION: RUQ pain, R19.7 Diarrhea, unspecified R50.9 Fever,  unspecified D72.829 Elevated white blood cell count, unspecified    COMPARISON: Ultrasound gallbladder 3/12/2022    TECHNIQUE: Grayscale and color Doppler sonographic images of the  gallbladder were obtained.    FINDINGS:  Visualized extent of pancreatic head appears unremarkable. IVC is  present. Visualized extent of aorta demonstrates no aneurysm.  There is no intrahepatic biliary ductal dilatation. Multiple  hepatic lesions are visualized. Main portal vein is patent with  hepatopedal flow. There is renal cortical thinning of the right  kidney consistent with chronic medical renal disease. Right renal  perfusion is present. Common bile duct is not dilated, measuring  3 mm. Gallbladder wall is normal in caliber measuring 3 mm. The  gallbladder is nondistended however there are abundant gallstones  within the gallbladder lumen. There is no pericholecystic fluid.      Impression:      1. Cholelithiasis without evidence of acute cholecystitis.  2. Extensive hepatic lesions consistent with patient's known  metastatic breast cancer.  3. Renal cortical thinning of right kidney consistent with  chronic medical renal disease.    Electronically signed by:  Lesli Jensen MD  3/23/2022 5:06 PM CDT  Workstation: [id number]          Assessment/Plan       Abdominal pain    Diarrhea    Sepsis (HCC)    Metastatic breast cancer (HCC)    Continue with the symptomatic treatment, the IV antibiotics for now. The stool sample  was negative.      I confirmed that the patient's Advance Care Plan is present, code status is documented, or surrogate decision maker is listed in the patient's medical record.      Edin Sanford MD  03/24/22  16:07 CDT

## 2022-03-24 NOTE — CONSULTS
DATE OF CONSULT: 3/24/2022    REQUESTING SOURCE:  Dr Sanford      REASON FOR CONSULTATION: Metastatic breast cancer with liver, lung and bone metastasis, leukocytosis, fever, diarrhea      HISTORY OF PRESENT ILLNESS:   51-year-old female with medical problem consisting of metastatic breast cancer with liver, lung and bone metastasis for which patient was on Xeloda until first week of March 2022, chronic back pain and hip pain secondary to bone metastasis for which patient is recently started on oxycodone, history of melanoma in situ, history of DCIS of right breast was admitted to [facility] on March 23, 2022 with complaint of fever of 100.7 at home along with diarrhea and vomiting.  Denies any excessive pain affecting abdominal area.  States she started having diarrhea on Monday which progressively got worse on Tuesday.  She started having vomiting on Tuesday.  States her diarrhea is watery in consistency without any blood or mucus in it.  States she feels better after starting intravenous fluids and antibiotics in the hospital.  Continues to have watery diarrhea.  Denies any more fever since hospital admission.  Complains of chronic back pain and hip pain.  Denies any new lymph node enlargement.            PAST MEDICAL HISTORY:    Past Medical History:   Diagnosis Date   • Anxiety    • Depression    • Liver metastases (HCC)    • Lung metastases (HCC)    • Primary malignant neoplasm of breast (HCC)    • Spine metastasis (HCC)    • Ventricular premature beats        PAST SURGICAL HISTORY:  Past Surgical History:   Procedure Laterality Date   • AUGMENTATION MAMMAPLASTY     • AXILLARY LYMPH NODE BIOPSY/EXCISION Right 07/10/2017   • BREAST BIOPSY     • BREAST LUMPECTOMY     • BREAST RECONSTRUCTION     • BREAST SURGERY     • CARDIAC CATHETERIZATION     • EP STUDY     • MASTECTOMY Right    • SC INSJ TUNNELED CVC W/O SUBQ PORT/ AGE 5 YR/> Right 07/10/2017       ALLERGIES:    Allergies   Allergen  Reactions   • Percocet [Oxycodone-Acetaminophen] Nausea And Vomiting       SOCIAL HISTORY:   Social History     Tobacco Use   • Smoking status: Never Smoker   • Smokeless tobacco: Never Used   Substance Use Topics   • Alcohol use: No   • Drug use: No       CURRENT MEDICATIONS:    Current Facility-Administered Medications   Medication Dose Route Frequency Provider Last Rate Last Admin   • gabapentin (NEURONTIN) capsule 300 mg  300 mg Oral TID Edin Sanford MD   300 mg at 03/24/22 0901   • morphine injection 2 mg  2 mg Intravenous Q2H PRN Edin Sanford MD       • ondansetron (ZOFRAN) 8 mg/50 mL NS IVPB  8 mg Intravenous Q6H PRN Edin Sanford MD       • Pharmacy to dose warfarin   Does not apply Continuous Edin Sanford MD       • piperacillin-tazobactam (ZOSYN) 3.375 g/100 mL 0.9% NS IVPB (mbp)  3.375 g Intravenous Q8H Edin Sanford MD 0 mL/hr at 03/24/22 0651 Currently Infusing at 03/24/22 1141   • potassium chloride (MICRO-K) CR capsule 40 mEq  40 mEq Oral PRN Edin Sanford MD   40 mEq at 03/24/22 0900    Or   • potassium chloride (KLOR-CON) packet 40 mEq  40 mEq Oral PRN Edin Sanford MD        Or   • potassium chloride 10 mEq in 100 mL IVPB  10 mEq Intravenous Q1H PRN Edin Sanford MD       • scopolamine patch 1 mg/72 hr  1 patch Transdermal Q72H Edin Sanford MD       • sertraline (ZOLOFT) tablet 100 mg  100 mg Oral Daily Edin Sanford MD   100 mg at 03/24/22 0900   • sodium chloride 0.9 % flush 10 mL  10 mL Intravenous PRN Edin Sanford MD       • sodium chloride 0.9 % flush 10 mL  10 mL Intravenous Q12H Edin Sanford MD   10 mL at 03/24/22 0901   • sodium chloride 0.9 % flush 10 mL  10 mL Intravenous PRN Edin Sanford MD       • sodium chloride 0.9 % infusion  75 mL/hr Intravenous Continuous Edin Sanford MD 75 mL/hr at 03/24/22 1141 75 mL/hr at 03/24/22 1141   • warfarin (COUMADIN) tablet 2.5 mg  2.5 mg Oral Daily Edin Sanford MD   2.5 mg at 03/23/22 7621        HOME MEDICATIONS:    No current facility-administered medications on file prior to encounter.     Current Outpatient Medications on File Prior to Encounter   Medication Sig Dispense Refill   • clindamycin (Clindagel) 1 % gel Apply 1 application topically to the appropriate area as directed 2 (Two) Times a Day. (Patient taking differently: Apply 1 application topically to the appropriate area as directed 2 (Two) Times a Day. Apply under arm twice a day) 60 g 1   • gabapentin (NEURONTIN) 300 MG capsule Take 1 capsule by mouth 3 (Three) Times a Day. (Patient taking differently: Take 300 mg by mouth 3 (Three) Times a Day As Needed.) 90 capsule 0   • ondansetron (ZOFRAN) 4 MG tablet Take 1 tablet by mouth 4 (Four) Times a Day As Needed for Nausea or Vomiting. 40 tablet 3   • oxyCODONE (ROXICODONE) 5 MG immediate release tablet Take 1 tablet by mouth Every 4 (Four) Hours As Needed for Moderate Pain  or Severe Pain . 90 tablet 0   • promethazine (PHENERGAN) 12.5 MG tablet Take 1 tablet by mouth Every 6 (Six) Hours As Needed for Nausea or Vomiting. 30 tablet 1   • Scopolamine (Transderm-Scop, 1.5 MG,) 1.5 MG/3DAYS patch Place 1 patch on the skin as directed by provider Every 72 (Seventy-Two) Hours. (Patient taking differently: Place 1 patch on the skin as directed by provider As Needed.) 10 patch 1   • sertraline (ZOLOFT) 100 MG tablet Take 1 tablet by mouth Daily.     • traMADol (ULTRAM) 50 MG tablet Take 1 tablet by mouth Every 8 (Eight) Hours As Needed for Moderate Pain . 90 tablet 0   • warfarin (COUMADIN) 5 MG tablet TAKE 1 TABLET BY MOUTH NIGHTLY OR AS DIRECTED PER COUMADIN CLINIC (Patient taking differently: TAKE 2.5 mg tablet 5 times a week and 1.25 mg tablet 2 days a week) 30 tablet 2       FAMILY HISTORY:    Family History   Problem Relation Age of Onset   • Anemia Mother    • Multiple sclerosis Mother    • Heart disease Father        REVIEW OF SYSTEMS:        CONSTITUTIONAL:  Complains of fatigue.  Positive for fever of 100.7 at  "home.  Denies any f, chills or weight loss.     EYES: No visual disturbances. No discharge. No new lesion.    ENMT:  No epistaxis, mouth sores or difficulty swallowing.    RESPIRATORY: States her shortness of breath is improved.. No new cough or hemoptysis.    CARDIOVASCULAR:  No chest pain or palpitations.    GASTROINTESTINAL: Positive for diarrhea.  Positive for vomiting prior to hospital admission.  No abdominal pain or blood in stool.    GENITOURINARY: No dysuria or hematuria.    MUSCULOSKELETAL: Positive for back pain and hip pain.    LYMPHATICS:  Denies any abnormal swollen glands anywhere in the body.    NEUROLOGICAL : Positive for chronic neuropathy. No headache or dizziness. No seizures or balance problems.    SKIN: No new skin lesion.    ENDOCRINE : No new hot or cold intolerance. No new polyuria. No polydipsia.        PHYSICAL EXAMINATION:      VITAL SIGNS:  /89 (BP Location: Left arm, Patient Position: Lying)   Pulse 85   Temp 97.9 °F (36.6 °C) (Temporal)   Resp 18   Ht 170.2 cm (67\")   Wt 97.1 kg (214 lb)   LMP  (LMP Unknown)   SpO2 94%   BMI 33.52 kg/m²       03/23/22  0843 03/23/22  1404 03/24/22  0500   Weight: 102 kg (225 lb) 97.6 kg (215 lb 4 oz) 97.1 kg (214 lb)       ECOG performance status: 1    CONSTITUTIONAL:  Not in any distress.    EYES: Mild conjunctival pallor. No Icterus. No pterygium. Extraocular movements intact. No ptosis.    ENMT:  Normocephalic, atraumatic. No facial asymmetry noted.    NECK:  No adenopathy. Trachea midline. No JVD.    RESPIRATORY:  Fair air entry bilateral. No rhonchi or wheezing. Fair respiratory effort.    CARDIOVASCULAR:  S1, S2. Regular rate and rhythm. No murmur or gallop appreciated.    ABDOMEN:  Soft, obese, nontender. Bowel sounds present in all four quadrants.  No hepatosplenomegaly appreciated.    MUSCULOSKELETAL:  No edema. No calf tenderness.  Decreased range of motion.    NEUROLOGIC:    No motor  deficit appreciated. Cranial nerves II-XII " grossly intact.    SKIN : No new skin lesion identified. Skin is warm and dry to touch.    LYMPHATICS: No new enlarged lymph nodes in neck or supraclavicular area.    PSYCHIATRY: Alert, awake and oriented ×3. Normal affect.  Normal judgment.  Makes good eye contact.          DIAGNOSTIC DATA:    WBC   Date Value Ref Range Status   03/24/2022 16.84 (H) 3.40 - 10.80 10*3/mm3 Final     RBC   Date Value Ref Range Status   03/24/2022 4.02 3.77 - 5.28 10*6/mm3 Final     Hemoglobin   Date Value Ref Range Status   03/24/2022 12.0 12.0 - 15.9 g/dL Final     Hematocrit   Date Value Ref Range Status   03/24/2022 35.3 34.0 - 46.6 % Final     MCV   Date Value Ref Range Status   03/24/2022 87.8 79.0 - 97.0 fL Final     MCH   Date Value Ref Range Status   03/24/2022 29.9 26.6 - 33.0 pg Final     MCHC   Date Value Ref Range Status   03/24/2022 34.0 31.5 - 35.7 g/dL Final     RDW   Date Value Ref Range Status   03/24/2022 20.0 (H) 12.3 - 15.4 % Final     RDW-SD   Date Value Ref Range Status   03/24/2022 64.5 (H) 37.0 - 54.0 fl Final     MPV   Date Value Ref Range Status   03/24/2022 9.6 6.0 - 12.0 fL Final     Platelets   Date Value Ref Range Status   03/24/2022 273 140 - 450 10*3/mm3 Final     Neutrophil %   Date Value Ref Range Status   03/24/2022 79.7 (H) 42.7 - 76.0 % Final     Lymphocyte %   Date Value Ref Range Status   03/24/2022 9.1 (L) 19.6 - 45.3 % Final     Monocyte %   Date Value Ref Range Status   03/24/2022 8.7 5.0 - 12.0 % Final     Eosinophil %   Date Value Ref Range Status   03/24/2022 1.2 0.3 - 6.2 % Final     Basophil %   Date Value Ref Range Status   03/24/2022 0.5 0.0 - 1.5 % Final     Immature Grans %   Date Value Ref Range Status   03/24/2022 0.8 (H) 0.0 - 0.5 % Final     Neutrophils, Absolute   Date Value Ref Range Status   03/24/2022 13.41 (H) 1.70 - 7.00 10*3/mm3 Final     Lymphocytes, Absolute   Date Value Ref Range Status   03/24/2022 1.54 0.70 - 3.10 10*3/mm3 Final     Monocytes, Absolute   Date Value Ref  Range Status   03/24/2022 1.46 (H) 0.10 - 0.90 10*3/mm3 Final     Eosinophils, Absolute   Date Value Ref Range Status   03/24/2022 0.20 0.00 - 0.40 10*3/mm3 Final     Basophils, Absolute   Date Value Ref Range Status   03/24/2022 0.09 0.00 - 0.20 10*3/mm3 Final     Immature Grans, Absolute   Date Value Ref Range Status   03/24/2022 0.14 (H) 0.00 - 0.05 10*3/mm3 Final     nRBC   Date Value Ref Range Status   03/24/2022 0.0 0.0 - 0.2 /100 WBC Final     Glucose   Date Value Ref Range Status   03/24/2022 93 65 - 99 mg/dL Final     Sodium   Date Value Ref Range Status   03/24/2022 137 136 - 145 mmol/L Final     Potassium   Date Value Ref Range Status   03/24/2022 3.3 (L) 3.5 - 5.2 mmol/L Final   03/24/2022 3.3 (L) 3.5 - 5.2 mmol/L Final     CO2   Date Value Ref Range Status   03/24/2022 22.0 22.0 - 29.0 mmol/L Final     Chloride   Date Value Ref Range Status   03/24/2022 105 98 - 107 mmol/L Final     Anion Gap   Date Value Ref Range Status   03/24/2022 10.0 5.0 - 15.0 mmol/L Final     Creatinine   Date Value Ref Range Status   03/24/2022 0.80 0.57 - 1.00 mg/dL Final     BUN   Date Value Ref Range Status   03/24/2022 13 6 - 20 mg/dL Final     BUN/Creatinine Ratio   Date Value Ref Range Status   03/24/2022 16.3 7.0 - 25.0 Final     Calcium   Date Value Ref Range Status   03/24/2022 8.4 (L) 8.6 - 10.5 mg/dL Final     Alkaline Phosphatase   Date Value Ref Range Status   03/24/2022 159 (H) 39 - 117 U/L Final     Total Protein   Date Value Ref Range Status   03/24/2022 6.3 6.0 - 8.5 g/dL Final     ALT (SGPT)   Date Value Ref Range Status   03/24/2022 28 1 - 33 U/L Final     AST (SGOT)   Date Value Ref Range Status   03/24/2022 32 1 - 32 U/L Final     Total Bilirubin   Date Value Ref Range Status   03/24/2022 0.4 0.0 - 1.2 mg/dL Final     Albumin   Date Value Ref Range Status   03/24/2022 3.50 3.50 - 5.20 g/dL Final     Globulin   Date Value Ref Range Status   03/24/2022 2.8 gm/dL Final     No results found for: IRON, TIBC,  LABIRON, FERRITIN, [id number], FOLATE  Lab Results   Component Value Date     178.6 (H) 02/16/2022    LABCA2 315.4 (H) 02/16/2022    HCGQUANT 1.16 06/03/2021         Radiology Data :  US Gallbladder    Result Date: 3/23/2022  1. Cholelithiasis without evidence of acute cholecystitis. 2. Extensive hepatic lesions consistent with patient's known metastatic breast cancer. 3. Renal cortical thinning of right kidney consistent with chronic medical renal disease. Electronically signed by:  Lesli Jensen MD  3/23/2022 5:06 PM CDT Workstation: [id number]      Pathology :  * Cannot find OR log *    ASSESSMENT AND PLAN:      1.  Leukocytosis:  -Patient's white blood cell count was 32,000 on admission.  White blood cell count was normal at the time of hospital discharge about 1 week ago.  -Patient is currently on antibiotic.  Blood culture has been drawn.  -Due to her leukocytosis with diarrhea, stool for C. difficile as well as stool for GI pathogen panel was sent out earlier today.  Both studies are negative for any infectious etiology.  -Clinically patient states she is feeling better.  -Continue with antibiotic for now and follow-up result of septic work-up  -CBC today shows white blood cell count is 16,000 today.  -We will do CBC with differential daily.    2.  Diarrhea:  -Work-up done earlier today for C. difficile as well as GI pathogen panel is negative  -We will start as needed Imodium.    3.  Metastatic breast cancer with liver, lung and bone metastases  -Plan is to start patient on eribulin in first week of April.    4.  Cancer related pain:  -Patient is tolerating oxycodone and Tylenol by itself at home without any adverse reaction.  -Patient can be resumed back on oxycodone 5 mg every 4 hours as needed for pain if required    5.  History of melanoma in situ s/p surgery    6.  History of DCIS of right breast          PHQ-9 Total Score:         [unfilled]           Thank you for this consultation.    Ovidio COLLIER  MD Dori  3/24/2022  13:02 CDT        Part of this note may be an electronic transcription/translation of spoken language to printed text using the Dragon Dictation System.

## 2022-03-24 NOTE — PROGRESS NOTES
"Anticoagulation by Pharmacy - Warfarin    Kylie García is a 51 y.o.female who has been consulted for warfarin for other-full anticoagulation/history of thrombosis of jugular vein.     Home regimen: Warfarin 2.5 mg PO SunTueThu and 1.25 mg all other days per anticoagulation visit on 3/9/22.   INR Goal: 2-3    Objective:  [Ht: 170.2 cm (67\"); Wt: 97.1 kg (214 lb)]    Lab Results   Component Value Date    INR 2.30 (H) 03/24/2022    INR 2.22 (H) 03/23/2022    INR 1.67 (H) 03/16/2022    PROTIME 24.7 (H) 03/24/2022    PROTIME 24.1 (H) 03/23/2022    PROTIME 19.4 (H) 03/16/2022     Lab Results   Component Value Date    HGB 12.0 03/24/2022    HGB 14.4 03/23/2022    HGB 12.2 03/16/2022    HCT 35.3 03/24/2022    HCT 40.7 03/23/2022    HCT 35.9 03/16/2022     03/24/2022     03/23/2022     03/16/2022       Recent Warfarin Administrations     The 5 most recent administrations since 03/17/2022 are shown below each listed medication.    Warfarin Sodium       Order Dose Date Given     warfarin (COUMADIN) tablet 2.5 mg 2.5 mg 03/23/2022                Assessment  • H/H/plts WNL. No signs or symptoms of bleeding noted.   • Interacting medications: zosyn  • INR is therapeutic and stable. Will continue current dose at this time.     Plan:  1. Give warfarin 2.5 mg tablet PO @ 1800 tonight  2. PT/INR ordered daily.  3. Pharmacy will continue to follow    Thank you for this consult.     Dwaine Sandy, [facility]  03/24/22 10:30 CDT     "

## 2022-03-24 NOTE — OUTREACH NOTE
Medical Week 1 Survey    Flowsheet Row Responses   [facility] patient discharged from? [facility]   Does the patient have one of the following disease processes/diagnoses(primary or secondary)? Other   Week 1 attempt successful? No   Unsuccessful attempts Attempt 2   Revoke Readmitted          BLAIR RHOADES - Registered Nurse

## 2022-03-24 NOTE — PLAN OF CARE
Goal Outcome Evaluation:  Plan of Care Reviewed With: patient        Progress: no change  Outcome Evaluation: Initial assessment.  Intake 50% - 1x.  Will maintain pt on prescribed diet, add yogurt to meals, encourage intake.

## 2022-03-25 NOTE — PLAN OF CARE
Problem: Adult Inpatient Plan of Care  Goal: Plan of Care Review  Outcome: Ongoing, Progressing  Flowsheets (Taken 3/25/2022 0608)  Progress: improving  Plan of Care Reviewed With: patient  Goal: Patient-Specific Goal (Individualized)  Outcome: Ongoing, Progressing  Goal: Absence of Hospital-Acquired Illness or Injury  Outcome: Ongoing, Progressing  Intervention: Identify and Manage Fall Risk  Recent Flowsheet Documentation  Taken 3/25/2022 0605 by Ayana Coleman RN  Safety Promotion/Fall Prevention: safety round/check completed  Taken 3/25/2022 0420 by Ayana Coleman RN  Safety Promotion/Fall Prevention: safety round/check completed  Taken 3/25/2022 0205 by Ayana Coleman RN  Safety Promotion/Fall Prevention: safety round/check completed  Taken 3/25/2022 0020 by Ayana Coleman RN  Safety Promotion/Fall Prevention: safety round/check completed  Taken 3/24/2022 2225 by Ayana Coleman RN  Safety Promotion/Fall Prevention: safety round/check completed  Taken 3/24/2022 2100 by Ayana Coleman RN  Safety Promotion/Fall Prevention: safety round/check completed  Taken 3/24/2022 2005 by Ayana Coleman RN  Safety Promotion/Fall Prevention: safety round/check completed  Taken 3/24/2022 1905 by Ayana Coleman RN  Safety Promotion/Fall Prevention: safety round/check completed  Intervention: Prevent Skin Injury  Recent Flowsheet Documentation  Taken 3/25/2022 0605 by Ayana Coleman RN  Body Position: sitting up in bed  Taken 3/25/2022 0420 by Ayana Coleman RN  Body Position:   right   side-lying  Taken 3/25/2022 0205 by Ayana Coleman RN  Body Position: position changed independently  Taken 3/25/2022 0020 by Ayana Coleman RN  Body Position: supine  Taken 3/24/2022 2225 by Ayana Coleman RN  Body Position:   left   side-lying  Taken 3/24/2022 2005 by Ayana Coleman RN  Body Position: sitting up in bed  Intervention: Prevent and Manage VTE (Venous Thromboembolism) Risk  Recent Flowsheet Documentation  Taken  3/25/2022 0605 by Ayana Coleman RN  Activity Management: activity adjusted per tolerance  Taken 3/25/2022 0420 by Ayana Coleman RN  Activity Management: activity adjusted per tolerance  Taken 3/25/2022 0205 by Ayana Coleman RN  Activity Management: activity adjusted per tolerance  Taken 3/25/2022 0020 by Ayana Coleman RN  Activity Management: activity adjusted per tolerance  Taken 3/24/2022 2225 by Ayana Coleman RN  Activity Management: activity adjusted per tolerance  Taken 3/24/2022 2100 by Ayana Coleman RN  Activity Management: activity adjusted per tolerance  Taken 3/24/2022 2005 by Ayana Coleman RN  Activity Management: activity adjusted per tolerance  Taken 3/24/2022 1905 by Ayana Coleman RN  Activity Management: activity adjusted per tolerance  Goal: Optimal Comfort and Wellbeing  Outcome: Ongoing, Progressing  Intervention: Monitor Pain and Promote Comfort  Recent Flowsheet Documentation  Taken 3/24/2022 2005 by Ayana Coleman RN  Pain Management Interventions: see MAR  Intervention: Provide Person-Centered Care  Recent Flowsheet Documentation  Taken 3/24/2022 2005 by Ayana Coleman RN  Trust Relationship/Rapport: care explained  Goal: Readiness for Transition of Care  Outcome: Ongoing, Progressing     Problem: Electrolyte Imbalance  Goal: Electrolyte Balance  Outcome: Ongoing, Progressing     Problem: Nausea and Vomiting  Goal: Fluid and Electrolyte Balance  Outcome: Ongoing, Progressing     Problem: Adjustment to Illness (Sepsis/Septic Shock)  Goal: Optimal Coping  Outcome: Ongoing, Progressing     Problem: Bleeding (Sepsis/Septic Shock)  Goal: Absence of Bleeding  Outcome: Ongoing, Progressing     Problem: Glycemic Control Impaired (Sepsis/Septic Shock)  Goal: Blood Glucose Level Within Desired Range  Outcome: Ongoing, Progressing     Problem: Infection Progression (Sepsis/Septic Shock)  Goal: Absence of Infection Signs and Symptoms  Outcome: Ongoing, Progressing  Intervention: Promote  Recovery  Recent Flowsheet Documentation  Taken 3/25/2022 0605 by Ayana Coleman, RN  Activity Management: activity adjusted per tolerance  Taken 3/25/2022 0420 by Ayana Coleman, RN  Activity Management: activity adjusted per tolerance  Taken 3/25/2022 0205 by Ayana Coleman, RN  Activity Management: activity adjusted per tolerance  Taken 3/25/2022 0020 by Ayana Coleman, RN  Activity Management: activity adjusted per tolerance  Taken 3/24/2022 2225 by Ayana Coleman, RN  Activity Management: activity adjusted per tolerance  Taken 3/24/2022 2100 by Ayana Coleman, RN  Activity Management: activity adjusted per tolerance  Taken 3/24/2022 2005 by Ayana Coleman, RN  Activity Management: activity adjusted per tolerance  Taken 3/24/2022 1905 by Ayana Coleman, RN  Activity Management: activity adjusted per tolerance     Problem: Nutrition Impaired (Sepsis/Septic Shock)  Goal: Optimal Nutrition Intake  Outcome: Ongoing, Progressing   Goal Outcome Evaluation:  Plan of Care Reviewed With: patient        Progress: improving

## 2022-03-25 NOTE — PROGRESS NOTES
Progress Note  Edin Sanford MD  Hospitalist    Date of visit: 3/25/2022     LOS: 0 days   Patient Care Team:  Teresa Lyn MD as PCP - General (Family Medicine)  Ovidio Meadows MD as Consulting Physician (Hematology and Oncology)    Chief Complaint: nausea / diarrhea     Subjective     Interval History:     Patient Complaints: nausea / diarrhea - better    History taken from: patient    Medication Review:   Current Facility-Administered Medications   Medication Dose Route Frequency Provider Last Rate Last Admin   • gabapentin (NEURONTIN) capsule 300 mg  300 mg Oral TID Edin Sanford MD   300 mg at 03/25/22 0902   • loperamide (IMODIUM) capsule 2 mg  2 mg Oral 4x Daily PRN Ovidio Meadows MD       • morphine injection 2 mg  2 mg Intravenous Q2H PRN Edin Sanford MD       • ondansetron (ZOFRAN) 8 mg/50 mL NS IVPB  8 mg Intravenous Q6H PRN Edin Sanford MD       • Pharmacy to dose warfarin   Does not apply Continuous Edin Sanford MD       • piperacillin-tazobactam (ZOSYN) 3.375 g/100 mL 0.9% NS IVPB (mbp)  3.375 g Intravenous Q8H Edin Sanford MD 0 mL/hr at 03/25/22 0605 3.375 g at 03/25/22 0902   • potassium chloride (MICRO-K) CR capsule 40 mEq  40 mEq Oral PRN Edin Sanford MD   40 mEq at 03/24/22 2005    Or   • potassium chloride (KLOR-CON) packet 40 mEq  40 mEq Oral PRN Edin Sanford MD        Or   • potassium chloride 10 mEq in 100 mL IVPB  10 mEq Intravenous Q1H PRN Edin Sanford MD       • scopolamine patch 1 mg/72 hr  1 patch Transdermal Q72H Edin Sanford MD       • sertraline (ZOLOFT) tablet 100 mg  100 mg Oral Daily Edin Sanford MD   100 mg at 03/25/22 0902   • sodium chloride 0.9 % flush 10 mL  10 mL Intravenous PRN Edin Sanford MD       • sodium chloride 0.9 % flush 10 mL  10 mL Intravenous Q12H Edin Sanford MD   10 mL at 03/24/22 2005   • sodium chloride 0.9 % flush 10 mL  10 mL Intravenous PRN Edin Sanford MD       • sodium chloride 0.9 % infusion  75 mL/hr Intravenous  Continuous Edin Sanford MD 75 mL/hr at 03/25/22 0921 75 mL/hr at 03/25/22 0921   • warfarin sodium (COUMADIN) half tablet 1.25 mg  1.25 mg Oral Daily Ovidio Meadows MD           Review of Systems:   Review of Systems   Constitutional: Positive for fatigue. Negative for fever.   Respiratory: Negative for cough, shortness of breath and wheezing.    Cardiovascular: Negative for chest pain, palpitations and leg swelling.   Gastrointestinal: Negative for abdominal distention, abdominal pain, anal bleeding, diarrhea and vomiting.   Genitourinary: Negative for dysuria, enuresis, frequency, hematuria, pelvic pain and urgency.   Musculoskeletal: Positive for arthralgias. Negative for back pain.   Skin: Negative for color change, pallor and rash.   Neurological: Positive for weakness.   Psychiatric/Behavioral: Negative for agitation, behavioral problems and confusion.   All other systems reviewed and are negative.      Objective     Vital Signs  Temp:  [96.6 °F (35.9 °C)-97.9 °F (36.6 °C)] 97.4 °F (36.3 °C)  Heart Rate:  [66-87] 66  Resp:  [18] 18  BP: (115-130)/(64-89) 122/69    Physical Exam:  Physical Exam  Vitals reviewed.   Constitutional:       General: She is not in acute distress.     Appearance: She is obese. She is not ill-appearing.   HENT:      Head: Normocephalic and atraumatic.   Eyes:      General: No scleral icterus.     Extraocular Movements: Extraocular movements intact.      Pupils: Pupils are equal, round, and reactive to light.   Cardiovascular:      Rate and Rhythm: Normal rate and regular rhythm.   Pulmonary:      Effort: No respiratory distress.      Breath sounds: Normal breath sounds. No stridor. No wheezing or rales.   Chest:      Chest wall: No tenderness.   Abdominal:      General: Bowel sounds are normal. There is no distension.      Palpations: Abdomen is soft. There is no mass.      Tenderness: There is no abdominal tenderness. There is no right CVA tenderness, left CVA tenderness or  guarding.   Musculoskeletal:         General: No swelling, tenderness or deformity. Normal range of motion.      Cervical back: Normal range of motion and neck supple. No rigidity or tenderness.      Right lower leg: No edema.      Left lower leg: No edema.   Skin:     General: Skin is warm and dry.      Coloration: Skin is not jaundiced or pale.      Findings: No bruising, lesion or rash.   Neurological:      General: No focal deficit present.      Mental Status: She is alert and oriented to person, place, and time. Mental status is at baseline.      Cranial Nerves: No cranial nerve deficit.      Sensory: No sensory deficit.      Motor: No weakness.   Psychiatric:         Mood and Affect: Mood normal.         Behavior: Behavior normal.          Results Review:    Lab Results (last 24 hours)     Procedure Component Value Units Date/Time    Blood Culture - Blood, Arm, Left [[id number]]  (Normal) Collected: 03/23/22 0945    Specimen: Blood from Arm, Left Updated: 03/25/22 1001     Blood Culture No growth at 2 days    Blood Culture - Blood, Arm, Left [[id number]]  (Normal) Collected: 03/23/22 0852    Specimen: Blood from Arm, Left Updated: 03/25/22 0901     Blood Culture No growth at 2 days    Protime-INR [[id number]]  (Abnormal) Collected: 03/25/22 0555    Specimen: Blood Updated: 03/25/22 0652     Protime 26.3 Seconds      INR 2.49    Narrative:      Therapeutic range for most indications is 2.0-3.0 INR,  or 2.5-3.5 for mechanical heart valves.    Basic Metabolic Panel [[id number]]  (Abnormal) Collected: 03/25/22 0555    Specimen: Blood Updated: 03/25/22 0618     Glucose 94 mg/dL      BUN 10 mg/dL      Creatinine 0.82 mg/dL      Sodium 139 mmol/L      Potassium 4.1 mmol/L      Chloride 111 mmol/L      CO2 20.0 mmol/L      Calcium 8.1 mg/dL      BUN/Creatinine Ratio 12.2     Anion Gap 8.0 mmol/L      eGFR 85.7 mL/min/1.73      Comment: National Kidney Foundation and American Society of Nephrology (ASN) Task Force  recommended calculation based on the Chronic Kidney Disease Epidemiology Collaboration (CKD-EPI) equation refit without adjustment for race.       Narrative:      GFR Normal >60  Chronic Kidney Disease <60  Kidney Failure <15      CBC & Differential [[id number]]  (Abnormal) Collected: 03/25/22 0555    Specimen: Blood Updated: 03/25/22 0559    Narrative:      The following orders were created for panel order CBC & Differential.  Procedure                               Abnormality         Status                     ---------                               -----------         ------                     CBC Auto Differential[[id number]]        Abnormal            Final result                 Please view results for these tests on the individual orders.    CBC Auto Differential [[id number]]  (Abnormal) Collected: 03/25/22 0555    Specimen: Blood Updated: 03/25/22 0559     WBC 8.57 10*3/mm3      RBC 3.94 10*6/mm3      Hemoglobin 11.5 g/dL      Hematocrit 34.5 %      MCV 87.6 fL      MCH 29.2 pg      MCHC 33.3 g/dL      RDW 20.2 %      RDW-SD 63.8 fl      MPV 9.8 fL      Platelets 272 10*3/mm3      Neutrophil % 70.8 %      Lymphocyte % 13.2 %      Monocyte % 11.3 %      Eosinophil % 2.8 %      Basophil % 0.7 %      Immature Grans % 1.2 %      Neutrophils, Absolute 6.07 10*3/mm3      Lymphocytes, Absolute 1.13 10*3/mm3      Monocytes, Absolute 0.97 10*3/mm3      Eosinophils, Absolute 0.24 10*3/mm3      Basophils, Absolute 0.06 10*3/mm3      Immature Grans, Absolute 0.10 10*3/mm3      nRBC 0.0 /100 WBC     Potassium [[id number]]  (Normal) Collected: 03/25/22 0030    Specimen: Blood Updated: 03/25/22 0043     Potassium 3.8 mmol/L     Potassium [[id number]]  (Normal) Collected: 03/24/22 1451    Specimen: Blood Updated: 03/24/22 1512     Potassium 3.6 mmol/L     Gastrointestinal Panel, PCR - Stool, Per Rectum [[id number]]  (Normal) Collected: 03/24/22 0939    Specimen: Stool from Per Rectum Updated: 03/24/22 1106     Campylobacter Not  Detected     Plesiomonas shigelloides Not Detected     Salmonella Not Detected     Vibrio Not Detected     Vibrio cholerae Not Detected     Yersinia enterocolitica Not Detected     Enteroaggregative E. coli (EAEC) Not Detected     Enteropathogenic E. coli (EPEC) Not Detected     Enterotoxigenic E. coli (ETEC) lt/st Not Detected     Shiga-like toxin-producing E. coli (STEC) stx1/stx2 Not Detected     Shigella/Enteroinvasive E. coli (EIEC) Not Detected     Cryptosporidium Not Detected     Cyclospora cayetanensis Not Detected     Entamoeba histolytica Not Detected     Giardia lamblia Not Detected     Adenovirus F40/41 Not Detected     Astrovirus Not Detected     Norovirus GI/GII Not Detected     Rotavirus A Not Detected     Sapovirus (I, II, IV or V) Not Detected    Narrative:      Testing performed by multiplex PCR system.          Imaging Results (Last 24 Hours)     ** No results found for the last 24 hours. **          Assessment/Plan       Abdominal pain    Diarrhea    Sepsis (HCC)    Metastatic breast cancer (HCC)    Continue with the symptomatic treatment, the IV antibiotics for now. The stool sample was negative. Advance diet as tolerated.    I confirmed that the patient's Advance Care Plan is present, code status is documented, or surrogate decision maker is listed in the patient's medical record.      Edin Sanford MD  03/25/22  10:39 CDT

## 2022-03-25 NOTE — PLAN OF CARE
Goal Outcome Evaluation:       Patient has no new complaints this day. VSS, will continue to monitor.

## 2022-03-25 NOTE — PROGRESS NOTES
"Anticoagulation by Pharmacy - Warfarin    Kylie García is a 51 y.o.female who has been consulted for warfarin for other-full anticoagulation/history of thrombosis of jugular vein.     Home regimen: Warfarin 2.5 mg PO SunTueThu and 1.25 mg all other days per anticoagulation visit on 3/9/22.   INR Goal: 2-3    Objective:  [Ht: 170.2 cm (67\"); Wt: 98.4 kg (217 lb)]    Lab Results   Component Value Date    INR 2.49 (H) 03/25/2022    INR 2.30 (H) 03/24/2022    INR 2.22 (H) 03/23/2022    PROTIME 26.3 (H) 03/25/2022    PROTIME 24.7 (H) 03/24/2022    PROTIME 24.1 (H) 03/23/2022     Lab Results   Component Value Date    HGB 11.5 (L) 03/25/2022    HGB 12.0 03/24/2022    HGB 14.4 03/23/2022    HCT 34.5 03/25/2022    HCT 35.3 03/24/2022    HCT 40.7 03/23/2022     03/25/2022     03/24/2022     03/23/2022       Recent Warfarin Administrations     The 5 most recent administrations since 03/17/2022 are shown below each listed medication.    Warfarin Sodium       Order Dose Date Given     warfarin (COUMADIN) tablet 2.5 mg 2.5 mg 03/23/2022                Assessment  • H/H/plts WNL. No signs or symptoms of bleeding noted.   • Interacting medications: zosyn  • INR is therapeutic and stable. Will continue home dose.      Plan:  1. Give warfarin 1.25 mg tablet PO @ 1800 tonight  2. PT/INR ordered daily.  3. Pharmacy will continue to follow    Thank you for this consult.     Dwaine Sandy, [facility]  03/25/22 09:24 CDT     "

## 2022-03-25 NOTE — PROGRESS NOTES
HISTORY OF PRESENT ILLNESS:  No acute overnight events.  States her diarrhea is improving.  Her stools are becoming more formed.  Denies any nausea or vomiting today.  Able to tolerate her diet.  No significant abdominal pain.  No fever since hospital admission.  No bleeding.      PAST MEDICAL HISTORY:    Past Medical History:   Diagnosis Date   • Anxiety    • Depression    • Liver metastases (HCC)    • Lung metastases (HCC)    • Primary malignant neoplasm of breast (HCC)    • Spine metastasis (HCC)    • Ventricular premature beats        SOCIAL HISTORY:    Social History     Tobacco Use   • Smoking status: Never Smoker   • Smokeless tobacco: Never Used   Substance Use Topics   • Alcohol use: No   • Drug use: No       Surgical History :  Past Surgical History:   Procedure Laterality Date   • AUGMENTATION MAMMAPLASTY     • AXILLARY LYMPH NODE BIOPSY/EXCISION Right 07/10/2017   • BREAST BIOPSY     • BREAST LUMPECTOMY     • BREAST RECONSTRUCTION     • BREAST SURGERY     • CARDIAC CATHETERIZATION     • EP STUDY     • MASTECTOMY Right    • IL INSJ TUNNELED CVC W/O SUBQ PORT/ AGE 3 YR/> Right 07/10/2017       ALLERGIES:    Allergies   Allergen Reactions   • Percocet [Oxycodone-Acetaminophen] Nausea And Vomiting       FAMILY HISTORY:  Family History   Problem Relation Age of Onset   • Anemia Mother    • Multiple sclerosis Mother    • Heart disease Father        REVIEW OF SYSTEMS:      CONSTITUTIONAL:  Complains of fatigue. Denies any fever, chills or weight loss.     EYES: No visual disturbances. No discharge. No new lesion.    ENMT: No epistaxis, mouth sores or difficulty swallowing.    RESPIRATORY: No new shortness of breath. No new cough or hemoptysis.    CARDIOVASCULAR: No chest pain or palpitations.    GASTROINTESTINAL: States her diarrhea, nausea and vomiting has improved.  No abdominal pain  or blood in the stool.    GENITOURINARY: No dysuria or hematuria.    MUSCULOSKELETAL: Positive for chronic back pain  "and hip pain    LYMPHATICS: Denies any abnormal swollen glands anywhere in the body.    NEUROLOGICAL: Positive for chronic neuropathy. No headache or dizziness. No seizures or balance problems.    SKIN: No new skin lesion.    ENDOCRINE: No new hot or cold intolerance. No new polyuria. No polydipsia.          PHYSICAL EXAMINATION:      VITAL SIGNS: /69 (BP Location: Left arm, Patient Position: Lying)   Pulse 66   Temp 97.4 °F (36.3 °C) (Temporal)   Resp 18   Ht 170.2 cm (67\")   Wt 98.4 kg (217 lb)   LMP  (LMP Unknown)   SpO2 96%   BMI 33.99 kg/m²       03/23/22  1404 03/24/22  0500 03/25/22  0430   Weight: 97.6 kg (215 lb 4 oz) 97.1 kg (214 lb) 98.4 kg (217 lb)           CONSTITUTIONAL:  Not in any distress.    EYES: Mild conjunctival pallor. No icterus. No pterygium. Extraocular movements intact. No ptosis.    ENMT: Normocephalic, atraumatic. No facial asymmetry noted.    NECK: No adenopathy. Trachea midline. No JVD.    RESPIRATORY: Fair air entry bilateral. No rhonchi or wheezing. Fair respiratory effort.    CARDIOVASCULAR: S1, S2. Regular rate and rhythm. No murmur or gallop appreciated.    ABDOMEN: Soft, obese, nontender. Bowel sounds present in all four quadrants.  No hepatosplenomegaly appreciated.    MUSCULOSKELETAL: No edema. No calf tenderness.Decreased range of motion.    NEUROLOGIC: No motor  deficit appreciated. Cranial nerves II-XII grossly intact.    SKIN: No new skin lesion identified. Skin is warm and moist to touch.    LYMPHATICS: No new enlarged lymph nodes in neck or supraclavicular area.    PSYCHIATRY: Alert, awake and oriented ×3. Normal affect.  Normal judgment.  Makes good eye contact.      DIAGNOSTIC DATA:    Glucose   Date Value Ref Range Status   03/25/2022 94 65 - 99 mg/dL Final     Sodium   Date Value Ref Range Status   03/25/2022 139 136 - 145 mmol/L Final     Potassium   Date Value Ref Range Status   03/25/2022 4.1 3.5 - 5.2 mmol/L Final     CO2   Date Value Ref Range " Status   03/25/2022 20.0 (L) 22.0 - 29.0 mmol/L Final     Chloride   Date Value Ref Range Status   03/25/2022 111 (H) 98 - 107 mmol/L Final     Anion Gap   Date Value Ref Range Status   03/25/2022 8.0 5.0 - 15.0 mmol/L Final     Creatinine   Date Value Ref Range Status   03/25/2022 0.82 0.57 - 1.00 mg/dL Final     BUN   Date Value Ref Range Status   03/25/2022 10 6 - 20 mg/dL Final     BUN/Creatinine Ratio   Date Value Ref Range Status   03/25/2022 12.2 7.0 - 25.0 Final     Calcium   Date Value Ref Range Status   03/25/2022 8.1 (L) 8.6 - 10.5 mg/dL Final     Alkaline Phosphatase   Date Value Ref Range Status   03/24/2022 159 (H) 39 - 117 U/L Final     Total Protein   Date Value Ref Range Status   03/24/2022 6.3 6.0 - 8.5 g/dL Final     ALT (SGPT)   Date Value Ref Range Status   03/24/2022 28 1 - 33 U/L Final     AST (SGOT)   Date Value Ref Range Status   03/24/2022 32 1 - 32 U/L Final     Total Bilirubin   Date Value Ref Range Status   03/24/2022 0.4 0.0 - 1.2 mg/dL Final     Albumin   Date Value Ref Range Status   03/24/2022 3.50 3.50 - 5.20 g/dL Final     Globulin   Date Value Ref Range Status   03/24/2022 2.8 gm/dL Final     Lab Results   Component Value Date    WBC 8.57 03/25/2022    HGB 11.5 (L) 03/25/2022    HCT 34.5 03/25/2022    MCV 87.6 03/25/2022     03/25/2022     Lab Results   Component Value Date    NEUTROABS 6.07 03/25/2022     Lab Results   Component Value Date     178.6 (H) 02/16/2022    LABCA2 315.4 (H) 02/16/2022    HCGQUANT 1.16 06/03/2021     Blood Culture   Date Value Ref Range Status   03/23/2022 No growth at 24 hours  Preliminary     No results found for: BCIDPCR, CXREFLEX, CSFCX, CULTURETIS  No results found for: CULTURES, HSVCX, URCX  No results found for: EYECULTURE, GCCX, HSVCULTURE, LABHSV  No results found for: LEGIONELLA, MRSACX, MUMPSCX, MYCOPLASCX  No results found for: NOCARDIACX, STOOLCX  No results found for: THROATCX, UNSTIMCULT, URINECX, CULTURE, VZVCULTUR  No results  found for: VIRALCULTU, WOUNDCX    PATHOLOGY:  * Cannot find OR log *     RADIOLOGY DATA :  US Gallbladder    Result Date: 3/23/2022  1. Cholelithiasis without evidence of acute cholecystitis. 2. Extensive hepatic lesions consistent with patient's known metastatic breast cancer. 3. Renal cortical thinning of right kidney consistent with chronic medical renal disease. Electronically signed by:  Lesli Jensen MD  3/23/2022 5:06 PM CDT Workstation: [id number]      ASSESSMENT AND PLAN:      1.  Diarrhea:  -Stool studies for C. difficile as well as GI panel sent panel has been negative.  -White blood cell count today is improved to 8000.  -Patient's diarrhea clinically is improved.  Patient was prescribed Imodium as needed yesterday but has not required any so far.  -If patient continues to improve, and able to tolerate diet today.  Okay to discharge from hematology point of view.    2.  Metastatic breast cancer with liver, lung and bone metastases  -Plan is to start eribulin as outpatient in first week of April.    3.  Cancer related pain:  -Okay to use oxycodone if required.  Patient has been using oxycodone at home without any allergic reaction.    4.  History of DCIS of right breast    5.  History of melanoma in situ.     Ovidio Meadows MD  3/25/2022  07:54 CDT    Part of this note may be an electronic transcription/translation of spoken language to printed text using the Dragon Dictation System.

## 2022-03-26 NOTE — PLAN OF CARE
Problem: Adult Inpatient Plan of Care  Goal: Plan of Care Review  Outcome: Ongoing, Progressing  Flowsheets  Taken 3/26/2022 0123 by Ryanne Aguayo RN  Progress: improving  Outcome Evaluation: Patient is stable, Tolerating meals well with no diahrea or vomiting. No complaints of nausea. Patient states her bowel movements are more formed today. Discharge is expected.  Taken 3/25/2022 0608 by Ayana Coleman RN  Plan of Care Reviewed With: patient  Goal: Patient-Specific Goal (Individualized)  Outcome: Ongoing, Progressing  Goal: Absence of Hospital-Acquired Illness or Injury  Outcome: Ongoing, Progressing  Intervention: Identify and Manage Fall Risk  Recent Flowsheet Documentation  Taken 3/26/2022 0000 by Ryanne Aguayo RN  Safety Promotion/Fall Prevention:   safety round/check completed   nonskid shoes/slippers when out of bed   clutter free environment maintained   assistive device/personal items within reach  Taken 3/25/2022 2200 by Ryanne Aguayo RN  Safety Promotion/Fall Prevention:   safety round/check completed   assistive device/personal items within reach   clutter free environment maintained   nonskid shoes/slippers when out of bed  Taken 3/25/2022 2100 by Ryanne Aguayo RN  Safety Promotion/Fall Prevention:   safety round/check completed   assistive device/personal items within reach   clutter free environment maintained   nonskid shoes/slippers when out of bed  Taken 3/25/2022 2030 by Ryanne Aguayo RN  Safety Promotion/Fall Prevention:   safety round/check completed   assistive device/personal items within reach   clutter free environment maintained   nonskid shoes/slippers when out of bed  Taken 3/25/2022 2000 by Ryanne Aguayo RN  Safety Promotion/Fall Prevention:   safety round/check completed   assistive device/personal items within reach   clutter free environment maintained   nonskid shoes/slippers when out of bed  Taken 3/25/2022 1900 by Ryanne Aguayo RN  Safety Promotion/Fall  Prevention:   safety round/check completed   assistive device/personal items within reach   clutter free environment maintained   nonskid shoes/slippers when out of bed  Intervention: Prevent Skin Injury  Recent Flowsheet Documentation  Taken 3/26/2022 0000 by Ryanne Aguayo RN  Body Position: position changed independently  Taken 3/25/2022 2200 by Ryanne Aguayo RN  Body Position: position changed independently  Taken 3/25/2022 2100 by Ryanne Aguayo RN  Body Position: position changed independently  Taken 3/25/2022 2030 by Ryanne Aguayo RN  Body Position: position changed independently  Taken 3/25/2022 2000 by Ryanne Aguayo RN  Body Position: position changed independently  Taken 3/25/2022 1900 by Ryanne Aguayo RN  Body Position: position changed independently  Intervention: Prevent and Manage VTE (Venous Thromboembolism) Risk  Recent Flowsheet Documentation  Taken 3/26/2022 0000 by Ryanne Aguayo RN  Activity Management: activity adjusted per tolerance  Taken 3/25/2022 2200 by Ryanne Aguayo RN  Activity Management: activity adjusted per tolerance  Taken 3/25/2022 2100 by Ryanne Aguayo RN  Activity Management: activity adjusted per tolerance  Taken 3/25/2022 2030 by Ryanne Aguayo RN  Activity Management: activity adjusted per tolerance  VTE Prevention/Management: bleeding risk factor(s) identified  Taken 3/25/2022 2000 by Ryanne Aguayo RN  Activity Management: activity adjusted per tolerance  Taken 3/25/2022 1900 by Ryanne Aguayo RN  Activity Management: activity adjusted per tolerance  Intervention: Prevent Infection  Recent Flowsheet Documentation  Taken 3/26/2022 0000 by Ryanne Aguayo RN  Infection Prevention: rest/sleep promoted  Taken 3/25/2022 2200 by Ryanne Aguayo RN  Infection Prevention: rest/sleep promoted  Taken 3/25/2022 2100 by Ryanne Aguayo RN  Infection Prevention: rest/sleep promoted  Taken 3/25/2022 2030 by Ryanne Aguayo RN  Infection Prevention:    rest/sleep promoted   hand hygiene promoted  Taken 3/25/2022 2000 by Ryanne Aguayo RN  Infection Prevention: rest/sleep promoted  Taken 3/25/2022 1900 by Ryanne Aguayo RN  Infection Prevention: rest/sleep promoted  Goal: Optimal Comfort and Wellbeing  Outcome: Ongoing, Progressing  Intervention: Provide Person-Centered Care  Recent Flowsheet Documentation  Taken 3/25/2022 2030 by Ryanne Aguayo RN  Trust Relationship/Rapport: care explained  Goal: Readiness for Transition of Care  Outcome: Ongoing, Progressing   Goal Outcome Evaluation:           Progress: improving  Outcome Evaluation: Patient is stable, Tolerating meals well with no diahrea or vomiting. No complaints of nausea. Patient states her bowel movements are more formed today. Discharge is expected.

## 2022-03-26 NOTE — DISCHARGE SUMMARY
Discharge Summary  Edin Sanford MD  Hospitalist     Date of Discharge:  3/26/2022    Discharge Diagnosis:      Abdominal pain    Diarrhea    Sepsis (HCC)    Metastatic breast cancer (HCC)      Presenting Problem/History of Present Illness  Abdominal pain    Hospital Course  Patient is a 51 y.o. female admitted for abdominal pain / nausea that recovered with the help of symptomatic treatment, IV hydration, supportive care.    She is able now to tolerate a regular diet, her vital signs are stable, her lab work is within normal limit, her stool analysis was negative and she will be discharged home. She will follow up with Dr. Meadows at the [facility] as scheduled.    Consults:   Consults     Date and Time Order Name Status Description    3/23/2022  5:41 PM Hematology & Oncology Inpatient Consult Completed     3/13/2022  3:54 AM Hematology & Oncology Inpatient Consult Completed           Pertinent Test Results: negative stool sample    Condition on Discharge: stable    Vital Signs  Temp:  [96.3 °F (35.7 °C)-98.6 °F (37 °C)] 96.3 °F (35.7 °C)  Heart Rate:  [70-83] 83  Resp:  [18] 18  BP: (115-133)/(61-80) 125/68    Physical Exam:  Physical Exam  Vitals reviewed.   Constitutional:       General: She is not in acute distress.     Appearance: She is obese. She is not ill-appearing.   HENT:      Head: Normocephalic and atraumatic.   Eyes:      General: No scleral icterus.     Extraocular Movements: Extraocular movements intact.      Pupils: Pupils are equal, round, and reactive to light.   Cardiovascular:      Rate and Rhythm: Normal rate and regular rhythm.   Pulmonary:      Effort: No respiratory distress.      Breath sounds: Normal breath sounds. No stridor. No wheezing or rales.   Chest:      Chest wall: No tenderness.   Abdominal:      General: Bowel sounds are normal. There is no distension.      Palpations: Abdomen is soft. There is no mass.      Tenderness: There is no abdominal tenderness. There is no right CVA  tenderness, left CVA tenderness or guarding.      Hernia: No hernia is present.   Musculoskeletal:         General: No swelling, tenderness or deformity. Normal range of motion.      Cervical back: Normal range of motion and neck supple. No rigidity or tenderness.      Right lower leg: No edema.      Left lower leg: No edema.   Skin:     General: Skin is warm and dry.      Coloration: Skin is not jaundiced or pale.      Findings: No bruising or lesion.   Neurological:      General: No focal deficit present.      Mental Status: She is alert and oriented to person, place, and time. Mental status is at baseline.      Cranial Nerves: No cranial nerve deficit.      Sensory: No sensory deficit.      Motor: No weakness.   Psychiatric:         Mood and Affect: Mood normal.         Behavior: Behavior normal.         Discharge Disposition  Home or Self Care    Discharge Medications     Discharge Medications      Changes to Medications      Instructions Start Date   gabapentin 300 MG capsule  Commonly known as: NEURONTIN  What changed:   · when to take this  · reasons to take this   300 mg, Oral, 3 Times Daily      Scopolamine 1 MG/3DAYS patch  Commonly known as: Transderm-Scop (1.5 MG)  What changed:   · when to take this  · reasons to take this   1 patch, Transdermal, Every 72 Hours      warfarin 5 MG tablet  Commonly known as: COUMADIN  What changed: additional instructions   TAKE 1 TABLET BY MOUTH NIGHTLY OR AS DIRECTED PER COUMADIN CLINIC         Continue These Medications      Instructions Start Date   ondansetron 4 MG tablet  Commonly known as: ZOFRAN   4 mg, Oral, 4 Times Daily PRN      oxyCODONE 5 MG immediate release tablet  Commonly known as: ROXICODONE   5 mg, Oral, Every 4 Hours PRN      promethazine 12.5 MG tablet  Commonly known as: PHENERGAN   12.5 mg, Oral, Every 6 Hours PRN      sertraline 100 MG tablet  Commonly known as: ZOLOFT   1 tablet, Oral, Daily      traMADol 50 MG tablet  Commonly known as: ULTRAM    50 mg, Oral, Every 8 Hours PRN         Stop These Medications    clindamycin 1 % gel  Commonly known as: Clindagel            Discharge Diet:   Diet Instructions     Diet: Regular      Discharge Diet: Regular          Activity at Discharge:   Activity Instructions     Activity as Tolerated            Follow-up Appointments  Future Appointments   Date Time Provider Department Center   4/4/2022  2:00 PM [employer] CT 1 [facility] CT [facility]AD   4/6/2022  9:15 AM  MAD OP INFU CHAIR 01 [facility] OPI [employer]   4/6/2022 10:00 AM Ovidio Meadows MD MG ONC [facility]     Additional Instructions for the Follow-ups that You Need to Schedule     Discharge Follow-up with PCP   As directed       Currently Documented PCP:    Teresa Lyn MD    PCP Phone Number:    [phone]     Follow Up Details: in 1 week               Test Results Pending at Discharge  Pending Labs     Order Current Status    Blood Culture - Blood, Arm, Left Preliminary result    Blood Culture - Blood, Arm, Left Preliminary result           Edin Sanford MD  03/26/22  15:18 CDT

## 2022-03-27 NOTE — OUTREACH NOTE
Prep Survey    Flowsheet Row Responses   [facility] facility patient discharged from? [facility]   Is LACE score < 7 ? No   Emergency Room discharge w/ pulse ox? No   Eligibility Readm Mgmt   Discharge diagnosis Abdominal pain  Diarrhea   Does the patient have one of the following disease processes/diagnoses(primary or secondary)? Other   Does the patient have Home health ordered? No   Is there a DME ordered? No   Prep survey completed? Yes          MICAELA DELGADO - Registered Nurse

## 2022-03-28 PROBLEM — R11.2 NAUSEA & VOMITING: Status: ACTIVE | Noted: 2022-01-01

## 2022-03-30 NOTE — PROGRESS NOTES
Pt is no longer taking Xeloda.  Pt will begin a new chemo medication next Thursday, Halaven, which does not show interaction with warfarin.  Pt states she has been taking warfarin 2.5mg daily.  Will recheck INR next Thursday when pt has another [facility] appt.  Patient instructed regarding medication; results given and questions answered. Nutritional counseling given.  Dietary factors affecting therapy addressed.  Patient instructed to monitor for excessive bruising or bleeding.  Findings reported by Aissatou Arellano RN.   Today's INR is Lab Results - Last 18 Months   Lab Units 03/30/22  0000   INR  2.00*

## 2022-03-30 NOTE — OUTREACH NOTE
Medical Week 1 Survey    Flowsheet Row Responses   [facility] patient discharged from? [place]   Does the patient have one of the following disease processes/diagnoses(primary or secondary)? Other   Week 1 attempt successful? Yes   Call start time 1549   Call end time 1551   Discharge diagnosis Abdominal pain  Diarrhea   Meds reviewed with patient/caregiver? Yes   Is the patient having any side effects they believe may be caused by any medication additions or changes? No   Does the patient have all medications ordered at discharge? Yes   Is the patient taking all medications as directed (includes completed medication regime)? Yes   Does the patient have a primary care provider?  Yes   Comments regarding PCP PATIENT SAW ONCOLOGY AND COUMADIN CLINIC TODAY   Has home health visited the patient within 72 hours of discharge? N/A   Psychosocial issues? No   Did the patient receive a copy of their discharge instructions? Yes   Nursing interventions Reviewed instructions with patient   What is the patient's perception of their health status since discharge? Improving   Is the patient/caregiver able to teach back signs and symptoms related to disease process for when to call PCP? Yes   Is the patient/caregiver able to teach back signs and symptoms related to disease process for when to call 911? Yes   Is the patient/caregiver able to teach back the hierarchy of who to call/visit for symptoms/problems? PCP, Specialist, Home health nurse, Urgent Care, ED, 911 Yes   If the patient is a current smoker, are they able to teach back resources for cessation? Not a smoker   Week 1 call completed? Yes          EVERARDO CORDERO - Licensed Nurse

## 2022-03-30 NOTE — PROGRESS NOTES
3/30/2022    CHEMOTHERAPY PREPARATION    Kylie García  [id number]  1970    Chief Complaint: chemo education     History of present illness:  Kylie García is a 51 y.o. year old female who is here today for chemotherapy preparation and needs assessment. The patient has been diagnosed with progressive breast cancer and is scheduled to begin treatment with Eribulin.     Oncology History:    Oncology/Hematology History Overview Note   1. Per Dr. 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  2.  Patient also had a history of melanoma of left shoulder which was surgically excised by dermatology, pathology report of which is not available to for review at this point.    3. Patient started not feeling well around November of last year with intermittent abdominal pain especially in the right upper quadrant and epigastric region. February 2017 she started having severe and worsening back pain.  Initially patient was seen by chiropractor and had x-rays done which showed scoliosis and arthritis.  Subsequently in view of worsening pain patient had a bone scan done by primary medical doctor on June 20, 2017 which showed increased activity in the region of L4 vertebrae, ribs and calvarium consistent with metastatic disease.  4. PET Scan completed for staging work up on 07/03/2017.  5. Referred her to Dr. Ernst to see if that lymph nodes can be removed surgically we can obtain tissue diagnosis.  She will also need port placement for chemotherapy.    6. Incisional Biopsy to right axillary mass and mediport placed by Dr. Ernst on 07/10/2017. Pathology revealed metastatic poorly differentiated ductal carcinoma.   7. 07/21/2017 Delay in initiation of chemotherapy treatment as documented per Dr. Meadows:  Since patient is having some active discharge from the site of surgery case was discussed with Dr. Ernst over the phone.  He recommended waiting for one more week before starting chemotherapy to prevent any infectious complication.  This recommendation were again discussed with patient and her family member.  Patient was referred back to Dr. Ernst to get evaluated today.  8. 07/26/2017 initiation of chemotherapy regimen as ordered by Dr. Meadows upon clearance by Dr. Ernst for wound healing.      Bone metastasis (HCC)   6/29/2017 Initial Diagnosis    Metastasis to bone of unknown primary     10/16/2020 - 6/3/2021 Chemotherapy    OP BREAST Fulvestrant     6/3/2021 - 8/18/2021 Chemotherapy    OP BREAST AC DOXOrubicin / Cyclophosphamide     8/20/2021 - 8/20/2021 Chemotherapy    OP BREAST Everolimus / Exemestane     9/24/2021 - 9/24/2021 Chemotherapy    OP BREAST Everolimus / Exemestane     1/20/2022 - 1/20/2022 Chemotherapy    OP BREAST Capecitabine     4/6/2022 -  Chemotherapy    OP BREAST Eribulin     Metastatic disease (HCC) (Resolved)   7/7/2017 Initial Diagnosis    Metastatic disease     1/25/2021 - 2/17/2021 Radiation    Radiation OncologyTreatment Course:  Kylie García received 4256 cGy in 16 fractions to Right Axilla Lymph Node via External Beam Radiation - EBRT.     Malignant neoplasm of right female breast (HCC)   7/3/2017 Imaging    Nuclear medicine PET/CT imaging study.     Results: 1.  Extensive metastatic disease. Pathologic uptake within  enlarged right-sided axillary lymph nodes. Metastatic adenopathy  in both laurie and mediastinum. Tumor replacing most of the left  lobe of liver. Intra-abdominal retroperitoneal metastases,  adenopathy.     Extensive skeletal metastases including a pathologic fracture at  L4.     Electronically signed by:  David Faith MD  7/3/2017 2:12 PM CDT  Workstation: [id number]     7/10/2017 Biopsy    BIOSPY RIGHT AXILLARY MASS AND OR LYMPH  MEDIPORT      (c-arm#2)     Surgeon(s):  Sourav Ernst MD    Results: Mass right axilla, excisional biopsy:      Metastatic poorly differentiated ductal carcinoma, breast primary      Estrogen receptor positive, 95% stainability, strong intensity      Progesterone receptor positive, 95% stainability, strong intensity      HER-2 2+(equivocal), sent to [facility] for FISH     7/19/2017 Initial Diagnosis    Malignant neoplasm of right female breast     7/21/2017 Cancer Staged    Malignant neoplasm of right female breast    Staging form: Breast, AJCC V7      Clinical: Stage IV (M1) - Signed by Ovidio Meadows MD on 7/21/2017 7/26/2017 -  Chemotherapy    OP BREAST TC DOCEtaxel / Cyclophosphamide       8/23/2017 -  Chemotherapy    OP Zoledronic Acid Q35D     10/16/2020 - 6/3/2021 Chemotherapy    OP BREAST Fulvestrant     1/25/2021 - 2/17/2021 Radiation    Radiation OncologyTreatment Course:  Kylie García received 4256 cGy in 16 fractions to Right Axilla Lymph Node via External Beam Radiation - EBRT.     6/3/2021 - 8/18/2021 Chemotherapy    OP BREAST AC DOXOrubicin / Cyclophosphamide     8/20/2021 - 8/20/2021 Chemotherapy    OP BREAST Everolimus / Exemestane     9/24/2021 - 9/24/2021 Chemotherapy    OP BREAST Everolimus / Exemestane     1/20/2022 - 1/20/2022 Chemotherapy    OP BREAST Capecitabine     4/6/2022 -  Chemotherapy    OP BREAST Eribulin     Metastasis to brain (HCC)   Liver metastasis (HCC)   12/20/2019 Initial Diagnosis    Liver metastasis (CMS/HCC)     6/3/2021 - 8/18/2021 Chemotherapy    OP BREAST AC DOXOrubicin / Cyclophosphamide     8/20/2021 - 8/20/2021 Chemotherapy    OP BREAST Everolimus / Exemestane     9/24/2021 - 9/24/2021 Chemotherapy    OP BREAST Everolimus / Exemestane     1/20/2022 - 1/20/2022 Chemotherapy    OP BREAST Capecitabine     4/6/2022 -  Chemotherapy    OP BREAST Eribulin     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)   1/12/2021 Initial Diagnosis    Secondary malignant  neoplasm of axillary lymph nodes (CMS/HCC)         Past Medical History:   Diagnosis Date   • Anxiety    • Depression    • Liver metastases (HCC)    • Lung metastases (HCC)    • Primary malignant neoplasm of breast (HCC)    • Spine metastasis (HCC)    • Ventricular premature beats        Past Surgical History:   Procedure Laterality Date   • AUGMENTATION MAMMAPLASTY     • AXILLARY LYMPH NODE BIOPSY/EXCISION Right 07/10/2017   • BREAST BIOPSY     • BREAST LUMPECTOMY     • BREAST RECONSTRUCTION     • BREAST SURGERY     • CARDIAC CATHETERIZATION     • EP STUDY     • MASTECTOMY Right    • [place] INSJ TUNNELED CVC W/O SUBQ PORT/ AGE 3 YR/> Right 07/10/2017       MEDICATIONS: The current medication list was reviewed and reconciled.     Allergies:  is allergic to percocet [oxycodone-acetaminophen].    Family History   Problem Relation Age of Onset   • Anemia Mother    • Multiple sclerosis Mother    • Heart disease Father          Review of Systems    Physical Exam  Vital Signs: /85   Pulse 120   Temp 97.6 °F (36.4 °C)   Wt 99.4 kg (219 lb 3.2 oz)   LMP  (LMP Unknown)   SpO2 95%   BMI 34.33 kg/m²    General Appearance:  alert, cooperative, no apparent distress and appears stated age   Neurologic/Psychiatric: A&O x 3, gait steady, appropriate affect   HEENT:  Normocephalic, without obvious abnormality, mucous membranes moist   Lungs:   Clear to auscultation bilaterally; respirations regular, even, and unlabored bilaterally   Heart:  Regular rate and rhythm, no murmurs appreciated   Extremities: Normal, atraumatic; no clubbing, cyanosis, or edema    Skin: No rashes, lesions, or abnormal coloration noted     ECOG Performance Status: (1) Restricted in Physically Strenuous Activity, Ambulatory & Able to Do Work of Light Nature          NEEDS ASSESSMENTS    Genetics  This not a new diagnosis.     Psychosocial  The patient has completed a PHQ-9 Depression Screening today.   PHQ-9 results show 1-4 (Minimal Depression).  "Copies of patient's questionnaires will be scanned into EMR for details and further reference.      VAD Assessment  The patient has medi port in place.     Advanced Care Planning  The patient and I discussed advanced care planning, \"Conversations that Matter\".   This service was offered, free of charge, for development of advance directives with a certified ACP facilitator.  The patient does have an up-to-date advanced directive. This document is on file with our office.   Palliative Care  The patient and I discussed palliative care services. Palliative care is not the same as Hospice care. This is specialized medical care for people living with serious illness with the goal of improving quality of life for the patient and their family. [facility] has partnered with [facility] to offer our patients outpatient palliative care early along with their treatment to assist in coordination of care, symptom management, pain management, and medical decision making.  Oncology criteria for palliative care referral is met at this time. The patient is not interested in a palliative care consultation.     Additional Referral needs  none      CHEMOTHERAPY EDUCATION    Booklets Given: Chemo cares education sheet on Eribulin      Chemotherapy/Biotherapy Education Sheets: (list all that apply)  nausea management, acid reflux management, diarrhea management, Cancer resourse contacts information, skin and mouth care and vaccination information                                                                                                                                                                 Chemotherapy Regimen:   Treatment Plans     Name Type Hold Status Plan Dates Plan Provider       Active    OP Zoledronic Acid Q35D ONCOLOGY SUPPORTIVE CARE 1 On Automatic Hold  8/23/2017 - Present Ovidio Meadows MD    OP BREAST Eribulin ONCOLOGY TREATMENT Not on Hold  4/5/2022 - Present Ovidio Meadows MD                   TOPICS " EDUCATION PROVIDED COMMENTS   ANEMIA:  role of RBC, cause, s/s, ways to manage, role of transfusion [x]    THROMBOCYTOPENIA:  role of platelet, cause, s/s, ways to prevent bleeding, things to avoid, when to seek help [x]    NEUTROPENIA:  role of WBC, cause, infection precautions, s/s of infection, when to call MD [x]    NUTRITION & APPETITE CHANGES:  importance of maintaining healthy diet & weight, ways to manage to improve intake, dietary consult, exercise regimen [x]    DIARRHEA:  causes, s/s of dehydration, ways to manage, dietary changes, when to call MD [x]    CONSTIPATION:  causes, ways to manage, dietary changes, when to call MD [x]    NAUSEA & VOMITING:  cause, use of antiemetics, dietary changes, when to call MD [x]    MOUTH SORES:  causes, oral care, ways to manage [x]    ALOPECIA:  cause, ways to manage, resources [x]    INFERTILITY & SEXUALITY:  causes, fertility preservation options, sexuality changes, ways to manage, importance of birth control [x]    NERVOUS SYSTEM CHANGES:  causes, s/s, neuropathies, cognitive changes, ways to manage [x]    PAIN:  causes, ways to manage [x] ????   SKIN & NAIL CHANGES:  cause, s/s, ways to manage [x]    ORGAN TOXICITIES:  cause, s/s, need for diagnostic tests, labs, when to notify MD [x]    SURVIVORSHIP:  distress, distress assessment, secondary malignancies, early/late effects, follow-up, social issues, social support [x]    HOME CARE:  use of spill kits, storing of PO chemo, how to manage bodily fluids [x]    MISCELLANEOUS:  drug interactions, administration, vesicant, et [x]        Assessment and Plan:    Diagnoses and all orders for this visit:    1. Malignant neoplasm of right breast in female, estrogen receptor positive, unspecified site of breast (HCC)        This was a 20 minute face-to-face visit with 20 minutes spent in  counseling and coordination of care as documented above.   The patient and I have reviewed their new cancer diagnosis and scheduled  treatment plan. Needs assessment was completed including genetics, psychosocial needs, barriers to care, VAD evaluation, advanced care planning, and palliative care services. Referrals have been ordered as appropriate based upon our evaluation and patient desires.     Chemotherapy teaching was also completed today as documented above. Adequate time was given to answer all questions to her satisfaction. Patient and family are aware of their care team members and contact information if they have questions or problems throughout the treatment course. Needs assessments and education has been completed. The patient is adequately prepared to begin treatment as scheduled.       April Greco, APRN

## 2022-04-02 NOTE — TELEPHONE ENCOUNTER
Pt states that she went to the dentist yesterday and she said the pt has food under gum so she is referring her to a oral surgeon next week. The pt states that she also has a knot on her left jaw line that she was wondering if you wanted her to come in or what she needed to do next.   no

## 2022-04-06 NOTE — OUTREACH NOTE
Medical Week 2 Survey    Flowsheet Row Responses   [facility] patient discharged from? [place]   Does the patient have one of the following disease processes/diagnoses(primary or secondary)? Other   Week 2 attempt successful? Yes   Call start time 1611   Discharge diagnosis Abdominal pain  Diarrhea   Call end time 1614   Is patient permission given to speak with other caregiver? No   Meds reviewed with patient/caregiver? Yes   Is the patient having any side effects they believe may be caused by any medication additions or changes? No   Does the patient have all medications ordered at discharge? Yes   Is the patient taking all medications as directed (includes completed medication regime)? Yes   Does the patient have a primary care provider?  Yes   Does the patient have an appointment with their PCP within 7 days of discharge? Yes   Comments regarding PCP She sees Oncologly tommorrow and receives Chemo.    Has the patient kept scheduled appointments due by today? N/A   Did the patient receive a copy of their discharge instructions? Yes   Nursing interventions Reviewed instructions with patient   What is the patient's perception of their health status since discharge? Improving   Is the patient/caregiver able to teach back signs and symptoms related to disease process for when to call PCP? Yes   Is the patient/caregiver able to teach back signs and symptoms related to disease process for when to call 911? Yes   Is the patient/caregiver able to teach back the hierarchy of who to call/visit for symptoms/problems? PCP, Specialist, Home health nurse, Urgent Care, ED, 911 Yes   If the patient is a current smoker, are they able to teach back resources for cessation? Not a smoker   Week 2 Call Completed? Yes   Wrap up additional comments She is getting her strength back. Gets Chemo Tomorrow.           ANGIE ALCANTAR - Registered Nurse

## 2022-04-07 NOTE — PROGRESS NOTES
Pt is starting chemo today (Halaven) which does not show an interaction. Pt denies bleeding problems. Pt instructed to continue current dose and appt made for next week; pt verbalized. Patient instructed regarding medication; results given and questions answered. Nutritional counseling given.  Dietary factors affecting therapy addressed.  Patient instructed to monitor for excessive bruising or bleeding.  Findings reported by Tata Nunez RN.    Today's INR is Lab Results - Last 18 Months   Lab Units 04/07/22  0000   INR  2.30*

## 2022-04-07 NOTE — PROGRESS NOTES
DATE OF VISIT: 4/7/2022      REASON FOR VISIT: Metastatic breast cancer with skin, bone, liver metastases, cancer related pain, DVT on anticoagulation with Coumadin, neuropathy from chemotherapy, elevated liver function test      HISTORY OF PRESENT ILLNESS:   51-year-old female with medical problem consisting of DCIS of right breast diagnosed in 2007, anxiety/depression, metastatic breast cancer with bone, liver, skin and lung metastasis for which patient was on Xeloda until March 2022 is here for follow-up appointment today.  Patient is scheduled to start day 1 of cycle 1 of eribulin today.  Complains of worsening skin lesion.  Complains of axillary discomfort.  Complains of abdominal discomfort moving into the lumbar region when she takes deep breath.  Denies any new abdominal pain or any excessive nausea or vomiting or diarrhea.  Denies any bleeding.  Denies any new lymph node enlargement.  Complains of chronic back pain and hip pain.  Complains of tingling and numbness affecting upper and lower extremity.        Oncology history:    1.  Metastatic right breast cancer with bone and liver metastases:  -Patient was initially diagnosed in July 2017 with axillary biopsy on right side  -S/p 6 cycles of chemotherapy with Taxotere and Cytoxan between July 26, 2017 and December 6, 2017  -Patient was evaluated by  at [facility] in [place] for second opinion.  -Patient received 20 cycles of chemotherapy with palbociclib and letrozole between July 30, 2018 and October 2, 2020.  -Due to enlarging mass in the right axilla patient's treatment was changed to Faslodex on October 16, 2020  -Due to continuous enlargement of axillary mass without any other progression patient received radiation treatment to axilla that was completed on March 4, 2021  -Patient has received 8 cycles of Faslodex between October 16, 2020 and May 7, 2021  -PET/CT done on May 21, 2021 shows progression of disease with axillary adenopathy, skin  involvement as well as new onset of right-sided liver metastases.  -Patient received 3 cycles of Adriamycin and Cytoxan between Lupe 3, 2021 in July 29, 2021.  Due to worsening skin involvement from malignancy, treatment was changed to Afinitor with Aromasin on August 26, 2021  -Due to worsening liver function test, dose of Afinitor has been decreased to 5 mg p.o. daily since November 30, 2021.  -CT of chest, abdomen and pelvis with contrast done on January 12, 2022 shows progression of disease with worsening liver metastases as well as worsening skin metastases.  -Patient was started on cycle 1 of Xeloda on January 26, 2022.  -CT of chest, abdomen and pelvis with contrast done on March 14, 2022 showed progression of disease.  -Xeloda was discontinued on March 14, 2022.  -Patient will be started on cycle 1 of eribulin today on April 7, 2022.            Past Medical History, Past Surgical History, Social History, Family History have been reviewed and are without significant changes except as mentioned.    Review of Systems   A comprehensive 14 point review of systems was performed and was negative except as mentioned in HPI.    Medications:  The current medication list was reviewed in the EMR    ALLERGIES:    Allergies   Allergen Reactions   • Percocet [Oxycodone-Acetaminophen] Nausea And Vomiting     PT IS NOT ALLERGIC. SHE HAS A FEW NEGATIVE SIDE EFFECTS.       Objective      Vitals:    04/07/22 0927   BP: 124/80   Pulse: 100   Temp: 98.2 °F (36.8 °C)   TempSrc: Temporal   SpO2: 95%   Weight: 99.2 kg (218 lb 9.6 oz)   PainSc:   6   PainLoc: Abdomen  Comment: HURTS WHEN SHE BREATHES IN.     Current Status 4/1/2022   ECOG score 0       Physical Exam  Pulmonary:      Breath sounds: Normal breath sounds.   Chest:      Comments: Breast exam was performed in presence of registered nurse Solange.  Skin involvement involving right axillary area and right chest wall area showing significant worsening with ulceration and new  skin nodules.  Neurological:      Mental Status: She is alert and oriented to person, place, and time.           RECENT LABS:  Glucose   Date Value Ref Range Status   04/07/2022 122 (H) 65 - 99 mg/dL Final     Sodium   Date Value Ref Range Status   04/07/2022 133 (L) 136 - 145 mmol/L Final     Potassium   Date Value Ref Range Status   04/07/2022 4.0 3.5 - 5.2 mmol/L Final     CO2   Date Value Ref Range Status   04/07/2022 24.0 22.0 - 29.0 mmol/L Final     Chloride   Date Value Ref Range Status   04/07/2022 100 98 - 107 mmol/L Final     Anion Gap   Date Value Ref Range Status   04/07/2022 9.0 5.0 - 15.0 mmol/L Final     Creatinine   Date Value Ref Range Status   04/07/2022 0.70 0.57 - 1.00 mg/dL Final     BUN   Date Value Ref Range Status   04/07/2022 7 6 - 20 mg/dL Final     BUN/Creatinine Ratio   Date Value Ref Range Status   04/07/2022 10.0 7.0 - 25.0 Final     Calcium   Date Value Ref Range Status   04/07/2022 9.2 8.6 - 10.5 mg/dL Final     eGFR Non  Amer   Date Value Ref Range Status   02/16/2022 62 >60 mL/min/1.73 Final     Alkaline Phosphatase   Date Value Ref Range Status   04/07/2022 332 (H) 39 - 117 U/L Final     Total Protein   Date Value Ref Range Status   04/07/2022 6.9 6.0 - 8.5 g/dL Final     ALT (SGPT)   Date Value Ref Range Status   04/07/2022 92 (H) 1 - 33 U/L Final     AST (SGOT)   Date Value Ref Range Status   04/07/2022 117 (H) 1 - 32 U/L Final     Total Bilirubin   Date Value Ref Range Status   04/07/2022 2.5 (H) 0.0 - 1.2 mg/dL Final     Albumin   Date Value Ref Range Status   04/07/2022 3.40 (L) 3.50 - 5.20 g/dL Final     Globulin   Date Value Ref Range Status   04/07/2022 3.5 gm/dL Final     Lab Results   Component Value Date    WBC 8.91 04/07/2022    HGB 12.1 04/07/2022    HCT 36.0 04/07/2022    MCV 88.7 04/07/2022     04/07/2022     Lab Results   Component Value Date    NEUTROABS 6.66 04/07/2022     Lab Results   Component Value Date     178.6 (H) 02/16/2022    LABCA2  315.4 (H) 02/16/2022    HCGQUANT 1.16 06/03/2021         PATHOLOGY:  * Cannot find OR log *         RADIOLOGY DATA :  No radiology results for the last 7 days        Assessment/Plan     1.  Metastatic right breast cancer with skin, bone, liver and lung metastasis, stage IV  -Review oncology history for prior treatment details  -CT scan of chest abdomen and pelvis with contrast done on March 14, 2022 showed progression of disease.  Xeloda was discontinued on March 14, 2022  -We will proceed with day 1 of cycle 1 of eribulin today.  -We will continue with monthly tumor marker CA 15-3 CA 27-29 with Zometa.  -Patient will return to clinic in 3 weeks with repeat CBC, CMP on that day.  -Plan is to repeat CT of chest, abdomen and pelvis with contrast after cycle 3 of eribulin which was discussed with patient.    2.  Brain metastasis:  -Patient was found to have calvarial metastasis without any apparent brain parenchymal lesion.  -Radiation treatment was not recommended by radiation oncologist Dr. Pulido at that point    3.  Bone metastasis  -Remains on monthly Zometa since October 23, 2017.    4.  Internal jugular vein DVT on right side  -Remains on Coumadin.    5.  Elevated liver function test per  Is secondary to liver metastasis plus chemotherapy  -CMP from today showing significant worsening of liver enzymes including bilirubin which is increased to 2.5.  If bilirubin becomes greater than 4 then chemotherapy will not be safe to be pursued which was discussed with patient and her family.  -We will recheck CMP next week.    6.  History of DCIS of right breast diagnosed in 2007  -Had a mastectomy done followed by tamoxifen 5 years that completed in 2013    7.  History of melanoma in situ s/p surgery    8.  Chemotherapy-induced neuropathy  -Remains on gabapentin    9.  Cancer related pain:  -Has a prescription for oxycodone 5 mg every 4 hours as needed for pain.   -Patient states her pain is not controlled despite of  taking oxycodone every 4 hours.  We will start patient on fentanyl 12 mcg patch and titrate the dose according to tolerance.  -Was counseled about bowel regimen to prevent constipation    10.  Health maintenance: Patient does not smoke    11.  Advance care planning: Patient has a living will on chart.    12.  Prescriptions: Prescription for fentanyl patch and lactulose has been sent to her pharmacy today                 PHQ-9 Total Score: 0   -Patient is not homicidal or suicidal.  No acute intervention required.    Kylie García reports a pain score of 6.  Given her pain assessment as noted, treatment options were discussed and the following options were decided upon as a follow-up plan to address the patient's pain: continuation of current treatment plan for pain.         Ovidio Meadows MD  4/7/2022  15:59 CDT        Part of this note may be an electronic transcription/translation of spoken language to printed text using the Dragon Dictation System.          CC:

## 2022-04-14 PROBLEM — E87.6 HYPOKALEMIA: Status: ACTIVE | Noted: 2022-01-01

## 2022-04-14 NOTE — PROGRESS NOTES
Patient is now using magic mouthwash. Pt states no bleeding problems or unexplained bruising. Patient instructed to continue current dosing schedule. Verbalizes understanding. Will recheck in 2 weeks. Patient instructed regarding medication; results given and questions answered. Nutritional counseling given.  Dietary factors affecting therapy addressed.  Patient instructed to monitor for excessive bruising or bleeding. Findings reported by Tata Nunez RN.    Today's INR is Lab Results - Last 18 Months   Lab Units 04/14/22  0000   INR  2.30*

## 2022-04-15 NOTE — OUTREACH NOTE
Medical Week 3 Survey    Flowsheet Row Responses   [facility] patient discharged from? [place]   Does the patient have one of the following disease processes/diagnoses(primary or secondary)? Other   Week 3 attempt successful? No   Unsuccessful attempts Attempt 1          PRESTON Emery Registered Nurse

## 2022-04-18 NOTE — PROGRESS NOTES
Pt started Diflucan 2 pills Thursday and 1 pill daily after for 5 days last Thursday. Pt denied other med changes or bleeding problems. Pt was instructed to hold coumadin 3 nights and dirnk 2 protien shakes today and 1 tomorrow and Wednesday as she cannot eat green veggies right now due to mouth pain; pt verbalized. Pt wishes to return on Thursday after [facility]. Patient instructed regarding medication; results given and questions answered. Nutritional counseling given.  Dietary factors affecting therapy addressed.  Patient instructed to monitor for excessive bruising or bleeding. Notify provider if you experience excessive bleeding from the nose, cuts, gums, rectum, urinary tract, or vagina. Reddish or brown urine or stool. Vomiting of blood or hemorrhoidal bleeding. If major injury occurs present to the Emergency Department. Findings reported by Tata Nunez RN.   Today's INR is Lab Results - Last 18 Months   Lab Units 04/18/22  0000   INR  7.10*

## 2022-04-18 NOTE — OUTREACH NOTE
Medical Week 3 Survey    Flowsheet Row Responses   [facility] patient discharged from? [place]   Does the patient have one of the following disease processes/diagnoses(primary or secondary)? Other   Week 3 attempt successful? No   Unsuccessful attempts Attempt 2          ZURI RHOADES - Registered Nurse

## 2022-04-21 NOTE — PROGRESS NOTES
Pt checked in the [facility] today. Pt states she finished Diflucan on Tuesday. Pt denied other med changes or bleeding problems. Pt instructed on dosing and to have another protein shake today; pt verbalize. Pt states she may restart Diflucan again on Monday but will call. Patient instructed regarding medication; results given and questions answered. Nutritional counseling given.  Dietary factors affecting therapy addressed.  Patient instructed to monitor for excessive bruising or bleeding. Findings reported by Tata Nunez RN.    Today's INR is Lab Results - Last 18 Months   Lab Units 04/21/22  0000   INR  3.10*

## 2022-04-26 NOTE — PROGRESS NOTES
Pt had INR done at [facility] in [place], [place] which we received via fax. I called and spoke to pt who states she will finish Diflucan tomorrow. Pt instructed to continue current dose and to hold green and protein shakes 3 days; pt verbalized. Patient instructed regarding medication; results given and questions answered. Nutritional counseling given.  Dietary factors affecting therapy addressed.  Patient instructed to monitor for excessive bruising or bleeding. Will recheck INR next Thursday when pt returns to the [facility]. Findings reported by Tata Nunez RN.    Today's INR is Lab Results - Last 18 Months   Lab Units 04/26/22  0000   INR  1.50

## 2022-04-28 NOTE — TELEPHONE ENCOUNTER
Incoming Refill Request      Medication requested (name and dose): Fentanyl patch    Pharmacy where request should be sent: [facility]     Additional details provided by patient: 3 patches left     Best call back number: [phone]    Does the patient have less than a 3 day supply:  [] Yes  [x] No    Carmenza Unger  04/28/22, 10:48 CDT

## 2022-04-28 NOTE — TELEPHONE ENCOUNTER
Rx Refill Note  Requested Prescriptions     Pending Prescriptions Disp Refills   • fentaNYL (DURAGESIC) 12 MCG/HR 10 patch 0     Sig: Place 1 patch on the skin as directed by provider Every 72 (Seventy-Two) Hours.      Last office visit with prescribing clinician: 4/7/2022      Next office visit with prescribing clinician: 5/5/2022            Elvia Allen RN  04/28/22, 10:59 CDT

## 2022-05-05 NOTE — PROGRESS NOTES
DATE OF VISIT: 5/5/2022      REASON FOR VISIT: Metastatic breast cancer with skin, bone, liver metastasis, cancer related pain, DVT on Coumadin neuropathy from chemotherapy, elevated liver function test      HISTORY OF PRESENT ILLNESS:   51-year-old female with medical problem consisting of DCIS of right breast diagnosed in 2007, anxiety/depression, metastatic breast cancer with bone, liver, skin and lung metastasis for which patient is currently on eribulin since April 7, 2022.  Patient is scheduled to start day 1 of cycle 2 of chemotherapy today.  Continues to have discomfort from skin lesion.  Denies any bleeding.  Denies any excessive nausea or vomiting or diarrhea with treatment.  Complains of chronic back pain and hip pain.  Denies any recent worsening of neuropathy affecting upper or lower extremity.  Denies any bowel or bladder incontinence.        Oncology history:    1.  Metastatic right breast cancer with bone and liver metastases:  -Patient was initially diagnosed in July 2017 with axillary biopsy on right side  -S/p 6 cycles of chemotherapy with Taxotere and Cytoxan between July 26, 2017 and December 6, 2017  -Patient was evaluated by  at [facility] in [place] for second opinion.  -Patient received 20 cycles of chemotherapy with palbociclib and letrozole between July 30, 2018 and October 2, 2020.  -Due to enlarging mass in the right axilla patient's treatment was changed to Faslodex on October 16, 2020  -Due to continuous enlargement of axillary mass without any other progression patient received radiation treatment to axilla that was completed on March 4, 2021  -Patient has received 8 cycles of Faslodex between October 16, 2020 and May 7, 2021  -PET/CT done on May 21, 2021 shows progression of disease with axillary adenopathy, skin involvement as well as new onset of right-sided liver metastases.  -Patient received 3 cycles of Adriamycin and Cytoxan between Lupe 3, 2021 in July 29, 2021.   Due to worsening skin involvement from malignancy, treatment was changed to Afinitor with Aromasin on August 26, 2021  -Due to worsening liver function test, dose of Afinitor has been decreased to 5 mg p.o. daily since November 30, 2021.  -CT of chest, abdomen and pelvis with contrast done on January 12, 2022 shows progression of disease with worsening liver metastases as well as worsening skin metastases.  -Patient was started on cycle 1 of Xeloda on January 26, 2022.  -CT of chest, abdomen and pelvis with contrast done on March 14, 2022 showed progression of disease.  -Xeloda was discontinued on March 14, 2022.  -Patient was started on cycle 1 of eribulin  on April 7, 2022.          Past Medical History, Past Surgical History, Social History, Family History have been reviewed and are without significant changes except as mentioned.    Review of Systems   A comprehensive 14 point review of systems was performed and was negative except as mentioned in HPI.    Medications:  The current medication list was reviewed in the EMR    ALLERGIES:    Allergies   Allergen Reactions   • Percocet [Oxycodone-Acetaminophen] Nausea And Vomiting     PT IS NOT ALLERGIC. SHE HAS A FEW NEGATIVE SIDE EFFECTS.       Objective      Vitals:    05/05/22 0915   BP: 98/61   Pulse: 87   Temp: 97.8 °F (36.6 °C)   TempSrc: Temporal   SpO2: 96%   Weight: 98.5 kg (217 lb 3.2 oz)   PainSc: 0-No pain     Current Status 5/5/2022   ECOG score 0       Physical Exam  Cardiovascular:      Comments: Port-A-Cath present  Pulmonary:      Breath sounds: Normal breath sounds.   Neurological:      Mental Status: She is alert and oriented to person, place, and time.           RECENT LABS:  Glucose   Date Value Ref Range Status   05/05/2022 84 65 - 99 mg/dL Final     Sodium   Date Value Ref Range Status   05/05/2022 140 136 - 145 mmol/L Final     Potassium   Date Value Ref Range Status   05/05/2022 3.9 3.5 - 5.2 mmol/L Final     CO2   Date Value Ref Range  Status   05/05/2022 24.0 22.0 - 29.0 mmol/L Final     Chloride   Date Value Ref Range Status   05/05/2022 105 98 - 107 mmol/L Final     Anion Gap   Date Value Ref Range Status   05/05/2022 11.0 5.0 - 15.0 mmol/L Final     Creatinine   Date Value Ref Range Status   05/05/2022 0.63 0.57 - 1.00 mg/dL Final     BUN   Date Value Ref Range Status   05/05/2022 12 6 - 20 mg/dL Final     BUN/Creatinine Ratio   Date Value Ref Range Status   05/05/2022 19.0 7.0 - 25.0 Final     Calcium   Date Value Ref Range Status   05/05/2022 10.5 8.6 - 10.5 mg/dL Final     eGFR Non  Amer   Date Value Ref Range Status   02/16/2022 62 >60 mL/min/1.73 Final     Alkaline Phosphatase   Date Value Ref Range Status   05/05/2022 104 39 - 117 U/L Final     Total Protein   Date Value Ref Range Status   05/05/2022 6.3 6.0 - 8.5 g/dL Final     ALT (SGPT)   Date Value Ref Range Status   05/05/2022 32 1 - 33 U/L Final     AST (SGOT)   Date Value Ref Range Status   05/05/2022 37 (H) 1 - 32 U/L Final     Total Bilirubin   Date Value Ref Range Status   05/05/2022 0.5 0.0 - 1.2 mg/dL Final     Albumin   Date Value Ref Range Status   05/05/2022 3.60 3.50 - 5.20 g/dL Final     Globulin   Date Value Ref Range Status   05/05/2022 2.7 gm/dL Final     Lab Results   Component Value Date    WBC 2.59 (L) 05/05/2022    HGB 12.0 05/05/2022    HCT 36.2 05/05/2022    MCV 87.9 05/05/2022     05/05/2022     Lab Results   Component Value Date    NEUTROABS 0.47 (L) 05/05/2022     Lab Results   Component Value Date     146.0 (H) 05/05/2022    LABCA2 487.2 (H) 04/07/2022    HCGQUANT 1.16 06/03/2021         PATHOLOGY:  * Cannot find OR log *         RADIOLOGY DATA :  No radiology results for the last 7 days        Assessment/Plan     1.  Metastatic right breast cancer with skin, bone, liver and lung metastasis stage IV  - Review oncology history for prior treatment details  - Due to severe neutropenia, we will hold day 1 of cycle 2 of chemotherapy today.  -  Patient will return to clinic in 1 week with recheck of CBC and will resume chemotherapy at a reduced dose of eribulin next week if neutrophil count has recovered.  - We will continue with monthly CA 15-3 and CA 27-29 with Zometa  - Patient will return to clinic in 3 weeks with repeat CBC CMP on that day.  - Plan is to do restaging CT of chest, abdomen and pelvis with contrast after cycle 3 of eribulin which was discussed with patient.    2.  Brain metastasis  - Patient was found to have calvarial metastasis without any brain parenchymal lesion.  - Patient was evaluated by Dr. Pulido and no radiation treatment was recommended    3.  Bone metastasis  - Currently on monthly Zometa since October 23, 2017    4.  Internal jugular vein DVT on right side  - Remains on Coumadin    5.  Elevated liver function test:  - Secondary to liver metastasis.  - CMP today shows significant improvement in liver function test    6.  History of DCIS of right breast diagnosed in 2007  - Had a mastectomy done followed by tamoxifen 5 years that completed in 2013    7.  History of melanoma in situ s/p surgery    8.  Chemotherapy-induced neuropathy  -Remains on gabapentin    9.  Cancer related pain:  - Patient is currently on fentanyl 12 mcg patch along with oxycodone 5 mg every 4 hours as needed for pain.  - Has been counseled about bowel regimen to prevent constipation    10.  Health maintenance: Patient does not smoke    11.  Advance care planning: Patient has living will on chart.                   PHQ-9 Total Score: 0   -Patient is not homicidal or suicidal.  No acute intervention required.    Kylie García reports a pain score of 0.  Given her pain assessment as noted, treatment options were discussed and the following options were decided upon as a follow-up plan to address the patient's pain: continuation of current treatment plan for pain.         Ovidio Meadows MD  5/5/2022  13:17 CDT        Part of this note may be an  electronic transcription/translation of spoken language to printed text using the Dragon Dictation System.          CC:

## 2022-05-05 NOTE — PROGRESS NOTES
Pt denies missed coumadin doses.  Pt states she did consume extra green.  I adjusted coumadin dose and instructed pt to limit green intake for now.  Recheck INR next Tuesday.  Pt verbalizes.  Patient instructed regarding medication; results given and questions answered. Nutritional counseling given.  Dietary factors affecting therapy addressed.  Patient instructed to monitor for excessive bruising or bleeding.  Findings reported by Aisstaou Arellano RN.   Today's INR is Lab Results - Last 18 Months   Lab Units 05/05/22  0000   INR  1.20*

## 2022-05-10 NOTE — PROGRESS NOTES
Pt here today for recheck of subtherapeutic INR.  Pt denies medication changes or bleeding issues.  Adjusted coumadin dose and instructed pt to limit green intake for now.  Recheck INR next wk.  Pt verbalizes.  Patient instructed regarding medication; results given and questions answered. Nutritional counseling given.  Dietary factors affecting therapy addressed.  Patient instructed to monitor for excessive bruising or bleeding.  Findings reported by Aissatou Arellano RN.   Today's INR is Lab Results - Last 18 Months   Lab Units 05/10/22  0000   INR  1.30*

## 2022-05-16 NOTE — TELEPHONE ENCOUNTER
Rx Refill Note  Requested Prescriptions     Pending Prescriptions Disp Refills   • gabapentin (NEURONTIN) 300 MG capsule [Pharmacy Med Name: GABAPENTIN 300MG] 90 capsule 0     Sig: TAKE 1 CAPSULE BY MOUTH THREE TIMES DAILY   • fluconazole (DIFLUCAN) 100 MG tablet [Pharmacy Med Name: FLUCONAZOLE 100MG] 7 tablet 0     Sig: TAKE 2 TABLETS BY MOUTH ON DAY 1, THEN TAKE 1 TABLET DAILY FOR 5 DAYS      Last office visit with prescribing clinician: 5/5/2022      Next office visit with prescribing clinician: 6/2/2022            Elvia Allen RN  05/16/22, 08:29 CDT

## 2022-05-16 NOTE — TELEPHONE ENCOUNTER
Rx Refill Note  Requested Prescriptions     Pending Prescriptions Disp Refills   • gabapentin (NEURONTIN) 300 MG capsule [Pharmacy Med Name: GABAPENTIN 300MG] 90 capsule 0     Sig: TAKE 1 CAPSULE BY MOUTH THREE TIMES DAILY   • fluconazole (DIFLUCAN) 100 MG tablet [Pharmacy Med Name: FLUCONAZOLE 100MG] 7 tablet 0     Sig: TAKE 2 TABLETS BY MOUTH ON DAY 1, THEN TAKE 1 TABLET DAILY FOR 5 DAYS      Last office visit with prescribing clinician: 5/5/2022      Next office visit with prescribing clinician: 6/2/2022            Solnage García RN  05/16/22, 09:30 CDT

## 2022-05-17 NOTE — PROGRESS NOTES
Pt denies med changes or bleeding problems. INR is on the rise. Pt instructed on dosing and appt made for 2 weeks when pt returns to the [facility]; pt verbalized. Patient instructed regarding medication; results given and questions answered. Nutritional counseling given.  Dietary factors affecting therapy addressed.  Patient instructed to monitor for excessive bruising or bleeding. Findings reported by Tata Nunez RN.    Today's INR is Lab Results - Last 18 Months   Lab Units 05/17/22  0000   INR  1.80*

## 2022-06-01 NOTE — TELEPHONE ENCOUNTER
Rx Refill Note  Requested Prescriptions     Pending Prescriptions Disp Refills   • fentaNYL (DURAGESIC) 12 MCG/HR 10 patch 0     Sig: Place 1 patch on the skin as directed by provider Every 72 (Seventy-Two) Hours.      Last office visit with prescribing clinician: 5/5/2022      Next office visit with prescribing clinician: Visit date not found            Elvia Allen RN  06/01/22, 08:20 CDT

## 2022-06-02 NOTE — PROGRESS NOTES
Pt seen in the [facility] today. Pt denies med changes other than steroids today before her chemo infusion. Pt denies bleeding problems. Pt reports eating well. Pt instructed to hold coumadin tonight and eat a large serving of broccoli or turnip greens ; pt verbalized. Patient instructed regarding medication; results given and questions answered. Nutritional counseling given.  Dietary factors affecting therapy addressed.  Patient instructed to monitor for excessive bruising or bleeding. Will recheck next week. Findings reported by Tata Nunez RN.    Today's INR is Lab Results - Last 18 Months   Lab Units 06/02/22  0000   INR  4.10*

## 2022-06-02 NOTE — PROGRESS NOTES
DATE OF VISIT: 6/2/2022    Reason for visit:  Metastatic breast cancer with skin, bone, liver metastasis, cancer related pain, DVT on Coumadin neuropathy from chemotherapy, elevated liver function test        HISTORY OF PRESENT ILLNESS:   As of today 6/2/2022:  51-year-old female with medical problem consisting of DCIS of right breast diagnosed in 2007, anxiety/depression, metastatic breast cancer with bone, liver, skin and lung metastasis for which patient is currently on eribulin since April 7, 2022.  Patient is scheduled to start day 1 of cycle 3 of chemotherapy today.  States skin lesions are doing better. Denies any bleeding.  Denies any excessive nausea or vomiting or diarrhea with treatment.  Complains of chronic back pain and hip pain.  Denies any recent worsening of neuropathy affecting upper or lower extremity.  Denies any bowel or bladder incontinence.           Oncology history:     1.  Metastatic right breast cancer with bone and liver metastases:  -Patient was initially diagnosed in July 2017 with axillary biopsy on right side  -S/p 6 cycles of chemotherapy with Taxotere and Cytoxan between July 26, 2017 and December 6, 2017  -Patient was evaluated by  at [facility] in [place] for second opinion.  -Patient received 20 cycles of chemotherapy with palbociclib and letrozole between July 30, 2018 and October 2, 2020.  -Due to enlarging mass in the right axilla patient's treatment was changed to Faslodex on October 16, 2020  -Due to continuous enlargement of axillary mass without any other progression patient received radiation treatment to axilla that was completed on March 4, 2021  -Patient has received 8 cycles of Faslodex between October 16, 2020 and May 7, 2021  -PET/CT done on May 21, 2021 shows progression of disease with axillary adenopathy, skin involvement as well as new onset of right-sided liver metastases.  -Patient received 3 cycles of Adriamycin and Cytoxan between Lupe 3, 2021 in  July 29, 2021.  Due to worsening skin involvement from malignancy, treatment was changed to Afinitor with Aromasin on August 26, 2021  -Due to worsening liver function test, dose of Afinitor has been decreased to 5 mg p.o. daily since November 30, 2021.  -CT of chest, abdomen and pelvis with contrast done on January 12, 2022 shows progression of disease with worsening liver metastases as well as worsening skin metastases.  -Patient was started on cycle 1 of Xeloda on January 26, 2022.  -CT of chest, abdomen and pelvis with contrast done on March 14, 2022 showed progression of disease.  -Xeloda was discontinued on March 14, 2022.  -Patient was started on cycle 1 of eribulin  on April 7, 2022.          Past Medical History, Past Surgical History, Social History, Family History have been reviewed and are without significant changes except as mentioned.    Review of Systems   A comprehensive 14 point review of systems was performed and was negative except as mentioned.    Medications:  The current medication list was reviewed in the EMR    ALLERGIES:    Allergies   Allergen Reactions   • Percocet [Oxycodone-Acetaminophen] Nausea And Vomiting     PT IS NOT ALLERGIC. SHE HAS A FEW NEGATIVE SIDE EFFECTS.       Objective      Vitals:    06/02/22 0853   BP: 130/67   Pulse: 76   Temp: 98 °F (36.7 °C)   TempSrc: Temporal   SpO2: 95%   Weight: 97.8 kg (215 lb 8 oz)   PainSc:   3   PainLoc: Back     Current Status 6/2/2022   ECOG score 0       Physical Exam  General; alert and oriented no acute distress  Lungs; CTAB  Card; RRR  Ext; no edema    RECENT LABS:  Glucose   Date Value Ref Range Status   06/02/2022 58 (L) 65 - 99 mg/dL Final     Sodium   Date Value Ref Range Status   06/02/2022 141 136 - 145 mmol/L Final     Potassium   Date Value Ref Range Status   06/02/2022 4.1 3.5 - 5.2 mmol/L Final     CO2   Date Value Ref Range Status   06/02/2022 25.0 22.0 - 29.0 mmol/L Final     Chloride   Date Value Ref Range Status    06/02/2022 106 98 - 107 mmol/L Final     Anion Gap   Date Value Ref Range Status   06/02/2022 10.0 5.0 - 15.0 mmol/L Final     Creatinine   Date Value Ref Range Status   06/02/2022 0.63 0.57 - 1.00 mg/dL Final     BUN   Date Value Ref Range Status   06/02/2022 10 6 - 20 mg/dL Final     BUN/Creatinine Ratio   Date Value Ref Range Status   06/02/2022 15.9 7.0 - 25.0 Final     Calcium   Date Value Ref Range Status   06/02/2022 10.1 8.6 - 10.5 mg/dL Final     eGFR Non  Amer   Date Value Ref Range Status   02/16/2022 62 >60 mL/min/1.73 Final     Alkaline Phosphatase   Date Value Ref Range Status   06/02/2022 106 39 - 117 U/L Final     Total Protein   Date Value Ref Range Status   06/02/2022 6.6 6.0 - 8.5 g/dL Final     ALT (SGPT)   Date Value Ref Range Status   06/02/2022 32 1 - 33 U/L Final     AST (SGOT)   Date Value Ref Range Status   06/02/2022 45 (H) 1 - 32 U/L Final     Total Bilirubin   Date Value Ref Range Status   06/02/2022 0.3 0.0 - 1.2 mg/dL Final     Albumin   Date Value Ref Range Status   06/02/2022 3.80 3.50 - 5.20 g/dL Final     Globulin   Date Value Ref Range Status   06/02/2022 2.8 gm/dL Final     Lab Results   Component Value Date    WBC 5.12 06/02/2022    HGB 12.2 06/02/2022    HCT 36.6 06/02/2022    MCV 86.1 06/02/2022     06/02/2022     Lab Results   Component Value Date    NEUTROABS 3.23 06/02/2022     Lab Results   Component Value Date     141.0 (H) 06/02/2022    LABCA2 262.9 (H) 05/05/2022    HCGQUANT 1.16 06/03/2021         RADIOLOGY DATA :  No radiology results for the last 7 days        Assessment & Plan     1.  Metastatic right breast cancer with skin, bone, liver and lung metastasis stage IV  - Review oncology history for prior treatment details  - Labs reviewed today and will proceed with Day 1 of cycle #3 today.  -return to clinic next week for labs and day 8.  -RTC in 3 weeks with results of CT C/A/P with contrast; orders placed today.         2.  Brain metastasis  -  Patient was found to have calvarial metastasis without any brain parenchymal lesion.  - Patient was evaluated by Dr. Puliod and no radiation treatment was recommended     3.  Bone metastasis  - Currently on monthly Zometa since October 23, 2017     4.  Internal jugular vein DVT on right side  - Remains on Coumadin     5.  Elevated liver function test:  - Secondary to liver metastasis.  - CMP today shows significant improvement in liver function test     6.  History of DCIS of right breast diagnosed in 2007  - Had a mastectomy done followed by tamoxifen 5 years that completed in 2013     7.  History of melanoma in situ s/p surgery     8.  Chemotherapy-induced neuropathy  -Remains on gabapentin     9.  Cancer related pain:  - Patient is currently on fentanyl 12 mcg patch along with oxycodone 5 mg every 4 hours as needed for pain.  - Has been counseled about bowel regimen to prevent constipation     10.  Health maintenance: Patient does not smoke     11.  Advance care planning: Patient has living will on chart.                     PHQ-9 Total Score: 1 pt is not suicidal or homicidal    Kylie García reports a pain score of 3.  Given her pain assessment as noted, treatment options were discussed and the following options were decided upon as a follow-up plan to address the patient's pain: continuation of current treatment plan for pain.         April Greco, APRN  6/2/2022  15:00 CDT        Part of this note may be an electronic transcription/translation of spoken language to printed text using the Dragon Dictation System.          CC:

## 2022-06-09 NOTE — PROGRESS NOTES
Pt denies med changes or bleeding problems. Pt won't have chemo next week. Pt states she missed Saturday nights dose of warfarin. Pt was instructed on new dose and to have a serving of green veggies Sunday and then every 3-4 days; pt verbalized. Patient instructed regarding medication; results given and questions answered. Nutritional counseling given.  Dietary factors affecting therapy addressed.  Patient instructed to monitor for excessive bruising or bleeding. Will recheck in 11 days. Findings reported by Tata Nunez RN.    Today's INR is Lab Results - Last 18 Months   Lab Units 06/09/22  0000   INR  1.80*

## 2022-06-15 NOTE — TELEPHONE ENCOUNTER
Incoming Refill Request      Medication requested (name and dose): Gabapentin    Pharmacy where request should be sent: [facility]     Additional details provided by patient:     Best call back number: [phone]    Does the patient have less than a 3 day supply:  [x] Yes  [] No    Carmenza Unger  06/15/22, 08:37 CDT

## 2022-06-15 NOTE — TELEPHONE ENCOUNTER
Rx Refill Note  Requested Prescriptions     Pending Prescriptions Disp Refills   • gabapentin (NEURONTIN) 300 MG capsule 90 capsule 0     Sig: Take 1 capsule by mouth 3 (Three) Times a Day.      Last office visit with prescribing clinician: 5/5/2022      Next office visit with prescribing clinician: 6/23/2022            Solange García RN  06/15/22, 08:52 CDT

## 2022-06-20 NOTE — PROGRESS NOTES
Patient states no med changes or bleeding problems or unexplained bruising. Patient instructed to continue current dosing schedule. Verbalizes understanding. Will recheck in 3 days after chemo infusion as she elevated during last tx. Patient instructed regarding medication; results given and questions answered. Nutritional counseling given.  Dietary factors affecting therapy addressed.  Patient instructed to monitor for excessive bruising or bleeding. Findings reported by Tata Nunez RN.    Today's INR is Lab Results - Last 18 Months   Lab Units 06/20/22  0000   INR  2.30*

## 2022-06-23 NOTE — PROGRESS NOTES
DATE OF VISIT: 6/23/2022      REASON FOR VISIT: Metastatic breast cancer with skin, bone, liver and pulmonary metastases, cancer-related pain, DVT on Coumadin, neuropathy from chemotherapy, elevated liver function test          HISTORY OF PRESENT ILLNESS:   51-year-old female with medical problem consisting of DCIS of right breast diagnosed in 2007, anxiety/depression, metastatic breast cancer with bone, liver, skin and pulmonary metastasis for which patient is currently on eribulin since April 7, 2022.  Patient is scheduled to start day 1 of cycle 4 of chemotherapy today.  States her skin lesions are improving.  Denies any excessive nausea or vomiting or diarrhea with treatment.  Complains of worsening hip pain especially on left side.  Denies any recent worsening of neuropathy affecting upper or lower extremity.  Denies any bowel or bladder incontinence.  Denies any new lymph node enlargement.            Oncology history:    1.  Metastatic right breast cancer with bone and liver metastases:  -Patient was initially diagnosed in July 2017 with axillary biopsy on right side  -S/p 6 cycles of chemotherapy with Taxotere and Cytoxan between July 26, 2017 and December 6, 2017  -Patient was evaluated by  at [facility] in [place] for second opinion.  -Patient received 20 cycles of chemotherapy with palbociclib and letrozole between July 30, 2018 and October 2, 2020.  -Due to enlarging mass in the right axilla patient's treatment was changed to Faslodex on October 16, 2020  -Due to continuous enlargement of axillary mass without any other progression patient received radiation treatment to axilla that was completed on March 4, 2021  -Patient has received 8 cycles of Faslodex between October 16, 2020 and May 7, 2021  -PET/CT done on May 21, 2021 shows progression of disease with axillary adenopathy, skin involvement as well as new onset of right-sided liver metastases.  -Patient received 3 cycles of Adriamycin and  Cytoxan between Lupe 3, 2021 in July 29, 2021.  Due to worsening skin involvement from malignancy, treatment was changed to Afinitor with Aromasin on August 26, 2021  -Due to worsening liver function test, dose of Afinitor has been decreased to 5 mg p.o. daily since November 30, 2021.  -CT of chest, abdomen and pelvis with contrast done on January 12, 2022 shows progression of disease with worsening liver metastases as well as worsening skin metastases.  -Patient was started on cycle 1 of Xeloda on January 26, 2022.  -CT of chest, abdomen and pelvis with contrast done on March 14, 2022 showed progression of disease.  -Xeloda was discontinued on March 14, 2022.  -Patient was started on cycle 1 of eribulin  on April 7, 2022.  -Patient received 3 cycles of eribulin between April 7, 2022 and June 9, 2022.  - Restaging CT of chest, abdomen and pelvis with contrast done on June 20, 2022 showing progression of disease with enlarging liver lesion, enlarging lung nodule as well as worsening bone metastasis.  - Recommend changing treatment to carboplatin and gemcitabine starting on July 7, 2022.            Past Medical History, Past Surgical History, Social History, Family History have been reviewed and are without significant changes except as mentioned.    Review of Systems   Constitutional: Positive for unexpected weight change (6 pound of weight loss since last clinic visit).      A comprehensive 14 point review of systems was performed and was negative except as mentioned in HPI.    Medications:  The current medication list was reviewed in the EMR    ALLERGIES:    Allergies   Allergen Reactions   • Percocet [Oxycodone-Acetaminophen] Nausea And Vomiting     PT IS NOT ALLERGIC. SHE HAS A FEW NEGATIVE SIDE EFFECTS.       Objective      Vitals:    06/23/22 0941   BP: 110/69   Pulse: 75   Temp: 97.1 °F (36.2 °C)   TempSrc: Temporal   SpO2: 99%   Weight: 95 kg (209 lb 6.4 oz)   PainSc: 0-No pain     Current Status 6/2/2022    ECOG score 0       Physical Exam  Cardiovascular:      Comments: Port-A-Cath present  Pulmonary:      Breath sounds: Normal breath sounds.   Skin:     Comments: Breast exam was performed in presence of registered nurse Solange.  Skin involvement involving the right breast and right axillary area is improved in terms of discharge.  Skin lesions are still erythematous, raised without any foul-smelling discharge.   Neurological:      Mental Status: She is alert and oriented to person, place, and time.           RECENT LABS:  Glucose   Date Value Ref Range Status   06/23/2022 75 65 - 99 mg/dL Final     Sodium   Date Value Ref Range Status   06/23/2022 141 136 - 145 mmol/L Final     Potassium   Date Value Ref Range Status   06/23/2022 4.2 3.5 - 5.2 mmol/L Final     CO2   Date Value Ref Range Status   06/23/2022 27.0 22.0 - 29.0 mmol/L Final     Chloride   Date Value Ref Range Status   06/23/2022 105 98 - 107 mmol/L Final     Anion Gap   Date Value Ref Range Status   06/23/2022 9.0 5.0 - 15.0 mmol/L Final     Creatinine   Date Value Ref Range Status   06/23/2022 0.64 0.57 - 1.00 mg/dL Final     BUN   Date Value Ref Range Status   06/23/2022 15 6 - 20 mg/dL Final     BUN/Creatinine Ratio   Date Value Ref Range Status   06/23/2022 23.4 7.0 - 25.0 Final     Calcium   Date Value Ref Range Status   06/23/2022 10.1 8.6 - 10.5 mg/dL Final     eGFR Non  Amer   Date Value Ref Range Status   02/16/2022 62 >60 mL/min/1.73 Final     Alkaline Phosphatase   Date Value Ref Range Status   06/23/2022 101 39 - 117 U/L Final     Total Protein   Date Value Ref Range Status   06/23/2022 6.5 6.0 - 8.5 g/dL Final     ALT (SGPT)   Date Value Ref Range Status   06/23/2022 30 1 - 33 U/L Final     AST (SGOT)   Date Value Ref Range Status   06/23/2022 40 (H) 1 - 32 U/L Final     Total Bilirubin   Date Value Ref Range Status   06/23/2022 0.3 0.0 - 1.2 mg/dL Final     Albumin   Date Value Ref Range Status   06/23/2022 3.70 3.50 - 5.20 g/dL  Final     Globulin   Date Value Ref Range Status   06/23/2022 2.8 gm/dL Final     Lab Results   Component Value Date    WBC 5.70 06/23/2022    HGB 11.8 (L) 06/23/2022    HCT 35.3 06/23/2022    MCV 83.8 06/23/2022     06/23/2022     Lab Results   Component Value Date    NEUTROABS 3.45 06/23/2022     Lab Results   Component Value Date     141.0 (H) 06/02/2022    LABCA2 270.6 (H) 06/02/2022    HCGQUANT 1.16 06/03/2021         PATHOLOGY:  * Cannot find OR log *         RADIOLOGY DATA :  CT Chest With Contrast Diagnostic    Result Date: 6/21/2022  1. Unfavorable change with right lateral breast slowly enlarging small nodule with spiculated borders with this lesion measuring 2.28 x 2.13 x 1.39 cm. Additional multiple enlarging soft tissue nodules in the subcutaneous fat of the right lateral chest walls. The largest lesion measures 1.78 x 1.58 x 1.18 cm. This would be suspicious for primary breast malignancy with associated metastatic disease. 2.Minimal enlargement of right upper lobe anterior segment peripheral spiculated nodule which measures 0.76 cm in greatest diameter. This would be suspicious for metastatic disease from breast malignancy. 3. Unfavorable change with enlarging anterior segment right lobe of liver extending into and involving the medial segment of the left lobe of liver mass lesion which poorly enhances. Enlarging lateral segment left lobe of liver partially calcified liver mass lesion. This would be suspicious for progression of hepatic metastatic disease. 4.Decreasing size of right lobe of liver posterior segment dome hypodense poorly enhancing mass lesion. This would suggest partial response to therapy of metastatic lesion in this region. 5.Interval progression with increasing size of extensive skeletal osteoblastic and lytic metastatic disease. Stable L4 vertebral body pathologic compression fracture. Electronically signed by:  Oscar Valentine MD  6/21/2022 1:14 PM CDT Workstation:  [id number]    CT Abdomen Pelvis With Contrast    Result Date: 6/21/2022  1. Unfavorable change with right lateral breast slowly enlarging small nodule with spiculated borders with this lesion measuring 2.28 x 2.13 x 1.39 cm. Additional multiple enlarging soft tissue nodules in the subcutaneous fat of the right lateral chest walls. The largest lesion measures 1.78 x 1.58 x 1.18 cm. This would be suspicious for primary breast malignancy with associated metastatic disease. 2.Minimal enlargement of right upper lobe anterior segment peripheral spiculated nodule which measures 0.76 cm in greatest diameter. This would be suspicious for metastatic disease from breast malignancy. 3. Unfavorable change with enlarging anterior segment right lobe of liver extending into and involving the medial segment of the left lobe of liver mass lesion which poorly enhances. Enlarging lateral segment left lobe of liver partially calcified liver mass lesion. This would be suspicious for progression of hepatic metastatic disease. 4.Decreasing size of right lobe of liver posterior segment dome hypodense poorly enhancing mass lesion. This would suggest partial response to therapy of metastatic lesion in this region. 5.Interval progression with increasing size of extensive skeletal osteoblastic and lytic metastatic disease. Stable L4 vertebral body pathologic compression fracture. Electronically signed by:  Oscar Valentine MD  6/21/2022 1:14 PM CDT Workstation: [id number]          Assessment & Plan     1.  Metastatic right breast cancer with skin, bone, liver and lung metastasis stage IV  - Review oncology history for prior treatment details  - Patient received 3 cycles of eribulin between April 7, 2000 2029 2022  - CT of chest, abdomen and pelvis with contrast done on June 20, 2022 after cycle 3 of eribulin was reviewed with radiologist, discussed with patient.  CT scan is showing progression of disease in terms of enlarging pulmonary nodule,  enlarging liver lesion and worsening bone metastases.  Discussed with patient progression of disease can be potentially life-threatening without any change in treatment  - Recommend changing treatment to carboplatin and gemcitabine.  - Chemotherapy side effects of carboplatin and gemcitabine including but not limited to risk of low blood counts, risk of infection, need for blood transfusion, risk of bleeding, risk of kidney failure, diarrhea, nausea, vomiting, risk of neuropathy and risk of allergic reaction which can be life-threatening were discussed with patient and family. We will also provide them some information today to read about the chemotherapy.  -Patient is planning to go on vacation next week.  We will start chemotherapy in 2 weeks from now.  - Patient will return to clinic with day 8 of cycle 1 of chemotherapy.  - Plan is to repeat CT of chest abdomen and pelvis with contrast after cycle 3 of chemotherapy which was discussed with patient and her family.    2.  Brain metastasis  - Patient was found to have calvarial metastasis without any brain parenchymal lesion  - Was evaluated by radiation oncologist Dr. Pulido and no radiation treatment was recommended at that point    3.  Bone metastasis:  - Remains on monthly Zometa since October 23, 2017.    4.  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  Elevated liver function test  - Secondary to liver metastasis.  Liver enzymes are minimally elevated but improved as compared to prior to starting regular    6.  History of DCIS of right breast in 2007  - Had a mastectomy done followed by tamoxifen for 5 years that completed in 2013    7.  History of melanoma in situ s/p surgery    8.  Chemotherapy-induced neuropathy  - Remains on gabapentin    9.  Cancer related pain  - Currently on fentanyl 12 mcg patch along with oxycodone 5 mg every 4 hours as needed.  - Has been counseled about bowel regimen to prevent constipation    10.  Health  complaints: Patient does not smoke    11.  Advance care planning: Patient has living will on chart                         PHQ-9 Total Score: 0   -Patient is not homicidal or suicidal.  No acute intervention required.    Kylie García reports a pain score of 0.  Given her pain assessment as noted, treatment options were discussed and the following options were decided upon as a follow-up plan to address the patient's pain: continuation of current treatment plan for pain.         Ovidio Meadows MD  6/23/2022  09:52 CDT        Part of this note may be an electronic transcription/translation of spoken language to printed text using the Dragon Dictation System.          CC:

## 2022-06-23 NOTE — PROGRESS NOTES
Pt called to report she is not having chemo today. We wanted to see pt due to chemo/steroids but since she was therapeutic and will not be getting those we rescheduled her appt to 7/7. Pt denied med changes for now but will be starting a new chemo regimen on 7/7. Pt was instructed to continue current dose; pt verbalized. Findings reported by Tata Nunez RN.

## 2022-06-24 NOTE — PROGRESS NOTES
6/24/2022    CHEMOTHERAPY PREPARATION    Kylie García  [id number]  1970    Chief Complaint: chemo education for progressing metastatic breast cancer    History of present illness:  Kylie García is a 51 y.o. year old female who is here today for chemotherapy preparation and needs assessment. Her recent Ct scan showed progression; recommendation was to start Carboplatin and Gemcitabine on day 1 and 8 every 21 days.     Oncology History:    Oncology/Hematology History Overview Note   1. Per Dr. 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  2.  Patient also had a history of melanoma of left shoulder which was surgically excised by dermatology, pathology report of which is not available to for review at this point.    3. Patient started not feeling well around November of last year with intermittent abdominal pain especially in the right upper quadrant and epigastric region. February 2017 she started having severe and worsening back pain.  Initially patient was seen by chiropractor and had x-rays done which showed scoliosis and arthritis.  Subsequently in view of worsening pain patient had a bone scan done by primary medical doctor on June 20, 2017 which showed increased activity in the region of L4 vertebrae, ribs and calvarium consistent with metastatic disease.  4. PET Scan completed for staging work up on 07/03/2017.  5. Referred her to Dr. Ernst to see if that lymph nodes can be removed surgically we can obtain tissue diagnosis.  She will also need port placement for chemotherapy.    6. Incisional Biopsy to right axillary mass and mediport placed by Dr. Ernst on 07/10/2017. Pathology revealed metastatic poorly differentiated ductal carcinoma.   7. 07/21/2017 Delay in initiation of  chemotherapy treatment as documented per Dr. Meadows: Since patient is having some active discharge from the site of surgery case was discussed with Dr. Ernst over the phone.  He recommended waiting for one more week before starting chemotherapy to prevent any infectious complication.  This recommendation were again discussed with patient and her family member.  Patient was referred back to Dr. Ernst to get evaluated today.  8. 07/26/2017 initiation of chemotherapy regimen as ordered by Dr. Meadows upon clearance by Dr. Ernst for wound healing.      Bone metastasis (HCC)   6/29/2017 Initial Diagnosis    Metastasis to bone of unknown primary     10/16/2020 - 6/3/2021 Chemotherapy    OP BREAST Fulvestrant     6/3/2021 - 8/18/2021 Chemotherapy    OP BREAST AC DOXOrubicin / Cyclophosphamide     8/20/2021 - 8/20/2021 Chemotherapy    OP BREAST Everolimus / Exemestane     9/24/2021 - 9/24/2021 Chemotherapy    OP BREAST Everolimus / Exemestane     1/20/2022 - 1/20/2022 Chemotherapy    OP BREAST Capecitabine     4/7/2022 - 6/9/2022 Chemotherapy    OP BREAST Eribulin     7/7/2022 -  Chemotherapy    OP BREAST Gemcitabine / CARBOplatin     Metastatic disease (HCC) (Resolved)   7/7/2017 Initial Diagnosis    Metastatic disease     1/25/2021 - 2/17/2021 Radiation    Radiation OncologyTreatment Course:  Kylie García received 4256 cGy in 16 fractions to Right Axilla Lymph Node via External Beam Radiation - EBRT.     Malignant neoplasm of right female breast (HCC)   7/3/2017 Imaging    Nuclear medicine PET/CT imaging study.     Results: 1.  Extensive metastatic disease. Pathologic uptake within  enlarged right-sided axillary lymph nodes. Metastatic adenopathy  in both laurie and mediastinum. Tumor replacing most of the left  lobe of liver. Intra-abdominal retroperitoneal metastases,  adenopathy.     Extensive skeletal metastases including a pathologic fracture at  L4.     Electronically signed by:  David Faith MD  7/3/2017 2:12 PM  CDT  Workstation: [id number]     7/10/2017 Biopsy    BIOSPY RIGHT AXILLARY MASS AND OR LYMPH  MEDIPORT     (c-arm#2)     Surgeon(s):  Sourav Ernst MD    Results: Mass right axilla, excisional biopsy:      Metastatic poorly differentiated ductal carcinoma, breast primary      Estrogen receptor positive, 95% stainability, strong intensity      Progesterone receptor positive, 95% stainability, strong intensity      HER-2 2+(equivocal), sent to [facility] for FISH     7/19/2017 Initial Diagnosis    Malignant neoplasm of right female breast     7/21/2017 Cancer Staged    Malignant neoplasm of right female breast    Staging form: Breast, AJCC V7      Clinical: Stage IV (M1) - Signed by Ovidio Meadows MD on 7/21/2017 7/26/2017 -  Chemotherapy    OP BREAST TC DOCEtaxel / Cyclophosphamide       8/23/2017 -  Chemotherapy    OP Zoledronic Acid Q35D     10/16/2020 - 6/3/2021 Chemotherapy    OP BREAST Fulvestrant     1/25/2021 - 2/17/2021 Radiation    Radiation OncologyTreatment Course:  Kylie García received 4256 cGy in 16 fractions to Right Axilla Lymph Node via External Beam Radiation - EBRT.     6/3/2021 - 8/18/2021 Chemotherapy    OP BREAST AC DOXOrubicin / Cyclophosphamide     8/20/2021 - 8/20/2021 Chemotherapy    OP BREAST Everolimus / Exemestane     9/24/2021 - 9/24/2021 Chemotherapy    OP BREAST Everolimus / Exemestane     1/20/2022 - 1/20/2022 Chemotherapy    OP BREAST Capecitabine     4/7/2022 - 6/9/2022 Chemotherapy    OP BREAST Eribulin     7/7/2022 -  Chemotherapy    OP BREAST Gemcitabine / CARBOplatin     Metastasis to brain (HCC)   Liver metastasis (HCC)   12/20/2019 Initial Diagnosis    Liver metastasis (CMS/HCC)     6/3/2021 - 8/18/2021 Chemotherapy    OP BREAST AC DOXOrubicin / Cyclophosphamide     8/20/2021 - 8/20/2021 Chemotherapy    OP BREAST Everolimus / Exemestane     9/24/2021 - 9/24/2021 Chemotherapy    OP BREAST Everolimus / Exemestane     1/20/2022 - 1/20/2022 Chemotherapy    OP  BREAST Capecitabine     4/7/2022 - 6/9/2022 Chemotherapy    OP BREAST Eribulin     7/7/2022 -  Chemotherapy    OP BREAST Gemcitabine / CARBOplatin     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)   1/12/2021 Initial Diagnosis    Secondary malignant neoplasm of axillary lymph nodes (CMS/HCC)         Past Medical History:   Diagnosis Date   • Anxiety    • Depression    • Liver metastases (HCC)    • Lung metastases (HCC)    • Primary malignant neoplasm of breast (HCC)    • Spine metastasis (HCC)    • Ventricular premature beats        Past Surgical History:   Procedure Laterality Date   • AUGMENTATION MAMMAPLASTY     • AXILLARY LYMPH NODE BIOPSY/EXCISION Right 07/10/2017   • BREAST BIOPSY     • BREAST LUMPECTOMY     • BREAST RECONSTRUCTION     • BREAST SURGERY     • CARDIAC CATHETERIZATION     • EP STUDY     • MASTECTOMY Right    • WI INSJ TUNNELED CVC W/O SUBQ PORT/ AGE 3 YR/> Right 07/10/2017       MEDICATIONS: The current medication list was reviewed and reconciled.     Allergies:  is allergic to percocet [oxycodone-acetaminophen].    Family History   Problem Relation Age of Onset   • Anemia Mother    • Multiple sclerosis Mother    • Heart disease Father          Review of Systems    Physical Exam  Vital Signs: /69   Pulse 92   Temp 97.6 °F (36.4 °C)   Resp 18   Wt 95.1 kg (209 lb 9.6 oz)   SpO2 96%   BMI 32.83 kg/m²    General Appearance:  alert, cooperative, no apparent distress, appears stated age and normal weight   Neurologic/Psychiatric: A&O x 3, gait steady, appropriate affect   HEENT:  Normocephalic, without obvious abnormality, mucous membranes moist   Lungs:   Clear to auscultation bilaterally; respirations regular, even, and unlabored bilaterally   Heart:  Regular rate and rhythm, no murmurs appreciated   Extremities: Normal, atraumatic; no clubbing, cyanosis, or edema    Skin: No rashes, lesions, or abnormal coloration noted     ECOG Performance Status: (0) Fully Active -  Able to Carry On All Pre-disease Performance Without Restriction          NEEDS ASSESSMENTS    Psychosocial  The patient has completed a PHQ-9 Depression Screening  today.   PHQ-9 results show 0 (No Depression).  Copies of patient's questionnaires will be scanned into EMR for details and further reference.      VAD Assessment  The patient has medi port in place    Advanced Care Planning  The patient has advanced directive on file.      Palliative Care  The patient and I discussed palliative care services. Palliative care is not the same as Hospice care. This is specialized medical care for people living with serious illness with the goal of improving quality of life for the patient and their family. [facility] has partnered with [facility] to offer our patients outpatient palliative care early along with their treatment to assist in coordination of care, symptom management, pain management, and medical decision making.  Oncology criteria for palliative care referral is met at this time. The patient is not interested in a palliative care consultation.     Additional Referral needs  none      CHEMOTHERAPY EDUCATION    Booklets Given: Chemo cares education sheets on Carboplatin and Gemcitabine      Chemotherapy/Biotherapy Education Sheets: (list all that apply)  nausea management, acid reflux management, diarrhea management, Cancer resourse contacts information, skin and mouth care and vaccination information                                                                                                                                                                 Chemotherapy Regimen:   Treatment Plans     Name Type Plan Dates Plan Provider         Active    OP Zoledronic Acid Q35D ONCOLOGY SUPPORTIVE CARE 1  8/23/2017 - Present Ovidio Meadows MD     OP BREAST Gemcitabine / CARBOplatin ONCOLOGY TREATMENT  7/6/2022 - Present Ovidio Meadows MD                    TOPICS EDUCATION PROVIDED COMMENTS   ANEMIA:   role of RBC, cause, s/s, ways to manage, role of transfusion [x]    THROMBOCYTOPENIA:  role of platelet, cause, s/s, ways to prevent bleeding, things to avoid, when to seek help [x]    NEUTROPENIA:  role of WBC, cause, infection precautions, s/s of infection, when to call MD [x]    NUTRITION & APPETITE CHANGES:  importance of maintaining healthy diet & weight, ways to manage to improve intake, dietary consult, exercise regimen [x]    DIARRHEA:  causes, s/s of dehydration, ways to manage, dietary changes, when to call MD [x]    CONSTIPATION:  causes, ways to manage, dietary changes, when to call MD [x]    NAUSEA & VOMITING:  cause, use of antiemetics, dietary changes, when to call MD [x]    MOUTH SORES:  causes, oral care, ways to manage [x]    ALOPECIA:  cause, ways to manage, resources [x]    INFERTILITY & SEXUALITY:  causes, fertility preservation options, sexuality changes, ways to manage, importance of birth control [x]    NERVOUS SYSTEM CHANGES:  causes, s/s, neuropathies, cognitive changes, ways to manage [x]    PAIN:  causes, ways to manage [x] ????   SKIN & NAIL CHANGES:  cause, s/s, ways to manage [x]    ORGAN TOXICITIES:  cause, s/s, need for diagnostic tests, labs, when to notify MD [x]    SURVIVORSHIP:  distress, distress assessment, secondary malignancies, early/late effects, follow-up, social issues, social support [x]    HOME CARE:  use of spill kits, storing of PO chemo, how to manage bodily fluids [x]    MISCELLANEOUS:  drug interactions, administration, vesicant, et [x]        Assessment and Plan:    Diagnoses and all orders for this visit:    1. Bone metastasis (HCC) (Primary)    2. Malignant neoplasm of right breast in female, estrogen receptor positive, unspecified site of breast (HCC)    3. Metastatic breast cancer (HCC)        This was a 30 minute face-to-face visit with 30 minutes spent in  counseling and coordination of care as documented above.   The patient and I have reviewed their new  cancer diagnosis and scheduled treatment plan. Needs assessment was completed including genetics, psychosocial needs, barriers to care, VAD evaluation, advanced care planning, and palliative care services. Referrals have been ordered as appropriate based upon our evaluation and patient desires.     Chemotherapy teaching was also completed today as documented above. Adequate time was given to answer all questions to her satisfaction. Patient and family are aware of their care team members and contact information if they have questions or problems throughout the treatment course. Needs assessments and education has been completed. The patient is adequately prepared to begin treatment as scheduled.       April Greco, APRN

## 2022-07-05 NOTE — TELEPHONE ENCOUNTER
Rx Refill Note  Requested Prescriptions     Pending Prescriptions Disp Refills   • Scopolamine (Transderm-Scop, 1.5 MG,) 1 MG/3DAYS patch 10 patch 1     Sig: Place 1 patch on the skin as directed by provider Every 72 (Seventy-Two) Hours.      Last office visit with prescribing clinician: 6/23/2022      Next office visit with prescribing clinician: Visit date not found            Elvia Allen RN  07/05/22, 12:40 CDT

## 2022-07-05 NOTE — TELEPHONE ENCOUNTER
Incoming Refill Request      Medication requested (name and dose): Scopolamine patch     Pharmacy where request should be sent: [facility]     Additional details provided by patient:     Best call back number: [phone]    Does the patient have less than a 3 day supply:  [x] Yes  [] No    Carmenza Unger  07/05/22, 12:37 CDT

## 2022-07-05 NOTE — TELEPHONE ENCOUNTER
Rx Refill Note  Requested Prescriptions     Pending Prescriptions Disp Refills   • fentaNYL (DURAGESIC) 12 MCG/HR [Pharmacy Med Name: FENTANYL DIS 12MCG/HR] 10 each 0     Sig: PLACE 1 PATCH ON THE SKIN AS DIRECTED BY PROVIDER EVERY 72 HOURS      Last office visit with prescribing clinician: 6/23/2022      Next office visit with prescribing clinician: Visit date not found            Elvia Allen RN  07/05/22, 08:20 CDT

## 2022-07-07 NOTE — PROGRESS NOTES
Pt started new chemo regimen today with Carboplatin and Gemcitabine- both show potential for moderate increase. Pt denies bleeding problems. Dose  decreased due to pt having steroids and potential interaction from chemo drugs. Pt instructed on dosing and to continue green veggies 1-2 times per week; pt verbalized. Patient instructed regarding medication; results given and questions answered. Nutritional counseling given.  Dietary factors affecting therapy addressed.  Patient instructed to monitor for excessive bruising or bleeding. Will recheck next week. Findings reported by Tata Nunez RN.    Today's INR is Lab Results - Last 18 Months   Lab Units 07/07/22  0000   INR  1.90*

## 2022-07-07 NOTE — PROGRESS NOTES
Adult Outpatient Nutrition  Assessment    Patient Name:  Kylie García  YOB: 1970  MRN: [id number]    Assessment Date:  7/7/2022    Comments: Follow-up to check nutrition. Wt 209.9 lb (slow trend down)--not intentional. Alb 3.7. Appetite/intake remain good. Premier Protein drink as supplement. Reinforced importance of nutrition. Offered samples of Boost Plus/Ensure Complete (more caloric dense than Premier). Recipes for homemade supplement options provided as well. Pt agreed to monitor weight, and try high calorie high protein supplement if weight loss continues. Pt very receptive.                        Electronically signed by:  Sue Devine RD  07/07/22 09:31 CDT

## 2022-07-10 NOTE — PROGRESS NOTES
DATE OF VISIT: 7/14/2022      REASON FOR VISIT: Metastatic breast cancer with skin, bone, liver and pulmonary metastases, cancer-related pain, DVT on Coumadin, neuropathy from chemotherapy, elevated liver function test      HISTORY OF PRESENT ILLNESS:   52-year-old female with medical problem consisting of DCIS of right breast diagnosed in 2007, anxiety/depression, metastatic breast cancer with bone, liver skin and pulmonary metastasis for which patient is currently on treatment with carboplatin and gemcitabine since July 7, 2022.  Patient is here to get day 8 of cycle 1 of carboplatin and gemcitabine today.  Denies any excessive nausea or vomiting or diarrhea with treatment.  Complains of hip pain.  Complains of burning pain around skin lesion and right axillary area.  Denies any recent worsening of neuropathy affecting upper and lower extremity.  Denies any bowel or bladder incontinence.  Denies any new lymph node enlargement.            Oncology history:    1.  Metastatic right breast cancer with bone and liver metastases:  -Patient was initially diagnosed in July 2017 with axillary biopsy on right side  -S/p 6 cycles of chemotherapy with Taxotere and Cytoxan between July 26, 2017 and December 6, 2017  -Patient was evaluated by  at [facility] in [place] for second opinion.  -Patient received 20 cycles of chemotherapy with palbociclib and letrozole between July 30, 2018 and October 2, 2020.  -Due to enlarging mass in the right axilla patient's treatment was changed to Faslodex on October 16, 2020  -Due to continuous enlargement of axillary mass without any other progression patient received radiation treatment to axilla that was completed on March 4, 2021  -Patient has received 8 cycles of Faslodex between October 16, 2020 and May 7, 2021  -PET/CT done on May 21, 2021 shows progression of disease with axillary adenopathy, skin involvement as well as new onset of right-sided liver metastases.  -Patient  received 3 cycles of Adriamycin and Cytoxan between Lupe 3, 2021 in July 29, 2021.  Due to worsening skin involvement from malignancy, treatment was changed to Afinitor with Aromasin on August 26, 2021  -Due to worsening liver function test, dose of Afinitor has been decreased to 5 mg p.o. daily since November 30, 2021.  -CT of chest, abdomen and pelvis with contrast done on January 12, 2022 shows progression of disease with worsening liver metastases as well as worsening skin metastases.  -Patient was started on cycle 1 of Xeloda on January 26, 2022.  -CT of chest, abdomen and pelvis with contrast done on March 14, 2022 showed progression of disease.  -Xeloda was discontinued on March 14, 2022.  -Patient was started on cycle 1 of eribulin  on April 7, 2022.  -Patient received 3 cycles of eribulin between April 7, 2022 and June 9, 2022.  - Restaging CT of chest, abdomen and pelvis with contrast done on June 20, 2022 showing progression of disease with enlarging liver lesion, enlarging lung nodule as well as worsening bone metastasis.  -  treatment was changed to carboplatin and gemcitabine  on July 7, 2022.      Past Medical History, Past Surgical History, Social History, Family History have been reviewed and are without significant changes except as mentioned.    Review of Systems   Constitutional: Positive for unexpected weight change (Has lost 8 pounds since last clinic visit).      A comprehensive 14 point review of systems was performed and was negative except as mentioned.    Medications:  The current medication list was reviewed in the EMR    ALLERGIES:    Allergies   Allergen Reactions   • Percocet [Oxycodone-Acetaminophen] Nausea And Vomiting     PT IS NOT ALLERGIC. SHE HAS A FEW NEGATIVE SIDE EFFECTS.       Objective      Vitals:    07/14/22 0919   BP: 106/75   Pulse: 83   Temp: 98.1 °F (36.7 °C)   TempSrc: Temporal   SpO2: 91%   Weight: 91.3 kg (201 lb 4.8 oz)   PainSc:   2   PainLoc: Incisional  Comment:  lesions on the right breast, lateral/side     Current Status 6/2/2022   ECOG score 0       Physical Exam  Cardiovascular:      Comments: Port-A-Cath present  Pulmonary:      Breath sounds: Normal breath sounds.   Neurological:      Mental Status: She is alert and oriented to person, place, and time.           RECENT LABS:  Glucose   Date Value Ref Range Status   07/14/2022 105 (H) 65 - 99 mg/dL Final     Sodium   Date Value Ref Range Status   07/14/2022 140 136 - 145 mmol/L Final     Potassium   Date Value Ref Range Status   07/14/2022 3.9 3.5 - 5.2 mmol/L Final     CO2   Date Value Ref Range Status   07/14/2022 24.0 22.0 - 29.0 mmol/L Final     Chloride   Date Value Ref Range Status   07/14/2022 104 98 - 107 mmol/L Final     Anion Gap   Date Value Ref Range Status   07/14/2022 12.0 5.0 - 15.0 mmol/L Final     Creatinine   Date Value Ref Range Status   07/14/2022 0.66 0.57 - 1.00 mg/dL Final     BUN   Date Value Ref Range Status   07/14/2022 14 6 - 20 mg/dL Final     BUN/Creatinine Ratio   Date Value Ref Range Status   07/14/2022 21.2 7.0 - 25.0 Final     Calcium   Date Value Ref Range Status   07/14/2022 9.2 8.6 - 10.5 mg/dL Final     eGFR Non  Amer   Date Value Ref Range Status   02/16/2022 62 >60 mL/min/1.73 Final     Alkaline Phosphatase   Date Value Ref Range Status   07/07/2022 157 (H) 39 - 117 U/L Final     Total Protein   Date Value Ref Range Status   07/07/2022 6.7 6.0 - 8.5 g/dL Final     ALT (SGPT)   Date Value Ref Range Status   07/07/2022 40 (H) 1 - 33 U/L Final     AST (SGOT)   Date Value Ref Range Status   07/07/2022 81 (H) 1 - 32 U/L Final     Total Bilirubin   Date Value Ref Range Status   07/07/2022 0.3 0.0 - 1.2 mg/dL Final     Albumin   Date Value Ref Range Status   07/07/2022 3.70 3.50 - 5.20 g/dL Final     Globulin   Date Value Ref Range Status   07/07/2022 3.0 gm/dL Final     Lab Results   Component Value Date    WBC 3.99 07/14/2022    HGB 12.0 07/14/2022    HCT 34.4 07/14/2022    MCV  80.8 07/14/2022     07/14/2022     Lab Results   Component Value Date    NEUTROABS 2.62 07/14/2022     Lab Results   Component Value Date     233.0 (H) 07/07/2022    LABCA2 416.0 (H) 07/07/2022    HCGQUANT 1.16 06/03/2021         PATHOLOGY:  * Cannot find OR log *         RADIOLOGY DATA :  No radiology results for the last 7 days        Assessment & Plan     1.  Metastatic right breast cancer with skin, bone, liver and lung metastasis stage IV  - Review oncology history for prior treatment details  - Patient is currently on carboplatin and gemcitabine since July 7, 2022.  - We will proceed with day 8 of cycle 1 of carboplatin and gemcitabine today.  - Patient will return to clinic in 2 weeks with repeat CBC, CMP on that day.  -We will continue close monitoring for chemotherapy related side effects.  - Plan is to repeat CT of chest, abdomen and pelvis with contrast after cycle 3 of carboplatin and gemcitabine which was discussed with patient and her family.      2.  Brain metastases  - Patient was found to have calvarial metastasis without any brain parenchymal lesion  - Patient was evaluated by radiation oncologist Dr. Pulido and no radiation treatment was recommended.    3.  Bone metastasis  - Currently on monthly Zometa since October 23, 2017    4.  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  Elevated liver function test:  - Secondary to liver metastasis    6.  History of DCIS of right breast in 2007  - Had a mastectomy done followed by tamoxifen for 5 years that completed around 2013    7.  History of melanoma in situ status postsurgery    8.  Chemotherapy-induced neuropathy  - Remains on gabapentin    9.  Cancer related pain:  - Remains on fentanyl 12 mcg patch along with oxycodone 5 mg every 4 hours as needed  - Has been counseled about bowel regimen to prevent constipation    10.  Health maintenance: Patient does not smoke    11.  Advance care planning: Patient has living  will on chart.    12.  Prescriptions: Prescriptions for gabapentin has been sent to her pharmacy today               PHQ-9 Total Score: 0   -Patient is not homicidal or suicidal.  No acute intervention required.    Kylie García reports a pain score of 2.  Given her pain assessment as noted, treatment options were discussed and the following options were decided upon as a follow-up plan to address the patient's pain: continuation of current treatment plan for pain.         Ovidio Meadows MD  7/14/2022  09:55 CDT        Part of this note may be an electronic transcription/translation of spoken language to printed text using the Dragon Dictation System.          CC:

## 2022-07-10 NOTE — PROGRESS NOTES
PT has taken 3 weeks of Palbociclib and is now on her week off. PT denies any bleeding issues or med changes. PT is going to [facility] today and will inform CC if any meds are changed. PT denies any s/s of blood clot. PT will be seen in 3 weeks if meds do not change. Patient instructed regarding medication; results given and questions answered. Nutritional counseling given.  Dietary factors affecting therapy addressed.  Patient instructed to monitor for excessive bruising or bleeding. PT verbalizes understanding.   Electronically signed by EVELYN Matute       10-Jul-2022 12:22

## 2022-07-14 NOTE — PROGRESS NOTES
Pt denies med changes or bleeding problems. Pt is on the same chemo regimen as last week with IV steroids. Pt placed on slightly higher dose and advised to take today's dose when she gets home; pt verbalized. Pt instructed to hold green veggies 3 days; pt verbalized. Patient instructed regarding medication; results given and questions answered. Nutritional counseling given.  Dietary factors affecting therapy addressed.  Patient instructed to monitor for excessive bruising or bleeding. Pt will have INR obtained in [place] and faxed to us next Thursday. Findings reported by Tata Nunez RN.    Today's INR is Lab Results - Last 18 Months   Lab Units 07/14/22  0000   INR  1.40*

## 2022-07-21 NOTE — PROGRESS NOTES
Pt had INR. Drawn at [facility] in [place], [place]. We received results via fax. I spoke to pt on the phone and she denied med changes, bleeding problems, missed doses, or excessive vitamin K intake. Pt was instructed on new warfarin dose and to hold green veggies; pt verbalized. Patient instructed regarding medication; results given and questions answered. Nutritional counseling given.  Dietary factors affecting therapy addressed.  Patient instructed to monitor for excessive bruising or bleeding. Appt made for 1 week after [facility] appt. Findings reported by Tata Nunez RN.    Today's INR is Lab Results - Last 18 Months   Lab Units 07/21/22  0000   INR  1.30

## 2022-07-28 NOTE — PROGRESS NOTES
Pt denies med changes or bleeding problems. Pt states she took her 1/2 warfarin dose on Saturday and a whole tablet all other days; calendar updated to refect this. Pt instructed to continue current dose and appt made for next week; pt verbalized. Patient instructed regarding medication; results given and questions answered. Nutritional counseling given.  Dietary factors affecting therapy addressed.  Patient instructed to monitor for excessive bruising or bleeding. Findings reported by Tata Nunez RN.    Today's INR is Lab Results - Last 18 Months   Lab Units 07/28/22  0000   INR  2.00*

## 2022-07-28 NOTE — PROGRESS NOTES
DATE OF VISIT: 7/28/2022      REASON FOR VISIT: Metastatic breast cancer with skin, bone, liver and pulmonary metastasis, cancer-related pain, DVT on Coumadin, neuropathy from chemotherapy, elevated liver function test      HISTORY OF PRESENT ILLNESS:   52-year-old female with medical problem consisting of DCIS of right breast diagnosed in 2007, anxiety/depression, metastatic breast cancer with bone, liver, skin and pulmonary metastasis for which patient is currently on treatment with carboplatin and gemcitabine since July 7, 2022.  Patient is here to get day 1 of cycle 2 of chemotherapy today.  Denies any excessive nausea or vomiting or diarrhea with treatment.  Complains of hip pain.  Complains of burning pain around skin lesion on right chest wall and axillary area.  Denies any worsening neuropathy.  Denies any bowel or bladder incontinence.  Denies any new lymph node enlargement.          Oncology history:    1.  Metastatic right breast cancer with bone and liver metastases:  -Patient was initially diagnosed in July 2017 with axillary biopsy on right side  -S/p 6 cycles of chemotherapy with Taxotere and Cytoxan between July 26, 2017 and December 6, 2017  -Patient was evaluated by  at [facility] in [place] for second opinion.  -Patient received 20 cycles of chemotherapy with palbociclib and letrozole between July 30, 2018 and October 2, 2020.  -Due to enlarging mass in the right axilla patient's treatment was changed to Faslodex on October 16, 2020  -Due to continuous enlargement of axillary mass without any other progression patient received radiation treatment to axilla that was completed on March 4, 2021  -Patient has received 8 cycles of Faslodex between October 16, 2020 and May 7, 2021  -PET/CT done on May 21, 2021 shows progression of disease with axillary adenopathy, skin involvement as well as new onset of right-sided liver metastases.  -Patient received 3 cycles of Adriamycin and Cytoxan  between Lupe 3, 2021 in July 29, 2021.  Due to worsening skin involvement from malignancy, treatment was changed to Afinitor with Aromasin on August 26, 2021  -Due to worsening liver function test, dose of Afinitor has been decreased to 5 mg p.o. daily since November 30, 2021.  -CT of chest, abdomen and pelvis with contrast done on January 12, 2022 shows progression of disease with worsening liver metastases as well as worsening skin metastases.  -Patient was started on cycle 1 of Xeloda on January 26, 2022.  -CT of chest, abdomen and pelvis with contrast done on March 14, 2022 showed progression of disease.  -Xeloda was discontinued on March 14, 2022.  -Patient was started on cycle 1 of eribulin  on April 7, 2022.  -Patient received 3 cycles of eribulin between April 7, 2022 and June 9, 2022.  - Restaging CT of chest, abdomen and pelvis with contrast done on June 20, 2022 showing progression of disease with enlarging liver lesion, enlarging lung nodule as well as worsening bone metastasis.  -  treatment was changed to carboplatin and gemcitabine  on July 7, 2022.            Past Medical History, Past Surgical History, Social History, Family History have been reviewed and are without significant changes except as mentioned.    Review of Systems   A comprehensive 14 point review of systems was performed and was negative except as mentioned in HPI.    Medications:  The current medication list was reviewed in the EMR    ALLERGIES:    Allergies   Allergen Reactions   • Percocet [Oxycodone-Acetaminophen] Nausea And Vomiting     PT IS NOT ALLERGIC. SHE HAS A FEW NEGATIVE SIDE EFFECTS.       Objective      Vitals:    07/28/22 0834   BP: 116/72   Pulse: 81   Temp: 97.4 °F (36.3 °C)   TempSrc: Temporal   SpO2: 95%   Weight: 92.2 kg (203 lb 4.8 oz)   PainSc:   2   PainLoc: Back     Current Status 7/28/2022   ECOG score 0       Physical Exam  Cardiovascular:      Comments: Port-A-Cath present  Pulmonary:      Breath sounds:  Normal breath sounds.   Neurological:      Mental Status: She is alert and oriented to person, place, and time.           RECENT LABS:  Glucose   Date Value Ref Range Status   07/28/2022 85 65 - 99 mg/dL Final     Sodium   Date Value Ref Range Status   07/28/2022 141 136 - 145 mmol/L Final     Potassium   Date Value Ref Range Status   07/28/2022 4.1 3.5 - 5.2 mmol/L Final     CO2   Date Value Ref Range Status   07/28/2022 28.0 22.0 - 29.0 mmol/L Final     Chloride   Date Value Ref Range Status   07/28/2022 104 98 - 107 mmol/L Final     Anion Gap   Date Value Ref Range Status   07/28/2022 9.0 5.0 - 15.0 mmol/L Final     Creatinine   Date Value Ref Range Status   07/28/2022 0.66 0.57 - 1.00 mg/dL Final     BUN   Date Value Ref Range Status   07/28/2022 13 6 - 20 mg/dL Final     BUN/Creatinine Ratio   Date Value Ref Range Status   07/28/2022 19.7 7.0 - 25.0 Final     Calcium   Date Value Ref Range Status   07/28/2022 9.8 8.6 - 10.5 mg/dL Final     eGFR Non  Amer   Date Value Ref Range Status   02/16/2022 62 >60 mL/min/1.73 Final     Alkaline Phosphatase   Date Value Ref Range Status   07/28/2022 157 (H) 39 - 117 U/L Final     Total Protein   Date Value Ref Range Status   07/28/2022 6.7 6.0 - 8.5 g/dL Final     ALT (SGPT)   Date Value Ref Range Status   07/28/2022 72 (H) 1 - 33 U/L Final     AST (SGOT)   Date Value Ref Range Status   07/28/2022 56 (H) 1 - 32 U/L Final     Total Bilirubin   Date Value Ref Range Status   07/28/2022 0.2 0.0 - 1.2 mg/dL Final     Albumin   Date Value Ref Range Status   07/28/2022 3.90 3.50 - 5.20 g/dL Final     Globulin   Date Value Ref Range Status   07/28/2022 2.8 gm/dL Final     Lab Results   Component Value Date    WBC 3.60 07/28/2022    HGB 11.6 (L) 07/28/2022    HCT 34.3 07/28/2022    MCV 83.9 07/28/2022     07/28/2022     Lab Results   Component Value Date    NEUTROABS 1.75 07/28/2022    NEUTROABS 1.98 07/28/2022    IRON 71 07/28/2022    TIBC 404 07/28/2022    LABIRON  18 (L) 07/28/2022    FERRITIN 533.90 (H) 07/28/2022    AOPDEYMJ74 >2,000 (H) 07/28/2022    FOLATE 10.00 07/28/2022     Lab Results   Component Value Date     233.0 (H) 07/07/2022    LABCA2 416.0 (H) 07/07/2022    HCGQUANT 1.16 06/03/2021         PATHOLOGY:  * Cannot find OR log *         RADIOLOGY DATA :  No radiology results for the last 7 days        Assessment & Plan     1.  Metastatic right breast cancer with skin, bone, liver and lung metastasis stage IV  - Review oncology history for prior treatment details  - Patient is currently on carboplatin and gemcitabine since 7/7/2022.  - We will proceed with day 8 of cycle 1 of chemotherapy today.  - We will continue close monitoring for chemotherapy related side effect  - Patient will return to clinic in 3 weeks with day 1 of cycle 3 of chemotherapy.  - Plan is to repeat CT of chest abdomen and pelvis with contrast after cycle 3 of chemotherapy which was discussed with patient and her family.    2.  Brain metastasis  - Patient was found to have calvarial metastasis without any brain parenchymal lesion  - Patient was evaluated by radiation oncologist Dr. Pulido and no radiation treatment was recommended    3.  Bone metastasis  - Remains on monthly Zometa since October 23, 2017    4.  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  Elevated liver function test:  - Secondary to liver metastasis    6.  History of DCIS of right breast in 2007  - Had a mastectomy done followed by tamoxifen for 5 years that completed around 2013.    7.  History of melanoma in situ s/p surgery    8.  Chemotherapy-induced neuropathy  - Remains on gabapentin    9.  Cancer related pain:  - Currently on fentanyl 12 mcg patch along with oxycodone 5 mg every 4 hours as needed  - Has been counseled about bowel regimen to prevent constipation    10.  Health maintenance: Patient does not smoke    11.  Advance care planning: Patient has living will on chart    12.   Anemia:  - Hemoglobin is 11.6.  Will check iron studies, ferritin, B12 and folate today  -Anemia work-up from today shows anemia of chronic disease with elevated ferritin of 533 and iron saturation of 18% we will hold off on starting any iron supplementation at present.  - Folate level is 10 we will start patient on folic acid p.o. daily.  Prescription for folic acid has been sent to her pharmacy today      13.  Prescriptions: Prescription for fentanyl patch has been sent to her pharmacy today.           PHQ-9 Total Score: 0   -Patient is not homicidal or suicidal.  No acute intervention required.    Kylie García reports a pain score of 2.  Given her pain assessment as noted, treatment options were discussed and the following options were decided upon as a follow-up plan to address the patient's pain: continuation of current treatment plan for pain.         Ovidio Meadows MD  7/28/2022  16:30 CDT        Part of this note may be an electronic transcription/translation of spoken language to printed text using the Dragon Dictation System.          CC:

## 2022-07-29 NOTE — TELEPHONE ENCOUNTER
Contacted pt and advised per Dr Meadows regarding lab work and supplements. PT verbalizes understanding.

## 2022-07-29 NOTE — TELEPHONE ENCOUNTER
----- Message from Ovidio Meadows MD sent at 7/28/2022  4:31 PM CDT -----  Regarding: labs  Please let patient know, her iron and B12 level is adequate no need to start any iron or B12 supplementation.  She needs to start taking folic acid p.o. daily.  Prescription for folic acid has been sent to her pharmacy today.  Thank you

## 2022-08-04 NOTE — PROGRESS NOTES
Patient states she will be starting Folic Acid and she did not receive chemo today due to low WBC's.cPatient states no bleeding problems or unexplained bruising. Patient instructed to continue current dosing schedule. Verbalizes understanding. Will recheck next week. Patient instructed regarding medication; results given and questions answered. Nutritional counseling given.  Dietary factors affecting therapy addressed.  Patient instructed to monitor for excessive bruising or bleeding.  Findings reported by Tata Nunez RN.    Today's INR is Lab Results - Last 18 Months   Lab Units 08/04/22  0000   INR  2.20*           Post-application: Motor, sensory, and vascular responses intact in the injured extremity./Pre-application: Motor, sensory, and vascular responses intact in the injured extremity./The patient/caregiver verbalized understanding of how to care for the injured extremity with splint

## 2022-08-05 NOTE — TELEPHONE ENCOUNTER
Rx Refill Note  Requested Prescriptions     Pending Prescriptions Disp Refills   • fentaNYL (DURAGESIC) 12 MCG/HR 10 each 0     Sig: Place 1 patch on the skin as directed by provider Every 72 (Seventy-Two) Hours.      Last office visit with prescribing clinician: 7/28/2022      Next office visit with prescribing clinician: 8/18/2022            Elvia Allen RN  08/05/22, 12:09 CDT

## 2022-08-11 NOTE — PROGRESS NOTES
Pt checked in the [facility] today. Patient states no med changes or bleeding problems or unexplained bruising. Patient instructed to continue current dosing schedule and eat a serving of green veggies today. Verbalizes understanding. Will recheck in 2 weeks. Patient instructed regarding medication; results given and questions answered. Nutritional counseling given.  Dietary factors affecting therapy addressed.  Patient instructed to monitor for excessive bruising or bleeding. Findings reported by Tata Nunez RN.    Today's INR is Lab Results - Last 18 Months   Lab Units 08/11/22  0000   INR  2.80*

## 2022-08-18 NOTE — TELEPHONE ENCOUNTER
Incoming Refill Request      Medication requested (name and dose): Gabapentin     Pharmacy where request should be sent: [facility]     Additional details provided by patient:     Best call back number: [phone]    Does the patient have less than a 3 day supply:  [] Yes  [x] No    Carmenza Unger  08/18/22, 15:02 CDT

## 2022-08-18 NOTE — TELEPHONE ENCOUNTER
Rx Refill Note  Requested Prescriptions     Pending Prescriptions Disp Refills   • gabapentin (NEURONTIN) 300 MG capsule 90 capsule 0     Sig: Take 1 capsule by mouth 3 (Three) Times a Day.      Last office visit with prescribing clinician: 7/28/2022      Next office visit with prescribing clinician: 8/25/2022            Alma Coyle  08/18/22, 15:30 CDT

## 2022-08-25 NOTE — PROGRESS NOTES
DATE OF VISIT: 8/25/2022      REASON FOR VISIT: Metastatic breast cancer with skin, bone, liver and pulmonary metastasis, cancer-related pain, DVT on Coumadin, neuropathy from chemotherapy, elevated liver function test      HISTORY OF PRESENT ILLNESS:   52-year-old female with medical problem consisting of DCIS of right breast diagnosed in 2007, anxiety/depression, metastatic breast cancer with bone, liver, spleen and pulmonary metastasis for which patient is currently on treatment with carboplatin and gemcitabine since July 7, 2022.  Patient is here to get day 1 of cycle 3 of carboplatin and gemcitabine today.  Denies any excessive nausea or vomiting or diarrhea with treatment.  Complains of hip pain.  Complaining of skin rash affecting face.  States skin rash has been getting worse with each treatment.  Denies any worsening neuropathy.  Denies any bowel or urinary incontinence.  Denies any new lymph node enlargement.            Oncology history:    1.  Metastatic right breast cancer with bone and liver metastases:  -Patient was initially diagnosed in July 2017 with axillary biopsy on right side  -S/p 6 cycles of chemotherapy with Taxotere and Cytoxan between July 26, 2017 and December 6, 2017  -Patient was evaluated by  at [facility] in [place] for second opinion.  -Patient received 20 cycles of chemotherapy with palbociclib and letrozole between July 30, 2018 and October 2, 2020.  -Due to enlarging mass in the right axilla patient's treatment was changed to Faslodex on October 16, 2020  -Due to continuous enlargement of axillary mass without any other progression patient received radiation treatment to axilla that was completed on March 4, 2021  -Patient has received 8 cycles of Faslodex between October 16, 2020 and May 7, 2021  -PET/CT done on May 21, 2021 shows progression of disease with axillary adenopathy, skin involvement as well as new onset of right-sided liver metastases.  -Patient received 3  cycles of Adriamycin and Cytoxan between Lupe 3, 2021 in July 29, 2021.  Due to worsening skin involvement from malignancy, treatment was changed to Afinitor with Aromasin on August 26, 2021  -Due to worsening liver function test, dose of Afinitor has been decreased to 5 mg p.o. daily since November 30, 2021.  -CT of chest, abdomen and pelvis with contrast done on January 12, 2022 shows progression of disease with worsening liver metastases as well as worsening skin metastases.  -Patient was started on cycle 1 of Xeloda on January 26, 2022.  -CT of chest, abdomen and pelvis with contrast done on March 14, 2022 showed progression of disease.  -Xeloda was discontinued on March 14, 2022.  -Patient was started on cycle 1 of eribulin  on April 7, 2022.  -Patient received 3 cycles of eribulin between April 7, 2022 and June 9, 2022.  - Restaging CT of chest, abdomen and pelvis with contrast done on June 20, 2022 showing progression of disease with enlarging liver lesion, enlarging lung nodule as well as worsening bone metastasis.  -  treatment was changed to carboplatin and gemcitabine  on July 7, 2022.            Past Medical History, Past Surgical History, Social History, Family History have been reviewed and are without significant changes except as mentioned.    Review of Systems   A comprehensive 14 point review of systems was performed and was negative except as mentioned in HPI.    Medications:  The current medication list was reviewed in the EMR    ALLERGIES:    Allergies   Allergen Reactions   • Percocet [Oxycodone-Acetaminophen] Nausea And Vomiting     PT IS NOT ALLERGIC. SHE HAS A FEW NEGATIVE SIDE EFFECTS.       Objective      Vitals:    08/25/22 0815   BP: 136/77   Pulse: 83   Temp: 98 °F (36.7 °C)   TempSrc: Temporal   SpO2: 97%   Weight: 90.3 kg (199 lb)   PainSc:   3   PainLoc: Rib Cage  Comment: RT SIDE, LATERAL     Current Status 8/11/2022   ECOG score 0       Physical Exam  Cardiovascular:      Comments:  Port-A-Cath present  Pulmonary:      Breath sounds: Normal breath sounds.   Skin:     Comments: Erythematous skin rash present on face.   Neurological:      Mental Status: She is alert and oriented to person, place, and time.           RECENT LABS:  Glucose   Date Value Ref Range Status   08/25/2022 132 (H) 65 - 99 mg/dL Final     Sodium   Date Value Ref Range Status   08/25/2022 141 136 - 145 mmol/L Final     Potassium   Date Value Ref Range Status   08/25/2022 3.7 3.5 - 5.2 mmol/L Final     CO2   Date Value Ref Range Status   08/25/2022 25.0 22.0 - 29.0 mmol/L Final     Chloride   Date Value Ref Range Status   08/25/2022 105 98 - 107 mmol/L Final     Anion Gap   Date Value Ref Range Status   08/25/2022 11.0 5.0 - 15.0 mmol/L Final     Creatinine   Date Value Ref Range Status   08/25/2022 0.74 0.57 - 1.00 mg/dL Final     BUN   Date Value Ref Range Status   08/25/2022 12 6 - 20 mg/dL Final     BUN/Creatinine Ratio   Date Value Ref Range Status   08/25/2022 16.2 7.0 - 25.0 Final     Calcium   Date Value Ref Range Status   08/25/2022 9.6 8.6 - 10.5 mg/dL Final     eGFR Non  Amer   Date Value Ref Range Status   02/16/2022 62 >60 mL/min/1.73 Final     Alkaline Phosphatase   Date Value Ref Range Status   08/25/2022 194 (H) 39 - 117 U/L Final     Total Protein   Date Value Ref Range Status   08/25/2022 6.8 6.0 - 8.5 g/dL Final     ALT (SGPT)   Date Value Ref Range Status   08/25/2022 62 (H) 1 - 33 U/L Final     AST (SGOT)   Date Value Ref Range Status   08/25/2022 61 (H) 1 - 32 U/L Final     Total Bilirubin   Date Value Ref Range Status   08/25/2022 0.4 0.0 - 1.2 mg/dL Final     Albumin   Date Value Ref Range Status   08/25/2022 4.10 3.50 - 5.20 g/dL Final     Globulin   Date Value Ref Range Status   08/25/2022 2.7 gm/dL Final     Lab Results   Component Value Date    WBC 4.99 08/25/2022    HGB 11.1 (L) 08/25/2022    HCT 32.0 (L) 08/25/2022    MCV 85.6 08/25/2022     08/25/2022     Lab Results   Component  Value Date    NEUTROABS 3.25 08/25/2022    IRON 71 07/28/2022    TIBC 404 07/28/2022    LABIRON 18 (L) 07/28/2022    FERRITIN 533.90 (H) 07/28/2022    MTSDXHEG15 >2,000 (H) 07/28/2022    FOLATE 10.00 07/28/2022     Lab Results   Component Value Date     368.0 (H) 08/04/2022    LABCA2 678.7 (H) 08/04/2022    HCGQUANT 1.16 06/03/2021       Component CA 27.29   Latest Ref Rng & Units 0.0 - 38.6 U/mL   3/12/2021 28.8   4/9/2021 32.6   5/7/2021 45.0 (H)   7/29/2021 118.1 (H)   9/23/2021 92.2 (H)   10/21/2021 82.2 (H)   11/18/2021 81.9 (H)   12/16/2021 100.9 (H)   1/12/2022 200.1 (H)   2/16/2022 315.4 (H)   4/7/2022 487.2 (H)   5/5/2022 262.9 (H)   6/2/2022 270.6 (H)   7/7/2022 416.0 (H)   8/4/2022 678.7 (H)             Component CA 15-3   Latest Ref Rng & Units <=25.0 U/mL   2/16/2022 178.6 (H)   4/7/2022 362.0 (H)   5/5/2022 146.0 (H)   6/2/2022 141.0 (H)   7/7/2022 233.0 (H)   8/4/2022 368.0 (H)       PATHOLOGY:  * Cannot find OR log *         RADIOLOGY DATA :  No radiology results for the last 7 days        Assessment & Plan     1.  Metastatic right breast cancer with skin, bone, liver and lung metastasis stage IV  - Review oncology history for prior treatment details  - Patient is currently on treatment with carboplatin and gemcitabine since July 7, 2022  - We will proceed with day 1 of cycle 3 of chemotherapy today.  - We will continue with close monitoring for chemotherapy related side effect  - We will have patient return to clinic in 3 weeks with repeat CBC, CMP on that day.  - We will get restaging CT of chest, abdomen and pelvis with contrast prior to next clinic visit in 3 weeks.    2.  Brain metastasis:  - Patient was found to have calvarial metastasis without any brain parenchymal lesion  - Patient was evaluated by radiation oncologist Dr. Pulido and no radiation treatment was recommended    3.  Bone metastasis  -Remains on monthly Zometa since October 23, 2017    4.  Internal jugular vein DVT on left  side  - Remains on Coumadin    5.  Elevated liver function test  - Secondary to liver metastases    6.  History of DCIS of right breast in 2007  - Had a mastectomy done followed by tamoxifen for 5 years that completed in 2013    7.  History of melanoma in situ s/p surgery    8.  Chemotherapy-induced neuropathy  - Remains on gabapentin    9.  Cancer related pain:  - Currently on fentanyl 12 mcg patch along with oxycodone 5 mg every 4 hours as needed  - Has been counseled about bowel regimen to prevent constipation    10.  Anemia:  - Secondary to chemotherapy.  - Hemoglobin today is 11.1  - Remains on folic acid p.o. daily.  - We will repeat anemia work-up upon next clinic visit in 3 weeks    11.  Health maintenance: Patient does not smoke    12.  Advance care planning: Patient has living will on chart    13.  Skin rash:  - Secondary to chemotherapy.  - Since skin rash is getting worse will prescribe patient prednisone 20 mg p.o. daily for 7 days.  Prescription for prednisone has been sent to her pharmacy today.             PHQ-9 Total Score: 0   -Patient is not homicidal or suicidal.  No acute intervention required.    Kylie García reports a pain score of 3.  Given her pain assessment as noted, treatment options were discussed and the following options were decided upon as a follow-up plan to address the patient's pain: continuation of current treatment plan for pain.         Ovidio Meadows MD  8/25/2022  08:29 CDT        Part of this note may be an electronic transcription/translation of spoken language to printed text using the Dragon Dictation System.          CC:

## 2022-08-25 NOTE — PROGRESS NOTES
Pt is starting Prednisone today for 7 days. Pt denied bleeding problems. Dose slightly adjusted due to steroids and pt instructed to have a serving of green veggies this weekend; pt verbalized. Patient instructed regarding medication; results given and questions answered. Nutritional counseling given.  Dietary factors affecting therapy addressed.  Patient instructed to monitor for excessive bruising or bleeding. Pt will have INR checked on Monday at [facility] in [place], [place] and they will fax us the results. Appt already made for Sept 1 when pt will return to the [facility]. Findings reported by Tata Nunez RN.    Today's INR is Lab Results - Last 18 Months   Lab Units 08/25/22  0000   INR  2.50*

## 2022-09-01 NOTE — PROGRESS NOTES
Checked pt while in [facility]. Denies any bleeding issues or bruising. Finished oral steroid yesterday. Pt instructed to continue current dosage schedule. Pt verbalizes understanding. Will recheck in 2 weeks. Patient instructed regarding medication; results given and questions answered. Nutritional counseling given.  Dietary factors affecting therapy addressed.  Patient instructed to monitor for excessive bruising or bleeding.  Findings reported by Vjiaya Daniel RN  Today's INR is Lab Results - Last 18 Months   Lab Units 09/01/22  0000   INR  2.30*

## 2022-09-02 NOTE — TELEPHONE ENCOUNTER
Rx Refill Note  Requested Prescriptions     Pending Prescriptions Disp Refills   • Scopolamine (Transderm-Scop, 1.5 MG,) 1 MG/3DAYS patch 10 patch 1     Sig: Place 1 patch on the skin as directed by provider Every 72 (Seventy-Two) Hours.      Last office visit with prescribing clinician: 8/25/2022      Next office visit with prescribing clinician: 9/15/2022            Alma Coyle  09/02/22, 08:19 CDT

## 2022-09-02 NOTE — TELEPHONE ENCOUNTER
Incoming Refill Request      Medication requested (name and dose): Scopolamine (Transderm-Scop, 1.5 MG,) 1 MG/3DAYS patch    Pharmacy where request should be sent: [facility]     Additional details provided by patient: patient has: patient stated she has one patch left     Best call back number: [phone]    Does the patient have less than a 3 day supply:  [x] Yes  [] No    Amara Gregory MA  09/02/22, 08:11 CDT

## 2022-09-15 NOTE — PROGRESS NOTES
Pt is no longer going to be taking her current chemo regimen. Pt denied other med changes or bleeding problems. Dose adjusted today only and pt instructed to have a serving of green veggies today; pt verbalized. Pt states she was given the decision between one more chemo or hospice and she is going to decide soon but is leaning toward hospice. Pt states she was also advised that if she chose hospice that she could stop her warfarin. Patient instructed regarding medication; results given and questions answered. Nutritional counseling given.  Dietary factors affecting therapy addressed.  Patient instructed to monitor for excessive bruising or bleeding. Appt made for 2.5 weeks. Findings reported by Tata Nunez RN.    Today's INR is Lab Results - Last 18 Months   Lab Units 09/15/22  0000   INR  3.10*

## 2022-09-15 NOTE — PROGRESS NOTES
DATE OF VISIT: 9/15/2022      REASON FOR VISIT: Metastatic breast cancer with skin, bone, liver and pulmonary metastasis, cancer-related pain, DVT on Coumadin, neuropathy from chemotherapy, elevated liver function test      HISTORY OF PRESENT ILLNESS:   52-year-old female with medical problem consisting of DCIS of right breast diagnosed in 2007, anxiety/depression, metastatic breast cancer with bone, liver, spleen and pulmonary metastasis for which patient is currently on treatment with carboplatin and gemcitabine since July 7, 2022.  Patient is scheduled to start day 1 of cycle 4 of gemcitabine and carboplatin today.  Patient had a restaging CT scan done recently, she is here to discuss results and further recommendation.  Continues to have skin lesion involving the right chest wall and axillary area.  Complains of skin rash affecting face after gemcitabine.  Denies any recent worsening of neuropathy.  Complains of chronic back pain and hip pain.  Denies any bowel or bladder incontinence.  Denies any new lymph node enlargement.            Oncology history:    1.  Metastatic right breast cancer with bone and liver metastases:  -Patient was initially diagnosed in July 2017 with axillary biopsy on right side  -S/p 6 cycles of chemotherapy with Taxotere and Cytoxan between July 26, 2017 and December 6, 2017  -Patient was evaluated by  at [facility] in [place] for second opinion.  -Patient received 20 cycles of chemotherapy with palbociclib and letrozole between July 30, 2018 and October 2, 2020.  -Due to enlarging mass in the right axilla patient's treatment was changed to Faslodex on October 16, 2020  -Due to continuous enlargement of axillary mass without any other progression patient received radiation treatment to axilla that was completed on March 4, 2021  -Patient has received 8 cycles of Faslodex between October 16, 2020 and May 7, 2021  -PET/CT done on May 21, 2021 shows progression of disease with  axillary adenopathy, skin involvement as well as new onset of right-sided liver metastases.  -Patient received 3 cycles of Adriamycin and Cytoxan between Lupe 3, 2021 in July 29, 2021.  Due to worsening skin involvement from malignancy, treatment was changed to Afinitor with Aromasin on August 26, 2021  -Due to worsening liver function test, dose of Afinitor has been decreased to 5 mg p.o. daily since November 30, 2021.  -CT of chest, abdomen and pelvis with contrast done on January 12, 2022 shows progression of disease with worsening liver metastases as well as worsening skin metastases.  -Patient was started on cycle 1 of Xeloda on January 26, 2022.  -CT of chest, abdomen and pelvis with contrast done on March 14, 2022 showed progression of disease.  -Xeloda was discontinued on March 14, 2022.  -Patient was started on cycle 1 of eribulin  on April 7, 2022.  -Patient received 3 cycles of eribulin between April 7, 2022 and June 9, 2022.  - Restaging CT of chest, abdomen and pelvis with contrast done on June 20, 2022 showing progression of disease with enlarging liver lesion, enlarging lung nodule as well as worsening bone metastasis.  -  treatment was changed to carboplatin and gemcitabine  on July 7, 2022.  -Patient received 3 cycles of chemotherapy with carboplatin and gemcitabine between July 7, 2022 until September 1, 2022  - CT of chest, abdomen and pelvis with contrast on September 13, 2022 after cycle 3 of carboplatin and gemcitabine is showing progression of disease.          Past Medical History, Past Surgical History, Social History, Family History have been reviewed and are without significant changes except as mentioned.    Review of Systems   A comprehensive 14 point review of systems was performed and was negative except as mentioned in HPI.    Medications:  The current medication list was reviewed in the EMR    ALLERGIES:    Allergies   Allergen Reactions   • Percocet [Oxycodone-Acetaminophen] Nausea  And Vomiting     PT IS NOT ALLERGIC. SHE HAS A FEW NEGATIVE SIDE EFFECTS.       Objective      Vitals:    09/15/22 0944   BP: 118/70   Pulse: 74   Temp: 98.1 °F (36.7 °C)   TempSrc: Temporal   SpO2: 97%   Weight: 92.1 kg (203 lb)   PainSc:   4   PainLoc: Rib Cage  Comment: on right side underneath RT armpit     Current Status 8/11/2022   ECOG score 0       Physical Exam  Cardiovascular:      Comments: Port-A-Cath present  Pulmonary:      Breath sounds: Normal breath sounds.   Neurological:      Mental Status: She is alert and oriented to person, place, and time.           RECENT LABS:  Glucose   Date Value Ref Range Status   09/15/2022 79 65 - 99 mg/dL Final     Sodium   Date Value Ref Range Status   09/15/2022 141 136 - 145 mmol/L Final     Potassium   Date Value Ref Range Status   09/15/2022 3.9 3.5 - 5.2 mmol/L Final     CO2   Date Value Ref Range Status   09/15/2022 26.0 22.0 - 29.0 mmol/L Final     Chloride   Date Value Ref Range Status   09/15/2022 105 98 - 107 mmol/L Final     Anion Gap   Date Value Ref Range Status   09/15/2022 10.0 5.0 - 15.0 mmol/L Final     Creatinine   Date Value Ref Range Status   09/15/2022 0.72 0.57 - 1.00 mg/dL Final     BUN   Date Value Ref Range Status   09/15/2022 8 6 - 20 mg/dL Final     BUN/Creatinine Ratio   Date Value Ref Range Status   09/15/2022 11.1 7.0 - 25.0 Final     Calcium   Date Value Ref Range Status   09/15/2022 9.5 8.6 - 10.5 mg/dL Final     eGFR Non  Amer   Date Value Ref Range Status   02/16/2022 62 >60 mL/min/1.73 Final     Alkaline Phosphatase   Date Value Ref Range Status   09/15/2022 271 (H) 39 - 117 U/L Final     Total Protein   Date Value Ref Range Status   09/15/2022 6.9 6.0 - 8.5 g/dL Final     ALT (SGPT)   Date Value Ref Range Status   09/15/2022 99 (H) 1 - 33 U/L Final     AST (SGOT)   Date Value Ref Range Status   09/15/2022 100 (H) 1 - 32 U/L Final     Total Bilirubin   Date Value Ref Range Status   09/15/2022 1.1 0.0 - 1.2 mg/dL Final      Albumin   Date Value Ref Range Status   09/15/2022 3.80 3.50 - 5.20 g/dL Final     Globulin   Date Value Ref Range Status   09/15/2022 3.1 gm/dL Final     Lab Results   Component Value Date    WBC 2.95 (L) 09/15/2022    HGB 9.6 (L) 09/15/2022    HCT 29.4 (L) 09/15/2022    MCV 93.3 09/15/2022     09/15/2022     Lab Results   Component Value Date    NEUTROABS 1.74 09/15/2022    IRON 71 07/28/2022    TIBC 404 07/28/2022    LABIRON 18 (L) 07/28/2022    FERRITIN 533.90 (H) 07/28/2022    [id number] >2,000 (H) 07/28/2022    FOLATE 10.00 07/28/2022     Lab Results   Component Value Date     501.0 (H) 08/25/2022    LABCA2 801.6 (H) 08/25/2022    HCGQUANT 1.16 06/03/2021         PATHOLOGY:  * Cannot find OR log *         RADIOLOGY DATA :  CT Chest With Contrast Diagnostic    Result Date: 9/14/2022  Worsening hepatic metastases compared with previous study. Stable to slightly worsened lung parenchymal and right breast lesions. Grossly stable diffuse blastic bony metastases. See body of report for details. Electronically signed by:  Freeman Kiran MD  9/14/2022 11:25 AM CDT Workstation: [id number]    CT Abdomen Pelvis With Contrast    Result Date: 9/14/2022  Worsening hepatic metastases compared with previous study. Stable to slightly worsened lung parenchymal and right breast lesions. Grossly stable diffuse blastic bony metastases. See body of report for details. Electronically signed by:  Freeman Kiran MD  9/14/2022 11:25 AM CDT Workstation: [id number]          Assessment & Plan     1.  Metastatic right breast cancer with skin, bone, liver and lung metastases, stage IV  - Review oncology history for prior treatment details  - Patient has received 3 cycles of carboplatin and gemcitabine between July 7, 2022 and September 1, 2022  - CT of chest, abdomen and pelvis with contrast after cycle 3 of gemcitabine on September 13, 2022 was reviewed with radiologist, discussed with patient.  CT scan is showing  progression of disease in terms of worsening pulmonary and liver metastasis.  Result of CT scan showing progression of disease which is potentially life-threatening was discussed with patient and her family.  - Patient has literally exhausted all available treatment for metastatic breast cancer in terms of chemotherapy.  -Option of hospice versus with  Re-introduction of taxane-based treatment was discussed with patient today.  - Patient wants to discuss with her family before making final decision.  She will contact our office next week with her final decision.  Patient is more inclined towards hospice after discussion today.    2.  Brain metastases  - Patient was found to have calvarial metastasis without any brain parenchymal lesion  - Was evaluated by Dr. Pulido and no radiation treatment was recommended    3.  Bone metastasis  - Remains on monthly Zometa since October 23, 2017    4.  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  Elevated liver function test:  - Secondary to liver metastases    6.  History of DCIS of right breast in 2007  - Had a mastectomy done followed by tamoxifen for 5 years that completed in 2013    7.  History of melanoma in situ s/p surgery    8.  Chemotherapy-induced neuropathy  - Remains on gabapentin    9.  Cancer related pain:  - Treatments 1 fentanyl 12 mcg patch along with oxycodone 5 mg every 4 hours as needed  - Has been counseled about bowel regimen to prevent constipation    10.  Anemia:  - Hemoglobin is 9.6  - Currently on folic acid p.o. daily    11.  Health maintenance: Patient does not smoke    12.  Advance care planning: Patient has living will on chart    13.  Prescriptions: Prescription for clindamycin topical gel has been sent to her pharmacy today                 PHQ-9 Total Score: 0   -Patient is not homicidal or suicidal.  No acute intervention required.    Kylie García reports a pain score of 4.  Given her pain assessment as noted,  treatment options were discussed and the following options were decided upon as a follow-up plan to address the patient's pain: continuation of current treatment plan for pain.         Ovidio Meadows MD  9/15/2022  18:54 CDT        Part of this note may be an electronic transcription/translation of spoken language to printed text using the Dragon Dictation System.          CC:

## 2022-09-15 NOTE — TELEPHONE ENCOUNTER
Rx Refill Note  Requested Prescriptions     Pending Prescriptions Disp Refills   • clindamycin (CLINDAGEL) 1 % gel [Pharmacy Med Name: CLINDAMYCIN GEL 1%] 60 g 0     Sig: APPLY 1 APPLICATION TOPICALLY TWICE DAILY TO THE APPROPRIATE AREA AS DIRECTED      Last office visit with prescribing clinician: 8/25/2022      Next office visit with prescribing clinician: 9/15/2022            Alma Coyle  09/15/22, 09:48 CDT

## 2022-09-15 NOTE — TELEPHONE ENCOUNTER
Rx Refill Note  Requested Prescriptions     Pending Prescriptions Disp Refills   • clindamycin (CLINDAGEL) 1 % gel [Pharmacy Med Name: CLINDAMYCIN GEL 1%] 60 g 0     Sig: APPLY 1 APPLICATION TOPICALLY TWICE DAILY TO THE APPROPRIATE AREA AS DIRECTED      Last office visit with prescribing clinician: 8/25/2022      Next office visit with prescribing clinician: 9/15/2022            Raine MÁRQUEZ Rep  09/15/22, 08:28 CDT

## 2022-09-22 NOTE — TELEPHONE ENCOUNTER
Rx Refill Note  Requested Prescriptions     Pending Prescriptions Disp Refills   • gabapentin (NEURONTIN) 300 MG capsule [Pharmacy Med Name: GABAPENTIN 300MG] 90 capsule 0     Sig: TAKE 1 CAPSULE BY MOUTH THREE TIMES DAILY      Last office visit with prescribing clinician: 9/15/2022      Next office visit with prescribing clinician: Visit date not found            Solange García RN  09/22/22, 09:01 CDT

## 2022-09-23 NOTE — TELEPHONE ENCOUNTER
Oncology SW reached out to pt by phone in follow up to advisement of hospice referral.  SW spoke with Mrs García, providing information, options and support.  She opted for [facility], noting that [facility] hospice does not have coverage in [place] at this time.  Per patient direction, SW contacted [facility] with referral and pertinent info faxed. Plan:  Hospice  to call pt today and admit according to pt request.

## 2022-10-03 PROBLEM — Z79.01 LONG TERM CURRENT USE OF ANTICOAGULANT THERAPY: Chronic | Status: RESOLVED | Noted: 2017-11-15 | Resolved: 2022-01-01

## 2022-10-03 PROBLEM — Z86.718: Status: RESOLVED | Noted: 2022-01-01 | Resolved: 2022-01-01

## 2022-10-03 PROBLEM — Z51.81 ENCOUNTER FOR THERAPEUTIC DRUG MONITORING: Status: RESOLVED | Noted: 2020-10-30 | Resolved: 2022-01-01

## 2022-10-03 NOTE — PROGRESS NOTES
Pt is now on hospice and Warfarin is discontinued per Solange at [facility].  Will resolve episode.

## 2022-10-03 NOTE — TELEPHONE ENCOUNTER
Incoming Refill Request      Medication requested (name and dose):     Pharmacy where request should be sent:     Additional details provided by patient:  2nd call for RX's please review and call pt       By Carmenza Unger            Best call back number:     Does the patient have less than a 3 day supply:  [] Yes  [] No    Carmenza Unger  10/03/22, 14:47 CDT

## (undated) DEVICE — SOL IRR NACL 0.9PCT BT 1000ML

## (undated) DEVICE — GOWN,NON-REINFORCED,4XL: Brand: MEDLINE

## (undated) DEVICE — SUT VIC 2/0 TIES 18IN J111T

## (undated) DEVICE — APPL CHLORAPREP W/TINT 26ML ORNG

## (undated) DEVICE — CVR FLUOROSCOPE C/ARM W/TP 36X28IN

## (undated) DEVICE — STERILE POLYISOPRENE POWDER-FREE SURGICAL GLOVES WITH EMOLLIENT COATING: Brand: PROTEXIS

## (undated) DEVICE — RESERVOIR,SUCTION,100CC,SILICONE: Brand: MEDLINE

## (undated) DEVICE — TOWEL,OR,DSP,ST,BLUE,DLX,4/PK,20PK/CS: Brand: MEDLINE

## (undated) DEVICE — TOTAL TRAY, 16FR 10ML SIL FOLEY, URN: Brand: MEDLINE

## (undated) DEVICE — SKIN AFFIX SURG ADHESIVE 72/CS 0.55ML: Brand: MEDLINE

## (undated) DEVICE — STCKNT IMPERV 12IN STRL

## (undated) DEVICE — DRSNG SURESITE WNDW 4X4.5

## (undated) DEVICE — SUT VIC 3/0 RB1 27IN J215H

## (undated) DEVICE — SUT VICRYL 3-0 SH-1 PO 18IN J772D

## (undated) DEVICE — INTENDED FOR TISSUE SEPARATION, AND OTHER PROCEDURES THAT REQUIRE A SHARP SURGICAL BLADE TO PUNCTURE OR CUT.: Brand: BARD-PARKER ® CARBON RIB-BACK BLADES

## (undated) DEVICE — BNDG ELAS CO-FLEX SLF ADHR 4IN5YD LF STRL

## (undated) DEVICE — GLV SURG TRIUMPH LT PF LTX 6.5 STRL

## (undated) DEVICE — STPLR SKIN VISISTAT WD 35CT

## (undated) DEVICE — GLV SURG SENSICARE GREEN W/ALOE PF LF 7 STRL

## (undated) DEVICE — DRN WND FLT 90D HUBLSS FULL TROC 10MM LF

## (undated) DEVICE — SUT SILK 2/0 FS BLK 18IN 685G

## (undated) DEVICE — GOWN,PREVENTION PLUS,XLNG/XXLARGE,STRL: Brand: MEDLINE

## (undated) DEVICE — GLV SURG TRIUMPH LT PF LTX 7 STRL

## (undated) DEVICE — PK MAJ PROC LF 60

## (undated) DEVICE — SUT PROLN 3/0 SH D/A 36IN 8522H

## (undated) DEVICE — ST CVR PROB PULLUP ULTRASND 5X48IN

## (undated) DEVICE — PIN SAFETY MED

## (undated) DEVICE — SUT MONOCRYL 4/0 PS2 27IN Y426H ETY426H

## (undated) DEVICE — GLV SURG TRIUMPH LT PF LTX 7.5 STRL